# Patient Record
Sex: FEMALE | Race: BLACK OR AFRICAN AMERICAN | NOT HISPANIC OR LATINO | Employment: FULL TIME | URBAN - METROPOLITAN AREA
[De-identification: names, ages, dates, MRNs, and addresses within clinical notes are randomized per-mention and may not be internally consistent; named-entity substitution may affect disease eponyms.]

---

## 2017-01-05 ENCOUNTER — TELEPHONE (OUTPATIENT)
Dept: INTERNAL MEDICINE | Facility: CLINIC | Age: 57
End: 2017-01-05

## 2017-01-09 ENCOUNTER — TELEPHONE (OUTPATIENT)
Dept: INTERNAL MEDICINE | Facility: CLINIC | Age: 57
End: 2017-01-09

## 2017-01-09 RX ORDER — HYDROCHLOROTHIAZIDE 12.5 MG/1
25 CAPSULE ORAL DAILY
Qty: 60 CAPSULE | Refills: 6 | Status: SHIPPED | OUTPATIENT
Start: 2017-01-09 | End: 2017-12-05 | Stop reason: SDUPTHER

## 2017-01-09 RX ORDER — HYDROCHLOROTHIAZIDE 12.5 MG/1
25 CAPSULE ORAL DAILY
Qty: 30 CAPSULE | Refills: 6 | Status: SHIPPED | OUTPATIENT
Start: 2017-01-09 | End: 2017-01-09 | Stop reason: SDUPTHER

## 2017-01-09 RX ORDER — LACTULOSE 10 G/15ML
SOLUTION ORAL; RECTAL
Qty: 236 ML | Refills: 0 | Status: SHIPPED | OUTPATIENT
Start: 2017-01-09 | End: 2017-02-15 | Stop reason: SDUPTHER

## 2017-01-09 RX ORDER — NISOLDIPINE 25.5 MG/1
TABLET, FILM COATED, EXTENDED RELEASE ORAL
Qty: 180 TABLET | Refills: 3 | Status: SHIPPED | OUTPATIENT
Start: 2017-01-09 | End: 2017-12-23 | Stop reason: SDUPTHER

## 2017-01-09 NOTE — TELEPHONE ENCOUNTER
----- Message from Gail Rogers sent at 1/9/2017  9:42 AM CST -----  Contact: self  - [phone]  RX request - refill or new RX.  Is this a refill or new RX:  refill  RX name and strength: Nisoldipine 25.5 mg   Directions:   Is this a 30 day or 90 day RX:  30 day  Pharmacy name and phone #: [facility]  [phone]    Comments:      RX request - refill or new RX.  Is this a refill or new RX:hydrochlorothiazide (MICROZIDE) 12.5    RX name and strength:   Directions:   Is this a 30 day or 90 day RX: 30 day          RX request - refill or new RX.  Is this a refill or new RX:    RX name and strength: lactulose (CHRONULAC) 10 gram/15 mL   Directions:   Is this a 30 day or 90 day RX:    :

## 2017-01-25 DIAGNOSIS — R10.13 DYSPEPSIA: ICD-10-CM

## 2017-01-25 RX ORDER — SUCRALFATE 1 G/1
TABLET ORAL
Qty: 90 TABLET | Refills: 0 | OUTPATIENT
Start: 2017-01-25

## 2017-02-09 DIAGNOSIS — R10.13 DYSPEPSIA: ICD-10-CM

## 2017-02-09 RX ORDER — METOPROLOL TARTRATE 100 MG/1
TABLET ORAL
Qty: 180 TABLET | Refills: 1 | Status: SHIPPED | OUTPATIENT
Start: 2017-02-09 | End: 2017-05-18 | Stop reason: SDUPTHER

## 2017-02-09 RX ORDER — HYOSCYAMINE SULFATE 0.12 MG/1
TABLET SUBLINGUAL
Qty: 30 TABLET | Refills: 0 | Status: SHIPPED | OUTPATIENT
Start: 2017-02-09 | End: 2018-01-19 | Stop reason: SDUPTHER

## 2017-02-09 RX ORDER — SUCRALFATE 1 G/1
1 TABLET ORAL 3 TIMES DAILY
Qty: 90 TABLET | Refills: 2 | Status: SHIPPED | OUTPATIENT
Start: 2017-02-09 | End: 2017-07-20 | Stop reason: SDUPTHER

## 2017-02-09 RX ORDER — HYOSCYAMINE SULFATE 0.12 MG/1
TABLET SUBLINGUAL
Qty: 30 TABLET | Refills: 0 | Status: SHIPPED | OUTPATIENT
Start: 2017-02-09 | End: 2017-02-09 | Stop reason: SDUPTHER

## 2017-02-09 NOTE — TELEPHONE ENCOUNTER
----- Message from Ofelia Roberson sent at 2/9/2017  9:02 AM CST -----  Contact: Self- Joslyn- [phone]  RX request - refill or new RX.  Is this a refill or new RX:  New Rx  RX name and strength: hyoscyamine (LEVSIN/SL) 0.125 mg Subl  Directions:   Is this a 30 day or 90 day RX:  30 day  Pharmacy name and phone #: [place] [phone], [place] - [address] [place] [address]  Comments:      Is this a refill or new RX:  New Rx  RX name and strength: sucralfate (CARAFATE) 1 gram tablet  Directions:   Is this a 30 day or 90 day RX:  30 day

## 2017-02-15 RX ORDER — LACTULOSE 10 G/15ML
SOLUTION ORAL; RECTAL
Qty: 473 ML | Refills: 1 | Status: SHIPPED | OUTPATIENT
Start: 2017-02-15 | End: 2017-08-02 | Stop reason: SDUPTHER

## 2017-02-23 ENCOUNTER — LAB VISIT (OUTPATIENT)
Dept: LAB | Facility: HOSPITAL | Age: 57
End: 2017-02-23
Attending: INTERNAL MEDICINE
Payer: COMMERCIAL

## 2017-02-23 LAB
ALBUMIN SERPL BCP-MCNC: 3.4 G/DL
ALP SERPL-CCNC: 96 U/L
ALT SERPL W/O P-5'-P-CCNC: 16 U/L
ANION GAP SERPL CALC-SCNC: 13 MMOL/L
AST SERPL-CCNC: 17 U/L
BILIRUB SERPL-MCNC: 0.3 MG/DL
BUN SERPL-MCNC: 26 MG/DL
CALCIUM SERPL-MCNC: 10 MG/DL
CHLORIDE SERPL-SCNC: 98 MMOL/L
CHOLEST/HDLC SERPL: 4.4 {RATIO}
CO2 SERPL-SCNC: 26 MMOL/L
CREAT SERPL-MCNC: 0.9 MG/DL
EST. GFR  (AFRICAN AMERICAN): >60 ML/MIN/1.73 M^2
EST. GFR  (NON AFRICAN AMERICAN): >60 ML/MIN/1.73 M^2
ESTIMATED AVG GLUCOSE: 235 MG/DL
GLUCOSE SERPL-MCNC: 266 MG/DL
HBA1C MFR BLD HPLC: 9.8 %
HDL/CHOLESTEROL RATIO: 22.9 %
HDLC SERPL-MCNC: 214 MG/DL
HDLC SERPL-MCNC: 49 MG/DL
LDLC SERPL CALC-MCNC: 131.8 MG/DL
NONHDLC SERPL-MCNC: 165 MG/DL
POTASSIUM SERPL-SCNC: 4 MMOL/L
PROT SERPL-MCNC: 8 G/DL
SODIUM SERPL-SCNC: 137 MMOL/L
TRIGL SERPL-MCNC: 166 MG/DL
TSH SERPL DL<=0.005 MIU/L-ACNC: 2.55 UIU/ML

## 2017-02-23 PROCEDURE — 84443 ASSAY THYROID STIM HORMONE: CPT

## 2017-02-23 PROCEDURE — 83036 HEMOGLOBIN GLYCOSYLATED A1C: CPT

## 2017-02-23 PROCEDURE — 36415 COLL VENOUS BLD VENIPUNCTURE: CPT

## 2017-02-23 PROCEDURE — 80061 LIPID PANEL: CPT

## 2017-02-23 PROCEDURE — 80053 COMPREHEN METABOLIC PANEL: CPT

## 2017-03-01 ENCOUNTER — TELEPHONE (OUTPATIENT)
Dept: INTERNAL MEDICINE | Facility: CLINIC | Age: 57
End: 2017-03-01

## 2017-03-01 DIAGNOSIS — M17.0 OSTEOARTHRITIS OF BOTH KNEES, UNSPECIFIED OSTEOARTHRITIS TYPE: Primary | ICD-10-CM

## 2017-03-01 RX ORDER — HYDROCODONE BITARTRATE AND ACETAMINOPHEN 10; 325 MG/1; MG/1
1 TABLET ORAL EVERY 4 HOURS PRN
Qty: 180 TABLET | Refills: 0 | Status: SHIPPED | OUTPATIENT
Start: 2017-03-01 | End: 2017-05-01 | Stop reason: SDUPTHER

## 2017-03-01 NOTE — TELEPHONE ENCOUNTER
----- Message from Emory Gong sent at 3/1/2017  9:41 AM CST -----  Contact: Self [phone]  Pt would like to speak with the nurse to see about getting her Knee Injections,Please call

## 2017-03-01 NOTE — TELEPHONE ENCOUNTER
Pt stated that you injected her knees approximately 6 months ago. She would like to have both knees re-injected. Would you need to see pt in clinic first? Or will you just place Euflexxa authorization request? Please advise.

## 2017-03-01 NOTE — TELEPHONE ENCOUNTER
----- Message from Chris Caicedo sent at 3/1/2017  9:44 AM CST -----  Contact: Self [phone]  Type: Rx    Name of medication(s):hydrocodone-acetaminophen 10-325mg (NORCO)  mg Tab     Is this a refill? New rx? Refill    Who prescribed medication? Dr Petersen    Pharmacy Name, Phone, & Location: [facility]     Comments:Advice    Thanks

## 2017-03-03 RX ORDER — QUINAPRIL 40 MG/1
TABLET ORAL
Qty: 90 TABLET | Refills: 0 | Status: SHIPPED | OUTPATIENT
Start: 2017-03-03 | End: 2017-06-07 | Stop reason: SDUPTHER

## 2017-03-03 NOTE — TELEPHONE ENCOUNTER
----- Message from Emmy Branham sent at 3/3/2017  9:11 AM CST -----  Contact: [phone]  RX request - refill or new RX.  Is this a refill or new RX:  refill  RX name and strength: quinapril (ACCUPRIL) 40 MG tablet  Directions: TAKE ONE TABLET BY MOUTH ONCE DAILY IN THE EVENING  Is this a 30 day or 90 day RX:  90  Pharmacy name and phone #: [facility] [phone] (Phone)  [phone] (Fax  Comments:

## 2017-03-03 NOTE — TELEPHONE ENCOUNTER
PA for euflexxa placed - please submit to authorizations and Please schedule patient for an appointment with me. Thank you.

## 2017-03-06 ENCOUNTER — TELEPHONE (OUTPATIENT)
Dept: INTERNAL MEDICINE | Facility: CLINIC | Age: 57
End: 2017-03-06

## 2017-03-06 NOTE — TELEPHONE ENCOUNTER
Spoke with pt. Appt for Euflexxa has been scheduled. Pt verbalized understanding of appt details.

## 2017-03-06 NOTE — TELEPHONE ENCOUNTER
----- Message from Sylvie Loo sent at 3/6/2017  9:57 AM CST -----  Contact: patient [phone]  Pt is returning a call from the nurse about a knee injection.

## 2017-03-06 NOTE — TELEPHONE ENCOUNTER
Spoke with pt in regards to scheduling bilateral Euflexxa injections. Appt has been scheduled. Pt has been notified and verbalized understanding of appt details.

## 2017-03-09 ENCOUNTER — OFFICE VISIT (OUTPATIENT)
Dept: INTERNAL MEDICINE | Facility: CLINIC | Age: 57
End: 2017-03-09
Payer: COMMERCIAL

## 2017-03-09 VITALS
HEART RATE: 68 BPM | HEIGHT: 64 IN | WEIGHT: 293 LBS | RESPIRATION RATE: 16 BRPM | BODY MASS INDEX: 50.02 KG/M2 | SYSTOLIC BLOOD PRESSURE: 128 MMHG | DIASTOLIC BLOOD PRESSURE: 86 MMHG | TEMPERATURE: 99 F

## 2017-03-09 DIAGNOSIS — E03.9 HYPOTHYROIDISM, UNSPECIFIED TYPE: ICD-10-CM

## 2017-03-09 DIAGNOSIS — I10 HTN, GOAL BELOW 130/80: ICD-10-CM

## 2017-03-09 DIAGNOSIS — E66.01 MORBID OBESITY WITH BMI OF 60.0-69.9, ADULT: ICD-10-CM

## 2017-03-09 DIAGNOSIS — E78.5 HYPERLIPIDEMIA, UNSPECIFIED HYPERLIPIDEMIA TYPE: ICD-10-CM

## 2017-03-09 PROCEDURE — 99214 OFFICE O/P EST MOD 30 MIN: CPT | Mod: S$GLB,,, | Performed by: INTERNAL MEDICINE

## 2017-03-09 PROCEDURE — 99999 PR PBB SHADOW E&M-EST. PATIENT-LVL III: CPT | Mod: PBBFAC,,, | Performed by: INTERNAL MEDICINE

## 2017-03-09 RX ORDER — BUPROPION HYDROCHLORIDE 150 MG/1
150 TABLET ORAL DAILY
Qty: 30 TABLET | Refills: 11 | Status: SHIPPED | OUTPATIENT
Start: 2017-03-09 | End: 2020-04-15

## 2017-03-09 NOTE — PROGRESS NOTES
Subjective:       Patient ID: Violeta Champion is a 56 y.o. female.    Chief Complaint: Follow-up (6 month f/u)    Patient is a 56 y.o.female who presents today for follow up. She stopped her statin and hasn't been taking the januvia daily. She has not been following the diet either.    Cholesterol: (reviewed)  Vaccines: Influenza (yearly); Tetanus (done) ; Prevnar(declines)  Eye exam: dec 2016  Mammogram: nov 2016  Gyn exam: total hysterectomy  Colonoscopy: due in April 2018    Review of Systems   Constitutional: Negative for appetite change, chills, diaphoresis, fatigue and fever.   HENT: Negative for congestion, dental problem, ear discharge, ear pain, hearing loss, postnasal drip, sinus pressure and sore throat.    Eyes: Negative for discharge, redness and itching.   Respiratory: Negative for cough, chest tightness, shortness of breath and wheezing.    Cardiovascular: Negative for chest pain, palpitations and leg swelling.   Gastrointestinal: Negative for abdominal pain, constipation, diarrhea, nausea and vomiting.   Endocrine: Negative for cold intolerance and heat intolerance.   Genitourinary: Negative for difficulty urinating, frequency, hematuria and urgency.   Musculoskeletal: Negative for arthralgias, back pain, gait problem, myalgias and neck pain.   Skin: Negative for color change and rash.   Neurological: Negative for dizziness, syncope and headaches.   Hematological: Negative for adenopathy.   Psychiatric/Behavioral: Negative for behavioral problems and sleep disturbance. The patient is not nervous/anxious.        Objective:      Physical Exam   Constitutional: She is oriented to person, place, and time. She appears well-developed and well-nourished. No distress.   HENT:   Head: Normocephalic and atraumatic.   Right Ear: External ear normal.   Left Ear: External ear normal.   Eyes: Conjunctivae and EOM are normal. Pupils are equal, round, and reactive to light. Right eye exhibits no discharge. Left eye  exhibits no discharge. No scleral icterus.   Neck: Normal range of motion. Neck supple. No JVD present. No thyromegaly present.   Cardiovascular: Normal rate, regular rhythm, normal heart sounds and intact distal pulses.  Exam reveals no gallop and no friction rub.    No murmur heard.  Pulmonary/Chest: Effort normal and breath sounds normal. No stridor. No respiratory distress. She has no wheezes. She has no rales. She exhibits no tenderness.   Abdominal: Soft. Bowel sounds are normal. She exhibits no distension. There is no tenderness. There is no rebound.   Musculoskeletal: Normal range of motion. She exhibits no edema or tenderness.   Lymphadenopathy:     She has no cervical adenopathy.   Neurological: She is alert and oriented to person, place, and time.   Skin: Skin is warm. No rash noted. She is not diaphoretic. No erythema.   Psychiatric: She has a normal mood and affect. Her behavior is normal.   Nursing note and vitals reviewed.      Assessment and Plan:       1. Uncontrolled type 2 diabetes mellitus without complication, without long-term current use of insulin  - continue metformin  - increase januvia to 100 mg  - restart diabetic diet  - rtc in one month  - Ambulatory Referral to Nutrition Services  - Hemoglobin A1c; Future    2. Hypothyroidism, unspecified type  - stable    3. HTN, goal below 130/80  - stable    4. Hyperlipidemia, unspecified hyperlipidemia type  - restart statin  - Lipid panel; Future    5. Morbid obesity with BMI of 60.0-69.9, adult  - Patient educated on importance of diet and exercise.  Recommended 30-45 minutes of exercise five days a week.  In addition, counseled patient on importance of low fat diet.  Limit carbohydrate intake.  Increase protein intake and vegetables.         Return in about 4 weeks (around 4/6/2017).

## 2017-03-19 ENCOUNTER — TELEPHONE (OUTPATIENT)
Dept: INTERNAL MEDICINE | Facility: CLINIC | Age: 57
End: 2017-03-19

## 2017-03-20 RX ORDER — METFORMIN HYDROCHLORIDE 1000 MG/1
TABLET ORAL
Qty: 180 TABLET | Refills: 1 | Status: SHIPPED | OUTPATIENT
Start: 2017-03-20 | End: 2017-09-25 | Stop reason: SDUPTHER

## 2017-03-20 RX ORDER — GLIPIZIDE 10 MG/1
10 TABLET ORAL
Qty: 180 TABLET | Refills: 3 | Status: SHIPPED | OUTPATIENT
Start: 2017-03-20 | End: 2018-03-24 | Stop reason: SDUPTHER

## 2017-03-20 NOTE — TELEPHONE ENCOUNTER
----- Message from Sylvie Loo sent at 3/20/2017  8:25 AM CDT -----  Contact: patient [phone] home  Pt would like to add glipizide back to her blood sugar medications because reading is still staying in the 300 range. Pharmacy info is on file.

## 2017-04-06 ENCOUNTER — LAB VISIT (OUTPATIENT)
Dept: LAB | Facility: HOSPITAL | Age: 57
End: 2017-04-06
Attending: INTERNAL MEDICINE
Payer: COMMERCIAL

## 2017-04-06 DIAGNOSIS — E78.5 HYPERLIPIDEMIA, UNSPECIFIED HYPERLIPIDEMIA TYPE: ICD-10-CM

## 2017-04-06 LAB
CHOLEST/HDLC SERPL: 4 {RATIO}
HDL/CHOLESTEROL RATIO: 24.8 %
HDLC SERPL-MCNC: 206 MG/DL
HDLC SERPL-MCNC: 51 MG/DL
LDLC SERPL CALC-MCNC: 127.4 MG/DL
NONHDLC SERPL-MCNC: 155 MG/DL
TRIGL SERPL-MCNC: 138 MG/DL

## 2017-04-06 PROCEDURE — 83036 HEMOGLOBIN GLYCOSYLATED A1C: CPT

## 2017-04-06 PROCEDURE — 36415 COLL VENOUS BLD VENIPUNCTURE: CPT | Mod: PO

## 2017-04-06 PROCEDURE — 80061 LIPID PANEL: CPT

## 2017-04-07 LAB
ESTIMATED AVG GLUCOSE: 280 MG/DL
HBA1C MFR BLD HPLC: 11.4 %

## 2017-04-18 ENCOUNTER — LAB VISIT (OUTPATIENT)
Dept: LAB | Facility: HOSPITAL | Age: 57
End: 2017-04-18
Attending: INTERNAL MEDICINE
Payer: COMMERCIAL

## 2017-04-18 ENCOUNTER — OFFICE VISIT (OUTPATIENT)
Dept: INTERNAL MEDICINE | Facility: CLINIC | Age: 57
End: 2017-04-18
Payer: COMMERCIAL

## 2017-04-18 VITALS
BODY MASS INDEX: 50.02 KG/M2 | WEIGHT: 293 LBS | TEMPERATURE: 98 F | HEIGHT: 64 IN | HEART RATE: 71 BPM | SYSTOLIC BLOOD PRESSURE: 131 MMHG | DIASTOLIC BLOOD PRESSURE: 88 MMHG | RESPIRATION RATE: 16 BRPM

## 2017-04-18 DIAGNOSIS — E11.9 TYPE 2 DIABETES MELLITUS WITHOUT COMPLICATION, WITHOUT LONG-TERM CURRENT USE OF INSULIN: Primary | ICD-10-CM

## 2017-04-18 DIAGNOSIS — E11.9 TYPE 2 DIABETES MELLITUS WITHOUT COMPLICATION, WITHOUT LONG-TERM CURRENT USE OF INSULIN: ICD-10-CM

## 2017-04-18 LAB
ALBUMIN SERPL BCP-MCNC: 3.2 G/DL
ALP SERPL-CCNC: 87 U/L
ALT SERPL W/O P-5'-P-CCNC: 14 U/L
ANION GAP SERPL CALC-SCNC: 13 MMOL/L
AST SERPL-CCNC: 18 U/L
BILIRUB SERPL-MCNC: 0.2 MG/DL
BUN SERPL-MCNC: 26 MG/DL
CALCIUM SERPL-MCNC: 9.7 MG/DL
CHLORIDE SERPL-SCNC: 102 MMOL/L
CO2 SERPL-SCNC: 26 MMOL/L
CREAT SERPL-MCNC: 0.9 MG/DL
EST. GFR  (AFRICAN AMERICAN): >60 ML/MIN/1.73 M^2
EST. GFR  (NON AFRICAN AMERICAN): >60 ML/MIN/1.73 M^2
GLUCOSE SERPL-MCNC: 206 MG/DL
POTASSIUM SERPL-SCNC: 4.6 MMOL/L
PROT SERPL-MCNC: 7.5 G/DL
SODIUM SERPL-SCNC: 141 MMOL/L

## 2017-04-18 PROCEDURE — 36415 COLL VENOUS BLD VENIPUNCTURE: CPT | Mod: PO

## 2017-04-18 PROCEDURE — 99999 PR PBB SHADOW E&M-EST. PATIENT-LVL III: CPT | Mod: PBBFAC,,, | Performed by: INTERNAL MEDICINE

## 2017-04-18 PROCEDURE — 99214 OFFICE O/P EST MOD 30 MIN: CPT | Mod: S$GLB,,, | Performed by: INTERNAL MEDICINE

## 2017-04-18 PROCEDURE — 80053 COMPREHEN METABOLIC PANEL: CPT

## 2017-04-18 NOTE — MR AVS SNAPSHOT
[facility]   [facility]  [place] [address]  Phone: [phone]  Fax: [phone]                  Violeta Champion   2017 10:40 AM   Office Visit    Description:  Female : 1960   Provider:  Madie Petersen DO   Department:  [facility]           Reason for Visit     Follow-up           Diagnoses this Visit        Comments    Type 2 diabetes mellitus without complication, without long-term current use of insulin    -  Primary            To Do List           Future Appointments        Provider Department Dept Phone    2017 9:00 AM MD [facility] - Endo/Diab/Metab [phone]    2017 1:30 PM DIETITIAN, GENERAL INT MED [facility] Zaire paul  Nutrition [phone]    2017 1:40 PM Arash Hester MD [facility] Internal Medicine [phone]      Goals (5 Years of Data)     None      Follow-Up and Disposition     Return in about 4 weeks (around 2017).       These Medications        Disp Refills Start End    canagliflozin (INVOKANA) 100 mg Tab 30 tablet 3 2017     Take 1 tablet (100 mg total) by mouth once daily. - Oral    Pharmacy: [facility] [address] [place] [address] #: [phone]         [facility]     [facility] On Call Nurse Care Line -  Assistance  Unless otherwise directed by your provider, please contact [facility], our nurse care line that is available for  assistance.     Registered nurses in the [facility] provide: appointment scheduling, clinical advisement, health education, and other advisory services.  Call: [phone] (toll free)               Medications           Message regarding Medications     Verify the changes and/or additions to your medication regime listed below are the same as discussed with your clinician today.  If any of these changes or additions are incorrect, please notify your healthcare provider.        START  taking these NEW medications        Refills    canagliflozin (INVOKANA) 100 mg Tab 3    Sig: Take 1 tablet (100 mg total) by mouth once daily.    Class: Normal    Route: Oral      STOP taking these medications     EUFLEXXA injection Syrg 20 mg     EUFLEXXA injection Syrg 20 mg     EUFLEXXA injection Syrg 20 mg     EUFLEXXA injection Syrg 20 mg     EUFLEXXA injection Syrg 20 mg     EUFLEXXA injection Syrg 20 mg     blood-glucose meter kit Use as instructed           Verify that the below list of medications is an accurate representation of the medications you are currently taking.  If none reported, the list may be blank. If incorrect, please contact your healthcare provider. Carry this list with you in case of emergency.           Current Medications     albuterol 90 mcg/actuation inhaler Inhale 1-2 puffs into the lungs every 6 (six) hours as needed for Wheezing.    aspirin 81 mg Tab Take by mouth. 1 Tablet Oral Every day    blood sugar diagnostic (FREESTYLE INSULINX TEST STRIPS) Strp Check glucose three times daily    buPROPion (WELLBUTRIN XL) 150 MG TB24 tablet Take 1 tablet (150 mg total) by mouth once daily.    butalbital-acetaminophen-caffeine -40 mg (FIORICET, ESGIC) -40 mg per tablet Take 1 tablet by mouth every 4 (four) hours as needed. for pain.    calcium carbonate (CALCIUM CARBONATE) 300 mg (750 mg) Chew Take by mouth 2 (two) times daily.      cyclobenzaprine (FLEXERIL) 10 MG tablet Take 1 tablet (10 mg total) by mouth 2 (two) times daily as needed for Muscle spasms.    etodolac (LODINE) 500 MG tablet Take 1 tablet (500 mg total) by mouth 2 (two) times daily.    fish oil-dha-epa 1,200-144-216 mg Cap Take by mouth. 1 Capsule Oral Every day    flurazepam (DALMANE) 30 MG capsule Take 30 mg by mouth nightly as needed.      glipiZIDE (GLUCOTROL) 10 MG tablet Take 1 tablet (10 mg total) by mouth 2 (two) times daily before meals.    hydrochlorothiazide (MICROZIDE) 12.5 mg capsule Take 2 capsules (25  mg total) by mouth once daily.    hydrocodone-acetaminophen 10-325mg (NORCO)  mg Tab Take 1 tablet by mouth every 4 (four) hours as needed for Pain.    hyoscyamine (LEVSIN/SL) 0.125 mg Subl DISSOLVE ONE TABLET UNDER THE TONGUE EVERY 4 TO 6 HOURS    lactulose (CHRONULAC) 10 gram/15 mL solution TAKE  15 ML BY MOUTH ONCE DAILY AS NEEDED FOR  CONSTIPATION    lactulose (CHRONULAC) 10 gram/15 mL solution TAKE 15 ML BY MOUTH ONCE DAILY AS NEEDED FOR CONSTIPATION    lancets Misc 1 Units by Misc.(Non-Drug; Combo Route) route 2 (two) times daily.    lancets Misc Check glucose three times daily    levothyroxine (SYNTHROID) 100 MCG tablet Take 1 tablet (100 mcg total) by mouth once daily.    lorazepam (ATIVAN) 0.5 MG tablet TAKE ONE TABLET BY MOUTH TWICE DAILY    meclizine (ANTIVERT) 25 mg tablet Take 1 tablet (25 mg total) by mouth 3 (three) times daily as needed.    metformin (GLUCOPHAGE) 1000 MG tablet TAKE ONE TABLET BY MOUTH TWICE DAILY WITH MEALS    metformin (GLUCOPHAGE-XR) 500 MG 24 hr tablet Take 2 tablets (1,000 mg total) by mouth 2 (two) times daily with meals.    metoprolol tartrate (LOPRESSOR) 100 MG tablet TAKE ONE TABLET BY MOUTH TWICE DAILY    mv-min-FA-Hb-Nf-rvuhbga-lutein (CENTRUM) 0.4-162-18 mg Tab Take by mouth. 1 Tablet Oral Every day    nisoldipine (SULAR) 25.5 MG 24 hr tablet TAKE ONE TABLET BY MOUTH TWICE DAILY    omeprazole (PRILOSEC) 20 MG capsule Take 1 capsule (20 mg total) by mouth 2 (two) times daily.    ondansetron (ZOFRAN) 4 MG tablet Take 1 tablet (4 mg total) by mouth every 8 (eight) hours as needed.    pravastatin (PRAVACHOL) 40 MG tablet     quinapril (ACCUPRIL) 40 MG tablet TAKE ONE TABLET BY MOUTH ONCE DAILY IN THE EVENING    SITagliptan (JANUVIA) 100 MG Tab Take 1 tablet (100 mg total) by mouth once daily.    sucralfate (CARAFATE) 1 gram tablet Take 1 tablet (1 g total) by mouth 3 (three) times daily.    temazepam (RESTORIL) 30 mg capsule Take 1 capsule (30 mg total) by mouth nightly  "as needed. 1 Capsule Oral Every day    tramadol (ULTRAM) 50 mg tablet Take 1-2 tablets ( mg total) by mouth 2 (two) times daily as needed for Pain.    VOLTAREN 1 % Gel APPLY TWO GRAMS TOPICALLY ONCE DAILY    canagliflozin (INVOKANA) 100 mg Tab Take 1 tablet (100 mg total) by mouth once daily.           Clinical Reference Information           Your Vitals Were     BP Pulse Temp Resp Height Weight    131/88 (BP Location: Left arm, Patient Position: Sitting, BP Method: Manual) 71 98.3 °F (36.8 °C) (Oral) 16 5' 4" (1.626 m) 162.3 kg (357 lb 12.9 oz)    BMI                61.42 kg/m2          Blood Pressure          Most Recent Value    BP  131/88      Allergies as of 4/18/2017     Iodinated Contrast Media - Oral And Iv Dye    Naproxen Sodium    Naprosyn  [Naproxen]      Immunizations Administered on Date of Encounter - 4/18/2017     None      Orders Placed During Today's Visit      Normal Orders This Visit    Ambulatory consult to Endocrinology     Future Labs/Procedures Expected by Expires    Comprehensive metabolic panel  4/18/2017 4/18/2019      [facility] Sign-Up     Activating your [facility] account is as easy as 1-2-3!     1) Visit [email or web address], select Sign Up Now, enter this activation code and your date of birth, then select Next.  [email or web address]  Expires: 6/2/2017 12:08 PM      2) Create a username and password to use when you visit [facility] in the future and select a security question in case you lose your password and select Next.    3) Enter your e-mail address and click Sign Up!    Additional Information  If you have questions, please e-mail [email or web address] or call [phone] to talk to our [facility] staff. Remember, [facility] is NOT to be used for urgent needs. For medical emergencies, dial 911.         Language Assistance Services     ATTENTION: Language assistance services are available, free of charge. Please call [phone].      ATENCIÓN: Si tracila español, tiene a jarvis disposición " servicios gratuitos de asistencia lingüística. Alexandro garcia [phone].     [facility] Ý: N?u b?n nói Ti?ng Vi?t, có các d?ch v? h? tr? ngôn ng? mi?n phí dành cho b?n. G?i s? [phone].         [facility] complies with applicable Federal civil rights laws and does not discriminate on the basis of race, color, national origin, age, disability, or sex.

## 2017-04-18 NOTE — PROGRESS NOTES
Subjective:       Patient ID: Violeta Champion is a 56 y.o. female.    Chief Complaint: Follow-up (1 month)    Patient is a 56 y.o.female who presents today for diabetes follow up.    Diabetes - Patient is on metformin, glipizide and januvia. She does check her BG at home, and it ranges from 240 to 250.   Her last A1c's were   Hemoglobin A1C   Date Value Ref Range Status   04/06/2017 11.4 (H) 4.5 - 6.2 % Final     Comment:     According to ADA guidelines, hemoglobin A1C <7.0% represents  optimal control in non-pregnant diabetic patients.  Different  metrics may apply to specific populations.   Standards of Medical Care in Diabetes - 2016.  For the purpose of screening for the presence of diabetes:  <5.7%     Consistent with the absence of diabetes  5.7-6.4%  Consistent with increasing risk for diabetes   (prediabetes)  >or=6.5%  Consistent with diabetes  Currently no consensus exists for use of hemoglobin A1C  for diagnosis of diabetes for children.     02/23/2017 9.8 (H) 4.5 - 6.2 % Final     Comment:     According to ADA guidelines, hemoglobin A1C <7.0% represents  optimal control in non-pregnant diabetic patients.  Different  metrics may apply to specific populations.   Standards of Medical Care in Diabetes - 2016.  For the purpose of screening for the presence of diabetes:  <5.7%     Consistent with the absence of diabetes  5.7-6.4%  Consistent with increasing risk for diabetes   (prediabetes)  >or=6.5%  Consistent with diabetes  Currently no consensus exists for use of hemoglobin A1C  for diagnosis of diabetes for children.     12/16/2016 8.9 (H) 4.5 - 6.2 % Final     Comment:     According to ADA guidelines, hemoglobin A1C <7.0% represents  optimal control in non-pregnant diabetic patients.  Different  metrics may apply to specific populations.   Standards of Medical Care in Diabetes - 2016.  For the purpose of screening for the presence of diabetes:  <5.7%     Consistent with the absence of diabetes  5.7-6.4%   Consistent with increasing risk for diabetes   (prediabetes)  >or=6.5%  Consistent with diabetes  Currently no consensus exists for use of hemoglobin A1C  for diagnosis of diabetes for children.     .    Breakfast: chobani greek yogurt; sugar 15 gm of sugar and 2 chicken legs; apple juice    Lunch: sandwich; Tunisian bread or wheat bread; fruit ( orange or banana)     Dinner: protein shake; later on she will eat chicken and yogurt     Review of Systems   Constitutional: Negative for appetite change, chills, diaphoresis, fatigue and fever.   HENT: Negative for congestion, dental problem, ear discharge, ear pain, hearing loss, postnasal drip, sinus pressure and sore throat.    Eyes: Negative for discharge and itching.   Respiratory: Negative for cough, chest tightness, shortness of breath and wheezing.    Cardiovascular: Negative for chest pain, palpitations and leg swelling.   Gastrointestinal: Negative for abdominal pain, constipation, diarrhea, nausea and vomiting.   Endocrine: Negative for cold intolerance and heat intolerance.   Genitourinary: Negative for difficulty urinating, frequency, hematuria and urgency.   Musculoskeletal: Negative for arthralgias, back pain, gait problem, myalgias and neck pain.   Skin: Negative for color change and rash.   Neurological: Negative for dizziness, syncope and headaches.   Hematological: Negative for adenopathy.   Psychiatric/Behavioral: Negative for behavioral problems and sleep disturbance. The patient is not nervous/anxious.        Objective:      Physical Exam   Constitutional: She is oriented to person, place, and time. She appears well-developed and well-nourished. No distress.   HENT:   Head: Normocephalic and atraumatic.   Right Ear: External ear normal.   Left Ear: External ear normal.   Eyes: Conjunctivae and EOM are normal. Pupils are equal, round, and reactive to light. Right eye exhibits no discharge. Left eye exhibits no discharge. No scleral icterus.   Neck:  Normal range of motion. Neck supple. No JVD present. No thyromegaly present.   Cardiovascular: Normal rate, regular rhythm, normal heart sounds and intact distal pulses.  Exam reveals no gallop and no friction rub.    No murmur heard.  Pulmonary/Chest: Effort normal and breath sounds normal. No stridor. No respiratory distress. She has no wheezes. She has no rales. She exhibits no tenderness.   Abdominal: Soft. Bowel sounds are normal. She exhibits no distension. There is no tenderness. There is no rebound.   Musculoskeletal: Normal range of motion. She exhibits no edema or tenderness.   Lymphadenopathy:     She has no cervical adenopathy.   Neurological: She is alert and oriented to person, place, and time.   Skin: Skin is warm. No rash noted. She is not diaphoretic. No erythema.   Psychiatric: She has a normal mood and affect. Her behavior is normal.   Nursing note and vitals reviewed.      Assessment and Plan:       1. Type 2 diabetes mellitus without complication, without long-term current use of insulin  - check kidney function today  - advised pt that we need to start insulin; she refuses  - advised she f/u with dietary and endocrine  - will attempt fourth oral medication; however, notified pt high risk for hypoglycemia  - she will monitor glucose 2-3 times daily and bring log to appt in one month  - if no improvement, will need to strongly consider insulin  - Comprehensive metabolic panel; Future  - canagliflozin (INVOKANA) 100 mg Tab; Take 1 tablet (100 mg total) by mouth once daily.  Dispense: 30 tablet; Refill: 3  - Ambulatory consult to Endocrinology  - long discussion on diabetic diet        No Follow-up on file.

## 2017-04-19 ENCOUNTER — TELEPHONE (OUTPATIENT)
Dept: INTERNAL MEDICINE | Facility: CLINIC | Age: 57
End: 2017-04-19

## 2017-04-20 NOTE — TELEPHONE ENCOUNTER
Spoke to pt and informed of lab results and ok to start invokana. Pt verbalized understanding. Will call back to schedule f/u

## 2017-04-21 ENCOUNTER — PATIENT OUTREACH (OUTPATIENT)
Dept: DIABETES | Facility: CLINIC | Age: 57
End: 2017-04-21

## 2017-04-21 DIAGNOSIS — E11.65 UNCONTROLLED TYPE 2 DIABETES MELLITUS WITH HYPERGLYCEMIA, WITHOUT LONG-TERM CURRENT USE OF INSULIN: Primary | ICD-10-CM

## 2017-04-21 NOTE — PROGRESS NOTES
Received message from Shanti Guerrero RD that pt is scheduled to see her on Monday but may require diabetes education. Pt is seeing Dr. Hammonds in [facility] on Monday, as well. Called pt, placed order for diabetes education per written order guide, and rescheduled for diabetes education class on Monday.

## 2017-04-24 ENCOUNTER — OFFICE VISIT (OUTPATIENT)
Dept: ENDOCRINOLOGY | Facility: CLINIC | Age: 57
End: 2017-04-24
Payer: COMMERCIAL

## 2017-04-24 ENCOUNTER — CLINICAL SUPPORT (OUTPATIENT)
Dept: DIABETES | Facility: CLINIC | Age: 57
End: 2017-04-24
Payer: COMMERCIAL

## 2017-04-24 VITALS
SYSTOLIC BLOOD PRESSURE: 134 MMHG | DIASTOLIC BLOOD PRESSURE: 80 MMHG | WEIGHT: 293 LBS | HEART RATE: 78 BPM | BODY MASS INDEX: 50.02 KG/M2 | HEIGHT: 64 IN

## 2017-04-24 DIAGNOSIS — E78.5 HYPERLIPIDEMIA, UNSPECIFIED HYPERLIPIDEMIA TYPE: ICD-10-CM

## 2017-04-24 DIAGNOSIS — I10 HTN, GOAL BELOW 130/80: ICD-10-CM

## 2017-04-24 DIAGNOSIS — E66.01 MORBID OBESITY WITH BMI OF 60.0-69.9, ADULT: ICD-10-CM

## 2017-04-24 DIAGNOSIS — E11.65 UNCONTROLLED TYPE 2 DIABETES MELLITUS WITH HYPERGLYCEMIA, WITHOUT LONG-TERM CURRENT USE OF INSULIN: Primary | ICD-10-CM

## 2017-04-24 DIAGNOSIS — E11.65 UNCONTROLLED TYPE 2 DIABETES MELLITUS WITH HYPERGLYCEMIA, WITHOUT LONG-TERM CURRENT USE OF INSULIN: ICD-10-CM

## 2017-04-24 PROCEDURE — G0108 DIAB MANAGE TRN  PER INDIV: HCPCS | Mod: S$GLB,,, | Performed by: INTERNAL MEDICINE

## 2017-04-24 PROCEDURE — 99999 PR PBB SHADOW E&M-EST. PATIENT-LVL III: CPT | Mod: PBBFAC,,, | Performed by: INTERNAL MEDICINE

## 2017-04-24 PROCEDURE — 99244 OFF/OP CNSLTJ NEW/EST MOD 40: CPT | Mod: S$GLB,,, | Performed by: INTERNAL MEDICINE

## 2017-04-24 PROCEDURE — 99999 PR PBB SHADOW E&M-EST. PATIENT-LVL III: CPT | Mod: PBBFAC,,,

## 2017-04-24 NOTE — MR AVS SNAPSHOT
[facility]Di[facility]Metab  [address]  [facility] [address]  Phone: [phone]  Fax: [phone]                  Violeta Champion   2017 11:00 AM   Office Visit    Description:  Female : 1960   Provider:  Rommel Hammonds MD   Department:  [facility]Di[facility]Metab           Reason for Visit     Diabetes Mellitus           Diagnoses this Visit        Comments    Uncontrolled type 2 diabetes mellitus with hyperglycemia, without long-term current use of insulin    -  Primary     HTN, goal below 130/80         Hyperlipidemia, unspecified hyperlipidemia type         Morbid obesity with BMI of 60.0-69.9, adult                To Do List           Future Appointments        Provider Department Dept Phone    2017 1:40 PM Arash Hester MD [facility] - Internal Medicine [phone]    2017 8:45 AM LAB, [facility] - Laboratory [phone]    2017 2:30 PM MD [facility] Endo/Diab/Metab [phone]      Goals (5 Years of Data)     None       These Medications        Disp Refills Start End    dulaglutide (TRULICITY) 0.75 mg/0.5 mL PnIj 2 mL 11 2017     Inject 0.5 mLs (0.75 mg total) into the skin every 7 days. - Subcutaneous    Pharmacy: [facility] [address] [place] [address] #: [phone]         [facility]     [facility] Care [facility] -  Assistance  Unless otherwise directed by your provider, please contact [facility], our nurse care line that is available for  assistance.     Registered nurses in the [facility] provide: appointment scheduling, clinical advisement, health education, and other advisory services.  Call: [phone] (toll free)               Medications           Message regarding Medications     Verify the changes and/or additions to your medication regime listed below are the same as discussed with your clinician today.  If any of these changes or additions are  incorrect, please notify your healthcare provider.        START taking these NEW medications        Refills    dulaglutide (TRULICITY) 0.75 mg/0.5 mL PnIj 11    Sig: Inject 0.5 mLs (0.75 mg total) into the skin every 7 days.    Class: Normal    Route: Subcutaneous      STOP taking these medications     SITagliptan (JANUVIA) 100 MG Tab Take 1 tablet (100 mg total) by mouth once daily.           Verify that the below list of medications is an accurate representation of the medications you are currently taking.  If none reported, the list may be blank. If incorrect, please contact your healthcare provider. Carry this list with you in case of emergency.           Current Medications     albuterol 90 mcg/actuation inhaler Inhale 1-2 puffs into the lungs every 6 (six) hours as needed for Wheezing.    aspirin 81 mg Tab Take by mouth. 1 Tablet Oral Every day    blood sugar diagnostic (FREESTYLE INSULINX TEST STRIPS) Strp Check glucose three times daily    buPROPion (WELLBUTRIN XL) 150 MG TB24 tablet Take 1 tablet (150 mg total) by mouth once daily.    butalbital-acetaminophen-caffeine -40 mg (FIORICET, ESGIC) -40 mg per tablet Take 1 tablet by mouth every 4 (four) hours as needed. for pain.    calcium carbonate (CALCIUM CARBONATE) 300 mg (750 mg) Chew Take by mouth 2 (two) times daily.      canagliflozin (INVOKANA) 100 mg Tab Take 1 tablet (100 mg total) by mouth once daily.    cyclobenzaprine (FLEXERIL) 10 MG tablet Take 1 tablet (10 mg total) by mouth 2 (two) times daily as needed for Muscle spasms.    dulaglutide (TRULICITY) 0.75 mg/0.5 mL PnIj Inject 0.5 mLs (0.75 mg total) into the skin every 7 days.    etodolac (LODINE) 500 MG tablet Take 1 tablet (500 mg total) by mouth 2 (two) times daily.    fish oil-dha-epa 1,200-144-216 mg Cap Take by mouth. 1 Capsule Oral Every day    flurazepam (DALMANE) 30 MG capsule Take 30 mg by mouth nightly as needed.      glipiZIDE (GLUCOTROL) 10 MG tablet Take 1 tablet (10 mg  total) by mouth 2 (two) times daily before meals.    hydrochlorothiazide (MICROZIDE) 12.5 mg capsule Take 2 capsules (25 mg total) by mouth once daily.    hydrocodone-acetaminophen 10-325mg (NORCO)  mg Tab Take 1 tablet by mouth every 4 (four) hours as needed for Pain.    hyoscyamine (LEVSIN/SL) 0.125 mg Subl DISSOLVE ONE TABLET UNDER THE TONGUE EVERY 4 TO 6 HOURS    lactulose (CHRONULAC) 10 gram/15 mL solution TAKE  15 ML BY MOUTH ONCE DAILY AS NEEDED FOR  CONSTIPATION    lactulose (CHRONULAC) 10 gram/15 mL solution TAKE 15 ML BY MOUTH ONCE DAILY AS NEEDED FOR CONSTIPATION    lancets Misc 1 Units by Misc.(Non-Drug; Combo Route) route 2 (two) times daily.    lancets Misc Check glucose three times daily    levothyroxine (SYNTHROID) 100 MCG tablet Take 1 tablet (100 mcg total) by mouth once daily.    lorazepam (ATIVAN) 0.5 MG tablet TAKE ONE TABLET BY MOUTH TWICE DAILY    meclizine (ANTIVERT) 25 mg tablet Take 1 tablet (25 mg total) by mouth 3 (three) times daily as needed.    metformin (GLUCOPHAGE) 1000 MG tablet TAKE ONE TABLET BY MOUTH TWICE DAILY WITH MEALS    metformin (GLUCOPHAGE-XR) 500 MG 24 hr tablet Take 2 tablets (1,000 mg total) by mouth 2 (two) times daily with meals.    metoprolol tartrate (LOPRESSOR) 100 MG tablet TAKE ONE TABLET BY MOUTH TWICE DAILY    mv-min-FA-Dm-Ch-nwmmckb-lutein (CENTRUM) 0.4-162-18 mg Tab Take by mouth. 1 Tablet Oral Every day    nisoldipine (SULAR) 25.5 MG 24 hr tablet TAKE ONE TABLET BY MOUTH TWICE DAILY    omeprazole (PRILOSEC) 20 MG capsule Take 1 capsule (20 mg total) by mouth 2 (two) times daily.    ondansetron (ZOFRAN) 4 MG tablet Take 1 tablet (4 mg total) by mouth every 8 (eight) hours as needed.    pravastatin (PRAVACHOL) 40 MG tablet     quinapril (ACCUPRIL) 40 MG tablet TAKE ONE TABLET BY MOUTH ONCE DAILY IN THE EVENING    sucralfate (CARAFATE) 1 gram tablet Take 1 tablet (1 g total) by mouth 3 (three) times daily.    temazepam (RESTORIL) 30 mg capsule Take 1  "capsule (30 mg total) by mouth nightly as needed. 1 Capsule Oral Every day    tramadol (ULTRAM) 50 mg tablet Take 1-2 tablets ( mg total) by mouth 2 (two) times daily as needed for Pain.    VOLTAREN 1 % Gel APPLY TWO GRAMS TOPICALLY ONCE DAILY           Clinical Reference Information           Your Vitals Were     BP Pulse Height Weight BMI    134/80 78 5' 4" (1.626 m) 161.6 kg (356 lb 4.2 oz) 61.15 kg/m2      Blood Pressure          Most Recent Value    BP  134/80      Allergies as of 4/24/2017     Iodinated Contrast Media - Oral And Iv Dye    Naproxen Sodium    Naprosyn  [Naproxen]      Immunizations Administered on Date of Encounter - 4/24/2017     None      Orders Placed During Today's Visit     Future Labs/Procedures Expected by Expires    Hemoglobin A1c  4/24/2017 6/23/2018      [facility] Sign-Up     Activating your [facility] account is as easy as 1-2-3!     1) Visit [email or web address], select Sign Up Now, enter this activation code and your date of birth, then select Next.  [email or web address]  Expires: 6/2/2017 12:08 PM      2) Create a username and password to use when you visit [facility] in the future and select a security question in case you lose your password and select Next.    3) Enter your e-mail address and click Sign Up!    Additional Information  If you have questions, please e-mail [email or web address] or call [phone] to talk to our [facility] staff. Remember, [facility] is NOT to be used for urgent needs. For medical emergencies, dial 911.         Language Assistance Services     ATTENTION: Language assistance services are available, free of charge. Please call [phone].      ATENCIÓN: Si habla ellis, tiene a jarvis disposición servicios gratuitos de asistencia lingüística. Llame al [phone].     CHÚ Ý: N?u b?n nói Ti?ng Vi?t, có các d?ch v? h? tr? ngôn ng? mi?n phí dành cho b?n. G?i s? [phone].         [facility] complies with applicable Federal civil rights " laws and does not discriminate on the basis of race, color, national origin, age, disability, or sex.

## 2017-04-24 NOTE — LETTER
April 24, 2017      Madie Petersen, DO  [address]  [place] [address]           [facility]  [address]  [facility] [address]  Phone: [phone]  Fax: [phone]          Patient: Violeta Champion   MR Number: [id number]   YOB: 1960   Date of Visit: 4/24/2017       Dear Dr. Madie Pteersen:    Thank you for referring Violeta Champion to me for evaluation. Attached you will find relevant portions of my assessment and plan of care.    If you have questions, please do not hesitate to call me. I look forward to following Violeta Champion along with you.    Sincerely,    Rommel Hammonds MD    Enclosure  CC:  No Recipients    If you would like to receive this communication electronically, please contact [email or web address] or [phone] to request more information on [facility] [employer] access.    For providers and/or their staff who would like to refer a patient to [facility], please contact us through our one-stop-shop provider referral line, [facility], at [phone].    If you feel you have received this communication in error or would no longer like to receive these types of communications, please e-mail [email or web address]

## 2017-04-24 NOTE — PROGRESS NOTES
Subjective:      Patient ID: Violeta Champion is a 56 y.o. female.    Chief Complaint:  Diabetes Mellitus    Referral from Dr. Petersen    History of Present Illness  Has active history of type 2 diabetes, hypertension, hyperlipidemia, YUSEF, osteopenia and hypothyroidism.      Type 2 diabetes first diagnosed in 2010. Has never been on insulin.     Diabetes Complications:  Has some neuropathy - no foot ulcers  Last eye exam 12/2016 - no retinopathy  No nephropathy - last urine micro WNL in 12/2016    Current Diabetes Regimen:  Glipizide 10 mg PO BID (started on glipizide about 1 year ago)  Metformin 1000 mg PO BID  Januvia 100 mg daily (added in past 3-4 months)  Invokana 100 mg daily (just prescribed, hasn't filled yet.    Reports full compliance with diabetes regimen.     Current Self Reported Glucoses:  AM: 195 - 245  Dinner: 220 - 245    Denies any hypoglycemia.    Eats 3 regular meals daily. Trying to eat diet high in protein and vegetable and low in carbs. Has frequent snacks between meals.     Lost about 5-7 lbs recently.     Last saw diabetes educator on 4/24/2017.    Denies polyuria. No polydipsia. No blurry vision.    Has hypothyroidism on levothyroxine 100 mcg PO daily.     Review of Systems   Constitutional: Negative for unexpected weight change.   HENT: Negative for voice change.    Eyes: Negative for visual disturbance.   Respiratory: Negative for shortness of breath.    Cardiovascular: Negative for chest pain.   Gastrointestinal: Negative for abdominal pain.   Endocrine: Negative for cold intolerance.   Genitourinary: Negative for frequency.   Musculoskeletal: Positive for myalgias.   Skin: Negative for rash.       Objective:   Physical Exam   Constitutional: She is oriented to person, place, and time. She appears well-developed and well-nourished.   HENT:   Head: Normocephalic and atraumatic.   Right Ear: External ear normal.   Left Ear: External ear normal.   Nose: Nose normal.   Neck: No tracheal  deviation present. No thyromegaly present.   Cardiovascular: Normal rate, regular rhythm and normal heart sounds.    No edema   Pulmonary/Chest: Effort normal and breath sounds normal.   Abdominal: Soft. Bowel sounds are normal. There is no tenderness.   Musculoskeletal:   Normal gait, no cyanosis or clubbing   Neurological: She is alert and oriented to person, place, and time. She has normal reflexes.   Vibration sense intact   Skin: Skin is warm and dry. No rash noted.   No nodules, no ulcers  Acanthosis and skin tags at the neck   Psychiatric: She has a normal mood and affect. Judgment normal.   Vitals reviewed.  Feet no cuts or  scratches  Shoes appropriate  sensation intact to vibration and monofilament    Lab Review:   Results for SHELLY CRUZ (MRN [id number]) as of 4/24/2017 11:20   Ref. Range 4/18/2017 11:50   Sodium Latest Ref Range: 136 - 145 mmol/L 141   Potassium Latest Ref Range: 3.5 - 5.1 mmol/L 4.6   Chloride Latest Ref Range: 95 - 110 mmol/L 102   CO2 Latest Ref Range: 23 - 29 mmol/L 26   Anion Gap Latest Ref Range: 8 - 16 mmol/L 13   BUN, Bld Latest Ref Range: 6 - 20 mg/dL 26 (H)   Creatinine Latest Ref Range: 0.5 - 1.4 mg/dL 0.9   eGFR if non African American Latest Ref Range: >60 mL/min/1.73 m^2 >60.0   eGFR if African American Latest Ref Range: >60 mL/min/1.73 m^2 >60.0   Glucose Latest Ref Range: 70 - 110 mg/dL 206 (H)   Calcium Latest Ref Range: 8.7 - 10.5 mg/dL 9.7   Alkaline Phosphatase Latest Ref Range: 55 - 135 U/L 87   Total Protein Latest Ref Range: 6.0 - 8.4 g/dL 7.5   Albumin Latest Ref Range: 3.5 - 5.2 g/dL 3.2 (L)   Total Bilirubin Latest Ref Range: 0.1 - 1.0 mg/dL 0.2   AST Latest Ref Range: 10 - 40 U/L 18   ALT Latest Ref Range: 10 - 44 U/L 14     Results for SHELLY CRUZ (MRN [id number]) as of 4/24/2017 11:20   Ref. Range 12/16/2016 11:47 2/23/2017 07:55 4/6/2017 09:14   Hemoglobin A1C Latest Ref Range: 4.5 - 6.2 % 8.9 (H) 9.8 (H) 11.4 (H)   Estimated Avg Glucose Latest Ref  Range: 68 - 131 mg/dL 209 (H) 235 (H) 280 (H)   TSH Latest Ref Range: 0.400 - 4.000 uIU/mL  2.551        Assessment:     1. Uncontrolled type 2 diabetes mellitus with hyperglycemia, without long-term current use of insulin    2. HTN, goal below 130/80    3. Hyperlipidemia, unspecified hyperlipidemia type    4. Morbid obesity with BMI of 60.0-69.9, adult        Plan:     1.) Patient with uncontrolled type 2 diabetes  --A1c goal <7.0  --Recent A1c 11.4%  --Currently on Metformin, glipizide and Januvia  --Had long discussion regarding options at this point  --Explained that I would normally add at least one shot of basal insulin with this degree of A1c elevation  --She is hesitant to start insulin as she explained to me that with diet and exercise in the past she was able to bring her A1c down to 7 from 11  --Will continue metformin and glipizide  --Will substitute once weekly Trulicity for Januvia  --She will check her glucoses regularly and if still above 140 in the next 2-4 weeks she will start Invokana as well  --Will then have her follow up in 3 months with repeat A1c and if still elevated will need to strongly consider basal insulin  --UTD with eye exam and urine micro  --Saw diabetes educator today    2.) Bp stable  --Continue current regimen    3.) On statin    4.) BMI reviewed  --Continue diet and exercise  --Trulicity should aid with wt loss    RTC in 3-4 months with A1c prior to appt    Copy to Dr. Trinity Hammonds M.D. Staff Endocrinology

## 2017-04-24 NOTE — PROGRESS NOTES
Diabetes Education  Author: Aleyda Monzon RD, CDE  Date: 4/24/2017    Diabetes Education Visit  Diabetes Education Record Assessment/Progress: Initial (Patient missed 9:00 appt with Dr. Hammonds; arrived for 11:30 DE appt at 9:27. Was able to see patient for DE and reschedule patient with Dr. Hammonds for 11:00)    Diabetes Type  Diabetes Type : Type II    Diabetes History  Diabetes Diagnosis: 5-10 years    Nutrition  Meal Planning: water, snacks between meal, 3 meals per day, eats out often (Eats about every 4 hours - 3 meals and may have 2 snacks; cut out juices; premier protein shakes (4 grams CHO), milk w/ cereal)    Monitoring   Monitoring: Other (Uses the Relion meter from [employer])  Self Monitoring :  (Checks BG BID)  Blood Glucose Logs: Yes    Exercise   Exercise Type:  (10 minutes of hand weights, leg exercises - 3 days a week)    Current Diabetes Treatment   Current Treatment: Oral Medication (Glipizide and Metformin BID, Januvia daily, was prescribed Invokana, but did not start taking yet - denies missing doses of medication )    Social History  Preferred Learning Method: Face to Face, Demonstration, Hands On, Reading Materials  Primary Support: Self  Smoking Status: Never a Smoker  Alcohol Use: Never                             Barriers to Change  Barriers to Change: None  Learning Challenges : None         Cultural Influences  Cultural Influences: No    Diabetes Education Assessment/Progress  Acute Complications (preventing, detecting, and treating acute complications): Instructed, Discussion, Individual Session, Written Materials Provided  Chronic Complications (preventing, detecting, and treating chronic complications): Instructed, Discussion, Individual Session, Written Materials Provided  Diabetes Disease Process (diabetes disease process and treatment options): Instructed, Discussion, Individual Session, Written Materials Provided  Nutrition (Incorporating nutritional management into one's lifestyle):  Instructed, Discussion, Individual Session, Written Materials Provided  Physical Activity (incorporating physical activity into one's lifestyle): Instructed, Discussion, Individual Session, Written Materials Provided  Medications (states correct name, dose, onset, peak, duration, side effects & timing of meds): Instructed, Discussion, Individual Session, Written Materials Provided  Monitoring (monitoring blood glucose/other parameters & using results): Instructed, Discussion, Individual Session, Written Materials Provided  Goal Setting and Problem Solving (verbalizes behavior change strategies & sets realistic goals): Instructed, Discussion, Individual Session  Behavior Change (developing personal strategies to health & behavior change): Instructed, Discussion, Individual Session  Psychosocial Issues (developing personal srategies to address psychosocial concerns): Instructed, Discussion, Individual Session    Goals  Other: Set (Keep healthcare appts)  Start Date: 04/24/17  Target Date: 07/24/17         Diabetes Care Plan/Intervention  Education Plan/Intervention: Individual Follow-Up DSMT    Diabetes Meal Plan  Carbohydrate Per Meal: 45-60g  Carbohydrate Per Snack : 15-20g    Education Units of Time   Time Spent: 45 min      Health Maintenance Topics with due status: Not Due       Topic Last Completion Date    Colonoscopy 04/15/2011    TETANUS VACCINE 07/13/2016    Mammogram 11/29/2016    Eye Exam 12/15/2016    Lipid Panel 04/06/2017    Hemoglobin A1c 04/06/2017     Health Maintenance Due   Topic Date Due    Pneumococcal PPSV23 (Medium Risk) (1) 12/18/1980    Foot Exam  04/12/2017

## 2017-05-01 ENCOUNTER — TELEPHONE (OUTPATIENT)
Dept: INTERNAL MEDICINE | Facility: CLINIC | Age: 57
End: 2017-05-01

## 2017-05-01 RX ORDER — HYDROCODONE BITARTRATE AND ACETAMINOPHEN 10; 325 MG/1; MG/1
1 TABLET ORAL EVERY 4 HOURS PRN
Qty: 180 TABLET | Refills: 0 | Status: SHIPPED | OUTPATIENT
Start: 2017-05-01 | End: 2017-07-18 | Stop reason: SDUPTHER

## 2017-05-01 RX ORDER — LEVOTHYROXINE SODIUM 100 UG/1
TABLET ORAL
Qty: 90 TABLET | Refills: 0 | Status: SHIPPED | OUTPATIENT
Start: 2017-05-01 | End: 2017-08-25 | Stop reason: SDUPTHER

## 2017-05-01 NOTE — TELEPHONE ENCOUNTER
----- Message from Toby Curry sent at 5/1/2017  4:34 PM CDT -----  Contact: self   RX request - refill or new RX.  Is this a refill or new RX:  Refill   RX name and strength: hydrocodone-acetaminophen 10-325mg (NORCO)  mg Tab  Directions: Take 1 tablet by mouth every 4 (four) hours as needed for Pain.  Is this a 30 day or 90 day RX:  30   Please call pt when hard copy ready for pickup

## 2017-05-08 ENCOUNTER — TELEPHONE (OUTPATIENT)
Dept: INTERNAL MEDICINE | Facility: CLINIC | Age: 57
End: 2017-05-08

## 2017-05-08 RX ORDER — LORAZEPAM 0.5 MG/1
0.5 TABLET ORAL 2 TIMES DAILY
Qty: 60 TABLET | Refills: 0 | Status: SHIPPED | OUTPATIENT
Start: 2017-05-08 | End: 2017-11-08 | Stop reason: SDUPTHER

## 2017-05-08 NOTE — TELEPHONE ENCOUNTER
----- Message from Leda Ulloa sent at 5/8/2017  1:56 PM CDT -----  Contact: Mobile: [phone]   RX request - refill or new RX.  Is this a refill or new RX:  Refill   RX name and strength: Lorazepam (ATIVAN) 0.5 MG tablet  Directions: Take as needed  Is this a 30 day or 90 day RX:  90   Pharmacy name and phone #: [facility]     [phone] (Phone)  [phone] (Fax)Comments:

## 2017-05-16 ENCOUNTER — CLINICAL SUPPORT (OUTPATIENT)
Dept: INTERNAL MEDICINE | Facility: CLINIC | Age: 57
End: 2017-05-16
Payer: COMMERCIAL

## 2017-05-16 VITALS
OXYGEN SATURATION: 98 % | BODY MASS INDEX: 50.02 KG/M2 | HEIGHT: 64 IN | HEART RATE: 81 BPM | WEIGHT: 293 LBS | SYSTOLIC BLOOD PRESSURE: 108 MMHG | DIASTOLIC BLOOD PRESSURE: 62 MMHG

## 2017-05-16 DIAGNOSIS — M17.9 OSTEOARTHRITIS OF KNEE, UNSPECIFIED LATERALITY, UNSPECIFIED OSTEOARTHRITIS TYPE: Primary | ICD-10-CM

## 2017-05-16 PROCEDURE — 99999 PR PBB SHADOW E&M-EST. PATIENT-LVL III: CPT | Mod: PBBFAC,,, | Performed by: FAMILY MEDICINE

## 2017-05-16 PROCEDURE — 99499 UNLISTED E&M SERVICE: CPT | Mod: S$GLB,,, | Performed by: FAMILY MEDICINE

## 2017-05-16 PROCEDURE — 20610 DRAIN/INJ JOINT/BURSA W/O US: CPT | Mod: S$GLB,,, | Performed by: FAMILY MEDICINE

## 2017-05-16 RX ORDER — HYALURONATE SODIUM 20 MG/2 ML
20 SYRINGE (ML) INTRAARTICULAR WEEKLY
Status: COMPLETED | OUTPATIENT
Start: 2017-05-16 | End: 2017-06-06

## 2017-05-16 RX ORDER — HYALURONATE SODIUM 20 MG/2 ML
20 SYRINGE (ML) INTRAARTICULAR WEEKLY
Status: DISCONTINUED | OUTPATIENT
Start: 2017-05-16 | End: 2018-12-17

## 2017-05-16 RX ADMIN — Medication 20 MG: at 02:05

## 2017-05-16 NOTE — MR AVS SNAPSHOT
[facility]  [address]  [place] [address]  Phone: [phone]  Fax: [phone]                  Violeta Champion   2017 1:40 PM   Clinical Support    Description:  Female : 1960   Provider:  Arash Hester MD   Department:  [facility]           Reason for Visit     Injections           Diagnoses this Visit        Comments    Osteoarthritis of knee, unspecified laterality, unspecified osteoarthritis type    -  Primary            To Do List           Future Appointments        Provider Department Dept Phone    2017 8:45 AM LAB, [facility] - Laboratory [phone]    2017 2:30 PM Rommel Hammonds MD [facility] - Endo/Diab/Metab [phone]      Goals (5 Years of Data)     None      Follow-Up and Disposition     Return in about 1 week (around 2017) for to reassess treatment for condition or medication.      [facility] On Call     [facility] On Call Nurse Care Line -  Assistance  Unless otherwise directed by your provider, please contact [facility], our nurse care line that is available for  assistance.     Registered nurses in the [facility] On Call Center provide: appointment scheduling, clinical advisement, health education, and other advisory services.  Call: [phone] (toll free)               Medications           Message regarding Medications     Verify the changes and/or additions to your medication regime listed below are the same as discussed with your clinician today.  If any of these changes or additions are incorrect, please notify your healthcare provider.        These medications were administered today        Dose Freq    EUFLEXXA 10 mg/mL(mw 2.4 -3.6 million) injection Syrg 20 mg 20 mg Weekly    Sig: Inject 2 mLs (20 mg total) into the articular space once a week.    Class: Normal    Route: Intra-articular    EUFLEXXA 10 mg/mL(mw 2.4 -3.6 million) injection Syrg 20 mg 20 mg Weekly    Sig: Inject 2  mLs (20 mg total) into the articular space once a week.    Class: Normal    Route: Intra-articular      STOP taking these medications     metformin (GLUCOPHAGE-XR) 500 MG 24 hr tablet Take 2 tablets (1,000 mg total) by mouth 2 (two) times daily with meals.           Verify that the below list of medications is an accurate representation of the medications you are currently taking.  If none reported, the list may be blank. If incorrect, please contact your healthcare provider. Carry this list with you in case of emergency.           Current Medications     albuterol 90 mcg/actuation inhaler Inhale 1-2 puffs into the lungs every 6 (six) hours as needed for Wheezing.    aspirin 81 mg Tab Take by mouth. 1 Tablet Oral Every day    blood sugar diagnostic (FREESTYLE INSULINX TEST STRIPS) Strp Check glucose three times daily    buPROPion (WELLBUTRIN XL) 150 MG TB24 tablet Take 1 tablet (150 mg total) by mouth once daily.    butalbital-acetaminophen-caffeine -40 mg (FIORICET, ESGIC) -40 mg per tablet Take 1 tablet by mouth every 4 (four) hours as needed. for pain.    calcium carbonate (CALCIUM CARBONATE) 300 mg (750 mg) Chew Take by mouth 2 (two) times daily.      canagliflozin (INVOKANA) 100 mg Tab Take 1 tablet (100 mg total) by mouth once daily.    cyclobenzaprine (FLEXERIL) 10 MG tablet Take 1 tablet (10 mg total) by mouth 2 (two) times daily as needed for Muscle spasms.    dulaglutide (TRULICITY) 0.75 mg/0.5 mL PnIj Inject 0.5 mLs (0.75 mg total) into the skin every 7 days.    etodolac (LODINE) 500 MG tablet Take 1 tablet (500 mg total) by mouth 2 (two) times daily.    fish oil-dha-epa 1,200-144-216 mg Cap Take by mouth. 1 Capsule Oral Every day    flurazepam (DALMANE) 30 MG capsule Take 30 mg by mouth nightly as needed.      glipiZIDE (GLUCOTROL) 10 MG tablet Take 1 tablet (10 mg total) by mouth 2 (two) times daily before meals.    hydrochlorothiazide (MICROZIDE) 12.5 mg capsule Take 2 capsules (25 mg  total) by mouth once daily.    hydrocodone-acetaminophen 10-325mg (NORCO)  mg Tab Take 1 tablet by mouth every 4 (four) hours as needed for Pain.    hyoscyamine (LEVSIN/SL) 0.125 mg Subl DISSOLVE ONE TABLET UNDER THE TONGUE EVERY 4 TO 6 HOURS    lactulose (CHRONULAC) 10 gram/15 mL solution TAKE  15 ML BY MOUTH ONCE DAILY AS NEEDED FOR  CONSTIPATION    lactulose (CHRONULAC) 10 gram/15 mL solution TAKE 15 ML BY MOUTH ONCE DAILY AS NEEDED FOR CONSTIPATION    lancets Misc 1 Units by Misc.(Non-Drug; Combo Route) route 2 (two) times daily.    lancets Misc Check glucose three times daily    levothyroxine (SYNTHROID) 100 MCG tablet TAKE ONE TABLET BY MOUTH ONCE DAILY    lorazepam (ATIVAN) 0.5 MG tablet Take 1 tablet (0.5 mg total) by mouth 2 (two) times daily.    meclizine (ANTIVERT) 25 mg tablet Take 1 tablet (25 mg total) by mouth 3 (three) times daily as needed.    metformin (GLUCOPHAGE) 1000 MG tablet TAKE ONE TABLET BY MOUTH TWICE DAILY WITH MEALS    metoprolol tartrate (LOPRESSOR) 100 MG tablet TAKE ONE TABLET BY MOUTH TWICE DAILY    mv-min-FA-Mv-Pv-dqukbuj-lutein (CENTRUM) 0.4-162-18 mg Tab Take by mouth. 1 Tablet Oral Every day    nisoldipine (SULAR) 25.5 MG 24 hr tablet TAKE ONE TABLET BY MOUTH TWICE DAILY    omeprazole (PRILOSEC) 20 MG capsule Take 1 capsule (20 mg total) by mouth 2 (two) times daily.    ondansetron (ZOFRAN) 4 MG tablet Take 1 tablet (4 mg total) by mouth every 8 (eight) hours as needed.    pravastatin (PRAVACHOL) 40 MG tablet     quinapril (ACCUPRIL) 40 MG tablet TAKE ONE TABLET BY MOUTH ONCE DAILY IN THE EVENING    sucralfate (CARAFATE) 1 gram tablet Take 1 tablet (1 g total) by mouth 3 (three) times daily.    temazepam (RESTORIL) 30 mg capsule Take 1 capsule (30 mg total) by mouth nightly as needed. 1 Capsule Oral Every day    tramadol (ULTRAM) 50 mg tablet Take 1-2 tablets ( mg total) by mouth 2 (two) times daily as needed for Pain.    VOLTAREN 1 % Gel APPLY TWO GRAMS TOPICALLY  "ONCE DAILY           Clinical Reference Information           Your Vitals Were     BP Pulse Height Weight SpO2 BMI    108/62 (BP Location: Left arm, Patient Position: Sitting, BP Method: Manual) 81 5' 4" (1.626 m) 162.8 kg (358 lb 12.8 oz) 98% 61.59 kg/m2      Blood Pressure          Most Recent Value    BP  108/62      Allergies as of 5/16/2017     Iodinated Contrast Media - Oral And Iv Dye    Naproxen Sodium    Naprosyn  [Naproxen]      Immunizations Administered on Date of Encounter - 5/16/2017     None      Administrations This Visit     EUFLEXXA 10 mg/mL(mw 2.4 -3.6 million) injection Syrg 20 mg     Admin Date Action Dose Route Administered By             05/16/2017 Given 20 mg Intra-articular Arash Hester MD              Admin Date Action Dose Route Administered By             05/16/2017 Given 20 mg Intra-articular Arash Hester MD                      [facility] Sign-Up     Activating your [facility] account is as easy as 1-2-3!     1) Visit [email or web address], select Sign Up Now, enter this activation code and your date of birth, then select Next.  [email or web address]  Expires: 6/2/2017 12:08 PM      2) Create a username and password to use when you visit [facility] in the future and select a security question in case you lose your password and select Next.    3) Enter your e-mail address and click Sign Up!    Additional Information  If you have questions, please e-mail [email or web address] or call [phone] to talk to our [facility] staff. Remember, [facility] is NOT to be used for urgent needs. For medical emergencies, dial 911.         Language Assistance Services     ATTENTION: Language assistance services are available, free of charge. Please call [phone].      ATENCIÓN: Si habla antonioañol, tiene a jarvis disposición servicios gratuitos de asistencia lingüística. Llame al [phone].     CHÚ Ý: N?u b?n nói Ti?ng Vi?t, có các d?ch v? h? tr? ngôn ng? mi?n phí dành cho b?n. G?i s? " [phone].         [facility] complies with applicable Federal civil rights laws and does not discriminate on the basis of race, color, national origin, age, disability, or sex.

## 2017-05-16 NOTE — PROGRESS NOTES
Subjective:       Patient ID: Violeta Champion is a 56 y.o. female.    Chief Complaint: Injections    HPI  Review of Systems   Constitutional: Negative for chills, fatigue and fever.   HENT: Negative for congestion and trouble swallowing.    Eyes: Negative for redness.   Respiratory: Negative for cough, chest tightness and shortness of breath.    Cardiovascular: Negative for chest pain, palpitations and leg swelling.   Gastrointestinal: Negative for abdominal pain and blood in stool.   Genitourinary: Negative for hematuria and menstrual problem.   Musculoskeletal: Positive for arthralgias and gait problem. Negative for back pain, joint swelling, myalgias and neck pain.   Skin: Negative for color change and rash.   Neurological: Negative for tremors, speech difficulty, weakness, numbness and headaches.   Hematological: Negative for adenopathy. Does not bruise/bleed easily.   Psychiatric/Behavioral: Negative for behavioral problems, confusion and sleep disturbance. The patient is not nervous/anxious.        Objective:      Physical Exam   Constitutional: She is oriented to person, place, and time. She appears well-developed and well-nourished. No distress.   Neck: Neck supple.   Pulmonary/Chest: Effort normal.   Musculoskeletal: She exhibits no edema.        Right lower leg: She exhibits no edema.        Left lower leg: She exhibits no edema.   Neurological: She is alert and oriented to person, place, and time.   Skin: Skin is warm and dry. No rash noted.   Psychiatric: She has a normal mood and affect. Her behavior is normal. Judgment and thought content normal.   Nursing note and vitals reviewed.      Assessment:       1. Osteoarthritis of knee, unspecified laterality, unspecified osteoarthritis type        Plan:   Violeta was seen today for injections.    Diagnoses and all orders for this visit:    Osteoarthritis of knee, unspecified laterality, unspecified osteoarthritis type    Other orders  -     EUFLEXXA 10  mg/mL(mw 2.4 -3.6 million) injection Syrg 20 mg; Inject 2 mLs (20 mg total) into the articular space once a week.  -     EUFLEXXA 10 mg/mL(mw 2.4 -3.6 million) injection Syrg 20 mg; Inject 2 mLs (20 mg total) into the articular space once a week.      See meds, orders, follow up, routing and instructions sections of encounter.  The patient is in for Euflexxa shot.  She has well established history of DJD of   the knees.  Last injections were in August, she states she is worse.  Discussed   her diabetes and other overall medical care.  She is seen at the [facility], she has no complaints today.      HUBER/AKIL  dd: 05/16/2017 14:28:12 (CDT)  td: 05/17/2017 02:51:58 (CDT)  Doc ID   #[id number]  Job ID #[id number]    CC:       BRIEF HISTORY:    Patient presents for therapeutic injections in the bilateral Knee(s) for OA. No complaints expressed at this time. The patient was counseled about risks and benefits of knee Visco-supplementation and other injections in general, including but not limited to pain, infection even that requiring surgery to clean out, failure to provide relief, transient flare, tissue damage. Patient expressed understanding, was given the opportunity to ask questions and any questions answered and consented verbally. Time out performed for localization, medication and patient identification using multiple identifiers.    Procedure Note:    Following a standard, sterile 2-antiseptic prep and negative arthrocentesis, Euflexxa was instilled in the bilateral knee(s) with no immediate discomfort. The procedure was tolerated well with no immediate adverse effects.    Follow up for next in series as scheduled.

## 2017-05-18 RX ORDER — METOPROLOL TARTRATE 100 MG/1
TABLET ORAL
Qty: 180 TABLET | Refills: 1 | Status: SHIPPED | OUTPATIENT
Start: 2017-05-18 | End: 2017-11-27 | Stop reason: SDUPTHER

## 2017-05-26 ENCOUNTER — NURSE TRIAGE (OUTPATIENT)
Dept: ADMINISTRATIVE | Facility: CLINIC | Age: 57
End: 2017-05-26

## 2017-05-26 NOTE — TELEPHONE ENCOUNTER
Reason for Disposition   Patient wants to be seen    Protocols used: ST BREATHING DIFFICULTY-A-OH  Ms. Champion states she is experiencing wheezing that is not going away after use of inhaler.

## 2017-05-29 ENCOUNTER — CLINICAL SUPPORT (OUTPATIENT)
Dept: INTERNAL MEDICINE | Facility: CLINIC | Age: 57
End: 2017-05-29
Payer: COMMERCIAL

## 2017-05-29 VITALS
BODY MASS INDEX: 50.02 KG/M2 | HEIGHT: 64 IN | HEART RATE: 87 BPM | DIASTOLIC BLOOD PRESSURE: 72 MMHG | OXYGEN SATURATION: 96 % | SYSTOLIC BLOOD PRESSURE: 105 MMHG | WEIGHT: 293 LBS

## 2017-05-29 DIAGNOSIS — K21.9 GASTROESOPHAGEAL REFLUX DISEASE, ESOPHAGITIS PRESENCE NOT SPECIFIED: ICD-10-CM

## 2017-05-29 DIAGNOSIS — M17.0 OSTEOARTHRITIS OF BOTH KNEES, UNSPECIFIED OSTEOARTHRITIS TYPE: Primary | ICD-10-CM

## 2017-05-29 PROCEDURE — 99999 PR PBB SHADOW E&M-EST. PATIENT-LVL III: CPT | Mod: PBBFAC,,, | Performed by: FAMILY MEDICINE

## 2017-05-29 PROCEDURE — 99213 OFFICE O/P EST LOW 20 MIN: CPT | Mod: 25,S$GLB,, | Performed by: FAMILY MEDICINE

## 2017-05-29 PROCEDURE — 20610 DRAIN/INJ JOINT/BURSA W/O US: CPT | Mod: 50,S$GLB,, | Performed by: FAMILY MEDICINE

## 2017-05-29 RX ORDER — PRAVASTATIN SODIUM 40 MG/1
TABLET ORAL
Qty: 90 TABLET | Refills: 0 | Status: SHIPPED | OUTPATIENT
Start: 2017-05-29 | End: 2017-08-25 | Stop reason: SDUPTHER

## 2017-05-30 PROCEDURE — 96372 THER/PROPH/DIAG INJ SC/IM: CPT | Mod: S$GLB,,, | Performed by: FAMILY MEDICINE

## 2017-05-30 RX ADMIN — Medication 20 MG: at 05:05

## 2017-05-30 NOTE — PROGRESS NOTES
Subjective:       Patient ID: Violeta Champion is a 56 y.o. female.    Chief Complaint: Injections (Euflexxa # 2 )    HPI  Review of Systems   Constitutional: Negative for chills, fatigue and fever.   HENT: Negative for congestion and trouble swallowing.    Eyes: Negative for redness.   Respiratory: Negative for cough, chest tightness and shortness of breath.    Cardiovascular: Negative for chest pain, palpitations and leg swelling.   Gastrointestinal: Positive for abdominal pain. Negative for blood in stool.   Genitourinary: Negative for hematuria and menstrual problem.   Musculoskeletal: Negative for arthralgias, back pain, gait problem, joint swelling, myalgias and neck pain.   Skin: Negative for color change and rash.   Neurological: Negative for tremors, speech difficulty, weakness, numbness and headaches.   Hematological: Negative for adenopathy. Does not bruise/bleed easily.   Psychiatric/Behavioral: Negative for behavioral problems, confusion and sleep disturbance. The patient is not nervous/anxious.        Objective:      Physical Exam   Constitutional: She is oriented to person, place, and time. She appears well-developed and well-nourished.   Eyes: No scleral icterus.   Neck: Normal range of motion. Neck supple.   Cardiovascular: Normal rate, normal heart sounds and intact distal pulses.  Exam reveals no gallop and no friction rub.    No murmur heard.  Pulmonary/Chest: Effort normal and breath sounds normal. No respiratory distress. She has no wheezes. She has no rales.   Abdominal: Soft. Bowel sounds are normal. She exhibits no abdominal bruit. There is no tenderness.   Musculoskeletal: She exhibits no edema.   Neurological: She is alert and oriented to person, place, and time. She displays no tremor. No cranial nerve deficit. Coordination and gait normal.   Skin: Skin is warm and dry. No rash noted. She is not diaphoretic. No erythema.   Psychiatric: She has a normal mood and affect. Her behavior is  normal. Judgment and thought content normal.   Nursing note and vitals reviewed.      Assessment:       1. Osteoarthritis of both knees, unspecified osteoarthritis type    2. Gastroesophageal reflux disease, esophagitis presence not specified        Plan:   Violeta was seen today for injections.    Diagnoses and all orders for this visit:    Osteoarthritis of both knees, unspecified osteoarthritis type    Gastroesophageal reflux disease, esophagitis presence not specified      See meds, orders, follow up, routing and instructions sections of encounter.    Dictation #1  MRN:[id number]  CSN:[id number]    BRIEF HISTORY:    Patient presents for therapeutic injections in the bilateral Knee(s) for OA. No complaints expressed at this time. The patient was counseled about risks and benefits of knee Visco-supplementation and other injections in general, including but not limited to pain, infection even that requiring surgery to clean out, failure to provide relief, transient flare, tissue damage. Patient expressed understanding, was given the opportunity to ask questions and any questions answered and consented verbally. Time out performed for localization, medication and patient identification using multiple identifiers.    Procedure Note:    Following a standard, sterile 2-antiseptic prep and negative arthrocentesis, Euflexxa was instilled in the bilateral knee(s) with no immediate discomfort. The procedure was tolerated well with no immediate adverse effects.    Follow up for next in series as scheduled.

## 2017-06-02 ENCOUNTER — OFFICE VISIT (OUTPATIENT)
Dept: INTERNAL MEDICINE | Facility: CLINIC | Age: 57
End: 2017-06-02
Payer: COMMERCIAL

## 2017-06-02 ENCOUNTER — NURSE TRIAGE (OUTPATIENT)
Dept: ADMINISTRATIVE | Facility: CLINIC | Age: 57
End: 2017-06-02

## 2017-06-02 ENCOUNTER — HOSPITAL ENCOUNTER (OUTPATIENT)
Dept: RADIOLOGY | Facility: HOSPITAL | Age: 57
Discharge: HOME | End: 2017-06-02
Attending: INTERNAL MEDICINE
Payer: COMMERCIAL

## 2017-06-02 VITALS
TEMPERATURE: 98 F | BODY MASS INDEX: 50.02 KG/M2 | HEIGHT: 64 IN | RESPIRATION RATE: 16 BRPM | HEART RATE: 75 BPM | SYSTOLIC BLOOD PRESSURE: 142 MMHG | WEIGHT: 293 LBS | DIASTOLIC BLOOD PRESSURE: 93 MMHG

## 2017-06-02 DIAGNOSIS — M25.569 CHRONIC KNEE PAIN, UNSPECIFIED LATERALITY: ICD-10-CM

## 2017-06-02 DIAGNOSIS — R06.2 WHEEZING: ICD-10-CM

## 2017-06-02 DIAGNOSIS — R06.2 WHEEZING: Primary | ICD-10-CM

## 2017-06-02 DIAGNOSIS — E11.65 UNCONTROLLED TYPE 2 DIABETES MELLITUS WITH HYPERGLYCEMIA, WITHOUT LONG-TERM CURRENT USE OF INSULIN: ICD-10-CM

## 2017-06-02 DIAGNOSIS — G89.29 CHRONIC KNEE PAIN, UNSPECIFIED LATERALITY: ICD-10-CM

## 2017-06-02 PROCEDURE — 99214 OFFICE O/P EST MOD 30 MIN: CPT | Mod: 25,S$GLB,, | Performed by: INTERNAL MEDICINE

## 2017-06-02 PROCEDURE — 96372 THER/PROPH/DIAG INJ SC/IM: CPT | Mod: S$GLB,,, | Performed by: INTERNAL MEDICINE

## 2017-06-02 PROCEDURE — 71020 XR CHEST PA AND LATERAL: CPT | Mod: 26,,, | Performed by: RADIOLOGY

## 2017-06-02 PROCEDURE — 71020 XR CHEST PA AND LATERAL: CPT | Mod: TC,PO

## 2017-06-02 PROCEDURE — 99999 PR PBB SHADOW E&M-EST. PATIENT-LVL III: CPT | Mod: PBBFAC,,, | Performed by: INTERNAL MEDICINE

## 2017-06-02 PROCEDURE — 94640 AIRWAY INHALATION TREATMENT: CPT | Mod: 59,S$GLB,, | Performed by: INTERNAL MEDICINE

## 2017-06-02 RX ORDER — OXYCODONE AND ACETAMINOPHEN 7.5; 325 MG/1; MG/1
1 TABLET ORAL EVERY 4 HOURS PRN
Qty: 180 TABLET | Refills: 0 | Status: SHIPPED | OUTPATIENT
Start: 2017-06-02 | End: 2017-11-21

## 2017-06-02 RX ORDER — LEVOCETIRIZINE DIHYDROCHLORIDE 5 MG/1
5 TABLET, FILM COATED ORAL NIGHTLY
Qty: 30 TABLET | Refills: 11 | Status: SHIPPED | OUTPATIENT
Start: 2017-06-02 | End: 2021-08-23

## 2017-06-02 RX ORDER — FLUTICASONE PROPIONATE AND SALMETEROL 250; 50 UG/1; UG/1
1 POWDER RESPIRATORY (INHALATION) 2 TIMES DAILY
Qty: 14 EACH | Refills: 0 | Status: SHIPPED | OUTPATIENT
Start: 2017-06-02 | End: 2017-12-08 | Stop reason: SDUPTHER

## 2017-06-02 RX ORDER — TRIAMCINOLONE ACETONIDE 40 MG/ML
40 INJECTION, SUSPENSION INTRA-ARTICULAR; INTRAMUSCULAR
Status: COMPLETED | OUTPATIENT
Start: 2017-06-02 | End: 2017-06-02

## 2017-06-02 RX ORDER — IPRATROPIUM BROMIDE AND ALBUTEROL SULFATE 2.5; .5 MG/3ML; MG/3ML
3 SOLUTION RESPIRATORY (INHALATION)
Status: COMPLETED | OUTPATIENT
Start: 2017-06-02 | End: 2017-06-02

## 2017-06-02 RX ADMIN — TRIAMCINOLONE ACETONIDE 40 MG: 40 INJECTION, SUSPENSION INTRA-ARTICULAR; INTRAMUSCULAR at 02:06

## 2017-06-02 RX ADMIN — IPRATROPIUM BROMIDE AND ALBUTEROL SULFATE 3 ML: 2.5; .5 SOLUTION RESPIRATORY (INHALATION) at 02:06

## 2017-06-02 NOTE — PROGRESS NOTES
Subjective:       Patient ID: Violeta Champion is a 56 y.o. female.    Chief Complaint: Wheezing    Patient is a 56 y.o.female who presents today for wheezing. It started one week ago after having severe heartburn for a few days. She denies fever, chills, shortness of breath or chest pain. She denies nasal congestion. No history of asthma. She has been using ventolin as needed for the wheezing. She denies cough.    Her glucose has been improving; fasting is 110-190. She never started trulicity which was advised by endocrine.    She suffers with chronic knee pain; norco no longer helping her. Getting euflexxa injections now.    Review of Systems   Constitutional: Negative for appetite change, chills, diaphoresis, fatigue and fever.   HENT: Negative for congestion, dental problem, ear discharge, ear pain, hearing loss, postnasal drip, sinus pressure and sore throat.    Eyes: Negative for discharge, redness and itching.   Respiratory: Positive for wheezing. Negative for cough, chest tightness and shortness of breath.    Cardiovascular: Negative for chest pain, palpitations and leg swelling.   Gastrointestinal: Negative for abdominal pain, constipation, diarrhea, nausea and vomiting.   Endocrine: Negative for cold intolerance and heat intolerance.   Genitourinary: Negative for difficulty urinating, frequency, hematuria and urgency.   Musculoskeletal: Negative for arthralgias, back pain, gait problem, myalgias and neck pain.   Skin: Negative for color change and rash.   Neurological: Negative for dizziness, syncope and headaches.   Hematological: Negative for adenopathy.   Psychiatric/Behavioral: Negative for behavioral problems and sleep disturbance. The patient is not nervous/anxious.        Objective:      Physical Exam   Constitutional: She is oriented to person, place, and time. She appears well-developed and well-nourished. No distress.   HENT:   Head: Normocephalic and atraumatic.   Right Ear: External ear normal.    Left Ear: External ear normal.   Eyes: Conjunctivae and EOM are normal. Pupils are equal, round, and reactive to light. Right eye exhibits no discharge. Left eye exhibits no discharge. No scleral icterus.   Neck: Normal range of motion. Neck supple. No JVD present. No thyromegaly present.   Cardiovascular: Normal rate, regular rhythm, normal heart sounds and intact distal pulses.  Exam reveals no gallop and no friction rub.    No murmur heard.  Pulmonary/Chest: Effort normal. No stridor. No respiratory distress. She has wheezes. She has no rales. She exhibits no tenderness.   Abdominal: Soft. Bowel sounds are normal. She exhibits no distension. There is no tenderness. There is no rebound.   Musculoskeletal: She exhibits no edema or tenderness.        Right knee: She exhibits decreased range of motion.        Left knee: She exhibits decreased range of motion.   Lymphadenopathy:     She has no cervical adenopathy.   Neurological: She is alert and oriented to person, place, and time.   Skin: Skin is warm. No rash noted. She is not diaphoretic. No erythema.   Psychiatric: She has a normal mood and affect. Her behavior is normal.   Nursing note and vitals reviewed.      Assessment and Plan:       1. Wheezing  - questionable asthma vs aspiration vs allergy  - X-Ray Chest PA And Lateral; Future  - levocetirizine (XYZAL) 5 MG tablet; Take 1 tablet (5 mg total) by mouth every evening.  Dispense: 30 tablet; Refill: 11  - albuterol-ipratropium 2.5mg-0.5mg/3mL nebulizer solution 3 mL; Take 3 mLs by nebulization one time.  - triamcinolone acetonide injection 40 mg; Inject 1 mL (40 mg total) into the muscle one time.  - fluticasone-salmeterol 250-50 mcg/dose (ADVAIR) 250-50 mcg/dose diskus inhaler; Inhale 1 puff into the lungs 2 (two) times daily.  Dispense: 14 each; Refill: 0  - if no better by next week, will start singulair and refer to pulm  - steroid shot may increase glucose temporarily  - Notify clinic if symptoms  change, worsen or do not improve    2. Chronic knee pain, unspecified laterality  - oxycodone-acetaminophen (PERCOCET) 7.5-325 mg per tablet; Take 1 tablet by mouth every 4 (four) hours as needed for Pain.  Dispense: 180 tablet; Refill: 0    3. DM: uncontrolled; advised her to call endo to discuss with them          No Follow-up on file.

## 2017-06-02 NOTE — TELEPHONE ENCOUNTER
Reason for Disposition   MODERATE longstanding difficulty breathing (e.g., speaks in phrases, SOB even at rest, pulse 100-120) and SAME as normal    Protocols used: ST BREATHING DIFFICULTY-A-OH  Ms. Champion states she was seen by Dr. Hester last week. She was advised to call back if wheezing has not improved in 2 days. Patient states wheezing is still present after medication adjustments. She is requesting advise from Dr. Hester.

## 2017-06-02 NOTE — TELEPHONE ENCOUNTER
Patient's call returned, no answer, LM on VM    Please reach out to patient again to assess and let me know how she is doing.

## 2017-06-05 ENCOUNTER — TELEPHONE (OUTPATIENT)
Dept: INTERNAL MEDICINE | Facility: CLINIC | Age: 57
End: 2017-06-05

## 2017-06-05 NOTE — TELEPHONE ENCOUNTER
Spoke with patient who stated she saw her PCP and is feeling better was given a kenalog shot.  Message forwarded to Dr. Hester to advise.

## 2017-06-06 ENCOUNTER — CLINICAL SUPPORT (OUTPATIENT)
Dept: INTERNAL MEDICINE | Facility: CLINIC | Age: 57
End: 2017-06-06
Payer: COMMERCIAL

## 2017-06-06 VITALS
HEIGHT: 64 IN | SYSTOLIC BLOOD PRESSURE: 108 MMHG | DIASTOLIC BLOOD PRESSURE: 72 MMHG | BODY MASS INDEX: 50.02 KG/M2 | HEART RATE: 70 BPM | OXYGEN SATURATION: 97 % | WEIGHT: 293 LBS

## 2017-06-06 DIAGNOSIS — M17.0 OSTEOARTHRITIS OF BOTH KNEES, UNSPECIFIED OSTEOARTHRITIS TYPE: Primary | ICD-10-CM

## 2017-06-06 PROCEDURE — 99999 PR PBB SHADOW E&M-EST. PATIENT-LVL III: CPT | Mod: PBBFAC,,, | Performed by: FAMILY MEDICINE

## 2017-06-06 PROCEDURE — 99499 UNLISTED E&M SERVICE: CPT | Mod: S$GLB,,, | Performed by: FAMILY MEDICINE

## 2017-06-06 PROCEDURE — 20610 DRAIN/INJ JOINT/BURSA W/O US: CPT | Mod: 50,S$GLB,, | Performed by: FAMILY MEDICINE

## 2017-06-06 RX ADMIN — Medication 20 MG: at 03:06

## 2017-06-06 NOTE — PROGRESS NOTES
Subjective:       Patient ID: Violeta Champion is a 56 y.o. female.    Chief Complaint: Injections (Euflexxa #3)    HPI  Review of Systems   Constitutional: Negative for chills, fatigue and fever.   HENT: Negative for congestion and trouble swallowing.    Eyes: Negative for redness.   Respiratory: Negative for cough, chest tightness and shortness of breath.    Cardiovascular: Negative for chest pain, palpitations and leg swelling.   Gastrointestinal: Negative for abdominal pain and blood in stool.   Genitourinary: Negative for hematuria and menstrual problem.   Musculoskeletal: Positive for arthralgias. Negative for back pain, gait problem, joint swelling, myalgias and neck pain.   Skin: Negative for color change and rash.   Neurological: Negative for tremors, speech difficulty, weakness, numbness and headaches.   Hematological: Negative for adenopathy. Does not bruise/bleed easily.   Psychiatric/Behavioral: Negative for behavioral problems, confusion and sleep disturbance. The patient is not nervous/anxious.        Objective:      Physical Exam   Constitutional: She is oriented to person, place, and time. She appears well-developed and well-nourished. No distress.   Neck: Neck supple.   Pulmonary/Chest: Effort normal.   Musculoskeletal: She exhibits no edema.        Right lower leg: She exhibits no edema.        Left lower leg: She exhibits no edema.   Neurological: She is alert and oriented to person, place, and time.   Skin: Skin is warm and dry. No rash noted.   Psychiatric: She has a normal mood and affect. Her behavior is normal. Judgment and thought content normal.   Nursing note and vitals reviewed.      Assessment:       1. Osteoarthritis of both knees, unspecified osteoarthritis type        Plan:   Violeta was seen today for injections.    Diagnoses and all orders for this visit:    Osteoarthritis of both knees, unspecified osteoarthritis type      See meds, orders, follow up, routing and instructions  sections of encounter.    BRIEF HISTORY:    Patient presents for therapeutic injections in the bilateral Knee(s) for OA. No complaints expressed at this time. The patient was counseled about risks and benefits of knee Visco-supplementation and other injections in general, including but not limited to pain, infection even that requiring surgery to clean out, failure to provide relief, transient flare, tissue damage. Patient expressed understanding, was given the opportunity to ask questions and any questions answered and consented verbally. Time out performed for localization, medication and patient identification using multiple identifiers.    Procedure Note:    Following a standard, sterile 2-antiseptic prep and negative arthrocentesis, Euflexxa was instilled in the bilateral knee(s) with no immediate discomfort. The procedure was tolerated well with no immediate adverse effects.    Follow up 6 months

## 2017-06-07 RX ORDER — QUINAPRIL 40 MG/1
TABLET ORAL
Qty: 90 TABLET | Refills: 1 | Status: SHIPPED | OUTPATIENT
Start: 2017-06-07 | End: 2017-12-05 | Stop reason: SDUPTHER

## 2017-07-18 ENCOUNTER — TELEPHONE (OUTPATIENT)
Dept: INTERNAL MEDICINE | Facility: CLINIC | Age: 57
End: 2017-07-18

## 2017-07-18 RX ORDER — HYDROCODONE BITARTRATE AND ACETAMINOPHEN 10; 325 MG/1; MG/1
1 TABLET ORAL EVERY 4 HOURS PRN
Qty: 180 TABLET | Refills: 0 | Status: SHIPPED | OUTPATIENT
Start: 2017-07-18 | End: 2017-09-15 | Stop reason: SDUPTHER

## 2017-07-18 NOTE — TELEPHONE ENCOUNTER
----- Message from Nanette Dai sent at 7/18/2017  9:05 AM CDT -----  Contact: Pt at [phone]  RX request - refill or new RX.  Is this a refill or new RX:  refill  RX name and strength: hydrocodone-acetaminophen 10-325mg (NORCO)  mg Tab  Directions:   Is this a 30 day or 90 day RX:    Pharmacy name and phone #: Pt pickup at [facility]  Comments:

## 2017-07-20 DIAGNOSIS — R10.13 DYSPEPSIA: ICD-10-CM

## 2017-07-20 RX ORDER — SUCRALFATE 1 G/1
TABLET ORAL
Qty: 90 TABLET | Refills: 6 | Status: SHIPPED | OUTPATIENT
Start: 2017-07-20 | End: 2020-01-10

## 2017-07-21 RX ORDER — ETODOLAC 500 MG/1
500 TABLET, FILM COATED ORAL 2 TIMES DAILY
Qty: 60 TABLET | Refills: 0 | Status: SHIPPED | OUTPATIENT
Start: 2017-07-21 | End: 2017-10-30 | Stop reason: SDUPTHER

## 2017-08-02 RX ORDER — LACTULOSE 10 G/15ML
SOLUTION ORAL; RECTAL
Qty: 473 ML | Refills: 0 | Status: SHIPPED | OUTPATIENT
Start: 2017-08-02 | End: 2017-09-11 | Stop reason: SDUPTHER

## 2017-08-18 ENCOUNTER — TELEPHONE (OUTPATIENT)
Dept: ENDOCRINOLOGY | Facility: CLINIC | Age: 57
End: 2017-08-18

## 2017-08-18 ENCOUNTER — LAB VISIT (OUTPATIENT)
Dept: LAB | Facility: HOSPITAL | Age: 57
End: 2017-08-18
Attending: INTERNAL MEDICINE
Payer: COMMERCIAL

## 2017-08-18 DIAGNOSIS — I10 HTN, GOAL BELOW 130/80: ICD-10-CM

## 2017-08-18 DIAGNOSIS — E11.65 UNCONTROLLED TYPE 2 DIABETES MELLITUS WITH HYPERGLYCEMIA, WITHOUT LONG-TERM CURRENT USE OF INSULIN: ICD-10-CM

## 2017-08-18 DIAGNOSIS — E66.01 MORBID OBESITY WITH BMI OF 60.0-69.9, ADULT: ICD-10-CM

## 2017-08-18 DIAGNOSIS — E78.5 HYPERLIPIDEMIA, UNSPECIFIED HYPERLIPIDEMIA TYPE: ICD-10-CM

## 2017-08-18 LAB
ESTIMATED AVG GLUCOSE: 166 MG/DL
HBA1C MFR BLD HPLC: 7.4 %

## 2017-08-18 PROCEDURE — 83036 HEMOGLOBIN GLYCOSYLATED A1C: CPT

## 2017-08-18 PROCEDURE — 36415 COLL VENOUS BLD VENIPUNCTURE: CPT

## 2017-08-18 NOTE — TELEPHONE ENCOUNTER
Please advise patient that I reviewed her A1c level and it is greatly improved and nearly to goal. I recommend she continue the same medicines for diabetes at this time.

## 2017-08-25 RX ORDER — LEVOTHYROXINE SODIUM 100 UG/1
TABLET ORAL
Qty: 90 TABLET | Refills: 0 | Status: SHIPPED | OUTPATIENT
Start: 2017-08-25 | End: 2017-11-28 | Stop reason: SDUPTHER

## 2017-08-25 RX ORDER — PRAVASTATIN SODIUM 40 MG/1
TABLET ORAL
Qty: 90 TABLET | Refills: 0 | Status: SHIPPED | OUTPATIENT
Start: 2017-08-25 | End: 2017-10-30 | Stop reason: SDUPTHER

## 2017-09-11 RX ORDER — LACTULOSE 10 G/15ML
SOLUTION ORAL; RECTAL
Qty: 473 ML | Refills: 0 | Status: SHIPPED | OUTPATIENT
Start: 2017-09-11 | End: 2017-10-25 | Stop reason: SDUPTHER

## 2017-09-15 RX ORDER — HYDROCODONE BITARTRATE AND ACETAMINOPHEN 10; 325 MG/1; MG/1
1 TABLET ORAL EVERY 4 HOURS PRN
Qty: 180 TABLET | Refills: 0 | Status: SHIPPED | OUTPATIENT
Start: 2017-09-15 | End: 2017-11-21 | Stop reason: SDUPTHER

## 2017-09-15 NOTE — TELEPHONE ENCOUNTER
----- Message from Janelle Shannon sent at 9/15/2017 12:41 PM CDT -----  Contact: pt [phone]  RX request - refill or new RX.  Is this a refill or new RX:  refill  RX name and strength: hydrocodone-acetaminophen 10-325mg (NORCO)  mg Tab  Directions:   Is this a 30 day or 90 day RX:    Pharmacy name and phone #  Comments:

## 2017-09-15 NOTE — TELEPHONE ENCOUNTER
Informed pt that rx is ready for  and that she is due for 3 month f/u. Pt will call back to schedule.

## 2017-09-20 ENCOUNTER — TELEPHONE (OUTPATIENT)
Dept: INTERNAL MEDICINE | Facility: CLINIC | Age: 57
End: 2017-09-20

## 2017-09-20 NOTE — TELEPHONE ENCOUNTER
----- Message from Jany Cordova sent at 9/20/2017  4:28 PM CDT -----  Contact:  [phone]  Would like to get Zanaflex, muscle relaxers, called in to [facility] pharmacy on [address]. Allergies in chart.

## 2017-09-20 NOTE — TELEPHONE ENCOUNTER
Pt requesting refill of zanaflex, last refill in 2014    Pt last refill of flexeril was 2/2017    Please advise

## 2017-09-25 RX ORDER — METFORMIN HYDROCHLORIDE 1000 MG/1
TABLET ORAL
Qty: 180 TABLET | Refills: 1 | Status: SHIPPED | OUTPATIENT
Start: 2017-09-25 | End: 2018-03-24 | Stop reason: SDUPTHER

## 2017-09-25 NOTE — TELEPHONE ENCOUNTER
Spoke to pt and informed that Dr. Juares didn't prescribe her zanaflex, she stated that the flexeril isn't helping. Advised that she would need an apt to discuss zanaflex. Pt stated she would call back to schedule.

## 2017-09-28 ENCOUNTER — OFFICE VISIT (OUTPATIENT)
Dept: INTERNAL MEDICINE | Facility: CLINIC | Age: 57
End: 2017-09-28
Payer: COMMERCIAL

## 2017-09-28 VITALS
BODY MASS INDEX: 50.02 KG/M2 | OXYGEN SATURATION: 90 % | RESPIRATION RATE: 16 BRPM | WEIGHT: 293 LBS | DIASTOLIC BLOOD PRESSURE: 74 MMHG | HEIGHT: 64 IN | SYSTOLIC BLOOD PRESSURE: 124 MMHG | TEMPERATURE: 98 F | HEART RATE: 95 BPM

## 2017-09-28 DIAGNOSIS — M54.9 BACK PAIN, UNSPECIFIED BACK LOCATION, UNSPECIFIED BACK PAIN LATERALITY, UNSPECIFIED CHRONICITY: Primary | ICD-10-CM

## 2017-09-28 DIAGNOSIS — Z12.31 VISIT FOR SCREENING MAMMOGRAM: ICD-10-CM

## 2017-09-28 PROCEDURE — 3008F BODY MASS INDEX DOCD: CPT | Mod: S$GLB,,, | Performed by: INTERNAL MEDICINE

## 2017-09-28 PROCEDURE — 96372 THER/PROPH/DIAG INJ SC/IM: CPT | Mod: S$GLB,,, | Performed by: INTERNAL MEDICINE

## 2017-09-28 PROCEDURE — 3078F DIAST BP <80 MM HG: CPT | Mod: S$GLB,,, | Performed by: INTERNAL MEDICINE

## 2017-09-28 PROCEDURE — 99999 PR PBB SHADOW E&M-EST. PATIENT-LVL III: CPT | Mod: PBBFAC,,, | Performed by: INTERNAL MEDICINE

## 2017-09-28 PROCEDURE — 3074F SYST BP LT 130 MM HG: CPT | Mod: S$GLB,,, | Performed by: INTERNAL MEDICINE

## 2017-09-28 PROCEDURE — 99214 OFFICE O/P EST MOD 30 MIN: CPT | Mod: 25,S$GLB,, | Performed by: INTERNAL MEDICINE

## 2017-09-28 RX ORDER — TRIAMCINOLONE ACETONIDE 40 MG/ML
40 INJECTION, SUSPENSION INTRA-ARTICULAR; INTRAMUSCULAR
Status: COMPLETED | OUTPATIENT
Start: 2017-09-28 | End: 2017-09-28

## 2017-09-28 RX ORDER — TIZANIDINE 4 MG/1
4 TABLET ORAL EVERY 8 HOURS PRN
Qty: 90 TABLET | Refills: 3 | Status: SHIPPED | OUTPATIENT
Start: 2017-09-28 | End: 2018-11-05 | Stop reason: SDUPTHER

## 2017-09-28 RX ADMIN — TRIAMCINOLONE ACETONIDE 40 MG: 40 INJECTION, SUSPENSION INTRA-ARTICULAR; INTRAMUSCULAR at 11:09

## 2017-09-28 NOTE — PROGRESS NOTES
Subjective:       Patient ID: Violeta Champion is a 56 y.o. female.    Chief Complaint: Back Pain (back pain and bilateral leg pain)    Patient is a 56 y.o.female who presents today for back pain. Two weeks ago started bothering her severely. She complains of pain and stiffness in both legs, quads, and low back pain. She is taking flexeril and lortab as needed. She feels very tight/ stiffness.     Mammo: due in November    Dm: doing well. She is taking metformin, glipizide and januvia.     Review of Systems   Constitutional: Negative for appetite change, chills, diaphoresis, fatigue and fever.   HENT: Negative for congestion, dental problem, ear discharge, ear pain, hearing loss, postnasal drip, sinus pressure and sore throat.    Eyes: Negative for discharge, redness and itching.   Respiratory: Negative for cough, chest tightness, shortness of breath and wheezing.    Cardiovascular: Negative for chest pain, palpitations and leg swelling.   Gastrointestinal: Negative for abdominal pain, constipation, diarrhea, nausea and vomiting.   Endocrine: Negative for cold intolerance and heat intolerance.   Genitourinary: Negative for difficulty urinating, frequency, hematuria and urgency.   Musculoskeletal: Positive for back pain and myalgias. Negative for arthralgias, gait problem and neck pain.   Skin: Negative for color change and rash.   Neurological: Negative for dizziness, syncope and headaches.   Hematological: Negative for adenopathy.   Psychiatric/Behavioral: Negative for behavioral problems and sleep disturbance. The patient is not nervous/anxious.        Objective:      Physical Exam   Constitutional: She is oriented to person, place, and time. She appears well-developed and well-nourished. No distress.   HENT:   Head: Normocephalic and atraumatic.   Right Ear: External ear normal.   Left Ear: External ear normal.   Nose: Nose normal.   Mouth/Throat: Oropharynx is clear and moist. No oropharyngeal exudate.   Eyes:  Conjunctivae and EOM are normal. Pupils are equal, round, and reactive to light. Right eye exhibits no discharge. Left eye exhibits no discharge. No scleral icterus.   Neck: Normal range of motion. Neck supple. No JVD present. No thyromegaly present.   Cardiovascular: Normal rate, regular rhythm, normal heart sounds and intact distal pulses.  Exam reveals no gallop and no friction rub.    No murmur heard.  Pulmonary/Chest: Effort normal and breath sounds normal. No stridor. No respiratory distress. She has no wheezes. She has no rales. She exhibits no tenderness.   Abdominal: Soft. Bowel sounds are normal. She exhibits no distension. There is no tenderness. There is no rebound.   Musculoskeletal: She exhibits no edema.        Lumbar back: She exhibits decreased range of motion and tenderness.   Lymphadenopathy:     She has no cervical adenopathy.   Neurological: She is alert and oriented to person, place, and time. No cranial nerve deficit.   Skin: Skin is warm and dry. No rash noted. She is not diaphoretic. No erythema.   Psychiatric: She has a normal mood and affect. Her behavior is normal.   Nursing note and vitals reviewed.      Assessment and Plan:       1. Back pain, unspecified back location, unspecified back pain laterality, unspecified chronicity  - steroid shot may raise glucose for 48 hrs  - tizanidine (ZANAFLEX) 4 MG tablet; Take 1 tablet (4 mg total) by mouth every 8 (eight) hours as needed (muscle spasm).  Dispense: 90 tablet; Refill: 3  - triamcinolone acetonide injection 40 mg; Inject 1 mL (40 mg total) into the muscle one time.    2. Visit for screening mammogram  - Mammo Digital Screening Bilat with CAD; Future          No Follow-up on file.

## 2017-10-25 RX ORDER — LACTULOSE 10 G/15ML
SOLUTION ORAL; RECTAL
Qty: 473 ML | Refills: 0 | Status: SHIPPED | OUTPATIENT
Start: 2017-10-25 | End: 2017-12-08 | Stop reason: SDUPTHER

## 2017-10-30 ENCOUNTER — OFFICE VISIT (OUTPATIENT)
Dept: INTERNAL MEDICINE | Facility: CLINIC | Age: 57
End: 2017-10-30
Payer: COMMERCIAL

## 2017-10-30 VITALS
RESPIRATION RATE: 20 BRPM | SYSTOLIC BLOOD PRESSURE: 120 MMHG | BODY MASS INDEX: 50.02 KG/M2 | DIASTOLIC BLOOD PRESSURE: 78 MMHG | WEIGHT: 293 LBS | HEART RATE: 86 BPM | TEMPERATURE: 99 F | HEIGHT: 64 IN

## 2017-10-30 DIAGNOSIS — G89.29 CHRONIC PAIN OF BOTH KNEES: Primary | ICD-10-CM

## 2017-10-30 DIAGNOSIS — E66.01 MORBID OBESITY WITH BMI OF 60.0-69.9, ADULT: ICD-10-CM

## 2017-10-30 DIAGNOSIS — M25.562 CHRONIC PAIN OF BOTH KNEES: Primary | ICD-10-CM

## 2017-10-30 DIAGNOSIS — I10 HTN, GOAL BELOW 130/80: ICD-10-CM

## 2017-10-30 DIAGNOSIS — M25.561 CHRONIC PAIN OF BOTH KNEES: Primary | ICD-10-CM

## 2017-10-30 DIAGNOSIS — E11.65 UNCONTROLLED TYPE 2 DIABETES MELLITUS WITH HYPERGLYCEMIA, WITHOUT LONG-TERM CURRENT USE OF INSULIN: ICD-10-CM

## 2017-10-30 DIAGNOSIS — M79.7 FIBROMYALGIA: ICD-10-CM

## 2017-10-30 PROCEDURE — 99999 PR PBB SHADOW E&M-EST. PATIENT-LVL III: CPT | Mod: PBBFAC,,, | Performed by: INTERNAL MEDICINE

## 2017-10-30 PROCEDURE — 99214 OFFICE O/P EST MOD 30 MIN: CPT | Mod: S$GLB,,, | Performed by: INTERNAL MEDICINE

## 2017-10-30 NOTE — PROGRESS NOTES
Subjective:       Patient ID: Violeta Champion is a 56 y.o. female.    Chief Complaint: fmla paper work    Patient is a 56 y.o.female who presents today for follow up. She would like to get fmla for her fibromyalgia, bilateral knee pain. She is getting knee injections without much relief. She does take the percocet as needed. Her fibromyalgia flares once every 2-3 months.     Review of Systems   Constitutional: Negative for appetite change, chills, diaphoresis, fatigue and fever.   HENT: Negative for congestion, dental problem, ear discharge, ear pain, hearing loss, postnasal drip, sinus pressure and sore throat.    Eyes: Negative for discharge, redness and itching.   Respiratory: Negative for cough, chest tightness, shortness of breath and wheezing.    Cardiovascular: Negative for chest pain, palpitations and leg swelling.   Gastrointestinal: Negative for abdominal pain, constipation, diarrhea, nausea and vomiting.   Endocrine: Negative for cold intolerance and heat intolerance.   Genitourinary: Negative for difficulty urinating, frequency, hematuria and urgency.   Musculoskeletal: Positive for arthralgias. Negative for back pain, gait problem, myalgias and neck pain.   Skin: Negative for color change and rash.   Neurological: Negative for dizziness, syncope and headaches.   Hematological: Negative for adenopathy.   Psychiatric/Behavioral: Negative for behavioral problems and sleep disturbance. The patient is not nervous/anxious.        Objective:      Physical Exam   Constitutional: She is oriented to person, place, and time. She appears well-developed and well-nourished. No distress.   HENT:   Head: Normocephalic and atraumatic.   Right Ear: External ear normal.   Left Ear: External ear normal.   Nose: Nose normal.   Mouth/Throat: Oropharynx is clear and moist. No oropharyngeal exudate.   Eyes: Conjunctivae and EOM are normal. Pupils are equal, round, and reactive to light. Right eye exhibits no discharge. Left  eye exhibits no discharge. No scleral icterus.   Neck: Normal range of motion. Neck supple. No JVD present. No thyromegaly present.   Cardiovascular: Normal rate, regular rhythm, normal heart sounds and intact distal pulses.  Exam reveals no gallop and no friction rub.    No murmur heard.  Pulmonary/Chest: Effort normal and breath sounds normal. No stridor. No respiratory distress. She has no wheezes. She has no rales. She exhibits no tenderness.   Abdominal: Soft. Bowel sounds are normal. She exhibits no distension. There is no tenderness. There is no rebound.   Musculoskeletal: Normal range of motion. She exhibits no edema or tenderness.   Lymphadenopathy:     She has no cervical adenopathy.   Neurological: She is alert and oriented to person, place, and time. No cranial nerve deficit.   Skin: Skin is warm and dry. No rash noted. She is not diaphoretic. No erythema.   Psychiatric: She has a normal mood and affect. Her behavior is normal.   Nursing note and vitals reviewed.      Assessment and Plan:       1. Chronic pain of both knees  - discussed importance of weight loss  - Ambulatory Referral to Bariatric Medicine    2. Fibromyalgia  - flares every 2-3 months  - Ambulatory Referral to Bariatric Medicine    3. Morbid obesity with BMI of 60.0-69.9, adult    - Ambulatory Referral to Bariatric Medicine    4. HTN, goal below 130/80  - stable  - Ambulatory Referral to Bariatric Medicine    5. Uncontrolled type 2 diabetes mellitus with hyperglycemia, without long-term current use of insulin  - stable  - Ambulatory Referral to Bariatric Medicine  Advised pt to fax fmla paperwork to be completed        No Follow-up on file.

## 2017-11-08 RX ORDER — LORAZEPAM 0.5 MG/1
TABLET ORAL
Qty: 60 TABLET | Refills: 0 | Status: SHIPPED | OUTPATIENT
Start: 2017-11-08 | End: 2018-07-25 | Stop reason: SDUPTHER

## 2017-11-10 ENCOUNTER — TELEPHONE (OUTPATIENT)
Dept: INTERNAL MEDICINE | Facility: CLINIC | Age: 57
End: 2017-11-10

## 2017-11-10 RX ORDER — METHOCARBAMOL 500 MG/1
500 TABLET, FILM COATED ORAL 3 TIMES DAILY PRN
Qty: 90 TABLET | Refills: 0 | Status: SHIPPED | OUTPATIENT
Start: 2017-11-10 | End: 2017-11-20

## 2017-11-10 NOTE — TELEPHONE ENCOUNTER
----- Message from Sylvie Loo sent at 11/10/2017 12:51 PM CST -----  Contact: patient [phone]  Pt saw you 2 weeks ago for back pain and you told her that if the xanaflex didn't hellp you would order robaxin. Pt feels she needs the robaxin.  Please call pt to advise when this has been ordered.    Macy Duke [phone]

## 2017-11-21 ENCOUNTER — TELEPHONE (OUTPATIENT)
Dept: INTERNAL MEDICINE | Facility: CLINIC | Age: 57
End: 2017-11-21

## 2017-11-21 RX ORDER — HYDROCODONE BITARTRATE AND ACETAMINOPHEN 10; 325 MG/1; MG/1
1 TABLET ORAL EVERY 4 HOURS PRN
Qty: 180 TABLET | Refills: 0 | Status: SHIPPED | OUTPATIENT
Start: 2017-11-21 | End: 2018-01-09 | Stop reason: SDUPTHER

## 2017-11-21 NOTE — TELEPHONE ENCOUNTER
----- Message from Shana Araujo sent at 11/21/2017  1:28 PM CST -----  Contact: self [phone]  RX request - refill or new RX.  Is this a refill or new RX: refill  RX name and strength: hydrocodone-acetaminophen 10-325mg (NORCO)  mg Tab  Directions: Take 1 tablet by mouth every 4 (four) hours as needed for Pain. - Oral  Is this a 30 day or 90 day RX:  30  Pharmacy name and phone # :   Comments:

## 2017-11-27 RX ORDER — METOPROLOL TARTRATE 100 MG/1
TABLET ORAL
Qty: 180 TABLET | Refills: 0 | Status: SHIPPED | OUTPATIENT
Start: 2017-11-27 | End: 2018-03-24 | Stop reason: SDUPTHER

## 2017-11-28 RX ORDER — LEVOTHYROXINE SODIUM 100 UG/1
TABLET ORAL
Qty: 90 TABLET | Refills: 1 | Status: SHIPPED | OUTPATIENT
Start: 2017-11-28 | End: 2018-01-03 | Stop reason: SDUPTHER

## 2017-12-05 DIAGNOSIS — Z12.31 VISIT FOR SCREENING MAMMOGRAM: Primary | ICD-10-CM

## 2017-12-05 RX ORDER — PRAVASTATIN SODIUM 40 MG/1
TABLET ORAL
Qty: 90 TABLET | Refills: 0 | Status: SHIPPED | OUTPATIENT
Start: 2017-12-05 | End: 2018-03-24 | Stop reason: SDUPTHER

## 2017-12-05 RX ORDER — QUINAPRIL 40 MG/1
TABLET ORAL
Qty: 90 TABLET | Refills: 1 | Status: SHIPPED | OUTPATIENT
Start: 2017-12-05 | End: 2018-01-03 | Stop reason: SDUPTHER

## 2017-12-05 RX ORDER — HYDROCHLOROTHIAZIDE 12.5 MG/1
25 CAPSULE ORAL DAILY
Qty: 60 CAPSULE | Refills: 6 | Status: SHIPPED | OUTPATIENT
Start: 2017-12-05 | End: 2017-12-19 | Stop reason: SDUPTHER

## 2017-12-05 NOTE — TELEPHONE ENCOUNTER
"----- Message from Emmy Branham sent at 12/5/2017  2:06 PM CST -----  Contact: Self/[phone]  RX request - refill or new RX.  Is this a refill or new RX:  new  RX name and strength: hydrochlorothiazide (MICROZIDE) 12.5 mg capsule  Directions: Take 2 capsules (25 mg total) by mouth once daily. - Oral  Is this a 30 day or 90 day RX:  60  Pharmacy name and phone # (DON'T enter "on file" or "in chart"): [facility]  [phone] (Phone)  [phone] (Fax)  Comments:  3 mon supply    Patient would like to schedule their annual mammogram appointment, please enter the order and contact the patient to schedule. Pt would like 3D isa  "

## 2017-12-08 DIAGNOSIS — R06.2 WHEEZING: ICD-10-CM

## 2017-12-09 RX ORDER — FLUTICASONE PROPIONATE AND SALMETEROL 50; 250 UG/1; UG/1
POWDER RESPIRATORY (INHALATION)
Qty: 60 EACH | Refills: 3 | Status: SHIPPED | OUTPATIENT
Start: 2017-12-09 | End: 2020-03-23 | Stop reason: SDUPTHER

## 2017-12-09 RX ORDER — ALBUTEROL SULFATE 90 UG/1
AEROSOL, METERED RESPIRATORY (INHALATION)
Qty: 18 EACH | Refills: 11 | Status: SHIPPED | OUTPATIENT
Start: 2017-12-09 | End: 2020-03-23 | Stop reason: SDUPTHER

## 2017-12-09 RX ORDER — LACTULOSE 10 G/15ML
SOLUTION ORAL; RECTAL
Qty: 473 ML | Refills: 3 | Status: SHIPPED | OUTPATIENT
Start: 2017-12-09 | End: 2018-03-03 | Stop reason: SDUPTHER

## 2017-12-14 ENCOUNTER — LAB VISIT (OUTPATIENT)
Dept: LAB | Facility: HOSPITAL | Age: 57
End: 2017-12-14
Attending: INTERNAL MEDICINE
Payer: COMMERCIAL

## 2017-12-14 ENCOUNTER — OFFICE VISIT (OUTPATIENT)
Dept: INTERNAL MEDICINE | Facility: CLINIC | Age: 57
End: 2017-12-14
Payer: COMMERCIAL

## 2017-12-14 VITALS
HEART RATE: 84 BPM | SYSTOLIC BLOOD PRESSURE: 127 MMHG | WEIGHT: 293 LBS | HEIGHT: 64 IN | RESPIRATION RATE: 16 BRPM | BODY MASS INDEX: 50.02 KG/M2 | DIASTOLIC BLOOD PRESSURE: 83 MMHG | TEMPERATURE: 98 F

## 2017-12-14 DIAGNOSIS — R60.0 LOWER EXTREMITY EDEMA: ICD-10-CM

## 2017-12-14 DIAGNOSIS — R60.9 EDEMA, UNSPECIFIED TYPE: ICD-10-CM

## 2017-12-14 DIAGNOSIS — R60.9 EDEMA, UNSPECIFIED TYPE: Primary | ICD-10-CM

## 2017-12-14 LAB
ALBUMIN SERPL BCP-MCNC: 3.1 G/DL
ANION GAP SERPL CALC-SCNC: 7 MMOL/L
BUN SERPL-MCNC: 24 MG/DL
CALCIUM SERPL-MCNC: 9.9 MG/DL
CHLORIDE SERPL-SCNC: 100 MMOL/L
CO2 SERPL-SCNC: 31 MMOL/L
CREAT SERPL-MCNC: 0.8 MG/DL
EST. GFR  (AFRICAN AMERICAN): >60 ML/MIN/1.73 M^2
EST. GFR  (NON AFRICAN AMERICAN): >60 ML/MIN/1.73 M^2
GLUCOSE SERPL-MCNC: 167 MG/DL
PHOSPHATE SERPL-MCNC: 3.5 MG/DL
POTASSIUM SERPL-SCNC: 4.6 MMOL/L
SODIUM SERPL-SCNC: 138 MMOL/L

## 2017-12-14 PROCEDURE — 99999 PR PBB SHADOW E&M-EST. PATIENT-LVL III: CPT | Mod: PBBFAC,,, | Performed by: INTERNAL MEDICINE

## 2017-12-14 PROCEDURE — 99214 OFFICE O/P EST MOD 30 MIN: CPT | Mod: S$GLB,,, | Performed by: INTERNAL MEDICINE

## 2017-12-14 PROCEDURE — 80069 RENAL FUNCTION PANEL: CPT

## 2017-12-14 PROCEDURE — 36415 COLL VENOUS BLD VENIPUNCTURE: CPT | Mod: PO

## 2017-12-14 RX ORDER — FUROSEMIDE 20 MG/1
20 TABLET ORAL 2 TIMES DAILY
Qty: 14 TABLET | Refills: 0 | Status: SHIPPED | OUTPATIENT
Start: 2017-12-14 | End: 2017-12-20

## 2017-12-14 NOTE — PROGRESS NOTES
"Subjective:       Patient ID: Violeta Champion is a 56 y.o. female.    Chief Complaint: Edema (in legs/swollen)    HPI    She reports swelling in her legs since she started taking robaxin in November. Today is different, because the swelling is causing cramping in both legs. She is worried that her kidneys aren't functioning, reports normal urination. She reports using her compression stockings yesterday. No increase in salt in diet. She reports the swelling is all over her body, not just the legs. No warmth, erythema of either leg.     Review of Systems   Constitutional: Negative for activity change, appetite change and unexpected weight change.   Eyes: Negative for visual disturbance.   Respiratory: Positive for wheezing. Negative for chest tightness and shortness of breath.    Cardiovascular: Positive for leg swelling. Negative for chest pain.   Gastrointestinal: Negative for nausea and vomiting.   Endocrine: Negative for polydipsia and polyuria.   Genitourinary: Negative for decreased urine volume and difficulty urinating.   Musculoskeletal: Positive for myalgias. Negative for arthralgias.   Skin: Negative for color change and rash.   Neurological: Negative for weakness.   Hematological: Negative for adenopathy.   All other systems reviewed and are negative.      Objective:        Vitals:    12/14/17 1544   BP: 127/83   BP Location: Left arm   Patient Position: Sitting   BP Method: Medium (Automatic)   Pulse: 84   Resp: 16   Temp: 97.6 °F (36.4 °C)   TempSrc: Oral   Weight: (!) 165.1 kg (363 lb 15.7 oz)   Height: 5' 4" (1.626 m)       Body mass index is 62.48 kg/m².    Physical Exam   Constitutional: She is oriented to person, place, and time. She appears well-developed and well-nourished. No distress.   HENT:   Head: Normocephalic and atraumatic.   Nose: Nose normal.   Eyes: Conjunctivae and EOM are normal. Right eye exhibits no discharge. Left eye exhibits no discharge.   Neck: Normal range of motion. Neck " supple.   Cardiovascular: Normal rate, regular rhythm, normal heart sounds and intact distal pulses.    Pulmonary/Chest: Effort normal and breath sounds normal. She has no wheezes.   Abdominal: Soft. Bowel sounds are normal.   Musculoskeletal: Normal range of motion. She exhibits edema (2+ pre-tibial BLE).        Right lower leg: She exhibits no tenderness and no swelling.        Left lower leg: She exhibits no tenderness and no swelling.   Neurological: She is alert and oriented to person, place, and time.   Skin: Skin is warm and dry. She is not diaphoretic. No erythema.   Psychiatric: She has a normal mood and affect. Her behavior is normal. Thought content normal.       Assessment:     1. Edema, unspecified type    2. Lower extremity edema           Plan:         1. Edema, unspecified type  - check kidney function and for proteinuria. Suspect dietary indiscretion as she reports eating chinese food. Continue compression socks. No signs of DVT. Hold HCTZ and switch to lasix for one week.   - Renal function panel; Future  - Urinalysis; Future  - furosemide (LASIX) 20 MG tablet; Take 1 tablet (20 mg total) by mouth 2 (two) times daily.  Dispense: 14 tablet; Refill: 0    2. Lower extremity edema  - see above  - furosemide (LASIX) 20 MG tablet; Take 1 tablet (20 mg total) by mouth 2 (two) times daily.  Dispense: 14 tablet; Refill: 0    Patient was instructed to notify clinic if symptoms change, worsen or do not improve.

## 2017-12-15 ENCOUNTER — TELEPHONE (OUTPATIENT)
Dept: INTERNAL MEDICINE | Facility: CLINIC | Age: 57
End: 2017-12-15

## 2017-12-15 NOTE — TELEPHONE ENCOUNTER
Please inform patient of lab results:  Electrolytes and kidney function are normal. No protein in the urine.

## 2017-12-19 ENCOUNTER — TELEPHONE (OUTPATIENT)
Dept: GASTROENTEROLOGY | Facility: CLINIC | Age: 57
End: 2017-12-19

## 2017-12-19 ENCOUNTER — TELEPHONE (OUTPATIENT)
Dept: INTERNAL MEDICINE | Facility: CLINIC | Age: 57
End: 2017-12-19

## 2017-12-19 RX ORDER — HYDROCHLOROTHIAZIDE 12.5 MG/1
25 CAPSULE ORAL DAILY
Qty: 60 CAPSULE | Refills: 6 | Status: SHIPPED | OUTPATIENT
Start: 2017-12-19 | End: 2018-04-13

## 2017-12-19 NOTE — TELEPHONE ENCOUNTER
"----- Message from Rosette Neal sent at 12/19/2017 12:05 PM CST -----  Contact: Pt [phone]  RX request - refill or new RX.  Is this a refill or new RX:  Refill  RX name and strength: hydroCHLOROthiazide (MICROZIDE) 12.5 mg capsule  Directions: 2x a day  Is this a 30 day or 90 day RX:  90 day  Pharmacy name and phone # (DON'T enter "on file" or "in chart"): [id number] [facility], [address] [phone] (Phone)  [phone] (Fax)  Comments:        "

## 2017-12-19 NOTE — TELEPHONE ENCOUNTER
----- Message from Darcy Dickinson NP sent at 12/19/2017  3:50 PM CST -----  Contact: Pt #[phone]  Hi Ms. Zambrano,  Moving forward, can you send these messages to my medical assistant please (Jessica Gramajo).    Thanks,  Darcy Dickinson NP  ----- Message -----  From: Birdie Hedrickre  Sent: 12/19/2017   3:22 PM  To: Darcy Dickinson NP    Pt states she is having some stomach issues and would like to scheduled an Appt # [phone]

## 2017-12-20 ENCOUNTER — OFFICE VISIT (OUTPATIENT)
Dept: GASTROENTEROLOGY | Facility: CLINIC | Age: 57
End: 2017-12-20
Payer: COMMERCIAL

## 2017-12-20 VITALS
WEIGHT: 293 LBS | HEART RATE: 73 BPM | HEIGHT: 64 IN | BODY MASS INDEX: 50.02 KG/M2 | DIASTOLIC BLOOD PRESSURE: 76 MMHG | SYSTOLIC BLOOD PRESSURE: 106 MMHG

## 2017-12-20 DIAGNOSIS — K59.09 CHRONIC CONSTIPATION: ICD-10-CM

## 2017-12-20 DIAGNOSIS — R10.84 GENERALIZED ABDOMINAL DISCOMFORT: Primary | ICD-10-CM

## 2017-12-20 PROCEDURE — 99214 OFFICE O/P EST MOD 30 MIN: CPT | Mod: S$GLB,,, | Performed by: NURSE PRACTITIONER

## 2017-12-20 PROCEDURE — 99999 PR PBB SHADOW E&M-EST. PATIENT-LVL V: CPT | Mod: PBBFAC,,, | Performed by: NURSE PRACTITIONER

## 2017-12-20 NOTE — PROGRESS NOTES
[facility] Note    Reason for Visit:  The primary encounter diagnosis was Generalized abdominal discomfort. A diagnosis of Chronic constipation was also pertinent to this visit.    PCP:   Madie Petersen       Referring MD:  No referring provider defined for this encounter.    HPI:  This is a 57 y.o. female here for evaluation of abdominal discomfort.  Previously seen in GI by me (7/2016 ) for dyspepsia.  Treated for h. Pylori in 2011; stools for h. Pylori were negative in 7/016. EGD recommended at that time, but pt declined.  Currently with recurrence of same abd discomfort as detailed below:    Abdominal pain:  Onset: episodic with recurrence 2 weeks ago. Some improvement in the last two days.  Location: generalized abd  Duration: daily  Character:  bloating, queasy   Associated/alleviating/aggravating:queasy/nausea. No vomiting. No fevers.  increased gas.  Passing flatus and burping with some relief of abd discomfort.  Worse after meals.  Taking gas ex or phazyme with some improvement.  levsin once or twice daily with some improvement. Zantac x a few days with some improvement. Takes Carafate 3 times daily per pcp x several months intermittently.  Takes zofran a few days weekly.    Severity:Rated 6-7/10.    Reflux + hx GERD. Taking OTC Nexium or Prilosec 20 mg once daily or less with good control.   Dysphagia/odynophagia - no   Bowel habits - hx constipation. Currently with 1 bristol type 4 or soft BM/day.  Takes two tab exlax daily, one dulcolax daily and colace once daily. Also takes lactulose PRN. On chronic pain meds.  GI bleeding - denies hematochezia, hematemesis, melena, BRBPR, black/tarry stools, and coffee ground emesis  NSAID usage -takes Excedrin every other day for HAs. Has not been taking 81 mg ASA d/t taking the Excedrin. Takes BC goody a few times per week and Advil daily.    ROS:  Constitutional: No fevers, no chills, No unintentional weight loss, + fatigue,   ENT: +  allergies  CV: No chest pain, no palpitations, no perif. edema, no sob on exertion  Pulm: No cough, No shortness of breath, no wheezes, no sputum  Ophtho: No vision changes  GI: see HPI;   Derm: No rash  Heme: No lymphadenopathy, No bruising  MSK: + arthritis, + muscle pain, no muscle weakness  : No dysuria, No hematuria  Endo: No hot or cold intolerance  Neuro: No syncope, No seizure,       Medical History:  has a past medical history of GERD (gastroesophageal reflux disease); High cholesterol; Hypertension; Hypothyroid; Injury of knee, leg, ankle and foot; Insulin-resistant diabetes mellitus and acanthosis nigricans; Menopause present; Osteoarthritis; Osteopenia; Osteopenia; and Sleep apnea.    Surgical History:  has a past surgical history that includes Hysterectomy; Other surgical history; and bilateral duct removal breast.    Family History: family history includes Amblyopia in her father; Cancer in her maternal grandmother; Diabetes in her maternal aunt, maternal uncle, and mother; Glaucoma in her mother; Hypertension in her brother and mother..     Social History:  reports that she has never smoked. She has never used smokeless tobacco. She reports that she drinks alcohol. She reports that she does not use drugs.    Allergies   Allergen Reactions    Iodinated Contrast- Oral And Iv Dye Swelling     Other reaction(s): Swelling    Naproxen Sodium Swelling    Naprosyn  [Naproxen]      Other reaction(s): Swelling       Current Outpatient Prescriptions   Medication Sig    ADVAIR DISKUS 250-50 mcg/dose diskus inhaler INHALE ONE PUFF BY MOUTH TWICE DAILY    aspirin 81 mg Tab Take by mouth. 1 Tablet Oral Every day    buPROPion (WELLBUTRIN XL) 150 MG TB24 tablet Take 1 tablet (150 mg total) by mouth once daily.    butalbital-acetaminophen-caffeine -40 mg (FIORICET, ESGIC) -40 mg per tablet Take 1 tablet by mouth every 4 (four) hours as needed. for pain.    calcium carbonate (CALCIUM CARBONATE) 300  mg (750 mg) Chew Take by mouth 2 (two) times daily.      etodolac (LODINE) 500 MG tablet Take 1 tablet (500 mg total) by mouth 2 (two) times daily.    fish oil-dha-epa 1,200-144-216 mg Cap Take by mouth. 1 Capsule Oral Every day    flurazepam (DALMANE) 30 MG capsule Take 30 mg by mouth nightly as needed.      glipiZIDE (GLUCOTROL) 10 MG tablet Take 1 tablet (10 mg total) by mouth 2 (two) times daily before meals.    hydroCHLOROthiazide (MICROZIDE) 12.5 mg capsule Take 2 capsules (25 mg total) by mouth once daily.    hydrocodone-acetaminophen 10-325mg (NORCO)  mg Tab Take 1 tablet by mouth every 4 (four) hours as needed for Pain.    hyoscyamine (LEVSIN/SL) 0.125 mg Subl DISSOLVE ONE TABLET UNDER THE TONGUE EVERY 4 TO 6 HOURS    lactulose (CHRONULAC) 10 gram/15 mL solution TAKE  15 ML BY MOUTH ONCE DAILY AS NEEDED FOR  CONSTIPATION    lancets Misc 1 Units by Misc.(Non-Drug; Combo Route) route 2 (two) times daily.    lancets Misc Check glucose three times daily    levocetirizine (XYZAL) 5 MG tablet Take 1 tablet (5 mg total) by mouth every evening.    levothyroxine (SYNTHROID) 100 MCG tablet TAKE ONE TABLET BY MOUTH ONCE DAILY    LORazepam (ATIVAN) 0.5 MG tablet TAKE ONE TABLET BY MOUTH TWICE DAILY AS NEEDED FOR ANXIETY    meclizine (ANTIVERT) 25 mg tablet Take 1 tablet (25 mg total) by mouth 3 (three) times daily as needed.    metformin (GLUCOPHAGE) 1000 MG tablet TAKE ONE TABLET BY MOUTH TWICE DAILY WITH  MEALS    metoprolol tartrate (LOPRESSOR) 100 MG tablet TAKE ONE TABLET BY MOUTH TWICE DAILY    mv-min-FA-Oy-Za-pbburnq-lutein (CENTRUM) 0.4-162-18 mg Tab Take by mouth. 1 Tablet Oral Every day    nisoldipine (SULAR) 25.5 MG 24 hr tablet TAKE ONE TABLET BY MOUTH TWICE DAILY    omeprazole (PRILOSEC) 20 MG capsule Take 1 capsule (20 mg total) by mouth 2 (two) times daily.    ondansetron (ZOFRAN) 4 MG tablet Take 1 tablet (4 mg total) by mouth every 8 (eight) hours as needed.    pravastatin  "(PRAVACHOL) 40 MG tablet TAKE ONE TABLET BY MOUTH ONCE DAILY AT  NIGHT    quinapril (ACCUPRIL) 40 MG tablet TAKE ONE TABLET BY MOUTH ONCE DAILY IN THE EVENING    sucralfate (CARAFATE) 1 gram tablet TAKE ONE TABLET BY MOUTH THREE TIMES DAILY    temazepam (RESTORIL) 30 mg capsule Take 1 capsule (30 mg total) by mouth nightly as needed. 1 Capsule Oral Every day    tramadol (ULTRAM) 50 mg tablet Take 1-2 tablets ( mg total) by mouth 2 (two) times daily as needed for Pain.    VENTOLIN HFA 90 mcg/actuation inhaler INHALE ONE TO TWO PUFFS INTO LUNGS EVERY 6 HOURS AS NEEDED FOR WHEEZING    VOLTAREN 1 % Gel APPLY TWO GRAMS TOPICALLY ONCE DAILY    blood sugar diagnostic (FREESTYLE INSULINX TEST STRIPS) Strp Check glucose three times daily    lactulose (CHRONULAC) 10 gram/15 mL solution TAKE 15 ML BY MOUTH ONCE DAILY AS NEEDED FOR  CONSTIPATION     Current Facility-Administered Medications   Medication    EUFLEXXA 10 mg/mL(mw 2.4 -3.6 million) injection Syrg 20 mg       Objective Findings:    Vital Signs:  /76   Pulse 73   Ht 5' 4" (1.626 m)   Wt (!) 163 kg (359 lb 5.6 oz)   BMI 61.68 kg/m²   Body mass index is 61.68 kg/m².    Physical Exam:  General Appearance: Well appearing in no acute distress  Head:   Normocephalic, without obvious abnormality  Eyes:    No scleral icterus, EOMI  ENT: Neck supple, Lips, mucosa, and tongue normal; teeth and gums normal  Lungs: CTA bilaterally in anterior and posterior fields, no wheezes, no crackles.  Heart:  Regular rate and rhythm, S1, S2 normal, no murmurs heard  Abdomen: PE limiited by body habitus, Soft, non tender, non distended with positive bowel sounds in all four quadrants. No hepatosplenomegaly, ascites, or mass  Extremities: 2+ radial pulses, no clubbing, cyanosis or edema  Skin: No rash to exposed areas  Neurologic: A&Ox4      Labs:  Lab Results   Component Value Date    WBC 8.65 07/11/2016    HGB 12.7 07/11/2016    HCT 38.3 07/11/2016     (H) " 07/11/2016    CHOL 206 (H) 04/06/2017    TRIG 138 04/06/2017    HDL 51 04/06/2017    ALT 14 04/18/2017    AST 18 04/18/2017     12/14/2017    K 4.6 12/14/2017     12/14/2017    CREATININE 0.8 12/14/2017    BUN 24 (H) 12/14/2017    CO2 31 (H) 12/14/2017    TSH 2.551 02/23/2017    INR 1.0 06/01/2004    HGBA1C 7.4 (H) 08/18/2017       Endoscopy:    2011 EGD Dr. Holden-normal appearance. Bx= +h. Pylori  2011 Colonoscopy Dr. Holden-tortuous colon. Pan diverticulosis. Repeat in 7 years (2018).   Assessment:  1. Generalized abdominal discomfort    2. Chronic constipation           Recommendations:  1.Gen abd discomfort-EGD once again recommended. Pt declines. Risk vs benefits discussed. Understanding verbalized. stop Carafate d/t it is to be taken for short courses. Continue Zantac, PPI, otc gas ex. Sadgas/ bloating dietary and lifestyle recs as per handout provided. abd us\ ordered. To pain management for non nsaid alternative to treat pain. Pt states she is awaiting call back for scheduling w/ pain management. States she noticed her stomach feels better if off NSAIDS, but needs something to control pain.  2. Constipation-controlled. continue present regimen.       Return in about 2 months (around 2/20/2018) for abd pain w/ Dr. Holden.      Order summary:  Orders Placed This Encounter    US Abdomen Complete         Thank you so much for allowing me to participate in the care of Violeta Champion    Darcy Dickinson, APRN, FNP-C

## 2017-12-20 NOTE — PATIENT INSTRUCTIONS
"   What to Eat and What Not to Eat (to reduce bloating and gas)    Avoid drinking from straws  Avoid eating excessively fast  Avoid eating excessively slow  Avoid gum chewing         Drinks  Cut out: Dairy, soda (regular and diet), sports drinks, fruit drinks, alcohol, caffeine, soy milk, carbonated beverages, kombucha    Drink: Water (1-2 Liters a day), coconut water, herbal tea, green juices (kale, spinach, green apple), smoothies (berries, bananas, amond milk, ice, flax seed).  Drink at the end of the meal; not during.         Food    Eliminate:  Eliminate all of these foods and ingredients for 10 days. Once the bloating has reduced, can add back one at a time to see which ones your body can tolerate.    Soy  Artificial sweeteners - sugar alcohols, splenda, aspartame  Dairy  Gluten  Alcohol  Sugar - no added sugars (glucose, fructose, maltose, dextrose, corn and maple syrup, honey, agave, stevia)    Limit:  Beans, broccoli, cabbage  Caffeine (1 small cup of coffee or tea a day)  Fatty meals  Meat  Processed foods (if it comes in a box, has more than 10 listed ingredients, has an expiration date)  Salt    Eat:  50% of daily intake should be food eaten in its natural, raw state.  Leafy greens - Kale, spinach, chard, collards, parsley, turnip and mustard greens, arugula, bok hailey, lee ann lettuce  Fiber - favor plant based sources, not processed fibers (cereals, fiber in a box)  Papaya and Pineapple  Brightly colored produce - strawberries, blueberries, bell peppers, lemon, spinach      Adapted from "Gutbliss" by Dr. Mariajose Varma                      Gas and Bloating  Although the mention of intestinal gas problems, such as belching, flatulence, bloating and .gas pains. often elicits some degree of amusement, all of us have gas in our  intestinal tract and must expel it in some way. Some individuals are very sensitive to the effects of gas collections in the stomach and intestinal tract and may " develop  significant discomfort. If such complaints are troublesome and persistent and do not respond to simple measures, such as change in diet, a visit to your doctor could be helpful.  Where does the gas that we belch  or burp come from?  The gas brought back by belching comes entirely from  swallowed air. We all swallow some air when eating food and  drinking liquids. Most of the gas mixes with the stomach  content and either enters into the small intestine or is  belched back. The air that enters the small intestine is either  absorbed or it may continue through to the large intestine  and is then passed rectally. Individuals may swallow more air  (and thus increase stomach gas) if they have a post-nasal  drip, chew gum, have poorly fitting dentures, suck on hard  candies or smoke tobacco. Drinking carbonated beverages  (soda or beer) or eating rapidly can also increase stomach  gas.  What causes repetitive belching or burping?  Some people have episodes of repeated belching. Since  belched gas comes from swallowed air, these individuals are  usually unaware that they caused the problem by swallowing  air into the esophagus and bringing it back rapidly. Often,  the habit can be broken if the person is made aware of the air  swallowing behavior.  What foods cause increased flatus  passage?  The food we choose to eat can influence the amount of  gas passed rectally. Although most of our food intake is  absorbed in the small intestine, some foods, such as  cauliflower, broccoli, cabbage, baked beans, and bran are  incompletely digested. They are then broken down by bacteria in the large intestine, causing the formation of gas. A high roughage diet is important to promote bowel  regularity, but excessive roughage or fiber may lead to  bloating and increased flatulence. When increasing the  amount of fiber in your diet, do so gradually, allowing your  intestinal tract time to adjust. Milk sugar (lactose) requires an  intestinal enzyme  (lactase) for digestion. When individuals lack this enzyme  the lactose in milk and other dairy products enters the large  intestine where the lactose is broken down by bacteria,  producing gas. Although milk is an excellent source of  protein and calcium, many adults experience abdominal  bloating, gas and diarrhea after consuming milk sugar.  Persons from [place] are often extremely intolerant  to the smallest quantity of dairy products.  Everyone passes some rectal gas, although the volume  of gas is different each day. Much of the flatus comes from  the nitrogen found in the air one swallows. The remainder  of the flatus volume is the result of carbohydrates which are  not absorbed in the small intestine and are broken down by  bacteria in the large intestine. Therefore, the amount of  flatus represents a combination of swallowed air and poorly  absorbed carbohydrates. The unpleasant order of flatus is  due to other gases, such as hydrogen sulfide, which is  produced by the bacteria.  How can the volume of flatus be reduced?  In addition to the gas-forming foods cited above, some  diet chewing gum and hard candies use sorbitol or fructose  as sweeteners. These sugars can lead to excess gas  production and should be avoided. Elimination of dairy  products or the use of lactase-added milk can be helpful for  those with lactase deficiency.  Where do I feel gas pains?  Individuals with irritable bowel problems (crampy pain  and/or bowel irregularity) are often sensitive to excess  intestinal gas. A common symptom is generalized  abdominal cramping, sometimes relieved by passing flatus.  If the gas accumulates in the right upper abdomen, the pain  may radiate up intconfused with gallbladder disease. If the gas accumulates in  the left upper abdomen, the pain may radiate into the left side  of the chest and could mimic heart disease. If gas  accumulates in the stomach, the upper abdominal  pressure  pain could radiate up to the lower chest and raise concern  about a heart disorder.  Is there treatment for gas pains?  Your physician may wish to obtain tests to be confident  that recurrent .gas pains. are not the result of some other  disorder. If the tests are normal, a diet designed to reduce  both air swallowing and the ingestion of gas forming foods  would be helpful. Anti-spasmodic medications may relieve  crampy discomfort, but these can have side effects on the  eyes, plus bladder and bowel function.  What causes abdominal distension (bloating)?  Many individuals complain of abdominal distension  which increases during the day and is most uncomfortable  after the evening meal. Often distension is believed to be  caused by the build-up of intestinal gas; however, there are  other considerations. The tone of the rectus muscles (the  muscles which support the abdominal wall and run along the  length of the abdomen on either side of the navel) may be  decreased due to the stretching of the abdominal wall in  women who have had one or more pregnancies. If these  muscles have become thinned, the abdomen may distend as  food (and gas) moves through the intestine. This is most  noticed after the evening meal. This explanation for  distension (bloating) is most likely if the uncomfortable  feeling is absent when the individual is lying down (you don.t  need the rectus muscle for a .flat. abdomen when lying  down) but is apparent when standing or sitting erect. There  is no effective medical therapy for this type of abdominal  bloating but exercise directed toward strengthening the  abdominal muscles may be helpful, particularly in younger  women.  When should individuals with gaseous symptoms consult a  Physician?  Individuals with a long history of occasional  gaseousness and abdominal discomfort need not seek  medical attention. A change in the location of abdominal  pain, significant increase in the  frequency or severity of  symptoms, or onset of new symptoms in individuals over the  age of 40 are some of the reasons to see your doctor.o the right lower chest and could be   What over-the-counter drugs provide relief for gaseous  symptoms?  Despite the many commercials and advertiseme  medications which reduce gas pains and bloating, very few  have any proven scientific value. Simethicone, a common  additive to antacid preparations, shows some benefits when  being tested in a lab, but many individuals feel little relief.  Several scientific studies have found some benefit from  activated charcoal preparations in gassy or flatulent  individuals, but other studies have failed to show symptom  Improvement.  10 Steps to Decrease Symptoms of Intestinal Gas  1. Develop a regular routine of diet, exercise, and rest.  2. Correct bad habits:  Chew food thoroughly  Eat slowly and leisurely in a quiet atmosphere  Avoid washing solids down with a beverage  Avoid gulping and sipping liquids  Avoid drinking out of small mouthed bottles or straws  Avoid drinking from water fountains  Avoid carbonated beverages.sodas and beer  Eliminate pipe, cigar, and cigarette smoking  Avoid gum chewing and sucking hard candy  Check dentures for proper fit  Attempt to be aware of and avoid deep sighing  3. Do not attempt to induce belching.  4. Do not overload the stomach at any one meal.  5. Avoid gaseous vegetables: navy beans, cabbage, brussel  sprouts, cauliflower, broccoli, turnips, cucumbers, radishes,  onions, melons, and excess raw fruit and vegetables.  6. Avoid foods with air whipped into them.souffles, sponge cake,  milk shakes.  7. Avoid long-term or frequent intermittent use of medications  intended for relief of cold symptoms. cough, nasal congestion,  post nasal discharge.  8. Avoid tight fitting garments, girdles, and belts.  9. Do not lie down or sit in a slumped position immediately after  eating.  10. Take a leisurely  stroll after meals.

## 2017-12-24 RX ORDER — NISOLDIPINE 25.5 MG/1
TABLET, FILM COATED, EXTENDED RELEASE ORAL
Qty: 60 TABLET | Refills: 11 | Status: SHIPPED | OUTPATIENT
Start: 2017-12-24 | End: 2018-05-09 | Stop reason: SDUPTHER

## 2017-12-29 ENCOUNTER — TELEPHONE (OUTPATIENT)
Dept: INTERNAL MEDICINE | Facility: CLINIC | Age: 57
End: 2017-12-29

## 2017-12-29 NOTE — TELEPHONE ENCOUNTER
----- Message from Rosette Neal sent at 12/29/2017 11:11 AM CST -----  Contact: Pt [phone]  Pt would like a call back from the nurse regarding if her [place] paper work was received.

## 2018-01-01 RX ORDER — NISOLDIPINE 25.5 MG/1
TABLET, FILM COATED, EXTENDED RELEASE ORAL
Qty: 60 TABLET | Refills: 11 | Status: SHIPPED | OUTPATIENT
Start: 2018-01-01 | End: 2018-03-03 | Stop reason: SDUPTHER

## 2018-01-03 ENCOUNTER — TELEPHONE (OUTPATIENT)
Dept: INTERNAL MEDICINE | Facility: CLINIC | Age: 58
End: 2018-01-03

## 2018-01-03 RX ORDER — QUINAPRIL 40 MG/1
TABLET ORAL
Qty: 90 TABLET | Refills: 1 | Status: SHIPPED | OUTPATIENT
Start: 2018-01-03 | End: 2018-12-17 | Stop reason: SDUPTHER

## 2018-01-03 RX ORDER — LEVOTHYROXINE SODIUM 100 UG/1
100 TABLET ORAL DAILY
Qty: 90 TABLET | Refills: 1 | Status: SHIPPED | OUTPATIENT
Start: 2018-01-03 | End: 2019-01-12 | Stop reason: SDUPTHER

## 2018-01-03 NOTE — TELEPHONE ENCOUNTER
"----- Message from Chloe Mancuso sent at 1/3/2018  3:45 PM CST -----  Contact: self/ [phone]  RX request - refill or new RX.  Is this a refill or new RX:    RX name and strength: levothyroxine (SYNTHROID) 100 MCG tablet 90 tablet , quinapril (ACCUPRIL) 40 MG tablet 90 tablet   Directions:   Is this a 30 day or 90 day RX:    Pharmacy name and phone # (DON'T enter "on file" or "in chart"): [facility] [address] [place] [address] [phone] (Phone)  [phone] (Fax)      Comments:        "

## 2018-01-09 RX ORDER — HYDROCODONE BITARTRATE AND ACETAMINOPHEN 10; 325 MG/1; MG/1
1 TABLET ORAL EVERY 4 HOURS PRN
Qty: 180 TABLET | Refills: 0 | Status: SHIPPED | OUTPATIENT
Start: 2018-01-09 | End: 2018-03-28 | Stop reason: SDUPTHER

## 2018-01-09 NOTE — TELEPHONE ENCOUNTER
Spoke to pt who stated that the norco wasn't helping and that she was hoping for a refill of the percocet 7.5 mg. Pt also stated that since she takes 2 HCTZ a day she is requesting a refill with #60     Please advise.

## 2018-01-09 NOTE — TELEPHONE ENCOUNTER
----- Message from Janelle Ortega sent at 1/9/2018 12:58 PM CST -----  Contact: pt [phone]  Pt would like to know if her pain script is ready for pickup pt said she sent a request in last week,please advise pt.

## 2018-01-19 RX ORDER — HYOSCYAMINE SULFATE 0.12 MG/1
TABLET SUBLINGUAL
Qty: 30 TABLET | Refills: 0 | Status: SHIPPED | OUTPATIENT
Start: 2018-01-19 | End: 2018-06-15 | Stop reason: SDUPTHER

## 2018-01-29 RX ORDER — SITAGLIPTIN 100 MG/1
TABLET, FILM COATED ORAL
Qty: 30 TABLET | Refills: 6 | Status: SHIPPED | OUTPATIENT
Start: 2018-01-29 | End: 2018-06-01

## 2018-02-09 ENCOUNTER — TELEPHONE (OUTPATIENT)
Dept: INTERNAL MEDICINE | Facility: CLINIC | Age: 58
End: 2018-02-09

## 2018-02-09 DIAGNOSIS — I10 HYPERTENSION, ESSENTIAL: ICD-10-CM

## 2018-02-09 DIAGNOSIS — M17.9 OSTEOARTHRITIS OF KNEE, UNSPECIFIED LATERALITY, UNSPECIFIED OSTEOARTHRITIS TYPE: Primary | ICD-10-CM

## 2018-02-10 NOTE — TELEPHONE ENCOUNTER
----- Message from Arash Hester MD sent at 2/7/2018  8:38 AM CST -----  Contact: pt [phone]      ----- Message -----  From: Janelle Ortega  Sent: 2/5/2018   4:28 PM  To: [facility] Staff    Pt would like a call from the nurse in [place] to getting knee injections in both knees and need the nurse to call her insurance company to get it approved,please advise

## 2018-02-10 NOTE — TELEPHONE ENCOUNTER
Dory, I placed a prior auth order for euflexxa injections in both knees. Please reach out to patient and schedule her for shots. Often the PA sits there unless a procedure appointment is made. Please also reach out to the auth department so they can initiate the auth. Thank you.

## 2018-02-12 NOTE — TELEPHONE ENCOUNTER
Spoke with Arnold with our authorization dep ext [id number]. She states that she handles the euflexxa injections and all that we are required to do here in the office is schedule the appointment and attach the referral and she will handle the PA from there. For this pt, PA is not needed pt is already approved for injection. Pt notified.      ARNALDO

## 2018-03-03 ENCOUNTER — OFFICE VISIT (OUTPATIENT)
Dept: INTERNAL MEDICINE | Facility: CLINIC | Age: 58
End: 2018-03-03
Payer: COMMERCIAL

## 2018-03-03 VITALS
HEIGHT: 64 IN | BODY MASS INDEX: 50.02 KG/M2 | SYSTOLIC BLOOD PRESSURE: 106 MMHG | TEMPERATURE: 98 F | WEIGHT: 293 LBS | DIASTOLIC BLOOD PRESSURE: 63 MMHG

## 2018-03-03 DIAGNOSIS — E87.6 HYPOKALEMIA: ICD-10-CM

## 2018-03-03 DIAGNOSIS — R60.9 EDEMA, UNSPECIFIED TYPE: Primary | ICD-10-CM

## 2018-03-03 PROCEDURE — 99214 OFFICE O/P EST MOD 30 MIN: CPT | Mod: S$GLB,,, | Performed by: FAMILY MEDICINE

## 2018-03-03 PROCEDURE — 3074F SYST BP LT 130 MM HG: CPT | Mod: S$GLB,,, | Performed by: FAMILY MEDICINE

## 2018-03-03 PROCEDURE — 3078F DIAST BP <80 MM HG: CPT | Mod: S$GLB,,, | Performed by: FAMILY MEDICINE

## 2018-03-03 PROCEDURE — 99999 PR PBB SHADOW E&M-EST. PATIENT-LVL III: CPT | Mod: PBBFAC,,, | Performed by: FAMILY MEDICINE

## 2018-03-03 RX ORDER — FUROSEMIDE 40 MG/1
40 TABLET ORAL 2 TIMES DAILY
Qty: 60 TABLET | Refills: 11 | Status: SHIPPED | OUTPATIENT
Start: 2018-03-03 | End: 2018-03-20

## 2018-03-03 RX ORDER — POTASSIUM CHLORIDE 20 MEQ/1
20 TABLET, EXTENDED RELEASE ORAL 2 TIMES DAILY
Qty: 30 TABLET | Refills: 5 | Status: SHIPPED | OUTPATIENT
Start: 2018-03-03 | End: 2018-07-08 | Stop reason: SDUPTHER

## 2018-03-03 NOTE — PATIENT INSTRUCTIONS
Leg Swelling in Both Legs    Swelling of the feet, ankles, and legs is called edema. It is caused by excess fluid that has collected in the tissues. Extra fluid in the body settles in the lowest part because of gravity. This is why the legs and feet are most affected.  Some of the causes for edema include:  · Disease of the heart like congestive heart failure  · Standing or sitting for long periods of time  · Infection of the feet or legs  · Blood pooling in the veins of your legs (venous insufficiency)  · Dilated veins in your lower leg (varicose veins)  · Garters or other clothing that is tight on your legs. This will cause blood to pool in your legs because the clothing limits blood flow.  · Some medicines such as hormones like birth control pills, some blood pressure medicines like calcium channel blockers (amlodipine) and steroids, some antidepressants like MAO inhibitors and tricyclics  · Menstrual periods that cause you to retain fluids  · Many types of renal disease  · Liver failure or cirrhosis  · Pregnancy, some swelling is normal, but a sudden increase in leg swelling or weight gain can be a sign of a dangerous complication of pregnancy  · Poor nutrition  · Thyroid disease  Medical treatment will depend on what is causing the swelling in your legs. Your healthcare provider may prescribe water pills (diuretics) to get rid of the extra fluid.  Home care  Follow these guidelines when caring for yourself at home:  · Don't wear clothing like garters that is tight on your legs.  · Keep your legs up while lying or sitting.  · If infection, injury, or recent surgery is causing the swelling, stay off your legs as much as possible until symptoms get better.  · If your healthcare provider says that your leg swelling is caused by venous insufficiency or varicose veins, don't sit or  one place for long periods of time. Take breaks and walk about every few hours. Brisk walking is a good exercise. It helps  circulate the blood that has collected in your leg. Talk with your provider about using support stockings to stop daytime leg swelling.  · If your provider says that heart disease is causing your leg swelling, follow a low-salt diet to stop extra fluid from staying in your body. You may also need medicine.  Follow-up care  Follow up with your healthcare provider, or as advised.  When to seek medical advice  Call your healthcare provider right away if any of these occur:  · New shortness of breath or chest pain  · Shortness of breath or chest pain that gets worse  · Swelling in both legs or ankles that gets worse  · Swelling of the abdomen  · Redness, warmth, or swelling in one leg  · Fever of 100.4ºF (38ºC) or higher, or as directed by your healthcare provider  · Yellow color to your skin or eyes  · Rapid, unexplained weight gain  · Having to sleep upright or use an increased number of pillows  Date Last Reviewed: 3/31/2016  © 4009-8806 [employer]. [address] [place] [address], [place] [address]. All rights reserved. This information is not intended as a substitute for professional medical care. Always follow your healthcare professional's instructions.

## 2018-03-05 ENCOUNTER — TELEPHONE (OUTPATIENT)
Dept: INTERNAL MEDICINE | Facility: CLINIC | Age: 58
End: 2018-03-05

## 2018-03-05 NOTE — TELEPHONE ENCOUNTER
Spoke to pt who stated that she needs an additional letter explaining pt's work restrictions. Requesting letter to be sent to:   Mary Bryan, HR [phone]  [facility] [phone]    Pt stated that letter should contain her limited restrictions including being overweight, and medium duty work restrictions; walking 50-75 ft, frequent rest periods ie. Every 2-5 minutes, lifting between 20 to 50lbs, up to 50 lbs of force occasionally, 10-20lbs of force frequently.

## 2018-03-05 NOTE — PROGRESS NOTES
Subjective:       Patient ID: Violeta Cahmpion is a 57 y.o. female.    Chief Complaint: Leg Swelling   patient is been treated with hydrochlorothiazide for swelling doesn't seem to be helping any longer.  Patient's support hose doesn't seem to be helping either.  Patient is morbidly obese and has gained weight  She admits to not adequately watching her salt intake  HPI above   Review of Systems   Constitutional: Positive for fatigue.   HENT: Negative.    Eyes: Negative.    Respiratory: Negative.    Cardiovascular: Positive for leg swelling.   Gastrointestinal: Negative.    Endocrine: Negative.    Genitourinary: Negative.    Musculoskeletal: Negative.    Allergic/Immunologic: Negative.    Neurological: Negative.    Hematological: Negative.    Psychiatric/Behavioral: Negative.        Objective:      Physical Exam   Constitutional: She is oriented to person, place, and time.   Morbid obesity   HENT:   Head: Normocephalic and atraumatic.   Right Ear: External ear normal.   Left Ear: External ear normal.   Nose: Nose normal.   Mouth/Throat: Oropharynx is clear and moist.   Eyes: Conjunctivae and EOM are normal. Pupils are equal, round, and reactive to light.   Neck: Normal range of motion. Neck supple. No JVD present. No thyromegaly present.   Cardiovascular: Normal rate, regular rhythm and normal heart sounds.    No murmur heard.  Pulmonary/Chest: Effort normal and breath sounds normal. No respiratory distress. She has no wheezes. She has no rales. She exhibits no tenderness.   Abdominal: Soft. Bowel sounds are normal. She exhibits no distension and no mass. There is no tenderness. There is no rebound and no guarding. No hernia.   Musculoskeletal: She exhibits edema.   Neurological: She is alert and oriented to person, place, and time. She displays normal reflexes. No cranial nerve deficit or sensory deficit. She exhibits normal muscle tone. Coordination normal.   Skin: Skin is warm and dry. No rash noted. No erythema.  No pallor.   Psychiatric: She has a normal mood and affect. Her behavior is normal. Judgment and thought content normal.   Nursing note and vitals reviewed.      Assessment:       1. Edema, unspecified type    2. Hypokalemia     3.     Morbid obesity  Plan:     see med card 3-3-18  Medications    VOLTAREN 1 % Gel         VENTOLIN HFA 90 mcg/actuation inhaler         tramadol (ULTRAM) 50 mg tablet  mg, 2 times daily PRN        temazepam (RESTORIL) 30 mg capsule 30 mg, Nightly PRN        sucralfate (CARAFATE) 1 gram tablet         quinapril (ACCUPRIL) 40 MG tablet         pravastatin (PRAVACHOL) 40 MG tablet         potassium chloride SA (K-DUR,KLOR-CON) 20 MEQ tablet 20 mEq, 2 times daily        ondansetron (ZOFRAN) 4 MG tablet 4 mg, Every 8 hours PRN        omeprazole (PRILOSEC) 20 MG capsule 20 mg, 2 times daily        nisoldipine (SULAR) 25.5 MG 24 hr tablet         mv-min-FA-Es-Gx-jskzmxi-lutein (CENTRUM) 0.4-162-18 mg Tab         metoprolol tartrate (LOPRESSOR) 100 MG tablet         metformin (GLUCOPHAGE) 1000 MG tablet         meclizine (ANTIVERT) 25 mg tablet 25 mg, 3 times daily PRN        LORazepam (ATIVAN) 0.5 MG tablet         levothyroxine (SYNTHROID) 100 MCG tablet 100 mcg, Daily        levocetirizine (XYZAL) 5 MG tablet 5 mg, Nightly        lancets Misc 1 Units, 2 times daily        lancets Misc         lactulose (CHRONULAC) 10 gram/15 mL solution         JANUVIA 100 mg Tab         hyoscyamine (LEVSIN/SL) 0.125 mg Subl         hydrocodone-acetaminophen 10-325mg (NORCO)  mg Tab 1 tablet, Every 4 hours PRN        hydroCHLOROthiazide (MICROZIDE) 12.5 mg capsule 25 mg, Daily        glipiZIDE (GLUCOTROL) 10 MG tablet 10 mg, 2 times daily before meals        furosemide (LASIX) 40 MG tablet 40 mg, 2 times daily        flurazepam (DALMANE) 30 MG capsule 30 mg, Nightly PRN        fish oil-dha-epa 1,200-144-216 mg Cap         EUFLEXXA 10 mg/mL(mw 2.4 -3.6 million) injection Syrg 20 mg 20 mg, Weekly         etodolac (LODINE) 500 MG tablet 500 mg, 2 times daily        calcium carbonate (CALCIUM CARBONATE) 300 mg (750 mg) Chew 2 times daily        butalbital-acetaminophen-caffeine -40 mg (FIORICET, ESGIC) -40 mg per tablet 1 tablet, Every 4 hours PRN        buPROPion (WELLBUTRIN XL) 150 MG TB24 tablet 150 mg, Daily        aspirin 81 mg Tab         ADVAIR DISKUS 250-50 mcg/dose diskus inhaler              Strongly recommend graduated weight loss elevating the legs (excess salt in the diet  Follow-up with PCP for reevaluation as scheduled

## 2018-03-05 NOTE — TELEPHONE ENCOUNTER
----- Message from Toby Curry sent at 3/5/2018  9:31 AM CST -----  Contact: self [phone]  Patient would like to speak to nurse regarding FMLA job restriction paper work    Please advise

## 2018-03-06 ENCOUNTER — TELEPHONE (OUTPATIENT)
Dept: FAMILY MEDICINE | Facility: CLINIC | Age: 58
End: 2018-03-06

## 2018-03-06 NOTE — TELEPHONE ENCOUNTER
----- Message from Edita Brian sent at 3/6/2018 11:13 AM CST -----  Contact: Patient [phone]  Patient is requesting a call concerning potassium chloride SA (K-DUR,KLOR-CON) 20 MEQ tablet.     Please call and advise.    Thank You

## 2018-03-06 NOTE — TELEPHONE ENCOUNTER
Spoke with patient states she was advised to take the prescribed lasix once daily but it was sent to the pharmacy as twice daily also the potassium was sent to the pharmacy as take 1 tab twice daily but you sent 30 tablets only.  Please clarify both medications.

## 2018-03-09 NOTE — TELEPHONE ENCOUNTER
Spoke with Dr. Delgado states patient would need to take the potassium and the lasix for 7 days only and f/u with her PCP.  Patient advised verbalizes understanding.

## 2018-03-20 ENCOUNTER — HOSPITAL ENCOUNTER (OUTPATIENT)
Dept: RADIOLOGY | Facility: HOSPITAL | Age: 58
Discharge: HOME | End: 2018-03-20
Attending: INTERNAL MEDICINE
Payer: COMMERCIAL

## 2018-03-20 ENCOUNTER — OFFICE VISIT (OUTPATIENT)
Dept: INTERNAL MEDICINE | Facility: CLINIC | Age: 58
End: 2018-03-20
Payer: COMMERCIAL

## 2018-03-20 VITALS
SYSTOLIC BLOOD PRESSURE: 110 MMHG | WEIGHT: 293 LBS | HEART RATE: 88 BPM | BODY MASS INDEX: 50.02 KG/M2 | DIASTOLIC BLOOD PRESSURE: 66 MMHG | HEIGHT: 64 IN | TEMPERATURE: 98 F

## 2018-03-20 DIAGNOSIS — R60.0 LOWER EXTREMITY EDEMA: Primary | ICD-10-CM

## 2018-03-20 DIAGNOSIS — R60.0 LOWER EXTREMITY EDEMA: ICD-10-CM

## 2018-03-20 DIAGNOSIS — R06.01 ORTHOPNEA: ICD-10-CM

## 2018-03-20 PROCEDURE — 3074F SYST BP LT 130 MM HG: CPT | Mod: CPTII,S$GLB,, | Performed by: INTERNAL MEDICINE

## 2018-03-20 PROCEDURE — 3078F DIAST BP <80 MM HG: CPT | Mod: CPTII,S$GLB,, | Performed by: INTERNAL MEDICINE

## 2018-03-20 PROCEDURE — 71046 X-RAY EXAM CHEST 2 VIEWS: CPT | Mod: TC,PO

## 2018-03-20 PROCEDURE — 99214 OFFICE O/P EST MOD 30 MIN: CPT | Mod: S$GLB,,, | Performed by: INTERNAL MEDICINE

## 2018-03-20 PROCEDURE — 99999 PR PBB SHADOW E&M-EST. PATIENT-LVL III: CPT | Mod: PBBFAC,,, | Performed by: INTERNAL MEDICINE

## 2018-03-20 PROCEDURE — 71046 X-RAY EXAM CHEST 2 VIEWS: CPT | Mod: 26,,, | Performed by: RADIOLOGY

## 2018-03-20 RX ORDER — FUROSEMIDE 20 MG/1
20 TABLET ORAL 2 TIMES DAILY
Qty: 14 TABLET | Refills: 3 | Status: SHIPPED | OUTPATIENT
Start: 2018-03-20 | End: 2018-03-28

## 2018-03-20 NOTE — PROGRESS NOTES
Subjective:       Patient ID: Violeta Champion is a 57 y.o. female.    Chief Complaint: Leg Swelling (both)    Patient is a 57 y.o.female who presents today for lower extremity edema. She noticed it worsen over the last week. She has been noncompliant with diet, activity and compression stockings. She notes swelling to her legs and also complains of shortness of breath when she tries to lie flat. No fever or chills. No chest pain.     Review of Systems   Constitutional: Negative for appetite change, chills, diaphoresis, fatigue and fever.   HENT: Negative for congestion, dental problem, ear discharge, ear pain, hearing loss, postnasal drip, sinus pressure and sore throat.    Eyes: Negative for discharge, redness and itching.   Respiratory: Positive for shortness of breath. Negative for cough, chest tightness and wheezing.    Cardiovascular: Positive for leg swelling. Negative for chest pain and palpitations.   Gastrointestinal: Negative for abdominal pain, constipation, diarrhea, nausea and vomiting.   Endocrine: Negative for cold intolerance and heat intolerance.   Genitourinary: Negative for difficulty urinating, frequency, hematuria and urgency.   Musculoskeletal: Negative for arthralgias, back pain, gait problem, myalgias and neck pain.   Skin: Negative for color change and rash.   Neurological: Negative for dizziness, syncope and headaches.   Hematological: Negative for adenopathy.   Psychiatric/Behavioral: Negative for behavioral problems and sleep disturbance. The patient is not nervous/anxious.        Objective:      Physical Exam   Constitutional: She is oriented to person, place, and time. She appears well-developed and well-nourished. No distress.   HENT:   Head: Normocephalic and atraumatic.   Right Ear: External ear normal.   Left Ear: External ear normal.   Nose: Nose normal.   Mouth/Throat: Oropharynx is clear and moist. No oropharyngeal exudate.   Eyes: Conjunctivae and EOM are normal. Pupils are  equal, round, and reactive to light. Right eye exhibits no discharge. Left eye exhibits no discharge. No scleral icterus.   Neck: Normal range of motion. Neck supple. No JVD present. No thyromegaly present.   Cardiovascular: Normal rate, regular rhythm, normal heart sounds and intact distal pulses.  Exam reveals no gallop and no friction rub.    No murmur heard.  Pulmonary/Chest: Effort normal and breath sounds normal. No stridor. No respiratory distress. She has no wheezes. She has no rales. She exhibits no tenderness.   Abdominal: Soft. Bowel sounds are normal. She exhibits no distension. There is no tenderness. There is no rebound.   Musculoskeletal: Normal range of motion. She exhibits no edema or tenderness.   Lymphadenopathy:     She has no cervical adenopathy.   2+ pitting edema bilateral lower extremities   Neurological: She is alert and oriented to person, place, and time. No cranial nerve deficit.   Skin: Skin is warm and dry. No rash noted. She is not diaphoretic. No erythema.   Psychiatric: She has a normal mood and affect. Her behavior is normal.   Nursing note and vitals reviewed.      Assessment and Plan:       1. Lower extremity edema  - Brain natriuretic peptide; Future  - Comprehensive metabolic panel; Future  - X-Ray Chest PA And Lateral; Future  - 2D Echo w/ Color Flow Doppler; Future  - furosemide (LASIX) 20 MG tablet; Take 1 tablet (20 mg total) by mouth 2 (two) times daily.  Dispense: 14 tablet; Refill: 3  - Urinalysis; Future    2. Orthopnea  - Brain natriuretic peptide; Future  - Comprehensive metabolic panel; Future  - X-Ray Chest PA And Lateral; Future  - 2D Echo w/ Color Flow Doppler; Future  - furosemide (LASIX) 20 MG tablet; Take 1 tablet (20 mg total) by mouth 2 (two) times daily.  Dispense: 14 tablet; Refill: 3  - Urinalysis; Future    - hold hctz for one week  - start lasix 20 mg po bid x 7 days; take potassium 20 meq po bid x 7 days with it  - elevate legs; compression stockings;  low salt  - check labs, urine, xray and echo  - if this works for her, will consider changing daily hctz to lasix instead for maintenance  - notify clinic of symptoms in one week  - pt also complains of urine incontinence that worsens with lasix; check ua today; if clear will start detrol  - Notify clinic if symptoms change, worsen or do not improve          No Follow-up on file.

## 2018-03-23 ENCOUNTER — TELEPHONE (OUTPATIENT)
Dept: INTERNAL MEDICINE | Facility: CLINIC | Age: 58
End: 2018-03-23

## 2018-03-23 NOTE — LETTER
March 23, 2018    Violeta Champion  [address]  [address]  [place] [address]             [facility]  [address] [facility] [place] [address]  Phone: [phone]  Fax: [phone] To whom it may concern,     Violeta Champion is under my care. She does have a few medium- duty work restrictions. These include: limit walking to 50-75 feet, frequent rest periods every 2-5 minutes. She can lift up to 50 lbs of force occaionally and 10-20 lbs of force frequently. These restrictions are due to morbid obesity, fibromyalgia and chronic osteoarthritis of joints due to obesity. Her FMLA is permanent due to these chronic conditions. Therefore, these restrictions are permanent.    Thank you.  Sincerely,

## 2018-03-23 NOTE — TELEPHONE ENCOUNTER
Pt stated that letter needed from Dr. Cunningham stating that FMLA is permanent. That her restrictions are permanent.

## 2018-03-25 RX ORDER — GLIPIZIDE 10 MG/1
TABLET ORAL
Qty: 180 TABLET | Refills: 3 | Status: SHIPPED | OUTPATIENT
Start: 2018-03-25 | End: 2018-04-17

## 2018-03-25 RX ORDER — PRAVASTATIN SODIUM 40 MG/1
TABLET ORAL
Qty: 90 TABLET | Refills: 0 | Status: SHIPPED | OUTPATIENT
Start: 2018-03-25 | End: 2018-07-09 | Stop reason: SDUPTHER

## 2018-03-25 RX ORDER — METFORMIN HYDROCHLORIDE 1000 MG/1
TABLET ORAL
Qty: 180 TABLET | Refills: 1 | Status: SHIPPED | OUTPATIENT
Start: 2018-03-25 | End: 2018-09-18 | Stop reason: SDUPTHER

## 2018-03-26 RX ORDER — METOPROLOL TARTRATE 100 MG/1
TABLET ORAL
Qty: 180 TABLET | Refills: 0 | Status: SHIPPED | OUTPATIENT
Start: 2018-03-26 | End: 2018-10-22 | Stop reason: SDUPTHER

## 2018-03-27 ENCOUNTER — CLINICAL SUPPORT (OUTPATIENT)
Dept: CARDIOLOGY | Facility: CLINIC | Age: 58
End: 2018-03-27
Attending: INTERNAL MEDICINE
Payer: COMMERCIAL

## 2018-03-27 DIAGNOSIS — R60.0 LOWER EXTREMITY EDEMA: ICD-10-CM

## 2018-03-27 DIAGNOSIS — I51.7 CARDIOMEGALY: ICD-10-CM

## 2018-03-27 DIAGNOSIS — R06.01 ORTHOPNEA: ICD-10-CM

## 2018-03-27 LAB
DIASTOLIC DYSFUNCTION: NO
ESTIMATED PA SYSTOLIC PRESSURE: 19.65
RETIRED EF AND QEF - SEE NOTES: 60 (ref 55–65)

## 2018-03-27 PROCEDURE — 93306 TTE W/DOPPLER COMPLETE: CPT | Mod: S$GLB,,, | Performed by: INTERNAL MEDICINE

## 2018-03-28 ENCOUNTER — TELEPHONE (OUTPATIENT)
Dept: INTERNAL MEDICINE | Facility: CLINIC | Age: 58
End: 2018-03-28

## 2018-03-28 DIAGNOSIS — M17.9 OSTEOARTHRITIS OF KNEE, UNSPECIFIED LATERALITY, UNSPECIFIED OSTEOARTHRITIS TYPE: Primary | ICD-10-CM

## 2018-03-28 RX ORDER — HYDROCODONE BITARTRATE AND ACETAMINOPHEN 10; 325 MG/1; MG/1
1 TABLET ORAL EVERY 4 HOURS PRN
Qty: 180 TABLET | Refills: 0 | Status: SHIPPED | OUTPATIENT
Start: 2018-03-28 | End: 2018-06-15 | Stop reason: SDUPTHER

## 2018-03-28 RX ORDER — FUROSEMIDE 20 MG/1
20 TABLET ORAL DAILY
Qty: 30 TABLET | Refills: 11 | Status: SHIPPED | OUTPATIENT
Start: 2018-03-28 | End: 2019-03-28

## 2018-03-28 RX ORDER — OXYBUTYNIN CHLORIDE 5 MG/1
5 TABLET, EXTENDED RELEASE ORAL DAILY
Qty: 30 TABLET | Refills: 11 | Status: SHIPPED | OUTPATIENT
Start: 2018-03-28 | End: 2020-08-20 | Stop reason: SDUPTHER

## 2018-03-28 RX ORDER — METOPROLOL TARTRATE 100 MG/1
TABLET ORAL
Qty: 180 TABLET | Refills: 0 | Status: SHIPPED | OUTPATIENT
Start: 2018-03-28 | End: 2018-04-13 | Stop reason: SDUPTHER

## 2018-03-28 NOTE — TELEPHONE ENCOUNTER
Pt stated that lasix is better and would like to swap medication. Reviewed echo, urine, and lab results. Pt is agreeable to starting medication for incontinence. Pt also requesting refill of norco.

## 2018-03-28 NOTE — TELEPHONE ENCOUNTER
----- Message from Tiara Ma sent at 3/27/2018  3:59 PM CDT -----  Contact: self [phone]  Patient requesting a call from the office to discuss getting an euflexxa injection bilateral done . Please call and advise, Thanks

## 2018-03-28 NOTE — TELEPHONE ENCOUNTER
----- Message from Chloe Mancuso sent at 3/28/2018 12:19 PM CDT -----  Contact: self/ [phone]  Patient is returning a phone call.  Who left a message for the patient: Dr. Petersen  Does patient know what this is regarding:  Lasix  Comments:

## 2018-03-28 NOTE — TELEPHONE ENCOUNTER
----- Message from Madie Petersen DO sent at 3/28/2018  7:07 AM CDT -----  Echocardiogram shows normal heart function

## 2018-04-03 NOTE — TELEPHONE ENCOUNTER
Please see euflexxa request and orders. Please run through authorizations and schedule patient for her first shots in about 2 weeks. Thank you.

## 2018-04-05 ENCOUNTER — TELEPHONE (OUTPATIENT)
Dept: INTERNAL MEDICINE | Facility: CLINIC | Age: 58
End: 2018-04-05

## 2018-04-05 NOTE — TELEPHONE ENCOUNTER
----- Message from Toby Curry sent at 4/3/2018  1:41 PM CDT -----  Contact: Arnold with [employer] ext [phone]  Needs to speak to nurse regarding pt euflexxa request     Please advise

## 2018-04-06 NOTE — TELEPHONE ENCOUNTER
Her shots have been authorized - Please call patient and schedule patient for an appointment with me. Thank you.

## 2018-04-13 ENCOUNTER — CLINICAL SUPPORT (OUTPATIENT)
Dept: INTERNAL MEDICINE | Facility: CLINIC | Age: 58
End: 2018-04-13
Attending: FAMILY MEDICINE
Payer: COMMERCIAL

## 2018-04-13 VITALS
WEIGHT: 293 LBS | HEART RATE: 71 BPM | BODY MASS INDEX: 50.02 KG/M2 | SYSTOLIC BLOOD PRESSURE: 128 MMHG | HEIGHT: 64 IN | OXYGEN SATURATION: 96 % | DIASTOLIC BLOOD PRESSURE: 84 MMHG

## 2018-04-13 DIAGNOSIS — M17.0 OSTEOARTHRITIS OF BOTH KNEES, UNSPECIFIED OSTEOARTHRITIS TYPE: Primary | ICD-10-CM

## 2018-04-13 PROCEDURE — 99999 PR PBB SHADOW E&M-EST. PATIENT-LVL III: CPT | Mod: PBBFAC,,, | Performed by: FAMILY MEDICINE

## 2018-04-13 PROCEDURE — 99499 UNLISTED E&M SERVICE: CPT | Mod: S$GLB,,, | Performed by: FAMILY MEDICINE

## 2018-04-13 PROCEDURE — 20610 DRAIN/INJ JOINT/BURSA W/O US: CPT | Mod: 50,S$GLB,, | Performed by: FAMILY MEDICINE

## 2018-04-13 RX ORDER — HYALURONATE SODIUM 20 MG/2 ML
20 SYRINGE (ML) INTRAARTICULAR WEEKLY
Status: COMPLETED | OUTPATIENT
Start: 2018-04-13 | End: 2018-04-30

## 2018-04-13 RX ORDER — METAXALONE 400 MG/1
400 TABLET ORAL 2 TIMES DAILY PRN
Qty: 60 TABLET | Refills: 0 | Status: SHIPPED | OUTPATIENT
Start: 2018-04-13 | End: 2019-04-03

## 2018-04-13 RX ADMIN — Medication 20 MG: at 12:04

## 2018-04-13 NOTE — PROGRESS NOTES
Subjective:       Patient ID: Violeta Champion is a 57 y.o. female.    Chief Complaint: Injections    HPI  Review of Systems   Constitutional: Negative for chills, fatigue and fever.   HENT: Negative for congestion and trouble swallowing.    Eyes: Negative for redness.   Respiratory: Negative for cough, chest tightness and shortness of breath.    Cardiovascular: Negative for chest pain, palpitations and leg swelling.   Gastrointestinal: Negative for abdominal pain and blood in stool.   Genitourinary: Negative for hematuria and menstrual problem.   Musculoskeletal: Positive for arthralgias and gait problem. Negative for back pain, joint swelling, myalgias and neck pain.   Skin: Negative for color change and rash.   Neurological: Negative for tremors, speech difficulty, weakness, numbness and headaches.   Hematological: Negative for adenopathy. Does not bruise/bleed easily.   Psychiatric/Behavioral: Negative for behavioral problems, confusion and sleep disturbance. The patient is not nervous/anxious.        Objective:      Physical Exam   Constitutional: She is oriented to person, place, and time. She appears well-developed and well-nourished. No distress.   Neck: Neck supple.   Pulmonary/Chest: Effort normal.   Musculoskeletal: She exhibits no edema.        Right lower leg: She exhibits no edema.        Left lower leg: She exhibits no edema.   Neurological: She is alert and oriented to person, place, and time.   Skin: Skin is warm and dry. No rash noted.   Psychiatric: She has a normal mood and affect. Her behavior is normal. Judgment and thought content normal.   Nursing note and vitals reviewed.      Assessment:       1. Osteoarthritis of both knees, unspecified osteoarthritis type        Plan:   Violeta was seen today for injections.    Diagnoses and all orders for this visit:    Osteoarthritis of both knees, unspecified osteoarthritis type    Other orders  -     EUFLEXXA 10 mg/mL(mw 2.4 -3.6 million) injection  Syrg 20 mg; Inject 2 mLs (20 mg total) into the articular space once a week.  -     EUFLEXXA 10 mg/mL(mw 2.4 -3.6 million) injection Syrg 20 mg; Inject 2 mLs (20 mg total) into the articular space once a week.  -     metaxalone (SKELAXIN) 400 mg tablet; Take 1 tablet (400 mg total) by mouth 2 (two) times daily as needed.      See meds, orders, follow up, routing and instructions sections of encounter.    BRIEF HISTORY:    Patient presents for therapeutic injections in the bilateral Knee(s) for OA. No complaints expressed at this time. The patient was counseled about risks and benefits of knee Visco-supplementation and other injections in general, including but not limited to pain, infection even that requiring surgery to clean out, failure to provide relief, transient flare, tissue damage. Patient expressed understanding, was given the opportunity to ask questions and any questions answered and consented verbally. Time out performed for localization, medication and patient identification using multiple identifiers.    Procedure Note:    Following a standard, sterile 2-antiseptic prep and negative arthrocentesis, Euflexxa was instilled in the bilateral knee(s) with no immediate discomfort. The procedure was tolerated well with no immediate adverse effects.    Follow up for next in series as scheduled.         This is a 57-year-old established female patient.  She called in requesting   injections in her knee.  She has had Euflexxa and Synvisc in the past with   marginal relief.  I did review her medication list.  She has had some interval   metabolic worsening.  She has no acute complaints at this time.  Her procedure   was explained in detail.  She did consent verbally.    RECOMMENDATIONS:  No immediate complication.  See procedure note.  Her prior   authorization was confirmed based on our 02/09/2018 orders and request.      IQRA  dd: 04/13/2018 12:29:57 (CDT)  td: 04/14/2018 05:11:14 (CDT)  Doc ID   #[id number]   Job ID #[id number]    CC:

## 2018-04-16 ENCOUNTER — TELEPHONE (OUTPATIENT)
Dept: INTERNAL MEDICINE | Facility: CLINIC | Age: 58
End: 2018-04-16

## 2018-04-16 RX ORDER — DOXYCYCLINE HYCLATE 100 MG
100 TABLET ORAL 2 TIMES DAILY
Qty: 14 TABLET | Refills: 0 | Status: SHIPPED | OUTPATIENT
Start: 2018-04-16 | End: 2018-04-23

## 2018-04-16 NOTE — TELEPHONE ENCOUNTER
----- Message from Arash Hester MD sent at 4/16/2018  9:35 AM CDT -----  Contact: Patient [phone]      ----- Message -----  From: Ash Benavides  Sent: 4/13/2018   3:44 PM  To: Mir Antoine A Staff    Patient calling stating you where suppose to call in an Rx Antibiotic for her.    Please call and advise  Thank you

## 2018-04-16 NOTE — TELEPHONE ENCOUNTER
Requested medication refilled and sent by electronic prescription. Please call and notify the patient. Thank you.

## 2018-04-17 ENCOUNTER — INITIAL CONSULT (OUTPATIENT)
Dept: BARIATRICS | Facility: CLINIC | Age: 58
End: 2018-04-17
Payer: COMMERCIAL

## 2018-04-17 VITALS
HEIGHT: 64 IN | WEIGHT: 293 LBS | BODY MASS INDEX: 50.02 KG/M2 | DIASTOLIC BLOOD PRESSURE: 80 MMHG | HEART RATE: 70 BPM | SYSTOLIC BLOOD PRESSURE: 124 MMHG

## 2018-04-17 DIAGNOSIS — E78.5 HYPERLIPIDEMIA, UNSPECIFIED HYPERLIPIDEMIA TYPE: ICD-10-CM

## 2018-04-17 DIAGNOSIS — E66.01 MORBID OBESITY WITH BMI OF 60.0-69.9, ADULT: ICD-10-CM

## 2018-04-17 DIAGNOSIS — I10 HTN, GOAL BELOW 130/80: ICD-10-CM

## 2018-04-17 DIAGNOSIS — E11.65 UNCONTROLLED TYPE 2 DIABETES MELLITUS WITH HYPERGLYCEMIA, WITHOUT LONG-TERM CURRENT USE OF INSULIN: Primary | ICD-10-CM

## 2018-04-17 DIAGNOSIS — M17.0 OSTEOARTHRITIS OF BOTH KNEES, UNSPECIFIED OSTEOARTHRITIS TYPE: ICD-10-CM

## 2018-04-17 PROCEDURE — 99999 PR PBB SHADOW E&M-EST. PATIENT-LVL III: CPT | Mod: PBBFAC,,, | Performed by: INTERNAL MEDICINE

## 2018-04-17 PROCEDURE — 99244 OFF/OP CNSLTJ NEW/EST MOD 40: CPT | Mod: S$GLB,,, | Performed by: INTERNAL MEDICINE

## 2018-04-17 NOTE — LETTER
[facility]  [address]  [facility] [address]  Phone: [phone]  Fax: [phone] April 17, 2018      Madie Petersen, DO  [address] [place] [address]    Patient: Violeta Champion   MR Number: [id number]   YOB: 1960   Date of Visit: 4/17/2018     Dear Dr. Petersen:    Thank you for referring Violeta Champion to me for evaluation. Below are the relevant portions of my assessment and plan of care.  Assessment:       1. Uncontrolled type 2 diabetes mellitus with hyperglycemia, without long-term current use of insulin    2. Morbid obesity with BMI of 60.0-69.9, adult    3. HTN, goal below 130/80    4. Hyperlipidemia, unspecified hyperlipidemia type    5. Osteoarthritis of both knees, unspecified osteoarthritis type        Plan:       1. Uncontrolled type 2 diabetes mellitus with hyperglycemia, without long-term current use of insulin  Start Trulicity once a week.  Stop glipizide when trulicity.      Call us when you finish the box you have at home so we can send in a higher dose.      Decrease portions as soon as you start Trulicity. Some nausea in the first 2 weeks is not unusual.      If you get pain across the upper abdomen and around to your back, please call the office.      2. Morbid obesity with BMI of 60.0-69.9, adult  3 meals a day made up of the following:  Unlimited green vegetables, tomatoes, mushrooms, spaghetti squash, cauliflower, meat, poultry, seafood, eggs and hard cheeses.   Milk and plain yogurt  Dressings, seasonings, condiments, etc should have less than 2 g sugars.   Beans (1-1.5 cups) or nuts (1/4 cup) can have 1 x a day.   1-2 servings of citrus fruits, berries, pineapple or melon a day (1/2 cup)  Avoid fried foods: No grains, rice, pasta, potatoes, bread, corn, peas, oatmeal, grits, tortillas, crackers, chips, no soda, sweet tea, juices or lemonade     Www.dietdoctor.com for recipes. Moderate carb intake     Meal ideas and  spring recipes given.      Sit and Be Fit exercise program on "[employer] or [employer] 3-4 times a week this month     3. HTN, goal below 130/80  The current medical regimen is effective;  continue present plan and medications. Expect improvement with weight loss.      4. Hyperlipidemia, unspecified hyperlipidemia type  Expect improvement with weight loss. Recheck after 10% TBW lost.       5. Osteoarthritis of both knees, unspecified osteoarthritis type  Increase low impact activity as tolerated.  Avoid high impact activity, very heavy lifting or other exercise regimens that may cause discomfort.      If you have questions, please do not hesitate to call me. I look forward to following Violeta along with you.    Sincerely,      Rosette Shirley MD   Medical Weight Loss   [facility]     AF/heath

## 2018-04-17 NOTE — PROGRESS NOTES
Subjective:       Patient ID: Violeta Champion is a 57 y.o. female.    Chief Complaint: Consult    CC:  To lose weight.     Current attempts at weight loss: New pt to me, referred by Madie Petersen,   [address] [facility]  [place] [address] , with Patient Active Problem List:     Hyperlipidemia     Hypothyroidism     Knee pain, right     DJD (degenerative joint disease) of knee     Contusion, knee     Obesity     Osteopenia     Morbid obesity with BMI of 60.0-69.9, adult     Obstructive sleep apnea (adult) (pediatric)     Pain, joint, ankle, right     Osteoarthritis of knee     HTN, goal below 130/80     Uncontrolled type 2 diabetes mellitus with hyperglycemia     Lab Results       Component                Value               Date                       HGBA1C                   7.4 (H)             08/18/2017                 HGBA1C                   11.4 (H)            04/06/2017                 HGBA1C                   9.8 (H)             02/23/2017            Lab Results       Component                Value               Date                       LDLCALC                  127.4               04/06/2017                 CREATININE               0.9                 03/20/2018               No efforts currently.     Previous diet attempts: WW, nutrisystem, atkins. Does say she did well with nutrisytem, even when cheating.     History of medication for loss: Not interested in surgery.   checked today Is on chronic narcotics. Denies.      Heaviest weight: 363#    Lightest weight: 140#    Goal weight: 180#. Did discuss having realistic expectations      Last eye exam:   January . No glaucoma per pt.                                     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 Does not cook often.   Breakfast: yogurt, gilman, sausage. Weekends: same.     Lunch: frozen dinner- enrrique's or atkins, protein shake. Weekends: same.     Dinner: hamburger evans, yogurt, chicken.  "Weekends: will eat every few hours when off.     Snacks: Chips, little sotero oatmeal creme pies, sherbet, protein shakes.    Beverages:  Coke-12 oz 2 times a day, OJ (for potassium), apple juice, water (eats ice). Hot tea with sugar 2 times a week.     Willingness to change:  6/10    EKG:Normal sinus rhythm  Nonspecific ST and T wave abnormality  Otherwise normal ECG  When compared with ECG of 03-MAY-2011 05:24,  No significant change was found    Echo CONCLUSIONS     1 - Mildly enlarged ascending aorta.     2 - Normal left ventricular systolic function (EF 60-65%).     3 - No wall motion abnormalities.     4 - Normal left ventricular diastolic function.     5 - Right ventricular enlargement with normal systolic function.     6 - The estimated PA systolic pressure is 20 mmHg.     BMR: 2216      Review of Systems   Constitutional: Positive for chills and fever.        With URI sx 2 weeks ago   Respiratory: Positive for apnea and shortness of breath.         Has mouth piece, but has not been using.    Cardiovascular: Positive for leg swelling. Negative for chest pain.   Gastrointestinal: Positive for constipation. Negative for diarrhea.        + GERD   Genitourinary: Negative for difficulty urinating and dysuria.   Musculoskeletal: Positive for arthralgias. Negative for back pain.   Neurological: Negative for dizziness and light-headedness.   Psychiatric/Behavioral: Positive for dysphoric mood. The patient is nervous/anxious.        Objective:     /80   Pulse 70   Ht 5' 4" (1.626 m)   Wt (!) 163.6 kg (360 lb 10.8 oz)   LMP  (LMP Unknown)   BMI 61.91 kg/m²     Physical Exam   Constitutional: She is oriented to person, place, and time. She appears well-developed. No distress.   HENT:   Head: Normocephalic and atraumatic.   Eyes: EOM are normal. Pupils are equal, round, and reactive to light. No scleral icterus.   Neck: Normal range of motion. Neck supple. No thyromegaly present.   Cardiovascular: Normal rate " and normal heart sounds.  Exam reveals no gallop and no friction rub.    No murmur heard.  Pulmonary/Chest: Effort normal and breath sounds normal. No respiratory distress. She has no wheezes.   Abdominal: Soft. Bowel sounds are normal. She exhibits no distension. There is no tenderness.   Musculoskeletal: Normal range of motion. She exhibits no edema.   Neurological: She is alert and oriented to person, place, and time. No cranial nerve deficit.   Skin: Skin is warm and dry. No erythema.   Thickened, dark patches at the neck     Psychiatric: She has a normal mood and affect. Her behavior is normal. Judgment normal.   Vitals reviewed.      Assessment:       1. Uncontrolled type 2 diabetes mellitus with hyperglycemia, without long-term current use of insulin    2. Morbid obesity with BMI of 60.0-69.9, adult    3. HTN, goal below 130/80    4. Hyperlipidemia, unspecified hyperlipidemia type    5. Osteoarthritis of both knees, unspecified osteoarthritis type        Plan:         1. Uncontrolled type 2 diabetes mellitus with hyperglycemia, without long-term current use of insulin  Start Trulicity once a week.  Stop glipizide when trulicity.     Call us when you finish the box you have at home so we can send in a higher dose.     Decrease portions as soon as you start Trulicity. Some nausea in the first 2 weeks is not unusual.     If you get pain across the upper abdomen and around to your back, please call the office.       2. Morbid obesity with BMI of 60.0-69.9, adult  3 meals a day made up of the following:  Unlimited green vegetables, tomatoes, mushrooms, spaghetti squash, cauliflower, meat, poultry, seafood, eggs and hard cheeses.   Milk and plain yogurt  Dressings, seasonings, condiments, etc should have less than 2 g sugars.   Beans (1-1.5 cups) or nuts (1/4 cup) can have 1 x a day.   1-2 servings of citrus fruits, berries, pineapple or melon a day (1/2 cup)  Avoid fried foods    No grains, rice, pasta, potatoes,  bread, corn, peas, oatmeal, grits, tortillas, crackers, chips    No soda, sweet tea, juices or lemonade    [email or web address] for recipes. Moderate carb intake    Meal ideas and spring recipes given.     Sit and Be Fit exercise program on WLAE, [employer] or MyCleanube 3-4 times a week this month         3. HTN, goal below 130/80  The current medical regimen is effective;  continue present plan and medications. Expect improvement with weight loss.     4. Hyperlipidemia, unspecified hyperlipidemia type  Expect improvement with weight loss. Recheck after 10% TBW lost.      5. Osteoarthritis of both knees, unspecified osteoarthritis type  Increase low impact activity as tolerated.  Avoid high impact activity, very heavy lifting or other exercise regimens that may cause discomfort.

## 2018-04-17 NOTE — LETTER
April 17, 2018      Madie Petersen, DO  [address] [facility] [place] [address]           [facility]  [address]  [place] [address]  Phone: [phone]  Fax: [phone]          Patient: Violeta Champion   MR Number: [id number]   YOB: 1960   Date of Visit: 4/17/2018       Dear Dr. Madie Petersen:    Thank you for referring Violeta Champion to me for evaluation. Attached you will find relevant portions of my assessment and plan of care.    If you have questions, please do not hesitate to call me. I look forward to following Violeta Champion along with you.    Sincerely,    Rosette Shirley MD    Enclosure  CC:  No Recipients    If you would like to receive this communication electronically, please contact [email or web address] or [phone] to request more information on [employer] access.    For providers and/or their staff who would like to refer a patient to [facility], please contact us through our one-stop-shop provider referral line, [facility], at [phone].    If you feel you have received this communication in error or would no longer like to receive these types of communications, please e-mail [email or web address]

## 2018-04-20 ENCOUNTER — OFFICE VISIT (OUTPATIENT)
Dept: INTERNAL MEDICINE | Facility: CLINIC | Age: 58
End: 2018-04-20
Attending: FAMILY MEDICINE
Payer: COMMERCIAL

## 2018-04-20 VITALS
HEART RATE: 57 BPM | TEMPERATURE: 98 F | DIASTOLIC BLOOD PRESSURE: 66 MMHG | SYSTOLIC BLOOD PRESSURE: 110 MMHG | OXYGEN SATURATION: 94 % | HEIGHT: 64 IN | BODY MASS INDEX: 50.02 KG/M2 | WEIGHT: 293 LBS

## 2018-04-20 DIAGNOSIS — M17.0 OSTEOARTHRITIS OF BOTH KNEES, UNSPECIFIED OSTEOARTHRITIS TYPE: Primary | ICD-10-CM

## 2018-04-20 PROCEDURE — 20610 DRAIN/INJ JOINT/BURSA W/O US: CPT | Mod: 50,S$GLB,, | Performed by: FAMILY MEDICINE

## 2018-04-20 PROCEDURE — 99499 UNLISTED E&M SERVICE: CPT | Mod: S$GLB,,, | Performed by: FAMILY MEDICINE

## 2018-04-20 PROCEDURE — 99999 PR PBB SHADOW E&M-EST. PATIENT-LVL III: CPT | Mod: PBBFAC,,, | Performed by: FAMILY MEDICINE

## 2018-04-20 RX ADMIN — Medication 20 MG: at 03:04

## 2018-04-30 ENCOUNTER — OFFICE VISIT (OUTPATIENT)
Dept: INTERNAL MEDICINE | Facility: CLINIC | Age: 58
End: 2018-04-30
Attending: FAMILY MEDICINE
Payer: COMMERCIAL

## 2018-04-30 VITALS
HEIGHT: 64 IN | OXYGEN SATURATION: 96 % | TEMPERATURE: 98 F | SYSTOLIC BLOOD PRESSURE: 124 MMHG | HEART RATE: 69 BPM | DIASTOLIC BLOOD PRESSURE: 72 MMHG | BODY MASS INDEX: 50.02 KG/M2 | WEIGHT: 293 LBS

## 2018-04-30 DIAGNOSIS — M17.0 OSTEOARTHRITIS OF BOTH KNEES, UNSPECIFIED OSTEOARTHRITIS TYPE: Primary | ICD-10-CM

## 2018-04-30 PROCEDURE — 99499 UNLISTED E&M SERVICE: CPT | Mod: S$GLB,,, | Performed by: FAMILY MEDICINE

## 2018-04-30 PROCEDURE — 99999 PR PBB SHADOW E&M-EST. PATIENT-LVL III: CPT | Mod: PBBFAC,,, | Performed by: FAMILY MEDICINE

## 2018-04-30 PROCEDURE — 20610 DRAIN/INJ JOINT/BURSA W/O US: CPT | Mod: 50,S$GLB,, | Performed by: FAMILY MEDICINE

## 2018-04-30 RX ADMIN — Medication 20 MG: at 12:04

## 2018-04-30 NOTE — PROGRESS NOTES
BRIEF HISTORY:    Patient presents for therapeutic injections in the bilateral Knee(s) for OA. No complaints expressed at this time. The patient was counseled about risks and benefits of knee Visco-supplementation and other injections in general, including but not limited to pain, infection even that requiring surgery to clean out, failure to provide relief, transient flare, tissue damage. Patient expressed understanding, was given the opportunity to ask questions and any questions answered and consented verbally. Time out performed for localization, medication and patient identification using multiple identifiers.    Procedure Note:    Following a standard, sterile 2-antiseptic prep and negative arthrocentesis, Euflexxa was instilled in the bilateral knee(s) with no immediate discomfort. The procedure was tolerated well with no immediate adverse effects.    Follow up prn in about 6 months.

## 2018-05-07 ENCOUNTER — TELEPHONE (OUTPATIENT)
Dept: ENDOCRINOLOGY | Facility: CLINIC | Age: 58
End: 2018-05-07

## 2018-05-07 NOTE — TELEPHONE ENCOUNTER
Spoke to patient and let her know that Dr Hammonds said to let her know that Per Dr. Shirley's note patient supposed to be taking metformin and Trulicity. Stop glipizide.

## 2018-05-07 NOTE — TELEPHONE ENCOUNTER
----- Message from Stefani Villaseñor sent at 5/7/2018 10:33 AM CDT -----  Contact: Self [phone]  PT forgot what diabetic medications she were to take with truclity.

## 2018-05-09 ENCOUNTER — TELEPHONE (OUTPATIENT)
Dept: INTERNAL MEDICINE | Facility: CLINIC | Age: 58
End: 2018-05-09

## 2018-05-09 RX ORDER — NISOLDIPINE 25.5 MG/1
TABLET, FILM COATED, EXTENDED RELEASE ORAL
Qty: 60 TABLET | Refills: 11 | Status: SHIPPED | OUTPATIENT
Start: 2018-05-09 | End: 2018-08-10 | Stop reason: ALTCHOICE

## 2018-05-09 NOTE — TELEPHONE ENCOUNTER
"----- Message from Chloe Mancuso sent at 5/9/2018 12:53 PM CDT -----  Contact: self/ [phone]  RX request - refill or new RX.  Is this a refill or new RX:    RX name and strength: nisoldipine (SULAR) 25.5 MG 24 hr tablet 60 tablet   Directions:   Is this a 30 day or 90 day RX:    Pharmacy name and phone # (DON'T enter "on file" or "in chart"): [facility] [address] [place] [address] [phone] (Phone)  [phone] (Fax)      Comments:        "

## 2018-05-11 ENCOUNTER — TELEPHONE (OUTPATIENT)
Dept: INTERNAL MEDICINE | Facility: CLINIC | Age: 58
End: 2018-05-11

## 2018-05-11 ENCOUNTER — TELEPHONE (OUTPATIENT)
Dept: BARIATRICS | Facility: CLINIC | Age: 58
End: 2018-05-11

## 2018-05-11 RX ORDER — AMLODIPINE BESYLATE 5 MG/1
5 TABLET ORAL DAILY
Qty: 30 TABLET | Refills: 0 | Status: SHIPPED | OUTPATIENT
Start: 2018-05-11 | End: 2018-06-15 | Stop reason: SDUPTHER

## 2018-05-11 NOTE — TELEPHONE ENCOUNTER
"----- Message from Kristina Irina sent at 5/11/2018 10:31 AM CDT -----  Contact: call pt at [phone]  RX request - refill or new RX.  Is this a refill or new RX:  Refill   RX name and strength: nisoldipine (SULAR) 25.5 MG 24 hr tablet   IS OUT PLEASE CALL IN AN EQUILIANT  Directions:   Is this a 30 day or 90 day RX:    Pharmacy name and phone # (DON'T enter "on file" or "in chart"): [facility] [address] [facility], [place] - [address] [facility] [phone] (Phone)   Comments:    "

## 2018-05-11 NOTE — TELEPHONE ENCOUNTER
----- Message from Myra Huber sent at 5/11/2018 12:19 PM CDT -----  [phone]//pt states that she needs to speak with nurse in which way she needs to take her medications/please call//thank you

## 2018-05-11 NOTE — TELEPHONE ENCOUNTER
----- Message from Martine Araujo sent at 5/11/2018 10:08 AM CDT -----  Contact: [facility] [phone]  Pharmacist states the pt has been without nisoldipine (SULAR) 25.5 MG 24 hr tablet for over 3 days because this medication is on back order. Pharmacist also states she would like a substitute for the script sent over to [facility], [address] [phone] (Phone)  [phone] (Fax)     .

## 2018-05-11 NOTE — TELEPHONE ENCOUNTER
----- Message from Delilah Tsai sent at 5/11/2018  9:19 AM CDT -----  Contact: Ms. Estrada @ [employer] [phone] press 0.  She's calling to change the script for Zular 25.5 Mg that's is on back order.

## 2018-05-14 NOTE — TELEPHONE ENCOUNTER
She was to stop the glipizide.   She can call with blood sugar readings as she makes diet changes and loses weight. With that. We may be able to reduce her other medications more later on.   Instructions and updated list (as of visit with me), were in AVS

## 2018-05-30 ENCOUNTER — TELEPHONE (OUTPATIENT)
Dept: GASTROENTEROLOGY | Facility: CLINIC | Age: 58
End: 2018-05-30

## 2018-05-30 NOTE — TELEPHONE ENCOUNTER
----- Message from Billie Harrison sent at 5/30/2018  8:34 AM CDT -----  Contact: Self- [phone]  Navin- pt called to reschedule her appt- originally for 5/30 at 10:30am- pt is unable to make it- please contact pt at [phone]

## 2018-06-01 DIAGNOSIS — R11.0 NAUSEA: ICD-10-CM

## 2018-06-01 RX ORDER — DULAGLUTIDE 0.75 MG/.5ML
INJECTION, SOLUTION SUBCUTANEOUS
Qty: 6 ML | Refills: 1 | Status: SHIPPED | OUTPATIENT
Start: 2018-06-01 | End: 2018-06-21

## 2018-06-01 RX ORDER — ONDANSETRON 4 MG/1
TABLET, FILM COATED ORAL
Qty: 30 TABLET | Refills: 1 | Status: SHIPPED | OUTPATIENT
Start: 2018-06-01 | End: 2020-09-04 | Stop reason: SDUPTHER

## 2018-06-08 DIAGNOSIS — E11.9 TYPE 2 DIABETES MELLITUS WITHOUT COMPLICATION: ICD-10-CM

## 2018-06-15 RX ORDER — AMLODIPINE BESYLATE 5 MG/1
TABLET ORAL
Qty: 30 TABLET | Refills: 0 | Status: SHIPPED | OUTPATIENT
Start: 2018-06-15 | End: 2018-07-25 | Stop reason: SDUPTHER

## 2018-06-15 NOTE — TELEPHONE ENCOUNTER
"----- Message from Olena Brunsoncindy sent at 6/15/2018  1:24 PM CDT -----  Contact: Self [phone]  RX request - refill or new RX.  Is this a refill or new RX:  refill  RX name and strength: hyoscyamine (LEVSIN/SL) 0.125 mg Subl  Directions:   Is this a 30 day or 90 day RX:    Local pharmacy or mail order pharmacy:  Local Pharmacy   Pharmacy name and phone # (DON'T enter "on file" or "in chart"): [address] [phone] (Phone)  [phone] (Fax)  Comments:         RX request - refill or new RX.  Is this a refill or new RX:  refill  RX name and strength: hydrocodone-acetaminophen 10-325mg (NORCO)  mg Tab  Directions:   Is this a 30 day or 90 day RX:    Local pharmacy or mail order pharmacy:  Patient   Pharmacy name and phone # (DON'T enter "on file" or "in chart"):   Comments:                  "

## 2018-06-16 RX ORDER — HYDROCODONE BITARTRATE AND ACETAMINOPHEN 10; 325 MG/1; MG/1
1 TABLET ORAL EVERY 4 HOURS PRN
Qty: 180 TABLET | Refills: 0 | Status: SHIPPED | OUTPATIENT
Start: 2018-06-16 | End: 2018-06-26 | Stop reason: SDUPTHER

## 2018-06-16 RX ORDER — HYOSCYAMINE SULFATE 0.12 MG/1
TABLET SUBLINGUAL
Qty: 30 TABLET | Refills: 0 | Status: SHIPPED | OUTPATIENT
Start: 2018-06-16 | End: 2020-01-10

## 2018-06-20 ENCOUNTER — TELEPHONE (OUTPATIENT)
Dept: INTERNAL MEDICINE | Facility: CLINIC | Age: 58
End: 2018-06-20

## 2018-06-20 NOTE — TELEPHONE ENCOUNTER
----- Message from Delilah Tsai sent at 6/19/2018  1:29 PM CDT -----  Contact: Katia @ [facility] [phone] Press [phone]  She's calling in regards to the script for Norco for the patient she said that she would like to discuss different information with you about it.

## 2018-06-21 ENCOUNTER — TELEPHONE (OUTPATIENT)
Dept: BARIATRICS | Facility: CLINIC | Age: 58
End: 2018-06-21

## 2018-06-21 NOTE — TELEPHONE ENCOUNTER
----- Message from Alistair Boyle sent at 6/21/2018 11:14 AM CDT -----  Pt said she has completed one month of trucility  Pt stated she was told to call in for an increase in dosage. Please call pt regarding this at  [phone]    [facility] [place] [address]   [phone] (Phone)  [phone] (Fax)

## 2018-06-26 ENCOUNTER — TELEPHONE (OUTPATIENT)
Dept: INTERNAL MEDICINE | Facility: CLINIC | Age: 58
End: 2018-06-26

## 2018-06-26 RX ORDER — HYDROCODONE BITARTRATE AND ACETAMINOPHEN 10; 325 MG/1; MG/1
1 TABLET ORAL
Qty: 120 TABLET | Refills: 0 | Status: SHIPPED | OUTPATIENT
Start: 2018-06-26 | End: 2018-08-10 | Stop reason: SDUPTHER

## 2018-06-26 NOTE — TELEPHONE ENCOUNTER
----- Message from Olena Watkins sent at 6/26/2018  8:53 AM CDT -----  Contact: Mimi with [facility] [phone]  Pharmacy is calling to clarify an RX.  RX name:  HYDROcodone-acetaminophen (NORCO)  mg per tablet  What do they need to clarify: Diagnosing Code   Comments: [facility] needs to speak with Dr. Cancino regarding dosage changes at this pharmacy. Pharmacy states they have been calling since last week and received no call back about this.

## 2018-06-26 NOTE — TELEPHONE ENCOUNTER
Per pharmacist.  To meet their new guidelines, patients Norco rx will need to be reduced to 120 tablets taking every 6-8 hours.

## 2018-07-03 ENCOUNTER — HOSPITAL ENCOUNTER (EMERGENCY)
Facility: HOSPITAL | Age: 58
Discharge: HOME | End: 2018-07-03
Attending: EMERGENCY MEDICINE
Payer: COMMERCIAL

## 2018-07-03 VITALS
WEIGHT: 293 LBS | HEIGHT: 64 IN | BODY MASS INDEX: 50.02 KG/M2 | DIASTOLIC BLOOD PRESSURE: 72 MMHG | HEART RATE: 80 BPM | RESPIRATION RATE: 16 BRPM | TEMPERATURE: 98 F | SYSTOLIC BLOOD PRESSURE: 146 MMHG | OXYGEN SATURATION: 96 %

## 2018-07-03 DIAGNOSIS — R05.9 COUGH: ICD-10-CM

## 2018-07-03 DIAGNOSIS — R68.89 CONGESTION OF THROAT: Primary | ICD-10-CM

## 2018-07-03 PROCEDURE — 99283 EMERGENCY DEPT VISIT LOW MDM: CPT | Mod: 25

## 2018-07-03 PROCEDURE — 63600175 PHARM REV CODE 636 W HCPCS: Performed by: EMERGENCY MEDICINE

## 2018-07-03 RX ORDER — PREDNISONE 20 MG/1
60 TABLET ORAL
Status: COMPLETED | OUTPATIENT
Start: 2018-07-03 | End: 2018-07-03

## 2018-07-03 RX ADMIN — PREDNISONE 60 MG: 20 TABLET ORAL at 08:07

## 2018-07-04 NOTE — ED PROVIDER NOTES
Encounter Date: 7/3/2018       History     Chief Complaint   Patient presents with    Cough     coughing with phlegm, and hoarse     The patient presents with cough, shortness of breath and congestion for 90 min.  She was afraid her throat was going to close off.  She took Benadryl and an  tablet of methylprednisolone but does not think there was any improvement.  She denies fever.  The cough is nonproductive.  She says she feels like something is stuck in her throat. No chest pain.  No dyspnea on exertion.  She was worried because she had an allergic reaction to intravenous contrast once that necessitated similar symptoms.          Review of patient's allergies indicates:   Allergen Reactions    Iodinated contrast- oral and iv dye Swelling     Other reaction(s): Swelling    Naproxen sodium Swelling    Naprosyn  [naproxen]      Other reaction(s): Swelling     Past Medical History:   Diagnosis Date    GERD (gastroesophageal reflux disease)     High cholesterol     Hypertension     Hypothyroid     Injury of knee, leg, ankle and foot     Insulin-resistant diabetes mellitus and acanthosis nigricans     Menopause present     Osteoarthritis     Osteopenia     Osteopenia     Sleep apnea      Past Surgical History:   Procedure Laterality Date    bilateral duct removal breast      HYSTERECTOMY      OTHER SURGICAL HISTORY      bilateral central duct removal with bilateral papilomona     Family History   Problem Relation Age of Onset    Hypertension Mother     Glaucoma Mother     Diabetes Mother     Hypertension Brother     Cancer Maternal Grandmother         breast    Amblyopia Father     Diabetes Maternal Aunt     Diabetes Maternal Uncle     Blindness Neg Hx     Cataracts Neg Hx     Macular degeneration Neg Hx     Retinal detachment Neg Hx     Strabismus Neg Hx     Colon cancer Neg Hx     Stomach cancer Neg Hx     Esophageal cancer Neg Hx     Irritable bowel syndrome Neg Hx      Rectal cancer Neg Hx     Ulcerative colitis Neg Hx     Crohn's disease Neg Hx     Celiac disease Neg Hx      Social History   Substance Use Topics    Smoking status: Never Smoker    Smokeless tobacco: Never Used    Alcohol use 0.0 oz/week      Comment: very rarely     Review of Systems   Constitutional: Negative for chills and fever.   HENT: Positive for trouble swallowing and voice change. Negative for sore throat.    Respiratory: Positive for cough and shortness of breath.    Cardiovascular: Negative for chest pain.   Gastrointestinal: Negative for nausea and vomiting.   All other systems reviewed and are negative.      Physical Exam     Initial Vitals [07/03/18 1817]   BP Pulse Resp Temp SpO2   (!) 156/74 81 18 98.4 °F (36.9 °C) 95 %      MAP       --         Physical Exam    Nursing note and vitals reviewed.  Constitutional: She appears well-developed and well-nourished. She is not diaphoretic. No distress.   Normal voice.   HENT:   Mouth/Throat: Oropharynx is clear and moist.   Eyes: EOM are normal. Pupils are equal, round, and reactive to light.   Neck: Normal range of motion. No tracheal deviation present.   Cardiovascular: Normal rate, regular rhythm, normal heart sounds and intact distal pulses.   Pulmonary/Chest: Breath sounds normal. No stridor. No respiratory distress. She has no wheezes. She has no rhonchi. She has no rales.   Lymphadenopathy:     She has no cervical adenopathy.   Skin: Skin is warm and dry.         ED Course   Procedures  Labs Reviewed - No data to display       Imaging Results    None       X-Rays:   Independently Interpreted Readings:   Chest X-Ray: No acute abnormalities.     Medical Decision Making:   Initial Assessment:   My differential includes pneumonia, URI, throat congestion.  Her exam is reassuring.  I will obtain a chest x-ray.  I recommended Mucinex.  ED Management:  Given the possibility of an allergic reaction, the patient was given a single dose of prednisone with  the understanding that she may be hyperglycemic from this.                      Clinical Impression:   The primary encounter diagnosis was Congestion of throat. A diagnosis of Cough was also pertinent to this visit.                             Bruce Savage MD  07/03/18 [id number]

## 2018-07-08 DIAGNOSIS — E87.6 HYPOKALEMIA: ICD-10-CM

## 2018-07-09 RX ORDER — POTASSIUM CHLORIDE 1500 MG/1
TABLET, EXTENDED RELEASE ORAL
Qty: 30 TABLET | Refills: 5 | Status: SHIPPED | OUTPATIENT
Start: 2018-07-09 | End: 2018-07-25 | Stop reason: SDUPTHER

## 2018-07-09 RX ORDER — PRAVASTATIN SODIUM 40 MG/1
TABLET ORAL
Qty: 90 TABLET | Refills: 3 | Status: SHIPPED | OUTPATIENT
Start: 2018-07-09 | End: 2019-04-03 | Stop reason: SDUPTHER

## 2018-07-09 NOTE — TELEPHONE ENCOUNTER
"----- Message from Chloe Mancuso sent at 7/9/2018 12:29 PM CDT -----  Contact: self/ [phone]  RX request - refill or new RX.  Is this a refill or new RX:    RX name and strength: pravastatin (PRAVACHOL) 40 MG tablet 90 tablet ,   Directions:   Is this a 30 day or 90 day RX:  90  Local pharmacy or mail order pharmacy:    Pharmacy name and phone # (DON'T enter "on file" or "in chart"): [facility] [address] [place] [address] [phone] (Phone)  [phone] (Fax)      Comments:        "

## 2018-07-13 NOTE — PROGRESS NOTES
Subjective:       Patient ID: Violeta Champion is a 57 y.o. female.    Chief Complaint: Follow-up and Medication Refill    Patient is a 57 y.o.female who presents today for annual exam.    Cholesterol: DUE NOW  Vaccines: Influenza (yearly); Tetanus (2016) Pneumovax (declines); Prevnar (declines)  Eye exam: up to date; dr brown  Mammogram: DUE NOW  Gyn exam: hysterectomy  Colonoscopy: due April 2021      Past Medical History:   Diagnosis Date    GERD (gastroesophageal reflux disease)     High cholesterol     Hypertension     Hypothyroid     Injury of knee, leg, ankle and foot     Insulin-resistant diabetes mellitus and acanthosis nigricans     Menopause present     Osteoarthritis     Osteopenia     Osteopenia     Sleep apnea      Past Surgical History:   Procedure Laterality Date    bilateral duct removal breast      HYSTERECTOMY      OTHER SURGICAL HISTORY      bilateral central duct removal with bilateral papilomona     Social History     Social History    Marital status: Single     Spouse name: N/A    Number of children: 0    Years of education: 14     Occupational History    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 Champion had no medications administered during this visit.    Review of Systems   Constitutional: Negative for appetite change, chills, diaphoresis, fatigue and fever.   HENT: Negative for congestion, dental problem, ear discharge, ear pain, hearing loss,  postnasal drip, sinus pressure and sore throat.    Eyes: Negative for discharge, redness and itching.   Respiratory: Negative for cough, chest tightness, shortness of breath and wheezing.    Cardiovascular: Negative for chest pain, palpitations and leg swelling.   Gastrointestinal: Negative for abdominal pain, constipation, diarrhea, nausea and vomiting.   Endocrine: Negative for cold intolerance and heat intolerance.   Genitourinary: Negative for difficulty urinating, frequency, hematuria and urgency.   Musculoskeletal: Negative for arthralgias, back pain, gait problem, myalgias and neck pain.   Skin: Negative for color change and rash.   Neurological: Negative for dizziness, syncope and headaches.   Hematological: Negative for adenopathy.   Psychiatric/Behavioral: Negative for behavioral problems and sleep disturbance. The patient is not nervous/anxious.        Objective:      Physical Exam   Constitutional: She is oriented to person, place, and time. She appears well-developed and well-nourished. No distress.   HENT:   Head: Normocephalic and atraumatic.   Right Ear: External ear normal.   Left Ear: External ear normal.   Nose: Nose normal.   Mouth/Throat: Oropharynx is clear and moist. No oropharyngeal exudate.   Eyes: Conjunctivae and EOM are normal. Pupils are equal, round, and reactive to light. Right eye exhibits no discharge. Left eye exhibits no discharge. No scleral icterus.   Neck: Normal range of motion. Neck supple. No JVD present. No thyromegaly present.   Cardiovascular: Normal rate, regular rhythm, normal heart sounds and intact distal pulses.  Exam reveals no gallop and no friction rub.    No murmur heard.  Pulses:       Dorsalis pedis pulses are 2+ on the right side, and 2+ on the left side.        Posterior tibial pulses are 2+ on the right side, and 2+ on the left side.   Pulmonary/Chest: Effort normal and breath sounds normal. No stridor. No respiratory distress. She has no wheezes. She has no  rales. She exhibits no tenderness.   Abdominal: Soft. Bowel sounds are normal. She exhibits no distension. There is no tenderness. There is no rebound.   Musculoskeletal: Normal range of motion. She exhibits no edema or tenderness.        Right foot: There is normal range of motion and no deformity.        Left foot: There is normal range of motion and no deformity.   Feet:   Right Foot:   Protective Sensation: 3 sites tested. 3 sites sensed.   Skin Integrity: Negative for ulcer, blister, skin breakdown, erythema, warmth, callus or dry skin.   Left Foot:   Protective Sensation: 3 sites tested. 3 sites sensed.   Skin Integrity: Negative for ulcer, blister, skin breakdown, erythema, warmth, callus or dry skin.   Lymphadenopathy:     She has no cervical adenopathy.   Neurological: She is alert and oriented to person, place, and time. No cranial nerve deficit.   Skin: Skin is warm and dry. No rash noted. She is not diaphoretic. No erythema.   Psychiatric: She has a normal mood and affect. Her behavior is normal.   Nursing note and vitals reviewed.      Assessment and Plan:       1. Annual physical exam    - CBC auto differential; Future  - Comprehensive metabolic panel; Future  - Hemoglobin A1c; Future  - Microalbumin/creatinine urine ratio; Future  - Lipid panel; Future  - TSH; Future  - Urinalysis; Future  - Vitamin D; Future    2. HTN, goal below 130/80    - CBC auto differential; Future  - Comprehensive metabolic panel; Future  - Hemoglobin A1c; Future  - Microalbumin/creatinine urine ratio; Future  - Lipid panel; Future  - TSH; Future  - Urinalysis; Future  - Vitamin D; Future    3. Hyperlipidemia, unspecified hyperlipidemia type    - CBC auto differential; Future  - Comprehensive metabolic panel; Future  - Hemoglobin A1c; Future  - Microalbumin/creatinine urine ratio; Future  - Lipid panel; Future  - TSH; Future  - Urinalysis; Future  - Vitamin D; Future    4. Hypothyroidism, unspecified type    - CBC auto  differential; Future  - Comprehensive metabolic panel; Future  - Hemoglobin A1c; Future  - Microalbumin/creatinine urine ratio; Future  - Lipid panel; Future  - TSH; Future  - Urinalysis; Future  - Vitamin D; Future    5. Morbid obesity with BMI of 60.0-69.9, adult  - Patient educated on importance of diet and exercise.  Recommended 30-45 minutes of exercise five days a week.  In addition, counseled patient on importance of low fat diet.  Limit carbohydrate intake.  Increase protein intake and vegetables.   - CBC auto differential; Future  - Comprehensive metabolic panel; Future  - Hemoglobin A1c; Future  - Microalbumin/creatinine urine ratio; Future  - Lipid panel; Future  - TSH; Future  - Urinalysis; Future  - Vitamin D; Future    6. Uncontrolled type 2 diabetes mellitus with hyperglycemia, without long-term current use of insulin  - CBC auto differential; Future  - Comprehensive metabolic panel; Future  - Hemoglobin A1c; Future  - Microalbumin/creatinine urine ratio; Future  - Lipid panel; Future  - TSH; Future  - Urinalysis; Future  - Vitamin D; Future    7. Visit for screening mammogram  - Mammo Digital Screening Bilat with CAD; Future    8. Chronic pain of right knee  - flares intermittently    9. Osteoarthritis of both knees, unspecified osteoarthritis type  - flares intermittently          No Follow-up on file.

## 2018-07-16 ENCOUNTER — TELEPHONE (OUTPATIENT)
Dept: INTERNAL MEDICINE | Facility: CLINIC | Age: 58
End: 2018-07-16

## 2018-07-16 ENCOUNTER — OFFICE VISIT (OUTPATIENT)
Dept: INTERNAL MEDICINE | Facility: CLINIC | Age: 58
End: 2018-07-16
Payer: COMMERCIAL

## 2018-07-16 VITALS
TEMPERATURE: 98 F | RESPIRATION RATE: 16 BRPM | DIASTOLIC BLOOD PRESSURE: 68 MMHG | BODY MASS INDEX: 54.76 KG/M2 | WEIGHT: 293 LBS | SYSTOLIC BLOOD PRESSURE: 124 MMHG | HEART RATE: 72 BPM

## 2018-07-16 DIAGNOSIS — M25.561 CHRONIC PAIN OF RIGHT KNEE: ICD-10-CM

## 2018-07-16 DIAGNOSIS — E78.5 HYPERLIPIDEMIA, UNSPECIFIED HYPERLIPIDEMIA TYPE: ICD-10-CM

## 2018-07-16 DIAGNOSIS — E66.01 MORBID OBESITY WITH BMI OF 60.0-69.9, ADULT: ICD-10-CM

## 2018-07-16 DIAGNOSIS — G89.29 CHRONIC PAIN OF RIGHT KNEE: ICD-10-CM

## 2018-07-16 DIAGNOSIS — E11.8 CONTROLLED DIABETES MELLITUS TYPE 2 WITH COMPLICATIONS, UNSPECIFIED WHETHER LONG TERM INSULIN USE: Primary | ICD-10-CM

## 2018-07-16 DIAGNOSIS — E11.65 UNCONTROLLED TYPE 2 DIABETES MELLITUS WITH HYPERGLYCEMIA, WITHOUT LONG-TERM CURRENT USE OF INSULIN: ICD-10-CM

## 2018-07-16 DIAGNOSIS — M17.0 OSTEOARTHRITIS OF BOTH KNEES, UNSPECIFIED OSTEOARTHRITIS TYPE: ICD-10-CM

## 2018-07-16 DIAGNOSIS — E03.9 HYPOTHYROIDISM, UNSPECIFIED TYPE: ICD-10-CM

## 2018-07-16 DIAGNOSIS — Z00.00 ANNUAL PHYSICAL EXAM: Primary | ICD-10-CM

## 2018-07-16 DIAGNOSIS — Z12.31 VISIT FOR SCREENING MAMMOGRAM: ICD-10-CM

## 2018-07-16 DIAGNOSIS — I10 HTN, GOAL BELOW 130/80: ICD-10-CM

## 2018-07-16 PROCEDURE — 3074F SYST BP LT 130 MM HG: CPT | Mod: CPTII,S$GLB,, | Performed by: INTERNAL MEDICINE

## 2018-07-16 PROCEDURE — 3078F DIAST BP <80 MM HG: CPT | Mod: CPTII,S$GLB,, | Performed by: INTERNAL MEDICINE

## 2018-07-16 PROCEDURE — 99396 PREV VISIT EST AGE 40-64: CPT | Mod: S$GLB,,, | Performed by: INTERNAL MEDICINE

## 2018-07-16 PROCEDURE — 3045F PR MOST RECENT HEMOGLOBIN A1C LEVEL 7.0-9.0%: CPT | Mod: CPTII,S$GLB,, | Performed by: INTERNAL MEDICINE

## 2018-07-16 PROCEDURE — 99999 PR PBB SHADOW E&M-EST. PATIENT-LVL III: CPT | Mod: PBBFAC,,, | Performed by: INTERNAL MEDICINE

## 2018-07-23 ENCOUNTER — TELEPHONE (OUTPATIENT)
Dept: BARIATRICS | Facility: CLINIC | Age: 58
End: 2018-07-23

## 2018-07-25 ENCOUNTER — TELEPHONE (OUTPATIENT)
Dept: INTERNAL MEDICINE | Facility: CLINIC | Age: 58
End: 2018-07-25

## 2018-07-25 DIAGNOSIS — E87.6 HYPOKALEMIA: ICD-10-CM

## 2018-07-25 RX ORDER — POTASSIUM CHLORIDE 20 MEQ/1
20 TABLET, EXTENDED RELEASE ORAL 2 TIMES DAILY
Qty: 60 TABLET | Refills: 5 | Status: SHIPPED | OUTPATIENT
Start: 2018-07-25 | End: 2019-04-03 | Stop reason: SDUPTHER

## 2018-07-25 RX ORDER — AMLODIPINE BESYLATE 5 MG/1
5 TABLET ORAL DAILY
Qty: 30 TABLET | Refills: 6 | Status: SHIPPED | OUTPATIENT
Start: 2018-07-25 | End: 2019-04-03 | Stop reason: SDUPTHER

## 2018-07-25 RX ORDER — LORAZEPAM 0.5 MG/1
0.5 TABLET ORAL 2 TIMES DAILY
Qty: 60 TABLET | Refills: 0 | Status: ON HOLD | OUTPATIENT
Start: 2018-07-25 | End: 2021-11-30 | Stop reason: HOSPADM

## 2018-07-25 NOTE — TELEPHONE ENCOUNTER
"----- Message from Latrice Dawson sent at 7/25/2018 12:23 PM CDT -----  Contact: pt [phone]  RX request - refill or new RX.  Is this a refill or new RX:  Refill   RX name and strength: LORazepam (ATIVAN) 0.5 MG tablet  Directions:   Is this a 30 day or 90 day RX:    Local pharmacy or mail order pharmacy: local    Pharmacy name and phone # (DON'T enter "on file" or "in chart"[facility] [address] [email or web address] [phone] (Phone)  [phone] (Fax)      Comments:      RX request - refill or new RX.  Is this a refill or new RX:  Refill   RX name and strength: amLODIPine (NORVASC) 5 MG tablet  Directions:   Is this a 30 day or 90 day RX:    Local pharmacy or mail order pharmacy:  Local   Pharmacy name and phone # (DON'T enter "on file" or "in chart"): [address] [email or web address] [phone] (Phone)  [phone] (Fax)      Comments:      RX request - refill or new RX.  Is this a refill or new RX:  Refill   RX name and strength: KLOR-CON M20 20 mEq tablet  Directions:   Is this a 30 day or 90 day RX:    Local pharmacy or mail order pharmacy:  Local   Pharmacy name and phone # (DON'T enter "on file" or "in chart"): [address] [email or web address] [phone] (Phone)  [phone] (Fax)      Comments:  Taking 2 a day so needs 60 tablets for 30 day refill       "

## 2018-08-09 ENCOUNTER — TELEPHONE (OUTPATIENT)
Dept: GASTROENTEROLOGY | Facility: CLINIC | Age: 58
End: 2018-08-09

## 2018-08-09 NOTE — TELEPHONE ENCOUNTER
----- Message from Billie Harrison sent at 8/9/2018  8:19 AM CDT -----  Contact: Self- [phone]  Navin- pt called to reschedule her appt- originally for today 8/9 at 8am- pt unable to make it- please contact pt at [phone]

## 2018-08-10 ENCOUNTER — TELEPHONE (OUTPATIENT)
Dept: INTERNAL MEDICINE | Facility: CLINIC | Age: 58
End: 2018-08-10

## 2018-08-10 ENCOUNTER — OFFICE VISIT (OUTPATIENT)
Dept: INTERNAL MEDICINE | Facility: CLINIC | Age: 58
End: 2018-08-10
Payer: COMMERCIAL

## 2018-08-10 VITALS
TEMPERATURE: 98 F | OXYGEN SATURATION: 95 % | BODY MASS INDEX: 50.02 KG/M2 | HEART RATE: 78 BPM | DIASTOLIC BLOOD PRESSURE: 76 MMHG | SYSTOLIC BLOOD PRESSURE: 124 MMHG | HEIGHT: 64 IN | WEIGHT: 293 LBS | RESPIRATION RATE: 18 BRPM

## 2018-08-10 DIAGNOSIS — L03.116 CELLULITIS OF LEFT LOWER EXTREMITY: Primary | ICD-10-CM

## 2018-08-10 PROCEDURE — 3074F SYST BP LT 130 MM HG: CPT | Mod: CPTII,S$GLB,, | Performed by: INTERNAL MEDICINE

## 2018-08-10 PROCEDURE — 99213 OFFICE O/P EST LOW 20 MIN: CPT | Mod: S$GLB,,, | Performed by: INTERNAL MEDICINE

## 2018-08-10 PROCEDURE — 3008F BODY MASS INDEX DOCD: CPT | Mod: CPTII,S$GLB,, | Performed by: INTERNAL MEDICINE

## 2018-08-10 PROCEDURE — 99999 PR PBB SHADOW E&M-EST. PATIENT-LVL III: CPT | Mod: PBBFAC,,, | Performed by: INTERNAL MEDICINE

## 2018-08-10 PROCEDURE — 3078F DIAST BP <80 MM HG: CPT | Mod: CPTII,S$GLB,, | Performed by: INTERNAL MEDICINE

## 2018-08-10 RX ORDER — SULFAMETHOXAZOLE AND TRIMETHOPRIM 800; 160 MG/1; MG/1
1 TABLET ORAL 2 TIMES DAILY
Qty: 14 TABLET | Refills: 0 | Status: SHIPPED | OUTPATIENT
Start: 2018-08-10 | End: 2018-08-17

## 2018-08-10 RX ORDER — HYDROCODONE BITARTRATE AND ACETAMINOPHEN 10; 325 MG/1; MG/1
1 TABLET ORAL
Qty: 120 TABLET | Refills: 0 | Status: SHIPPED | OUTPATIENT
Start: 2018-08-10 | End: 2018-09-25 | Stop reason: SDUPTHER

## 2018-08-10 NOTE — PROGRESS NOTES
"Subjective:       Patient ID: Violeta Champion is a 57 y.o. female.    Chief Complaint: Foot Pain (left; pt stepped on some staples, x 4 days ago 3/10 pain level)    HPI    She presents for evaluation of left foot pain. The pain started 4 days ago after stepping on staples. She denies any bleeding. She reports warmth and erythema at the site and she is worried because she is a diabetic.   Her last tetanus vaccine was 7/13/2016.    Review of Systems   Constitutional: Negative for chills and fever.   Musculoskeletal: Positive for arthralgias (foot pain).   Skin: Positive for color change (red).   Neurological: Positive for numbness.       Objective:        Vitals:    08/10/18 1144   BP: 124/76   Pulse: 78   Resp: 18   Temp: 98.4 °F (36.9 °C)   SpO2: 95%   Weight: (!) 140.7 kg (310 lb 3 oz)   Height: 5' 4" (1.626 m)       Body mass index is 53.24 kg/m².    Physical Exam   Constitutional: She is oriented to person, place, and time. She appears well-developed and well-nourished. No distress.   HENT:   Head: Normocephalic and atraumatic.   Right Ear: External ear normal.   Left Ear: External ear normal.   Eyes: Conjunctivae and EOM are normal.   Neck: Normal range of motion.   Cardiovascular: Normal rate and intact distal pulses.    Pulmonary/Chest: Effort normal.   Musculoskeletal: Normal range of motion.   Feet:   Left Foot:   Skin Integrity: Positive for erythema (slight erythema and warm along medial plantar surface) and warmth. Negative for skin breakdown.   Neurological: She is alert and oriented to person, place, and time.   Skin: Skin is warm and dry. No abrasion, no laceration and no rash noted.   Psychiatric: She has a normal mood and affect. Her behavior is normal.       Assessment:     1. Cellulitis of left lower extremity           Plan:         1. Cellulitis of left lower extremity  - sulfamethoxazole-trimethoprim 800-160mg (BACTRIM DS) 800-160 mg Tab; Take 1 tablet by mouth 2 (two) times daily. for 7 days  " Dispense: 14 tablet; Refill: 0

## 2018-08-31 NOTE — PATIENT INSTRUCTIONS
Start Trulicity once a week.  Stop glipizide when trulicity.     Call us when you finish the box you have at home so we can send in a higher dose.     Decrease portions as soon as you start Trulicity. Some nausea in the first 2 weeks is not unusual.     If you get pain across the upper abdomen and around to your back, please call the office.       3 meals a day made up of the following:  Unlimited green vegetables, tomatoes, mushrooms, spaghetti squash, cauliflower, meat, poultry, seafood, eggs and hard cheeses.   Milk and plain yogurt  Dressings, seasonings, condiments, etc should have less than 2 g sugars.   Beans (1-1.5 cups) or nuts (1/4 cup) can have 1 x a day.   1-2 servings of citrus fruits, berries, pineapple or melon a day (1/2 cup)  Avoid fried foods    No grains, rice, pasta, potatoes, bread, corn, peas, oatmeal, grits, tortillas, crackers, chips    No soda, sweet tea, juices or lemonade    [email or web address].[email or web address] for recipes. Moderate carb intake        Sit and Be Fit exercise program on W[employer], On[employer] or youtube 3-4 times a week this month         Fruits and Vegetables       Include 1-2 servings of fruit daily.      1 serving of fruit includes ½ cup unsweetened applesauce, ½ medium banana, tennis ball size piece of fruit, 17 grapes, 1 cup melon, 1 cup strawberries, ¼ cup dried fruit     Include 2-3 servings of vegetables daily. 1 serving is 1 cup raw or ½ cup cooked.     Non-starchy vegetables include artichoke, asparagus, baby corn, bamboo shoots, beans: green/Italian/wax, bean sprouts, beets, broccoli, Sage sprouts, cabbage, carrots, cauliflower, celery, cucumber, eggplant, green onions or scallions, greens, jicama, leeks, mushrooms, okra, onions, pea pods, peppers, radishes, spinach, summer squash, tomatoes and salsa, turnips, vegetable juice cocktail, water chestnuts, zucchini        Meal Ideas for Regular Bariatric Diet  *Recipes and products available at www.bariatriceating.com    *You can  substitute regular dairy and/or dressings, and whole eggs for egg whites in the ideas below.     Breakfast: (15-20g protein)    - Egg white omelet: 2 egg whites or ½ cup Egg Beaters. (Optional proteins: cheese, shrimp, black beans, chicken, sliced turkey) (Optional veggies: tomatoes, salsa, spinach, mushrooms, onions, green peppers, or small slice avocado)     - Egg and sausage: 1 egg or ¼ cup Egg Beaters (any variety), with 1 evans or 2 links of Turkey sausage or Veggie breakfast sausage (Safer[employer]i or [employer])    - Crust-less breakfast quiche: To make a glass pie dish, mix 4oz part skim Ricotta, 1 cup skim milk, and 2 eggs as your base. Add protein: shredded cheese, sliced lean ham or turkey, turkey iglman/sausage. Add veggies: tomato, onion, green onion, mushroom, green pepper, spinach, etc.    - Yogurt parfait: Mix 1 - 6oz container Dannon Light N Fit vanilla yogurt, with ¼ cup Kashi Go Lean cereal    - Cottage cheese and fruit: ½ cup part-skim cottage cheese or ricotta cheese topped with fresh fruit or sugar free preserves     - Whitney Lovell's Vanilla Egg custard* (add 2 Tbsp instant coffee granules to make Cappuccino Custard*)    - Hi-Protein café latte (skim milk, decaf coffee, 1 scoop protein powder). Optional to add Sugar free syrup or extract flavoring.    Lunch: (20-30g protein)    - ½ cup Black bean soup (Homemade or Progresso), with ¼ cup shredded low-fat cheese. Top with chopped tomato or fresh salsa.     - Lean deli turkey breast and low-fat sliced cheese, mustard or light alamo to moisten, rolled up together, or wrapped in a Garfield lettuce leaf    - Chicken salad made from dinner leftovers, moisten with low-fat salad dressing or light alamo. Also try leftover salmon, shrimp, tuna or boiled eggs. Serve ½ cup over dark green salad    - Fat-free canned refried beans, topped with ¼ cup shredded low-fat cheese. Top with chopped tomato or fresh salsa.     - Greek salad: Top mixed greens with 1-2oz  grilled chicken, tomatoes, red onions, 2-3 kalamata olives, and sprinkle lightly with feta cheese. Spritz with Balsamic vinegar to taste.     - Crust-less lunch quiche: To make a glass pie dish, mix 4oz part skim Ricotta, 1 cup skim milk, and 2 eggs as your base. Add protein: shredded cheese, sliced lean ham or turkey, shrimp, chicken. Add veggies: tomato, onion, green onion, mushroom, green pepper, spinach, artichoke, broccoli, etc.    - Pizza bake: tomato sauce, low-fat shredded mozzarella and turkey pepperoni or Nigerian gilman. Add any veggies.    - Cucumber crab bites: Spread ¼ cup crab dip (lump crabmeat + light cream cheese and green onions) over sliced cucumber.     - Chicken with light spinach and artichoke dip*: Puree in : 6oz cooked and drained spinach, 2 cloves garlic, 1 can cannelloni beans, ½ cup chopped green onions, 1 can drained artichoke hearts (not marinated in oil), lemon juice and basil. Mix in 2oz chopped up chicken.    Supper: (20-30g protein)    - Serve grilled fish over dark green salad tossed with low-fat dressing, served with grilled asparagus ahmadi     - Rotisserie chicken salad: served with sliced strawberries, walnuts, fat-free feta cheese crumbles and 1 tbsp Abads Own Light Raspberry [place] Vinaigrette    - Shrimp cocktail: Dip cold boiled shrimp in homemade low-sugar cocktail sauce (1/2 cup Mary One Carb ketchup, 2 tbsp horseradish, 1/4 tsp hot sauce, 1 tsp Worcestershire sauce, 1 tbsp freshly-squeezed lemon juice). Serve with dark green salad, walnuts, and crumbled blue cheese drizzled with olive oil and Balsamic vinegar    - Tuna Melt: Spread tuna salad onto 2 thick slices of tomato. Top with low-fat cheese and broil until cheese is melted. May also be made with chicken salad of shrimp salad. Reed with different types of cheeses.    - Homemade low-fat Chili using extra lean ground beef or ground turkey. Top with shredded cheese and salsa as desired. May add  dollop fat-free sour cream if desired    - Dinner Omelet with shrimp or chicken and onion, green peppers and chives.    - No noodle lasagna: Use sliced zucchini or eggplant in place of noodles.  Layer with part skim ricotta cheese and low sugar meat sauce (use very lean ground beef or ground turkey).    - Mexican chicken bake: Bake chunks of chicken breast or thigh with taco seasoning, Pace brand enchilada sauce, green onions and low-fat cheese. Serve with ¼ cup black beans or fat free refried beans topped with chopped tomatoes or salsa.    - Santa frozen meatballs, simmered in Classico Marinara sauce. Different flavors of salsa or spaghetti sauce create different dishes! Sprinkle with parmesan cheese. Serve with grilled or steamed veggies, or a dark green salad.    - Simmer boneless skinless chicken thigh chunks in Classico Marinara sauce or roasted salsa until tender with chopped onion, bell pepper, garlic, mushrooms, spinach, etc.     - Hamburger, without the bun, dressed the way you like. Served with grilled or steamed veggies.    - Eggplant parmesan: Bake slices of eggplant at 350 degrees for 15 minutes. Layer tomato sauce, sliced eggplant and low-fat mozzarella cheese in a baking dish and cover with foil. Bake 30-40 more minutes or until bubbly. Uncover and bake at 400 degrees for about 15 more minutes, or until top is slightly crisp.    - Fish tacos: grilled/baked white fish, wrapped in Garfield lettuce leaf, topped with salsa, shredded low-fat cheese, and light coleslaw.    Snacks: (100-200 calories; >5g protein)    - 1 low-fat cheese stick with 8 cherry tomatoes or 1 serving fresh fruit  - 4 thin slices fat-free turkey breast and 1 slice low-fat cheese  - 4 thin slices fat-free honey ham with wedge of melon  - 1/4 cup unsalted nuts with ½ cup fruit  - 6-oz container Dannon Light n Fit vanilla yogurt, topped with 1oz unsalted nuts         - apple, celery or baby carrots spread with 2 Tbsp natural peanut  butter or almond butter   - apple slices with 1 oz slice low-fat cheese  - celery, cucumber, bell pepper or baby carrots dipped in ¼ cup hummus bean spread or light spinach and artichoke dip (*recipe in lunch section)  - 100 calorie bag microwave light popcorn with 3 tbsp grated parmesan cheese  - Oscar Carolyn Beef Steak - 14g protein! (similar to beef jerky)  - 2 wedges [place] - Light Herb & Garlic Cheese with sliced cucumber or green bell pepper  - 1/2 cup low-fat cottage cheese with ¼ cup fruit or ¼ cup salsa  - RTD Protein drinks: Atkins, Low Carb Slim Fast, EAS light, Muscle Milk Light, etc.  - Homemade Protein drinks: GNC Soy95, Isopure, Nectar, UNJURY, Whey Gourmet, etc. Mix 1 scoop powder with 8oz skim/1% milk or light soymilk.  - Protein bars: Atkins, EAS, Pure Protein, Think Thin, Detour, etc. Must have 0-4 grams sugar - Read the label.    Takeout Options: No more than twice/week  Deli - Salads (no pasta or rice), meats, cheeses. Roasted chicken. Lox (salmon)    Mexican - Platters which don't include tortillas, chips, or rice. Go easy on the beans. Example: Fajitas without the tortillas. Ask the  not to bring chips to the table if they are too tempting.    Greek - Meat or fish and vegetable, but no bread or rice. Including hummus, baba ganoush, etc, is OK. Most sit-down Greek restaurants can provide you with cucumber slices for dipping instead of mary bread.    Fast Food (Avoid as much as possible) - Salads (no croutons and limit salad dressing to 2 tbsp), grilled chicken sandwich without the bun and ask for no alamo. Arianas low fat chili or Taco Bell pintos and cheese.    BBQ - The meats are fine if you ask for sauces on the side, but most of the traditional side dishes are loaded with carbs. Artur slaw, baked beans and BBQ sauce are typically made with sugar.    Chinese - Nothing deep-fried, no rice or noodles. Many Chinese sauces have starch and sugar in them, so you'll have to use your  "judgement. If you find that these sauces trigger cravings, or cause Dumping, you can ask for the sauce to be made without sugar or just use soy sauce.      Spring Recipes:    [place] Shake:     Ingredients  ½ cup low fat cottage cheese  ¼ cup vanilla protein powder  1/8 tsp mint extract  2-3 packets of stevia or artificial sweetener of choice  5-10 ice cubes (more or less depending on how thick you would like it)  4 oz of water  2-3 drops of green food coloring OR a small handful of spinach to make it green    Put all ingredients in  and  until desired consistency.      "Unstuffed" Cabbage Rolls:    Ingredients:  1 1/2 to 2 pounds lean ground beef or turkey  1 large onion, chopped  1 clove garlic, minced  1 small cabbage, chopped  2 cans (14.5 ounces each) diced tomatoes  1 can (8 ounces) tomato sauce  ¼ - ½ cup water depending on desired consistency  1 teaspoon ground black pepper, salt to taste    Spray large skillet with nonstick cookspray. Heat pan on medium heat. Sauté the onions until tender, and then add the ground beef (or turkey) and cook until the meat is browned.    Add the garlic, cook an additional minute before adding the remaining ingredients. Cover, reduce the heat and simmer about 25 minutes (or until the cabbage is  fork tender)        Not so James's Pie:    Ingredients  2 (or more) pounds of lean ground beef, or turkey  2 heads of cauliflower or 3 bags of frozen cauliflower, chopped  1 bag of frozen mixed veggies          (NO Corn, Peas or Potatoes!)  1-2 onions  1 egg  1 teaspoon each of basil, garlic powder, pepper, oregano and a little cayenne  4-5 sprays of I cant believe its not butter spray  4 ounces of fat free cream cheese (optional)  Low fat Cheese to top (optional)    Roast cauliflower in oven on 350* F for about 15-20 minutes, until browned and fork tender. (begin jacome meat while cauliflower is cooking). Place cooked cauliflower in . Add 4 ounces of fat " free cream cheese, 4-5 sprays of I cant believe its not butter SPRAY, and spices and puree until creamy.     While cauliflower is roasting, brown meat in large skillet and season to taste. Set aside. Sauté diced onion in skillet until somewhat soft. Set aside with meat. Pour mixed veggies in the skillet to heat on low heat.     Mix the meat, onions, mixed veggies, raw egg and any additional seasonings and put in bottom of 9x13 baking dish. Spread mashed cauliflower mixture over it until smooth.    Bake at 350 for approximately 30 minutes. Add cheese and bake 5 additional minutes (optional). Serve warm (or reheat later).    Crock Pot Balsamic Pork Tenderloin:    Ingredients:  1 tsp dried thyme  1 tsp ground pepper  ½ tsp salt  3 tbsp dried minced onion  2 tsp dried basil  ¼ cup low sodium broth  ½ cup balsamic vinegar  2 cloves garlic, minced  2 lbs Pork Tenderloin    Mix first 5 ingredients together. Rub pork tenderloin with dry seasoning mixture.  In a large sauté pan, heat ¼ cup balsamic vinegar and garlic on medium heat. Add pork to sauté swann and sear, jacome all sides. Add low sodium broth, 1 tbsp at a time to keep tenderloin from sticking or use nonstick cookspray.     Transfer meat to crock pot. Pour remaining balsamic vinegar into sauté pan and continue  to stir for a few minutes to deglaze the pan. Pour juices over the top of the tenderloin in crockpot. Cook on high for 3 hours or until meat thermometer says 170*. Let rest 5-10 minutes and then slice.       Side Dish: Steamed carrots w/ garlic and eleuterio    Ingredients:  2 garlic cloves, minced  1 lb baby carrots  5-6 sprays of I cant believe its not butter SPRAY  1 tsp minced peeled fresh eleuterio  1 tbsp chopped fresh cilantro  ½ tsp grated lime rind  1 tbsp fresh lime juice   ¼ tsp salt    Prepare garlic; let stand 10 minutes. Steam carrots, covered in pot, about 10 minutes or until tender.     In a nonstick skillet over medium heat, spray pan w/ I  cant believe its not butter SPRAY. Add garlic and eleuterio and cook 1 minute, stirring constantly. Remove from  heat; stir in carrots, cilantro and remaining ingredients.         Side Dish: Green Bean Almondine    Ingredients:  1 pound fresh green beans, trimmed  1 tbsp galina vinegar  1 ½ tsp water  1 tsp Jan Mustard  ¼ tsp salt  ¼ tsp freshly ground black pepper  1 tbsp sliced almonds, toasted    Cook green beans in boiling water for 4 minutes or until crisp-tender. Drain and plunge beans into ice water. Drain well. Pat beans dry w/ paper towel.     Combine vinegar, water, mustard, salt and pepper in a medium bowl. Add beans to vinegar mixture; toss to coat well. Sprinkle with toasted almonds.      How to Cook the Perfect Hard Boiled Egg:    Steam in water: Fill a large pot with 1 inch of water. Place steamer insert inside, cover, and bring to a boil over high heat. Add eggs to steamer basket, cover, and continue cooking 12 minute. If serving cold, immediately place eggs in a bowl of ice water and allow to cool for at least 15 minutes before peeling under cool running water. Store in the refrigerator for up to 5 days.    OR    Purchase Dash Go Rapid Egg Boiler: available @ Amazon, [employer], [employer], [facility] for ~$15-$20      Classic Egg Salad Recipe:    Ingredients:  8 hard cooked eggs, peeled and coarsely chopped  4-6 tbsp of low fat or fat free alamo  2 tbsp celery, chopped  2 tsp jan mustard  Dash of cayenne pepper (for spice)  Black pepper, salt to taste    In a medium bowl, combine alamo, celery, mustard and cayenne pepper with chopped eggs. Season w/ pepper and salt. Serve over Lettuce leaves.     Get Creative! Add chopped turkey gilman and tomato and serve on lettuce leaves for an egg-cellent BLT egg salad or mix lean diced ham, low fat cheddar and tomatoes for  egg salad    Tuna Deviled Eggs:    Ingredients:  12 hard boiled eggs  1 can of tuna packed in water  1 rib celery, diced  ¼ cup  low fat alamo  1 tsp mustard  Garlic powder, black pepper to taste  Dash of paprika (for finish)    Cut eggs in half lengthwise. Remove yolk and mash in bowl. In separate bowl, mix tuna, celery, low fat alamo, mustard and seasonings.  Stir in egg yolks until blended. Stuff eggs and sprinkle dash of paprika      Gilman Jalapeno Deviled Eggs:    Ingredients:  12 hard boiled eggs, peeled  ½ cup low fat alamo  1 ½ tsp rice vinegar  ¾ tsp ground mustard  ½ packet splenda  2 jalapenos, seeded and chopped, 6 pieces turkey gilman, cooked crisp and crumbled  Dash of paprika (for finish)    Cut eggs in half lengthwise. Remove yolk and mash in bowl. Add alamo, vinegar, spices and Splenda until well combined. Mix in jalapenos and gilman. Stuff eggs and sprinkle w/ dash of paprika      Sugar-Free High Protein Brownie Recipe:    Ingredients:  2 cups of pureed zucchini  4 oz fat free cream cheese  1 large egg  2 scoops chocolate protein powder  1 tbsp pure vanilla extract  1/3 cup unsweetened cocoa powder    ¼ tsp salt  2 tbsp chopped nuts  *8-9 packets of splenda or artificial sweetener of choice    Preheat oven to 350* F.     Line an 8x8 pan w/ parchment paper or spray with nonstick cookspray.     In a medium bowl, blend the fat free cream cheese with egg until creamy. Add chocolate protein powder and cocoa powder until creamy. Add vanilla extract. Stir in pureed zucchini and salt.     The batter will be thick, but not dry. If batter seems dry, add 1-2 tbsp water. Fold in Nuts. Pour batter in prepared pan.     Bake for 30 minutes. Allow to cool completely. Cut into squares and chill to semi-set.     *best if eaten within 2 days but may last 3-4 days in fridge.         Lemon Whip:    Ingredients:  Small box of sugar-free vanilla instant pudding mix  1 small packet of crystal light lemonade mix  2 cups skim milk or fat free fairlife milk  Sugar-free, fat free cool whip     Make sugar-free pudding using 2 cups skim milk according to  package. Stir in 1 small packet of crystal light lemonade mix.  Fold in a dollop of sugar-free, fat free cool whip and stir to combine.     Home-made Sugar-free Pudding Pops:    Ingredients:  1 small pkg of sugar-free instant pudding mix (flavor of choice!)  2 cups fat free milk     Beat ingredients with whisk for 2 minutes. Pour into 6 paper or plastic cups or into a popsicle mold. Insert wooden pop stick in center of each cup.     Freeze for 5 hours or until firm. Peel off paper or pull out of popsicle mold.     Variations: add sugar-free syrups to change up the flavor, such as sugar-free chocolate pudding + sugar free raspberry syrup = chocolate raspberry fudgesicle.          Universal Safety Interventions

## 2018-09-18 RX ORDER — METFORMIN HYDROCHLORIDE 1000 MG/1
TABLET ORAL
Qty: 180 TABLET | Refills: 1 | Status: SHIPPED | OUTPATIENT
Start: 2018-09-18 | End: 2019-04-03 | Stop reason: SDUPTHER

## 2018-09-18 RX ORDER — METOPROLOL TARTRATE 100 MG/1
TABLET ORAL
Qty: 180 TABLET | Refills: 0 | Status: SHIPPED | OUTPATIENT
Start: 2018-09-18 | End: 2018-12-17 | Stop reason: SDUPTHER

## 2018-09-25 ENCOUNTER — TELEPHONE (OUTPATIENT)
Dept: INTERNAL MEDICINE | Facility: CLINIC | Age: 58
End: 2018-09-25

## 2018-09-25 RX ORDER — HYDROCODONE BITARTRATE AND ACETAMINOPHEN 10; 325 MG/1; MG/1
1 TABLET ORAL
Qty: 120 TABLET | Refills: 0 | Status: SHIPPED | OUTPATIENT
Start: 2018-09-25 | End: 2018-11-19 | Stop reason: SDUPTHER

## 2018-09-25 NOTE — TELEPHONE ENCOUNTER
----- Message from Ash Benavides sent at 9/25/2018  9:56 AM CDT -----  Contact: Pharmacy [facility] [phone]  Pharmacy calling stating does not have enough to fill the Rx Norco wants to know if office would like to send to another pharmacy to have refill done.    Pharmacy: [facility] [address] [place] [address] [phone] (Phone) [phone] (Fax)    Please call an advise  Thank you

## 2018-09-25 NOTE — TELEPHONE ENCOUNTER
Spoke to pharm who stated they are out of stock on norco. Will attempt to contact pt to wee what other pharm she would like to use.

## 2018-09-25 NOTE — TELEPHONE ENCOUNTER
"----- Message from Emmy Branham sent at 9/25/2018  9:20 AM CDT -----  Contact: Self/[phone]  RX request - refill or new RX.  Is this a refill or new RX:  refill  RX name and strength: HYDROcodone-acetaminophen (NORCO)  mg per tablet  Directions: Take 1 tablet by mouth every 6 to 8 hours as needed for Pain. - Oral  Is this a 30 day or 90 day RX:    Local pharmacy or mail order pharmacy: local  Pharmacy name and phone # (DON'T enter "on file" or "in chart"[facility] [phone] (Phone)  [phone] (Fax)  Comments:        "

## 2018-09-26 ENCOUNTER — TELEPHONE (OUTPATIENT)
Dept: INTERNAL MEDICINE | Facility: CLINIC | Age: 58
End: 2018-09-26

## 2018-09-26 NOTE — TELEPHONE ENCOUNTER
----- Message from Apolonia Rebollar sent at 9/25/2018  4:00 PM CDT -----  Contact: Patient [phone]  Patient is returning a phone call.  Who left a message for the patient: Rosette  Does patient know what this is regarding:  Yes. [facility] will call [facility] to see if they can find another pharmacy that has the rx forNorco  in stock  Comments:

## 2018-09-28 RX ORDER — SITAGLIPTIN 100 MG/1
TABLET, FILM COATED ORAL
Qty: 30 TABLET | Refills: 6 | Status: SHIPPED | OUTPATIENT
Start: 2018-09-28 | End: 2019-04-17 | Stop reason: SDUPTHER

## 2018-09-28 NOTE — TELEPHONE ENCOUNTER
"----- Message from Igor Ward sent at 9/28/2018 12:54 PM CDT -----  Contact: Self / [phone]  RX request - refill or new RX.  Is this a refill or new RX:  Refill  RX name and strength: HYDROcodone-acetaminophen (NORCO)  mg per tablet   Directions: Take 1 tablet by mouth every 6 to 8 hours as needed for Pain. - Oral  Is this a 30 day or 90 day RX:    Local pharmacy or mail order pharmacy: Local   Pharmacy name and phone # (DON'T enter "on file" or "in chart"): [facility] [address] [place] [address] [phone] (Phone)  [phone] (Fax)  Comments:  Patient states that Pharmacy only needs the prescription code sent over.          "

## 2018-10-17 ENCOUNTER — TELEPHONE (OUTPATIENT)
Dept: INTERNAL MEDICINE | Facility: CLINIC | Age: 58
End: 2018-10-17

## 2018-10-17 DIAGNOSIS — M17.0 OSTEOARTHRITIS OF BOTH KNEES, UNSPECIFIED OSTEOARTHRITIS TYPE: Primary | ICD-10-CM

## 2018-10-17 NOTE — TELEPHONE ENCOUNTER
Please see below and advise. Thank you NL  ----- Message from Mary Choi sent at 10/17/2018  9:42 AM CDT -----  Contact: self/[phone]  Patient called in regards needing to schedule an appointment with Dr Hester about getting a knee injection. Please call and advise. Thank you!!!

## 2018-10-22 ENCOUNTER — OFFICE VISIT (OUTPATIENT)
Dept: BARIATRICS | Facility: CLINIC | Age: 58
End: 2018-10-22
Payer: COMMERCIAL

## 2018-10-22 VITALS
SYSTOLIC BLOOD PRESSURE: 116 MMHG | DIASTOLIC BLOOD PRESSURE: 70 MMHG | HEART RATE: 84 BPM | BODY MASS INDEX: 49.11 KG/M2 | WEIGHT: 287.69 LBS | HEIGHT: 64 IN

## 2018-10-22 DIAGNOSIS — E66.01 CLASS 3 SEVERE OBESITY DUE TO EXCESS CALORIES WITHOUT SERIOUS COMORBIDITY WITH BODY MASS INDEX (BMI) OF 45.0 TO 49.9 IN ADULT: ICD-10-CM

## 2018-10-22 DIAGNOSIS — E11.65 UNCONTROLLED TYPE 2 DIABETES MELLITUS WITH HYPERGLYCEMIA: Primary | ICD-10-CM

## 2018-10-22 PROCEDURE — 99214 OFFICE O/P EST MOD 30 MIN: CPT | Mod: S$GLB,,, | Performed by: INTERNAL MEDICINE

## 2018-10-22 PROCEDURE — 3008F BODY MASS INDEX DOCD: CPT | Mod: CPTII,S$GLB,, | Performed by: INTERNAL MEDICINE

## 2018-10-22 PROCEDURE — 3074F SYST BP LT 130 MM HG: CPT | Mod: CPTII,S$GLB,, | Performed by: INTERNAL MEDICINE

## 2018-10-22 PROCEDURE — 3078F DIAST BP <80 MM HG: CPT | Mod: CPTII,S$GLB,, | Performed by: INTERNAL MEDICINE

## 2018-10-22 PROCEDURE — 99999 PR PBB SHADOW E&M-EST. PATIENT-LVL III: CPT | Mod: PBBFAC,,, | Performed by: INTERNAL MEDICINE

## 2018-10-22 RX ORDER — GLIPIZIDE 10 MG/1
10 TABLET ORAL
Qty: 180 TABLET | Refills: 1 | Status: SHIPPED | OUTPATIENT
Start: 2018-10-22 | End: 2019-04-18 | Stop reason: SDUPTHER

## 2018-10-22 NOTE — PATIENT INSTRUCTIONS
3 meals a day made up of the following:  Unlimited green vegetables, tomatoes, mushrooms, spaghetti squash, cauliflower, meat, poultry, seafood, eggs and hard cheeses.   Milk and plain yogurt  Dressings, seasonings, condiments, etc should have less than 2 g sugars.   Beans (1-1.5 cups) or nuts (1/4 cup) can have 1 x a day.   1-2 servings of citrus fruits, berries, pineapple or melon a day (1/2 cup)  Avoid fried foods    No grains, rice, pasta, potatoes, bread, corn, peas, oatmeal, grits, tortillas, crackers, chips    No soda, sweet tea, juices or lemonade    Www.[employer] for recipes. Moderate carb intake    Sit and Be Fit exercise program on [employer], [employer] or [employer] 3-4 times a week this month      Helpful tips to survive the holidays:  - Dont save yourself for the meal: Make sure you continue to eat high protein small meals every 3-4 hours to ensure to do not become over-hungry. Avoid temptation by not showing up to a holiday party or gathering hungry.   - Plan ahead. Bring a dish to a party if you know there may not be an appropriate option.   - Choose sugar-free drinks: Stick to water or other sugar-free beverages and make sure you are getting 6-8 cups of fluid each day (but not with meals!). Avoid alcohol, carbonated beverages, and high-fat/high-sugar beverages like hot chocolate and eggnog. Try sugar-free hot cocoa made with skim milk or water, or sugar-free spiced tea to add some holiday flair to your beverage (see sugar-free mulled cider recipe below)  - Take your time: Eat mindfully. Dont graze on food throughout the day. Sit down to enjoy your small meals. Chew slowly and thoroughly. Cut your food into small pieces before eating.  - Listen to your body: Stop eating as soon as you feel full. Do not feel pressured to try certain (or all) foods or to eat all of the food on your plate. Listen to your hunger cues.   - REMEMBER: Make your holidays about spending time with family and friends instead of  focusing gatherings around food.  - Keep up your exercise routine: Make sure you continue to get regular exercise throughout the holiday season. Encourage friends and family to be active by taking a walk together after a meal, to look at holiday lights, or to window-shop.    Good Holiday Meal Options:  - Roasted Turkey, NO skin. Use low sodium broth instead of gravy.   - Stuffed Bell Peppers made WITHOUT bread crumbs or Rice. Try using parmesan cheese instead  - Gumbo, NO rice. Try picking out mostly the meat/seafood and vegetables with little broth.   - Green Bean Casserole made with 98% fat free cream of mushroom soup and crushed almonds/pecans instead of fried onions  - Side salad w/ low fat dressing. Try a different kind of salad maybe use Kale or spinach.   - Roasted non-starchy vegetables like brussel sprouts, broccoli, green beans, zucchini, butternut squash, cauliflower  - Cauliflower Mash (steam or roast cauliflower, puree w/ low fat cheese, dash of fat free milk and 2-3 sprays of I cant believe its not butter spray. Add garlic powder and black pepper to season). Use Low sodium broth instead of gravy.   - Try Loaded Cauliflower Mash (Make cauliflower like above cauliflower mash. Top with diced turkey gilman, ¼ cup low fat cheddar cheese and bake @ 350* F for 5-10 minutes, until cheese is melted. Top with minced chives, black pepper and garlic to taste).   - Homemade cranberry sauce using Splenda or another alternative sweetener. Boil fresh cranberries and add splenda to taste. Boil until cranberries break open and then simmer until it reaches the consistency you want (less time for more watery sauce and simmer for longer to create a thicker sauce).   - Deviled eggs: make using low fat alamo, mustard, DILL relish (not sweet relish).   - Vegetable tray w/ Greek yogurt Ranch Dip. Mix 1 packet of hidden valley ranch dip mix w/ 16 oz low fat plain greek yogurt.     Good Holiday Dessert Options:  - High protein  Pumpkin Cheesecake (see recipe below)  - Pumpkin Whip (see recipe below)  - Quest Apple Pie or Cinnamon Roll flavored protein bar (warm in microwave for 10-15 seconds)  - Eggnog Protein shake (see recipe below)  - Fresh fruit w/ low fat cheese  - Sugar-free Jello Parfaits. Layer Red and Green sugar-free jello in cups and top w/ 2 tbsp Sugar-free cool-whip    Pumpkin Cheesecake    8 ounces fat free cream cheese, softened   2 scoops of vanilla protein powder (<4 g sugar per serving)   ¼ tsp Fine salt   2 eggs, at room temperature   1/3 cup fat free sour cream  1/3 cup fat free half and half  1 15 -ounce can pure pumpkin puree   1 tablespoon pumpkin pie spice, plus more for dusting   Unsalted nuts, crushed  *Add splenda to taste    Directions     1. Preheat the oven to 300 degrees F. Line 18 muffin cups with paper liners. Sprinkle 1 tsp crushed unsalted nuts at the bottom of each of muffin cup liner.     2. In a large bowl, beat the cream cheese, vanilla protein powder and 1/4 teaspoon fine salt on medium-high speed until smooth and creamy, 2 to 3 minutes. Scrape down the sides, reduce speed to low and beat in the eggs, 1 at a time, until combined. Beat in 1/3 cup fat free sour cream and fat free half and half. Stir in the pumpkin puree and pumpkin pie spice until smooth. Divide evenly among cookie-lined paper cups, filling almost all the way to the top.     3. Bake until the filling is just set, 40 to 45 minutes. A sharp knife inserted into the center will come out moist, but clean. Cool completely in tins on a wire rack. Refrigerate until cold, 4 hours, or overnight. Top with a dusting of pumpkin pie spice.    Recipe altered from the following recipe: http://www.[employer].com/recipes/food-network-franko/mini-pumpkin-cheesecakes-recipe.print.html?oc=linkback    Pumpkin Whip    Box of sugar-free vanilla pudding  Can of pumpkin puree  Pumpkin Pie spice (sprinkle to taste)  ½ cup of sugar-free Cool  Whip    Directions:  Make sugar-free pudding according to package directions using fat free or 1% milk. Stir in pumpkin and cool whip. Add pumpkin pie spice to taste.     Egg Nog Protein shake    8 oz skim or 1% milk  1 scoop vanilla protein powder  1 tbsp sugar-free vanilla pudding mix  ½ tsp butter flavor extract  ½ tsp rum extract  ½ tsp cinnamon     Shake together or blend with ice and serve.     Sugar-Free Mulled Cider    3 oz diet cran-apple juice  6 oz water  1 packet sugar-free apple cider mix  ½ tsp apple pie spice  ½ tsp butter flavor extract  1 tbsp Sugar-free Syrup    Mix together. Warm if needed and serve w/ orange wedge and cinnamon stick.

## 2018-10-22 NOTE — PROGRESS NOTES
"Subjective:       Patient ID: Violeta Champion is a 57 y.o. female.    Chief Complaint: No chief complaint on file.    Pt here today for follow-up. Has lost 73 lbs. Was last her in April. Started Trulicity. She thinks the trulicity makes her urinate and hungrier. She eating every 4 hours. Her BS was s 401 today. She says she is eating constantly. She is drinking sugary drinks. Has frequent urination.  does not check her BS because it will not be "accurate". She has appt with PCP in 2 days. Missed her lab appointment that had been scheduled previously. .                  Review of Systems   Constitutional: Positive for chills and fever.        With URI sx 2 weeks ago   Respiratory: Positive for apnea and shortness of breath.         Has mouth piece, but has not been using.    Cardiovascular: Positive for leg swelling. Negative for chest pain.   Gastrointestinal: Positive for constipation. Negative for diarrhea.        + GERD   Genitourinary: Negative for difficulty urinating and dysuria.   Musculoskeletal: Positive for arthralgias. Negative for back pain.   Neurological: Negative for dizziness and light-headedness.   Psychiatric/Behavioral: Positive for dysphoric mood. The patient is nervous/anxious.        Objective:     /70   Pulse 84   Ht 5' 4" (1.626 m)   Wt 130.5 kg (287 lb 11.2 oz)   LMP  (LMP Unknown)   BMI 49.38 kg/m²     Physical Exam   Constitutional: She is oriented to person, place, and time. She appears well-developed. No distress.   HENT:   Head: Normocephalic and atraumatic.   Eyes: EOM are normal. Pupils are equal, round, and reactive to light. No scleral icterus.   Neck: Normal range of motion. Neck supple. No thyromegaly present.   Cardiovascular: Normal rate and normal heart sounds. Exam reveals no gallop and no friction rub.   No murmur heard.  Pulmonary/Chest: Effort normal and breath sounds normal. No respiratory distress. She has no wheezes.   Abdominal: Soft. Bowel sounds are normal. " She exhibits no distension. There is no tenderness.   Musculoskeletal: Normal range of motion. She exhibits no edema.   Neurological: She is alert and oriented to person, place, and time. No cranial nerve deficit.   Skin: Skin is warm and dry. No erythema.   Thickened, dark patches at the neck     Psychiatric: She has a normal mood and affect. Her behavior is normal. Judgment normal.   Vitals reviewed.      Assessment:       1. Uncontrolled type 2 diabetes mellitus with hyperglycemia    2. Class 3 severe obesity due to excess calories without serious comorbidity with body mass index (BMI) of 45.0 to 49.9 in adult        Plan:           Diagnoses and all orders for this visit:    Uncontrolled type 2 diabetes mellitus with hyperglycemia  -     glipiZIDE (GLUCOTROL) 10 MG tablet; Take 1 tablet (10 mg total) by mouth 2 (two) times daily before meals.  -     dulaglutide (TRULICITY) 1.5 mg/0.5 mL PnIj; Inject 1.5 mg into the skin every 7 days.  Discussed with pt that she needs to get her labs done and monitor her BS at home. She should bring her home readings with her to her appt with Madie Petersen, DO so further med adjustments can be med. I was rody with pt that she may likely need insulin. Intake of sugary drinks and high carb foods is not helping the situation.  Also that the increase in urination and appetite are not SE of the trulicity, but of the hyperglycemia. Unfortunately, this is also likely the main cause of her weight loss at this point.   Class 3 severe obesity due to excess calories without serious comorbidity with body mass index (BMI) of 45.0 to 49.9 in adult  We can re-eval other tx options once pt has had her labs and BS are improved. I did not want to start other agents as she could has electrolyte abnormalities, etc assoc with BS ion the 400s.     3 meals a day made up of the following:  Unlimited green vegetables, tomatoes, mushrooms, spaghetti squash, cauliflower, meat, poultry, seafood, eggs  and hard cheeses.   Milk and plain yogurt  Dressings, seasonings, condiments, etc should have less than 2 g sugars.   Beans (1-1.5 cups) or nuts (1/4 cup) can have 1 x a day.   1-2 servings of citrus fruits, berries, pineapple or melon a day (1/2 cup)  Avoid fried foods    No grains, rice, pasta, potatoes, bread, corn, peas, oatmeal, grits, tortillas, crackers, chips    No soda, sweet tea, juices or lemonade    Www.dietdoctor.[email or web address] for recipes. Moderate carb intake    Holiday tips given.     Sit and Be Fit exercise program on [employer], [employer] or youtube 3-4 times a week this month

## 2018-10-22 NOTE — TELEPHONE ENCOUNTER
Nam, Patient is requesting Euflexxa injections. See pre-authorization order and schedule her for a 'procedure' appointment in 2 weeks. Please link the PA to the appointment so that the pre-auth is processed. Thank you.

## 2018-10-22 NOTE — TELEPHONE ENCOUNTER
Pt was scheduled for fri 11/9 at 2:40pm by someone else and I have assigned the referral to the appt    The referral was also authorized

## 2018-10-25 ENCOUNTER — LAB VISIT (OUTPATIENT)
Dept: LAB | Facility: HOSPITAL | Age: 58
End: 2018-10-25
Attending: INTERNAL MEDICINE
Payer: COMMERCIAL

## 2018-10-25 DIAGNOSIS — E11.8 CONTROLLED DIABETES MELLITUS TYPE 2 WITH COMPLICATIONS, UNSPECIFIED WHETHER LONG TERM INSULIN USE: ICD-10-CM

## 2018-10-25 LAB
ALBUMIN SERPL BCP-MCNC: 3.4 G/DL
ALP SERPL-CCNC: 85 U/L
ALT SERPL W/O P-5'-P-CCNC: 15 U/L
ANION GAP SERPL CALC-SCNC: 9 MMOL/L
AST SERPL-CCNC: 22 U/L
BILIRUB SERPL-MCNC: 0.3 MG/DL
BUN SERPL-MCNC: 14 MG/DL
CALCIUM SERPL-MCNC: 9.7 MG/DL
CHLORIDE SERPL-SCNC: 102 MMOL/L
CHOLEST SERPL-MCNC: 219 MG/DL
CHOLEST/HDLC SERPL: 5.2 {RATIO}
CO2 SERPL-SCNC: 24 MMOL/L
CREAT SERPL-MCNC: 0.9 MG/DL
EST. GFR  (AFRICAN AMERICAN): >60 ML/MIN/1.73 M^2
EST. GFR  (NON AFRICAN AMERICAN): >60 ML/MIN/1.73 M^2
ESTIMATED AVG GLUCOSE: ABNORMAL MG/DL
GLUCOSE SERPL-MCNC: 341 MG/DL
HBA1C MFR BLD HPLC: >14 %
HDLC SERPL-MCNC: 42 MG/DL
HDLC SERPL: 19.2 %
LDLC SERPL CALC-MCNC: 130.4 MG/DL
NONHDLC SERPL-MCNC: 177 MG/DL
POTASSIUM SERPL-SCNC: 4.7 MMOL/L
PROT SERPL-MCNC: 7.4 G/DL
SODIUM SERPL-SCNC: 135 MMOL/L
TRIGL SERPL-MCNC: 233 MG/DL

## 2018-10-25 PROCEDURE — 80053 COMPREHEN METABOLIC PANEL: CPT

## 2018-10-25 PROCEDURE — 36415 COLL VENOUS BLD VENIPUNCTURE: CPT

## 2018-10-25 PROCEDURE — 80061 LIPID PANEL: CPT

## 2018-10-25 PROCEDURE — 83036 HEMOGLOBIN GLYCOSYLATED A1C: CPT

## 2018-11-05 DIAGNOSIS — M54.9 BACK PAIN, UNSPECIFIED BACK LOCATION, UNSPECIFIED BACK PAIN LATERALITY, UNSPECIFIED CHRONICITY: ICD-10-CM

## 2018-11-05 RX ORDER — TIZANIDINE 4 MG/1
TABLET ORAL
Qty: 90 TABLET | Refills: 3 | Status: SHIPPED | OUTPATIENT
Start: 2018-11-05 | End: 2019-04-03

## 2018-11-19 ENCOUNTER — TELEPHONE (OUTPATIENT)
Dept: INTERNAL MEDICINE | Facility: CLINIC | Age: 58
End: 2018-11-19

## 2018-11-19 RX ORDER — HYDROCODONE BITARTRATE AND ACETAMINOPHEN 10; 325 MG/1; MG/1
1 TABLET ORAL
Qty: 120 TABLET | Refills: 0 | Status: SHIPPED | OUTPATIENT
Start: 2018-11-19 | End: 2018-12-31 | Stop reason: SDUPTHER

## 2018-11-19 NOTE — TELEPHONE ENCOUNTER
----- Message from Apolonia Rebollar sent at 11/19/2018 11:22 AM CST -----  Contact: Patient [phone]  Is this a refill or new RX:  Refill    RX name and strength: HYDROcodone-acetaminophen (NORCO)  mg per tablet     Pharmacy name and phone # [facility]  [address] [phone] (Phone) [phone] (Fax)

## 2018-12-17 ENCOUNTER — OFFICE VISIT (OUTPATIENT)
Dept: INTERNAL MEDICINE | Facility: CLINIC | Age: 58
End: 2018-12-17
Attending: FAMILY MEDICINE
Payer: COMMERCIAL

## 2018-12-17 VITALS
OXYGEN SATURATION: 97 % | TEMPERATURE: 98 F | BODY MASS INDEX: 48.82 KG/M2 | HEIGHT: 65 IN | DIASTOLIC BLOOD PRESSURE: 70 MMHG | HEART RATE: 65 BPM | SYSTOLIC BLOOD PRESSURE: 122 MMHG | WEIGHT: 293 LBS

## 2018-12-17 DIAGNOSIS — M17.0 OSTEOARTHRITIS OF BOTH KNEES, UNSPECIFIED OSTEOARTHRITIS TYPE: Primary | ICD-10-CM

## 2018-12-17 PROCEDURE — 20610 DRAIN/INJ JOINT/BURSA W/O US: CPT | Mod: 50,S$GLB,, | Performed by: FAMILY MEDICINE

## 2018-12-17 PROCEDURE — 99999 PR PBB SHADOW E&M-EST. PATIENT-LVL III: CPT | Mod: PBBFAC,,, | Performed by: FAMILY MEDICINE

## 2018-12-17 PROCEDURE — 99499 UNLISTED E&M SERVICE: CPT | Mod: S$GLB,,, | Performed by: FAMILY MEDICINE

## 2018-12-17 RX ORDER — METOPROLOL TARTRATE 100 MG/1
TABLET ORAL
Qty: 180 TABLET | Refills: 0 | Status: SHIPPED | OUTPATIENT
Start: 2018-12-17 | End: 2019-04-03 | Stop reason: SDUPTHER

## 2018-12-17 RX ORDER — QUINAPRIL 40 MG/1
TABLET ORAL
Qty: 90 TABLET | Refills: 1 | Status: SHIPPED | OUTPATIENT
Start: 2018-12-17 | End: 2019-04-03 | Stop reason: SDUPTHER

## 2018-12-17 NOTE — PROGRESS NOTES
Subjective:       Patient ID: Violeta Champion is a 57 y.o. female.    Chief Complaint: Injections (both knees)    HPI  Review of Systems   Constitutional: Negative for chills, fatigue, fever and unexpected weight change.   HENT: Negative for congestion and trouble swallowing.    Eyes: Negative for redness and visual disturbance.   Respiratory: Negative for cough, chest tightness and shortness of breath.    Cardiovascular: Negative for chest pain, palpitations and leg swelling.   Gastrointestinal: Negative for abdominal pain and blood in stool.   Genitourinary: Negative for difficulty urinating, hematuria and menstrual problem.   Musculoskeletal: Positive for arthralgias, back pain and gait problem. Negative for joint swelling, myalgias and neck pain.   Skin: Negative for color change and rash.   Neurological: Negative for tremors, speech difficulty, weakness, numbness and headaches.   Hematological: Negative for adenopathy. Does not bruise/bleed easily.   Psychiatric/Behavioral: Negative for behavioral problems, confusion and sleep disturbance. The patient is not nervous/anxious.        Objective:      Physical Exam   Constitutional: She is oriented to person, place, and time. She appears well-developed and well-nourished. No distress.   Neck: Neck supple.   Pulmonary/Chest: Effort normal.   Musculoskeletal: She exhibits no edema.        Right lower leg: She exhibits no edema.        Left lower leg: She exhibits no edema.   Neurological: She is alert and oriented to person, place, and time.   Skin: Skin is warm and dry. No rash noted.   Psychiatric: She has a normal mood and affect. Her behavior is normal. Judgment and thought content normal.   Nursing note and vitals reviewed.      Assessment:       1. Osteoarthritis of both knees, unspecified osteoarthritis type        Plan:   Violeta was seen today for injections.    Diagnoses and all orders for this visit:    Osteoarthritis of both knees, unspecified  osteoarthritis type    Other orders  -     sodium hyaluronate (EUFLEXXA) 10 mg/mL(mw 2.4 -3.6 million) Syrg 20 mg  -     sodium hyaluronate (EUFLEXXA) 10 mg/mL(mw 2.4 -3.6 million) Syrg 20 mg      See meds, orders, follow up, routing and instructions sections of encounter.  A 57-year-old patient for knee injections.  She gets these periodically.  She is   having some worsening of her gait at this point in time.  She also has some   neck and back pain under treatment from her primary care physician.  I did   discuss with her the risks and benefits of injection.  She did consent verbally.    Please see EPIC documentation.  Follow up in one week.      HUBER/AKIL  dd: 12/17/2018 15:57:20 (CST)  td: 12/18/2018 11:07:36 (CST)  Doc ID   #[id number]  Job ID #[id number]    CC:        BRIEF HISTORY:    Patient presents for therapeutic injections in the bilateral Knee(s) for OA. No complaints expressed at this time. The patient was counseled about risks and benefits of knee Visco-supplementation and other injections in general, including but not limited to pain, infection even that requiring surgery to clean out, failure to provide relief, transient flare, tissue damage. Patient expressed understanding, was given the opportunity to ask questions and any questions answered and consented verbally. Time out performed for localization, medication and patient identification using multiple identifiers.    Procedure Note:    Following a standard, sterile 2-antiseptic prep and negative arthrocentesis, Euflexxa was instilled in the bilateral knee(s) with no immediate discomfort. The procedure was tolerated well with no immediate adverse effects.    Follow up for next in series as scheduled.

## 2018-12-31 ENCOUNTER — TELEPHONE (OUTPATIENT)
Dept: INTERNAL MEDICINE | Facility: CLINIC | Age: 58
End: 2018-12-31

## 2018-12-31 RX ORDER — HYDROCODONE BITARTRATE AND ACETAMINOPHEN 10; 325 MG/1; MG/1
1 TABLET ORAL
Qty: 120 TABLET | Refills: 0 | Status: SHIPPED | OUTPATIENT
Start: 2018-12-31 | End: 2019-04-03 | Stop reason: SDUPTHER

## 2018-12-31 NOTE — TELEPHONE ENCOUNTER
----- Message from Delilah S Eulalio sent at 12/31/2018 11:17 AM CST -----  Contact: Pt Mobile/Home [phone]   RX request - refill or new RX.  Is this a refill or new RX:  Refill  RX name and strength: HYDROcodone-acetaminophen (NORCO)  mg per tablet  Directions:   Is this a 30 day or 90 day RX:    Local pharmacy or mail order pharmacy:  [facility] [address] [place] [address]  [facility] and phone [facility] Phone# [phone],Fax# [phone]  Comments:  120 Quantity

## 2019-01-07 ENCOUNTER — TELEPHONE (OUTPATIENT)
Dept: INTERNAL MEDICINE | Facility: CLINIC | Age: 59
End: 2019-01-07

## 2019-01-07 NOTE — TELEPHONE ENCOUNTER
----- Message from Rose Avendano sent at 1/7/2019 12:59 PM CST -----  Contact: self [phone]  Patient is scheduled today for 1pm, is not going to make it on time and is calling to ask if she can be seen later today. Please call her back and advise at [phone].    thanks

## 2019-01-11 ENCOUNTER — PROCEDURE VISIT (OUTPATIENT)
Dept: INTERNAL MEDICINE | Facility: CLINIC | Age: 59
End: 2019-01-11
Attending: FAMILY MEDICINE
Payer: COMMERCIAL

## 2019-01-11 VITALS
DIASTOLIC BLOOD PRESSURE: 80 MMHG | OXYGEN SATURATION: 96 % | WEIGHT: 293 LBS | BODY MASS INDEX: 50.02 KG/M2 | TEMPERATURE: 98 F | HEIGHT: 64 IN | HEART RATE: 76 BPM | SYSTOLIC BLOOD PRESSURE: 124 MMHG

## 2019-01-11 DIAGNOSIS — M17.0 OSTEOARTHRITIS OF BOTH KNEES, UNSPECIFIED OSTEOARTHRITIS TYPE: Primary | ICD-10-CM

## 2019-01-11 PROCEDURE — 20610 DRAIN/INJ JOINT/BURSA W/O US: CPT | Mod: 50,S$GLB,, | Performed by: FAMILY MEDICINE

## 2019-01-11 PROCEDURE — 20610 PR DRAIN/INJECT LARGE JOINT/BURSA: ICD-10-PCS | Mod: 50,S$GLB,, | Performed by: FAMILY MEDICINE

## 2019-01-11 NOTE — PROCEDURES
Procedures BRIEF HISTORY:    Patient presents for therapeutic injections in the bilateral Knee(s) for OA. No complaints expressed at this time. The patient was counseled about risks and benefits of knee Visco-supplementation and other injections in general, including but not limited to pain, infection even that requiring surgery to clean out, failure to provide relief, transient flare, tissue damage. Patient expressed understanding, was given the opportunity to ask questions and any questions answered and consented verbally. Time out performed for localization, medication and patient identification using multiple identifiers.    Procedure Note:    Following a standard, sterile 2-antiseptic prep and negative arthrocentesis, Euflexxa was instilled in the bilateral knee(s) with no immediate discomfort. The procedure was tolerated well with no immediate adverse effects.    Follow up for last in series as scheduled.

## 2019-01-13 RX ORDER — LEVOTHYROXINE SODIUM 100 UG/1
TABLET ORAL
Qty: 90 TABLET | Refills: 1 | Status: SHIPPED | OUTPATIENT
Start: 2019-01-13 | End: 2019-07-05 | Stop reason: SDUPTHER

## 2019-02-06 RX ORDER — LACTULOSE 10 G/15ML
SOLUTION ORAL; RECTAL
Qty: 473 ML | Refills: 3 | Status: SHIPPED | OUTPATIENT
Start: 2019-02-06 | End: 2019-08-27 | Stop reason: SDUPTHER

## 2019-02-11 ENCOUNTER — TELEPHONE (OUTPATIENT)
Dept: INTERNAL MEDICINE | Facility: CLINIC | Age: 59
End: 2019-02-11

## 2019-02-11 NOTE — TELEPHONE ENCOUNTER
"----- Message from Navin Hull sent at 2/11/2019  1:04 PM CST -----  Contact: patient @ [phone]  RX request - refill or new RX.  Is this a refill or new RX:  refill  RX name and strength: HYDROcodone-acetaminophen (NORCO)  mg per tablet  Directions: Take 1 tablet by mouth every 6 to 8 hours as needed for Pain. - Oral  Is this a 30 day or 90 day RX:  120 tablets  Local pharmacy or mail order pharmacy:  local  Pharmacy name and phone # (DON'T enter "on file" or "in chart"): [facility] [address] [place] [address] [phone] (Phone)  [phone] (Fax)  Comments:        "

## 2019-02-12 NOTE — TELEPHONE ENCOUNTER
Pt no longer is employed and is currently between jobs without insurance. She is asking for Dr. Cunningham to refill x 1 while she tries to figure out her new insurance.

## 2019-03-25 NOTE — PROGRESS NOTES
Subjective:       Patient ID: Violeta Champion is a 58 y.o. female.    Chief Complaint: Knee Pain    Patient is a 58 y.o.female who presents today for follow up    Cholesterol: due now  Vaccines: Influenza (yearly); Tetanus (2016) Pneumovax (declines); Prevnar (declines)  Eye exam: up to date; dr brown  Mammogram: due now  Gyn exam: hysterectomy  Colonoscopy: due April 2021    She feels generalized stiffness. She has chronic knee pain which is controlled with norco prn.    DM2: sugars are doing better; lost 60 lbs or so.   Review of Systems   Constitutional: Negative for appetite change, chills, diaphoresis, fatigue and fever.   HENT: Negative for congestion, dental problem, ear discharge, ear pain, hearing loss, postnasal drip, sinus pressure and sore throat.    Eyes: Negative for discharge, redness and itching.   Respiratory: Negative for cough, chest tightness, shortness of breath and wheezing.    Cardiovascular: Negative for chest pain, palpitations and leg swelling.   Gastrointestinal: Negative for abdominal pain, constipation, diarrhea, nausea and vomiting.   Endocrine: Negative for cold intolerance and heat intolerance.   Genitourinary: Negative for difficulty urinating, frequency, hematuria and urgency.   Musculoskeletal: Negative for arthralgias, back pain, gait problem, myalgias and neck pain.   Skin: Negative for color change and rash.   Neurological: Negative for dizziness, syncope and headaches.   Hematological: Negative for adenopathy.   Psychiatric/Behavioral: Negative for behavioral problems and sleep disturbance. The patient is not nervous/anxious.        Objective:      Physical Exam   Constitutional: She is oriented to person, place, and time. She appears well-developed and well-nourished. No distress.   HENT:   Head: Normocephalic and atraumatic.   Right Ear: External ear normal.   Left Ear: External ear normal.   Nose: Nose normal.   Mouth/Throat: Oropharynx is clear and moist. No oropharyngeal  exudate.   Eyes: Pupils are equal, round, and reactive to light. Conjunctivae and EOM are normal. Right eye exhibits no discharge. Left eye exhibits no discharge. No scleral icterus.   Neck: Normal range of motion. Neck supple. No JVD present. No thyromegaly present.   Cardiovascular: Normal rate, regular rhythm, normal heart sounds and intact distal pulses. Exam reveals no gallop and no friction rub.   No murmur heard.  Pulmonary/Chest: Effort normal and breath sounds normal. No stridor. No respiratory distress. She has no wheezes. She has no rales. She exhibits no tenderness.   Abdominal: Soft. Bowel sounds are normal. She exhibits no distension. There is no tenderness. There is no rebound.   Musculoskeletal: Normal range of motion. She exhibits no edema or tenderness.   Lymphadenopathy:     She has no cervical adenopathy.   Neurological: She is alert and oriented to person, place, and time. No cranial nerve deficit.   Skin: Skin is warm and dry. No rash noted. She is not diaphoretic. No erythema.   Psychiatric: She has a normal mood and affect. Her behavior is normal.   Nursing note and vitals reviewed.      Assessment and Plan:       1. HTN, goal below 130/80    - Comprehensive metabolic panel; Future  - Hemoglobin A1c; Future  - Lipid panel; Future    2. Hyperlipidemia, unspecified hyperlipidemia type    - Comprehensive metabolic panel; Future  - Hemoglobin A1c; Future  - Lipid panel; Future    3. Hypothyroidism, unspecified type    - Comprehensive metabolic panel; Future  - Hemoglobin A1c; Future  - Lipid panel; Future    4. Uncontrolled type 2 diabetes mellitus with hyperglycemia  - Comprehensive metabolic panel; Future  - Hemoglobin A1c; Future  - Lipid panel; Future  - dulaglutide (TRULICITY) 1.5 mg/0.5 mL PnIj; Inject 1.5 mg into the skin every 7 days.  Dispense: 2 mL; Refill: 3    5. Visit for screening mammogram  mammo ordered    6. Hypokalemia    - potassium chloride SA (KLOR-CON M20) 20 MEQ tablet;  Take 1 tablet (20 mEq total) by mouth 2 (two) times daily.  Dispense: 180 tablet; Refill: 3    7. Chronic Osteoarthritis of both knees, unspecified osteoarthritis type  · - Medication Management: The risks and benefits of medication were discussed with the patient  - Pt has signed pain contract. Discussed that medication should be taken as prescribed. Discussed potential side effects to this medication and pt must notify me if they start having any side effects. Discussed random drug screenings. Discussed appt needed every 3 months to continue this medication. Pt agreeable to plan.    - pt takes opioid sparingly; last filled in December. Has it at home in case of acute flares from arthritic knees. Pt has seen ortho, sports medicine and rheumatology in the past. Pain likely due to weight as well. She has tried synvisc injections in the past and per pt has tried PT. Not interested in surgery at this time as pain is not always flared.      Addendum: pharmacy would not fill medication quantity. Will only fill for 7 day supply. If pt wants higher quantity, will need to see a specialist.          Follow up in about 3 months (around 7/3/2019).

## 2019-04-03 ENCOUNTER — OFFICE VISIT (OUTPATIENT)
Dept: INTERNAL MEDICINE | Facility: CLINIC | Age: 59
End: 2019-04-03
Payer: COMMERCIAL

## 2019-04-03 ENCOUNTER — TELEPHONE (OUTPATIENT)
Dept: INTERNAL MEDICINE | Facility: CLINIC | Age: 59
End: 2019-04-03

## 2019-04-03 VITALS
HEART RATE: 68 BPM | DIASTOLIC BLOOD PRESSURE: 79 MMHG | WEIGHT: 293 LBS | TEMPERATURE: 99 F | RESPIRATION RATE: 16 BRPM | BODY MASS INDEX: 50.02 KG/M2 | HEIGHT: 64 IN | SYSTOLIC BLOOD PRESSURE: 124 MMHG

## 2019-04-03 DIAGNOSIS — E11.65 UNCONTROLLED TYPE 2 DIABETES MELLITUS WITH HYPERGLYCEMIA: ICD-10-CM

## 2019-04-03 DIAGNOSIS — M17.0 OSTEOARTHRITIS OF BOTH KNEES, UNSPECIFIED OSTEOARTHRITIS TYPE: ICD-10-CM

## 2019-04-03 DIAGNOSIS — M79.7 FIBROMYALGIA: ICD-10-CM

## 2019-04-03 DIAGNOSIS — E87.6 HYPOKALEMIA: ICD-10-CM

## 2019-04-03 DIAGNOSIS — Z12.31 VISIT FOR SCREENING MAMMOGRAM: ICD-10-CM

## 2019-04-03 DIAGNOSIS — E03.9 HYPOTHYROIDISM, UNSPECIFIED TYPE: ICD-10-CM

## 2019-04-03 DIAGNOSIS — I10 HTN, GOAL BELOW 130/80: Primary | ICD-10-CM

## 2019-04-03 DIAGNOSIS — M17.0 OSTEOARTHRITIS OF BOTH KNEES, UNSPECIFIED OSTEOARTHRITIS TYPE: Primary | ICD-10-CM

## 2019-04-03 DIAGNOSIS — E78.5 HYPERLIPIDEMIA, UNSPECIFIED HYPERLIPIDEMIA TYPE: ICD-10-CM

## 2019-04-03 PROCEDURE — 99214 OFFICE O/P EST MOD 30 MIN: CPT | Mod: S$GLB,,, | Performed by: INTERNAL MEDICINE

## 2019-04-03 PROCEDURE — 99213 OFFICE O/P EST LOW 20 MIN: CPT | Mod: PBBFAC,PO | Performed by: INTERNAL MEDICINE

## 2019-04-03 PROCEDURE — 99999 PR PBB SHADOW E&M-EST. PATIENT-LVL III: ICD-10-PCS | Mod: PBBFAC,,, | Performed by: INTERNAL MEDICINE

## 2019-04-03 PROCEDURE — 99214 PR OFFICE/OUTPT VISIT, EST, LEVL IV, 30-39 MIN: ICD-10-PCS | Mod: S$GLB,,, | Performed by: INTERNAL MEDICINE

## 2019-04-03 PROCEDURE — 99999 PR PBB SHADOW E&M-EST. PATIENT-LVL III: CPT | Mod: PBBFAC,,, | Performed by: INTERNAL MEDICINE

## 2019-04-03 RX ORDER — POTASSIUM CHLORIDE 20 MEQ/1
20 TABLET, EXTENDED RELEASE ORAL 2 TIMES DAILY
Qty: 180 TABLET | Refills: 3 | Status: SHIPPED | OUTPATIENT
Start: 2019-04-03 | End: 2019-04-17 | Stop reason: SDUPTHER

## 2019-04-03 RX ORDER — AMLODIPINE BESYLATE 5 MG/1
5 TABLET ORAL DAILY
Qty: 90 TABLET | Refills: 3 | Status: SHIPPED | OUTPATIENT
Start: 2019-04-03 | End: 2020-03-08

## 2019-04-03 RX ORDER — METFORMIN HYDROCHLORIDE 1000 MG/1
1000 TABLET ORAL 2 TIMES DAILY WITH MEALS
Qty: 180 TABLET | Refills: 1 | Status: SHIPPED | OUTPATIENT
Start: 2019-04-03 | End: 2019-09-25 | Stop reason: SDUPTHER

## 2019-04-03 RX ORDER — PRAVASTATIN SODIUM 40 MG/1
TABLET ORAL
Qty: 90 TABLET | Refills: 3 | Status: SHIPPED | OUTPATIENT
Start: 2019-04-03 | End: 2019-08-13 | Stop reason: SDUPTHER

## 2019-04-03 RX ORDER — METOPROLOL TARTRATE 100 MG/1
100 TABLET ORAL 2 TIMES DAILY
Qty: 180 TABLET | Refills: 3 | Status: SHIPPED | OUTPATIENT
Start: 2019-04-03 | End: 2020-03-08

## 2019-04-03 RX ORDER — HYDROCODONE BITARTRATE AND ACETAMINOPHEN 10; 325 MG/1; MG/1
1 TABLET ORAL
Qty: 21 TABLET | Refills: 0 | Status: SHIPPED | OUTPATIENT
Start: 2019-04-03 | End: 2019-04-17 | Stop reason: SDUPTHER

## 2019-04-03 RX ORDER — CYCLOBENZAPRINE HCL 5 MG
5 TABLET ORAL 3 TIMES DAILY PRN
Qty: 90 TABLET | Refills: 3 | Status: SHIPPED | OUTPATIENT
Start: 2019-04-03 | End: 2019-04-13

## 2019-04-03 RX ORDER — QUINAPRIL 40 MG/1
40 TABLET ORAL DAILY
Qty: 90 TABLET | Refills: 1 | Status: SHIPPED | OUTPATIENT
Start: 2019-04-03 | End: 2019-06-13

## 2019-04-03 RX ORDER — HYDROCODONE BITARTRATE AND ACETAMINOPHEN 10; 325 MG/1; MG/1
1 TABLET ORAL
Qty: 120 TABLET | Refills: 0 | Status: SHIPPED | OUTPATIENT
Start: 2019-04-03 | End: 2019-04-03 | Stop reason: SDUPTHER

## 2019-04-03 NOTE — TELEPHONE ENCOUNTER
Notify pt that since she doesn't take it all the time, we can only fill 7 days at a time. If she wants a higher quantity, she will need to see a specialist.

## 2019-04-03 NOTE — TELEPHONE ENCOUNTER
Spoke to pharmacist and informed of all previous medications tried and therapies tried.     Called pt and lm for pt to call back.

## 2019-04-03 NOTE — TELEPHONE ENCOUNTER
----- Message from Ash Benavides sent at 4/3/2019 12:05 PM CDT -----  Contact: Pharmacy Yet [phone]  Pharmacy calling regarding the Rx Norco, stating is needing additional information, if acute, chronic, last visit, and if there was any other options that took place prior to requesting the Rx?. Pharmacy Yet [phone]    Please call an advise  Thank you

## 2019-04-03 NOTE — TELEPHONE ENCOUNTER
Spoke to pt and informed of not able to fill for more than 7 days. She stated that she would like to go ahead with the specialist because she will need more than 21 tablets.

## 2019-04-03 NOTE — TELEPHONE ENCOUNTER
Would recommend rheum referral for fibromyalgia and ortho for knee pain.  Contract will be null and void

## 2019-04-03 NOTE — TELEPHONE ENCOUNTER
----- Message from Leda Ulloa sent at 4/3/2019  3:12 PM CDT -----  Contact: Brea Childs [phone]  She has some more question about pain assessment on patient prior to filling medication.

## 2019-04-03 NOTE — TELEPHONE ENCOUNTER
Pharmacy called stating that due to quantity prescribed they cannot fill it. It is being flagged by [facility]. She stated that because pt doesn't fill this monthly, they cannot fill it without starting pt back on the initial dose or only enough for 7 days. She is requesting Dr. Cunningham to refill for #90 for better chance of approval with out being flagged.

## 2019-04-05 NOTE — TELEPHONE ENCOUNTER
Spoke to pt who stated that she has already seen ortho and rheum and would prefer to have Dr. Cunningham continue to refill norco. Explained to pt we would need to see her back in clinic to document from acute to chronic pain and to figure out medication regimen for monthly dosing, and to sign another contract as the previous one has already been shredded. Booked apt.

## 2019-04-07 NOTE — PROGRESS NOTES
Subjective:       Patient ID: Violeta Champion is a 58 y.o. female.    Chief Complaint: Follow-up    Patient is a 58 y.o.female who presents today for chronic pain.    She feels generalized stiffness. She has chronic knee pain which is controlled with norco prn. This is likely contributing from arthritis and weight. She does see an ortho for injections.       Labs: reviewed. Diabetic marker improved from greater than 14 to 12.3. Cholesterol is not at goal despite taking statin.    Review of Systems   Constitutional: Negative for appetite change, chills, diaphoresis, fatigue and fever.   HENT: Negative for congestion, dental problem, ear discharge, ear pain, hearing loss, postnasal drip, sinus pressure and sore throat.    Eyes: Negative for discharge, redness and itching.   Respiratory: Negative for cough, chest tightness, shortness of breath and wheezing.    Cardiovascular: Negative for chest pain, palpitations and leg swelling.   Gastrointestinal: Negative for abdominal pain, constipation, diarrhea, nausea and vomiting.   Endocrine: Negative for cold intolerance and heat intolerance.   Genitourinary: Negative for difficulty urinating, frequency, hematuria and urgency.   Musculoskeletal: Negative for arthralgias, back pain, gait problem, myalgias and neck pain.   Skin: Negative for color change and rash.   Neurological: Negative for dizziness, syncope and headaches.   Hematological: Negative for adenopathy.   Psychiatric/Behavioral: Negative for behavioral problems and sleep disturbance. The patient is not nervous/anxious.        Objective:      Physical Exam   Constitutional: She is oriented to person, place, and time. She appears well-developed and well-nourished. No distress.   HENT:   Head: Normocephalic and atraumatic.   Right Ear: External ear normal.   Left Ear: External ear normal.   Nose: Nose normal.   Mouth/Throat: Oropharynx is clear and moist. No oropharyngeal exudate.   Eyes: Pupils are equal, round,  and reactive to light. Conjunctivae and EOM are normal. Right eye exhibits no discharge. Left eye exhibits no discharge. No scleral icterus.   Neck: Normal range of motion. Neck supple. No JVD present. No thyromegaly present.   Cardiovascular: Normal rate, regular rhythm, normal heart sounds and intact distal pulses. Exam reveals no gallop and no friction rub.   No murmur heard.  Pulmonary/Chest: Effort normal and breath sounds normal. No stridor. No respiratory distress. She has no wheezes. She has no rales. She exhibits no tenderness.   Abdominal: Soft. Bowel sounds are normal. She exhibits no distension. There is no tenderness. There is no rebound.   Musculoskeletal: Normal range of motion. She exhibits no edema or tenderness.   Lymphadenopathy:     She has no cervical adenopathy.   Neurological: She is alert and oriented to person, place, and time. No cranial nerve deficit.   Skin: Skin is warm and dry. No rash noted. She is not diaphoretic. No erythema.   Psychiatric: She has a normal mood and affect. Her behavior is normal.   Nursing note and vitals reviewed.      Assessment and Plan:       1. Osteoarthritis of both knees, unspecified osteoarthritis type  - Pt has signed pain contract. Discussed that medication should be taken as prescribed. Discussed potential side effects to this medication and pt must notify me if they start having any side effects. Discussed random drug screenings. Discussed appt needed every 3 months to continue this medication. Pt agreeable to plan.    · - Medication Management: The risks and benefits of medication were discussed with the patient    Pt has seen ortho, sports medicine and rheumatology in the past. Pain likely due to weight as well. She has tried synvisc injections in the past and per pt has tried PT. Not interested in surgery at this time as pain is not always flared.    2. Uncontrolled type 2 diabetes mellitus with hyperglycemia  - improving; continue meds; labs in three  months    3. Hyperlipidemia, unspecified hyperlipidemia type  - not compliant with med; will restart statin and add coq10 to stop the muscle pain    4. HTN, goal below 130/80  - stable on meds    5. Hypokalemia  - potassium chloride SA (KLOR-CON M20) 20 MEQ tablet; Take 1 tablet (20 mEq total) by mouth 2 (two) times daily.  Dispense: 180 tablet; Refill: 3          No follow-ups on file.

## 2019-04-16 ENCOUNTER — HOSPITAL ENCOUNTER (OUTPATIENT)
Dept: RADIOLOGY | Facility: HOSPITAL | Age: 59
Discharge: HOME | End: 2019-04-16
Attending: INTERNAL MEDICINE
Payer: COMMERCIAL

## 2019-04-16 DIAGNOSIS — Z12.31 VISIT FOR SCREENING MAMMOGRAM: ICD-10-CM

## 2019-04-16 PROCEDURE — 77067 SCR MAMMO BI INCL CAD: CPT | Mod: 26,,, | Performed by: RADIOLOGY

## 2019-04-16 PROCEDURE — 77067 MAMMO DIGITAL SCREENING BILAT WITH TOMOSYNTHESIS_CAD: ICD-10-PCS | Mod: 26,,, | Performed by: RADIOLOGY

## 2019-04-16 PROCEDURE — 77063 BREAST TOMOSYNTHESIS BI: CPT | Mod: 26,,, | Performed by: RADIOLOGY

## 2019-04-16 PROCEDURE — 77063 MAMMO DIGITAL SCREENING BILAT WITH TOMOSYNTHESIS_CAD: ICD-10-PCS | Mod: 26,,, | Performed by: RADIOLOGY

## 2019-04-16 PROCEDURE — 77067 SCR MAMMO BI INCL CAD: CPT | Mod: TC

## 2019-04-17 ENCOUNTER — OFFICE VISIT (OUTPATIENT)
Dept: INTERNAL MEDICINE | Facility: CLINIC | Age: 59
End: 2019-04-17
Payer: COMMERCIAL

## 2019-04-17 VITALS
TEMPERATURE: 98 F | DIASTOLIC BLOOD PRESSURE: 80 MMHG | HEIGHT: 64 IN | WEIGHT: 293 LBS | HEART RATE: 83 BPM | BODY MASS INDEX: 50.02 KG/M2 | SYSTOLIC BLOOD PRESSURE: 117 MMHG | RESPIRATION RATE: 16 BRPM

## 2019-04-17 DIAGNOSIS — E87.6 HYPOKALEMIA: ICD-10-CM

## 2019-04-17 DIAGNOSIS — E78.5 HYPERLIPIDEMIA, UNSPECIFIED HYPERLIPIDEMIA TYPE: ICD-10-CM

## 2019-04-17 DIAGNOSIS — M17.0 OSTEOARTHRITIS OF BOTH KNEES, UNSPECIFIED OSTEOARTHRITIS TYPE: Primary | ICD-10-CM

## 2019-04-17 DIAGNOSIS — I10 HTN, GOAL BELOW 130/80: ICD-10-CM

## 2019-04-17 DIAGNOSIS — E11.65 UNCONTROLLED TYPE 2 DIABETES MELLITUS WITH HYPERGLYCEMIA: ICD-10-CM

## 2019-04-17 PROCEDURE — 3074F PR MOST RECENT SYSTOLIC BLOOD PRESSURE < 130 MM HG: ICD-10-PCS | Mod: CPTII,S$GLB,, | Performed by: INTERNAL MEDICINE

## 2019-04-17 PROCEDURE — 99214 OFFICE O/P EST MOD 30 MIN: CPT | Mod: S$GLB,,, | Performed by: INTERNAL MEDICINE

## 2019-04-17 PROCEDURE — 3008F BODY MASS INDEX DOCD: CPT | Mod: CPTII,S$GLB,, | Performed by: INTERNAL MEDICINE

## 2019-04-17 PROCEDURE — 99999 PR PBB SHADOW E&M-EST. PATIENT-LVL III: CPT | Mod: PBBFAC,,, | Performed by: INTERNAL MEDICINE

## 2019-04-17 PROCEDURE — 99214 PR OFFICE/OUTPT VISIT, EST, LEVL IV, 30-39 MIN: ICD-10-PCS | Mod: S$GLB,,, | Performed by: INTERNAL MEDICINE

## 2019-04-17 PROCEDURE — 3008F PR BODY MASS INDEX (BMI) DOCUMENTED: ICD-10-PCS | Mod: CPTII,S$GLB,, | Performed by: INTERNAL MEDICINE

## 2019-04-17 PROCEDURE — 3046F HEMOGLOBIN A1C LEVEL >9.0%: CPT | Mod: CPTII,S$GLB,, | Performed by: INTERNAL MEDICINE

## 2019-04-17 PROCEDURE — 3079F PR MOST RECENT DIASTOLIC BLOOD PRESSURE 80-89 MM HG: ICD-10-PCS | Mod: CPTII,S$GLB,, | Performed by: INTERNAL MEDICINE

## 2019-04-17 PROCEDURE — 3074F SYST BP LT 130 MM HG: CPT | Mod: CPTII,S$GLB,, | Performed by: INTERNAL MEDICINE

## 2019-04-17 PROCEDURE — 3079F DIAST BP 80-89 MM HG: CPT | Mod: CPTII,S$GLB,, | Performed by: INTERNAL MEDICINE

## 2019-04-17 PROCEDURE — 3046F PR MOST RECENT HEMOGLOBIN A1C LEVEL > 9.0%: ICD-10-PCS | Mod: CPTII,S$GLB,, | Performed by: INTERNAL MEDICINE

## 2019-04-17 PROCEDURE — 99999 PR PBB SHADOW E&M-EST. PATIENT-LVL III: ICD-10-PCS | Mod: PBBFAC,,, | Performed by: INTERNAL MEDICINE

## 2019-04-17 RX ORDER — POTASSIUM CHLORIDE 20 MEQ/1
20 TABLET, EXTENDED RELEASE ORAL 2 TIMES DAILY
Qty: 180 TABLET | Refills: 3 | Status: SHIPPED | OUTPATIENT
Start: 2019-04-17 | End: 2020-06-02

## 2019-04-17 RX ORDER — HYDROCODONE BITARTRATE AND ACETAMINOPHEN 10; 325 MG/1; MG/1
1 TABLET ORAL
Qty: 90 TABLET | Refills: 0 | Status: SHIPPED | OUTPATIENT
Start: 2019-04-17 | End: 2019-05-06 | Stop reason: SDUPTHER

## 2019-04-18 DIAGNOSIS — E11.65 UNCONTROLLED TYPE 2 DIABETES MELLITUS WITH HYPERGLYCEMIA: ICD-10-CM

## 2019-04-18 RX ORDER — GLIPIZIDE 10 MG/1
10 TABLET ORAL
Qty: 180 TABLET | Refills: 1 | Status: SHIPPED | OUTPATIENT
Start: 2019-04-18 | End: 2019-10-21 | Stop reason: SDUPTHER

## 2019-05-06 ENCOUNTER — TELEPHONE (OUTPATIENT)
Dept: INTERNAL MEDICINE | Facility: CLINIC | Age: 59
End: 2019-05-06

## 2019-05-06 RX ORDER — HYDROCODONE BITARTRATE AND ACETAMINOPHEN 10; 325 MG/1; MG/1
1 TABLET ORAL
Qty: 90 TABLET | Refills: 0 | Status: SHIPPED | OUTPATIENT
Start: 2019-05-06 | End: 2019-05-09 | Stop reason: SDUPTHER

## 2019-05-06 NOTE — TELEPHONE ENCOUNTER
----- Message from Azeem Holden sent at 5/6/2019 11:21 AM CDT -----  Contact: self/ [phone]  Patient is calling for an RX refill or new RX.  Is this a refill or new RX: Refill   RX name and strength:HYDROcodone-acetaminophen (NORCO)  mg per tablet 90 tablet    Directions (copy/paste from chart):    Is this a 30 day or 90 day RX:    Local pharmacy or mail order pharmacy:        [facility] [address] [place], [place] [address]KAREN CASAS [address] [phone] (Phone)  [phone] (Fax)

## 2019-05-09 ENCOUNTER — TELEPHONE (OUTPATIENT)
Dept: INTERNAL MEDICINE | Facility: CLINIC | Age: 59
End: 2019-05-09

## 2019-05-09 RX ORDER — HYDROCODONE BITARTRATE AND ACETAMINOPHEN 10; 325 MG/1; MG/1
1 TABLET ORAL
Qty: 90 TABLET | Refills: 0 | Status: SHIPPED | OUTPATIENT
Start: 2019-05-09 | End: 2019-06-13 | Stop reason: SDUPTHER

## 2019-05-09 NOTE — TELEPHONE ENCOUNTER
----- Message from Hayley Guillory sent at 5/9/2019 11:15 AM CDT -----  Contact: self  Patient is calling for an RX refill or new RX.  Is this a refill or new RX:  refill  RX name and strength: HYDROcodone-acetaminophen (NORCO)  mg per tablet  Directions (copy/paste from chart):  Take 1 tablet by mouth every 6 to 8 hours as needed for Pain  Is this a 30 day or 90 day RX:  90  Local pharmacy or mail order pharmacy:  local  Pharmacy name and phone # (copy/paste from chart):   [facility] [address] [place] [address] [phone] (Phone)  [phone] (Fax  Comments:

## 2019-06-04 ENCOUNTER — PATIENT OUTREACH (OUTPATIENT)
Dept: ADMINISTRATIVE | Facility: HOSPITAL | Age: 59
End: 2019-06-04

## 2019-06-13 ENCOUNTER — TELEPHONE (OUTPATIENT)
Dept: INTERNAL MEDICINE | Facility: CLINIC | Age: 59
End: 2019-06-13

## 2019-06-13 RX ORDER — HYDROCODONE BITARTRATE AND ACETAMINOPHEN 10; 325 MG/1; MG/1
1 TABLET ORAL
Qty: 90 TABLET | Refills: 0 | Status: SHIPPED | OUTPATIENT
Start: 2019-06-13 | End: 2019-07-09 | Stop reason: SDUPTHER

## 2019-06-13 RX ORDER — TELMISARTAN 40 MG/1
40 TABLET ORAL DAILY
Qty: 90 TABLET | Refills: 3 | Status: SHIPPED | OUTPATIENT
Start: 2019-06-13 | End: 2019-06-27

## 2019-06-13 NOTE — TELEPHONE ENCOUNTER
----- Message from Mary Choi sent at 6/13/2019 11:10 AM CDT -----  Contact: self/[phone]  Patient is calling for an RX refill or new RX.  Is this a refill or new RX:  refill  RX name and strength: HYDROcodone-acetaminophen (NORCO)  mg per tablet  Directions (copy/paste from chart):    Is this a 30 day or 90 day RX:    Local pharmacy or mail order pharmacy:  Local pharmacy  Pharmacy name and phone # (copy/paste from chart):  [facility] [address] [facility] [phone] (Phone)[phone] (Fax)   Comments:      Patient is calling for an RX refill or new RX.  Is this a refill or new RX: refill   RX name and strength: quinapril (ACCUPRIL) 40 MG tablet  Directions (copy/paste from chart):    Is this a 30 day or 90 day RX:    Local pharmacy or mail order pharmacy: [facility]    Pharmacy name and phone # (copy/paste from chart):    [facility] [address] [facility] [phone] (Phone)[phone] (Fax)   Comments:

## 2019-06-13 NOTE — TELEPHONE ENCOUNTER
Spoke to pt and informed of recall, pt agreeable to switch but requesting not to have losartan due to family hx of reactions to this medication.

## 2019-06-13 NOTE — TELEPHONE ENCOUNTER
----- Message from Ash Benavides sent at 6/13/2019  4:18 PM CDT -----  Contact: Pharmacy [facility] [phone]  Pharmacy is calling to clarify an RX.  RX name:  telmisartan (MICARDIS) 40 MG Tab         And      Quinatril 40 MG  What do they need to clarify: Pharmacy stating both Rx's are the same wants to make sure the office is aware and if would like to have the most recent Rx filled for patient?   Pharmacy [facility] [phone]    Please call an advise  Thank you

## 2019-06-13 NOTE — TELEPHONE ENCOUNTER
checked ; no suspicious activity; med filled    For accupril:    Notify pt that the brand of medication ( Ace inhibitor) they are taking has been linked to lung cancer in recent studies. We would like to change the BP medication to something else. If in agreement, we can send the new one in and have the patient contact the office with BP readings on the new med in one week to confirm it is effective in controlling their BP

## 2019-06-21 NOTE — PROGRESS NOTES
Subjective:       Patient ID: Violeta Champion is a 58 y.o. female.    Chief Complaint: Follow-up (3 month)    Patient is a 58 y.o.female who presents today for follow up    Chronic knee pain:  Pt has seen ortho, sports medicine and rheumatology in the past. Pain likely due to weight as well. She has tried synvisc injections in the past and per pt has tried PT. Not interested in surgery at this time as pain is not always flared. norco controls her pain    Labs: due now  Gyn hysterectomy  Diet: trying intermittent fasting  Mammogram april 2019  Colon due April 2021\  Diabetic eye exam: November; [facility] best.    DM2: pt taking januvia, metformin and glipizide. She stopped trulicity for two weeks. She started intermittent fasting and keto diet.   Review of Systems   Constitutional: Negative for appetite change, chills, diaphoresis and fever.   HENT: Negative for congestion, ear discharge, ear pain, postnasal drip, tinnitus, trouble swallowing and voice change.    Eyes: Negative for discharge, redness and itching.   Respiratory: Negative for cough, chest tightness, shortness of breath and wheezing.    Cardiovascular: Negative for chest pain, palpitations and leg swelling.   Gastrointestinal: Negative for abdominal pain, constipation, diarrhea, nausea and vomiting.   Endocrine: Negative for cold intolerance and heat intolerance.   Genitourinary: Negative for difficulty urinating, flank pain, hematuria and urgency.   Musculoskeletal: Negative for arthralgias, gait problem, myalgias and neck stiffness.   Skin: Negative for color change and rash.   Neurological: Negative for dizziness, seizures, syncope and headaches.   Hematological: Negative for adenopathy.   Psychiatric/Behavioral: Negative for agitation, behavioral problems, confusion and sleep disturbance.       Objective:      Physical Exam   Constitutional: She is oriented to person, place, and time. She appears well-developed and well-nourished. No distress.    HENT:   Head: Normocephalic and atraumatic.   Right Ear: External ear normal.   Left Ear: External ear normal.   Nose: Nose normal.   Mouth/Throat: Oropharynx is clear and moist. No oropharyngeal exudate.   Eyes: Pupils are equal, round, and reactive to light. Conjunctivae and EOM are normal. Right eye exhibits no discharge. Left eye exhibits no discharge. No scleral icterus.   Neck: Neck supple. No JVD present. No tracheal deviation present. No thyromegaly present.   Cardiovascular: Normal rate, normal heart sounds and intact distal pulses. Exam reveals no gallop and no friction rub.   No murmur heard.  Pulmonary/Chest: Effort normal and breath sounds normal. No stridor. No respiratory distress. She has no wheezes. She has no rales. She exhibits no tenderness.   Abdominal: Soft. Bowel sounds are normal. She exhibits no distension. There is no tenderness. There is no rebound.   Musculoskeletal: She exhibits no edema or tenderness.   Lymphadenopathy:     She has no cervical adenopathy.   Neurological: She is alert and oriented to person, place, and time.   Skin: Skin is warm and dry. No rash noted. She is not diaphoretic. No erythema.   Psychiatric: She has a normal mood and affect. Her behavior is normal.   Nursing note and vitals reviewed.      Assessment and Plan:       1. Osteoarthritis of both knees, unspecified osteoarthritis type  - stable on norco  - Hemoglobin A1c; Future  - Comprehensive metabolic panel; Future  - Lipid panel; Future  - Pain Clinic Drug Screen  - naloxone (NARCAN) 4 mg/actuation Spry; 1 spray (4 mg total) by Nasal route once. for 1 dose  Dispense: 1 each; Refill: 0  Pt has signed pain contract. Discussed that medication should be taken as prescribed. Discussed potential side effects to this medication and pt must notify me if they start having any side effects. Discussed random drug screenings. Discussed appt needed every 3 months to continue this medication. Pt agreeable to plan.    2.  Uncontrolled type 2 diabetes mellitus with hyperglycemia  - stopped trulicity; following strict diet; due for labs  - Hemoglobin A1c; Future  - Comprehensive metabolic panel; Future  - Lipid panel; Future    3. Morbid obesity with BMI of 60.0-69.9, adult  - Patient educated on importance of diet and exercise.  Recommended 30-45 minutes of exercise five days a week.  In addition, counseled patient on importance of low fat diet.  Limit carbohydrate intake.  Increase protein intake and vegetables.   - Hemoglobin A1c; Future  - Comprehensive metabolic panel; Future  - Lipid panel; Future    4. Hypothyroidism, unspecified type    - Hemoglobin A1c; Future  - Comprehensive metabolic panel; Future  - Lipid panel; Future    5. Hyperlipidemia, unspecified hyperlipidemia type  - on statin  - Hemoglobin A1c; Future  - Comprehensive metabolic panel; Future  - Lipid panel; Future    6. HTN, goal below 130/80  - didn't want to take arb; brother had angioedema with it  - on amlodipine and metoprolol; bp has been normal at home; will monitor  - Hemoglobin A1c; Future  - Comprehensive metabolic panel; Future  - Lipid panel; Future          No follow-ups on file.

## 2019-06-22 NOTE — PROGRESS NOTES
Subjective:       Patient ID: Violeta Champion is a 58 y.o. female sent by Dr. Petersen for a consultation on myalgia/arthralgia.    Chief Complaint: No chief complaint on file.    HPI   58 yr old lady a number of morbidities including DM, hypothyroidism & morbid obesity who had been seen twice in the remote past by Dr. Haider Caicedo. Patient presented to her with widespread pain, tenderness & swelling in R 2&3rd MCPs only on exam. Serologies for RA were negative, but she did have elevated ESR, CRP, plts and low albumin and she was begun on HCQ. Has a discolored area left back that began age 15 was dx as scleroderma by a dermatologist after bx. She saw me initially once on 7/14/16 at which time an inflammatory polyarthritis was entertained due to a hx of synovitis, AM stiffness and inflammatory parameters of inflammation.      She never did return for follow-up.    She returns today as the request of Dr. Petersen for pain all over--arthralgia/myalgia. States that nothing helps. Is on etodolac and Norco and they don't help much. She thinks she has a form of RA. She thinks maybe she has PsA.  Her main pain today begins in the left elbow where is has hyperpigmented skin and radiates up to her left shoulder. However, she gets episodic pain in different areas at different times.  She states that when she is give steroids for respiratory issues she feels great all over, helps her pain & stiffness but she is a bad diabetic and cannot take them. She denies joint swelling. Currently does not have peripheral arthralgia. Has low back pain on & off.   She is currently stiff all the time. Stiffness does not wear off as day progresses. No swollen joints. Muscles hurt especially legs. Has some hair thinining.  Has dry eyes attributed to allergies. Denies Raynaud's, dysphagia, tight skin, oral ulcers, pleurisy, pericarditis, photosensitivity, miscarriages (0/0), thromboses. Had a maternal aunt with RA.    She has taken neurontin wo  effect. NSAIDs help. . Owen Kr[employer] oil has helped with movement. She is currently on Norco and etodolac and they don't help much.            Current Outpatient Medications   Medication Sig Dispense Refill    ADVAIR DISKUS 250-50 mcg/dose diskus inhaler INHALE ONE PUFF BY MOUTH TWICE DAILY 60 each 3    amLODIPine (NORVASC) 5 MG tablet Take 1 tablet (5 mg total) by mouth once daily. 90 tablet 3    aspirin 81 mg Tab Take by mouth. 1 Tablet Oral Every day      blood sugar diagnostic (FREESTYLE INSULINX TEST STRIPS) Strp Check glucose three times daily 100 each 3    buPROPion (WELLBUTRIN XL) 150 MG TB24 tablet Take 1 tablet (150 mg total) by mouth once daily. 30 tablet 11    calcium carbonate (CALCIUM CARBONATE) 300 mg (750 mg) Chew Take by mouth 2 (two) times daily.        dulaglutide (TRULICITY) 1.5 mg/0.5 mL PnIj Inject 1.5 mg into the skin every 7 days. 2 mL 3    fish oil-dha-epa 1,200-144-216 mg Cap Take by mouth. 1 Capsule Oral Every day      flurazepam (DALMANE) 30 MG capsule Take 30 mg by mouth nightly as needed.        furosemide (LASIX) 20 MG tablet Take 1 tablet (20 mg total) by mouth once daily. 30 tablet 11    glipiZIDE (GLUCOTROL) 10 MG tablet Take 1 tablet (10 mg total) by mouth 2 (two) times daily before meals. 180 tablet 1    HYDROcodone-acetaminophen (NORCO)  mg per tablet Take 1 tablet by mouth every 6 to 8 hours as needed for Pain. 90 tablet 0    hyoscyamine (LEVSIN/SL) 0.125 mg Subl DISSOLVE ONE TABLET UNDER THE TONGUE EVERY 4 TO 6 HOURS 30 tablet 0    lactulose (CHRONULAC) 10 gram/15 mL solution TAKE  15 ML BY MOUTH ONCE DAILY AS NEEDED FOR  CONSTIPATION 236 mL 1    lactulose (CHRONULAC) 10 gram/15 mL solution TAKE 15 ML BY MOUTH ONCE DAILY AS NEEDED FOR CONSTIPATION 473 mL 3    lancets Misc 1 Units by Misc.(Non-Drug; Combo Route) route 2 (two) times daily. 100 each 6    lancets Misc Check glucose three times daily 100 each 3    levocetirizine (XYZAL) 5 MG tablet Take 1 tablet  (5 mg total) by mouth every evening. 30 tablet 11    levothyroxine (SYNTHROID) 100 MCG tablet TAKE ONE TABLET BY MOUTH ONCE DAILY 90 tablet 1    LORazepam (ATIVAN) 0.5 MG tablet Take 1 tablet (0.5 mg total) by mouth 2 (two) times daily. 60 tablet 0    meclizine (ANTIVERT) 25 mg tablet Take 1 tablet (25 mg total) by mouth 3 (three) times daily as needed. 30 tablet 0    metFORMIN (GLUCOPHAGE) 1000 MG tablet Take 1 tablet (1,000 mg total) by mouth 2 (two) times daily with meals. 180 tablet 1    metoprolol tartrate (LOPRESSOR) 100 MG tablet Take 1 tablet (100 mg total) by mouth 2 (two) times daily. 180 tablet 3    mv-min-FA-Ag-Ff-bnpjkwx-lutein (CENTRUM) 0.4-162-18 mg Tab Take by mouth. 1 Tablet Oral Every day      omeprazole (PRILOSEC) 20 MG capsule Take 1 capsule (20 mg total) by mouth 2 (two) times daily. 60 capsule 2    ondansetron (ZOFRAN) 4 MG tablet TAKE ONE TABLET BY MOUTH EVERY 8 HOURS AS NEEDED 30 tablet 1    oxybutynin (DITROPAN-XL) 5 MG TR24 Take 1 tablet (5 mg total) by mouth once daily. 30 tablet 11    potassium chloride SA (KLOR-CON M20) 20 MEQ tablet Take 1 tablet (20 mEq total) by mouth 2 (two) times daily. 180 tablet 3    pravastatin (PRAVACHOL) 40 MG tablet TAKE ONE TABLET BY MOUTH ONCE DAILY AT  NIGHT 90 tablet 3    SITagliptin (JANUVIA) 100 MG Tab Take 1 tablet (100 mg total) by mouth once daily. 90 tablet 3    sucralfate (CARAFATE) 1 gram tablet TAKE ONE TABLET BY MOUTH THREE TIMES DAILY 90 tablet 6    telmisartan (MICARDIS) 40 MG Tab Take 1 tablet (40 mg total) by mouth once daily. 90 tablet 3    temazepam (RESTORIL) 30 mg capsule Take 1 capsule (30 mg total) by mouth nightly as needed. 1 Capsule Oral Every day 30 capsule 0    VENTOLIN HFA 90 mcg/actuation inhaler INHALE ONE TO TWO PUFFS INTO LUNGS EVERY 6 HOURS AS NEEDED FOR WHEEZING 18 each 11    VOLTAREN 1 % Gel APPLY TWO GRAMS TOPICALLY ONCE DAILY 100 g 0     No current facility-administered medications for this visit.         Review of patient's allergies indicates:   Allergen Reactions    Iodinated contrast- oral and iv dye Swelling     Other reaction(s): Swelling    Naproxen sodium Swelling    Naprosyn  [naproxen]      Other reaction(s): Swelling       Past Medical History:   Diagnosis Date    GERD (gastroesophageal reflux disease)     High cholesterol     Hypertension     Hypothyroid     Injury of knee, leg, ankle and foot     Insulin-resistant diabetes mellitus and acanthosis nigricans     Menopause present     Osteoarthritis     Osteopenia     Osteopenia     Sleep apnea        Past Surgical History:   Procedure Laterality Date    bilateral duct removal breast      BREAST CYST EXCISION Bilateral     papilloma    HYSTERECTOMY      OOPHORECTOMY      OTHER SURGICAL HISTORY      bilateral central duct removal with bilateral papilomona         Review of Systems   Constitutional: Positive for fatigue. Negative for diaphoresis and fever.   HENT: Negative.  Negative for mouth sores, sore throat, tinnitus and trouble swallowing.    Eyes: Negative.  Negative for visual disturbance.   Respiratory: Negative.  Negative for cough, choking, chest tightness and shortness of breath.    Cardiovascular: Negative for chest pain, palpitations and leg swelling.   Gastrointestinal: Positive for constipation and nausea. Negative for abdominal distention, abdominal pain, blood in stool, diarrhea and vomiting.        Has GERD   Endocrine: Positive for heat intolerance.   Genitourinary: Positive for frequency (nocturia x 2-3) and menstrual problem (hysterectomy & BSO for fibroids 2005). Negative for hematuria.   Musculoskeletal: Positive for arthralgias, back pain, myalgias, neck pain and neck stiffness. Negative for joint swelling.   Skin: Negative.  Negative for rash.   Neurological: Positive for numbness (left foot now). Negative for dizziness, syncope, weakness, light-headedness and headaches.   Hematological: Negative.  Negative  for adenopathy. Does not bruise/bleed easily.   Psychiatric/Behavioral: Positive for dysphoric mood (has taken elavil in past; celexa & cymbalta in past didn't help; wellbutrin helped in past.) and sleep disturbance (has sleep apnea). The patient is nervous/anxious (occasionally.).         Cymbalta did not help.       Family History   Problem Relation Age of Onset    Hypertension Mother     Glaucoma Mother     Diabetes Mother     Hypertension Brother     Cancer Maternal Grandmother         breast    Amblyopia Father     Diabetes Maternal Aunt     Diabetes Maternal Uncle     Breast cancer Paternal Aunt     Breast cancer Paternal Grandmother     Blindness Neg Hx     Cataracts Neg Hx     Macular degeneration Neg Hx     Retinal detachment Neg Hx     Strabismus Neg Hx     Colon cancer Neg Hx     Stomach cancer Neg Hx     Esophageal cancer Neg Hx     Irritable bowel syndrome Neg Hx     Rectal cancer Neg Hx     Ulcerative colitis Neg Hx     Crohn's disease Neg Hx     Celiac disease Neg Hx    Mom: 76 yo  allergies;  Dad: 80 yo with leg pain  2 S; 1 S has skin problems & stomach problems; 1 S with scoliosis  1 B: age 59 bad heart  Has 1 maternal cousin with thyroid disease.  No CTDs  1 maternal uncle & aunt with Sarcoid.       Social History     Tobacco Use    Smoking status: Never Smoker    Smokeless tobacco: Never Used   Substance Use Topics    Alcohol use: Yes     Alcohol/week: 0.0 oz     Comment: very rarely    Drug use: No   Is an RN at [employer].    Objective:       LMP  (LMP Unknown)     Physical Exam   Vitals reviewed.  Constitutional: She is oriented to person, place, and time and well-developed, well-nourished, and in no distress. No distress.   HENT:   Head: Normocephalic and atraumatic.   Mouth/Throat: Oropharynx is clear and moist. No oropharyngeal exudate.   No facial rashes  Parotids not enlarged  No oral ulcers   Eyes: Conjunctivae and EOM are normal. Pupils are  equal, round, and reactive to light. Right eye exhibits no discharge. Left eye exhibits no discharge. No scleral icterus.   Neck: Neck supple. No JVD present. No tracheal deviation present. No thyromegaly present.   Cardiovascular: Normal rate, regular rhythm, normal heart sounds and intact distal pulses.  Exam reveals no gallop and no friction rub.    No murmur heard.  Pulmonary/Chest: Effort normal and breath sounds normal. No respiratory distress. She has no wheezes. She has no rales. She exhibits no tenderness.   Abdominal: Soft. Bowel sounds are normal. She exhibits no distension and no mass. There is no splenomegaly or hepatomegaly. There is no tenderness. There is no rebound and no guarding.   Lymphadenopathy:     She has no cervical adenopathy.        Right: No inguinal adenopathy present.        Left: No inguinal adenopathy present.   Neurological: She is alert and oriented to person, place, and time. She has normal reflexes. No cranial nerve deficit. Gait normal.   Proximal and distal muscle strength 5/5.   Skin: Skin is warm and dry. Rash noted. She is not diaphoretic.     (See photo)  Hyperpigmented, mildly sclerosed horizontal lesion left back.    Psychiatric: Mood, memory, affect and judgment normal.   Musculoskeletal: Normal range of motion. She exhibits edema (trace). She exhibits no tenderness.   Cspine FROM no tenderness  Tspine FROM no tenderness  Lspine FROM no tenderness.  TMJ: unremarkable  Shoulders: FROM; no synovitis;  Elbows: FROM; no synovitis; no tophi or nodules  Wrists: FROM; no synovitis;    MCPs: FROM; no synovitis; no metacarpalgia;  ok;  PIPs:FROM; no synovitis;   DIPs: FROM; no synovitis;   HIPS: FROM  Knees: FROM; no synovitis; no instability;  Ankles: FROM: no synovitis   Toes: ok; no metatarsalgia                           4/16/19: CMP Na 134; glu 357; alb 3.4; Hg A1C 12.8  7/13/16: ESR 47; CRP 14.9; UA 5.0; CCP neg  7/11/16: CBC plt 380; Alb. 3.1; ; Vit D 27; HgbA1C  7.1; TSH ok; UA tr pr; KO/SSA/RF/CCP    Imagin16: Arthritis survey: personally viewed: Cspine: minimal hypertrophic spurring: hands & feet ok; knees: bilateral mjsn R>L w varus deformity  7/10/15: MRI RLE: personally reviewed: prominence of the posteromedial talus extending to the subtalar joint with associated edema signal.  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  Further evaluation with CT examination may be helpful. Os trigonum with associated edema.   13: DXA: Only forearm done: T=1.1;  13: MRI RLE (knee): personally reviewed: complex tear involving the anterior horn and body segments of the medial meniscus; cartilage loss > medial tibiofemoral compartment with associated subchondral cystic changes.  13: Bilateral knees: personally reviewed: Tibiofemoral joint space narrowing is observed bilaterally, preferentially involving the medial compartments and essentially symmetric on the two sides.  Some spurring involving the tibial spines is observed bilaterally.  Osseous structures appear intact, with no definite evidence of recent fracture, lytic destructive process, or other significant abnormality noted.  Assessment:   Joint pain multiple sites c/w CSS   R/O inflammatory arthritis    Hx of synovitis of 2& 3rd MCPs    AM stiffness    Inflammatory parameters of inflammation    Morphea?/cutaneous scleroderma low back from childhood?   OA of knees   Probable large contribution from fibromyalgia    Fibromyalgia associated with non restorative sleep   Not responsive to duloxetine    Vitamin D insufficiency    DM II    Depressive disorder   Not responsive to celexa & duloxetine    Some response to wellbutrin    Morbid obesity with BMI of 55.41        Plan:   At patient's request we will w/u again for inflammatory polyarthritis.  This was discussed.  Labs and imaging ordered.  Offered to [facility] to [facility] but she declined.  Amb referral to  dermatology for skin lesions  She is of the opinion she needs RA type rx.   RTC 3 months or prn.        CC: Madie Petersen DO

## 2019-06-26 DIAGNOSIS — R60.9 EDEMA, UNSPECIFIED TYPE: ICD-10-CM

## 2019-06-26 RX ORDER — FUROSEMIDE 40 MG/1
TABLET ORAL
Qty: 60 TABLET | Refills: 11 | Status: SHIPPED | OUTPATIENT
Start: 2019-06-26 | End: 2020-09-04 | Stop reason: SDUPTHER

## 2019-06-27 ENCOUNTER — OFFICE VISIT (OUTPATIENT)
Dept: INTERNAL MEDICINE | Facility: CLINIC | Age: 59
End: 2019-06-27
Payer: COMMERCIAL

## 2019-06-27 ENCOUNTER — PATIENT OUTREACH (OUTPATIENT)
Dept: ADMINISTRATIVE | Facility: HOSPITAL | Age: 59
End: 2019-06-27

## 2019-06-27 ENCOUNTER — LAB VISIT (OUTPATIENT)
Dept: LAB | Facility: HOSPITAL | Age: 59
End: 2019-06-27
Attending: INTERNAL MEDICINE
Payer: COMMERCIAL

## 2019-06-27 ENCOUNTER — INITIAL CONSULT (OUTPATIENT)
Dept: RHEUMATOLOGY | Facility: CLINIC | Age: 59
End: 2019-06-27
Payer: COMMERCIAL

## 2019-06-27 VITALS
WEIGHT: 293 LBS | RESPIRATION RATE: 18 BRPM | BODY MASS INDEX: 50.02 KG/M2 | HEIGHT: 64 IN | HEART RATE: 67 BPM | SYSTOLIC BLOOD PRESSURE: 120 MMHG | DIASTOLIC BLOOD PRESSURE: 72 MMHG | OXYGEN SATURATION: 97 % | TEMPERATURE: 98 F

## 2019-06-27 VITALS
HEART RATE: 83 BPM | HEIGHT: 64 IN | SYSTOLIC BLOOD PRESSURE: 140 MMHG | DIASTOLIC BLOOD PRESSURE: 79 MMHG | WEIGHT: 293 LBS | BODY MASS INDEX: 50.02 KG/M2

## 2019-06-27 DIAGNOSIS — M79.10 MYALGIA: ICD-10-CM

## 2019-06-27 DIAGNOSIS — R53.82 CHRONIC FATIGUE: ICD-10-CM

## 2019-06-27 DIAGNOSIS — E03.9 HYPOTHYROIDISM, UNSPECIFIED TYPE: ICD-10-CM

## 2019-06-27 DIAGNOSIS — E66.01 MORBID OBESITY WITH BMI OF 60.0-69.9, ADULT: ICD-10-CM

## 2019-06-27 DIAGNOSIS — M17.0 OSTEOARTHRITIS OF BOTH KNEES, UNSPECIFIED OSTEOARTHRITIS TYPE: Primary | ICD-10-CM

## 2019-06-27 DIAGNOSIS — E55.9 VITAMIN D INSUFFICIENCY: ICD-10-CM

## 2019-06-27 DIAGNOSIS — I10 HTN, GOAL BELOW 130/80: ICD-10-CM

## 2019-06-27 DIAGNOSIS — E78.5 HYPERLIPIDEMIA, UNSPECIFIED HYPERLIPIDEMIA TYPE: ICD-10-CM

## 2019-06-27 DIAGNOSIS — L30.9 DERMATITIS: ICD-10-CM

## 2019-06-27 DIAGNOSIS — M25.50 PAIN IN JOINT INVOLVING MULTIPLE SITES: ICD-10-CM

## 2019-06-27 DIAGNOSIS — E11.65 UNCONTROLLED TYPE 2 DIABETES MELLITUS WITH HYPERGLYCEMIA: ICD-10-CM

## 2019-06-27 DIAGNOSIS — E66.01 MORBID OBESITY WITH BMI OF 50.0-59.9, ADULT: ICD-10-CM

## 2019-06-27 DIAGNOSIS — M25.50 PAIN IN JOINT INVOLVING MULTIPLE SITES: Primary | ICD-10-CM

## 2019-06-27 LAB
25(OH)D3+25(OH)D2 SERPL-MCNC: 54 NG/ML (ref 30–96)
ALBUMIN SERPL BCP-MCNC: 3.5 G/DL (ref 3.5–5.2)
ALP SERPL-CCNC: 103 U/L (ref 55–135)
ALT SERPL W/O P-5'-P-CCNC: 12 U/L (ref 10–44)
ANION GAP SERPL CALC-SCNC: 12 MMOL/L (ref 8–16)
AST SERPL-CCNC: 17 U/L (ref 10–40)
BASOPHILS # BLD AUTO: 0.08 K/UL (ref 0–0.2)
BASOPHILS NFR BLD: 0.8 % (ref 0–1.9)
BILIRUB SERPL-MCNC: 0.2 MG/DL (ref 0.1–1)
BUN SERPL-MCNC: 20 MG/DL (ref 6–20)
CALCIUM SERPL-MCNC: 10 MG/DL (ref 8.7–10.5)
CCP AB SER IA-ACNC: <0.5 U/ML
CHLORIDE SERPL-SCNC: 101 MMOL/L (ref 95–110)
CK SERPL-CCNC: 52 U/L (ref 20–180)
CO2 SERPL-SCNC: 25 MMOL/L (ref 23–29)
CREAT SERPL-MCNC: 0.8 MG/DL (ref 0.5–1.4)
CRP SERPL-MCNC: 11.6 MG/L (ref 0–8.2)
DIFFERENTIAL METHOD: ABNORMAL
EOSINOPHIL # BLD AUTO: 0.8 K/UL (ref 0–0.5)
EOSINOPHIL NFR BLD: 7.7 % (ref 0–8)
ERYTHROCYTE [DISTWIDTH] IN BLOOD BY AUTOMATED COUNT: 13.2 % (ref 11.5–14.5)
ERYTHROCYTE [SEDIMENTATION RATE] IN BLOOD BY WESTERGREN METHOD: 90 MM/HR (ref 0–36)
EST. GFR  (AFRICAN AMERICAN): >60 ML/MIN/1.73 M^2
EST. GFR  (NON AFRICAN AMERICAN): >60 ML/MIN/1.73 M^2
GLUCOSE SERPL-MCNC: 272 MG/DL (ref 70–110)
HCT VFR BLD AUTO: 41.7 % (ref 37–48.5)
HGB BLD-MCNC: 13.5 G/DL (ref 12–16)
IMM GRANULOCYTES # BLD AUTO: 0.02 K/UL (ref 0–0.04)
IMM GRANULOCYTES NFR BLD AUTO: 0.2 % (ref 0–0.5)
LYMPHOCYTES # BLD AUTO: 4.2 K/UL (ref 1–4.8)
LYMPHOCYTES NFR BLD: 42.3 % (ref 18–48)
MCH RBC QN AUTO: 28.2 PG (ref 27–31)
MCHC RBC AUTO-ENTMCNC: 32.4 G/DL (ref 32–36)
MCV RBC AUTO: 87 FL (ref 82–98)
MONOCYTES # BLD AUTO: 0.6 K/UL (ref 0.3–1)
MONOCYTES NFR BLD: 5.9 % (ref 4–15)
NEUTROPHILS # BLD AUTO: 4.3 K/UL (ref 1.8–7.7)
NEUTROPHILS NFR BLD: 43.1 % (ref 38–73)
NRBC BLD-RTO: 0 /100 WBC
PLATELET # BLD AUTO: 440 K/UL (ref 150–350)
PMV BLD AUTO: 10.5 FL (ref 9.2–12.9)
POTASSIUM SERPL-SCNC: 4.5 MMOL/L (ref 3.5–5.1)
PROT SERPL-MCNC: 8 G/DL (ref 6–8.4)
RBC # BLD AUTO: 4.78 M/UL (ref 4–5.4)
RHEUMATOID FACT SERPL-ACNC: <10 IU/ML (ref 0–15)
SODIUM SERPL-SCNC: 138 MMOL/L (ref 136–145)
T4 FREE SERPL-MCNC: 1.07 NG/DL (ref 0.71–1.51)
TSH SERPL DL<=0.005 MIU/L-ACNC: 2.26 UIU/ML (ref 0.4–4)
WBC # BLD AUTO: 10.02 K/UL (ref 3.9–12.7)

## 2019-06-27 PROCEDURE — 86235 NUCLEAR ANTIGEN ANTIBODY: CPT

## 2019-06-27 PROCEDURE — 3074F SYST BP LT 130 MM HG: CPT | Mod: CPTII,S$GLB,, | Performed by: INTERNAL MEDICINE

## 2019-06-27 PROCEDURE — 99214 OFFICE O/P EST MOD 30 MIN: CPT | Mod: S$GLB,,, | Performed by: INTERNAL MEDICINE

## 2019-06-27 PROCEDURE — 99999 PR PBB SHADOW E&M-EST. PATIENT-LVL III: ICD-10-PCS | Mod: PBBFAC,,, | Performed by: INTERNAL MEDICINE

## 2019-06-27 PROCEDURE — 86200 CCP ANTIBODY: CPT

## 2019-06-27 PROCEDURE — 3078F DIAST BP <80 MM HG: CPT | Mod: CPTII,S$GLB,, | Performed by: INTERNAL MEDICINE

## 2019-06-27 PROCEDURE — 3078F PR MOST RECENT DIASTOLIC BLOOD PRESSURE < 80 MM HG: ICD-10-PCS | Mod: CPTII,S$GLB,, | Performed by: INTERNAL MEDICINE

## 2019-06-27 PROCEDURE — 84439 ASSAY OF FREE THYROXINE: CPT

## 2019-06-27 PROCEDURE — 82085 ASSAY OF ALDOLASE: CPT

## 2019-06-27 PROCEDURE — 86038 ANTINUCLEAR ANTIBODIES: CPT

## 2019-06-27 PROCEDURE — 3008F BODY MASS INDEX DOCD: CPT | Mod: CPTII,S$GLB,, | Performed by: INTERNAL MEDICINE

## 2019-06-27 PROCEDURE — 3046F PR MOST RECENT HEMOGLOBIN A1C LEVEL > 9.0%: ICD-10-PCS | Mod: CPTII,S$GLB,, | Performed by: INTERNAL MEDICINE

## 2019-06-27 PROCEDURE — 80053 COMPREHEN METABOLIC PANEL: CPT

## 2019-06-27 PROCEDURE — 99999 PR PBB SHADOW E&M-EST. PATIENT-LVL IV: CPT | Mod: PBBFAC,,, | Performed by: INTERNAL MEDICINE

## 2019-06-27 PROCEDURE — 82550 ASSAY OF CK (CPK): CPT

## 2019-06-27 PROCEDURE — 82306 VITAMIN D 25 HYDROXY: CPT

## 2019-06-27 PROCEDURE — 3074F PR MOST RECENT SYSTOLIC BLOOD PRESSURE < 130 MM HG: ICD-10-PCS | Mod: CPTII,S$GLB,, | Performed by: INTERNAL MEDICINE

## 2019-06-27 PROCEDURE — 86140 C-REACTIVE PROTEIN: CPT

## 2019-06-27 PROCEDURE — 85652 RBC SED RATE AUTOMATED: CPT

## 2019-06-27 PROCEDURE — 99214 PR OFFICE/OUTPT VISIT, EST, LEVL IV, 30-39 MIN: ICD-10-PCS | Mod: S$GLB,,, | Performed by: INTERNAL MEDICINE

## 2019-06-27 PROCEDURE — 99999 PR PBB SHADOW E&M-EST. PATIENT-LVL IV: ICD-10-PCS | Mod: PBBFAC,,, | Performed by: INTERNAL MEDICINE

## 2019-06-27 PROCEDURE — 85025 COMPLETE CBC W/AUTO DIFF WBC: CPT

## 2019-06-27 PROCEDURE — 3046F HEMOGLOBIN A1C LEVEL >9.0%: CPT | Mod: CPTII,S$GLB,, | Performed by: INTERNAL MEDICINE

## 2019-06-27 PROCEDURE — 84165 PATHOLOGIST INTERPRETATION SPE: ICD-10-PCS | Mod: 26,,, | Performed by: PATHOLOGY

## 2019-06-27 PROCEDURE — 99999 PR PBB SHADOW E&M-EST. PATIENT-LVL III: CPT | Mod: PBBFAC,,, | Performed by: INTERNAL MEDICINE

## 2019-06-27 PROCEDURE — 84165 PROTEIN E-PHORESIS SERUM: CPT | Mod: 26,,, | Performed by: PATHOLOGY

## 2019-06-27 PROCEDURE — 86431 RHEUMATOID FACTOR QUANT: CPT

## 2019-06-27 PROCEDURE — 99245 PR OFFICE CONSULTATION,LEVEL V: ICD-10-PCS | Mod: S$GLB,,, | Performed by: INTERNAL MEDICINE

## 2019-06-27 PROCEDURE — 84443 ASSAY THYROID STIM HORMONE: CPT

## 2019-06-27 PROCEDURE — 99245 OFF/OP CONSLTJ NEW/EST HI 55: CPT | Mod: S$GLB,,, | Performed by: INTERNAL MEDICINE

## 2019-06-27 PROCEDURE — 84165 PROTEIN E-PHORESIS SERUM: CPT

## 2019-06-27 PROCEDURE — 36415 COLL VENOUS BLD VENIPUNCTURE: CPT

## 2019-06-27 PROCEDURE — 3008F PR BODY MASS INDEX (BMI) DOCUMENTED: ICD-10-PCS | Mod: CPTII,S$GLB,, | Performed by: INTERNAL MEDICINE

## 2019-06-27 RX ORDER — IBUPROFEN 800 MG/1
800 TABLET ORAL EVERY 8 HOURS PRN
Refills: 0 | Status: ON HOLD | COMMUNITY
Start: 2019-06-18 | End: 2021-11-30 | Stop reason: HOSPADM

## 2019-06-27 RX ORDER — NALOXONE HYDROCHLORIDE 4 MG/.1ML
1 SPRAY NASAL ONCE
Qty: 1 EACH | Refills: 0 | Status: SHIPPED | OUTPATIENT
Start: 2019-06-27 | End: 2019-06-27

## 2019-06-27 RX ORDER — CYCLOBENZAPRINE HCL 5 MG
TABLET ORAL
Refills: 3 | COMMUNITY
Start: 2019-05-06 | End: 2020-02-20

## 2019-06-27 RX ORDER — TIZANIDINE 4 MG/1
TABLET ORAL
Refills: 3 | COMMUNITY
Start: 2019-04-06 | End: 2020-02-20 | Stop reason: SDUPTHER

## 2019-06-27 ASSESSMENT — ROUTINE ASSESSMENT OF PATIENT INDEX DATA (RAPID3)
PSYCHOLOGICAL DISTRESS SCORE: 4.4
PAIN SCORE: 9
WHEN YOU AWAKENED IN THE MORNING OVER THE LAST WEEK, PLEASE INDICATE THE AMOUNT OF TIME IT TAKES UNTIL YOU ARE AS LIMBER AS YOU WILL BE FOR THE DAY: 6 MINUTES
PATIENT GLOBAL ASSESSMENT SCORE: 5.5
MDHAQ FUNCTION SCORE: .8
AM STIFFNESS SCORE: 1, YES
FATIGUE SCORE: 5.5
TOTAL RAPID3 SCORE: 5.72

## 2019-06-27 NOTE — LETTER
June 27, 2019      Madie Petersen, DO  [address] [facility] [place] [address]           [facility] - Rheumatology  [address]  [place] [address]  Phone: [phone]  Fax: [phone]          Patient: Violeta Champion   MR Number: [id number]   YOB: 1960   Date of Visit: 6/27/2019       Dear Dr. Madie Petersen:    Thank you for referring Violeta Champion to me for evaluation. Attached you will find relevant portions of my assessment and plan of care.    If you have questions, please do not hesitate to call me. I look forward to following Violeta Champion along with you.    Sincerely,    María Rudolph MD    Enclosure  CC:  No Recipients    If you would like to receive this communication electronically, please contact [email or web address] or [phone] to request more information on Contactually Link access.    For providers and/or their staff who would like to refer a patient to [facility], please contact us through our one-stop-shop provider referral line, [facility], at [phone].    If you feel you have received this communication in error or would no longer like to receive these types of communications, please e-mail [email or web address]

## 2019-06-27 NOTE — LETTER
June 27, 2019    [facility]  [address]  [place] [address]  [phone] June 27, 2019     Patient: Violeta Champion    YOB: 1960   Date of Visit: 6/27/2019       To Whom It May Concern:    The patient listed above has been seen recently by a primary care provider , Dr. Petersen,at [facility]. Please release the following information specified below for the patient.    Eye Exam     Please fax the requested record to our secure fax number [phone]  Attention: TRINY Romero    Thank you for your help! If I can be of any assistance please contact me at the number listed below.      Regards,    Heather Mir LPN  Clinical Care Coordinator  For Dr. [place]/[facility]  Internal/Family Medicine  [phone] phone  [phone] fax  [email or web address]

## 2019-06-28 LAB
ALBUMIN SERPL ELPH-MCNC: 3.93 G/DL (ref 3.35–5.55)
ALDOLASE SERPL-CCNC: 4.4 U/L (ref 1.2–7.6)
ALPHA1 GLOB SERPL ELPH-MCNC: 0.37 G/DL (ref 0.17–0.41)
ALPHA2 GLOB SERPL ELPH-MCNC: 0.78 G/DL (ref 0.43–0.99)
ANA SER QL IF: NORMAL
ANTI-SSA ANTIBODY: 1.46 EU (ref 0–19.99)
ANTI-SSA INTERPRETATION: NEGATIVE
B-GLOBULIN SERPL ELPH-MCNC: 1.25 G/DL (ref 0.5–1.1)
GAMMA GLOB SERPL ELPH-MCNC: 1.27 G/DL (ref 0.67–1.58)
PATHOLOGIST INTERPRETATION SPE: NORMAL
PROT SERPL-MCNC: 7.6 G/DL (ref 6–8.4)

## 2019-07-01 RX ORDER — CELECOXIB 200 MG/1
200 CAPSULE ORAL 2 TIMES DAILY
Qty: 60 CAPSULE | Refills: 3 | Status: SHIPPED | OUTPATIENT
Start: 2019-07-01 | End: 2019-07-31

## 2019-07-05 RX ORDER — LEVOTHYROXINE SODIUM 100 UG/1
TABLET ORAL
Qty: 90 TABLET | Refills: 0 | Status: SHIPPED | OUTPATIENT
Start: 2019-07-05 | End: 2020-03-23 | Stop reason: SDUPTHER

## 2019-07-09 RX ORDER — HYDROCODONE BITARTRATE AND ACETAMINOPHEN 10; 325 MG/1; MG/1
1 TABLET ORAL
Qty: 90 TABLET | Refills: 0 | Status: SHIPPED | OUTPATIENT
Start: 2019-07-09 | End: 2019-08-13 | Stop reason: SDUPTHER

## 2019-07-09 NOTE — TELEPHONE ENCOUNTER
----- Message from Leda Ulloa sent at 7/9/2019  9:23 AM CDT -----  Contact: Self   [phone]  Patient is calling for an RX refill or new RX.  Is this a refill or new RX:  Refill  RX name and strength: HYDROcodone-acetaminophen (NORCO)  mg per tablet  Directions (copy/paste from chart):  Take 1 tablet by mouth every 6 to 8 hours as needed for Pain  Is this a 30 day or 90 day RX:  30  Local pharmacy or mail order pharmacy:  Local   Pharmacy name and phone # (copy/paste from chart): [facility]  [phone] (Phone)  [phone] (Fax)  Comments:

## 2019-07-11 ENCOUNTER — TELEPHONE (OUTPATIENT)
Dept: INTERNAL MEDICINE | Facility: CLINIC | Age: 59
End: 2019-07-11

## 2019-07-11 ENCOUNTER — HOSPITAL ENCOUNTER (OUTPATIENT)
Dept: RADIOLOGY | Facility: HOSPITAL | Age: 59
Discharge: HOME | End: 2019-07-11
Attending: INTERNAL MEDICINE
Payer: COMMERCIAL

## 2019-07-11 DIAGNOSIS — M25.50 PAIN IN JOINT INVOLVING MULTIPLE SITES: ICD-10-CM

## 2019-07-11 PROCEDURE — 77077 XR ARTHRITIS SURVEY: ICD-10-PCS | Mod: 26,,, | Performed by: RADIOLOGY

## 2019-07-11 PROCEDURE — 77077 JOINT SURVEY SINGLE VIEW: CPT | Mod: 26,,, | Performed by: RADIOLOGY

## 2019-07-11 PROCEDURE — 77077 JOINT SURVEY SINGLE VIEW: CPT | Mod: TC

## 2019-07-11 RX ORDER — LEVOTHYROXINE SODIUM 100 UG/1
TABLET ORAL
Qty: 90 TABLET | Refills: 1 | Status: SHIPPED | OUTPATIENT
Start: 2019-07-11 | End: 2020-03-08

## 2019-07-11 NOTE — TELEPHONE ENCOUNTER
Left msg to call us back to discuss recent labs and get  's comments.    I also mailed results letter with labs enclosed in case she doesn't call us back.

## 2019-07-11 NOTE — TELEPHONE ENCOUNTER
Notify pt that diabetic marker is slowly improving from 12.8 to 10.1.. When I saw her a few weeks ago, she had started a strict diabetic diet, if she is continuing so, we can keep meds the same and repeat in 3 months for monitoring.    Her cholesterol is not at goal; is she compliant with pravastatin? If not, she needs to restart. If so, we need to swap to a different statin as it is not effective enough.     Kidney and liver are normal

## 2019-07-12 ENCOUNTER — TELEPHONE (OUTPATIENT)
Dept: INTERNAL MEDICINE | Facility: CLINIC | Age: 59
End: 2019-07-12

## 2019-07-12 DIAGNOSIS — E11.65 UNCONTROLLED TYPE 2 DIABETES MELLITUS WITH HYPERGLYCEMIA: Primary | ICD-10-CM

## 2019-07-12 NOTE — TELEPHONE ENCOUNTER
Spoke to pt and informed of lab results. Pt stated that she is still on pravastatin, but wants to try diet change and repeat in 3 months before switching statins    Please order labs to repeat in 3 months.

## 2019-07-12 NOTE — TELEPHONE ENCOUNTER
----- Message from Mikel Palencia sent at 7/12/2019 12:11 PM CDT -----  Contact: Self [phone]  Patient is returning a phone call.  Who left a message for the patient: Rosette  Does patient know what this is regarding:  labs  Comments:

## 2019-07-22 ENCOUNTER — TELEPHONE (OUTPATIENT)
Dept: INTERNAL MEDICINE | Facility: CLINIC | Age: 59
End: 2019-07-22

## 2019-07-23 ENCOUNTER — OFFICE VISIT (OUTPATIENT)
Dept: INTERNAL MEDICINE | Facility: CLINIC | Age: 59
End: 2019-07-23
Attending: FAMILY MEDICINE
Payer: COMMERCIAL

## 2019-07-23 VITALS
WEIGHT: 293 LBS | BODY MASS INDEX: 50.02 KG/M2 | HEIGHT: 64 IN | DIASTOLIC BLOOD PRESSURE: 64 MMHG | SYSTOLIC BLOOD PRESSURE: 124 MMHG | HEART RATE: 83 BPM | OXYGEN SATURATION: 96 %

## 2019-07-23 DIAGNOSIS — M17.0 OSTEOARTHRITIS OF BOTH KNEES, UNSPECIFIED OSTEOARTHRITIS TYPE: Primary | ICD-10-CM

## 2019-07-23 PROCEDURE — 20610 PR DRAIN/INJECT LARGE JOINT/BURSA: ICD-10-PCS | Mod: 50,S$GLB,, | Performed by: FAMILY MEDICINE

## 2019-07-23 PROCEDURE — 99499 NO LOS: ICD-10-PCS | Mod: S$GLB,,, | Performed by: FAMILY MEDICINE

## 2019-07-23 PROCEDURE — 99999 PR PBB SHADOW E&M-EST. PATIENT-LVL III: ICD-10-PCS | Mod: PBBFAC,,, | Performed by: FAMILY MEDICINE

## 2019-07-23 PROCEDURE — 99999 PR PBB SHADOW E&M-EST. PATIENT-LVL III: CPT | Mod: PBBFAC,,, | Performed by: FAMILY MEDICINE

## 2019-07-23 PROCEDURE — 99499 UNLISTED E&M SERVICE: CPT | Mod: S$GLB,,, | Performed by: FAMILY MEDICINE

## 2019-07-23 PROCEDURE — 20610 DRAIN/INJ JOINT/BURSA W/O US: CPT | Mod: 50,S$GLB,, | Performed by: FAMILY MEDICINE

## 2019-07-23 RX ORDER — DULAGLUTIDE 1.5 MG/.5ML
INJECTION, SOLUTION SUBCUTANEOUS
Refills: 3 | COMMUNITY
Start: 2019-07-02 | End: 2020-03-23 | Stop reason: SDUPTHER

## 2019-07-23 NOTE — PROGRESS NOTES
Subjective:       Patient ID: Violeta Champion is a 58 y.o. female.    Chief Complaint: Injections (both knees )    HPI  Review of Systems   Constitutional: Negative for chills, fatigue, fever and unexpected weight change.   HENT: Negative for congestion and trouble swallowing.    Eyes: Negative for redness and visual disturbance.   Respiratory: Negative for cough, chest tightness and shortness of breath.    Cardiovascular: Negative for chest pain, palpitations and leg swelling.   Gastrointestinal: Negative for abdominal pain and blood in stool.   Genitourinary: Negative for difficulty urinating, hematuria and menstrual problem.   Musculoskeletal: Positive for arthralgias, gait problem and myalgias. Negative for back pain, joint swelling and neck pain.   Skin: Negative for color change and rash.   Neurological: Negative for tremors, speech difficulty, weakness, numbness and headaches.   Hematological: Negative for adenopathy. Does not bruise/bleed easily.   Psychiatric/Behavioral: Negative for behavioral problems, confusion and sleep disturbance. The patient is not nervous/anxious.        Objective:      Physical Exam   Constitutional: She is oriented to person, place, and time. She appears well-developed and well-nourished. No distress.   Neck: Neck supple.   Pulmonary/Chest: Effort normal.   Musculoskeletal: She exhibits no edema.        Right lower leg: She exhibits no edema.        Left lower leg: She exhibits no edema.   Neurological: She is alert and oriented to person, place, and time.   Skin: Skin is warm and dry. No rash noted.   Psychiatric: She has a normal mood and affect. Her behavior is normal. Judgment and thought content normal.   Nursing note and vitals reviewed.      Assessment:       1. Osteoarthritis of both knees, unspecified osteoarthritis type        Plan:     Medication List with Changes/Refills   Current Medications    ADVAIR DISKUS 250-50 MCG/DOSE DISKUS INHALER    INHALE ONE PUFF BY MOUTH  TWICE DAILY    AMLODIPINE (NORVASC) 5 MG TABLET    Take 1 tablet (5 mg total) by mouth once daily.    ASPIRIN 81 MG TAB    Take by mouth. 1 Tablet Oral Every day    BLOOD SUGAR DIAGNOSTIC (FREESTYLE INSULINX TEST STRIPS) STRP    Check glucose three times daily    BUPROPION (WELLBUTRIN XL) 150 MG TB24 TABLET    Take 1 tablet (150 mg total) by mouth once daily.    CALCIUM CARBONATE (CALCIUM CARBONATE) 300 MG (750 MG) CHEW    Take by mouth 2 (two) times daily.      CELECOXIB (CELEBREX) 200 MG CAPSULE    Take 1 capsule (200 mg total) by mouth 2 (two) times daily.    CYCLOBENZAPRINE (FLEXERIL) 5 MG TABLET        FISH OIL-DHA-EPA 1,200-144-216 MG CAP    Take by mouth. 1 Capsule Oral Every day    FLURAZEPAM (DALMANE) 30 MG CAPSULE    Take 30 mg by mouth nightly as needed.      FUROSEMIDE (LASIX) 40 MG TABLET    TAKE 1 TABLET BY MOUTH TWICE DAILY    GLIPIZIDE (GLUCOTROL) 10 MG TABLET    Take 1 tablet (10 mg total) by mouth 2 (two) times daily before meals.    HYDROCODONE-ACETAMINOPHEN (NORCO)  MG PER TABLET    Take 1 tablet by mouth every 6 to 8 hours as needed for Pain.    HYOSCYAMINE (LEVSIN/SL) 0.125 MG SUBL    DISSOLVE ONE TABLET UNDER THE TONGUE EVERY 4 TO 6 HOURS    IBUPROFEN (ADVIL,MOTRIN) 800 MG TABLET    Take 800 mg by mouth every 8 (eight) hours as needed.    LACTULOSE (CHRONULAC) 10 GRAM/15 ML SOLUTION    TAKE  15 ML BY MOUTH ONCE DAILY AS NEEDED FOR  CONSTIPATION    LACTULOSE (CHRONULAC) 10 GRAM/15 ML SOLUTION    TAKE 15 ML BY MOUTH ONCE DAILY AS NEEDED FOR CONSTIPATION    LANCETS MISC    1 Units by Misc.(Non-Drug; Combo Route) route 2 (two) times daily.    LANCETS MISC    Check glucose three times daily    LEVOCETIRIZINE (XYZAL) 5 MG TABLET    Take 1 tablet (5 mg total) by mouth every evening.    LEVOTHYROXINE (SYNTHROID) 100 MCG TABLET    TAKE 1 TABLET BY MOUTH ONCE DAILY    LEVOTHYROXINE (SYNTHROID) 100 MCG TABLET    TAKE 1 TABLET BY MOUTH ONCE DAILY    LORAZEPAM (ATIVAN) 0.5 MG TABLET    Take 1 tablet  (0.5 mg total) by mouth 2 (two) times daily.    MECLIZINE (ANTIVERT) 25 MG TABLET    Take 1 tablet (25 mg total) by mouth 3 (three) times daily as needed.    METFORMIN (GLUCOPHAGE) 1000 MG TABLET    Take 1 tablet (1,000 mg total) by mouth 2 (two) times daily with meals.    METOPROLOL TARTRATE (LOPRESSOR) 100 MG TABLET    Take 1 tablet (100 mg total) by mouth 2 (two) times daily.    MV-MIN-FA-GO-FS-KXONETH-LUTEIN (CENTRUM) 0.4-162-18 MG TAB    Take by mouth. 1 Tablet Oral Every day    OMEPRAZOLE (PRILOSEC) 20 MG CAPSULE    Take 1 capsule (20 mg total) by mouth 2 (two) times daily.    ONDANSETRON (ZOFRAN) 4 MG TABLET    TAKE ONE TABLET BY MOUTH EVERY 8 HOURS AS NEEDED    OXYBUTYNIN (DITROPAN-XL) 5 MG TR24    Take 1 tablet (5 mg total) by mouth once daily.    POTASSIUM CHLORIDE SA (KLOR-CON M20) 20 MEQ TABLET    Take 1 tablet (20 mEq total) by mouth 2 (two) times daily.    PRAVASTATIN (PRAVACHOL) 40 MG TABLET    TAKE ONE TABLET BY MOUTH ONCE DAILY AT  NIGHT    SITAGLIPTIN (JANUVIA) 100 MG TAB    Take 1 tablet (100 mg total) by mouth once daily.    SUCRALFATE (CARAFATE) 1 GRAM TABLET    TAKE ONE TABLET BY MOUTH THREE TIMES DAILY    TEMAZEPAM (RESTORIL) 30 MG CAPSULE    Take 1 capsule (30 mg total) by mouth nightly as needed. 1 Capsule Oral Every day    TIZANIDINE (ZANAFLEX) 4 MG TABLET        TRULICITY 1.5 MG/0.5 ML PNIJ    INJECT 1.5 MG INTO THE SKIN EVERY 7 DAYS    VENTOLIN HFA 90 MCG/ACTUATION INHALER    INHALE ONE TO TWO PUFFS INTO LUNGS EVERY 6 HOURS AS NEEDED FOR WHEEZING    VOLTAREN 1 % GEL    APPLY TWO GRAMS TOPICALLY ONCE DAILY     Violeta was seen today for injections.    Diagnoses and all orders for this visit:    Osteoarthritis of both knees, unspecified osteoarthritis type      See meds, orders, follow up, routing and instructions sections of encounter.  A 58-year-old established female patient.  She is in for injections in her knee.    We discussed risks and benefits of Euflexxa including, but not  limited to   bleeding, scar, infection and pain flare and septic arthritis necessitating   surgical washout.  The patient accepted the minimal risk.  Verbally consented.      HUBER/AKIL  dd: 07/23/2019 17:11:46 (CDT)  td: 07/24/2019 01:02:34 (CDT)  Doc ID   #[id number]  Job ID #[id number]    CC:         BRIEF HISTORY:    Patient presents for therapeutic injections in the bilateral Knee(s) for OA. No complaints expressed at this time. The patient was counseled about risks and benefits of knee Visco-supplementation and other injections in general, including but not limited to pain, infection even that requiring surgery to clean out, failure to provide relief, transient flare, tissue damage. Patient expressed understanding, was given the opportunity to ask questions and any questions answered and consented verbally. Time out performed for localization, medication and patient identification using multiple identifiers.    Procedure Note:    Following a standard, sterile 2-antiseptic prep and negative arthrocentesis, Euflexxa was instilled in the bilateral knee(s) with no immediate discomfort. The procedure was tolerated well with no immediate adverse effects.    Follow up for next in series as scheduled.

## 2019-07-29 ENCOUNTER — TELEPHONE (OUTPATIENT)
Dept: INTERNAL MEDICINE | Facility: CLINIC | Age: 59
End: 2019-07-29

## 2019-08-01 ENCOUNTER — OFFICE VISIT (OUTPATIENT)
Dept: INTERNAL MEDICINE | Facility: CLINIC | Age: 59
End: 2019-08-01
Attending: FAMILY MEDICINE
Payer: COMMERCIAL

## 2019-08-01 VITALS
OXYGEN SATURATION: 97 % | HEART RATE: 82 BPM | WEIGHT: 293 LBS | HEIGHT: 64 IN | DIASTOLIC BLOOD PRESSURE: 70 MMHG | SYSTOLIC BLOOD PRESSURE: 120 MMHG | BODY MASS INDEX: 50.02 KG/M2

## 2019-08-01 DIAGNOSIS — M17.0 OSTEOARTHRITIS OF BOTH KNEES, UNSPECIFIED OSTEOARTHRITIS TYPE: Primary | ICD-10-CM

## 2019-08-01 PROCEDURE — 99499 NO LOS: ICD-10-PCS | Mod: S$GLB,,, | Performed by: FAMILY MEDICINE

## 2019-08-01 PROCEDURE — 99999 PR PBB SHADOW E&M-EST. PATIENT-LVL III: ICD-10-PCS | Mod: PBBFAC,,, | Performed by: FAMILY MEDICINE

## 2019-08-01 PROCEDURE — 20610 DRAIN/INJ JOINT/BURSA W/O US: CPT | Mod: 50,S$GLB,, | Performed by: FAMILY MEDICINE

## 2019-08-01 PROCEDURE — 99499 UNLISTED E&M SERVICE: CPT | Mod: S$GLB,,, | Performed by: FAMILY MEDICINE

## 2019-08-01 PROCEDURE — 20610 PR DRAIN/INJECT LARGE JOINT/BURSA: ICD-10-PCS | Mod: 50,S$GLB,, | Performed by: FAMILY MEDICINE

## 2019-08-01 PROCEDURE — 99999 PR PBB SHADOW E&M-EST. PATIENT-LVL III: CPT | Mod: PBBFAC,,, | Performed by: FAMILY MEDICINE

## 2019-08-01 NOTE — PROGRESS NOTES
Subjective:       Patient ID: Violeta Champion is a 58 y.o. female.    Chief Complaint: Knee Pain (second knee injection )    HPI  Review of Systems   Constitutional: Negative for chills, fatigue, fever and unexpected weight change.   HENT: Negative for congestion and trouble swallowing.    Eyes: Negative for redness and visual disturbance.   Respiratory: Negative for cough, chest tightness and shortness of breath.    Cardiovascular: Negative for chest pain, palpitations and leg swelling.   Gastrointestinal: Negative for abdominal pain and blood in stool.   Genitourinary: Negative for difficulty urinating, hematuria and menstrual problem.   Musculoskeletal: Positive for arthralgias and gait problem. Negative for back pain, joint swelling, myalgias and neck pain.   Skin: Negative for color change and rash.   Neurological: Negative for tremors, speech difficulty, weakness, numbness and headaches.   Hematological: Negative for adenopathy. Does not bruise/bleed easily.   Psychiatric/Behavioral: Negative for behavioral problems, confusion and sleep disturbance. The patient is not nervous/anxious.        Objective:      Physical Exam    Assessment:       1. Osteoarthritis of both knees, unspecified osteoarthritis type        Plan:     Medication List with Changes/Refills   Current Medications    ADVAIR DISKUS 250-50 MCG/DOSE DISKUS INHALER    INHALE ONE PUFF BY MOUTH TWICE DAILY    AMLODIPINE (NORVASC) 5 MG TABLET    Take 1 tablet (5 mg total) by mouth once daily.    ASPIRIN 81 MG TAB    Take by mouth. 1 Tablet Oral Every day    BLOOD SUGAR DIAGNOSTIC (FREESTYLE INSULINX TEST STRIPS) STRP    Check glucose three times daily    BUPROPION (WELLBUTRIN XL) 150 MG TB24 TABLET    Take 1 tablet (150 mg total) by mouth once daily.    CALCIUM CARBONATE (CALCIUM CARBONATE) 300 MG (750 MG) CHEW    Take by mouth 2 (two) times daily.      CYCLOBENZAPRINE (FLEXERIL) 5 MG TABLET        FISH OIL-DHA-EPA 1,200-144-216 MG CAP    Take by  mouth. 1 Capsule Oral Every day    FLURAZEPAM (DALMANE) 30 MG CAPSULE    Take 30 mg by mouth nightly as needed.      FUROSEMIDE (LASIX) 40 MG TABLET    TAKE 1 TABLET BY MOUTH TWICE DAILY    GLIPIZIDE (GLUCOTROL) 10 MG TABLET    Take 1 tablet (10 mg total) by mouth 2 (two) times daily before meals.    HYDROCODONE-ACETAMINOPHEN (NORCO)  MG PER TABLET    Take 1 tablet by mouth every 6 to 8 hours as needed for Pain.    HYOSCYAMINE (LEVSIN/SL) 0.125 MG SUBL    DISSOLVE ONE TABLET UNDER THE TONGUE EVERY 4 TO 6 HOURS    IBUPROFEN (ADVIL,MOTRIN) 800 MG TABLET    Take 800 mg by mouth every 8 (eight) hours as needed.    LACTULOSE (CHRONULAC) 10 GRAM/15 ML SOLUTION    TAKE  15 ML BY MOUTH ONCE DAILY AS NEEDED FOR  CONSTIPATION    LACTULOSE (CHRONULAC) 10 GRAM/15 ML SOLUTION    TAKE 15 ML BY MOUTH ONCE DAILY AS NEEDED FOR CONSTIPATION    LANCETS MISC    1 Units by Misc.(Non-Drug; Combo Route) route 2 (two) times daily.    LANCETS MISC    Check glucose three times daily    LEVOCETIRIZINE (XYZAL) 5 MG TABLET    Take 1 tablet (5 mg total) by mouth every evening.    LEVOTHYROXINE (SYNTHROID) 100 MCG TABLET    TAKE 1 TABLET BY MOUTH ONCE DAILY    LEVOTHYROXINE (SYNTHROID) 100 MCG TABLET    TAKE 1 TABLET BY MOUTH ONCE DAILY    LORAZEPAM (ATIVAN) 0.5 MG TABLET    Take 1 tablet (0.5 mg total) by mouth 2 (two) times daily.    MECLIZINE (ANTIVERT) 25 MG TABLET    Take 1 tablet (25 mg total) by mouth 3 (three) times daily as needed.    METFORMIN (GLUCOPHAGE) 1000 MG TABLET    Take 1 tablet (1,000 mg total) by mouth 2 (two) times daily with meals.    METOPROLOL TARTRATE (LOPRESSOR) 100 MG TABLET    Take 1 tablet (100 mg total) by mouth 2 (two) times daily.    MV-MIN-FA-XJ-JE-VYDKXDY-LUTEIN (CENTRUM) 0.4-162-18 MG TAB    Take by mouth. 1 Tablet Oral Every day    OMEPRAZOLE (PRILOSEC) 20 MG CAPSULE    Take 1 capsule (20 mg total) by mouth 2 (two) times daily.    ONDANSETRON (ZOFRAN) 4 MG TABLET    TAKE ONE TABLET BY MOUTH EVERY 8 HOURS  AS NEEDED    OXYBUTYNIN (DITROPAN-XL) 5 MG TR24    Take 1 tablet (5 mg total) by mouth once daily.    POTASSIUM CHLORIDE SA (KLOR-CON M20) 20 MEQ TABLET    Take 1 tablet (20 mEq total) by mouth 2 (two) times daily.    PRAVASTATIN (PRAVACHOL) 40 MG TABLET    TAKE ONE TABLET BY MOUTH ONCE DAILY AT  NIGHT    SITAGLIPTIN (JANUVIA) 100 MG TAB    Take 1 tablet (100 mg total) by mouth once daily.    SUCRALFATE (CARAFATE) 1 GRAM TABLET    TAKE ONE TABLET BY MOUTH THREE TIMES DAILY    TEMAZEPAM (RESTORIL) 30 MG CAPSULE    Take 1 capsule (30 mg total) by mouth nightly as needed. 1 Capsule Oral Every day    TIZANIDINE (ZANAFLEX) 4 MG TABLET        TRULICITY 1.5 MG/0.5 ML PNIJ    INJECT 1.5 MG INTO THE SKIN EVERY 7 DAYS    VENTOLIN HFA 90 MCG/ACTUATION INHALER    INHALE ONE TO TWO PUFFS INTO LUNGS EVERY 6 HOURS AS NEEDED FOR WHEEZING    VOLTAREN 1 % GEL    APPLY TWO GRAMS TOPICALLY ONCE DAILY     Violeta was seen today for knee pain.    Diagnoses and all orders for this visit:    Osteoarthritis of both knees, unspecified osteoarthritis type      See meds, orders, follow up, routing and instructions sections of encounter.  A 58-year-old established female patient.  She is here for her knee injection.    She had a little bit of pain from last week.  No fever, chills or constitutional   complaints.    The procedure was slightly modified today.  She had a small noninfected   appearing skin area to the right lateral knee; therefore, we used a medial   approach, well  from the aforementioned entity.  She had some   appropriate postinjection pain on both sides and we will follow up in one week.      HUBER/AKIL  dd: 08/01/2019 12:10:28 (CDT)  td: 08/02/2019 02:41:02 (CDT)  Doc ID   #[id number]  Job ID #[id number]    CC:         BRIEF HISTORY:    Patient presents for therapeutic injections in the bilateral Knee(s) for OA. No complaints expressed at this time. The patient was counseled about risks and benefits of knee  Visco-supplementation and other injections in general, including but not limited to pain, infection even that requiring surgery to clean out, failure to provide relief, transient flare, tissue damage. Patient expressed understanding, was given the opportunity to ask questions and any questions answered and consented verbally. Time out performed for localization, medication and patient identification using multiple identifiers.    Procedure Note:    Following a standard, sterile 2-antiseptic prep and negative arthrocentesis, Euflexxa was instilled in the bilateral knee(s) with no immediate discomfort. The procedure was tolerated well with no immediate adverse effects.    Follow up for final in series as scheduled.

## 2019-08-08 ENCOUNTER — OFFICE VISIT (OUTPATIENT)
Dept: INTERNAL MEDICINE | Facility: CLINIC | Age: 59
End: 2019-08-08
Attending: FAMILY MEDICINE
Payer: COMMERCIAL

## 2019-08-08 VITALS
HEART RATE: 91 BPM | WEIGHT: 293 LBS | OXYGEN SATURATION: 95 % | HEIGHT: 64 IN | DIASTOLIC BLOOD PRESSURE: 82 MMHG | SYSTOLIC BLOOD PRESSURE: 132 MMHG | TEMPERATURE: 98 F | BODY MASS INDEX: 50.02 KG/M2

## 2019-08-08 DIAGNOSIS — M17.0 OSTEOARTHRITIS OF BOTH KNEES, UNSPECIFIED OSTEOARTHRITIS TYPE: Primary | ICD-10-CM

## 2019-08-08 PROCEDURE — 99999 PR PBB SHADOW E&M-EST. PATIENT-LVL III: CPT | Mod: PBBFAC,,, | Performed by: FAMILY MEDICINE

## 2019-08-08 PROCEDURE — 20610 PR DRAIN/INJECT LARGE JOINT/BURSA: ICD-10-PCS | Mod: 50,S$GLB,, | Performed by: FAMILY MEDICINE

## 2019-08-08 PROCEDURE — 99499 UNLISTED E&M SERVICE: CPT | Mod: S$GLB,,, | Performed by: FAMILY MEDICINE

## 2019-08-08 PROCEDURE — 99499 NO LOS: ICD-10-PCS | Mod: S$GLB,,, | Performed by: FAMILY MEDICINE

## 2019-08-08 PROCEDURE — 20610 DRAIN/INJ JOINT/BURSA W/O US: CPT | Mod: 50,S$GLB,, | Performed by: FAMILY MEDICINE

## 2019-08-08 PROCEDURE — 99999 PR PBB SHADOW E&M-EST. PATIENT-LVL III: ICD-10-PCS | Mod: PBBFAC,,, | Performed by: FAMILY MEDICINE

## 2019-08-13 ENCOUNTER — TELEPHONE (OUTPATIENT)
Dept: INTERNAL MEDICINE | Facility: CLINIC | Age: 59
End: 2019-08-13

## 2019-08-13 RX ORDER — HYDROCODONE BITARTRATE AND ACETAMINOPHEN 10; 325 MG/1; MG/1
1 TABLET ORAL
Qty: 90 TABLET | Refills: 0 | Status: SHIPPED | OUTPATIENT
Start: 2019-08-13 | End: 2019-09-16 | Stop reason: SDUPTHER

## 2019-08-13 RX ORDER — PRAVASTATIN SODIUM 40 MG/1
TABLET ORAL
Qty: 90 TABLET | Refills: 3 | Status: SHIPPED | OUTPATIENT
Start: 2019-08-13 | End: 2020-06-30 | Stop reason: RX

## 2019-08-13 NOTE — TELEPHONE ENCOUNTER
----- Message from Erica Price sent at 8/13/2019  9:47 AM CDT -----  Contact: pt [phone]  Patient is calling for an RX refill or new RX.  Is this a refill or new RX:  New   RX name and strength: pravastatin (PRAVACHOL) 40 MG tablet  Directions (copy/paste from chart):  TAKE ONE TABLET BY MOUTH ONCE DAILY AT  NIGHT  Is this a 30 day or 90 day RX:  90  Local pharmacy or mail order pharmacy:  Local   Pharmacy name and phone # (copy/paste from chart):   [facility] [place] [facility] [address] [facility] [phone] (Phone)  [phone] (Fax)  Comments:      Patient is calling for an RX refill or new RX.  Is this a refill or new RX:  New   RX name and strength: HYDROcodone-acetaminophen (NORCO)  mg per tablet  Directions (copy/paste from chart):  Take 1 tablet by mouth every 6 to 8 hours as needed for Pain.   Is this a 30 day or 90 day RX:  30  Local pharmacy or mail order pharmacy:  Local   Pharmacy name and phone # (copy/paste from chart):   [facility] [place] [place] [facility] [address] [facility] [phone] (Phone)  [phone] (Fax)  Comments:

## 2019-08-14 RX ORDER — DICLOFENAC SODIUM 10 MG/G
GEL TOPICAL
Qty: 100 G | Refills: 0 | Status: ON HOLD | OUTPATIENT
Start: 2019-08-14 | End: 2021-11-30 | Stop reason: HOSPADM

## 2019-08-14 NOTE — TELEPHONE ENCOUNTER
----- Message from James Randolph sent at 8/14/2019  8:46 AM CDT -----  Contact: self/[phone]  Type: Rx    Name of medication(s): VOLTAREN 1 % Gel    Is this a refill? New rx? Refill     Who prescribed medication? Dr. Hester     Pharmacy Name, Phone, & Location:[facility] [address] [place] [address]    Comments: Please call and advise.        Thank You

## 2019-08-27 RX ORDER — LACTULOSE 10 G/15ML
SOLUTION ORAL; RECTAL
Qty: 473 ML | Refills: 3 | Status: SHIPPED | OUTPATIENT
Start: 2019-08-27 | End: 2020-03-23 | Stop reason: SDUPTHER

## 2019-09-02 DIAGNOSIS — E11.65 UNCONTROLLED TYPE 2 DIABETES MELLITUS WITH HYPERGLYCEMIA: ICD-10-CM

## 2019-09-02 RX ORDER — DULAGLUTIDE 1.5 MG/.5ML
INJECTION, SOLUTION SUBCUTANEOUS
Qty: 4 SYRINGE | Refills: 3 | Status: SHIPPED | OUTPATIENT
Start: 2019-09-02 | End: 2019-12-31

## 2019-09-05 RX ORDER — LACTULOSE 10 G/15ML
SOLUTION ORAL; RECTAL
Qty: 473 ML | Refills: 3 | Status: SHIPPED | OUTPATIENT
Start: 2019-09-05 | End: 2020-03-08

## 2019-09-16 ENCOUNTER — TELEPHONE (OUTPATIENT)
Dept: INTERNAL MEDICINE | Facility: CLINIC | Age: 59
End: 2019-09-16

## 2019-09-16 RX ORDER — HYDROCODONE BITARTRATE AND ACETAMINOPHEN 10; 325 MG/1; MG/1
1 TABLET ORAL
Qty: 90 TABLET | Refills: 0 | Status: SHIPPED | OUTPATIENT
Start: 2019-09-16 | End: 2019-10-18 | Stop reason: SDUPTHER

## 2019-09-16 NOTE — TELEPHONE ENCOUNTER
----- Message from Leda Ulloa sent at 9/16/2019 12:51 PM CDT -----  Contact: Self Call  [phone]  Patient is calling for an RX refill or new RX.  Is this a refill or new RX: refill   RX name and strength: HYDROcodone-acetaminophen (NORCO)  mg per tablet  Directions (copy/paste from chart):   Take 1 tablet by mouth every 6 to 8 hours as needed for Pain  Is this a 30 day or 90 day RX: 30   Local pharmacy or mail order pharmacy: Local    Pharmacy name and phone # (copy/paste from chart): [facility]   [phone] (Phone)  [phone] (Fax)  Comments:

## 2019-09-24 ENCOUNTER — LAB VISIT (OUTPATIENT)
Dept: LAB | Facility: HOSPITAL | Age: 59
End: 2019-09-24
Attending: INTERNAL MEDICINE
Payer: COMMERCIAL

## 2019-09-24 DIAGNOSIS — E11.65 UNCONTROLLED TYPE 2 DIABETES MELLITUS WITH HYPERGLYCEMIA: ICD-10-CM

## 2019-09-24 LAB
ALBUMIN SERPL BCP-MCNC: 3.5 G/DL (ref 3.5–5.2)
ALP SERPL-CCNC: 92 U/L (ref 55–135)
ALT SERPL W/O P-5'-P-CCNC: 19 U/L (ref 10–44)
ANION GAP SERPL CALC-SCNC: 10 MMOL/L (ref 8–16)
AST SERPL-CCNC: 24 U/L (ref 10–40)
BILIRUB SERPL-MCNC: 0.3 MG/DL (ref 0.1–1)
BUN SERPL-MCNC: 19 MG/DL (ref 6–20)
CALCIUM SERPL-MCNC: 9.4 MG/DL (ref 8.7–10.5)
CHLORIDE SERPL-SCNC: 104 MMOL/L (ref 95–110)
CHOLEST SERPL-MCNC: 221 MG/DL (ref 120–199)
CHOLEST/HDLC SERPL: 4.5 {RATIO} (ref 2–5)
CO2 SERPL-SCNC: 25 MMOL/L (ref 23–29)
CREAT SERPL-MCNC: 0.8 MG/DL (ref 0.5–1.4)
EST. GFR  (AFRICAN AMERICAN): >60 ML/MIN/1.73 M^2
EST. GFR  (NON AFRICAN AMERICAN): >60 ML/MIN/1.73 M^2
ESTIMATED AVG GLUCOSE: 263 MG/DL (ref 68–131)
GLUCOSE SERPL-MCNC: 259 MG/DL (ref 70–110)
HBA1C MFR BLD HPLC: 10.8 % (ref 4–5.6)
HDLC SERPL-MCNC: 49 MG/DL (ref 40–75)
HDLC SERPL: 22.2 % (ref 20–50)
LDLC SERPL CALC-MCNC: 127.2 MG/DL (ref 63–159)
NONHDLC SERPL-MCNC: 172 MG/DL
POTASSIUM SERPL-SCNC: 4.3 MMOL/L (ref 3.5–5.1)
PROT SERPL-MCNC: 7.5 G/DL (ref 6–8.4)
SODIUM SERPL-SCNC: 139 MMOL/L (ref 136–145)
TRIGL SERPL-MCNC: 224 MG/DL (ref 30–150)

## 2019-09-24 PROCEDURE — 83036 HEMOGLOBIN GLYCOSYLATED A1C: CPT

## 2019-09-24 PROCEDURE — 80061 LIPID PANEL: CPT

## 2019-09-24 PROCEDURE — 36415 COLL VENOUS BLD VENIPUNCTURE: CPT | Mod: PO

## 2019-09-24 PROCEDURE — 80053 COMPREHEN METABOLIC PANEL: CPT

## 2019-09-25 RX ORDER — METFORMIN HYDROCHLORIDE 1000 MG/1
TABLET ORAL
Qty: 180 TABLET | Refills: 1 | Status: SHIPPED | OUTPATIENT
Start: 2019-09-25 | End: 2020-03-08

## 2019-10-18 ENCOUNTER — TELEPHONE (OUTPATIENT)
Dept: INTERNAL MEDICINE | Facility: CLINIC | Age: 59
End: 2019-10-18

## 2019-10-18 RX ORDER — HYDROCODONE BITARTRATE AND ACETAMINOPHEN 10; 325 MG/1; MG/1
1 TABLET ORAL
Qty: 90 TABLET | Refills: 0 | Status: SHIPPED | OUTPATIENT
Start: 2019-10-18 | End: 2019-11-14 | Stop reason: SDUPTHER

## 2019-10-18 NOTE — TELEPHONE ENCOUNTER
----- Message from Mikel Palencia sent at 10/18/2019 11:48 AM CDT -----  Contact: Self [phone]  Patient is calling for an RX refill or new RX.  Is this a refill or new RX:  refill  RX name and strength: HYDROcodone-acetaminophen (NORCO)  mg per tabletDirections (copy/paste from chart):    Is this a 30 day or 90 day RX:  30 Day  Local pharmacy or mail order pharmacy:  local  Pharmacy name and phone # (copy/paste from chart):  [facility] [address] [place] [address] [phone] (Phone)  [phone] (Fax)   Comments:

## 2019-10-21 DIAGNOSIS — E11.65 UNCONTROLLED TYPE 2 DIABETES MELLITUS WITH HYPERGLYCEMIA: ICD-10-CM

## 2019-10-21 RX ORDER — GLIPIZIDE 10 MG/1
TABLET ORAL
Qty: 180 TABLET | Refills: 1 | Status: SHIPPED | OUTPATIENT
Start: 2019-10-21 | End: 2020-05-11

## 2019-10-29 ENCOUNTER — OFFICE VISIT (OUTPATIENT)
Dept: RHEUMATOLOGY | Facility: CLINIC | Age: 59
End: 2019-10-29
Payer: COMMERCIAL

## 2019-10-29 ENCOUNTER — LAB VISIT (OUTPATIENT)
Dept: LAB | Facility: HOSPITAL | Age: 59
End: 2019-10-29
Attending: INTERNAL MEDICINE
Payer: COMMERCIAL

## 2019-10-29 VITALS
DIASTOLIC BLOOD PRESSURE: 80 MMHG | BODY MASS INDEX: 50.02 KG/M2 | HEIGHT: 64 IN | SYSTOLIC BLOOD PRESSURE: 123 MMHG | WEIGHT: 293 LBS

## 2019-10-29 DIAGNOSIS — M79.10 MYALGIA: ICD-10-CM

## 2019-10-29 DIAGNOSIS — M25.50 PAIN IN JOINT INVOLVING MULTIPLE SITES: ICD-10-CM

## 2019-10-29 DIAGNOSIS — Z55.9 EDUCATIONAL CIRCUMSTANCE: ICD-10-CM

## 2019-10-29 DIAGNOSIS — R53.82 CHRONIC FATIGUE: ICD-10-CM

## 2019-10-29 DIAGNOSIS — M06.4 INFLAMMATORY POLYARTHRITIS: Primary | ICD-10-CM

## 2019-10-29 LAB
ALBUMIN SERPL BCP-MCNC: 3.6 G/DL (ref 3.5–5.2)
ALP SERPL-CCNC: 97 U/L (ref 55–135)
ALT SERPL W/O P-5'-P-CCNC: 23 U/L (ref 10–44)
ANION GAP SERPL CALC-SCNC: 12 MMOL/L (ref 8–16)
AST SERPL-CCNC: 24 U/L (ref 10–40)
BASOPHILS # BLD AUTO: 0.08 K/UL (ref 0–0.2)
BASOPHILS NFR BLD: 0.9 % (ref 0–1.9)
BILIRUB SERPL-MCNC: 0.2 MG/DL (ref 0.1–1)
BUN SERPL-MCNC: 22 MG/DL (ref 6–20)
CALCIUM SERPL-MCNC: 10.2 MG/DL (ref 8.7–10.5)
CHLORIDE SERPL-SCNC: 102 MMOL/L (ref 95–110)
CO2 SERPL-SCNC: 22 MMOL/L (ref 23–29)
CREAT SERPL-MCNC: 0.8 MG/DL (ref 0.5–1.4)
CRP SERPL-MCNC: 6.7 MG/L (ref 0–8.2)
DIFFERENTIAL METHOD: NORMAL
EOSINOPHIL # BLD AUTO: 0.4 K/UL (ref 0–0.5)
EOSINOPHIL NFR BLD: 4.4 % (ref 0–8)
ERYTHROCYTE [DISTWIDTH] IN BLOOD BY AUTOMATED COUNT: 13.6 % (ref 11.5–14.5)
ERYTHROCYTE [SEDIMENTATION RATE] IN BLOOD BY WESTERGREN METHOD: 65 MM/HR (ref 0–36)
EST. GFR  (AFRICAN AMERICAN): >60 ML/MIN/1.73 M^2
EST. GFR  (NON AFRICAN AMERICAN): >60 ML/MIN/1.73 M^2
GLUCOSE SERPL-MCNC: 335 MG/DL (ref 70–110)
HCT VFR BLD AUTO: 42.3 % (ref 37–48.5)
HGB BLD-MCNC: 13.7 G/DL (ref 12–16)
IMM GRANULOCYTES # BLD AUTO: 0.04 K/UL (ref 0–0.04)
IMM GRANULOCYTES NFR BLD AUTO: 0.5 % (ref 0–0.5)
LYMPHOCYTES # BLD AUTO: 3.8 K/UL (ref 1–4.8)
LYMPHOCYTES NFR BLD: 44.8 % (ref 18–48)
MCH RBC QN AUTO: 28.4 PG (ref 27–31)
MCHC RBC AUTO-ENTMCNC: 32.4 G/DL (ref 32–36)
MCV RBC AUTO: 88 FL (ref 82–98)
MONOCYTES # BLD AUTO: 0.5 K/UL (ref 0.3–1)
MONOCYTES NFR BLD: 5.7 % (ref 4–15)
NEUTROPHILS # BLD AUTO: 3.7 K/UL (ref 1.8–7.7)
NEUTROPHILS NFR BLD: 43.7 % (ref 38–73)
NRBC BLD-RTO: 0 /100 WBC
PLATELET # BLD AUTO: 285 K/UL (ref 150–350)
PLATELET BLD QL SMEAR: NORMAL
PMV BLD AUTO: 11.4 FL (ref 9.2–12.9)
POTASSIUM SERPL-SCNC: 4.3 MMOL/L (ref 3.5–5.1)
PROT SERPL-MCNC: 7.8 G/DL (ref 6–8.4)
RBC # BLD AUTO: 4.82 M/UL (ref 4–5.4)
SODIUM SERPL-SCNC: 136 MMOL/L (ref 136–145)
WBC # BLD AUTO: 8.43 K/UL (ref 3.9–12.7)

## 2019-10-29 PROCEDURE — 99214 OFFICE O/P EST MOD 30 MIN: CPT | Mod: S$GLB,,, | Performed by: INTERNAL MEDICINE

## 2019-10-29 PROCEDURE — 85549 MURAMIDASE: CPT

## 2019-10-29 PROCEDURE — 80053 COMPREHEN METABOLIC PANEL: CPT

## 2019-10-29 PROCEDURE — 99999 PR PBB SHADOW E&M-EST. PATIENT-LVL V: ICD-10-PCS | Mod: PBBFAC,,, | Performed by: INTERNAL MEDICINE

## 2019-10-29 PROCEDURE — 3008F BODY MASS INDEX DOCD: CPT | Mod: CPTII,S$GLB,, | Performed by: INTERNAL MEDICINE

## 2019-10-29 PROCEDURE — 3074F PR MOST RECENT SYSTOLIC BLOOD PRESSURE < 130 MM HG: ICD-10-PCS | Mod: CPTII,S$GLB,, | Performed by: INTERNAL MEDICINE

## 2019-10-29 PROCEDURE — 86787 VARICELLA-ZOSTER ANTIBODY: CPT

## 2019-10-29 PROCEDURE — 86703 HIV-1/HIV-2 1 RESULT ANTBDY: CPT

## 2019-10-29 PROCEDURE — 99999 PR PBB SHADOW E&M-EST. PATIENT-LVL V: CPT | Mod: PBBFAC,,, | Performed by: INTERNAL MEDICINE

## 2019-10-29 PROCEDURE — 82164 ANGIOTENSIN I ENZYME TEST: CPT

## 2019-10-29 PROCEDURE — 3074F SYST BP LT 130 MM HG: CPT | Mod: CPTII,S$GLB,, | Performed by: INTERNAL MEDICINE

## 2019-10-29 PROCEDURE — 3008F PR BODY MASS INDEX (BMI) DOCUMENTED: ICD-10-PCS | Mod: CPTII,S$GLB,, | Performed by: INTERNAL MEDICINE

## 2019-10-29 PROCEDURE — 3079F DIAST BP 80-89 MM HG: CPT | Mod: CPTII,S$GLB,, | Performed by: INTERNAL MEDICINE

## 2019-10-29 PROCEDURE — 86803 HEPATITIS C AB TEST: CPT

## 2019-10-29 PROCEDURE — 3079F PR MOST RECENT DIASTOLIC BLOOD PRESSURE 80-89 MM HG: ICD-10-PCS | Mod: CPTII,S$GLB,, | Performed by: INTERNAL MEDICINE

## 2019-10-29 PROCEDURE — 86592 SYPHILIS TEST NON-TREP QUAL: CPT

## 2019-10-29 PROCEDURE — 86682 HELMINTH ANTIBODY: CPT

## 2019-10-29 PROCEDURE — 99214 PR OFFICE/OUTPT VISIT, EST, LEVL IV, 30-39 MIN: ICD-10-PCS | Mod: S$GLB,,, | Performed by: INTERNAL MEDICINE

## 2019-10-29 PROCEDURE — 86706 HEP B SURFACE ANTIBODY: CPT

## 2019-10-29 PROCEDURE — 85025 COMPLETE CBC W/AUTO DIFF WBC: CPT

## 2019-10-29 PROCEDURE — 87340 HEPATITIS B SURFACE AG IA: CPT

## 2019-10-29 PROCEDURE — 86140 C-REACTIVE PROTEIN: CPT

## 2019-10-29 PROCEDURE — 85652 RBC SED RATE AUTOMATED: CPT

## 2019-10-29 PROCEDURE — 86790 VIRUS ANTIBODY NOS: CPT

## 2019-10-29 PROCEDURE — 86704 HEP B CORE ANTIBODY TOTAL: CPT

## 2019-10-29 RX ORDER — FOLIC ACID 1 MG/1
1 TABLET ORAL DAILY
Qty: 90 TABLET | Refills: 3 | Status: ON HOLD
Start: 2019-10-29 | End: 2021-10-27

## 2019-10-29 RX ORDER — METHOTREXATE 2.5 MG/1
15 TABLET ORAL
Qty: 24 TABLET | Refills: 11 | Status: SHIPPED | OUTPATIENT
Start: 2019-10-29 | End: 2020-04-15

## 2019-10-29 RX ORDER — PREDNISONE 5 MG/1
5 TABLET ORAL 2 TIMES DAILY
Qty: 90 TABLET | Refills: 1 | Status: SHIPPED | OUTPATIENT
Start: 2019-10-29 | End: 2019-11-28

## 2019-10-29 SDOH — SOCIAL DETERMINANTS OF HEALTH (SDOH): PROBLEMS RELATED TO EDUCATION AND LITERACY, UNSPECIFIED: Z55.9

## 2019-10-29 NOTE — PROGRESS NOTES
Subjective:       Patient ID: Violeta Champion is a 58 y.o. female sent by Dr. Petersen for a consultation on myalgia/arthralgia.    Chief Complaint: Follow-up       58 yr old lady a number of morbidities including DM, hypothyroidism & morbid obesity who had been seen twice in the remote past by Dr. Haider Caicedo. Patient presented to her with widespread pain, tenderness & swelling in R 2&3rd MCPs only on exam. Serologies for RA were negative, but she did have elevated ESR, CRP, plts and low albumin and she was begun on HCQ. Has a discolored area left back that began age 15 was dx as scleroderma by a dermatologist after bx. She saw me initially once on 7/14/16 at which time an inflammatory polyarthritis was entertained due to a hx of synovitis, AM stiffness and inflammatory parameters of inflammation.  She did not return for f/u but presented again on 6/27/19 at the request of Dr. Petersen for pain all over--arthralgia/myalgia at which time a work up was repeated and largely unchanged. She declined the [facility] & did not see Dermatology as requested.     She returns to clinic stating that she needs something for pain and stiffness. Currently having pain & reports swelling in her R MCPs , elbows and shoulders.  She is able to get out of her bed in the AM. No other swelling. Has some back pain on & off. She is stiff all the time & gels easily. She states she's been on prednisone in the past and it has helped. Reports some hair thinining.  Has dry eyes attributed to allergies. Denies Raynaud's, dysphagia, tight skin, oral ulcers, pleurisy, pericarditis, photosensitivity, miscarriages (0/0), thromboses. Had a maternal aunt with RA & has a family hx of sarcoid and wants to be checked for that as well.     Celebrex 200 mg bid helps some but not entirely. She's taken prednisone in the past  (for pulmonary issues) and she feels great on it. She is aware of side effects but wants something for her pain  that will help right away    Other meds have not helped. Etodolac has not helped. Gabapentin 300 mg has not helped. Norco does not help much.       She did review some of the material on fibromyalgia and identified with it, but is convinced she has a systemic inflammatory disorder as well.      Current Outpatient Medications   Medication Sig Dispense Refill    ADVAIR DISKUS 250-50 mcg/dose diskus inhaler INHALE ONE PUFF BY MOUTH TWICE DAILY 60 each 3    amLODIPine (NORVASC) 5 MG tablet Take 1 tablet (5 mg total) by mouth once daily. 90 tablet 3    aspirin 81 mg Tab Take by mouth. 1 Tablet Oral Every day      blood sugar diagnostic (FREESTYLE INSULINX TEST STRIPS) Strp Check glucose three times daily 100 each 3    buPROPion (WELLBUTRIN XL) 150 MG TB24 tablet Take 1 tablet (150 mg total) by mouth once daily. 30 tablet 11    calcium carbonate (CALCIUM CARBONATE) 300 mg (750 mg) Chew Take by mouth 2 (two) times daily.        cyclobenzaprine (FLEXERIL) 5 MG tablet   3    diclofenac sodium (VOLTAREN) 1 % Gel APPLY TWO GRAMS TOPICALLY ONCE DAILY 100 g 0    fish oil-dha-epa 1,200-144-216 mg Cap Take by mouth. 1 Capsule Oral Every day      flurazepam (DALMANE) 30 MG capsule Take 30 mg by mouth nightly as needed.        furosemide (LASIX) 40 MG tablet TAKE 1 TABLET BY MOUTH TWICE DAILY 60 tablet 11    glipiZIDE (GLUCOTROL) 10 MG tablet TAKE 1 TABLET BY MOUTH TWICE DAILY BEFORE MEAL(S) 180 tablet 1    HYDROcodone-acetaminophen (NORCO)  mg per tablet Take 1 tablet by mouth every 6 to 8 hours as needed for Pain. 90 tablet 0    hyoscyamine (LEVSIN/SL) 0.125 mg Subl DISSOLVE ONE TABLET UNDER THE TONGUE EVERY 4 TO 6 HOURS 30 tablet 0    ibuprofen (ADVIL,MOTRIN) 800 MG tablet Take 800 mg by mouth every 8 (eight) hours as needed.  0    lactulose (CHRONULAC) 10 gram/15 mL solution TAKE  15 ML BY MOUTH ONCE DAILY AS NEEDED FOR  CONSTIPATION 236 mL 1    lactulose (CHRONULAC) 10 gram/15 mL solution TAKE  15 ML BY  MOUTH ONCE DAILY AS NEEDED FOR CONSTIPATION 473 mL 3    lactulose (CHRONULAC) 10 gram/15 mL solution TAKE  15 ML BY MOUTH ONCE DAILY AS NEEDED FOR CONSTIPATION 473 mL 3    lancets Misc 1 Units by Misc.(Non-Drug; Combo Route) route 2 (two) times daily. 100 each 6    lancets Misc Check glucose three times daily 100 each 3    levothyroxine (SYNTHROID) 100 MCG tablet TAKE 1 TABLET BY MOUTH ONCE DAILY 90 tablet 0    levothyroxine (SYNTHROID) 100 MCG tablet TAKE 1 TABLET BY MOUTH ONCE DAILY 90 tablet 1    LORazepam (ATIVAN) 0.5 MG tablet Take 1 tablet (0.5 mg total) by mouth 2 (two) times daily. 60 tablet 0    meclizine (ANTIVERT) 25 mg tablet Take 1 tablet (25 mg total) by mouth 3 (three) times daily as needed. 30 tablet 0    metFORMIN (GLUCOPHAGE) 1000 MG tablet TAKE 1 TABLET BY MOUTH TWICE DAILY WITH MEALS 180 tablet 1    metoprolol tartrate (LOPRESSOR) 100 MG tablet Take 1 tablet (100 mg total) by mouth 2 (two) times daily. 180 tablet 3    mv-min-FA-Xv-Oo-tnhijnt-lutein (CENTRUM) 0.4-162-18 mg Tab Take by mouth. 1 Tablet Oral Every day      omeprazole (PRILOSEC) 20 MG capsule Take 1 capsule (20 mg total) by mouth 2 (two) times daily. 60 capsule 2    ondansetron (ZOFRAN) 4 MG tablet TAKE ONE TABLET BY MOUTH EVERY 8 HOURS AS NEEDED 30 tablet 1    potassium chloride SA (KLOR-CON M20) 20 MEQ tablet Take 1 tablet (20 mEq total) by mouth 2 (two) times daily. 180 tablet 3    pravastatin (PRAVACHOL) 40 MG tablet TAKE ONE TABLET BY MOUTH ONCE DAILY AT  NIGHT 90 tablet 3    SITagliptin (JANUVIA) 100 MG Tab Take 1 tablet (100 mg total) by mouth once daily. 90 tablet 3    temazepam (RESTORIL) 30 mg capsule Take 1 capsule (30 mg total) by mouth nightly as needed. 1 Capsule Oral Every day 30 capsule 0    tiZANidine (ZANAFLEX) 4 MG tablet   3    TRULICITY 1.5 mg/0.5 mL PnIj INJECT 1.5 MG INTO THE SKIN EVERY 7 DAYS  3    TRULICITY 1.5 mg/0.5 mL PnIj  INJECT 1.5 MG INTO THE SKIN EVERY 7 DAYS 4 Syringe 3    VENTOLIN  HFA 90 mcg/actuation inhaler INHALE ONE TO TWO PUFFS INTO LUNGS EVERY 6 HOURS AS NEEDED FOR WHEEZING 18 each 11    levocetirizine (XYZAL) 5 MG tablet Take 1 tablet (5 mg total) by mouth every evening. 30 tablet 11    oxybutynin (DITROPAN-XL) 5 MG TR24 Take 1 tablet (5 mg total) by mouth once daily. (Patient not taking: Reported on 10/29/2019) 30 tablet 11    sucralfate (CARAFATE) 1 gram tablet TAKE ONE TABLET BY MOUTH THREE TIMES DAILY (Patient not taking: Reported on 10/29/2019) 90 tablet 6     No current facility-administered medications for this visit.    On celebrex 200 mg bid; not on ibuprofen.       Review of patient's allergies indicates:   Allergen Reactions    Iodinated contrast media Swelling     Other reaction(s): Swelling    Naproxen sodium Swelling    Naprosyn  [naproxen]      Other reaction(s): Swelling       Past Medical History:   Diagnosis Date    GERD (gastroesophageal reflux disease)     High cholesterol     Hypertension     Hypothyroid     Injury of knee, leg, ankle and foot     Insulin-resistant diabetes mellitus and acanthosis nigricans     Menopause present     Osteoarthritis     Osteoarthritis, knee     Osteopenia     Osteopenia     Sleep apnea        Past Surgical History:   Procedure Laterality Date    bilateral duct removal breast      BREAST CYST EXCISION Bilateral     papilloma    HYSTERECTOMY      OOPHORECTOMY      OTHER SURGICAL HISTORY      bilateral central duct removal with bilateral papilomona         Review of Systems   Constitutional: Positive for diaphoresis and fatigue. Negative for fever.   HENT: Negative.  Negative for mouth sores, sore throat, tinnitus and trouble swallowing.    Eyes: Negative.  Negative for visual disturbance.   Respiratory: Negative.  Negative for cough, choking, chest tightness and shortness of breath.    Cardiovascular: Negative.  Negative for chest pain, palpitations and leg swelling.   Gastrointestinal: Positive for constipation  and nausea. Negative for abdominal distention, abdominal pain, blood in stool, diarrhea and vomiting.        Has GERD   Endocrine: Positive for heat intolerance.   Genitourinary: Positive for frequency (nocturia x 2-3) and menstrual problem (hysterectomy & BSO for fibroids 2005). Negative for hematuria.   Musculoskeletal: Positive for arthralgias, back pain, myalgias, neck pain and neck stiffness. Negative for joint swelling.   Skin: Negative.  Negative for rash.   Neurological: Positive for numbness (left foot now). Negative for dizziness, syncope, weakness, light-headedness and headaches.   Hematological: Negative.  Negative for adenopathy. Does not bruise/bleed easily.   Psychiatric/Behavioral: Positive for dysphoric mood (has taken elavil in past; celexa & cymbalta in past didn't help; wellbutrin helped in past.) and sleep disturbance (has sleep apnea). The patient is nervous/anxious (occasionally.).         Cymbalta did not help.       Family History   Problem Relation Age of Onset    Hypertension Mother     Glaucoma Mother     Diabetes Mother     Hypertension Brother     Cancer Maternal Grandmother         breast    Amblyopia Father     Diabetes Maternal Aunt     Diabetes Maternal Uncle     Breast cancer Paternal Aunt     Breast cancer Paternal Grandmother     Blindness Neg Hx     Cataracts Neg Hx     Macular degeneration Neg Hx     Retinal detachment Neg Hx     Strabismus Neg Hx     Colon cancer Neg Hx     Stomach cancer Neg Hx     Esophageal cancer Neg Hx     Irritable bowel syndrome Neg Hx     Rectal cancer Neg Hx     Ulcerative colitis Neg Hx     Crohn's disease Neg Hx     Celiac disease Neg Hx    Mom: 76 yo  allergies;  Dad: 78 yo with leg pain  2 S; 1 S has skin problems & stomach problems; 1 S with scoliosis  1 B: age 59 bad heart  Has 1 maternal cousin with thyroid disease.  No CTDs  1 maternal aunt with RA.  1 maternal uncle & aunt with Sarcoid.       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    Objective:       /80   Ht 5' 4" (1.626 m)   Wt (!) 147.5 kg (325 lb 2.9 oz)   LMP  (LMP Unknown)   BMI 55.82 kg/m²     Physical Exam   Vitals reviewed.  Constitutional: She is oriented to person, place, and time and well-developed, well-nourished, and in no distress. No distress.   HENT:   Head: Normocephalic and atraumatic.   Mouth/Throat: Oropharynx is clear and moist. No oropharyngeal exudate.   No facial rashes  Parotids not enlarged  No oral ulcers   Eyes: Conjunctivae and EOM are normal. Pupils are equal, round, and reactive to light. Right eye exhibits no discharge. Left eye exhibits no discharge. No scleral icterus.   Neck: Neck supple. No JVD present. No tracheal deviation present. No thyromegaly present.   Cardiovascular: Normal rate, regular rhythm, normal heart sounds and intact distal pulses.  Exam reveals no gallop and no friction rub.    No murmur heard.  Pulmonary/Chest: Effort normal and breath sounds normal. No respiratory distress. She has no wheezes. She has no rales. She exhibits no tenderness.   Abdominal: Soft. Bowel sounds are normal. She exhibits no distension and no mass. There is no splenomegaly or hepatomegaly. There is no tenderness. There is no rebound and no guarding.   Lymphadenopathy:     She has no cervical adenopathy.        Right: No inguinal adenopathy present.        Left: No inguinal adenopathy present.   Neurological: She is alert and oriented to person, place, and time. She has normal reflexes. No cranial nerve deficit. Gait normal.   Proximal and distal muscle strength 5/5.   Skin: Skin is warm and dry. She is not diaphoretic.     (See photo from previous visit)  Hyperpigmented, mildly sclerosed horizontal lesion left back & both " elbows   Psychiatric: Mood, memory, affect and judgment normal.   Musculoskeletal: Normal range of motion. She exhibits edema (trace). She exhibits no tenderness.   Cspine FROM no tenderness  Tspine FROM no tenderness  Lspine FROM no tenderness.  TMJ: unremarkable  Shoulders: FROM; no synovitis;  Elbows: FROM; no synovitis; no tophi or nodules  Wrists: FROM; no synovitis;    MCPs: FROM; no synovitis; minimal metacarpalgia on R;  ok;  PIPs:FROM; no synovitis;   DIPs: FROM; no synovitis;   HIPS: FROM  Knees: FROM; no synovitis; no instability;  Ankles: FROM: no synovitis   Toes: ok; no metatarsalgia                       19: ESR 90 (36); CRP 11.2;  CBC plt 440; CMP glu 272; Vit D 54; KO/SSA neg; CCP/RF neg; SPE elevated beta  19: CMP Na 134; glu 357; alb 3.4; Hg A1C 12.8  16: ESR 47; CRP 14.9; UA 5.0; CCP neg  16: CBC plt 380; Alb. 3.1; ; Vit D 27; HgbA1C 7.1; TSH ok; UA tr pr; KO/SSA/RF/CCP    Imagin19: Arthritis survey: personally viewed: Lateral view of the cervical spine show mild DJD.  Well maintained disc spaces.  No atlantoaxial subluxation.  AP view of the hands and feet shows mild DJD.  No definite bone erosions.  AP view of the knees shows DJD and narrowing of the medial tibiofemoral joint spaces bilaterally.  No fracture or dislocation.  No bone destruction identified    16: Arthritis survey: personally viewed: Cspine: minimal hypertrophic spurring: hands & feet ok; knees: bilateral mjsn R>L w varus deformity  7/10/15: MRI RLE: personally reviewed: prominence of the posteromedial talus extending to the subtalar joint with associated edema signal.  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  Further evaluation with CT examination may be helpful. Os trigonum with associated edema.   13: DXA: Only forearm done: T=1.1;  13: MRI RLE (knee): personally reviewed: complex tear involving the anterior horn and body segments of the medial  "meniscus; cartilage loss > medial tibiofemoral compartment with associated subchondral cystic changes.  2/1/13: Bilateral knees: personally reviewed: Tibiofemoral joint space narrowing is observed bilaterally, preferentially involving the medial compartments and essentially symmetric on the two sides.  Some spurring involving the tibial spines is observed bilaterally.  Osseous structures appear intact, with no definite evidence of recent fracture, lytic destructive process, or other significant abnormality noted.  Assessment:   Joint pain multiple sites c/w CSS   R/O inflammatory arthritis    Hx of synovitis of 2& 3rd MCPs & mild metacarpalgia    AM stiffness    Inflammatory parameters of inflammation    Morphea?/cutaneous scleroderma low back from childhood?    Response to prednisone    Maternal aunt with RA;    OA of knees   Probable large contribution from fibromyalgia    Fibromyalgia associated with non restorative sleep   Not responsive to duloxetine    Vitamin D insufficiency--treated    DM II    Depressive disorder   Not responsive to celexa & duloxetine      Morbid obesity with BMI of 55.82        Plan:   We discussed possibility for inflammatory arthritis is remote, however, given abnormal parameters of inflammation and response to steroids empiric treatment was reviewed. Likelihood of inadequate response stressed.  Patient is amenable to MTX but wants something immediately over an above what she is taking. She would like to take prednisone and is aware of side effects and toxicities which were reviewed at length and handout provided.  We will get pre dmard labs today & begin on prednisone 5-10 mg/d prn & MTX 15 mg/wk + folic acid 1 mg/d.  Side effects and toxicities of MTX reviewed.  Necessity for labs q 3 months stressed.  I made it clear to patient, I will not provide "pain" medicine.   RTC 4 months or prn.        CC: Madie Petersen, DO  "

## 2019-10-30 ENCOUNTER — TELEPHONE (OUTPATIENT)
Dept: PHARMACY | Facility: CLINIC | Age: 59
End: 2019-10-30

## 2019-10-30 LAB
HBV CORE AB SERPL QL IA: NEGATIVE
HBV SURFACE AB SER-ACNC: NEGATIVE M[IU]/ML
HBV SURFACE AG SERPL QL IA: NEGATIVE
HCV AB SERPL QL IA: NEGATIVE
HEPATITIS A ANTIBODY, IGG: NEGATIVE
HIV 1+2 AB+HIV1 P24 AG SERPL QL IA: NEGATIVE
RPR SER QL: NORMAL
VARICELLA INTERPRETATION: NEGATIVE
VARICELLA ZOSTER IGG: 0.67 ISR (ref 0–0.9)

## 2019-10-31 LAB
ACE SERPL-CCNC: 61 U/L (ref 16–85)
STRONGYLOIDES ANTIBODY IGG: NEGATIVE

## 2019-10-31 NOTE — PROGRESS NOTES
Subjective:       Patient ID: Violeta Champion is a 58 y.o. female.    Chief Complaint: Follow-up    Patient is a 58 y.o.female who presents today for follow up    Chronic knee pain:  Pt has seen ortho, sports medicine and rheumatology in the past. Pain likely due to weight as well. She has tried synvisc injections in the past and per pt has tried PT. Not interested in surgery at this time as pain is not always flared. norco controls her pain       - she did start prednisone 5 mg po bid with rheum; sugars have been doing better since she is more active        Labs: due now  Gyn hysterectomy  Diet: trying intermittent fasting  Mammogram april 2019  Colon due April 2021\  Diabetic eye exam: November; malaika's be    Review of Systems   Constitutional: Negative for appetite change, chills, diaphoresis and fever.   HENT: Negative for congestion, ear discharge, ear pain, postnasal drip, tinnitus, trouble swallowing and voice change.    Eyes: Negative for discharge, redness and itching.   Respiratory: Negative for cough, chest tightness, shortness of breath and wheezing.    Cardiovascular: Negative for chest pain, palpitations and leg swelling.   Gastrointestinal: Negative for abdominal pain, constipation, diarrhea, nausea and vomiting.   Endocrine: Negative for cold intolerance and heat intolerance.   Genitourinary: Negative for difficulty urinating, flank pain, hematuria and urgency.   Musculoskeletal: Negative for arthralgias, gait problem, myalgias and neck stiffness.   Skin: Negative for color change and rash.   Neurological: Negative for dizziness, seizures, syncope and headaches.   Hematological: Negative for adenopathy.   Psychiatric/Behavioral: Negative for agitation, behavioral problems, confusion and sleep disturbance.       Objective:      Physical Exam   Constitutional: She is oriented to person, place, and time. She appears well-developed and well-nourished. No distress.   HENT:   Head: Normocephalic and  atraumatic.   Right Ear: External ear normal.   Left Ear: External ear normal.   Nose: Nose normal.   Mouth/Throat: Oropharynx is clear and moist. No oropharyngeal exudate.   Eyes: Pupils are equal, round, and reactive to light. Conjunctivae and EOM are normal. Right eye exhibits no discharge. Left eye exhibits no discharge. No scleral icterus.   Neck: Neck supple. No JVD present. No tracheal deviation present. No thyromegaly present.   Cardiovascular: Normal rate, normal heart sounds and intact distal pulses. Exam reveals no gallop and no friction rub.   No murmur heard.  Pulses:       Dorsalis pedis pulses are 2+ on the right side, and 2+ on the left side.        Posterior tibial pulses are 2+ on the right side, and 2+ on the left side.   Pulmonary/Chest: Effort normal and breath sounds normal. No stridor. No respiratory distress. She has no wheezes. She has no rales. She exhibits no tenderness.   Abdominal: Soft. Bowel sounds are normal. She exhibits no distension. There is no tenderness. There is no rebound.   Musculoskeletal: She exhibits no edema.        Right knee: She exhibits decreased range of motion and swelling. Tenderness found.        Left knee: She exhibits decreased range of motion and swelling. Tenderness found.        Right foot: There is normal range of motion and no deformity.        Left foot: There is normal range of motion and no deformity.   Feet:   Right Foot:   Protective Sensation: 3 sites tested. 2 sites sensed.   Skin Integrity: Positive for callus. Negative for ulcer, blister, skin breakdown, warmth or dry skin.   Left Foot:   Protective Sensation: 3 sites tested. 2 sites sensed.   Skin Integrity: Positive for callus. Negative for ulcer, blister, skin breakdown, erythema, warmth or dry skin.   Lymphadenopathy:     She has no cervical adenopathy.   Neurological: She is alert and oriented to person, place, and time.   Skin: Skin is warm and dry. No rash noted. She is not diaphoretic. No  erythema.   Psychiatric: She has a normal mood and affect. Her behavior is normal.   Nursing note and vitals reviewed.      Assessment and Plan:       1. HTN, goal below 130/80  - stable; labs reviewed    2. Hyperlipidemia, unspecified hyperlipidemia type  - stable    3. Hypothyroidism, unspecified type  - stable    4. Morbid obesity with BMI of 60.0-69.9, adult  Patient educated on importance of diet and exercise.  Recommended 30-45 minutes of exercise five days a week.  In addition, counseled patient on importance of low fat diet.  Limit carbohydrate intake.  Increase protein intake and vegetables.     5. Osteoarthritis of both knees, unspecified osteoarthritis type  - stable with norco prn  Pt has signed pain contract. Discussed that medication should be taken as prescribed. Discussed potential side effects to this medication and pt must notify me if they start having any side effects. Discussed random drug screenings. Discussed appt needed every 3 months to continue this medication. Pt agreeable to plan.    6. Uncontrolled type 2 diabetes mellitus with hyperglycemia  - not at goal; pt declines med change; wants to exercise more and get stricter with diet; if no better in 3 months, will adjust meds  - Ambulatory Referral to Optometry          No follow-ups on file.

## 2019-11-02 LAB — LYSOZYME SERPL-MCNC: 1.1 UG/ML (ref 0–2.75)

## 2019-11-08 NOTE — TELEPHONE ENCOUNTER
DOCUMENTATION ONLY:  Prior authorization for Methotrexate not required.    Co-pay: $20.13    Patient Assistance is not required.

## 2019-11-13 ENCOUNTER — TELEPHONE (OUTPATIENT)
Dept: PHARMACY | Facility: CLINIC | Age: 59
End: 2019-11-13

## 2019-11-13 NOTE — TELEPHONE ENCOUNTER
Called patient regarding MTX initial consult/fill on 11/13. Patient stated she wanted some time to think about starting MTX, and she did not feel ready. She explained she is doing well with steroids currently. Informed patient that steroids are not a recommended long term therapy and, with long term use,  risks can sometimes outweigh benefits. Patient voiced understanding. Explained MTX has side effects, but [facility] will check in with patient. Emphasized the importance of labs and appts. Patient voiced understanding. Encouraged patient to call with ANY questions. She stated she has an appt with her PCP tomorrow and will review use of steroids then.  Patient declined [facility] offer to review medication in full at this time. Will check in with patient in about 1 week to see if patient has made a decision to start MTX. Preet had no further questions or concerns.

## 2019-11-14 ENCOUNTER — OFFICE VISIT (OUTPATIENT)
Dept: INTERNAL MEDICINE | Facility: CLINIC | Age: 59
End: 2019-11-14
Payer: COMMERCIAL

## 2019-11-14 VITALS
RESPIRATION RATE: 16 BRPM | SYSTOLIC BLOOD PRESSURE: 110 MMHG | HEIGHT: 64 IN | DIASTOLIC BLOOD PRESSURE: 80 MMHG | TEMPERATURE: 98 F | HEART RATE: 76 BPM | WEIGHT: 293 LBS | BODY MASS INDEX: 50.02 KG/M2

## 2019-11-14 DIAGNOSIS — E66.01 MORBID OBESITY WITH BMI OF 60.0-69.9, ADULT: ICD-10-CM

## 2019-11-14 DIAGNOSIS — E11.65 UNCONTROLLED TYPE 2 DIABETES MELLITUS WITH HYPERGLYCEMIA: ICD-10-CM

## 2019-11-14 DIAGNOSIS — E78.5 HYPERLIPIDEMIA, UNSPECIFIED HYPERLIPIDEMIA TYPE: ICD-10-CM

## 2019-11-14 DIAGNOSIS — M17.0 OSTEOARTHRITIS OF BOTH KNEES, UNSPECIFIED OSTEOARTHRITIS TYPE: ICD-10-CM

## 2019-11-14 DIAGNOSIS — E03.9 HYPOTHYROIDISM, UNSPECIFIED TYPE: ICD-10-CM

## 2019-11-14 DIAGNOSIS — I10 HTN, GOAL BELOW 130/80: Primary | ICD-10-CM

## 2019-11-14 PROCEDURE — 3079F DIAST BP 80-89 MM HG: CPT | Mod: CPTII,S$GLB,, | Performed by: INTERNAL MEDICINE

## 2019-11-14 PROCEDURE — 99214 OFFICE O/P EST MOD 30 MIN: CPT | Mod: S$GLB,,, | Performed by: INTERNAL MEDICINE

## 2019-11-14 PROCEDURE — 3008F BODY MASS INDEX DOCD: CPT | Mod: CPTII,S$GLB,, | Performed by: INTERNAL MEDICINE

## 2019-11-14 PROCEDURE — 3046F PR MOST RECENT HEMOGLOBIN A1C LEVEL > 9.0%: ICD-10-PCS | Mod: CPTII,S$GLB,, | Performed by: INTERNAL MEDICINE

## 2019-11-14 PROCEDURE — 99999 PR PBB SHADOW E&M-EST. PATIENT-LVL III: ICD-10-PCS | Mod: PBBFAC,,, | Performed by: INTERNAL MEDICINE

## 2019-11-14 PROCEDURE — 99214 PR OFFICE/OUTPT VISIT, EST, LEVL IV, 30-39 MIN: ICD-10-PCS | Mod: S$GLB,,, | Performed by: INTERNAL MEDICINE

## 2019-11-14 PROCEDURE — 99999 PR PBB SHADOW E&M-EST. PATIENT-LVL III: CPT | Mod: PBBFAC,,, | Performed by: INTERNAL MEDICINE

## 2019-11-14 PROCEDURE — 3008F PR BODY MASS INDEX (BMI) DOCUMENTED: ICD-10-PCS | Mod: CPTII,S$GLB,, | Performed by: INTERNAL MEDICINE

## 2019-11-14 PROCEDURE — 3046F HEMOGLOBIN A1C LEVEL >9.0%: CPT | Mod: CPTII,S$GLB,, | Performed by: INTERNAL MEDICINE

## 2019-11-14 PROCEDURE — 3074F PR MOST RECENT SYSTOLIC BLOOD PRESSURE < 130 MM HG: ICD-10-PCS | Mod: CPTII,S$GLB,, | Performed by: INTERNAL MEDICINE

## 2019-11-14 PROCEDURE — 3074F SYST BP LT 130 MM HG: CPT | Mod: CPTII,S$GLB,, | Performed by: INTERNAL MEDICINE

## 2019-11-14 PROCEDURE — 3079F PR MOST RECENT DIASTOLIC BLOOD PRESSURE 80-89 MM HG: ICD-10-PCS | Mod: CPTII,S$GLB,, | Performed by: INTERNAL MEDICINE

## 2019-11-14 RX ORDER — HYDROCODONE BITARTRATE AND ACETAMINOPHEN 10; 325 MG/1; MG/1
1 TABLET ORAL
Qty: 90 TABLET | Refills: 0 | Status: SHIPPED | OUTPATIENT
Start: 2019-11-14 | End: 2019-12-18 | Stop reason: SDUPTHER

## 2019-11-14 RX ORDER — GABAPENTIN 300 MG/1
300 CAPSULE ORAL 3 TIMES DAILY
Qty: 90 CAPSULE | Refills: 3 | Status: SHIPPED | OUTPATIENT
Start: 2019-11-14 | End: 2021-05-11 | Stop reason: SDUPTHER

## 2019-11-18 ENCOUNTER — PATIENT OUTREACH (OUTPATIENT)
Dept: ADMINISTRATIVE | Facility: OTHER | Age: 59
End: 2019-11-18

## 2019-11-21 ENCOUNTER — OFFICE VISIT (OUTPATIENT)
Dept: OPTOMETRY | Facility: CLINIC | Age: 59
End: 2019-11-21
Payer: COMMERCIAL

## 2019-11-21 DIAGNOSIS — Z46.0 FITTING AND ADJUSTMENT OF SPECTACLES AND CONTACT LENSES: ICD-10-CM

## 2019-11-21 DIAGNOSIS — H52.13 MYOPIA WITH ASTIGMATISM AND PRESBYOPIA, BILATERAL: ICD-10-CM

## 2019-11-21 DIAGNOSIS — H52.203 MYOPIA WITH ASTIGMATISM AND PRESBYOPIA, BILATERAL: Primary | ICD-10-CM

## 2019-11-21 DIAGNOSIS — H52.4 MYOPIA WITH ASTIGMATISM AND PRESBYOPIA, BILATERAL: Primary | ICD-10-CM

## 2019-11-21 DIAGNOSIS — H52.203 MYOPIA WITH ASTIGMATISM AND PRESBYOPIA, BILATERAL: ICD-10-CM

## 2019-11-21 DIAGNOSIS — H52.4 MYOPIA WITH ASTIGMATISM AND PRESBYOPIA, BILATERAL: ICD-10-CM

## 2019-11-21 DIAGNOSIS — E11.9 TYPE 2 DIABETES MELLITUS WITHOUT RETINOPATHY: Primary | ICD-10-CM

## 2019-11-21 DIAGNOSIS — H52.13 MYOPIA WITH ASTIGMATISM AND PRESBYOPIA, BILATERAL: Primary | ICD-10-CM

## 2019-11-21 DIAGNOSIS — H40.023 OAG (OPEN ANGLE GLAUCOMA) SUSPECT, HIGH RISK, BILATERAL: ICD-10-CM

## 2019-11-21 DIAGNOSIS — H25.13 NUCLEAR SCLEROSIS OF BOTH EYES: ICD-10-CM

## 2019-11-21 PROCEDURE — 92014 COMPRE OPH EXAM EST PT 1/>: CPT | Mod: S$GLB,,, | Performed by: OPTOMETRIST

## 2019-11-21 PROCEDURE — 76514 ECHO EXAM OF EYE THICKNESS: CPT | Mod: S$GLB,,, | Performed by: OPTOMETRIST

## 2019-11-21 PROCEDURE — 99999 PR PBB SHADOW E&M-EST. PATIENT-LVL II: ICD-10-PCS | Mod: PBBFAC,,, | Performed by: OPTOMETRIST

## 2019-11-21 PROCEDURE — 92310 CONTACT LENS FITTING OU: CPT | Mod: CSM,,, | Performed by: OPTOMETRIST

## 2019-11-21 PROCEDURE — 99999 PR PBB SHADOW E&M-EST. PATIENT-LVL II: CPT | Mod: PBBFAC,,, | Performed by: OPTOMETRIST

## 2019-11-21 PROCEDURE — 92310 PR CONTACT LENS FITTING (NO CHANGE): ICD-10-PCS | Mod: CSM,,, | Performed by: OPTOMETRIST

## 2019-11-21 PROCEDURE — 92014 PR EYE EXAM, EST PATIENT,COMPREHESV: ICD-10-PCS | Mod: S$GLB,,, | Performed by: OPTOMETRIST

## 2019-11-21 PROCEDURE — 92015 DETERMINE REFRACTIVE STATE: CPT | Mod: S$GLB,,, | Performed by: OPTOMETRIST

## 2019-11-21 PROCEDURE — 76514 PR  US, EYE, FOR CORNEAL THICKNESS: ICD-10-PCS | Mod: S$GLB,,, | Performed by: OPTOMETRIST

## 2019-11-21 PROCEDURE — 92015 PR REFRACTION: ICD-10-PCS | Mod: S$GLB,,, | Performed by: OPTOMETRIST

## 2019-11-21 RX ORDER — PENICILLIN V POTASSIUM 500 MG/1
TABLET, FILM COATED ORAL
Refills: 0 | COMMUNITY
Start: 2019-11-11 | End: 2020-03-23 | Stop reason: ALTCHOICE

## 2019-11-21 NOTE — LETTER
November 22, 2019      Madie Petersen DO  [address]  [place] [address]           [place] [facility]  [address]  [place] [address]  Phone: [phone]  Fax: [phone]          Patient: Violeta Champion   MR Number: [id number]   YOB: 1960   Date of Visit: 11/21/2019       Dear Dr. Madie Petersen:    Thank you for referring Violeta Champion to me for evaluation. Attached you will find relevant portions of my assessment and plan of care.    If you have questions, please do not hesitate to call me. I look forward to following Violeta Champion along with you.    Sincerely,    Brenden Friend, OD    Enclosure  CC:  No Recipients    If you would like to receive this communication electronically, please contact [email or web address] or [phone] to request more information on [employer] Link access.    For providers and/or their staff who would like to refer a patient to [facility], please contact us through our one-stop-shop provider referral line, [facility] , at [phone].    If you feel you have received this communication in error or would no longer like to receive these types of communications, please e-mail [email or web address]

## 2019-11-22 ENCOUNTER — CLINICAL SUPPORT (OUTPATIENT)
Dept: OPHTHALMOLOGY | Facility: CLINIC | Age: 59
End: 2019-11-22
Payer: COMMERCIAL

## 2019-11-22 DIAGNOSIS — H40.023 OAG (OPEN ANGLE GLAUCOMA) SUSPECT, HIGH RISK, BILATERAL: ICD-10-CM

## 2019-11-22 PROCEDURE — 92083 EXTENDED VISUAL FIELD XM: CPT | Mod: S$GLB,,, | Performed by: OPTOMETRIST

## 2019-11-22 PROCEDURE — 92083 HUMPHREY VISUAL FIELD - OU - BOTH EYES: ICD-10-PCS | Mod: S$GLB,,, | Performed by: OPTOMETRIST

## 2019-11-22 PROCEDURE — 92133 POSTERIOR SEGMENT OCT OPTIC NERVE(OCULAR COHERENCE TOMOGRAPHY) - OU - BOTH EYES: ICD-10-PCS | Mod: S$GLB,,, | Performed by: OPTOMETRIST

## 2019-11-22 PROCEDURE — 92133 CPTRZD OPH DX IMG PST SGM ON: CPT | Mod: S$GLB,,, | Performed by: OPTOMETRIST

## 2019-11-22 NOTE — PROGRESS NOTES
HPI     PINA:1 yr   Glasses? yes  Contacts? Yes PW   H/o eye surgery, injections or laser: no  H/o eye injury: no  Known eye conditions? no  Family h/o eye conditions? Glaucoma -mother   Eye gtts?no    (-) Flashes (-) Floaters (-) Mucous   (-) Tearing (+) Itching (-) Burning   (-) Headaches (-) Eye Pain/discomfort (-) Irritation   (-) Redness (-) Double vision (-) Blurry vision    Diabetic? (+)  A1c?  (Hemoglobin A1C       Date                     Value               Ref Range             Status                09/24/2019               10.8 (H)            4.0 - 5.6 %           Final              Comment:    ADA Screening Guidelines:  5.7-6.4%  Consistent with   prediabetes  >or=6.5%  Consistent with diabetes  High levels of fetal   hemoglobin interfere with the HbA1C  assay. Heterozygous hemoglobin   variants (HbS, HgC, etc)do  not significantly interfere with this assay.     However, presence of multiple variants may affect accuracy.         07/11/2019               10.1 (H)            4.0 - 5.6 %           Final              Comment:    ADA Screening Guidelines:  5.7-6.4%  Consistent with   prediabetes  >or=6.5%  Consistent with diabetes  High levels of fetal   hemoglobin interfere with the HbA1C  assay. Heterozygous hemoglobin   variants (HbS, HgC, etc)do  not significantly interfere with this assay.     However, presence of multiple variants may affect accuracy.         04/16/2019               12.8 (H)            4.0 - 5.6 %           Final              Comment:    ADA Screening Guidelines:  5.7-6.4%  Consistent with   prediabetes  >or=6.5%  Consistent with diabetes  High levels of fetal   hemoglobin interfere with the HbA1C  assay. Heterozygous hemoglobin   variants (HbS, HgC, etc)do  not significantly interfere with this assay.     However, presence of multiple variants may affect accuracy.         04/16/2019               12.8 (H)            4.0 - 5.6 %           Final              Comment:    ADA Screening  Guidelines:  5.7-6.4%  Consistent with   prediabetes  >or=6.5%  Consistent with diabetes  High levels of fetal   hemoglobin interfere with the HbA1C  assay. Heterozygous hemoglobin   variants (HbS, HgC, etc)do  not significantly interfere with this assay.     However, presence of multiple variants may affect accuracy.    ----------)        Last edited by Mellisa Harris on 11/21/2019  3:49 PM. (History)            Assessment /Plan     For exam results, see Encounter Report.    Type 2 diabetes mellitus without retinopathy    OAG (open angle glaucoma) suspect, high risk, bilateral  -     Tam Visual Field - OU - Extended - Both Eyes; Future  -     Posterior Segment OCT Optic Nerve- Both eyes; Future    Myopia with astigmatism and presbyopia, bilateral    Nuclear sclerosis of both eyes      1. BS control. No signs of diabetic retinopathy. Monitor with annual exam.  2. (+) FHx- mother. IOP 23 OD, OS. C/d 0.75 OD, OS. Prev testing normal. Educated pt on findings. RTC 1 mo iOP/pachy/OCT/HVF.   3. SRx released to patient. Patient educated on lens options. Normal ocular health. RTC 1 year for routine exam. . Order CL trials. Given to Eri. Pt will need refresher I&R. CL dispense at F/u.   4. Nuclear sclerotic cataract - not visually significant. Observe.

## 2019-12-04 ENCOUNTER — TELEPHONE (OUTPATIENT)
Dept: OPTOMETRY | Facility: CLINIC | Age: 59
End: 2019-12-04

## 2019-12-04 NOTE — TELEPHONE ENCOUNTER
Lm for pt to call back ----- Message from Tonja Soto sent at 12/4/2019 12:57 PM CST -----  Contact: Violeta  Pt was calling to get the results from her last visit. She can be reached at [phone]

## 2019-12-06 ENCOUNTER — TELEPHONE (OUTPATIENT)
Dept: OPTOMETRY | Facility: CLINIC | Age: 59
End: 2019-12-06

## 2019-12-06 NOTE — TELEPHONE ENCOUNTER
Pt is calling to get results of vf. Will forward to Dr. Friend and call pt back. Pt understood ----- Message from Tonja Soto sent at 12/5/2019  3:01 PM CST -----  Contact: Violeta Phan was calling to get the results from her last eye visit. She can be reached at [phone]

## 2019-12-06 NOTE — TELEPHONE ENCOUNTER
Called to let pt know that contacts were not in . (verfied; acuvue in , ultra multi not ). Pt needs contact dispense appt when the trials come in . Pt will be notified when they are in

## 2019-12-10 ENCOUNTER — TELEPHONE (OUTPATIENT)
Dept: INTERNAL MEDICINE | Facility: CLINIC | Age: 59
End: 2019-12-10

## 2019-12-10 NOTE — TELEPHONE ENCOUNTER
Unable to prescribe opioid and benzo any further; pt will have to choose one or the other; does she take the ativan daily?

## 2019-12-10 NOTE — TELEPHONE ENCOUNTER
----- Message from Ash Benavides sent at 12/10/2019  8:27 AM CST -----  Contact: Patient [phone]  RX request - refill or new RX.  Is this a refill or new RX: Refill   RX name and strength:  LORazepam (ATIVAN) 0.5 MG tablet     Pharmacy name and phone # [facility] [address] [phone] (Phone) [phone] (Fax)    Please call an advise  Thank you

## 2019-12-10 NOTE — TELEPHONE ENCOUNTER
Spoke to pt and informed of EDI law. She verbalized understanding. Pt doesn't take ativan daily. Please advise on how to proceed.

## 2019-12-11 NOTE — TELEPHONE ENCOUNTER
LM OR for pt re: no need to wean off medication. Dr. ewing would like to know if you want another non controlled medication called in.   Please call then office if she would.

## 2019-12-12 ENCOUNTER — TELEPHONE (OUTPATIENT)
Dept: OPTOMETRY | Facility: CLINIC | Age: 59
End: 2019-12-12

## 2019-12-18 ENCOUNTER — TELEPHONE (OUTPATIENT)
Dept: INTERNAL MEDICINE | Facility: CLINIC | Age: 59
End: 2019-12-18

## 2019-12-18 RX ORDER — HYDROCODONE BITARTRATE AND ACETAMINOPHEN 10; 325 MG/1; MG/1
1 TABLET ORAL
Qty: 90 TABLET | Refills: 0 | Status: SHIPPED | OUTPATIENT
Start: 2019-12-18 | End: 2020-01-16 | Stop reason: SDUPTHER

## 2019-12-18 NOTE — TELEPHONE ENCOUNTER
----- Message from Laura Leavitt sent at 12/18/2019  8:45 AM CST -----  Contact: Patient   Type:  RX Refill Request    Who Called: Violeta  Refill or New Rx: Refill  RX Name and Strength:HYDROcodone-acetaminophen (NORCO)  mg per tablet  How is the patient currently taking it? (ex. 1XDay): as needed  Is this a 30 day or 90 day RX:30 day  Preferred Pharmacy with phone number: [facility] [address]  Local or Mail Order:local   Ordering Provider:Dr. Gaines  Would the patient rather a call back or a response via [facility]? Call back  Best Call Back Number: [phone]  Additional Information: no

## 2019-12-21 ENCOUNTER — TELEPHONE (OUTPATIENT)
Dept: OPTOMETRY | Facility: CLINIC | Age: 59
End: 2019-12-21

## 2019-12-21 NOTE — TELEPHONE ENCOUNTER
Called to let pt know contacts are in . Scheduled dispense 01/03 @ 11:00 ----- Message from Mellisa Harris sent at 12/20/2019  5:01 PM CST -----  Called to let pt know contacts are in. Set up contact lens dispense

## 2019-12-31 DIAGNOSIS — E11.65 UNCONTROLLED TYPE 2 DIABETES MELLITUS WITH HYPERGLYCEMIA: ICD-10-CM

## 2019-12-31 RX ORDER — DULAGLUTIDE 1.5 MG/.5ML
INJECTION, SOLUTION SUBCUTANEOUS
Qty: 4 ML | Refills: 11 | Status: SHIPPED | OUTPATIENT
Start: 2019-12-31 | End: 2021-01-14

## 2020-01-03 ENCOUNTER — PATIENT OUTREACH (OUTPATIENT)
Dept: ADMINISTRATIVE | Facility: OTHER | Age: 60
End: 2020-01-03

## 2020-01-10 DIAGNOSIS — R10.13 DYSPEPSIA: ICD-10-CM

## 2020-01-10 RX ORDER — SUCRALFATE 1 G/1
TABLET ORAL
Qty: 90 TABLET | Refills: 3 | Status: SHIPPED | OUTPATIENT
Start: 2020-01-10 | End: 2020-10-20

## 2020-01-10 RX ORDER — HYOSCYAMINE SULFATE 0.12 MG/1
TABLET SUBLINGUAL
Qty: 30 TABLET | Refills: 5 | Status: ON HOLD | OUTPATIENT
Start: 2020-01-10 | End: 2021-11-30 | Stop reason: HOSPADM

## 2020-01-15 ENCOUNTER — PATIENT OUTREACH (OUTPATIENT)
Dept: ADMINISTRATIVE | Facility: OTHER | Age: 60
End: 2020-01-15

## 2020-01-15 DIAGNOSIS — E11.65 UNCONTROLLED TYPE 2 DIABETES MELLITUS WITH HYPERGLYCEMIA: Primary | ICD-10-CM

## 2020-01-16 ENCOUNTER — OFFICE VISIT (OUTPATIENT)
Dept: OPTOMETRY | Facility: CLINIC | Age: 60
End: 2020-01-16
Payer: COMMERCIAL

## 2020-01-16 ENCOUNTER — TELEPHONE (OUTPATIENT)
Dept: INTERNAL MEDICINE | Facility: CLINIC | Age: 60
End: 2020-01-16

## 2020-01-16 DIAGNOSIS — H52.13 MYOPIA WITH ASTIGMATISM AND PRESBYOPIA, BILATERAL: Primary | ICD-10-CM

## 2020-01-16 DIAGNOSIS — H52.203 MYOPIA WITH ASTIGMATISM AND PRESBYOPIA, BILATERAL: Primary | ICD-10-CM

## 2020-01-16 DIAGNOSIS — H52.4 MYOPIA WITH ASTIGMATISM AND PRESBYOPIA, BILATERAL: Primary | ICD-10-CM

## 2020-01-16 PROCEDURE — 92499 PR CONTACT LENS F/U LEV 1: ICD-10-PCS | Mod: ,,, | Performed by: OPTOMETRIST

## 2020-01-16 PROCEDURE — 99999 PR PBB SHADOW E&M-EST. PATIENT-LVL III: CPT | Mod: PBBFAC,,, | Performed by: OPTOMETRIST

## 2020-01-16 PROCEDURE — 99999 PR PBB SHADOW E&M-EST. PATIENT-LVL III: ICD-10-PCS | Mod: PBBFAC,,, | Performed by: OPTOMETRIST

## 2020-01-16 PROCEDURE — 92499 UNLISTED OPH SVC/PROCEDURE: CPT | Mod: ,,, | Performed by: OPTOMETRIST

## 2020-01-16 RX ORDER — HYDROCODONE BITARTRATE AND ACETAMINOPHEN 10; 325 MG/1; MG/1
1 TABLET ORAL
Qty: 90 TABLET | Refills: 0 | Status: SHIPPED | OUTPATIENT
Start: 2020-01-16 | End: 2020-02-17 | Stop reason: SDUPTHER

## 2020-01-16 NOTE — TELEPHONE ENCOUNTER
----- Message from Eunice Longoria sent at 1/16/2020 11:49 AM CST -----  Contact: [phone]  Type: Rx    Name of medication(s): HYDROcodone-acetaminophen (NORCO)  mg per tablet    Is this a refill? New rx? Refill     Pharmacy Name, Phone, & Location:[address]   [phone] (Phone)  [phone] (Fax)    Comments:please advise, thanks

## 2020-02-04 ENCOUNTER — TELEPHONE (OUTPATIENT)
Dept: INTERNAL MEDICINE | Facility: CLINIC | Age: 60
End: 2020-02-04

## 2020-02-04 DIAGNOSIS — E11.65 UNCONTROLLED TYPE 2 DIABETES MELLITUS WITH HYPERGLYCEMIA: Primary | ICD-10-CM

## 2020-02-04 DIAGNOSIS — M17.0 OSTEOARTHRITIS OF BOTH KNEES, UNSPECIFIED OSTEOARTHRITIS TYPE: Primary | ICD-10-CM

## 2020-02-04 NOTE — TELEPHONE ENCOUNTER
----- Message from Eunice Longoria sent at 2/4/2020  1:27 PM CST -----  Contact: [phone]  Doctor appointment and lab have been scheduled.  Please link lab orders to the lab appointment.  Date of doctor appointment:02/13    Date of lab appointment:  02/05  Physical or EP: ep  Comments: please advise, thanks     REMINDER to appt coordinator: ## delete this from the message after reading! ##     1) Physical Established Patient: NO LABS FIRST for Choco, Maria Del Rosario, Maximo Chavarria, Rakesh and Marilyn  2) The lab appointment must be at least 3 days from now to allow time for the order to be entered and linked to the appointment.   3) Lab appointment should be scheduled at least 3 days before the doctor appointment.

## 2020-02-04 NOTE — TELEPHONE ENCOUNTER
----- Message from Jessica Posada sent at 2/4/2020  1:30 PM CST -----  Contact: Patient [phone]  Patient would like to speak with you regarding having knee injections.    Please call and advise.    Thank You

## 2020-02-04 NOTE — TELEPHONE ENCOUNTER
Pt says she gets both knees injected every 6 months.  Once a week for 3 weeks. She says normally insurance has to approve. Not too familiar with this. Does pt just schedule an appt?

## 2020-02-05 ENCOUNTER — LAB VISIT (OUTPATIENT)
Dept: LAB | Facility: HOSPITAL | Age: 60
End: 2020-02-05
Attending: INTERNAL MEDICINE
Payer: COMMERCIAL

## 2020-02-05 DIAGNOSIS — E11.65 UNCONTROLLED TYPE 2 DIABETES MELLITUS WITH HYPERGLYCEMIA: ICD-10-CM

## 2020-02-05 LAB
ALBUMIN SERPL BCP-MCNC: 3.4 G/DL (ref 3.5–5.2)
ALP SERPL-CCNC: 82 U/L (ref 55–135)
ALT SERPL W/O P-5'-P-CCNC: 18 U/L (ref 10–44)
ANION GAP SERPL CALC-SCNC: 11 MMOL/L (ref 8–16)
AST SERPL-CCNC: 34 U/L (ref 10–40)
BILIRUB SERPL-MCNC: 0.3 MG/DL (ref 0.1–1)
BUN SERPL-MCNC: 16 MG/DL (ref 6–20)
CALCIUM SERPL-MCNC: 9.9 MG/DL (ref 8.7–10.5)
CHLORIDE SERPL-SCNC: 103 MMOL/L (ref 95–110)
CHOLEST SERPL-MCNC: 193 MG/DL (ref 120–199)
CHOLEST/HDLC SERPL: 4.5 {RATIO} (ref 2–5)
CO2 SERPL-SCNC: 26 MMOL/L (ref 23–29)
CREAT SERPL-MCNC: 0.8 MG/DL (ref 0.5–1.4)
EST. GFR  (AFRICAN AMERICAN): >60 ML/MIN/1.73 M^2
EST. GFR  (NON AFRICAN AMERICAN): >60 ML/MIN/1.73 M^2
ESTIMATED AVG GLUCOSE: 280 MG/DL (ref 68–131)
GLUCOSE SERPL-MCNC: 249 MG/DL (ref 70–110)
HBA1C MFR BLD HPLC: 11.4 % (ref 4–5.6)
HDLC SERPL-MCNC: 43 MG/DL (ref 40–75)
HDLC SERPL: 22.3 % (ref 20–50)
LDLC SERPL CALC-MCNC: 109 MG/DL (ref 63–159)
NONHDLC SERPL-MCNC: 150 MG/DL
POTASSIUM SERPL-SCNC: 4.5 MMOL/L (ref 3.5–5.1)
PROT SERPL-MCNC: 7.3 G/DL (ref 6–8.4)
SODIUM SERPL-SCNC: 140 MMOL/L (ref 136–145)
TRIGL SERPL-MCNC: 205 MG/DL (ref 30–150)

## 2020-02-05 PROCEDURE — 80053 COMPREHEN METABOLIC PANEL: CPT

## 2020-02-05 PROCEDURE — 36415 COLL VENOUS BLD VENIPUNCTURE: CPT | Mod: PO

## 2020-02-05 PROCEDURE — 83036 HEMOGLOBIN GLYCOSYLATED A1C: CPT

## 2020-02-05 PROCEDURE — 80061 LIPID PANEL: CPT

## 2020-02-08 NOTE — TELEPHONE ENCOUNTER
I placed a prior authorization for Euflexxa.  Please schedule patient in a about 2 weeks.  Audrey is ordering a couple of boxes of Euflexxa for us.  Thank you

## 2020-02-10 NOTE — TELEPHONE ENCOUNTER
Spoke with pt and scheduled the appt for the 1st knee shot  You are off for the week of the 24th. Pt was scheduled for the 21st

## 2020-02-17 ENCOUNTER — TELEPHONE (OUTPATIENT)
Dept: INTERNAL MEDICINE | Facility: CLINIC | Age: 60
End: 2020-02-17

## 2020-02-17 RX ORDER — HYDROCODONE BITARTRATE AND ACETAMINOPHEN 10; 325 MG/1; MG/1
1 TABLET ORAL
Qty: 90 TABLET | Refills: 0 | Status: SHIPPED | OUTPATIENT
Start: 2020-02-17 | End: 2020-03-23 | Stop reason: SDUPTHER

## 2020-02-17 NOTE — TELEPHONE ENCOUNTER
----- Message from Mariah Bolton sent at 2/17/2020  3:20 PM CST -----  Contact: [phone]  Type: Rx    Name of medication(s): HYDROcodone-acetaminophen (NORCO)  mg per tablet,  tiZANidine (ZANAFLEX) 4 MG tablet    Is this a refill? New rx?   refill    Who prescribed medication?  Dr. Petersen    Pharmacy Name, Phone, & Location:  [address]    Comments:   Please advise, thank you

## 2020-02-20 ENCOUNTER — TELEPHONE (OUTPATIENT)
Dept: INTERNAL MEDICINE | Facility: CLINIC | Age: 60
End: 2020-02-20

## 2020-02-20 RX ORDER — TIZANIDINE 4 MG/1
4 TABLET ORAL EVERY 8 HOURS
Qty: 90 TABLET | Refills: 3 | Status: SHIPPED | OUTPATIENT
Start: 2020-02-20 | End: 2021-02-21

## 2020-02-20 NOTE — TELEPHONE ENCOUNTER
----- Message from Doreen Holden sent at 2/20/2020 12:28 PM CST -----  Contact: Self   Patient is asking for a Rx zanaflex and asking to discontinue the Rx  cyclobenzaprine (FLEXERIL) 5 MG tablet. Please call and advise.      [facility] [address] [place] [address] [phone] (Phone)  [phone] (Fax)

## 2020-02-27 ENCOUNTER — TELEPHONE (OUTPATIENT)
Dept: INTERNAL MEDICINE | Facility: CLINIC | Age: 60
End: 2020-02-27

## 2020-02-27 DIAGNOSIS — Z12.31 VISIT FOR SCREENING MAMMOGRAM: Primary | ICD-10-CM

## 2020-02-27 NOTE — TELEPHONE ENCOUNTER
----- Message from Jessica Posada sent at 2/27/2020  8:36 AM CST -----  Contact: Patient [phone]  Orders Needed    Orders being requested: Digital Mammogram    Does patient have future appt with PCP: yes    Please contact patient to schedule mammogram when orders have been placed.    Thank You

## 2020-02-27 NOTE — TELEPHONE ENCOUNTER
----- Message from Doreen Holden sent at 2/27/2020  8:28 AM CST -----  Contact: self   Patient is asking for an appointment to be seen sooner. Patient have been advise the next available appointment is 04/16. Please call and advise.

## 2020-02-28 ENCOUNTER — TELEPHONE (OUTPATIENT)
Dept: INTERNAL MEDICINE | Facility: CLINIC | Age: 60
End: 2020-02-28

## 2020-02-28 NOTE — TELEPHONE ENCOUNTER
----- Message from Nehal Baptiste sent at 2/28/2020  2:58 PM CST -----  Contact: [phone]  Caller is requesting to schedule their annual screening mammogram appointment. Order is not listed in Epic.  Please enter order and contact patient to schedule.  Where would they like the mammogram performed?:  [facility]  Would the patient rather receive a phone call back or a response via [facility]?:   Call back  Additional information:

## 2020-03-08 RX ORDER — AMLODIPINE BESYLATE 5 MG/1
TABLET ORAL
Qty: 90 TABLET | Refills: 1 | Status: SHIPPED | OUTPATIENT
Start: 2020-03-08 | End: 2020-09-29 | Stop reason: SDUPTHER

## 2020-03-08 RX ORDER — METOPROLOL TARTRATE 100 MG/1
TABLET ORAL
Qty: 180 TABLET | Refills: 1 | Status: SHIPPED | OUTPATIENT
Start: 2020-03-08 | End: 2020-09-21 | Stop reason: SDUPTHER

## 2020-03-08 RX ORDER — LACTULOSE 10 G/15ML
SOLUTION ORAL; RECTAL
Qty: 473 ML | Refills: 0 | Status: SHIPPED | OUTPATIENT
Start: 2020-03-08 | End: 2020-04-22

## 2020-03-08 RX ORDER — METFORMIN HYDROCHLORIDE 1000 MG/1
TABLET ORAL
Qty: 180 TABLET | Refills: 1 | Status: SHIPPED | OUTPATIENT
Start: 2020-03-08 | End: 2020-09-29 | Stop reason: SDUPTHER

## 2020-03-08 RX ORDER — LEVOTHYROXINE SODIUM 100 UG/1
TABLET ORAL
Qty: 90 TABLET | Refills: 1 | Status: SHIPPED | OUTPATIENT
Start: 2020-03-08 | End: 2020-11-17

## 2020-03-16 ENCOUNTER — TELEPHONE (OUTPATIENT)
Dept: INTERNAL MEDICINE | Facility: CLINIC | Age: 60
End: 2020-03-16

## 2020-03-16 NOTE — TELEPHONE ENCOUNTER
----- Message from Chloe Mancuso sent at 3/16/2020 10:11 AM CDT -----  Contact: self/ [phone]  Requesting an RX refill or new RX.  Is this a refill or new RX:    RX name and strength: HYDROcodone-acetaminophen (NORCO)  mg per tablet 90 tablet   Directions (copy/paste from chart):  Take 1 tablet by mouth every 6 to 8 hours as needed for Pain. Medically necessary; over-ride; pt needs more than seven day supply  Is this a 30 day or 90 day RX:    Local pharmacy or mail order pharmacy:  [facility] [address] [place] [address] [phone] (Phone)  [phone] (Fax)  Pharmacy name and phone # (copy/paste from chart):     Comments:

## 2020-03-16 NOTE — TELEPHONE ENCOUNTER
"Med cannot be filled. She has to be seen every 3 months and she has not been seen since November. She "no showed" her appt last week. She needs an o/v and during that o/v the med will be refilled  "

## 2020-03-18 ENCOUNTER — OFFICE VISIT (OUTPATIENT)
Dept: INTERNAL MEDICINE | Facility: CLINIC | Age: 60
End: 2020-03-18
Attending: FAMILY MEDICINE
Payer: COMMERCIAL

## 2020-03-18 VITALS
BODY MASS INDEX: 50.02 KG/M2 | WEIGHT: 293 LBS | HEIGHT: 64 IN | DIASTOLIC BLOOD PRESSURE: 80 MMHG | TEMPERATURE: 98 F | HEART RATE: 86 BPM | OXYGEN SATURATION: 95 % | SYSTOLIC BLOOD PRESSURE: 106 MMHG

## 2020-03-18 DIAGNOSIS — M17.0 OSTEOARTHRITIS OF BOTH KNEES, UNSPECIFIED OSTEOARTHRITIS TYPE: Primary | ICD-10-CM

## 2020-03-18 PROCEDURE — 99499 UNLISTED E&M SERVICE: CPT | Mod: S$GLB,,, | Performed by: FAMILY MEDICINE

## 2020-03-18 PROCEDURE — 20610 PR DRAIN/INJECT LARGE JOINT/BURSA: ICD-10-PCS | Mod: 50,S$GLB,, | Performed by: FAMILY MEDICINE

## 2020-03-18 PROCEDURE — 99499 NO LOS: ICD-10-PCS | Mod: S$GLB,,, | Performed by: FAMILY MEDICINE

## 2020-03-18 PROCEDURE — 99999 PR PBB SHADOW E&M-EST. PATIENT-LVL III: CPT | Mod: PBBFAC,,, | Performed by: FAMILY MEDICINE

## 2020-03-18 PROCEDURE — 20610 DRAIN/INJ JOINT/BURSA W/O US: CPT | Mod: 50,S$GLB,, | Performed by: FAMILY MEDICINE

## 2020-03-18 PROCEDURE — 99999 PR PBB SHADOW E&M-EST. PATIENT-LVL III: ICD-10-PCS | Mod: PBBFAC,,, | Performed by: FAMILY MEDICINE

## 2020-03-21 NOTE — PROGRESS NOTES
Subjective:       Patient ID: Violeta Champion is a 59 y.o. female.    Chief Complaint: Follow-up    Patient is a 59 y.o.female who presents today for follow up    Chronic knee pain:  Pt has seen ortho, sports medicine and rheumatology in the past. Pain likely due to weight as well. She has tried synvisc injections in the past and per pt has tried PT. Not interested in surgery at this time as pain is not always flared. norco controls her pain. No side effects to this medication. She takes 2-3 per day as needed and only if needed. She had euflexxa injections on 3/18. Contract signed in April 2019.     DM: taking trulicity, januvia, metformin and glipizide  - off steroids now  - sugars are better now ; in the 100s for her fasting.  - she stopped trulicity for a while and now restarted.      Labs: reviewed  Gyn hysterectomy  Diet: trying intermittent fasting  Mammogram april 2019; scheduled  Colon due April 2021  Diabetic eye exam: November; [facility]      1. Felt a lump in her right breast for one week; not painful. Mammogram is scheduled at the end of April. 12 o'clock region.    2. All toes on left foot are in pain; feels like a strong  and swelling.   Review of Systems   Constitutional: Negative for appetite change, chills, diaphoresis and fever.   HENT: Negative for congestion, ear discharge, ear pain, postnasal drip, tinnitus, trouble swallowing and voice change.    Eyes: Negative for discharge, redness and itching.   Respiratory: Negative for cough, chest tightness, shortness of breath and wheezing.    Cardiovascular: Negative for chest pain, palpitations and leg swelling.   Gastrointestinal: Negative for abdominal pain, constipation, diarrhea, nausea and vomiting.   Endocrine: Negative for cold intolerance and heat intolerance.   Genitourinary: Negative for difficulty urinating, flank pain, hematuria and urgency.   Musculoskeletal: Positive for arthralgias. Negative for gait problem, myalgias and neck  stiffness.        Foot pain   Skin: Negative for color change and rash.   Neurological: Negative for dizziness, seizures, syncope and headaches.   Hematological: Negative for adenopathy.   Psychiatric/Behavioral: Negative for agitation, behavioral problems, confusion and sleep disturbance.       Objective:      Physical Exam   Constitutional: She is oriented to person, place, and time. She appears well-developed and well-nourished. No distress.   HENT:   Head: Normocephalic and atraumatic.   Right Ear: External ear normal.   Left Ear: External ear normal.   Nose: Nose normal.   Mouth/Throat: Oropharynx is clear and moist. No oropharyngeal exudate.   Eyes: Pupils are equal, round, and reactive to light. Conjunctivae and EOM are normal. Right eye exhibits no discharge. Left eye exhibits no discharge. No scleral icterus.   Neck: Neck supple. No JVD present. No tracheal deviation present. No thyromegaly present.   Cardiovascular: Normal rate, normal heart sounds and intact distal pulses. Exam reveals no gallop and no friction rub.   No murmur heard.  Pulmonary/Chest: Effort normal and breath sounds normal. No stridor. No respiratory distress. She has no wheezes. She has no rales. She exhibits no tenderness. Right breast exhibits mass.   Abdominal: Soft. Bowel sounds are normal. She exhibits no distension. There is no tenderness. There is no rebound.   Musculoskeletal: She exhibits no edema or tenderness.        Right knee: She exhibits decreased range of motion and swelling.        Left knee: She exhibits decreased range of motion and swelling.   Lymphadenopathy:     She has no cervical adenopathy.   Neurological: She is alert and oriented to person, place, and time.   Skin: Skin is warm and dry. No rash noted. She is not diaphoretic. No erythema.   Psychiatric: She has a normal mood and affect. Her behavior is normal.   Nursing note and vitals reviewed.      Assessment and Plan:       1. Osteoarthritis of both knees,  unspecified osteoarthritis type  - pain contract renewed  Pt has signed pain contract. Discussed that medication should be taken as prescribed. Discussed potential side effects to this medication and pt must notify me if they start having any side effects. Discussed random drug screenings. Discussed appt needed every 3 months to continue this medication. Pt agreeable to plan.  - norco filled   checked ; no suspicious activity; med filled  - Hemoglobin A1c; Future  - Comprehensive metabolic panel; Future  - Lipid panel; Future  - CBC auto differential; Future  - Microalbumin/creatinine urine ratio; Future  - Vitamin D; Future  - Urinalysis; Future  - TSH; Future    2. Uncontrolled type 2 diabetes mellitus with hyperglycemia  - not controlled  - pt was recently on steroids for a few months; now off  - better fasting readings  - compliant with all meds and diet   - Hemoglobin A1c; Future  - Comprehensive metabolic panel; Future  - Lipid panel; Future  - CBC auto differential; Future  - Microalbumin/creatinine urine ratio; Future  - Vitamin D; Future  - Urinalysis; Future  - TSH; Future    3. HTN, goal below 130/80  - stable on meds  - Hemoglobin A1c; Future  - Comprehensive metabolic panel; Future  - Lipid panel; Future  - CBC auto differential; Future  - Microalbumin/creatinine urine ratio; Future  - Vitamin D; Future  - Urinalysis; Future  - TSH; Future    4. Hyperlipidemia, unspecified hyperlipidemia type  - stable on statin  - Hemoglobin A1c; Future  - Comprehensive metabolic panel; Future  - Lipid panel; Future  - CBC auto differential; Future  - Microalbumin/creatinine urine ratio; Future  - Vitamin D; Future  - Urinalysis; Future  - TSH; Future    5. Hypothyroidism, unspecified type  - stable on synthroid  - Hemoglobin A1c; Future  - Comprehensive metabolic panel; Future  - Lipid panel; Future  - CBC auto differential; Future  - Microalbumin/creatinine urine ratio; Future  - Vitamin D; Future  - Urinalysis;  Future  - TSH; Future    6. Lump of right breast  - US Breast Right Complete; Future      7. Wheezing  - fluticasone-salmeterol diskus inhaler 250-50 mcg; Inhale 1 puff into the lungs 2 (two) times daily. Controller  Dispense: 180 each; Refill: 3    8. Left foot pain  - cannot xray today due to covid precautions; no appearance of gout on exam  - likely neuropathy vs arthritis  - Ambulatory referral/consult to Podiatry; Future          No follow-ups on file.

## 2020-03-23 ENCOUNTER — TELEPHONE (OUTPATIENT)
Dept: INTERNAL MEDICINE | Facility: CLINIC | Age: 60
End: 2020-03-23

## 2020-03-23 ENCOUNTER — OFFICE VISIT (OUTPATIENT)
Dept: INTERNAL MEDICINE | Facility: CLINIC | Age: 60
End: 2020-03-23
Payer: COMMERCIAL

## 2020-03-23 VITALS
RESPIRATION RATE: 16 BRPM | DIASTOLIC BLOOD PRESSURE: 84 MMHG | HEART RATE: 76 BPM | TEMPERATURE: 97 F | BODY MASS INDEX: 50.02 KG/M2 | HEIGHT: 64 IN | SYSTOLIC BLOOD PRESSURE: 126 MMHG | WEIGHT: 293 LBS

## 2020-03-23 DIAGNOSIS — M17.0 OSTEOARTHRITIS OF BOTH KNEES, UNSPECIFIED OSTEOARTHRITIS TYPE: Primary | ICD-10-CM

## 2020-03-23 DIAGNOSIS — M79.672 LEFT FOOT PAIN: ICD-10-CM

## 2020-03-23 DIAGNOSIS — E78.5 HYPERLIPIDEMIA, UNSPECIFIED HYPERLIPIDEMIA TYPE: ICD-10-CM

## 2020-03-23 DIAGNOSIS — E03.9 HYPOTHYROIDISM, UNSPECIFIED TYPE: ICD-10-CM

## 2020-03-23 DIAGNOSIS — N63.10 LUMP OF RIGHT BREAST: ICD-10-CM

## 2020-03-23 DIAGNOSIS — E11.65 UNCONTROLLED TYPE 2 DIABETES MELLITUS WITH HYPERGLYCEMIA: ICD-10-CM

## 2020-03-23 DIAGNOSIS — I10 HTN, GOAL BELOW 130/80: ICD-10-CM

## 2020-03-23 DIAGNOSIS — N64.4 BREAST PAIN, RIGHT: Primary | ICD-10-CM

## 2020-03-23 DIAGNOSIS — R06.2 WHEEZING: ICD-10-CM

## 2020-03-23 PROCEDURE — 3079F DIAST BP 80-89 MM HG: CPT | Mod: CPTII,S$GLB,, | Performed by: INTERNAL MEDICINE

## 2020-03-23 PROCEDURE — 3074F SYST BP LT 130 MM HG: CPT | Mod: CPTII,S$GLB,, | Performed by: INTERNAL MEDICINE

## 2020-03-23 PROCEDURE — 3046F HEMOGLOBIN A1C LEVEL >9.0%: CPT | Mod: CPTII,S$GLB,, | Performed by: INTERNAL MEDICINE

## 2020-03-23 PROCEDURE — 99214 PR OFFICE/OUTPT VISIT, EST, LEVL IV, 30-39 MIN: ICD-10-PCS | Mod: S$GLB,,, | Performed by: INTERNAL MEDICINE

## 2020-03-23 PROCEDURE — 99999 PR PBB SHADOW E&M-EST. PATIENT-LVL IV: ICD-10-PCS | Mod: PBBFAC,,, | Performed by: INTERNAL MEDICINE

## 2020-03-23 PROCEDURE — 3008F PR BODY MASS INDEX (BMI) DOCUMENTED: ICD-10-PCS | Mod: CPTII,S$GLB,, | Performed by: INTERNAL MEDICINE

## 2020-03-23 PROCEDURE — 99214 OFFICE O/P EST MOD 30 MIN: CPT | Mod: S$GLB,,, | Performed by: INTERNAL MEDICINE

## 2020-03-23 PROCEDURE — 3008F BODY MASS INDEX DOCD: CPT | Mod: CPTII,S$GLB,, | Performed by: INTERNAL MEDICINE

## 2020-03-23 PROCEDURE — 3079F PR MOST RECENT DIASTOLIC BLOOD PRESSURE 80-89 MM HG: ICD-10-PCS | Mod: CPTII,S$GLB,, | Performed by: INTERNAL MEDICINE

## 2020-03-23 PROCEDURE — 99999 PR PBB SHADOW E&M-EST. PATIENT-LVL IV: CPT | Mod: PBBFAC,,, | Performed by: INTERNAL MEDICINE

## 2020-03-23 PROCEDURE — 3046F PR MOST RECENT HEMOGLOBIN A1C LEVEL > 9.0%: ICD-10-PCS | Mod: CPTII,S$GLB,, | Performed by: INTERNAL MEDICINE

## 2020-03-23 PROCEDURE — 3074F PR MOST RECENT SYSTOLIC BLOOD PRESSURE < 130 MM HG: ICD-10-PCS | Mod: CPTII,S$GLB,, | Performed by: INTERNAL MEDICINE

## 2020-03-23 RX ORDER — ALBUTEROL SULFATE 90 UG/1
AEROSOL, METERED RESPIRATORY (INHALATION)
Qty: 53 G | Refills: 3 | Status: SHIPPED | OUTPATIENT
Start: 2020-03-23 | End: 2020-03-23 | Stop reason: SDUPTHER

## 2020-03-23 RX ORDER — CELECOXIB 200 MG/1
CAPSULE ORAL
Status: ON HOLD | COMMUNITY
Start: 2020-02-05 | End: 2021-10-27

## 2020-03-23 RX ORDER — HYDROCODONE BITARTRATE AND ACETAMINOPHEN 10; 325 MG/1; MG/1
1 TABLET ORAL
Qty: 90 TABLET | Refills: 0 | Status: SHIPPED | OUTPATIENT
Start: 2020-03-23 | End: 2020-04-20 | Stop reason: SDUPTHER

## 2020-03-23 RX ORDER — ALBUTEROL SULFATE 90 UG/1
AEROSOL, METERED RESPIRATORY (INHALATION)
Qty: 54 G | Refills: 3 | Status: SHIPPED | OUTPATIENT
Start: 2020-03-23

## 2020-03-23 RX ORDER — FLUTICASONE PROPIONATE AND SALMETEROL 250; 50 UG/1; UG/1
1 POWDER RESPIRATORY (INHALATION) 2 TIMES DAILY
Qty: 180 EACH | Refills: 3 | Status: SHIPPED | OUTPATIENT
Start: 2020-03-23

## 2020-03-23 NOTE — TELEPHONE ENCOUNTER
----- Message from Jennifer Chu sent at 3/23/2020  1:43 PM CDT -----  Type:  Needs Medical Advice    Who Called: alvarado     Would the patient rather a call back or a response via [facility]? CALL BACK     Best Call Back Number: [phone]    Additional Information: PT WOULD LIKE TO SPEAK WITH THE NURSE REGARDING GETTING A DIAGNOSTIC MAMMOGRAM

## 2020-03-23 NOTE — TELEPHONE ENCOUNTER
----- Message from Chio Hartman sent at 3/23/2020 10:33 AM CDT -----  Contact: [facility] [phone]  Pharmacy Calling    Reason for call: how many inhaler does the patient get due to the grams    Pharmacy Name:   [facility] [address] [phone] (Phone)  [phone] (Fax)      Prescription Name: albuterol (VENTOLIN HFA) 90 mcg/actuation inhaler      Additional Information:  Pharmacy is requesting a call back to advise

## 2020-03-23 NOTE — TELEPHONE ENCOUNTER
Pt stated that routine mammo was canceled due to covid-19. She stated that since she is having a problem, Dr. Cunningham can order a diagnostic mammo for pt to complete.

## 2020-03-24 ENCOUNTER — TELEPHONE (OUTPATIENT)
Dept: INTERNAL MEDICINE | Facility: CLINIC | Age: 60
End: 2020-03-24

## 2020-03-24 NOTE — TELEPHONE ENCOUNTER
Brian, patient has an appointment on 4/1 - please re-schedule that to 4/29 in the morning - you will have to override somewhere. Please call her and tell her what time to come in. Thank you.

## 2020-04-15 ENCOUNTER — PATIENT OUTREACH (OUTPATIENT)
Dept: ADMINISTRATIVE | Facility: OTHER | Age: 60
End: 2020-04-15

## 2020-04-15 ENCOUNTER — OFFICE VISIT (OUTPATIENT)
Dept: RHEUMATOLOGY | Facility: CLINIC | Age: 60
End: 2020-04-15
Payer: COMMERCIAL

## 2020-04-15 DIAGNOSIS — M79.7 FIBROMYALGIA: ICD-10-CM

## 2020-04-15 DIAGNOSIS — M15.9 GENERALIZED OSTEOARTHRITIS OF MULTIPLE SITES: ICD-10-CM

## 2020-04-15 DIAGNOSIS — Z55.9 EDUCATIONAL CIRCUMSTANCE: ICD-10-CM

## 2020-04-15 DIAGNOSIS — M25.50 PAIN IN JOINT INVOLVING MULTIPLE SITES: Primary | ICD-10-CM

## 2020-04-15 PROCEDURE — 99214 OFFICE O/P EST MOD 30 MIN: CPT | Mod: 95,,, | Performed by: INTERNAL MEDICINE

## 2020-04-15 PROCEDURE — 99214 PR OFFICE/OUTPT VISIT, EST, LEVL IV, 30-39 MIN: ICD-10-PCS | Mod: 95,,, | Performed by: INTERNAL MEDICINE

## 2020-04-15 SDOH — SOCIAL DETERMINANTS OF HEALTH (SDOH): PROBLEMS RELATED TO EDUCATION AND LITERACY, UNSPECIFIED: Z55.9

## 2020-04-15 NOTE — PROGRESS NOTES
Subjective:       Patient ID: Violeta Champion is a 59 y.o. female sent by Dr. Petersen for a consultation on myalgia/arthralgia.    Chief Complaint: No chief complaint on file.    The patient location is:home  The chief complaint leading to consultation is: inflammatory arthritis vs fibromyalgia  Visit type: audio only  Total time spent with patient: 30 minutes  Each patient to whom he or she provides medical services by telemedicine is:  (1) informed of the relationship between the physician and patient and the respective role of any other health care provider with respect to management of the patient; and (2) notified that he or she may decline to receive medical services by telemedicine and may withdraw from such care at any time.      59 yr old lady a number of morbidities including DM, hypothyroidism & morbid obesity who had been seen twice in the remote past by Dr. Haider Caicedo. Patient presented to her with widespread pain, tenderness & swelling in R 2&3rd MCPs only on exam. Serologies for RA were negative, but she did have elevated ESR, CRP, plts and low albumin and she was begun on HCQ. Has a discolored area left back that began age 15 was dx as scleroderma by a dermatologist after bx. She saw me initially once on 7/14/16 at which time an inflammatory polyarthritis was entertained due to a hx of synovitis, AM stiffness and inflammatory parameters of inflammation.  She did not return for f/u but presented again on 6/27/19 at the request of Dr. Petersen for pain all over--arthralgia/myalgia at which time a work up was repeated and largely unchanged. She declined the [facility] & did not see Dermatology as requested.     She was last seen by us 10/29/19. At that time we felt we might give her a trial of MTX due to her hx & persisting inflammatory markers. She also reported response to steroids. Nevertheless, she did not begin it.  She took prednisone which helped her pain initially but then  no longer. Gabapentin helped but made her too drowsy to work.  Now she is requesting something for pain which is not only in the joints but muscles and is all over & associated with AM stiffness. She has been on duloxetine in the past and had no problems with it and would like to try it again (she had said previously that it hadn't helped)  as she will be returning to work in 2 weeks and wants to be able to function.    She had a maternal aunt with RA & has a family hx of sarcoid    She did review some of the material on fibromyalgia and identified with it.      Current Outpatient Medications   Medication Sig Dispense Refill    albuterol (VENTOLIN HFA) 90 mcg/actuation inhaler INHALE ONE TO TWO PUFFS INTO LUNGS EVERY 6 HOURS AS NEEDED FOR WHEEZING 54 g 3    amLODIPine (NORVASC) 5 MG tablet Take 1 tablet by mouth once daily 90 tablet 1    aspirin 81 mg Tab Take by mouth. 1 Tablet Oral Every day      blood sugar diagnostic (FREESTYLE INSULINX TEST STRIPS) Strp Check glucose three times daily 100 each 3    buPROPion (WELLBUTRIN XL) 150 MG TB24 tablet Take 1 tablet (150 mg total) by mouth once daily. (Patient not taking: Reported on 3/23/2020) 30 tablet 11    calcium carbonate (CALCIUM CARBONATE) 300 mg (750 mg) Chew Take by mouth 2 (two) times daily.        celecoxib (CELEBREX) 200 MG capsule       diclofenac sodium (VOLTAREN) 1 % Gel APPLY TWO GRAMS TOPICALLY ONCE DAILY 100 g 0    fish oil-dha-epa 1,200-144-216 mg Cap Take by mouth. 1 Capsule Oral Every day      flurazepam (DALMANE) 30 MG capsule Take 30 mg by mouth nightly as needed.        fluticasone-salmeterol diskus inhaler 250-50 mcg Inhale 1 puff into the lungs 2 (two) times daily. Controller 180 each 3    folic acid (FOLVITE) 1 MG tablet Take 1 tablet (1 mg total) by mouth once daily. 90 tablet 3    furosemide (LASIX) 40 MG tablet TAKE 1 TABLET BY MOUTH TWICE DAILY 60 tablet 11    gabapentin (NEURONTIN) 300 MG capsule Take 1 capsule (300 mg  total) by mouth 3 (three) times daily. 90 capsule 3    glipiZIDE (GLUCOTROL) 10 MG tablet TAKE 1 TABLET BY MOUTH TWICE DAILY BEFORE MEAL(S) 180 tablet 1    HYDROcodone-acetaminophen (NORCO)  mg per tablet Take 1 tablet by mouth every 6 to 8 hours as needed for Pain. 90 tablet 0    hyoscyamine (LEVSIN/SL) 0.125 mg Subl DISSOLVE 1 TABLET UNDER THE TONGUE EVERY 4 TO 6 HOURS 30 tablet 5    ibuprofen (ADVIL,MOTRIN) 800 MG tablet Take 800 mg by mouth every 8 (eight) hours as needed.  0    lactulose (CHRONULAC) 10 gram/15 mL solution TAKE 15 ML BY MOUTH ONCE DAILY AS NEEDED FOR CONSTIPATION 473 mL 0    levocetirizine (XYZAL) 5 MG tablet Take 1 tablet (5 mg total) by mouth every evening. (Patient not taking: Reported on 3/23/2020) 30 tablet 11    levothyroxine (SYNTHROID) 100 MCG tablet Take 1 tablet by mouth once daily 90 tablet 1    LORazepam (ATIVAN) 0.5 MG tablet Take 1 tablet (0.5 mg total) by mouth 2 (two) times daily. (Patient not taking: Reported on 3/23/2020) 60 tablet 0    meclizine (ANTIVERT) 25 mg tablet Take 1 tablet (25 mg total) by mouth 3 (three) times daily as needed. (Patient not taking: Reported on 3/23/2020) 30 tablet 0    metFORMIN (GLUCOPHAGE) 1000 MG tablet TAKE 1 TABLET BY MOUTH TWICE DAILY WITH MEALS 180 tablet 1    methotrexate 2.5 MG Tab Take 6 tablets (15 mg total) by mouth every 7 days. This medication requires periodic lab monitoring. (Patient not taking: Reported on 3/23/2020) 24 tablet 11    metoprolol tartrate (LOPRESSOR) 100 MG tablet Take 1 tablet by mouth twice daily 180 tablet 1    mv-min-FA-Pq-Zf-nrdwqjb-lutein (CENTRUM) 0.4-162-18 mg Tab Take by mouth. 1 Tablet Oral Every day      omeprazole (PRILOSEC) 20 MG capsule Take 1 capsule (20 mg total) by mouth 2 (two) times daily. 60 capsule 2    ondansetron (ZOFRAN) 4 MG tablet TAKE ONE TABLET BY MOUTH EVERY 8 HOURS AS NEEDED 30 tablet 1    oxybutynin (DITROPAN-XL) 5 MG TR24 Take 1 tablet (5 mg total) by mouth once  daily. (Patient not taking: Reported on 10/29/2019) 30 tablet 11    potassium chloride SA (KLOR-CON M20) 20 MEQ tablet Take 1 tablet (20 mEq total) by mouth 2 (two) times daily. 180 tablet 3    pravastatin (PRAVACHOL) 40 MG tablet TAKE ONE TABLET BY MOUTH ONCE DAILY AT  NIGHT 90 tablet 3    SITagliptin (JANUVIA) 100 MG Tab Take 1 tablet (100 mg total) by mouth once daily. 90 tablet 3    sucralfate (CARAFATE) 1 gram tablet TAKE ONE TABLET BY MOUTH THREE TIMES DAILY (Patient not taking: Reported on 3/23/2020) 90 tablet 3    temazepam (RESTORIL) 30 mg capsule Take 1 capsule (30 mg total) by mouth nightly as needed. 1 Capsule Oral Every day (Patient not taking: Reported on 3/23/2020) 30 capsule 0    tiZANidine (ZANAFLEX) 4 MG tablet Take 1 tablet (4 mg total) by mouth every 8 (eight) hours. 90 tablet 3    TRULICITY 1.5 mg/0.5 mL PnIj INJECT 1.5 MG INTO THE SKIN EVERY 7 DAYS 4 mL 11     No current facility-administered medications for this visit.           Review of patient's allergies indicates:   Allergen Reactions    Iodinated contrast media Swelling     Other reaction(s): Swelling    Naproxen sodium Swelling    Naprosyn  [naproxen]      Other reaction(s): Swelling       Past Medical History:   Diagnosis Date    GERD (gastroesophageal reflux disease)     High cholesterol     Hypertension     Hypothyroid     Injury of knee, leg, ankle and foot     Insulin-resistant diabetes mellitus and acanthosis nigricans     Menopause present     Osteoarthritis     Osteoarthritis, knee     Osteopenia     Osteopenia     Sleep apnea        Past Surgical History:   Procedure Laterality Date    bilateral duct removal breast      BREAST CYST EXCISION Bilateral     papilloma    HYSTERECTOMY      OOPHORECTOMY      OTHER SURGICAL HISTORY      bilateral central duct removal with bilateral papilomona         Review of Systems   Constitutional: Positive for diaphoresis and fatigue. Negative for fever.   HENT:  Negative.  Negative for mouth sores, sore throat, tinnitus and trouble swallowing.    Eyes: Negative.  Negative for visual disturbance.   Respiratory: Negative.  Negative for cough, choking, chest tightness and shortness of breath.    Cardiovascular: Negative.  Negative for chest pain, palpitations and leg swelling.   Gastrointestinal: Positive for constipation and nausea. Negative for abdominal distention, abdominal pain, blood in stool, diarrhea and vomiting.        Has GERD   Endocrine: Positive for heat intolerance.   Genitourinary: Positive for frequency (nocturia x 2-3) and menstrual problem (hysterectomy & BSO for fibroids 2005). Negative for hematuria.   Musculoskeletal: Positive for arthralgias, back pain, myalgias, neck pain and neck stiffness. Negative for joint swelling.   Skin: Negative.  Negative for rash.   Neurological: Positive for numbness (left foot now). Negative for dizziness, syncope, weakness, light-headedness and headaches.   Hematological: Negative.  Negative for adenopathy. Does not bruise/bleed easily.   Psychiatric/Behavioral: Positive for dysphoric mood (has taken elavil in past; celexa & cymbalta in past didn't help; wellbutrin helped in past.) and sleep disturbance (has sleep apnea). The patient is nervous/anxious (occasionally.).        Family History   Problem Relation Age of Onset    Hypertension Mother     Glaucoma Mother     Diabetes Mother     Hypertension Brother     Cancer Maternal Grandmother         breast    Amblyopia Father     Diabetes Maternal Aunt     Diabetes Maternal Uncle     Breast cancer Paternal Aunt     Breast cancer Paternal Grandmother     Blindness Neg Hx     Cataracts Neg Hx     Macular degeneration Neg Hx     Retinal detachment Neg Hx     Strabismus Neg Hx     Colon cancer Neg Hx     Stomach cancer Neg Hx     Esophageal cancer Neg Hx     Irritable bowel syndrome Neg Hx     Rectal cancer Neg Hx     Ulcerative colitis Neg Hx     Crohn's  disease Neg Hx     Celiac disease Neg Hx    Mom: 76 yo  allergies;  Dad: 78 yo with leg pain  2 S; 1 S has skin problems & stomach problems; 1 S with scoliosis  1 B: age 59 bad heart  Has 1 maternal cousin with thyroid disease.  No CTDs  1 maternal aunt with RA.  1 maternal uncle & aunt with Sarcoid.       Social History     Tobacco Use    Smoking status: Never Smoker    Smokeless tobacco: Never Used   Substance Use Topics    Alcohol use: Yes     Alcohol/week: 0.0 standard drinks     Comment: very rarely    Drug use: No   Is an RN at [employer].    Objective:       LMP  (LMP Unknown)   Exam below is from 10/29/19  Physical Exam   Vitals reviewed.  Constitutional: She is oriented to person, place, and time and well-developed, well-nourished, and in no distress. No distress.   HENT:   Head: Normocephalic and atraumatic.   Mouth/Throat: Oropharynx is clear and moist. No oropharyngeal exudate.   No facial rashes  Parotids not enlarged  No oral ulcers   Eyes: Conjunctivae and EOM are normal. Pupils are equal, round, and reactive to light. Right eye exhibits no discharge. Left eye exhibits no discharge. No scleral icterus.   Neck: Neck supple. No JVD present. No tracheal deviation present. No thyromegaly present.   Cardiovascular: Normal rate, regular rhythm, normal heart sounds and intact distal pulses.  Exam reveals no gallop and no friction rub.    No murmur heard.  Pulmonary/Chest: Effort normal and breath sounds normal. No respiratory distress. She has no wheezes. She has no rales. She exhibits no tenderness.   Abdominal: Soft. Bowel sounds are normal. She exhibits no distension and no mass. There is no splenomegaly or hepatomegaly. There is no tenderness. There is no rebound and no guarding.   Lymphadenopathy:     She has no cervical adenopathy.   Neurological: She is alert and oriented to person, place, and time. She has normal reflexes. No cranial nerve deficit. Gait normal.   Proximal and  distal muscle strength 5/5.   Skin: Skin is warm and dry. She is not diaphoretic.     (See photo from previous visit)  Hyperpigmented, mildly sclerosed horizontal lesion left back & both elbows   Psychiatric: Mood, memory, affect and judgment normal.   Musculoskeletal: Normal range of motion. She exhibits edema (trace). She exhibits no tenderness.   Cspine FROM no tenderness  Tspine FROM no tenderness  Lspine FROM no tenderness.  TMJ: unremarkable  Shoulders: FROM; no synovitis;  Elbows: FROM; no synovitis; no tophi or nodules  Wrists: FROM; no synovitis;    MCPs: FROM; no synovitis; minimal metacarpalgia on R;  ok;  PIPs:FROM; no synovitis;   DIPs: FROM; no synovitis;   HIPS: FROM  Knees: FROM; no synovitis; no instability;  Ankles: FROM: no synovitis   Toes: ok; no metatarsalgia                     10/29/19: ESR 65 (36); CRP 6.7; CBC ok; CMP glu 335; lysozyme ok; ACE ok;  19: ESR 90 (36); CRP 11.2;  CBC plt 440; CMP glu 272; Vit D 54; KO/SSA neg; CCP/RF neg; SPE elevated beta  19: CMP Na 134; glu 357; alb 3.4; Hg A1C 12.8  16: ESR 47; CRP 14.9; UA 5.0; CCP neg  16: CBC plt 380; Alb. 3.1; ; Vit D 27; HgbA1C 7.1; TSH ok; UA tr pr; KO/SSA/RF/CCP    Imagin/27/19: Arthritis survey: personally viewed: Lateral view of the cervical spine show mild DJD.  Well maintained disc spaces.  No atlantoaxial subluxation.  AP view of the hands and feet shows mild DJD.  No definite bone erosions.  AP view of the knees shows DJD and narrowing of the medial tibiofemoral joint spaces bilaterally.  No fracture or dislocation.  No bone destruction identified    7/3/18: CXR: personally viewed: left basilar minimal platelike scarring versus atelectasis.    16: Arthritis survey: personally viewed: Cspine: minimal hypertrophic spurring: hands & feet ok; knees: bilateral mjsn R>L w varus deformity  7/10/15: MRI RLE: personally reviewed: prominence of the posteromedial talus extending to the subtalar  joint with associated edema signal.  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  Further evaluation with CT examination may be helpful. Os trigonum with associated edema.   12/18/13: DXA: Only forearm done: T=1.1;  2/13/13: MRI RLE (knee): personally reviewed: complex tear involving the anterior horn and body segments of the medial meniscus; cartilage loss > medial tibiofemoral compartment with associated subchondral cystic changes.  2/1/13: Bilateral knees: personally reviewed: Tibiofemoral joint space narrowing is observed bilaterally, preferentially involving the medial compartments and essentially symmetric on the two sides.  Some spurring involving the tibial spines is observed bilaterally.  Osseous structures appear intact, with no definite evidence of recent fracture, lytic destructive process, or other significant abnormality noted.  Assessment:   Joint pain multiple sites c/w CSS   R/O inflammatory arthritis--strongly doubt    Synovitis of 2& 3rd MCPs & mild metacarpalgia   by hx in the past    AM stiffness    Inflammatory parameters of inflammation    Morphea?/cutaneous scleroderma low back from childhood?    Response to prednisone    Maternal aunt with RA;    OA of knees   Probable large contribution from fibromyalgia    Fibromyalgia associated with non restorative sleep    Mild generalized osteoarthritis     Vitamin D insufficiency--treated    DM II    Depressive disorder   Not responsive to celexa & duloxetine in past.     Morbid obesity         Plan:   Reviewed and educated on fibromyalgia again.  Also reviewed imaging with patient.  After reviewing sxs again, do not think warranted to begin MTX.  She would like to start duloxetine again, however, I explained to her I do not follow or treat fibromyalgia.  Additionally the mainstay of rx is lifestyle modifications which she stated she is working on.  Risk of seratonin syndrome increases with mixing of many of the CNS drugs she is on  but she can discuss beginning duloxetine again with Dr. Petersen.   RTC prn      CC: Madie Petersen, DO

## 2020-04-15 NOTE — Clinical Note
Madie:If you want to try low dose naltrexone for fibromyalgia, let me know and I will explain it to you.Khushboo

## 2020-04-15 NOTE — PATIENT INSTRUCTIONS
Observe Covid precautions: www.cdc.gov/coronavirus    The importance of hand washing cannot be stressed enough during this COVID 19 pandemic. Here is a YouTube video that shows you the proper 20 sec technique for hand washing.     [email or web address]/[email or web address]

## 2020-04-20 ENCOUNTER — TELEMEDICINE (OUTPATIENT)
Dept: INTERNAL MEDICINE | Facility: CLINIC | Age: 60
End: 2020-04-20

## 2020-04-20 RX ORDER — HYDROCODONE BITARTRATE AND ACETAMINOPHEN 10; 325 MG/1; MG/1
1 TABLET ORAL
Qty: 90 TABLET | Refills: 0 | Status: SHIPPED | OUTPATIENT
Start: 2020-04-20 | End: 2020-05-13 | Stop reason: SDUPTHER

## 2020-04-20 NOTE — PROGRESS NOTES
Patient was upset that we had to move her knee injection appointment.  I gave her a call myself and explained the governor nurse guidance and legal authority on this.  It is pretty clear.  I understand that she is hurting and I want to take care of her but right now there is a directive to suspend elective procedures until at least April 30th.  I explained that we want to back her up further because of the daily changes in guidance to us.  As a compromise we can schedule her for the 1st week of May however explain that between now and then there may be further guidance on face-to-face elective procedures.

## 2020-04-20 NOTE — TELEPHONE ENCOUNTER
----- Message from Apolonia Rebollar sent at 4/20/2020  9:02 AM CDT -----  Contact: Pt [phone]  Is this a refill or new RX:  Refill    RX name and strength: HYDROcodone-acetaminophen (NORCO)  mg per tablet and lactulose (CHRONULAC) 10 gram/15 mL solution    Pharmacy name and phone # [facility] [address] [place] [address] [phone] (Phone) [phone] (Fax)

## 2020-04-22 ENCOUNTER — PATIENT OUTREACH (OUTPATIENT)
Dept: ADMINISTRATIVE | Facility: OTHER | Age: 60
End: 2020-04-22

## 2020-04-22 RX ORDER — LACTULOSE 10 G/15ML
SOLUTION ORAL; RECTAL
Qty: 473 ML | Refills: 6 | Status: SHIPPED | OUTPATIENT
Start: 2020-04-22 | End: 2021-05-31

## 2020-05-09 RX ORDER — SITAGLIPTIN 100 MG/1
TABLET, FILM COATED ORAL
Qty: 90 TABLET | Refills: 0 | Status: SHIPPED | OUTPATIENT
Start: 2020-05-09 | End: 2020-09-04 | Stop reason: ALTCHOICE

## 2020-05-11 ENCOUNTER — TELEPHONE (OUTPATIENT)
Dept: RADIOLOGY | Facility: HOSPITAL | Age: 60
End: 2020-05-11

## 2020-05-11 DIAGNOSIS — E11.65 UNCONTROLLED TYPE 2 DIABETES MELLITUS WITH HYPERGLYCEMIA: ICD-10-CM

## 2020-05-11 RX ORDER — GLIPIZIDE 10 MG/1
TABLET ORAL
Qty: 180 TABLET | Refills: 1 | Status: SHIPPED | OUTPATIENT
Start: 2020-05-11 | End: 2020-06-30 | Stop reason: ALTCHOICE

## 2020-05-11 NOTE — TELEPHONE ENCOUNTER
----- Message from Phillip Ferreira MA sent at 5/11/2020  9:07 AM CDT -----  Contact: PT      ----- Message -----  From: Devora Nix  Sent: 5/11/2020   8:55 AM CDT  To: , #    PT called asking if she's able to do her Ultrasound and Mammogram that was scheduled before. PT said it was cancelled, unsure if these diagnoses deems these orders urgent..    US - Lump of right breast [N63.10]  Mammogram - Breast pain, right [N64.4]    Advised patient we are doing these tests/orders in June for non-urgent needs, depending on the circumstance we may be able to schedule them sooner     Callback: [phone]

## 2020-05-12 ENCOUNTER — OFFICE VISIT (OUTPATIENT)
Dept: INTERNAL MEDICINE | Facility: CLINIC | Age: 60
End: 2020-05-12
Attending: FAMILY MEDICINE
Payer: COMMERCIAL

## 2020-05-12 VITALS
OXYGEN SATURATION: 95 % | BODY MASS INDEX: 50.02 KG/M2 | DIASTOLIC BLOOD PRESSURE: 62 MMHG | HEIGHT: 64 IN | WEIGHT: 293 LBS | HEART RATE: 91 BPM | SYSTOLIC BLOOD PRESSURE: 110 MMHG

## 2020-05-12 DIAGNOSIS — M17.0 OSTEOARTHRITIS OF BOTH KNEES, UNSPECIFIED OSTEOARTHRITIS TYPE: Primary | ICD-10-CM

## 2020-05-12 PROCEDURE — 20610 PR DRAIN/INJECT LARGE JOINT/BURSA: ICD-10-PCS | Mod: 50,S$GLB,, | Performed by: FAMILY MEDICINE

## 2020-05-12 PROCEDURE — 99999 PR PBB SHADOW E&M-EST. PATIENT-LVL III: ICD-10-PCS | Mod: PBBFAC,,, | Performed by: FAMILY MEDICINE

## 2020-05-12 PROCEDURE — 20610 DRAIN/INJ JOINT/BURSA W/O US: CPT | Mod: 50,S$GLB,, | Performed by: FAMILY MEDICINE

## 2020-05-12 PROCEDURE — 99499 UNLISTED E&M SERVICE: CPT | Mod: S$GLB,,, | Performed by: FAMILY MEDICINE

## 2020-05-12 PROCEDURE — 99999 PR PBB SHADOW E&M-EST. PATIENT-LVL III: CPT | Mod: PBBFAC,,, | Performed by: FAMILY MEDICINE

## 2020-05-12 PROCEDURE — 99499 NO LOS: ICD-10-PCS | Mod: S$GLB,,, | Performed by: FAMILY MEDICINE

## 2020-05-13 RX ORDER — HYDROCODONE BITARTRATE AND ACETAMINOPHEN 10; 325 MG/1; MG/1
1 TABLET ORAL
Qty: 90 TABLET | Refills: 0 | Status: SHIPPED | OUTPATIENT
Start: 2020-05-13 | End: 2020-06-09 | Stop reason: SDUPTHER

## 2020-05-13 NOTE — TELEPHONE ENCOUNTER
----- Message from Azeem Holden sent at 5/13/2020  1:39 PM CDT -----  Contact: self    Requesting an RX refill or new RX.  Is this a refill or new RX:  Refill   RX name and strength: HYDROcodone-acetaminophen (NORCO)  mg per tabletDirections (copy/paste from chart):    Is this a 30 day or 90 day RX:  90   [facility] or [facility]        [address] [phone] (Phone)  [phone] (Fax)

## 2020-05-16 ENCOUNTER — TELEPHONE (OUTPATIENT)
Dept: INTERNAL MEDICINE | Facility: CLINIC | Age: 60
End: 2020-05-16

## 2020-05-16 NOTE — TELEPHONE ENCOUNTER
----- Message from Lala Ovalle sent at 5/15/2020  9:17 AM CDT -----  Contact: Mimi das/ Lee Ann  Type:  Pharmacy Calling to Clarify an RX    Name of Caller: Mimi    Pharmacy Name: [facility] [address] [place] [address] [phone] (Phone)  [phone] (Fax)    Prescription Name: HYDROcodone-acetaminophen (NORCO)  mg per tablet    What do they need to clarify?: f/u information    Additional Information: Mimi called to request f/u information for prescription. She is requesting a call back.

## 2020-05-16 NOTE — TELEPHONE ENCOUNTER
Spoke with  @ pharmacy re: Rx that was sent by Dr. Cancino. Pharmacy was verifying bc Dr. Ewing is also prescribing Dr.  Verified both Dr's are in the same facility & Dr. Cancino covering for Dr. ewing while on maternity leave.

## 2020-05-18 ENCOUNTER — OFFICE VISIT (OUTPATIENT)
Dept: INTERNAL MEDICINE | Facility: CLINIC | Age: 60
End: 2020-05-18
Attending: FAMILY MEDICINE
Payer: COMMERCIAL

## 2020-05-18 VITALS
SYSTOLIC BLOOD PRESSURE: 136 MMHG | DIASTOLIC BLOOD PRESSURE: 74 MMHG | BODY MASS INDEX: 50.02 KG/M2 | HEIGHT: 64 IN | OXYGEN SATURATION: 95 % | HEART RATE: 96 BPM | WEIGHT: 293 LBS

## 2020-05-18 DIAGNOSIS — M17.0 OSTEOARTHRITIS OF BOTH KNEES, UNSPECIFIED OSTEOARTHRITIS TYPE: Primary | ICD-10-CM

## 2020-05-18 PROCEDURE — 99999 PR PBB SHADOW E&M-EST. PATIENT-LVL III: ICD-10-PCS | Mod: PBBFAC,,, | Performed by: FAMILY MEDICINE

## 2020-05-18 PROCEDURE — 99499 NO LOS: ICD-10-PCS | Mod: S$GLB,,, | Performed by: FAMILY MEDICINE

## 2020-05-18 PROCEDURE — 20610 DRAIN/INJ JOINT/BURSA W/O US: CPT | Mod: 50,S$GLB,, | Performed by: FAMILY MEDICINE

## 2020-05-18 PROCEDURE — 20610 PR DRAIN/INJECT LARGE JOINT/BURSA: ICD-10-PCS | Mod: 50,S$GLB,, | Performed by: FAMILY MEDICINE

## 2020-05-18 PROCEDURE — 99499 UNLISTED E&M SERVICE: CPT | Mod: S$GLB,,, | Performed by: FAMILY MEDICINE

## 2020-05-18 PROCEDURE — 99999 PR PBB SHADOW E&M-EST. PATIENT-LVL III: CPT | Mod: PBBFAC,,, | Performed by: FAMILY MEDICINE

## 2020-05-18 NOTE — PROGRESS NOTES
Subjective:       Patient ID: Violeta Champion is a 59 y.o. female.    Chief Complaint: Follow-up (knee injections (both ) and Injections    HPI  Review of Systems   Constitutional: Negative for appetite change, chills, diaphoresis, fatigue and fever.   HENT: Negative for congestion, postnasal drip, rhinorrhea, sore throat and trouble swallowing.    Eyes: Negative for visual disturbance.   Respiratory: Negative for cough, choking, chest tightness, shortness of breath and wheezing.    Cardiovascular: Negative for chest pain and leg swelling.   Gastrointestinal: Negative for abdominal distention, abdominal pain, diarrhea, nausea and vomiting.   Genitourinary: Negative for difficulty urinating.   Musculoskeletal: Positive for arthralgias and gait problem. Negative for myalgias.   Skin: Negative for rash.   Neurological: Negative for weakness, light-headedness and headaches.       Objective:      Physical Exam   Constitutional: She is oriented to person, place, and time. She appears well-developed and well-nourished. No distress.   Neck: Neck supple.   Pulmonary/Chest: Effort normal.   Musculoskeletal: She exhibits no edema.        Right lower leg: She exhibits no edema.        Left lower leg: She exhibits no edema.   Neurological: She is alert and oriented to person, place, and time.   Skin: Skin is warm and dry. No rash noted.   Psychiatric: She has a normal mood and affect. Her behavior is normal. Judgment and thought content normal.   Nursing note and vitals reviewed.      Assessment:       1. Osteoarthritis of both knees, unspecified osteoarthritis type        Plan:     Medication List with Changes/Refills   Current Medications    ALBUTEROL (VENTOLIN HFA) 90 MCG/ACTUATION INHALER    INHALE ONE TO TWO PUFFS INTO LUNGS EVERY 6 HOURS AS NEEDED FOR WHEEZING    AMLODIPINE (NORVASC) 5 MG TABLET    Take 1 tablet by mouth once daily    ASPIRIN 81 MG TAB    Take by mouth. 1 Tablet Oral Every day    BLOOD SUGAR DIAGNOSTIC  (FREESTYLE INSULINX TEST STRIPS) STRP    Check glucose three times daily    CALCIUM CARBONATE (CALCIUM CARBONATE) 300 MG (750 MG) CHEW    Take by mouth 2 (two) times daily.      CELECOXIB (CELEBREX) 200 MG CAPSULE        DICLOFENAC SODIUM (VOLTAREN) 1 % GEL    APPLY TWO GRAMS TOPICALLY ONCE DAILY    FISH OIL-DHA-EPA 1,200-144-216 MG CAP    Take by mouth. 1 Capsule Oral Every day    FLURAZEPAM (DALMANE) 30 MG CAPSULE    Take 30 mg by mouth nightly as needed.      FLUTICASONE-SALMETEROL DISKUS INHALER 250-50 MCG    Inhale 1 puff into the lungs 2 (two) times daily. Controller    FOLIC ACID (FOLVITE) 1 MG TABLET    Take 1 tablet (1 mg total) by mouth once daily.    FUROSEMIDE (LASIX) 40 MG TABLET    TAKE 1 TABLET BY MOUTH TWICE DAILY    GABAPENTIN (NEURONTIN) 300 MG CAPSULE    Take 1 capsule (300 mg total) by mouth 3 (three) times daily.    GLIPIZIDE (GLUCOTROL) 10 MG TABLET    TAKE 1 TABLET BY MOUTH TWICE DAILY BEFORE MEAL(S)    HYDROCODONE-ACETAMINOPHEN (NORCO)  MG PER TABLET    Take 1 tablet by mouth every 6 to 8 hours as needed for Pain.    HYOSCYAMINE (LEVSIN/SL) 0.125 MG SUBL    DISSOLVE 1 TABLET UNDER THE TONGUE EVERY 4 TO 6 HOURS    IBUPROFEN (ADVIL,MOTRIN) 800 MG TABLET    Take 800 mg by mouth every 8 (eight) hours as needed.    JANUVIA 100 MG TAB    Take 1 tablet by mouth once daily    LACTULOSE (CHRONULAC) 10 GRAM/15 ML SOLUTION    TAKE 15 ML BY MOUTH  ONCE DAILY AS NEEDED FOR CONSTIPATION    LEVOCETIRIZINE (XYZAL) 5 MG TABLET    Take 1 tablet (5 mg total) by mouth every evening.    LEVOTHYROXINE (SYNTHROID) 100 MCG TABLET    Take 1 tablet by mouth once daily    LORAZEPAM (ATIVAN) 0.5 MG TABLET    Take 1 tablet (0.5 mg total) by mouth 2 (two) times daily.    MECLIZINE (ANTIVERT) 25 MG TABLET    Take 1 tablet (25 mg total) by mouth 3 (three) times daily as needed.    METFORMIN (GLUCOPHAGE) 1000 MG TABLET    TAKE 1 TABLET BY MOUTH TWICE DAILY WITH MEALS    METOPROLOL TARTRATE (LOPRESSOR) 100 MG TABLET     Take 1 tablet by mouth twice daily    MV-MIN-FA-YP-CC-XBCTHJO-LUTEIN (CENTRUM) 0.4-162-18 MG TAB    Take by mouth. 1 Tablet Oral Every day    OMEPRAZOLE (PRILOSEC) 20 MG CAPSULE    Take 1 capsule (20 mg total) by mouth 2 (two) times daily.    ONDANSETRON (ZOFRAN) 4 MG TABLET    TAKE ONE TABLET BY MOUTH EVERY 8 HOURS AS NEEDED    OXYBUTYNIN (DITROPAN-XL) 5 MG TR24    Take 1 tablet (5 mg total) by mouth once daily.    POTASSIUM CHLORIDE SA (KLOR-CON M20) 20 MEQ TABLET    Take 1 tablet (20 mEq total) by mouth 2 (two) times daily.    PRAVASTATIN (PRAVACHOL) 40 MG TABLET    TAKE ONE TABLET BY MOUTH ONCE DAILY AT  NIGHT    SUCRALFATE (CARAFATE) 1 GRAM TABLET    TAKE ONE TABLET BY MOUTH THREE TIMES DAILY    TEMAZEPAM (RESTORIL) 30 MG CAPSULE    Take 1 capsule (30 mg total) by mouth nightly as needed. 1 Capsule Oral Every day    TIZANIDINE (ZANAFLEX) 4 MG TABLET    Take 1 tablet (4 mg total) by mouth every 8 (eight) hours.    TRULICITY 1.5 MG/0.5 ML PNIJ    INJECT 1.5 MG INTO THE SKIN EVERY 7 DAYS     Violeta was seen today for follow-up and injections.    Diagnoses and all orders for this visit:    Osteoarthritis of both knees, unspecified osteoarthritis type      See meds, orders, follow up, routing and instructions sections of encounter and AVS. Discussed with patient and provided on AVS.    BRIEF HISTORY:    Patient presents for therapeutic injections in the bilateral Knee(s) for OA. No complaints expressed at this time. The patient was counseled about risks and benefits of knee Visco-supplementation and other injections in general, including but not limited to pain, infection even that requiring surgery to clean out, failure to provide relief, transient flare, tissue damage. Patient expressed understanding, was given the opportunity to ask questions and any questions answered and consented verbally. Time out performed for localization, medication and patient identification using multiple identifiers.    Procedure  Note:    Following a standard, sterile 2-antiseptic prep and negative arthrocentesis, Euflexxa was instilled in the bilateral knee(s) with no immediate discomfort. The procedure was tolerated well with no immediate adverse effects.    Follow up 6 months.

## 2020-05-19 ENCOUNTER — HOSPITAL ENCOUNTER (OUTPATIENT)
Dept: RADIOLOGY | Facility: HOSPITAL | Age: 60
Discharge: HOME | End: 2020-05-19
Attending: INTERNAL MEDICINE
Payer: COMMERCIAL

## 2020-05-19 DIAGNOSIS — N64.4 BREAST PAIN, RIGHT: ICD-10-CM

## 2020-05-19 DIAGNOSIS — N63.10 LUMP OF RIGHT BREAST: ICD-10-CM

## 2020-05-19 PROCEDURE — 76642 US BREAST RIGHT LIMITED: ICD-10-PCS | Mod: 26,RT,, | Performed by: RADIOLOGY

## 2020-05-19 PROCEDURE — 77062 BREAST TOMOSYNTHESIS BI: CPT | Mod: TC,PO

## 2020-05-19 PROCEDURE — 77066 MAMMO DIGITAL DIAGNOSTIC BILAT WITH TOMOSYNTHESIS_CAD: ICD-10-PCS | Mod: 26,,, | Performed by: RADIOLOGY

## 2020-05-19 PROCEDURE — 76642 ULTRASOUND BREAST LIMITED: CPT | Mod: TC,PO,RT

## 2020-05-19 PROCEDURE — 76642 ULTRASOUND BREAST LIMITED: CPT | Mod: 26,RT,, | Performed by: RADIOLOGY

## 2020-05-19 PROCEDURE — 77066 DX MAMMO INCL CAD BI: CPT | Mod: 26,,, | Performed by: RADIOLOGY

## 2020-05-19 PROCEDURE — G0279 MAMMO DIGITAL DIAGNOSTIC BILAT WITH TOMOSYNTHESIS_CAD: ICD-10-PCS | Mod: 26,,, | Performed by: RADIOLOGY

## 2020-05-19 PROCEDURE — G0279 TOMOSYNTHESIS, MAMMO: HCPCS | Mod: 26,,, | Performed by: RADIOLOGY

## 2020-05-20 ENCOUNTER — TELEPHONE (OUTPATIENT)
Dept: RADIOLOGY | Facility: HOSPITAL | Age: 60
End: 2020-05-20

## 2020-05-21 ENCOUNTER — TELEPHONE (OUTPATIENT)
Dept: RADIOLOGY | Facility: HOSPITAL | Age: 60
End: 2020-05-21

## 2020-05-22 ENCOUNTER — TELEPHONE (OUTPATIENT)
Dept: RADIOLOGY | Facility: HOSPITAL | Age: 60
End: 2020-05-22

## 2020-05-22 NOTE — TELEPHONE ENCOUNTER
Spoke with patient. Reviewed breast biopsy procedure and reviewed instructions for breast biopsy. Patient expressed understanding and all questions were answered. Provided patient with my phone number to call for any further concerns or questions.   Patient scheduled breast biopsy at the [facility] on 5/29/2020.

## 2020-05-27 ENCOUNTER — PATIENT OUTREACH (OUTPATIENT)
Dept: ADMINISTRATIVE | Facility: OTHER | Age: 60
End: 2020-05-27

## 2020-05-27 NOTE — PROGRESS NOTES
Patient's chart was reviewed.   Requested updates within Care Everywhere.  Immunizations reconciled.

## 2020-05-29 ENCOUNTER — HOSPITAL ENCOUNTER (OUTPATIENT)
Dept: RADIOLOGY | Facility: HOSPITAL | Age: 60
Discharge: HOME | End: 2020-05-29
Attending: INTERNAL MEDICINE
Payer: COMMERCIAL

## 2020-05-29 DIAGNOSIS — R92.8 ABNORMAL MAMMOGRAM: ICD-10-CM

## 2020-05-29 PROCEDURE — 19000 US ASPIRATION BREAST CYST: ICD-10-PCS | Mod: ,,, | Performed by: RADIOLOGY

## 2020-05-29 PROCEDURE — 19000 PUNCTURE ASPIR CYST BREAST: CPT | Mod: ,,, | Performed by: RADIOLOGY

## 2020-05-29 PROCEDURE — 25000003 PHARM REV CODE 250: Mod: PO | Performed by: INTERNAL MEDICINE

## 2020-05-29 PROCEDURE — 76942 ECHO GUIDE FOR BIOPSY: CPT | Mod: 26,,, | Performed by: RADIOLOGY

## 2020-05-29 PROCEDURE — 19001 PUNCTURE ASPIR CYST BRST EA: CPT | Mod: PO

## 2020-05-29 PROCEDURE — 76942 US ASPIRATION BREAST CYST: ICD-10-PCS | Mod: 26,,, | Performed by: RADIOLOGY

## 2020-05-29 PROCEDURE — 76942 ECHO GUIDE FOR BIOPSY: CPT | Mod: TC,PO

## 2020-05-29 RX ORDER — LIDOCAINE HYDROCHLORIDE AND EPINEPHRINE 20; 10 MG/ML; UG/ML
20 INJECTION, SOLUTION INFILTRATION; PERINEURAL ONCE
Status: COMPLETED | OUTPATIENT
Start: 2020-05-29 | End: 2020-05-29

## 2020-05-29 RX ORDER — LIDOCAINE HYDROCHLORIDE 10 MG/ML
1 INJECTION, SOLUTION EPIDURAL; INFILTRATION; INTRACAUDAL; PERINEURAL ONCE
Status: COMPLETED | OUTPATIENT
Start: 2020-05-29 | End: 2020-05-29

## 2020-05-29 RX ADMIN — LIDOCAINE HYDROCHLORIDE,EPINEPHRINE BITARTRATE 10 ML: 20; .01 INJECTION, SOLUTION INFILTRATION; PERINEURAL at 02:05

## 2020-05-29 RX ADMIN — LIDOCAINE HYDROCHLORIDE 30 MG: 10 INJECTION, SOLUTION EPIDURAL; INFILTRATION; INTRACAUDAL; PERINEURAL at 02:05

## 2020-05-30 ENCOUNTER — OFFICE VISIT (OUTPATIENT)
Dept: URGENT CARE | Facility: CLINIC | Age: 60
End: 2020-05-30
Payer: COMMERCIAL

## 2020-05-30 VITALS
HEIGHT: 64 IN | BODY MASS INDEX: 50.02 KG/M2 | WEIGHT: 293 LBS | OXYGEN SATURATION: 96 % | TEMPERATURE: 97 F | HEART RATE: 80 BPM

## 2020-05-30 DIAGNOSIS — M65.4 DE QUERVAIN'S TENOSYNOVITIS, RIGHT: Primary | ICD-10-CM

## 2020-05-30 PROCEDURE — 96372 THER/PROPH/DIAG INJ SC/IM: CPT | Mod: S$GLB,,, | Performed by: EMERGENCY MEDICINE

## 2020-05-30 PROCEDURE — 99214 OFFICE O/P EST MOD 30 MIN: CPT | Mod: 25,S$GLB,, | Performed by: PHYSICIAN ASSISTANT

## 2020-05-30 PROCEDURE — 96372 PR INJECTION,THERAP/PROPH/DIAG2ST, IM OR SUBCUT: ICD-10-PCS | Mod: S$GLB,,, | Performed by: EMERGENCY MEDICINE

## 2020-05-30 PROCEDURE — 99214 PR OFFICE/OUTPT VISIT, EST, LEVL IV, 30-39 MIN: ICD-10-PCS | Mod: 25,S$GLB,, | Performed by: PHYSICIAN ASSISTANT

## 2020-05-30 RX ORDER — KETOROLAC TROMETHAMINE 30 MG/ML
30 INJECTION, SOLUTION INTRAMUSCULAR; INTRAVENOUS
Status: COMPLETED | OUTPATIENT
Start: 2020-05-30 | End: 2020-05-30

## 2020-05-30 RX ADMIN — KETOROLAC TROMETHAMINE 30 MG: 30 INJECTION, SOLUTION INTRAMUSCULAR; INTRAVENOUS at 12:05

## 2020-05-30 NOTE — PATIENT INSTRUCTIONS
De Quervain Tenosynovitis    De Quervain tenosynovitis is inflammation of tendons and synovium on the thumb side of the wrist. Tendons are fibers that attach muscle to bone. Synovium is a slick membrane that helps tendons move. Movements done over and over can irritate and inflame these tissues. This can cause pain when you touch or grasp objects, turn or twist your wrist, or make a fist. You may also have pain and swelling near the base of the thumb or numbness along the back of your thumb and index finger. You may also feel the thumb catch or snap when you move it.  Treatment will depend on how bad the pain is. It can often be treated with medicines, injections, splinting, and home care. If your case is severe, you may be referred to a specialist to talk about surgery.  Home care  Your healthcare provider may prescribe medicines to relieve pain and reduce inflammation. A steroid medicine may be injected near the tendons. This reduces swelling. The healthcare provider may also suggest taking over-the-counter medicines like ibuprofen or naproxen. These help reduce inflammation. Take all medicines only as directed.  The following are general care guidelines:  · Avoid repetitive movements of your wrist and thumb.  · Note any activity that causes pain or swelling. If possible, avoid or limit that activity.  · Put a cold pack on your thumb. You can make your own cold pack by wrapping a plastic bag of ice or bag of frozen vegetables in a thin towel. Hold this to your thumb for up to 20 minutes at a time. Don't put ice directly on the skin.  · Your healthcare provider may put a splint on the thumb to hold it still. Use the splint as you have been instructed. In some cases, you may need to use a splint 24 hours a day for 4 to 6 weeks. This will allow the wrist and thumb to heal.  Follow-up care  Follow up with your healthcare provider, or as advised. You may need more treatment if your injury is severe or if your  symptoms don't get better. This additional treatment may include local injections, physical therapy, and surgery.  When to seek medical advice  Call your healthcare provider right away if any of these occur:  · Increase in pain or swelling  · If you have fever, chills, redness, warmth, or drainage  · Symptoms get worse after taking medicine  · Pain spreads farther down the thumb or into the forearm  · Pain continues to get in the way of daily life  Date Last Reviewed: 1/18/2016  © 9710-2450 [employer]. [address] [place] [address]. All rights reserved. This information is not intended as a substitute for professional medical care. Always follow your healthcare professional's instructions.      Please follow up with your Primary care provider within 2-5 days if your signs and symptoms have not resolved or worsen.     If your condition worsens or fails to improve we recommend that you receive another evaluation at the emergency room immediately or contact your primary medical clinic to discuss your concerns.   You must understand that you have received an Urgent Care treatment only and that you may be released before all of your medical problems are known or treated. You, the patient, will arrange for follow up care as instructed.     RED FLAGS/WARNING SYMPTOMS DISCUSSED WITH PATIENT THAT WOULD WARRANT EMERGENT MEDICAL ATTENTION. PATIENT VERBALIZED UNDERSTANDING.

## 2020-05-30 NOTE — PROGRESS NOTES
"Subjective:       Patient ID: Violeta Champion is a 59 y.o. female.    Vitals:  height is 5' 4" (1.626 m) and weight is 138.3 kg (305 lb) (abnormal). Her oral temperature is 96.5 °F (35.8 °C). Her pulse is 80. Her oxygen saturation is 96%.     Chief Complaint: Arm Pain    Arm Pain    The incident occurred 3 to 5 days ago. The incident occurred at home. There was no injury mechanism. The pain is present in the right forearm. The pain does not radiate. The pain is at a severity of 5/10. The pain is moderate. The pain has been constant since the incident. Pertinent negatives include no chest pain, muscle weakness, numbness or tingling. The symptoms are aggravated by movement and lifting. Treatments tried: ibuprofen. The treatment provided mild relief.       Constitution: Negative for chills, fatigue and fever.   HENT: Negative for congestion and sore throat.    Neck: Negative for painful lymph nodes.   Cardiovascular: Negative for chest pain and leg swelling.   Eyes: Negative for double vision and blurred vision.   Respiratory: Negative for cough and shortness of breath.    Gastrointestinal: Negative for nausea, vomiting and diarrhea.   Genitourinary: Negative for dysuria, frequency, urgency and history of kidney stones.   Musculoskeletal: Positive for pain. Negative for joint pain, joint swelling, muscle cramps and muscle ache.   Skin: Negative for color change, pale, rash and bruising.   Allergic/Immunologic: Negative for seasonal allergies.   Neurological: Negative for dizziness, history of vertigo, light-headedness, passing out, headaches and numbness.   Hematologic/Lymphatic: Negative for swollen lymph nodes.   Psychiatric/Behavioral: Negative for nervous/anxious, sleep disturbance and depression. The patient is not nervous/anxious.        Objective:      Physical Exam   Constitutional: She is oriented to person, place, and time. She appears well-developed and well-nourished. She is cooperative.  Non-toxic " appearance. She does not appear ill. No distress.   HENT:   Head: Normocephalic and atraumatic.   Right Ear: Hearing, tympanic membrane, external ear and ear canal normal.   Left Ear: Hearing, tympanic membrane, external ear and ear canal normal.   Nose: Nose normal. No mucosal edema, rhinorrhea or nasal deformity. No epistaxis. Right sinus exhibits no maxillary sinus tenderness and no frontal sinus tenderness. Left sinus exhibits no maxillary sinus tenderness and no frontal sinus tenderness.   Mouth/Throat: Uvula is midline, oropharynx is clear and moist and mucous membranes are normal. No trismus in the jaw. Normal dentition. No uvula swelling. No posterior oropharyngeal erythema.   Eyes: Conjunctivae and lids are normal. Right eye exhibits no discharge. Left eye exhibits no discharge. No scleral icterus.   Neck: Trachea normal, normal range of motion, full passive range of motion without pain and phonation normal. Neck supple.   Cardiovascular: Normal rate, regular rhythm, normal heart sounds, intact distal pulses and normal pulses.   Pulmonary/Chest: Effort normal and breath sounds normal. No respiratory distress.   Abdominal: Soft. Normal appearance and bowel sounds are normal. She exhibits no distension, no pulsatile midline mass and no mass. There is no tenderness.   Musculoskeletal: Normal range of motion. She exhibits no edema or deformity.        Right wrist: She exhibits tenderness. She exhibits normal range of motion, no bony tenderness, no swelling, no effusion, no crepitus, no deformity and no laceration.        Right forearm: She exhibits no tenderness, no bony tenderness, no swelling, no edema, no deformity and no laceration.        Right hand: She exhibits tenderness. She exhibits normal range of motion, no bony tenderness, normal two-point discrimination, normal capillary refill, no deformity, no laceration and no swelling. Normal sensation noted. Decreased sensation is not present in the ulnar  distribution, is not present in the medial distribution and is not present in the radial distribution. Normal strength noted. She exhibits no finger abduction, no thumb/finger opposition and no wrist extension trouble.        Hands:  Neurological: She is alert and oriented to person, place, and time. She exhibits normal muscle tone. Coordination normal.   Skin: Skin is warm, dry, intact, not diaphoretic and not pale.   Psychiatric: She has a normal mood and affect. Her speech is normal and behavior is normal. Judgment and thought content normal. Cognition and memory are normal.   Nursing note and vitals reviewed.        Assessment:       1. De Quervain's tenosynovitis, right        Plan:         De Quervain's tenosynovitis, right  -     ketorolac injection 30 mg  -     THUMB ORTHOSIS SPLINT UNIVERSAL FOR HOME USE    Discussed diagnosis with patient as well as treatment and home care. Discussed return to clinic precautions vs ER precautions. All patients questions answered. Patient verbalized understanding. Patient agreed with plan of care.         De Quervain Tenosynovitis    De Quervain tenosynovitis is inflammation of tendons and synovium on the thumb side of the wrist. Tendons are fibers that attach muscle to bone. Synovium is a slick membrane that helps tendons move. Movements done over and over can irritate and inflame these tissues. This can cause pain when you touch or grasp objects, turn or twist your wrist, or make a fist. You may also have pain and swelling near the base of the thumb or numbness along the back of your thumb and index finger. You may also feel the thumb catch or snap when you move it.  Treatment will depend on how bad the pain is. It can often be treated with medicines, injections, splinting, and home care. If your case is severe, you may be referred to a specialist to talk about surgery.  Home care  Your healthcare provider may prescribe medicines to relieve pain and reduce inflammation. A  steroid medicine may be injected near the tendons. This reduces swelling. The healthcare provider may also suggest taking over-the-counter medicines like ibuprofen or naproxen. These help reduce inflammation. Take all medicines only as directed.  The following are general care guidelines:  · Avoid repetitive movements of your wrist and thumb.  · Note any activity that causes pain or swelling. If possible, avoid or limit that activity.  · Put a cold pack on your thumb. You can make your own cold pack by wrapping a plastic bag of ice or bag of frozen vegetables in a thin towel. Hold this to your thumb for up to 20 minutes at a time. Don't put ice directly on the skin.  · Your healthcare provider may put a splint on the thumb to hold it still. Use the splint as you have been instructed. In some cases, you may need to use a splint 24 hours a day for 4 to 6 weeks. This will allow the wrist and thumb to heal.  Follow-up care  Follow up with your healthcare provider, or as advised. You may need more treatment if your injury is severe or if your symptoms don't get better. This additional treatment may include local injections, physical therapy, and surgery.  When to seek medical advice  Call your healthcare provider right away if any of these occur:  · Increase in pain or swelling  · If you have fever, chills, redness, warmth, or drainage  · Symptoms get worse after taking medicine  · Pain spreads farther down the thumb or into the forearm  · Pain continues to get in the way of daily life  Date Last Reviewed: 1/18/2016  © 2285-0431 [employer]. [address] [place] [address], [place] [address]. All rights reserved. This information is not intended as a substitute for professional medical care. Always follow your healthcare professional's instructions.      Please follow up with your Primary care provider within 2-5 days if your signs and symptoms have not resolved or worsen.     If your condition worsens or fails to  improve we recommend that you receive another evaluation at the emergency room immediately or contact your primary medical clinic to discuss your concerns.   You must understand that you have received an Urgent Care treatment only and that you may be released before all of your medical problems are known or treated. You, the patient, will arrange for follow up care as instructed.     RED FLAGS/WARNING SYMPTOMS DISCUSSED WITH PATIENT THAT WOULD WARRANT EMERGENT MEDICAL ATTENTION. PATIENT VERBALIZED UNDERSTANDING.

## 2020-06-01 ENCOUNTER — TELEPHONE (OUTPATIENT)
Dept: PODIATRY | Facility: CLINIC | Age: 60
End: 2020-06-01

## 2020-06-01 NOTE — TELEPHONE ENCOUNTER
Called pt. And rescheduled appt.     ----- Message from Roseanne Mosley sent at 6/1/2020 10:00 AM CDT -----  Contact: Pt/ [phone]  Pt is requesting a callback in regards to rescheduling the cancelled appt.    Please call and advise

## 2020-06-02 ENCOUNTER — TELEPHONE (OUTPATIENT)
Dept: URGENT CARE | Facility: CLINIC | Age: 60
End: 2020-06-02

## 2020-06-09 RX ORDER — HYDROCODONE BITARTRATE AND ACETAMINOPHEN 10; 325 MG/1; MG/1
1 TABLET ORAL
Qty: 90 TABLET | Refills: 0 | Status: SHIPPED | OUTPATIENT
Start: 2020-06-09 | End: 2020-07-13 | Stop reason: SDUPTHER

## 2020-06-09 NOTE — TELEPHONE ENCOUNTER
----- Message from Nehal Baptiste sent at 6/9/2020  8:18 AM CDT -----  Contact: [phone]  Requesting an RX refill or new RX.  Is this a refill or new RX:  Refill  RX name and strength: HYDROcodone-acetaminophen (NORCO)  mg per tablet  Directions (copy/paste from chart):    Is this a 30 day or 90 day RX:  30  Local pharmacy or mail order pharmacy:  Local  Pharmacy name and phone # (copy/paste from chart):   [address] [phone] (Phone)  [phone] (Fax)  Comments:

## 2020-06-10 ENCOUNTER — TELEPHONE (OUTPATIENT)
Dept: INTERNAL MEDICINE | Facility: CLINIC | Age: 60
End: 2020-06-10

## 2020-06-10 NOTE — TELEPHONE ENCOUNTER
Spoke to pharmacist and informed of stable on current therapy and that Dr. Waters is filling in for Dr. Cunningham while out on maternity leave.

## 2020-06-10 NOTE — TELEPHONE ENCOUNTER
----- Message from Lala Reich LPN sent at 6/10/2020  9:19 AM CDT -----  Contact: Jennifer Childs   [phone]      ----- Message -----  From: Leda Ulloa  Sent: 6/10/2020   9:07 AM CDT  To: Roxana Collins Staff    Pharmacy is calling to clarify an RX.  RX name:  Norco  What do they need to clarify:  Need verifying Rx and need f/u notes for pain therapy  Comments:

## 2020-06-19 ENCOUNTER — LAB VISIT (OUTPATIENT)
Dept: LAB | Facility: HOSPITAL | Age: 60
End: 2020-06-19
Attending: INTERNAL MEDICINE
Payer: COMMERCIAL

## 2020-06-19 DIAGNOSIS — E11.65 UNCONTROLLED TYPE 2 DIABETES MELLITUS WITH HYPERGLYCEMIA: ICD-10-CM

## 2020-06-19 DIAGNOSIS — M79.672 LEFT FOOT PAIN: ICD-10-CM

## 2020-06-19 DIAGNOSIS — R06.2 WHEEZING: ICD-10-CM

## 2020-06-19 DIAGNOSIS — N63.10 LUMP OF RIGHT BREAST: ICD-10-CM

## 2020-06-19 DIAGNOSIS — E78.5 HYPERLIPIDEMIA, UNSPECIFIED HYPERLIPIDEMIA TYPE: ICD-10-CM

## 2020-06-19 DIAGNOSIS — M17.0 OSTEOARTHRITIS OF BOTH KNEES, UNSPECIFIED OSTEOARTHRITIS TYPE: ICD-10-CM

## 2020-06-19 DIAGNOSIS — E03.9 HYPOTHYROIDISM, UNSPECIFIED TYPE: ICD-10-CM

## 2020-06-19 DIAGNOSIS — I10 HTN, GOAL BELOW 130/80: ICD-10-CM

## 2020-06-19 LAB
25(OH)D3+25(OH)D2 SERPL-MCNC: 44 NG/ML (ref 30–96)
ALBUMIN SERPL BCP-MCNC: 3.8 G/DL (ref 3.5–5.2)
ALP SERPL-CCNC: 89 U/L (ref 55–135)
ALT SERPL W/O P-5'-P-CCNC: 16 U/L (ref 10–44)
ANION GAP SERPL CALC-SCNC: 13 MMOL/L (ref 8–16)
AST SERPL-CCNC: 23 U/L (ref 10–40)
BASOPHILS # BLD AUTO: 0.07 K/UL (ref 0–0.2)
BASOPHILS NFR BLD: 0.6 % (ref 0–1.9)
BILIRUB SERPL-MCNC: 0.3 MG/DL (ref 0.1–1)
BUN SERPL-MCNC: 17 MG/DL (ref 6–20)
CALCIUM SERPL-MCNC: 10.2 MG/DL (ref 8.7–10.5)
CHLORIDE SERPL-SCNC: 105 MMOL/L (ref 95–110)
CHOLEST SERPL-MCNC: 200 MG/DL (ref 120–199)
CHOLEST/HDLC SERPL: 3.8 {RATIO} (ref 2–5)
CO2 SERPL-SCNC: 21 MMOL/L (ref 23–29)
CREAT SERPL-MCNC: 0.8 MG/DL (ref 0.5–1.4)
DIFFERENTIAL METHOD: ABNORMAL
EOSINOPHIL # BLD AUTO: 0.4 K/UL (ref 0–0.5)
EOSINOPHIL NFR BLD: 3.4 % (ref 0–8)
ERYTHROCYTE [DISTWIDTH] IN BLOOD BY AUTOMATED COUNT: 14 % (ref 11.5–14.5)
EST. GFR  (AFRICAN AMERICAN): >60 ML/MIN/1.73 M^2
EST. GFR  (NON AFRICAN AMERICAN): >60 ML/MIN/1.73 M^2
ESTIMATED AVG GLUCOSE: 203 MG/DL (ref 68–131)
GLUCOSE SERPL-MCNC: 142 MG/DL (ref 70–110)
HBA1C MFR BLD HPLC: 8.7 % (ref 4–5.6)
HCT VFR BLD AUTO: 39.7 % (ref 37–48.5)
HDLC SERPL-MCNC: 52 MG/DL (ref 40–75)
HDLC SERPL: 26 % (ref 20–50)
HGB BLD-MCNC: 12.8 G/DL (ref 12–16)
IMM GRANULOCYTES # BLD AUTO: 0.03 K/UL (ref 0–0.04)
IMM GRANULOCYTES NFR BLD AUTO: 0.3 % (ref 0–0.5)
LDLC SERPL CALC-MCNC: 119.4 MG/DL (ref 63–159)
LYMPHOCYTES # BLD AUTO: 4.6 K/UL (ref 1–4.8)
LYMPHOCYTES NFR BLD: 41.7 % (ref 18–48)
MCH RBC QN AUTO: 28.3 PG (ref 27–31)
MCHC RBC AUTO-ENTMCNC: 32.2 G/DL (ref 32–36)
MCV RBC AUTO: 88 FL (ref 82–98)
MONOCYTES # BLD AUTO: 0.7 K/UL (ref 0.3–1)
MONOCYTES NFR BLD: 6.5 % (ref 4–15)
NEUTROPHILS # BLD AUTO: 5.2 K/UL (ref 1.8–7.7)
NEUTROPHILS NFR BLD: 47.5 % (ref 38–73)
NONHDLC SERPL-MCNC: 148 MG/DL
NRBC BLD-RTO: 0 /100 WBC
PLATELET # BLD AUTO: 473 K/UL (ref 150–350)
PMV BLD AUTO: 11.4 FL (ref 9.2–12.9)
POTASSIUM SERPL-SCNC: 4.2 MMOL/L (ref 3.5–5.1)
PROT SERPL-MCNC: 8 G/DL (ref 6–8.4)
RBC # BLD AUTO: 4.53 M/UL (ref 4–5.4)
SODIUM SERPL-SCNC: 139 MMOL/L (ref 136–145)
TRIGL SERPL-MCNC: 143 MG/DL (ref 30–150)
TSH SERPL DL<=0.005 MIU/L-ACNC: 1.68 UIU/ML (ref 0.4–4)
WBC # BLD AUTO: 11.02 K/UL (ref 3.9–12.7)

## 2020-06-19 PROCEDURE — 80061 LIPID PANEL: CPT

## 2020-06-19 PROCEDURE — 83036 HEMOGLOBIN GLYCOSYLATED A1C: CPT

## 2020-06-19 PROCEDURE — 82306 VITAMIN D 25 HYDROXY: CPT

## 2020-06-19 PROCEDURE — 80053 COMPREHEN METABOLIC PANEL: CPT

## 2020-06-19 PROCEDURE — 84443 ASSAY THYROID STIM HORMONE: CPT

## 2020-06-19 PROCEDURE — 36415 COLL VENOUS BLD VENIPUNCTURE: CPT | Mod: PO

## 2020-06-19 PROCEDURE — 85025 COMPLETE CBC W/AUTO DIFF WBC: CPT

## 2020-06-24 ENCOUNTER — LAB VISIT (OUTPATIENT)
Dept: LAB | Facility: HOSPITAL | Age: 60
End: 2020-06-24
Attending: INTERNAL MEDICINE
Payer: COMMERCIAL

## 2020-06-24 ENCOUNTER — OFFICE VISIT (OUTPATIENT)
Dept: INTERNAL MEDICINE | Facility: CLINIC | Age: 60
End: 2020-06-24
Payer: COMMERCIAL

## 2020-06-24 VITALS
TEMPERATURE: 97 F | SYSTOLIC BLOOD PRESSURE: 134 MMHG | HEIGHT: 64 IN | WEIGHT: 293 LBS | BODY MASS INDEX: 50.02 KG/M2 | DIASTOLIC BLOOD PRESSURE: 78 MMHG | HEART RATE: 80 BPM

## 2020-06-24 DIAGNOSIS — M79.7 FIBROMYALGIA: ICD-10-CM

## 2020-06-24 DIAGNOSIS — E11.65 UNCONTROLLED TYPE 2 DIABETES MELLITUS WITH HYPERGLYCEMIA: ICD-10-CM

## 2020-06-24 DIAGNOSIS — N63.0 BREAST LUMP: Primary | ICD-10-CM

## 2020-06-24 DIAGNOSIS — I10 HTN, GOAL BELOW 130/80: ICD-10-CM

## 2020-06-24 LAB
ANION GAP SERPL CALC-SCNC: 11 MMOL/L (ref 8–16)
BUN SERPL-MCNC: 15 MG/DL (ref 6–20)
CALCIUM SERPL-MCNC: 9.3 MG/DL (ref 8.7–10.5)
CHLORIDE SERPL-SCNC: 108 MMOL/L (ref 95–110)
CO2 SERPL-SCNC: 22 MMOL/L (ref 23–29)
CREAT SERPL-MCNC: 0.7 MG/DL (ref 0.5–1.4)
EST. GFR  (AFRICAN AMERICAN): >60 ML/MIN/1.73 M^2
EST. GFR  (NON AFRICAN AMERICAN): >60 ML/MIN/1.73 M^2
GLUCOSE SERPL-MCNC: 137 MG/DL (ref 70–110)
POTASSIUM SERPL-SCNC: 4.2 MMOL/L (ref 3.5–5.1)
SODIUM SERPL-SCNC: 141 MMOL/L (ref 136–145)

## 2020-06-24 PROCEDURE — 3052F HG A1C>EQUAL 8.0%<EQUAL 9.0%: CPT | Mod: CPTII,S$GLB,, | Performed by: INTERNAL MEDICINE

## 2020-06-24 PROCEDURE — 3052F PR MOST RECENT HEMOGLOBIN A1C LEVEL 8.0 - < 9.0%: ICD-10-PCS | Mod: CPTII,S$GLB,, | Performed by: INTERNAL MEDICINE

## 2020-06-24 PROCEDURE — 99999 PR PBB SHADOW E&M-EST. PATIENT-LVL V: CPT | Mod: PBBFAC,,, | Performed by: INTERNAL MEDICINE

## 2020-06-24 PROCEDURE — 36415 COLL VENOUS BLD VENIPUNCTURE: CPT | Mod: PO

## 2020-06-24 PROCEDURE — 99999 PR PBB SHADOW E&M-EST. PATIENT-LVL V: ICD-10-PCS | Mod: PBBFAC,,, | Performed by: INTERNAL MEDICINE

## 2020-06-24 PROCEDURE — 3078F PR MOST RECENT DIASTOLIC BLOOD PRESSURE < 80 MM HG: ICD-10-PCS | Mod: CPTII,S$GLB,, | Performed by: INTERNAL MEDICINE

## 2020-06-24 PROCEDURE — 83036 HEMOGLOBIN GLYCOSYLATED A1C: CPT

## 2020-06-24 PROCEDURE — 3008F BODY MASS INDEX DOCD: CPT | Mod: CPTII,S$GLB,, | Performed by: INTERNAL MEDICINE

## 2020-06-24 PROCEDURE — 80048 BASIC METABOLIC PNL TOTAL CA: CPT

## 2020-06-24 PROCEDURE — 99214 OFFICE O/P EST MOD 30 MIN: CPT | Mod: S$GLB,,, | Performed by: INTERNAL MEDICINE

## 2020-06-24 PROCEDURE — 3078F DIAST BP <80 MM HG: CPT | Mod: CPTII,S$GLB,, | Performed by: INTERNAL MEDICINE

## 2020-06-24 PROCEDURE — 3008F PR BODY MASS INDEX (BMI) DOCUMENTED: ICD-10-PCS | Mod: CPTII,S$GLB,, | Performed by: INTERNAL MEDICINE

## 2020-06-24 PROCEDURE — 99214 PR OFFICE/OUTPT VISIT, EST, LEVL IV, 30-39 MIN: ICD-10-PCS | Mod: S$GLB,,, | Performed by: INTERNAL MEDICINE

## 2020-06-24 PROCEDURE — 3075F SYST BP GE 130 - 139MM HG: CPT | Mod: CPTII,S$GLB,, | Performed by: INTERNAL MEDICINE

## 2020-06-24 PROCEDURE — 3075F PR MOST RECENT SYSTOLIC BLOOD PRESS GE 130-139MM HG: ICD-10-PCS | Mod: CPTII,S$GLB,, | Performed by: INTERNAL MEDICINE

## 2020-06-24 RX ORDER — DULOXETIN HYDROCHLORIDE 30 MG/1
30 CAPSULE, DELAYED RELEASE ORAL DAILY
Qty: 30 CAPSULE | Refills: 3 | Status: SHIPPED | OUTPATIENT
Start: 2020-06-24 | End: 2020-08-20 | Stop reason: SDUPTHER

## 2020-06-24 NOTE — PROGRESS NOTES
CC: followup of hypertension and diabetes  HPI:  The patient is a 59 y.o. year old female who presents to the office for followup of hypertension and diabetes.  The patient denies any chest pain, shortness of breath, blurred vision, excessive fatigue, nausea or vomiting, but complains of headache recently, which she attributes to an oral problem.  Review of labs reveals an elevated hemoglobin A1c of 8.7, improved from previous lab, elevated glucose and elevated total cholesterol.  She complains of diffuse bodyaches due to fibromyalgia.  She complains of stiffness with minimal activiy.  She complains of persistent lump of right breast.  She recently had a biopsy, but states it was not of the same area.    PAST MEDICAL HISTORY:  Past Medical History:   Diagnosis Date    Diabetes mellitus, type 2     GERD (gastroesophageal reflux disease)     High cholesterol     Hypertension     Hypothyroid     Injury of knee, leg, ankle and foot     Insulin-resistant diabetes mellitus and acanthosis nigricans     Menopause present     Osteoarthritis     Osteoarthritis, knee     Osteopenia     Osteopenia     Sleep apnea        SURGICAL HISTORY:  Past Surgical History:   Procedure Laterality Date    bilateral duct removal breast      BREAST CYST EXCISION Bilateral     papilloma    HYSTERECTOMY      OOPHORECTOMY      OTHER SURGICAL HISTORY      bilateral central duct removal with bilateral papilomona       MEDS:  Medcard reviewed and updated    ALLERGIES: Allergy Card reviewed and updated    SOCIAL HISTORY:   The patient is a nonsmoker.    PE:   APPEARANCE: Obese, in no acute distress.    CHEST: Lungs clear to auscultation with unlabored respirations.  CARDIOVASCULAR: Normal S1, S2. No murmurs. No carotid bruits. No pedal edema.  ABDOMEN: Bowel sounds normal. Not distended. Soft. No tenderness or masses.   PSYCHIATRIC: The patient is oriented to person, place, and time and has a pleasant affect.         ASSESSMENT/PLAN:  Violeta was seen today for follow-up.    Diagnoses and all orders for this visit:    Breast lump  -     Ambulatory referral/consult to Breast Surgery; Future    HTN, goal below 130/80  -     blood pressure is near goal    Uncontrolled type 2 diabetes mellitus with hyperglycemia  -     Basic metabolic panel; Future  -     Hemoglobin A1C; Future  -     Ambulatory referral/consult to Endocrinology; Future    Fibromyalgia  -     restart Cymbalta    Other orders  -     DULoxetine (CYMBALTA) 30 MG capsule; Take 1 capsule (30 mg total) by mouth once daily.

## 2020-06-25 LAB
ESTIMATED AVG GLUCOSE: 194 MG/DL (ref 68–131)
HBA1C MFR BLD HPLC: 8.4 % (ref 4–5.6)

## 2020-06-25 NOTE — PROGRESS NOTES
HPI: Violeta Champion is a 59 y.o.  female c/I for visit to address Diabetes Type 2  She was diagnosed with T2DM about 10 years ago.   Suboptimal control with a1c at 8.4%.   Her mother, and maternal aunt and uncle all have type 2 diabetes  Reports was getting steroid shots frequently for several years for allergies/seasonal reasons.    She is a patient of Dr. Petersen and recently saw Dr. Burton for primary care needs.   She is taking Trulicity 1.5mg sc weekly and Januvia 100mg tablet daily.   Also on metformin 1000mg po bid and glipizide 10mg tablet - BID before meals. (breakfast and dinner).   Denies missing doses of DM medication.   Has lost 62 lbs on trulicity - over 4 - 5 months. Feels like she lost a lot of muscle mass, and Her ultimate goal is to get off of Trulicity medication.   States a1c was closer to 11% before starting on trulicity.   She is now down to 8.4% - wants to get off of medication completely at some point.   Feeling down somewhat, related to joint pain/aches r/t fibromyalgia.  Otherwise, no acute complaints.     Past medical History:   Past Medical History:   Diagnosis Date    Diabetes mellitus, type 2     GERD (gastroesophageal reflux disease)     High cholesterol     Hypertension     Hypothyroid     Injury of knee, leg, ankle and foot     Insulin-resistant diabetes mellitus and acanthosis nigricans     Menopause present     Osteoarthritis     Osteoarthritis, knee     Osteopenia     Osteopenia     Sleep apnea       Family hx:   Family History   Problem Relation Age of Onset    Hypertension Mother     Glaucoma Mother     Diabetes Mother     Hypertension Brother     Cancer Maternal Grandmother         breast    Amblyopia Father     Diabetes Maternal Aunt     Diabetes Maternal Uncle     Breast cancer Paternal Aunt     Breast cancer Paternal Grandmother     Blindness Neg Hx     Cataracts Neg Hx     Macular degeneration Neg Hx     Retinal detachment Neg Hx      Strabismus Neg Hx     Colon cancer Neg Hx     Stomach cancer Neg Hx     Esophageal cancer Neg Hx     Irritable bowel syndrome Neg Hx     Rectal cancer Neg Hx     Ulcerative colitis Neg Hx     Crohn's disease Neg Hx     Celiac disease Neg Hx       Current meds:   Current Outpatient Medications:     albuterol (VENTOLIN HFA) 90 mcg/actuation inhaler, INHALE ONE TO TWO PUFFS INTO LUNGS EVERY 6 HOURS AS NEEDED FOR WHEEZING, Disp: 54 g, Rfl: 3    amLODIPine (NORVASC) 5 MG tablet, Take 1 tablet by mouth once daily, Disp: 90 tablet, Rfl: 1    aspirin 81 mg Tab, Take by mouth. 1 Tablet Oral Every day, Disp: , Rfl:     blood sugar diagnostic (FREESTYLE INSULINX TEST STRIPS) Strp, Check glucose three times daily, Disp: 100 each, Rfl: 3    calcium carbonate (CALCIUM CARBONATE) 300 mg (750 mg) Chew, Take by mouth 2 (two) times daily.  , Disp: , Rfl:     celecoxib (CELEBREX) 200 MG capsule, , Disp: , Rfl:     diclofenac sodium (VOLTAREN) 1 % Gel, APPLY TWO GRAMS TOPICALLY ONCE DAILY, Disp: 100 g, Rfl: 0    DULoxetine (CYMBALTA) 30 MG capsule, Take 1 capsule (30 mg total) by mouth once daily., Disp: 30 capsule, Rfl: 3    fish oil-dha-epa 1,200-144-216 mg Cap, Take by mouth. 1 Capsule Oral Every day, Disp: , Rfl:     flurazepam (DALMANE) 30 MG capsule, Take 30 mg by mouth nightly as needed.  , Disp: , Rfl:     fluticasone-salmeterol diskus inhaler 250-50 mcg, Inhale 1 puff into the lungs 2 (two) times daily. Controller, Disp: 180 each, Rfl: 3    folic acid (FOLVITE) 1 MG tablet, Take 1 tablet (1 mg total) by mouth once daily., Disp: 90 tablet, Rfl: 3    furosemide (LASIX) 40 MG tablet, TAKE 1 TABLET BY MOUTH TWICE DAILY, Disp: 60 tablet, Rfl: 11    gabapentin (NEURONTIN) 300 MG capsule, Take 1 capsule (300 mg total) by mouth 3 (three) times daily., Disp: 90 capsule, Rfl: 3    glipiZIDE (GLUCOTROL) 10 MG tablet, TAKE 1 TABLET BY MOUTH TWICE DAILY BEFORE MEAL(S), Disp: 180 tablet, Rfl: 1     HYDROcodone-acetaminophen (NORCO)  mg per tablet, Take 1 tablet by mouth every 6 to 8 hours as needed for Pain., Disp: 90 tablet, Rfl: 0    hyoscyamine (LEVSIN/SL) 0.125 mg Subl, DISSOLVE 1 TABLET UNDER THE TONGUE EVERY 4 TO 6 HOURS, Disp: 30 tablet, Rfl: 5    ibuprofen (ADVIL,MOTRIN) 800 MG tablet, Take 800 mg by mouth every 8 (eight) hours as needed., Disp: , Rfl: 0    JANUVIA 100 mg Tab, Take 1 tablet by mouth once daily, Disp: 90 tablet, Rfl: 0    lactulose (CHRONULAC) 10 gram/15 mL solution, TAKE 15 ML BY MOUTH  ONCE DAILY AS NEEDED FOR CONSTIPATION, Disp: 473 mL, Rfl: 6    levothyroxine (SYNTHROID) 100 MCG tablet, Take 1 tablet by mouth once daily, Disp: 90 tablet, Rfl: 1    LORazepam (ATIVAN) 0.5 MG tablet, Take 1 tablet (0.5 mg total) by mouth 2 (two) times daily., Disp: 60 tablet, Rfl: 0    meclizine (ANTIVERT) 25 mg tablet, Take 1 tablet (25 mg total) by mouth 3 (three) times daily as needed., Disp: 30 tablet, Rfl: 0    metFORMIN (GLUCOPHAGE) 1000 MG tablet, TAKE 1 TABLET BY MOUTH TWICE DAILY WITH MEALS, Disp: 180 tablet, Rfl: 1    metoprolol tartrate (LOPRESSOR) 100 MG tablet, Take 1 tablet by mouth twice daily, Disp: 180 tablet, Rfl: 1    mv-min-FA-Qw-Pw-zesswvt-lutein (CENTRUM) 0.4-162-18 mg Tab, Take by mouth. 1 Tablet Oral Every day, Disp: , Rfl:     omeprazole (PRILOSEC) 20 MG capsule, Take 1 capsule (20 mg total) by mouth 2 (two) times daily., Disp: 60 capsule, Rfl: 2    ondansetron (ZOFRAN) 4 MG tablet, TAKE ONE TABLET BY MOUTH EVERY 8 HOURS AS NEEDED, Disp: 30 tablet, Rfl: 1    potassium chloride SA (K-DUR,KLOR-CON) 20 MEQ tablet, Take 1 tablet by mouth twice daily, Disp: 180 tablet, Rfl: 0    pravastatin (PRAVACHOL) 40 MG tablet, TAKE ONE TABLET BY MOUTH ONCE DAILY AT  NIGHT, Disp: 90 tablet, Rfl: 3    sucralfate (CARAFATE) 1 gram tablet, TAKE ONE TABLET BY MOUTH THREE TIMES DAILY, Disp: 90 tablet, Rfl: 3    temazepam (RESTORIL) 30 mg capsule, Take 1 capsule (30 mg total) by  "mouth nightly as needed. 1 Capsule Oral Every day, Disp: 30 capsule, Rfl: 0    tiZANidine (ZANAFLEX) 4 MG tablet, Take 1 tablet (4 mg total) by mouth every 8 (eight) hours., Disp: 90 tablet, Rfl: 3    TRULICITY 1.5 mg/0.5 mL PnIj, INJECT 1.5 MG INTO THE SKIN EVERY 7 DAYS, Disp: 4 mL, Rfl: 11    levocetirizine (XYZAL) 5 MG tablet, Take 1 tablet (5 mg total) by mouth every evening. (Patient not taking: Reported on 3/23/2020), Disp: 30 tablet, Rfl: 11    oxybutynin (DITROPAN-XL) 5 MG TR24, Take 1 tablet (5 mg total) by mouth once daily. (Patient not taking: Reported on 10/29/2019), Disp: 30 tablet, Rfl: 11     Social:   Lives at home by herself.   Life changes/stressors currently:   Diet: following ADA diet for the most part.   3 meals per day with snacks -   Breakfast - chobani yogurt, chicken/green beans.  Lunch - protein and veggie, fish, chicken  Dinner - protein shake, meat and veggie.   Snacks - cashews, almond nut mix with raisins/cranberries, watermelon, apples  Exercise: none. Active at work. Walking a lot.  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    Current Diabetes medications:   GLP1:  Trulicity 1.5mg sc weekly.   Oral meds:   Metformin 1000mg po bid.   Januvia 100mg po daily.   RIGGINS: glucotrol bid before breakfast and dinner.     Medications Tried and Failed:       Glucose Monitoring:   Checks sugar in a.m. on occasion. States running "lower side" lately this week - states normal is 130's.     Vital Signs  /78 (BP Location: Right arm, Patient Position: Sitting, BP Method: Large (Manual))   Pulse 72   Temp 97.8 °F (36.6 °C) (Oral)   Resp 16   Ht 5' 4" (1.626 m)   Wt (!) 139.7 kg (308 lb)   LMP  (LMP Unknown)   BMI 52.87 kg/m²     Pertinent Labs:   Hgba1c   Lab Results   Component Value Date    HGBA1C 8.4 (H) 06/24/2020     Lipid panel   Lab Results   Component Value Date    CHOL 200 (H) 06/19/2020    CHOL 193 02/05/2020    CHOL 221 (H) 09/24/2019     Lab " Results   Component Value Date    HDL 52 06/19/2020    HDL 43 02/05/2020    HDL 49 09/24/2019     Lab Results   Component Value Date    LDLCALC 119.4 06/19/2020    LDLCALC 109.0 02/05/2020    LDLCALC 127.2 09/24/2019     Lab Results   Component Value Date    TRIG 143 06/19/2020    TRIG 205 (H) 02/05/2020    TRIG 224 (H) 09/24/2019     Lab Results   Component Value Date    CHOLHDL 26.0 06/19/2020    CHOLHDL 22.3 02/05/2020    CHOLHDL 22.2 09/24/2019      CMP  Glucose   Date Value Ref Range Status   06/24/2020 137 (H) 70 - 110 mg/dL Final     BUN, Bld   Date Value Ref Range Status   06/24/2020 15 6 - 20 mg/dL Final     Creatinine   Date Value Ref Range Status   06/24/2020 0.7 0.5 - 1.4 mg/dL Final     eGFR if    Date Value Ref Range Status   06/24/2020 >60.0 >60 mL/min/1.73 m^2 Final     eGFR if non    Date Value Ref Range Status   06/24/2020 >60.0 >60 mL/min/1.73 m^2 Final     Comment:     Calculation used to obtain the estimated glomerular filtration  rate (eGFR) is the CKD-EPI equation.         Microalbumin creatinine ratio:   Lab Results   Component Value Date    MICALBCREAT 24.4 06/19/2020       Review of Pertinent co-morbidities/risk factors:   CV: Denies history of MI nor stroke.   CAD: none/denies  Takes aspirin 325mg a few days per week for pain.   BP: has history of HTN  Statin: Taking  ACE/ARB: Not taking    Social History     Tobacco Use   Smoking Status Never Smoker   Smokeless Tobacco Never Used        Standards of care:   Eyes: .: 11/21/2019  Foot exam: : 06/30/2020   Diabetes education: has had in past, over a year ago.     Review Of Systems:   Gen: Appetite good, +60lbs of weight loss, some fatigue and joint pain. No polydipsia  Skin: Skin is intact and heals well, no rashes, no hair changes  Eyes: Denies visual disturbances, no blurred vision. Reports eye exam last year was normal.   Resp: no SOB or Dyspnea on exertion, denies cough.   Cardiac: Denies chest pain,  palpitations, or swelling.   GI: Denies abdominal pain, nausea or vomiting, diarrhea, constipation.   /GYN: No nocturia, burning or pain.   MS/Neuro: Denies numbness/ tingling in BLE; Gait steady, speech clear  Joint pain - reports chronic related to fibromyalgia  Psych: Denies drug/ETOH abuse, reports mild depression related to fibromyalgia.  Other systems: negative.    Physical Exam:   GENERAL: Well developed, well nourished in appearance. Some forehead perspiration noted.   PSYCH: AAOx3, appropriate mood and affect, pleasant expression, conversant, appears relaxed, well groomed.   EYES: PERRL, Conjunctiva and corneas clear  NECK: Soft and Supple, trachea midline   CHEST: Even, regular, and unlabored respirations, CTA bilateral.  CARDIAC: RRR, S1, S2 heard  ABDOMEN: Soft, non-tender, non-distended   VASCULAR: pedal pulses palpable bilaterally, no edema.  NEURO:  cranial nerves II - XII intact   MUSCULOSKELETAL: Good ROM, equal strength against resistance to bilateral lower extremities  SKIN: Skin warm, dry, and intact  FEET: Pedal pulses palpable and intact.     Assessment and Plan of Care:     Violeta was seen today for diabetes.    Diagnoses and all orders for this visit:    Mixed hyperlipidemia    HTN, goal below 130/80    Class 3 severe obesity due to excess calories in adult, unspecified BMI, unspecified whether serious comorbidity present    Uncontrolled type 2 diabetes mellitus with hyperglycemia    Other orders  -     pravastatin (PRAVACHOL) 80 MG tablet; Take 1 tablet (80 mg total) by mouth once daily.  -     Ambulatory referral/consult to Diabetes Education; Future  -     dapagliflozin (FARXIGA) 10 mg Tab; Take 10 mg by mouth once daily.  -     Hemoglobin A1C  -     Lipid Panel; Future  -     Microalbumin/creatinine urine ratio; Future  -     Comprehensive metabolic panel; Future      1. T2DM with hyperglycemia- Hgba1c goal is 7.5% or less - not at goal. Current a1c at 8.4%.   But improved since last  a1c was 11%. She lost 60+ lbs on trulicity.   D/c januvia. It interferes with GLp1 pathway, not effective.   Continue metformin 1000mg po bid.   Continue Trulicity 1.5mg sc weekly - consider switching to ozempic for more weight loss and sugar control on her.   Discontinue glucotrol. Likely can cause lows - especially in combo with GLP1 and SGLT2i.  SGLT2i will have more sugar and a1c reduction than a dpp4 and help with weight loss and have CV protection benefits as well.   Start SGLTi. She has no history of renal disease. Will recheck cmp at 3 month point.   Start farxiga 10 mg - Take 1 tablet po daily.   Discussed MOA, possible side effects including yeast infection, UTI, dehydration and ketoacidosis, importance of maintaining hydration and avoiding No carb diets. Good use hygiene. Notify my office if any side effects. Will monitor renal function closely. Stable at present.   discussed DM, progression of disease, long term complications, CV risk factors and tx options.   Advise compliance with ADA diet and encourage exercise  Reviewed  hypoglycemia, s/s and appropriate tx.  Instructed to monitor Blood glucose 1- 2x/daily fasting. Goal is 100 - 150.   Referral to diabetic educator for diet consultation - gave information/brochure handouts on ADA diet for now.     2. HTN- controlled, continue meds as previously prescribed and monitor.     3. HLP - LDL goal < 100. Is elevated - not at goal. On pravastatin 40mg every Hs - will increase to 80mg every HS.   Can consider zetia since she reports failure to several statins due to joint pain. Seems to worsen fibromyalgia/flare up.   Also started her on Vascepa bid for elevated trigs. She is taking otc fish oil, which are expensive for her, this will likely be better solution and more potent omega 3 to take.  Encourage compliance in taking medication. LFTs WNL.     4.Over Weight - BMI - 52.75   Consider bariatric surgery referral? Right now patient weary to lose more weight -  as she has lost 60lbs quickly on trulicity.   Will continue the GLP1 and add on SGLT2i -   Can visit the idea at future visit.   Encourage Ada diet and exercise.      Follow up in 3 months with OV and labs prior

## 2020-06-30 ENCOUNTER — OFFICE VISIT (OUTPATIENT)
Dept: INTERNAL MEDICINE | Facility: CLINIC | Age: 60
End: 2020-06-30
Payer: COMMERCIAL

## 2020-06-30 VITALS
RESPIRATION RATE: 16 BRPM | DIASTOLIC BLOOD PRESSURE: 78 MMHG | TEMPERATURE: 98 F | HEART RATE: 72 BPM | WEIGHT: 293 LBS | SYSTOLIC BLOOD PRESSURE: 120 MMHG | BODY MASS INDEX: 50.02 KG/M2 | HEIGHT: 64 IN

## 2020-06-30 DIAGNOSIS — E11.65 UNCONTROLLED TYPE 2 DIABETES MELLITUS WITH HYPERGLYCEMIA: Primary | ICD-10-CM

## 2020-06-30 DIAGNOSIS — I10 HTN, GOAL BELOW 130/80: ICD-10-CM

## 2020-06-30 DIAGNOSIS — E78.2 MIXED HYPERLIPIDEMIA: ICD-10-CM

## 2020-06-30 DIAGNOSIS — E66.01 CLASS 3 SEVERE OBESITY DUE TO EXCESS CALORIES IN ADULT, UNSPECIFIED BMI, UNSPECIFIED WHETHER SERIOUS COMORBIDITY PRESENT: ICD-10-CM

## 2020-06-30 PROCEDURE — 3074F SYST BP LT 130 MM HG: CPT | Mod: CPTII,S$GLB,, | Performed by: NURSE PRACTITIONER

## 2020-06-30 PROCEDURE — 3078F DIAST BP <80 MM HG: CPT | Mod: CPTII,S$GLB,, | Performed by: NURSE PRACTITIONER

## 2020-06-30 PROCEDURE — 99214 PR OFFICE/OUTPT VISIT, EST, LEVL IV, 30-39 MIN: ICD-10-PCS | Mod: S$GLB,,, | Performed by: NURSE PRACTITIONER

## 2020-06-30 PROCEDURE — 3008F BODY MASS INDEX DOCD: CPT | Mod: CPTII,S$GLB,, | Performed by: NURSE PRACTITIONER

## 2020-06-30 PROCEDURE — 99214 OFFICE O/P EST MOD 30 MIN: CPT | Mod: S$GLB,,, | Performed by: NURSE PRACTITIONER

## 2020-06-30 PROCEDURE — 3052F PR MOST RECENT HEMOGLOBIN A1C LEVEL 8.0 - < 9.0%: ICD-10-PCS | Mod: CPTII,S$GLB,, | Performed by: NURSE PRACTITIONER

## 2020-06-30 PROCEDURE — 3052F HG A1C>EQUAL 8.0%<EQUAL 9.0%: CPT | Mod: CPTII,S$GLB,, | Performed by: NURSE PRACTITIONER

## 2020-06-30 PROCEDURE — 99999 PR PBB SHADOW E&M-EST. PATIENT-LVL V: CPT | Mod: PBBFAC,,, | Performed by: NURSE PRACTITIONER

## 2020-06-30 PROCEDURE — 3074F PR MOST RECENT SYSTOLIC BLOOD PRESSURE < 130 MM HG: ICD-10-PCS | Mod: CPTII,S$GLB,, | Performed by: NURSE PRACTITIONER

## 2020-06-30 PROCEDURE — 3078F PR MOST RECENT DIASTOLIC BLOOD PRESSURE < 80 MM HG: ICD-10-PCS | Mod: CPTII,S$GLB,, | Performed by: NURSE PRACTITIONER

## 2020-06-30 PROCEDURE — 3008F PR BODY MASS INDEX (BMI) DOCUMENTED: ICD-10-PCS | Mod: CPTII,S$GLB,, | Performed by: NURSE PRACTITIONER

## 2020-06-30 PROCEDURE — 99999 PR PBB SHADOW E&M-EST. PATIENT-LVL V: ICD-10-PCS | Mod: PBBFAC,,, | Performed by: NURSE PRACTITIONER

## 2020-06-30 RX ORDER — PRAVASTATIN SODIUM 80 MG/1
80 TABLET ORAL DAILY
Qty: 90 TABLET | Refills: 1 | Status: SHIPPED | OUTPATIENT
Start: 2020-06-30 | End: 2021-05-20

## 2020-06-30 RX ORDER — DAPAGLIFLOZIN 10 MG/1
10 TABLET, FILM COATED ORAL DAILY
Qty: 90 TABLET | Refills: 1 | Status: SHIPPED | OUTPATIENT
Start: 2020-06-30 | End: 2020-09-04 | Stop reason: ALTCHOICE

## 2020-06-30 RX ORDER — ICOSAPENT ETHYL 1000 MG/1
2 CAPSULE ORAL 2 TIMES DAILY
Qty: 180 CAPSULE | Refills: 1 | Status: SHIPPED | OUTPATIENT
Start: 2020-06-30 | End: 2020-09-28

## 2020-06-30 NOTE — PATIENT INSTRUCTIONS
"Continue metformin 1000mg po bid. Take with food.  Continue trulicity 1.5mg sc weekly.   Stop januvia.   Stop glucotrol.   Start Farxiga 10mg tablet daily every morning. Drink plenty of water/stay hydrated.  Do not use/do a Keto diet. Please eat some carbs - low amount 60 - 90 grams daily is a good goal. Below are some tips on food.   Meet with diabetic educator for a review on diet.     Repeat labs and OV in 3 months with me.   Please call me if you have any questions or concerns.     Low Carb Snacks & Diabetes friendly foods   Snacks can be an important part of a balanced, healthy meal plan.   They allow you to eat more frequently, feeling full and satisfied throughout the day.   Also, they allow you to spread carbohydrates evenly, which may stabilize blood sugars.  Plus, snacks are enjoyable!     The amount of carbohydrate needed at snacks varies. Generally, about 15-30 grams of carbohydrate per snack is recommended.    Below you will find some tasty treats.       0-5 gm carb   Crystal Light flavoring (I like fruit punch flavor!)   Vitamin Water Zero   Shanna antioxidant drinks.    Herbal tea, unsweetened   2 tsp peanut butter on celery or carrots   1/2 cup sugar-free jell-o   1 sugar-free popsicle   ¼ cup blueberries or strawberries   8oz Blue Bessy unsweetened almond milk (or there is sugar free vanilla flavored as well).   5 baby carrots & celery sticks, cucumbers, bell peppers dipped in ¼ cup salsa, 2Tbsp light ranch dressing or 2Tbsp plain Greek yogurt   10 Goldfish crackers   ½ oz low-fat cheese or string cheese   1 closed handful of nuts, unsalted (example-->almonds, pistachios, cashews, peanuts, etc).   1 Tbsp of sunflower seeds, unsalted   1 cup Smart Pop popcorn   1 whole grain brown rice cake    "Think Thin" protein bars - my personal favorite is peanut butter, chocolate brownie, and oreo   Quest bars (my favorite is birthday cake, and also cinnamon roll)      Premier protein shakes " "- sold at [employer], or other brand alternatives - usually 1 - 2 grams of carbs (strawberry, vanilla, chocolate flavors)   Scrambled eggs! Or a fried egg or boiled eggs - add sliced tomatoes, cilantro or some chopped green onions.        15 gm carb   1 small piece of fruit or ½ banana or 1/2 cup lite canned fruit   3 best cracker squares   3 cups Smart Pop popcorn, top spray butter, Campa lite salt or cinnamon and Truvia   5 Vanilla Wafers   ½ cup low fat, no added sugar ice cream or frozen yogurt (Blue bell, Blue Bunny, Weight Watchers, Skinny Cow)   ½ turkey, ham, or chicken sandwich   ½ c fruit with ½ c Cottage cheese   4-6 unsalted wheat crackers with 1 oz low fat cheese or 1 tbsp peanut butter    30-45 goldfish crackers (depending on flavor)    7-8 Rastafari mini brown rice cakes (caramel, apple cinnamon, chocolate)    12 Rastafari mini brown rice cakes (cheddar, bbq, ranch)    1/3 cup hummus dip with raw veg   1/2 whole wheat mary, 1Tbsp hummus   Mini Pizza (1/2 whole wheat English muffin, low-fat  cheese, tomato sauce)   100 calorie snack pack (Oreo, Chips Ahoy, Ritz Mix, Baked Cheetos)   4-6 oz. light or Greek Style yogurt (Chobani, Yoplait, Okios, [place]oneyfield)   ½ cup sugar-free pudding     6 in. wheat tortilla or mary oven toasted chips (topped with spray butter flavoring, cinnamon, Truvia OR spray butter, garlic powder, chili powder)    18 BBQ Popchips (available at [employer], [employer], [employer] Market)   Mini bagel (small size) toasted - add fat free cream cheese or avocado and sliced tomato on top - yum!    1/2 cup Halo top icecream - birthday cake is my go-to flavor.     Tips and Tricks:   My favorite "secret" seasoning to make things extra yummy is cinnamon for sweet taste or adding   "everything but the bagel" seasoning (you can get on amazon.com or at  joes. I have seen at local groceries such as [employer] too!).     Dark colored fruits are your friend -- blueberries, strawberries, " "raspberries, blackberries. They have a lower sugar content. So will be the best choice for you.   Light colored fruits are NOT your friend - they tend to be higher in sugar and are not the best options for an ADA diet - examples are oranges, bananas, peaches, watermelon, -- you can eat these, but carefully and in moderation.     Other snack choices    Celery with peanut butter   Celery with tuna salad   Dill pickles and cheddar cheese (no kidding, it's a great combo)   Nuts (keep raw ones in the freezer if you think you'll overeat them)   Sunflower seeds (get them in the shell so it will take longer to eat them)   Other seeds (How to Toast Pumpkin or Squash Seeds)    Pistachios or almonds - will fill you up and are tasty!    Low-Carb Trail Mix   Jerky (beef or turkey -- try to find low-sugar varieties)   Salami slices (you can find in the deli section)   Cheese sticks, such as string cheese   Sugar-free Jello, alone or with cottage cheese and a sprinkling of nuts. Make sugar-free lime Jello with part coconut milk -- For a large package, dissolve the powder in a cup of boiling water, add a can of coconut milk, and then add the rest of the water. Stir well.   Pepperoni "chips" -- Zap the slices in the microwave   Cheese with a few apple slices   4-ounce plain or sugar-free yogurt with berries and flax seed meal   Smoked salmon and cream cheese on cucumber slices   Lettuce Roll-ups -- Roll luncheon meat, egg salad, tuna or other filling and veggies in lettuce leaves   Lunch Meat Roll-ups -- Roll cheese or veggies in lunch meat (read the labels for carbs on the lunch meat)   Spread bean dip, spinach dip, or other low-carb dip or spread on the lunch meat or lettuce and then roll it up   Raw veggies and spinach dip, or other low-carb dip   Pork rinds (Chicharrón), with or without dip   Ricotta cheese with fruit and/or nuts and/or flax seed meal   Mushrooms with cheese spread inside (or other spreads " or dips)   Low-carb snack bars (watch out for sugar alcohols, especially maltitol)   Product Review: Atkins Advantage Bars   Pepperoni Chips -- Microwave pepperoni slices until crisp. Great with cheeses and dips   Garlic Parmesan Flax Seed Crackers   Parmesan Crisps -- Good when you want a crunchy snack.   Peanut Butter Protein Balls

## 2020-07-07 ENCOUNTER — PATIENT OUTREACH (OUTPATIENT)
Dept: ADMINISTRATIVE | Facility: OTHER | Age: 60
End: 2020-07-07

## 2020-07-07 NOTE — PROGRESS NOTES
Requested updates within Care Everywhere.  Patient's chart was reviewed for overdue VICK topics.  Immunizations reconciled.    Orders placed:  Tasked appts:  Labs Linked:

## 2020-07-13 ENCOUNTER — TELEPHONE (OUTPATIENT)
Dept: INTERNAL MEDICINE | Facility: CLINIC | Age: 60
End: 2020-07-13

## 2020-07-13 RX ORDER — HYDROCODONE BITARTRATE AND ACETAMINOPHEN 10; 325 MG/1; MG/1
1 TABLET ORAL
Qty: 90 TABLET | Refills: 0 | Status: SHIPPED | OUTPATIENT
Start: 2020-07-13 | End: 2020-07-21 | Stop reason: SDUPTHER

## 2020-07-13 NOTE — TELEPHONE ENCOUNTER
----- Message from Jennifer Chu sent at 7/13/2020 10:17 AM CDT -----  Type:  Needs Medical Advice - Refill     Who Called: NANCY    Pharmacy name and phone #: [id number] - [place], [place]  [facility] [phone] (Phone)  [phone] (Fax        Would the patient rather a call back or a response via [facility]? CALL BACK    Best Call Back Number:[phone]    Additional Information: This medication need a refill HYDROcodone-acetaminophen (NORCO)  mg per tablet

## 2020-07-19 ENCOUNTER — PATIENT OUTREACH (OUTPATIENT)
Dept: ADMINISTRATIVE | Facility: OTHER | Age: 60
End: 2020-07-19

## 2020-07-21 NOTE — TELEPHONE ENCOUNTER
Pt needs to clarify with her insurance how many   Tablets they will allow to be filled at one time    7/13/Rx was written for #90     Reviewed w/ [facility] and it is an insurance issue  They only allowed one week (#28)

## 2020-07-21 NOTE — TELEPHONE ENCOUNTER
Pt states she has new insurance, the Rx sent on 7/13/20 for norco was only filled for a week 28 TABLETS. States the pharmacy needs a new rx to get her back on track for the month long supply. Please verify and advise

## 2020-07-21 NOTE — TELEPHONE ENCOUNTER
----- Message from Lesley He sent at 7/21/2020  8:13 AM CDT -----  Contact: self   Requesting an RX refill or new RX.  Is this a refill or new RX:    RX name and strength: HYDROcodone-acetaminophen (NORCO)  mg per tablet   Directions (copy/paste from chart):  Route: Take 1 tablet by mouth every 6 to 8 hours as needed for Pain. - Oral   Is this a 30 day or 90 day RX:    Local pharmacy or mail order pharmacy:    Pharmacy name and phone # (copy/paste from chart):   [facility] [address] [place] [address] [phone] (Phone)  [phone] (Fax)  Comments:    Pt states that with new insurance a new rx will need to be sent to pharmacy.

## 2020-07-23 RX ORDER — HYDROCODONE BITARTRATE AND ACETAMINOPHEN 10; 325 MG/1; MG/1
1 TABLET ORAL
Qty: 90 TABLET | Refills: 0 | Status: SHIPPED | OUTPATIENT
Start: 2020-07-23 | End: 2020-08-25 | Stop reason: SDUPTHER

## 2020-07-23 NOTE — TELEPHONE ENCOUNTER
Due to new insurance, 7 day supply has to be filled first then 30 supply can be filled. Okay to send for 30 day?   [phone]

## 2020-07-30 ENCOUNTER — PATIENT OUTREACH (OUTPATIENT)
Dept: ADMINISTRATIVE | Facility: OTHER | Age: 60
End: 2020-07-30

## 2020-08-02 NOTE — TELEPHONE ENCOUNTER
----- Message from Rhonda English sent at 3/23/2018  3:18 PM CDT -----  Contact: self/[phone]  Pt would like to speak to the nurse regarding a personal matter.   Yes

## 2020-08-03 ENCOUNTER — OFFICE VISIT (OUTPATIENT)
Dept: SURGERY | Facility: CLINIC | Age: 60
End: 2020-08-03
Payer: COMMERCIAL

## 2020-08-03 VITALS
BODY MASS INDEX: 50.02 KG/M2 | HEART RATE: 63 BPM | HEIGHT: 64 IN | SYSTOLIC BLOOD PRESSURE: 136 MMHG | DIASTOLIC BLOOD PRESSURE: 77 MMHG | WEIGHT: 293 LBS

## 2020-08-03 DIAGNOSIS — R92.30 BREAST DENSITY: Primary | ICD-10-CM

## 2020-08-03 DIAGNOSIS — N63.0 BREAST LUMP: Primary | ICD-10-CM

## 2020-08-03 DIAGNOSIS — N63.0 BREAST LUMP: ICD-10-CM

## 2020-08-03 PROCEDURE — 99203 PR OFFICE/OUTPT VISIT, NEW, LEVL III, 30-44 MIN: ICD-10-PCS | Mod: S$GLB,,, | Performed by: PHYSICIAN ASSISTANT

## 2020-08-03 PROCEDURE — 99999 PR PBB SHADOW E&M-EST. PATIENT-LVL V: CPT | Mod: PBBFAC,,, | Performed by: PHYSICIAN ASSISTANT

## 2020-08-03 PROCEDURE — 3078F DIAST BP <80 MM HG: CPT | Mod: CPTII,S$GLB,, | Performed by: PHYSICIAN ASSISTANT

## 2020-08-03 PROCEDURE — 3008F PR BODY MASS INDEX (BMI) DOCUMENTED: ICD-10-PCS | Mod: CPTII,S$GLB,, | Performed by: PHYSICIAN ASSISTANT

## 2020-08-03 PROCEDURE — 3075F PR MOST RECENT SYSTOLIC BLOOD PRESS GE 130-139MM HG: ICD-10-PCS | Mod: CPTII,S$GLB,, | Performed by: PHYSICIAN ASSISTANT

## 2020-08-03 PROCEDURE — 3078F PR MOST RECENT DIASTOLIC BLOOD PRESSURE < 80 MM HG: ICD-10-PCS | Mod: CPTII,S$GLB,, | Performed by: PHYSICIAN ASSISTANT

## 2020-08-03 PROCEDURE — 99999 PR PBB SHADOW E&M-EST. PATIENT-LVL V: ICD-10-PCS | Mod: PBBFAC,,, | Performed by: PHYSICIAN ASSISTANT

## 2020-08-03 PROCEDURE — 99203 OFFICE O/P NEW LOW 30 MIN: CPT | Mod: S$GLB,,, | Performed by: PHYSICIAN ASSISTANT

## 2020-08-03 PROCEDURE — 3075F SYST BP GE 130 - 139MM HG: CPT | Mod: CPTII,S$GLB,, | Performed by: PHYSICIAN ASSISTANT

## 2020-08-03 PROCEDURE — 3008F BODY MASS INDEX DOCD: CPT | Mod: CPTII,S$GLB,, | Performed by: PHYSICIAN ASSISTANT

## 2020-08-03 NOTE — PROGRESS NOTES
[facility]  [facility]  8/3/2020      REFERRING PROVIDER: Quynh Burton MD   [facility] [place] [address]    SUBJECTIVE:   Ms. Violeta Champion is a 59 y.o. female who presents today complaining of right breast lump.    History of Present Illness:  Patient is a 58 yo female who presents today complaining of 2 right breast lumps. She reports she noted these breast lumps 3 months ago. Reports minimal pain associated with lumps that comes and goes. Nothing makes pain better or worse. Lumps have not changed in size. No nipple discharge, inversion, skin changes, swelling. No palpable masses to left breast. She does do self breast exams routinely. No personal history of cancer. Of note, she had a complicated cyst in similar area of right breast drained in May 2020.    GYN History:  Patient age of menarche was 13. Age of menopause 43. She is . Does report history of papillomas on both breasts.    Family History:  PGM breast cancer diagnosed in 70s,  age 78  PA breast cancer diagnosed in 60s, currently 83    Past Medical History:   Diagnosis Date    Diabetes mellitus, type 2     GERD (gastroesophageal reflux disease)     High cholesterol     Hypertension     Hypothyroid     Injury of knee, leg, ankle and foot     Insulin-resistant diabetes mellitus and acanthosis nigricans     Menopause present     Osteoarthritis     Osteoarthritis, knee     Osteopenia     Osteopenia     Sleep apnea      Past Surgical History:   Procedure Laterality Date    bilateral duct removal breast      BREAST CYST EXCISION Bilateral     papilloma    HYSTERECTOMY      OOPHORECTOMY      OTHER SURGICAL HISTORY      bilateral central duct removal with bilateral papilomona     Social History     Socioeconomic History    Marital status: Single     Spouse name: Not on file    Number of children: 0    Years of education: 14    Highest education level: Not on file   Occupational  History    Occupation: Nurse     Employer: [employer]   Social Needs    Financial resource strain: Not on file    Food insecurity     Worry: Not on file     Inability: Not on file    Transportation needs     Medical: Not on file     Non-medical: Not on file   Tobacco Use    Smoking status: Never Smoker    Smokeless tobacco: Never Used   Substance and Sexual Activity    Alcohol use: Yes     Alcohol/week: 0.0 standard drinks     Comment: very rarely    Drug use: No    Sexual activity: Not on file   Lifestyle    Physical activity     Days per week: Not on file     Minutes per session: Not on file    Stress: Not on file   Relationships    Social connections     Talks on phone: Not on file     Gets together: Not on file     Attends Hindu service: Not on file     Active member of club or organization: Not on file     Attends meetings of clubs or organizations: Not on file     Relationship status: Not on file   Other Topics Concern    Not on file   Social History Narrative    RN, 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  Denies any fever or chills.  See HPI/ Interval History for other systems reviewed.    OBJECTIVE:   Vitals:    08/03/20 1429   BP: 136/77   Pulse: 63      Physical Exam:  HEENT: Normocephalic, atraumatic.    Breast: No masses present bilaterally. There is an area of breast density at 11 o clock position 4 cm from NAC on right breast that corresponds to palpable area patient feels; no discernible mass appreciated.  Normal color and contour of right and left breast.  No nipple inversion or discharge bilaterally.    Lymph: No palpable adjacent axillary lymph nodes.  No cervical or supraclavicular  lymphadenopathy.    Neurologic Exam: A&O x 3. There are no focal neurologic deficits.    ASSESSMENT:  Ms. Violeta Champion is a 59 y.o. year old female with right breast density.    PLAN:   Plan diag mammogram and US to further evaluate; will notify with results and plan further if indicated.       Nilda Linares PA-C      Surgical Oncology            8/3/2020

## 2020-08-03 NOTE — LETTER
August 3, 2020      Quynh Burton MD  [address] [facility] [place] [address]           [facility]  [address], [address]  [facility] [address]  Phone: [phone]  Fax: [phone]          Patient: Violeta Champion   MR Number: [id number]   YOB: 1960   Date of Visit: 8/3/2020       Dear Dr. Quynh Burton:    Thank you for referring Violeta Champion to me for evaluation. Attached you will find relevant portions of my assessment and plan of care.    If you have questions, please do not hesitate to call me. I look forward to following Violeta Champion along with you.    Sincerely,    Nilda Linares PA-C    Enclosure  CC:  No Recipients    If you would like to receive this communication electronically, please contact [email or web address] or [phone] to request more information on [facility] access.    For providers and/or their staff who would like to refer a patient to [facility], please contact us through our one-stop-shop provider referral line, [facility], at [phone].    If you feel you have received this communication in error or would no longer like to receive these types of communications, please e-mail [email or web address]

## 2020-08-04 ENCOUNTER — TELEPHONE (OUTPATIENT)
Dept: SURGERY | Facility: CLINIC | Age: 60
End: 2020-08-04

## 2020-08-04 NOTE — TELEPHONE ENCOUNTER
Spoke with patient in regards to US appointment time and date, she verbalizes understanding.      ----- Message from Shirin Garrett RN sent at 8/3/2020  4:38 PM CDT -----  Regarding: RE: US  Booked for you.  US is booked for 1:40 pm because it is a 60 min appt.    ----- Message -----  From: Catarina Mcguire MA  Sent: 8/3/2020   3:03 PM CDT  To: Shirin Garrett RN  Subject: US                                               HI, Can you book this patient for 2:00pm for a Breast US. Her Diag mammo is scheduled for 2:30. The spots are open but I dont have access to book this one. Thanks,

## 2020-08-11 ENCOUNTER — HOSPITAL ENCOUNTER (OUTPATIENT)
Dept: RADIOLOGY | Facility: HOSPITAL | Age: 60
Discharge: HOME | End: 2020-08-11
Attending: PHYSICIAN ASSISTANT
Payer: COMMERCIAL

## 2020-08-11 DIAGNOSIS — R92.30 BREAST DENSITY: ICD-10-CM

## 2020-08-11 PROCEDURE — 77061 BREAST TOMOSYNTHESIS UNI: CPT | Mod: TC

## 2020-08-11 PROCEDURE — 77065 MAMMO DIGITAL DIAGNOSTIC RIGHT WITH TOMOSYNTHESIS_CAD: ICD-10-PCS | Mod: 26,,, | Performed by: RADIOLOGY

## 2020-08-11 PROCEDURE — 76642 US BREAST RIGHT LIMITED: ICD-10-PCS | Mod: 26,RT,, | Performed by: RADIOLOGY

## 2020-08-11 PROCEDURE — 77061 BREAST TOMOSYNTHESIS UNI: CPT | Mod: 26,,, | Performed by: RADIOLOGY

## 2020-08-11 PROCEDURE — 77065 DX MAMMO INCL CAD UNI: CPT | Mod: TC

## 2020-08-11 PROCEDURE — 77061 MAMMO DIGITAL DIAGNOSTIC RIGHT WITH TOMOSYNTHESIS_CAD: ICD-10-PCS | Mod: 26,,, | Performed by: RADIOLOGY

## 2020-08-11 PROCEDURE — 76642 ULTRASOUND BREAST LIMITED: CPT | Mod: TC,RT

## 2020-08-11 PROCEDURE — 77065 DX MAMMO INCL CAD UNI: CPT | Mod: 26,,, | Performed by: RADIOLOGY

## 2020-08-11 PROCEDURE — 76642 ULTRASOUND BREAST LIMITED: CPT | Mod: 26,RT,, | Performed by: RADIOLOGY

## 2020-08-12 ENCOUNTER — TELEPHONE (OUTPATIENT)
Dept: RADIOLOGY | Facility: HOSPITAL | Age: 60
End: 2020-08-12

## 2020-08-12 NOTE — TELEPHONE ENCOUNTER
Spoke with patient. Reviewed breast biopsy procedure and reviewed instructions for breast biopsy. Patient expressed understanding and all questions were answered. Provided patient with my phone number to call for any further concerns or questions.   Patient scheduled breast biopsy at the [facility] on 8/25/2020.

## 2020-08-20 ENCOUNTER — CLINICAL SUPPORT (OUTPATIENT)
Dept: DIABETES | Facility: CLINIC | Age: 60
End: 2020-08-20
Payer: COMMERCIAL

## 2020-08-20 DIAGNOSIS — E11.65 UNCONTROLLED TYPE 2 DIABETES MELLITUS WITH HYPERGLYCEMIA: ICD-10-CM

## 2020-08-20 PROCEDURE — 99999 PR PBB SHADOW E&M-EST. PATIENT-LVL III: ICD-10-PCS | Mod: PBBFAC,,, | Performed by: DIETITIAN, REGISTERED

## 2020-08-20 PROCEDURE — 99999 PR PBB SHADOW E&M-EST. PATIENT-LVL III: CPT | Mod: PBBFAC,,, | Performed by: DIETITIAN, REGISTERED

## 2020-08-20 PROCEDURE — G0108 DIAB MANAGE TRN  PER INDIV: HCPCS | Mod: S$GLB,,, | Performed by: DIETITIAN, REGISTERED

## 2020-08-20 PROCEDURE — G0108 PR DIAB MANAGE TRN  PER INDIV: ICD-10-PCS | Mod: S$GLB,,, | Performed by: DIETITIAN, REGISTERED

## 2020-08-20 NOTE — TELEPHONE ENCOUNTER
----- Message from Olena Saldaña sent at 8/20/2020  2:39 PM CDT -----  Contact: Violeta [phone]  Prescription refill request.    RX name and strength :    DULoxetine (CYMBALTA) 30 MG capsule    Local pharmacy or mail order pharmacy:  Local    Pharmacy name and phone #:   [facility] [phone] [address] [place], [address]    Additional information:   Refill request

## 2020-08-20 NOTE — TELEPHONE ENCOUNTER
----- Message from Olena Saldaña sent at 8/20/2020  2:42 PM CDT -----  Contact: Violeta [phone]  Prescription refill request.    RX name and strength :   oxybutynin (DITROPAN-XL) 5 MG TR24    Local pharmacy or mail order pharmacy:  Local     Pharmacy name and phone # :   [facility] [phone] [address] [facility] [address] [place], [address]    Additional information:

## 2020-08-20 NOTE — PROGRESS NOTES
Diabetes Education  Author: Aleyda Monzon RD, CDE  Date: 8/20/2020    Diabetes Care Management Summary  Diabetes Education Record Assessment/Progress: Initial     Diabetes Type  Diabetes Type : Type II    Diabetes History  Diabetes Diagnosis: >10 years  Current Treatment: Oral Medication, Injectable    Health Maintenance was reviewed today with patient. Discussed with patient importance of routine eye exams, foot exams/foot care, blood work (i.e.: A1c, microalbumin, and lipid), dental visits, yearly flu vaccine, and pneumonia vaccine as indicated by PCP. Patient verbalized understanding.     Health Maintenance Topics with due status: Not Due       Topic Last Completion Date    Colorectal Cancer Screening 04/15/2011    TETANUS VACCINE 07/13/2016    Eye Exam 11/21/2019    Lipid Panel 06/19/2020    Urine Microalbumin 06/19/2020    Hemoglobin A1c 06/24/2020    Foot Exam 06/30/2020    Mammogram 08/11/2020     Health Maintenance Due   Topic Date Due    Urine Drug Screen  12/18/1980    Naloxone Prescription  12/18/1980    Pneumococcal Vaccine (Medium Risk) (1 of 1 - PPSV23) 12/18/1981    Shingles Vaccine (1 of 2) 12/18/2012     Nutrition  Meal Planning: drinks regular soda, water, eats out often, snacks between meal(2 good meals plus snacks)  What type of sweetener do you use?: sugar  What type of beverages do you drink?: water, diet soda/tea, juice, sport drinks    Monitoring   Self Monitoring : Tries to check once a day sometimes twice  Blood Glucose Logs: No  Do you use a personal continuous glucose monitor?: No  In the last month, how often have you had a low blood sugar reaction?: never  What are your symptoms of low blood sugar?: N/A  How do you treat low blood sugar?: N/A  Can you tell when your blood sugar is too high?: yes  How do you treat high blood sugar?: None    Exercise   Exercise Type: none    Current Diabetes Treatment   Current Treatment: Oral Medication, Injectable    Social History  Preferred  Learning Method: Face to Face  Primary Support: Self  Smoking Status: Never a Smoker  Alcohol Use: Never    Barriers to Change  Barriers to Change: None  Learning Challenges : None    Readiness to Learn   Readiness to Learn : Acceptance    Cultural Influences  Cultural Influences: No    Diabetes Education Assessment/Progress  Diabetes Disease Process (diabetes disease process and treatment options): Discussion, Individual Session, Written Materials Provided, Comprehends Key Points  Nutrition (Incorporating nutritional management into one's lifestyle): Discussion, Individual Session, Written Materials Provided, Comprehends Key Points - reviewed CHO counting, label reading and addt'l resources to assist w/ CHO counting; plate method reviewed; encouraged to eliminate sugary beverages - alternatives discussed  Physical Activity (incorporating physical activity into one's lifestyle): Discussion, Individual Session, Comprehends Key Points - reviewed goals and benefits  Medications (states correct name, dose, onset, peak, duration, side effects & timing of meds): Discussion, Individual Session, Written Materials Provided, Comprehends Key Points - reviewed medication regimen  Monitoring (monitoring blood glucose/other parameters & using results): Discussion, Individual Session, Written Materials Provided, Comprehends Key Points - reviewed SMBG schedule and BG goals  Acute Complications (preventing, detecting, and treating acute complications): Discussion, Individual Session, Written Materials Provided, Comprehends Key Points - reviewed s/s and treatment of hypoglycemia  Chronic Complications (preventing, detecting, and treating chronic complications): Discussion, Individual Session, Written Materials Provided, Comprehends Key Points - reviewed standards of care  Clinical (diabetes, other pertinent medical history, and relevant comorbidities reviewed during visit): Discussion, Individual Session, Comprehends Key  Points  Cognitive (knowledge of self-management skills, functional health literacy): Discussion, Individual Session, Comprehends Key Points  Psychosocial (emotional response to diabetes): Discussion, Individual Session, Comprehends Key Points  Diabetes Distress and Support Systems: Not Covered/Deferred - Time  Behavioral (readiness for change, lifestyle practices, self-care behaviors): Discussion, Individual Session, Comprehends Key Points    Goals  Patient has selected/evaluated goals during today's session: Yes, selected  Monitoring: (Check BG BID)    Diabetes Care Plan/Intervention  Education Plan/Intervention: Individual Follow-Up DSMT    Diabetes Meal Plan  Carbohydrate Per Meal: 30-45g  Carbohydrate Per Snack : 15-20g    Today's Self-Management Care Plan was developed with the patient's input and is based on barriers identified during today's assessment.    The long and short-term goals in the care plan were written with the patient/caregiver's input. The patient has agreed to work toward these goals to improve her overall diabetes control.      The patient received a copy of today's self-management plan and verbalized understanding of the care plan, goals, and all of today's instructions.      The patient was encouraged to communicate with her physician and care team regarding her condition(s) and treatment.  I provided the patient with my contact information today and encouraged her to contact me via phone or patient portal as needed.     Education Units of Time   Time Spent: 60 min

## 2020-08-23 RX ORDER — OXYBUTYNIN CHLORIDE 5 MG/1
5 TABLET, EXTENDED RELEASE ORAL DAILY
Qty: 30 TABLET | Refills: 11 | Status: SHIPPED | OUTPATIENT
Start: 2020-08-23 | End: 2020-09-29

## 2020-08-23 RX ORDER — DULOXETIN HYDROCHLORIDE 30 MG/1
30 CAPSULE, DELAYED RELEASE ORAL DAILY
Qty: 30 CAPSULE | Refills: 3 | Status: SHIPPED | OUTPATIENT
Start: 2020-08-23 | End: 2020-11-03 | Stop reason: SDUPTHER

## 2020-08-25 ENCOUNTER — HOSPITAL ENCOUNTER (OUTPATIENT)
Dept: RADIOLOGY | Facility: HOSPITAL | Age: 60
Discharge: HOME | End: 2020-08-25
Attending: PHYSICIAN ASSISTANT
Payer: COMMERCIAL

## 2020-08-25 DIAGNOSIS — R92.8 ABNORMAL MAMMOGRAM: Primary | ICD-10-CM

## 2020-08-25 PROCEDURE — 88305 TISSUE EXAM BY PATHOLOGIST: CPT | Mod: 26,,, | Performed by: PATHOLOGY

## 2020-08-25 PROCEDURE — 19083 US BREAST BIOPSY WITH IMAGING 1ST SITE RIGHT: ICD-10-PCS | Mod: RT,,, | Performed by: RADIOLOGY

## 2020-08-25 PROCEDURE — 88305 TISSUE EXAM BY PATHOLOGIST: CPT | Performed by: PATHOLOGY

## 2020-08-25 PROCEDURE — 88305 TISSUE EXAM BY PATHOLOGIST: ICD-10-PCS | Mod: 26,,, | Performed by: PATHOLOGY

## 2020-08-25 PROCEDURE — 77065 DX MAMMO INCL CAD UNI: CPT | Mod: 26,,, | Performed by: RADIOLOGY

## 2020-08-25 PROCEDURE — 77065 DX MAMMO INCL CAD UNI: CPT | Mod: TC,RT

## 2020-08-25 PROCEDURE — 25000003 PHARM REV CODE 250: Performed by: PHYSICIAN ASSISTANT

## 2020-08-25 PROCEDURE — 27201068 US BREAST BIOPSY WITH IMAGING 1ST SITE RIGHT

## 2020-08-25 PROCEDURE — 77065 MAMMO DIGITAL DIAGNOSTIC RIGHT WITH CAD: ICD-10-PCS | Mod: 26,,, | Performed by: RADIOLOGY

## 2020-08-25 PROCEDURE — 19083 BX BREAST 1ST LESION US IMAG: CPT | Mod: RT,,, | Performed by: RADIOLOGY

## 2020-08-25 RX ORDER — LIDOCAINE HYDROCHLORIDE 10 MG/ML
3 INJECTION INFILTRATION; PERINEURAL ONCE
Status: COMPLETED | OUTPATIENT
Start: 2020-08-25 | End: 2020-08-25

## 2020-08-25 RX ORDER — HYDROCODONE BITARTRATE AND ACETAMINOPHEN 10; 325 MG/1; MG/1
1 TABLET ORAL
Qty: 90 TABLET | Refills: 0 | Status: SHIPPED | OUTPATIENT
Start: 2020-08-25 | End: 2020-09-25 | Stop reason: SDUPTHER

## 2020-08-25 RX ORDER — LIDOCAINE HYDROCHLORIDE AND EPINEPHRINE 20; 10 MG/ML; UG/ML
10 INJECTION, SOLUTION INFILTRATION; PERINEURAL ONCE
Status: COMPLETED | OUTPATIENT
Start: 2020-08-25 | End: 2020-08-25

## 2020-08-25 RX ADMIN — LIDOCAINE HYDROCHLORIDE 3 ML: 10 INJECTION, SOLUTION EPIDURAL; INFILTRATION; INTRACAUDAL; PERINEURAL at 04:08

## 2020-08-25 RX ADMIN — LIDOCAINE HYDROCHLORIDE,EPINEPHRINE BITARTRATE 10 ML: 20; .01 INJECTION, SOLUTION INFILTRATION; PERINEURAL at 04:08

## 2020-08-25 NOTE — TELEPHONE ENCOUNTER
Spoke to pt who stated that she was started on farxiga and trulicity which is making her urinate more than previously.

## 2020-08-27 LAB
COMMENT: NORMAL
FINAL PATHOLOGIC DIAGNOSIS: NORMAL
GROSS: NORMAL

## 2020-08-28 ENCOUNTER — TELEPHONE (OUTPATIENT)
Dept: SURGERY | Facility: CLINIC | Age: 60
End: 2020-08-28

## 2020-08-28 NOTE — TELEPHONE ENCOUNTER
Called patient with biopsy results from 8/25/2020. Biopsy results showed an intraductal papilloma. Discussed what this means and the results were reviewed by Dr. Greene . These results are benign, concordant and a surgical consult is recommended. Patient was waiting on her new work schedule, she will receive it on  Monday. Will call patient on Tuesday 9/1/2020 to schedule appt per patients request.Patient verbalized understanding.

## 2020-09-03 ENCOUNTER — TELEPHONE (OUTPATIENT)
Dept: SURGERY | Facility: CLINIC | Age: 60
End: 2020-09-03

## 2020-09-03 ENCOUNTER — LAB VISIT (OUTPATIENT)
Dept: LAB | Facility: HOSPITAL | Age: 60
End: 2020-09-03
Attending: NURSE PRACTITIONER
Payer: COMMERCIAL

## 2020-09-03 DIAGNOSIS — E78.2 MIXED HYPERLIPIDEMIA: ICD-10-CM

## 2020-09-03 DIAGNOSIS — I10 HTN, GOAL BELOW 130/80: ICD-10-CM

## 2020-09-03 DIAGNOSIS — E11.65 UNCONTROLLED TYPE 2 DIABETES MELLITUS WITH HYPERGLYCEMIA: ICD-10-CM

## 2020-09-03 LAB
ALBUMIN SERPL BCP-MCNC: 3.5 G/DL (ref 3.5–5.2)
ALP SERPL-CCNC: 105 U/L (ref 55–135)
ALT SERPL W/O P-5'-P-CCNC: 20 U/L (ref 10–44)
ANION GAP SERPL CALC-SCNC: 9 MMOL/L (ref 8–16)
AST SERPL-CCNC: 40 U/L (ref 10–40)
BILIRUB SERPL-MCNC: 0.3 MG/DL (ref 0.1–1)
BUN SERPL-MCNC: 13 MG/DL (ref 6–20)
CALCIUM SERPL-MCNC: 9.5 MG/DL (ref 8.7–10.5)
CHLORIDE SERPL-SCNC: 101 MMOL/L (ref 95–110)
CHOLEST SERPL-MCNC: 213 MG/DL (ref 120–199)
CHOLEST/HDLC SERPL: 4 {RATIO} (ref 2–5)
CO2 SERPL-SCNC: 25 MMOL/L (ref 23–29)
CREAT SERPL-MCNC: 0.8 MG/DL (ref 0.5–1.4)
EST. GFR  (AFRICAN AMERICAN): >60 ML/MIN/1.73 M^2
EST. GFR  (NON AFRICAN AMERICAN): >60 ML/MIN/1.73 M^2
ESTIMATED AVG GLUCOSE: 255 MG/DL (ref 68–131)
GLUCOSE SERPL-MCNC: 288 MG/DL (ref 70–110)
HBA1C MFR BLD HPLC: 10.5 % (ref 4–5.6)
HDLC SERPL-MCNC: 53 MG/DL (ref 40–75)
HDLC SERPL: 24.9 % (ref 20–50)
LDLC SERPL CALC-MCNC: 124 MG/DL (ref 63–159)
NONHDLC SERPL-MCNC: 160 MG/DL
POTASSIUM SERPL-SCNC: 4.5 MMOL/L (ref 3.5–5.1)
PROT SERPL-MCNC: 7.7 G/DL (ref 6–8.4)
SODIUM SERPL-SCNC: 135 MMOL/L (ref 136–145)
TRIGL SERPL-MCNC: 180 MG/DL (ref 30–150)

## 2020-09-03 PROCEDURE — 36415 COLL VENOUS BLD VENIPUNCTURE: CPT | Mod: PO

## 2020-09-03 PROCEDURE — 83036 HEMOGLOBIN GLYCOSYLATED A1C: CPT

## 2020-09-03 PROCEDURE — 80053 COMPREHEN METABOLIC PANEL: CPT

## 2020-09-03 PROCEDURE — 80061 LIPID PANEL: CPT

## 2020-09-03 NOTE — TELEPHONE ENCOUNTER
Spoke with patient, scheduled appt for 9/8/2020 with Dr. Lopez. Reviewed location of appt. Patient verbalized understanding.

## 2020-09-04 ENCOUNTER — OFFICE VISIT (OUTPATIENT)
Dept: INTERNAL MEDICINE | Facility: CLINIC | Age: 60
End: 2020-09-04
Payer: COMMERCIAL

## 2020-09-04 VITALS
TEMPERATURE: 98 F | RESPIRATION RATE: 16 BRPM | SYSTOLIC BLOOD PRESSURE: 132 MMHG | DIASTOLIC BLOOD PRESSURE: 80 MMHG | HEART RATE: 72 BPM | HEIGHT: 64 IN | WEIGHT: 293 LBS | BODY MASS INDEX: 50.02 KG/M2

## 2020-09-04 DIAGNOSIS — E66.01 MORBID OBESITY WITH BMI OF 60.0-69.9, ADULT: ICD-10-CM

## 2020-09-04 DIAGNOSIS — I10 HTN, GOAL BELOW 130/80: ICD-10-CM

## 2020-09-04 DIAGNOSIS — E66.01 CLASS 3 SEVERE OBESITY DUE TO EXCESS CALORIES IN ADULT, UNSPECIFIED BMI, UNSPECIFIED WHETHER SERIOUS COMORBIDITY PRESENT: ICD-10-CM

## 2020-09-04 DIAGNOSIS — E78.2 MIXED HYPERLIPIDEMIA: Primary | ICD-10-CM

## 2020-09-04 DIAGNOSIS — E11.65 UNCONTROLLED TYPE 2 DIABETES MELLITUS WITH HYPERGLYCEMIA: ICD-10-CM

## 2020-09-04 DIAGNOSIS — E03.9 HYPOTHYROIDISM, UNSPECIFIED TYPE: ICD-10-CM

## 2020-09-04 DIAGNOSIS — G47.33 OBSTRUCTIVE SLEEP APNEA (ADULT) (PEDIATRIC): ICD-10-CM

## 2020-09-04 DIAGNOSIS — R60.9 EDEMA, UNSPECIFIED TYPE: ICD-10-CM

## 2020-09-04 DIAGNOSIS — R10.13 DYSPEPSIA: ICD-10-CM

## 2020-09-04 DIAGNOSIS — R11.0 NAUSEA: ICD-10-CM

## 2020-09-04 PROCEDURE — 3075F PR MOST RECENT SYSTOLIC BLOOD PRESS GE 130-139MM HG: ICD-10-PCS | Mod: CPTII,S$GLB,, | Performed by: NURSE PRACTITIONER

## 2020-09-04 PROCEDURE — 99999 PR PBB SHADOW E&M-EST. PATIENT-LVL V: CPT | Mod: PBBFAC,,, | Performed by: NURSE PRACTITIONER

## 2020-09-04 PROCEDURE — 3046F PR MOST RECENT HEMOGLOBIN A1C LEVEL > 9.0%: ICD-10-PCS | Mod: CPTII,S$GLB,, | Performed by: NURSE PRACTITIONER

## 2020-09-04 PROCEDURE — 99214 OFFICE O/P EST MOD 30 MIN: CPT | Mod: S$GLB,,, | Performed by: NURSE PRACTITIONER

## 2020-09-04 PROCEDURE — 3046F HEMOGLOBIN A1C LEVEL >9.0%: CPT | Mod: CPTII,S$GLB,, | Performed by: NURSE PRACTITIONER

## 2020-09-04 PROCEDURE — 3008F PR BODY MASS INDEX (BMI) DOCUMENTED: ICD-10-PCS | Mod: CPTII,S$GLB,, | Performed by: NURSE PRACTITIONER

## 2020-09-04 PROCEDURE — 99999 PR PBB SHADOW E&M-EST. PATIENT-LVL V: ICD-10-PCS | Mod: PBBFAC,,, | Performed by: NURSE PRACTITIONER

## 2020-09-04 PROCEDURE — 99214 PR OFFICE/OUTPT VISIT, EST, LEVL IV, 30-39 MIN: ICD-10-PCS | Mod: S$GLB,,, | Performed by: NURSE PRACTITIONER

## 2020-09-04 PROCEDURE — 3079F DIAST BP 80-89 MM HG: CPT | Mod: CPTII,S$GLB,, | Performed by: NURSE PRACTITIONER

## 2020-09-04 PROCEDURE — 3079F PR MOST RECENT DIASTOLIC BLOOD PRESSURE 80-89 MM HG: ICD-10-PCS | Mod: CPTII,S$GLB,, | Performed by: NURSE PRACTITIONER

## 2020-09-04 PROCEDURE — 3008F BODY MASS INDEX DOCD: CPT | Mod: CPTII,S$GLB,, | Performed by: NURSE PRACTITIONER

## 2020-09-04 PROCEDURE — 3075F SYST BP GE 130 - 139MM HG: CPT | Mod: CPTII,S$GLB,, | Performed by: NURSE PRACTITIONER

## 2020-09-04 RX ORDER — BLOOD-GLUCOSE,RECEIVER,CONT
1 EACH MISCELLANEOUS DAILY
Qty: 1 EACH | Refills: 0 | Status: SHIPPED | OUTPATIENT
Start: 2020-09-04 | End: 2021-05-20

## 2020-09-04 RX ORDER — GLIPIZIDE 10 MG/1
10 TABLET, FILM COATED, EXTENDED RELEASE ORAL
Qty: 90 TABLET | Refills: 1 | Status: SHIPPED | OUTPATIENT
Start: 2020-09-04 | End: 2020-11-03 | Stop reason: SDUPTHER

## 2020-09-04 RX ORDER — EZETIMIBE 10 MG/1
10 TABLET ORAL NIGHTLY
Qty: 90 TABLET | Refills: 3 | Status: SHIPPED | OUTPATIENT
Start: 2020-09-04 | End: 2020-11-03 | Stop reason: SDUPTHER

## 2020-09-04 RX ORDER — FLASH GLUCOSE SENSOR
1 KIT MISCELLANEOUS DAILY
Qty: 1 KIT | Refills: 12 | Status: SHIPPED | OUTPATIENT
Start: 2020-09-04 | End: 2020-10-02

## 2020-09-04 RX ORDER — OMEPRAZOLE 20 MG/1
20 CAPSULE, DELAYED RELEASE ORAL 2 TIMES DAILY
Qty: 60 CAPSULE | Refills: 2 | Status: ON HOLD | OUTPATIENT
Start: 2020-09-04 | End: 2021-10-27

## 2020-09-04 RX ORDER — EMPAGLIFLOZIN 10 MG/1
10 TABLET, FILM COATED ORAL DAILY
Qty: 30 TABLET | Refills: 6 | Status: SHIPPED | OUTPATIENT
Start: 2020-09-04 | End: 2021-04-02

## 2020-09-04 RX ORDER — ONDANSETRON 4 MG/1
4 TABLET, FILM COATED ORAL EVERY 8 HOURS PRN
Qty: 30 TABLET | Refills: 1 | Status: SHIPPED | OUTPATIENT
Start: 2020-09-04 | End: 2021-08-23 | Stop reason: SDUPTHER

## 2020-09-04 RX ORDER — BLOOD-GLUCOSE SENSOR
1 EACH MISCELLANEOUS
Qty: 3 EACH | Refills: 3 | Status: SHIPPED | OUTPATIENT
Start: 2020-09-04 | End: 2021-05-20

## 2020-09-04 RX ORDER — FLASH GLUCOSE SENSOR
1 KIT MISCELLANEOUS DAILY
Qty: 2 KIT | Refills: 12 | Status: SHIPPED | OUTPATIENT
Start: 2020-09-04 | End: 2020-10-02

## 2020-09-04 RX ORDER — BLOOD-GLUCOSE TRANSMITTER
1 EACH MISCELLANEOUS DAILY
Qty: 1 EACH | Refills: 3 | Status: SHIPPED | OUTPATIENT
Start: 2020-09-04 | End: 2021-05-20

## 2020-09-04 RX ORDER — FUROSEMIDE 40 MG/1
40 TABLET ORAL 2 TIMES DAILY
Qty: 60 TABLET | Refills: 11 | Status: ON HOLD | OUTPATIENT
Start: 2020-09-04 | End: 2021-10-27

## 2020-09-04 NOTE — PROGRESS NOTES
"  59 y.o. very pleasant lady, patient of Dr. Petersen's -   Coming in to follow up on her management of type 2 diabetes.   I initially saw her June 30th, 2020 - those notes are below.      Reports A lot of stress lately because of job changes and issues with her home needing multiple repairs. Has been "one thing after the other",   So has been difficult for her to eat right.   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     She continues on metformin 1000 mg po bid.   Continues on trulicity 1.5mg sc weekly.   Was on januvia, but I dc'd that as was not right for her to take along with a GLP1 due to same pathways.   She had also been on glipizide twice daily - I recommended stopping that as I was beginning her on an SGLT2i, and did not want her to have lows.   She started on farxiga 10mg tablet daily - states he took for 3 weeks. It caused her, she believes, to urinate so much, that it was affecting her quality of sleep and her job.   So she stopped taking it.   She did restart her glipizide 10mg twice daily however in the interim.   Admits she has not been eating correctly, lots of high sugar, high carbohydrate foods.   Her current Hgba1c yesterday was at 9.3%.   She does not take insulin and does not wish to start.   She is here to discuss realistic plan for management of her diabetes.     Last visit notes from June 30th, 2020:   HPI: Violeta Champion is a 59 y.o.  female c/I for visit to address Diabetes Type 2  She was diagnosed with T2DM about 10 years ago.   Suboptimal control with a1c at 8.4%.   Her mother, and maternal aunt and uncle all have type 2 diabetes  Reports was getting steroid shots frequently for several years for allergies/seasonal reasons.    She is a patient of Dr. Petersen and recently saw Dr. Burton for primary care needs.   She is taking Trulicity 1.5mg sc weekly and Januvia 100mg tablet daily.   Also on metformin 1000mg po bid and glipizide 10mg tablet - BID before meals. " (breakfast and dinner).   Denies missing doses of DM medication.   Has lost 62 lbs on trulicity - over 4 - 5 months. Feels like she lost a lot of muscle mass, and Her ultimate goal is to get off of Trulicity medication.   States a1c was closer to 11% before starting on trulicity.   She is now down to 8.4% - wants to get off of medication completely at some point.   Feeling down somewhat, related to joint pain/aches r/t fibromyalgia.  Otherwise, no acute complaints.     Past medical History:   Past Medical History:   Diagnosis Date    Diabetes mellitus, type 2     GERD (gastroesophageal reflux disease)     High cholesterol     Hypertension     Hypothyroid     Injury of knee, leg, ankle and foot     Insulin-resistant diabetes mellitus and acanthosis nigricans     Menopause present     Osteoarthritis     Osteoarthritis, knee     Osteopenia     Osteopenia     Sleep apnea       Family hx:   Family History   Problem Relation Age of Onset    Hypertension Mother     Glaucoma Mother     Diabetes Mother     Hypertension Brother     Cancer Maternal Grandmother         breast    Amblyopia Father     Diabetes Maternal Aunt     Diabetes Maternal Uncle     Breast cancer Paternal Aunt     Breast cancer Paternal Grandmother     Blindness Neg Hx     Cataracts Neg Hx     Macular degeneration Neg Hx     Retinal detachment Neg Hx     Strabismus Neg Hx     Colon cancer Neg Hx     Stomach cancer Neg Hx     Esophageal cancer Neg Hx     Irritable bowel syndrome Neg Hx     Rectal cancer Neg Hx     Ulcerative colitis Neg Hx     Crohn's disease Neg Hx     Celiac disease Neg Hx       Current meds:   Current Outpatient Medications:     albuterol (VENTOLIN HFA) 90 mcg/actuation inhaler, INHALE ONE TO TWO PUFFS INTO LUNGS EVERY 6 HOURS AS NEEDED FOR WHEEZING, Disp: 54 g, Rfl: 3    amLODIPine (NORVASC) 5 MG tablet, Take 1 tablet by mouth once daily, Disp: 90 tablet, Rfl: 1    aspirin 81 mg Tab, Take by mouth.  1 Tablet Oral Every day, Disp: , Rfl:     blood sugar diagnostic (FREESTYLE INSULINX TEST STRIPS) Strp, Check glucose three times daily, Disp: 100 each, Rfl: 3    calcium carbonate (CALCIUM CARBONATE) 300 mg (750 mg) Chew, Take by mouth 2 (two) times daily.  , Disp: , Rfl:     celecoxib (CELEBREX) 200 MG capsule, , Disp: , Rfl:     diclofenac sodium (VOLTAREN) 1 % Gel, APPLY TWO GRAMS TOPICALLY ONCE DAILY, Disp: 100 g, Rfl: 0    DULoxetine (CYMBALTA) 30 MG capsule, Take 1 capsule (30 mg total) by mouth once daily., Disp: 30 capsule, Rfl: 3    fish oil-dha-epa 1,200-144-216 mg Cap, Take by mouth. 1 Capsule Oral Every day, Disp: , Rfl:     flurazepam (DALMANE) 30 MG capsule, Take 30 mg by mouth nightly as needed.  , Disp: , Rfl:     fluticasone-salmeterol diskus inhaler 250-50 mcg, Inhale 1 puff into the lungs 2 (two) times daily. Controller, Disp: 180 each, Rfl: 3    folic acid (FOLVITE) 1 MG tablet, Take 1 tablet (1 mg total) by mouth once daily., Disp: 90 tablet, Rfl: 3    furosemide (LASIX) 40 MG tablet, TAKE 1 TABLET BY MOUTH TWICE DAILY, Disp: 60 tablet, Rfl: 11    gabapentin (NEURONTIN) 300 MG capsule, Take 1 capsule (300 mg total) by mouth 3 (three) times daily., Disp: 90 capsule, Rfl: 3    HYDROcodone-acetaminophen (NORCO)  mg per tablet, Take 1 tablet by mouth every 6 to 8 hours as needed for Pain., Disp: 90 tablet, Rfl: 0    hyoscyamine (LEVSIN/SL) 0.125 mg Subl, DISSOLVE 1 TABLET UNDER THE TONGUE EVERY 4 TO 6 HOURS, Disp: 30 tablet, Rfl: 5    ibuprofen (ADVIL,MOTRIN) 800 MG tablet, Take 800 mg by mouth every 8 (eight) hours as needed., Disp: , Rfl: 0    icosapent ethyL (VASCEPA) 1 gram Cap, Take 2 g by mouth 2 (two) times daily., Disp: 180 capsule, Rfl: 1    lactulose (CHRONULAC) 10 gram/15 mL solution, TAKE 15 ML BY MOUTH  ONCE DAILY AS NEEDED FOR CONSTIPATION, Disp: 473 mL, Rfl: 6    levothyroxine (SYNTHROID) 100 MCG tablet, Take 1 tablet by mouth once daily, Disp: 90 tablet, Rfl:  1    LORazepam (ATIVAN) 0.5 MG tablet, Take 1 tablet (0.5 mg total) by mouth 2 (two) times daily., Disp: 60 tablet, Rfl: 0    meclizine (ANTIVERT) 25 mg tablet, Take 1 tablet (25 mg total) by mouth 3 (three) times daily as needed., Disp: 30 tablet, Rfl: 0    metFORMIN (GLUCOPHAGE) 1000 MG tablet, TAKE 1 TABLET BY MOUTH TWICE DAILY WITH MEALS, Disp: 180 tablet, Rfl: 1    metoprolol tartrate (LOPRESSOR) 100 MG tablet, Take 1 tablet by mouth twice daily, Disp: 180 tablet, Rfl: 1    mv-min-FA-Nt-Kd-yryeuum-lutein (CENTRUM) 0.4-162-18 mg Tab, Take by mouth. 1 Tablet Oral Every day, Disp: , Rfl:     omeprazole (PRILOSEC) 20 MG capsule, Take 1 capsule (20 mg total) by mouth 2 (two) times daily., Disp: 60 capsule, Rfl: 2    ondansetron (ZOFRAN) 4 MG tablet, TAKE ONE TABLET BY MOUTH EVERY 8 HOURS AS NEEDED, Disp: 30 tablet, Rfl: 1    oxybutynin (DITROPAN-XL) 5 MG TR24, Take 1 tablet (5 mg total) by mouth once daily., Disp: 30 tablet, Rfl: 11    potassium chloride SA (K-DUR,KLOR-CON) 20 MEQ tablet, Take 1 tablet by mouth twice daily, Disp: 180 tablet, Rfl: 0    pravastatin (PRAVACHOL) 80 MG tablet, Take 1 tablet (80 mg total) by mouth once daily., Disp: 90 tablet, Rfl: 1    sucralfate (CARAFATE) 1 gram tablet, TAKE ONE TABLET BY MOUTH THREE TIMES DAILY, Disp: 90 tablet, Rfl: 3    temazepam (RESTORIL) 30 mg capsule, Take 1 capsule (30 mg total) by mouth nightly as needed. 1 Capsule Oral Every day, Disp: 30 capsule, Rfl: 0    tiZANidine (ZANAFLEX) 4 MG tablet, Take 1 tablet (4 mg total) by mouth every 8 (eight) hours., Disp: 90 tablet, Rfl: 3    TRULICITY 1.5 mg/0.5 mL PnIj, INJECT 1.5 MG INTO THE SKIN EVERY 7 DAYS, Disp: 4 mL, Rfl: 11    empagliflozin (JARDIANCE) 10 mg tablet, Take 1 tablet (10 mg total) by mouth once daily., Disp: 30 tablet, Rfl: 6    levocetirizine (XYZAL) 5 MG tablet, Take 1 tablet (5 mg total) by mouth every evening., Disp: 30 tablet, Rfl: 11     Social:   Lives at home by herself.   Life  "changes/stressors currently:   Diet: following ADA diet for the most part.   3 meals per day with snacks -   Breakfast - chobani yogurt, chicken/green beans.  Lunch - protein and veggie, fish, chicken  Dinner - protein shake, meat and veggie.   Snacks - cashews, almond nut mix with raisins/cranberries, watermelon, apples  Exercise: none. Active at work. Walking a lot.  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    Current Diabetes medications:   GLP1:  Trulicity 1.5mg sc weekly.   Oral meds:   Metformin 1000mg po bid.   Januvia 100mg po daily.   RIGGINS: glucotrol bid before breakfast and dinner.     Medications Tried and Failed:   farxiga     Glucose Monitoring:   Checks sugar in a.m. on occasion. States running "lower side" lately this week - states normal is 130's.     Vital Signs  /80 (BP Location: Left arm, Patient Position: Sitting, BP Method: Large (Manual))   Pulse 72   Temp 97.8 °F (36.6 °C) (Temporal)   Resp 16   Ht 5' 4" (1.626 m)   Wt 135 kg (297 lb 9.9 oz)   LMP  (LMP Unknown)   BMI 51.09 kg/m²     Pertinent Labs:   Hgba1c   Lab Results   Component Value Date    HGBA1C 10.5 (H) 09/03/2020     Lipid panel   Lab Results   Component Value Date    CHOL 213 (H) 09/03/2020    CHOL 200 (H) 06/19/2020    CHOL 193 02/05/2020     Lab Results   Component Value Date    HDL 53 09/03/2020    HDL 52 06/19/2020    HDL 43 02/05/2020     Lab Results   Component Value Date    LDLCALC 124.0 09/03/2020    LDLCALC 119.4 06/19/2020    LDLCALC 109.0 02/05/2020     Lab Results   Component Value Date    TRIG 180 (H) 09/03/2020    TRIG 143 06/19/2020    TRIG 205 (H) 02/05/2020     Lab Results   Component Value Date    CHOLHDL 24.9 09/03/2020    CHOLHDL 26.0 06/19/2020    CHOLHDL 22.3 02/05/2020      CMP  Glucose   Date Value Ref Range Status   09/03/2020 288 (H) 70 - 110 mg/dL Final     BUN, Bld   Date Value Ref Range Status   09/03/2020 13 6 - 20 mg/dL Final     Creatinine   Date Value " Ref Range Status   09/03/2020 0.8 0.5 - 1.4 mg/dL Final     eGFR if    Date Value Ref Range Status   09/03/2020 >60.0 >60 mL/min/1.73 m^2 Final     eGFR if non    Date Value Ref Range Status   09/03/2020 >60.0 >60 mL/min/1.73 m^2 Final     Comment:     Calculation used to obtain the estimated glomerular filtration  rate (eGFR) is the CKD-EPI equation.         Microalbumin creatinine ratio:   Lab Results   Component Value Date    MICALBCREAT 24.4 06/19/2020       Review of Pertinent co-morbidities/risk factors:   CV: Denies history of MI nor stroke.   CAD: none/denies  Takes aspirin 325mg a few days per week for pain.   BP: has history of HTN  Statin: Taking  ACE/ARB: Not taking    Social History     Tobacco Use   Smoking Status Never Smoker   Smokeless Tobacco Never Used        Standards of care:   Eyes: .: 11/21/2019  Foot exam: : 06/30/2020   Diabetes education: has had in past, over a year ago.     Review Of Systems:   Gen: Appetite good, +60lbs of weight loss, some fatigue and joint pain. No polydipsia  Skin: Skin is intact and heals well, no rashes, no hair changes  Eyes: Denies visual disturbances, no blurred vision. Reports eye exam last year was normal.   Resp: no SOB or Dyspnea on exertion, denies cough.   Cardiac: Denies chest pain, palpitations, or swelling.   GI: Denies abdominal pain, nausea or vomiting, diarrhea, constipation.   /GYN: No nocturia, burning or pain.   MS/Neuro: Denies numbness/ tingling in BLE; Gait steady, speech clear  Joint pain - reports chronic related to fibromyalgia  Psych: Denies drug/ETOH abuse, reports mild depression related to fibromyalgia.  Other systems: negative.    Physical Exam:   GENERAL: Well developed, well nourished in appearance. Some forehead perspiration noted.   PSYCH: AAOx3, appropriate mood and affect, pleasant expression, conversant, appears relaxed, well groomed.   EYES: PERRL, Conjunctiva and corneas clear  NECK: Soft and  Supple, trachea midline   CHEST: Even, regular, and unlabored respirations  ABDOMEN: Soft, non-tender, non-distended   VASCULAR: pedal pulses palpable bilaterally, no edema.  NEURO:  cranial nerves II - XII intact   MUSCULOSKELETAL: Good ROM, steady gait.   SKIN: Skin warm, dry, and intact  FEET: Pedal pulses palpable and intact.     Assessment and Plan of Care:     Violeta was seen today for diabetes and knee pain.    Diagnoses and all orders for this visit:    Mixed hyperlipidemia    Hypothyroidism, unspecified type    Class 3 severe obesity due to excess calories in adult, unspecified BMI, unspecified whether serious comorbidity present    Obstructive sleep apnea (adult) (pediatric)    Uncontrolled type 2 diabetes mellitus with hyperglycemia    HTN, goal below 130/80    Other orders  -     empagliflozin (JARDIANCE) 10 mg tablet; Take 1 tablet (10 mg total) by mouth once daily.     1. T2DM with hyperglycemia- Hgba1c goal is 7.5% or less - not at goal. Current a1c at 8.4%--> 10.5%--> 9.3%   Failed farxiga - switch to jardiance - will start low dose 10mg daily.   Had s/e's of excessive urination on farxiga.   (Discussed MOA, possible side effects including yeast infection, UTI, dehydration and ketoacidosis, importance of maintaining hydration and avoiding No carb diets. Good use hygiene. Notify my office if any side effects. Will monitor renal function closely. Stable at present.)  Discussed option of starting insulin instead and that I think she needs insulin, but she is unwilling to start insulin.   I truly do believe she needs low dose basal insulin - would recommend tresiba or toujeo once daily.   So was agreeable to beginning on the sglt2i instead of the insulin.   I stressed the importance of taking this medication consistently and if she thinks she needs/wants to stop/or has side effects - to let me know, so I can further tailor a plan to work that meets her needs.   Continue metformin 1000 bid.   Continue  trulicity 1.5 weekly.   Restarted glipizide just 10 mg tablet in the morning.   discussed DM, progression of disease, long term complications, CV risk factors and tx options.   Advise compliance with ADA diet and encourage exercise  Reviewed  hypoglycemia, s/s and appropriate tx.  Instructed to monitor Blood glucose 1- 2x/daily fasting. Goal is 100 - 150.   Referral to diabetic educator for diet consultation - gave information/brochure handouts on ADA diet for now.   Sent in rx for freestyle ivan if that might help her with BG awareness.     2. HTN- controlled, continue meds as previously prescribed and monitor.   Lopressor, norvasc.  Urine mac normal 6/2020.     3. HLP - LDL goal < 100. Is elevated - not at goal. On pravastatin 40mg every Hs - will increase to 80mg every HS.   Can consider zetia since she reports failure to several statins due to joint pain. Seems to worsen fibromyalgia/flare up.   Also started her on Vascepa bid for elevated trigs. She is taking otc fish oil, which are expensive for her, this will likely be better solution and more potent omega 3 to take.  Encourage compliance in taking medication. LFTs WNL.     4.Over Weight - BMI - 52.75   Consider bariatric surgery referral? Right now patient weary to lose more weight - as she has lost 60lbs quickly on trulicity.   Will continue the GLP1 and add on SGLT2i  - failed farxiga. Will try jardiance instead.   Can visit the idea at future visit.   Encourage Ada diet and exercise.     5. Hypothyroid - continues on Synthroid 100 mcg tablet daily. Last TSH in June 2020 was 1.6 - continue same.      6. YUSEF - on cpap? - management of diabetes and weight loss will help with this.     7. Chronic pain - follows with pain management.  Hx. Fibromyalgia. Norco, zanaflex.    Follow up in 6 weeks with bg logs.

## 2020-09-04 NOTE — PATIENT INSTRUCTIONS
Continue trulicity 1.5mg sc weekly.   Start jardiance 10mg tablet daily.   Start glipizide 10mg xr daily in a.m.   Continue metformin 1000mg twice per day.   Check sugars 2 times per day - I sent in rx for free style ivan - start using it - to track your sugars.     Start zetia 10mg every night for high cholesterol

## 2020-09-06 ENCOUNTER — PATIENT OUTREACH (OUTPATIENT)
Dept: ADMINISTRATIVE | Facility: OTHER | Age: 60
End: 2020-09-06

## 2020-09-06 NOTE — PROGRESS NOTES
LINKS immunization registry updated  Care Everywhere updated  Health Maintenance updated  Chart reviewed for overdue Proactive [facility] Encounters (VICK) health maintenance testing (CRS, Breast Ca, Diabetic Eye Exam)   Orders entered:N/A

## 2020-09-15 ENCOUNTER — TELEPHONE (OUTPATIENT)
Dept: PHARMACY | Facility: CLINIC | Age: 60
End: 2020-09-15

## 2020-09-15 RX ORDER — FLASH GLUCOSE SENSOR
1 KIT MISCELLANEOUS DAILY
Qty: 2 KIT | Refills: 12 | Status: SHIPPED | OUTPATIENT
Start: 2020-09-15 | End: 2020-10-13

## 2020-09-16 ENCOUNTER — TELEPHONE (OUTPATIENT)
Dept: INTERNAL MEDICINE | Facility: CLINIC | Age: 60
End: 2020-09-16

## 2020-09-16 NOTE — TELEPHONE ENCOUNTER
Call patient - let her know that I spoke with the pharmacist this morning about her cgm issue.     They said:   the ivan or dexcom isn't covered under prescription benefits.   . you can send off to be billed medically (but we can't send it to dme as it won't get approved b/c she's not on 4 insulin injections per day and that is what is required to get medical device approval).     per the prior auth tech that worked on the dexcom pa- 'Per insurance rep, Dexcom is excluded from patient's drug coverage. No PA can be completed. However, for Freestyle, patient can receive Freestyle if they try Dexcom first but must pay for Dexcom out of pocket'    So it will be up to her if she wants to pay cash out of pocket for one month for the dexcom, then it looks like we can then submit a request for free style ivan.   But please tell her that even if free style ivan is then dispensed - it's going to be the same as the cash price - $50- $60/month generally for any and all cgms.     Thanks,  Pallavi

## 2020-09-19 NOTE — PROGRESS NOTES
Subjective:       Patient ID: Violeta Champion is a 59 y.o. female.    Chief Complaint: No chief complaint on file.    Patient is a 59 y.o.female who presents today for follow up on chronic medical conditions.  Established Patient - Audio Only Telehealth Visit     The patient location is: [place]  The chief complaint leading to consultation is: f/u  Visit type: Virtual visit with audio only (telephone)  Total time spent with patient: 21 min       The reason for the audio only service rather than synchronous audio and video virtual visit was related to technical difficulties or patient preference/necessity.     Each patient to whom I provide medical services by telemedicine is:  (1) informed of the relationship between the physician and patient and the respective role of any other health care provider with respect to management of the patient; and (2) notified that they may decline to receive medical services by telemedicine and may withdraw from such care at any time. Patient verbally consented to receive this service via voice-only telephone call.       HPI:     Her blood sugar is mostly 215. She said she will f/u with endo next month. jardiance is causing urinary frequency      Labs reviewed  Vaccines  Gyn hysterectomy  Mammogram aug 2020  Colon due April 2021  Diet: pt will start getting more aggressive      DM: seeing endo; per last endo visit:    Continue trulicity 1.5mg sc weekly.     Start jardiance 10mg tablet daily. It is causing her to urinate often but not as severe as the farxiga.      Start glipizide 10mg xr daily in a.m.     Continue metformin 1000mg twice per day.          Start zetia 10mg every night for high cholesterol       This service was not originating from a related E/M service provided within the previous 7 days nor will  to an E/M service or procedure within the next 24 hours or my soonest available appointment.  Prevailing standard of care was able to be met in this audio-only visit.           Review of Systems   Constitutional: Negative for appetite change, chills, diaphoresis and fever.   HENT: Negative for congestion, ear discharge, ear pain, postnasal drip, tinnitus, trouble swallowing and voice change.    Eyes: Negative for discharge, redness and itching.   Respiratory: Negative for cough, chest tightness, shortness of breath and wheezing.    Cardiovascular: Negative for chest pain, palpitations and leg swelling.   Gastrointestinal: Negative for abdominal pain, constipation, diarrhea, nausea and vomiting.   Endocrine: Negative for cold intolerance and heat intolerance.   Genitourinary: Negative for difficulty urinating, flank pain, hematuria and urgency.   Musculoskeletal: Positive for arthralgias. Negative for gait problem, myalgias and neck stiffness.   Skin: Negative for color change and rash.   Neurological: Negative for dizziness, seizures, syncope and headaches.   Hematological: Negative for adenopathy.   Psychiatric/Behavioral: Negative for agitation, behavioral problems, confusion and sleep disturbance.       Objective:      Physical Exam  Constitutional:       General: She is not in acute distress.     Appearance: She is well-developed. She is not diaphoretic.   HENT:      Head: Normocephalic and atraumatic.      Right Ear: External ear normal.      Left Ear: External ear normal.   Eyes:      General: No scleral icterus.        Right eye: No discharge.         Left eye: No discharge.      Conjunctiva/sclera: Conjunctivae normal.      Pupils: Pupils are equal, round, and reactive to light.   Neck:      Musculoskeletal: Normal range of motion and neck supple.   Pulmonary:      Effort: Pulmonary effort is normal.      Breath sounds: No stridor.   Neurological:      Mental Status: She is alert and oriented to person, place, and time.   Psychiatric:         Behavior: Behavior normal.         Thought Content: Thought content normal.         Judgment: Judgment normal.         Assessment and  Plan:       1. Osteoarthritis of both knees, unspecified osteoarthritis type  - stable on pain med prn  - pain contract signature in three months  · Medication Management: The risks and benefits of medication were discussed with the patient      2. Mixed hyperlipidemia  On zetia    3. Hypothyroidism, unspecified type  stable    4. Class 3 severe obesity due to excess calories in adult, unspecified BMI, unspecified whether serious comorbidity present  Patient educated on importance of diet and exercise.  Recommended 30-45 minutes of exercise five days a week.  In addition, counseled patient on importance of low fat diet.  Limit carbohydrate intake.  Increase protein intake and vegetables.     5. Uncontrolled type 2 diabetes mellitus with hyperglycemia  Not at goal; sees endo; discussed stricter diabetic diet; sees endo next week    6. HTN, goal below 130/80  Stable omar clayton    7. Hypokalemia    - potassium chloride SA (K-DUR,KLOR-CON) 20 MEQ tablet; Take 1 tablet (20 mEq total) by mouth 2 (two) times daily.  Dispense: 180 tablet; Refill: 3      8. Overactive bladder: increase ditropan to 10 mg daily        No follow-ups on file.

## 2020-09-21 RX ORDER — METOPROLOL TARTRATE 100 MG/1
100 TABLET ORAL 2 TIMES DAILY
Qty: 180 TABLET | Refills: 1 | Status: SHIPPED | OUTPATIENT
Start: 2020-09-21 | End: 2021-03-02 | Stop reason: SDUPTHER

## 2020-09-21 RX ORDER — HYDROCODONE BITARTRATE AND ACETAMINOPHEN 10; 325 MG/1; MG/1
1 TABLET ORAL
Qty: 90 TABLET | Refills: 0 | OUTPATIENT
Start: 2020-09-21

## 2020-09-21 NOTE — TELEPHONE ENCOUNTER
----- Message from Rosette Xavier sent at 9/21/2020  3:17 PM CDT -----  Regarding: refill  Type:  RX Refill Request    Who Called: patient  Refill or New Rx:refill  RX Name and Strength:metoprolol tartrate (LOPRESSOR) 100 MG tablet  How is the patient currently taking it? (ex. 1XDay):2xday  Is this a 30 day or 90 day RX:90  Preferred Pharmacy with phone number:[facility]  Local or Mail Order:local  Ordering Provider:above provider  Would the patient rather a call back or a response via [facility]? callback  Best Call Back Number:[phone]  Additional Information:    Type:  RX Refill Request    Who Called: patient  Refill or New Rx:refill  RX Name and Strength:HYDROcodone-acetaminophen (NORCO)  mg per tablet  How is the patient currently taking it? (ex. 1XDay):2xday as needed  Is this a 30 day or 90 day RX:30  Preferred Pharmacy with phone number:[facility]  Local or Mail Order:local  Ordering Provider:above provider  Would the patient rather a call back or a response via [facility]? callback  Best Call Back Number:[phone]  Additional Information:

## 2020-09-25 ENCOUNTER — TELEPHONE (OUTPATIENT)
Dept: HEMATOLOGY/ONCOLOGY | Facility: CLINIC | Age: 60
End: 2020-09-25

## 2020-09-25 ENCOUNTER — TELEPHONE (OUTPATIENT)
Dept: INTERNAL MEDICINE | Facility: CLINIC | Age: 60
End: 2020-09-25

## 2020-09-25 DIAGNOSIS — M17.0 OSTEOARTHRITIS OF BOTH KNEES, UNSPECIFIED OSTEOARTHRITIS TYPE: Primary | ICD-10-CM

## 2020-09-25 RX ORDER — HYDROCODONE BITARTRATE AND ACETAMINOPHEN 10; 325 MG/1; MG/1
1 TABLET ORAL
Qty: 90 TABLET | Refills: 0 | Status: SHIPPED | OUTPATIENT
Start: 2020-09-25 | End: 2020-10-27 | Stop reason: SDUPTHER

## 2020-09-25 NOTE — TELEPHONE ENCOUNTER
Left voicemail for patient to return call to reschedule appt  That was cancelled. Left direct contact number on voicemail.

## 2020-09-25 NOTE — TELEPHONE ENCOUNTER
----- Message from Lesley He sent at 9/25/2020  3:27 PM CDT -----  Contact: Self   Requesting an RX refill or new RX.  Is this a refill or new RX:    RX name and strength: HYDROcodone-acetaminophen (NORCO)  mg per tablet  Directions (copy/paste from chart):  Take 1 tablet by mouth every 6 to 8 hours as needed for Pain. - Oral   Is this a 30 day or 90 day RX:    Local pharmacy or mail order pharmacy:    Pharmacy name and phone # (copy/paste from chart):   [facility]/pharmacy #[id number] - [place] - [address] AT [facility] [phone] (Phone)  [phone] (Fax)      Comments:

## 2020-09-29 ENCOUNTER — OFFICE VISIT (OUTPATIENT)
Dept: INTERNAL MEDICINE | Facility: CLINIC | Age: 60
End: 2020-09-29
Payer: COMMERCIAL

## 2020-09-29 DIAGNOSIS — E03.9 HYPOTHYROIDISM, UNSPECIFIED TYPE: ICD-10-CM

## 2020-09-29 DIAGNOSIS — E66.01 CLASS 3 SEVERE OBESITY DUE TO EXCESS CALORIES IN ADULT, UNSPECIFIED BMI, UNSPECIFIED WHETHER SERIOUS COMORBIDITY PRESENT: ICD-10-CM

## 2020-09-29 DIAGNOSIS — I10 HTN, GOAL BELOW 130/80: ICD-10-CM

## 2020-09-29 DIAGNOSIS — E87.6 HYPOKALEMIA: ICD-10-CM

## 2020-09-29 DIAGNOSIS — E11.65 UNCONTROLLED TYPE 2 DIABETES MELLITUS WITH HYPERGLYCEMIA: ICD-10-CM

## 2020-09-29 DIAGNOSIS — N32.81 OVERACTIVE BLADDER: ICD-10-CM

## 2020-09-29 DIAGNOSIS — M17.0 OSTEOARTHRITIS OF BOTH KNEES, UNSPECIFIED OSTEOARTHRITIS TYPE: Primary | ICD-10-CM

## 2020-09-29 DIAGNOSIS — E78.2 MIXED HYPERLIPIDEMIA: ICD-10-CM

## 2020-09-29 PROCEDURE — 99214 PR OFFICE/OUTPT VISIT, EST, LEVL IV, 30-39 MIN: ICD-10-PCS | Mod: 95,,, | Performed by: INTERNAL MEDICINE

## 2020-09-29 PROCEDURE — 99214 OFFICE O/P EST MOD 30 MIN: CPT | Mod: 95,,, | Performed by: INTERNAL MEDICINE

## 2020-09-29 RX ORDER — POTASSIUM CHLORIDE 20 MEQ/1
20 TABLET, EXTENDED RELEASE ORAL 2 TIMES DAILY
Qty: 180 TABLET | Refills: 3 | Status: SHIPPED | OUTPATIENT
Start: 2020-09-29 | End: 2021-09-05

## 2020-09-29 RX ORDER — METFORMIN HYDROCHLORIDE 1000 MG/1
1000 TABLET ORAL 2 TIMES DAILY WITH MEALS
Qty: 180 TABLET | Refills: 3 | Status: SHIPPED | OUTPATIENT
Start: 2020-09-29 | End: 2021-09-05

## 2020-09-29 RX ORDER — AMLODIPINE BESYLATE 5 MG/1
5 TABLET ORAL DAILY
Qty: 90 TABLET | Refills: 3 | Status: SHIPPED | OUTPATIENT
Start: 2020-09-29 | End: 2021-08-24

## 2020-09-29 RX ORDER — OXYBUTYNIN CHLORIDE 10 MG/1
10 TABLET, EXTENDED RELEASE ORAL DAILY
Qty: 30 TABLET | Refills: 11 | Status: SHIPPED | OUTPATIENT
Start: 2020-09-29 | End: 2020-10-10

## 2020-10-08 ENCOUNTER — TELEPHONE (OUTPATIENT)
Dept: SURGERY | Facility: CLINIC | Age: 60
End: 2020-10-08

## 2020-10-08 NOTE — TELEPHONE ENCOUNTER
Left voicemail for patient to return call to reschedule appt  With breast surgery that was recommended post biopsy.

## 2020-10-09 ENCOUNTER — TELEPHONE (OUTPATIENT)
Dept: INTERNAL MEDICINE | Facility: CLINIC | Age: 60
End: 2020-10-09

## 2020-10-09 DIAGNOSIS — R32 URINARY INCONTINENCE, UNSPECIFIED TYPE: Primary | ICD-10-CM

## 2020-10-09 NOTE — TELEPHONE ENCOUNTER
Pt c/o of oxybutynin not helping. Is there an alternative medication we can send or referral to specialty?

## 2020-10-09 NOTE — TELEPHONE ENCOUNTER
Pt scheduled with Uro. She wanted to know if there was an alternative med she could take in the meantime.    no

## 2020-10-09 NOTE — TELEPHONE ENCOUNTER
----- Message from Lesley He sent at 10/9/2020  3:11 PM CDT -----  Contact: self   Pt is requesting a call regarding oxybutynin (DITROPAN-XL) 10 MG 24 hr tablet. Pt states she is not helping. Please advise

## 2020-10-10 RX ORDER — TOLTERODINE TARTRATE 2 MG/1
2 TABLET, EXTENDED RELEASE ORAL 2 TIMES DAILY
Qty: 60 TABLET | Refills: 11 | Status: SHIPPED | OUTPATIENT
Start: 2020-10-10 | End: 2021-01-06

## 2020-10-12 ENCOUNTER — PATIENT OUTREACH (OUTPATIENT)
Dept: ADMINISTRATIVE | Facility: OTHER | Age: 60
End: 2020-10-12

## 2020-10-12 NOTE — PROGRESS NOTES
Health Maintenance Due   Topic Date Due    Urine Drug Screen  12/18/1980    Naloxone Prescription  12/18/1980    Pneumococcal Vaccine (Medium Risk) (1 of 1 - PPSV23) 12/18/1981    Shingles Vaccine (1 of 2) 12/18/2012     Updates were requested from care everywhere.  Chart was reviewed for overdue Proactive [facility] Encounters (VICK) topics (CRS, Breast Cancer Screening, Eye exam)  Health Maintenance has been updated.  LINKS immunization registry triggered.  Immunizations were reconciled.

## 2020-10-20 ENCOUNTER — TELEPHONE (OUTPATIENT)
Dept: INTERNAL MEDICINE | Facility: CLINIC | Age: 60
End: 2020-10-20

## 2020-10-20 DIAGNOSIS — R10.13 DYSPEPSIA: ICD-10-CM

## 2020-10-20 DIAGNOSIS — M17.0 OSTEOARTHRITIS OF BOTH KNEES, UNSPECIFIED OSTEOARTHRITIS TYPE: Primary | ICD-10-CM

## 2020-10-20 RX ORDER — SUCRALFATE 1 G/1
TABLET ORAL
Qty: 90 TABLET | Refills: 1 | Status: SHIPPED | OUTPATIENT
Start: 2020-10-20 | End: 2020-11-02

## 2020-10-27 ENCOUNTER — TELEPHONE (OUTPATIENT)
Dept: SURGERY | Facility: CLINIC | Age: 60
End: 2020-10-27

## 2020-10-27 ENCOUNTER — OFFICE VISIT (OUTPATIENT)
Dept: SURGERY | Facility: CLINIC | Age: 60
End: 2020-10-27
Payer: COMMERCIAL

## 2020-10-27 VITALS
HEIGHT: 64 IN | HEART RATE: 62 BPM | DIASTOLIC BLOOD PRESSURE: 68 MMHG | WEIGHT: 293 LBS | BODY MASS INDEX: 50.02 KG/M2 | SYSTOLIC BLOOD PRESSURE: 122 MMHG

## 2020-10-27 DIAGNOSIS — M17.0 OSTEOARTHRITIS OF BOTH KNEES, UNSPECIFIED OSTEOARTHRITIS TYPE: ICD-10-CM

## 2020-10-27 DIAGNOSIS — N63.0 BREAST LUMP: ICD-10-CM

## 2020-10-27 DIAGNOSIS — Z01.818 PRE-OP TESTING: ICD-10-CM

## 2020-10-27 DIAGNOSIS — D24.1 PAPILLOMA OF RIGHT BREAST: Primary | ICD-10-CM

## 2020-10-27 PROCEDURE — 99213 OFFICE O/P EST LOW 20 MIN: CPT | Mod: S$GLB,,, | Performed by: SURGERY

## 2020-10-27 PROCEDURE — 3074F PR MOST RECENT SYSTOLIC BLOOD PRESSURE < 130 MM HG: ICD-10-PCS | Mod: CPTII,S$GLB,, | Performed by: SURGERY

## 2020-10-27 PROCEDURE — 3008F PR BODY MASS INDEX (BMI) DOCUMENTED: ICD-10-PCS | Mod: CPTII,S$GLB,, | Performed by: SURGERY

## 2020-10-27 PROCEDURE — 99213 PR OFFICE/OUTPT VISIT, EST, LEVL III, 20-29 MIN: ICD-10-PCS | Mod: S$GLB,,, | Performed by: SURGERY

## 2020-10-27 PROCEDURE — 99999 PR PBB SHADOW E&M-EST. PATIENT-LVL V: CPT | Mod: PBBFAC,,, | Performed by: SURGERY

## 2020-10-27 PROCEDURE — 3008F BODY MASS INDEX DOCD: CPT | Mod: CPTII,S$GLB,, | Performed by: SURGERY

## 2020-10-27 PROCEDURE — 3078F PR MOST RECENT DIASTOLIC BLOOD PRESSURE < 80 MM HG: ICD-10-PCS | Mod: CPTII,S$GLB,, | Performed by: SURGERY

## 2020-10-27 PROCEDURE — 99999 PR PBB SHADOW E&M-EST. PATIENT-LVL V: ICD-10-PCS | Mod: PBBFAC,,, | Performed by: SURGERY

## 2020-10-27 PROCEDURE — 3078F DIAST BP <80 MM HG: CPT | Mod: CPTII,S$GLB,, | Performed by: SURGERY

## 2020-10-27 PROCEDURE — 3074F SYST BP LT 130 MM HG: CPT | Mod: CPTII,S$GLB,, | Performed by: SURGERY

## 2020-10-27 RX ORDER — HYDROCODONE BITARTRATE AND ACETAMINOPHEN 10; 325 MG/1; MG/1
1 TABLET ORAL
Qty: 90 TABLET | Refills: 0 | Status: SHIPPED | OUTPATIENT
Start: 2020-10-27 | End: 2020-11-13

## 2020-10-27 NOTE — PROGRESS NOTES
"  History and Physical  [facility]  Department of Surgery    REFERRING PROVIDER: Nilda Linares Pa-c  [address] [place] [address]    CHIEF COMPLAINT: intraductal papilloma of the right breast    Subjective:      Violeta Champion is a 59 y.o. postmenopausal female referred for evaluation of an intraductal papilloma of the right breast noted on core needle biopsy.  Patient denies right nipple discharge.  Diagnostics studies including  Mammogram and/or ultrasound were performed on 2020.  Patient was given BIRADS 4 and a biopsy was scheduled. A ultrasound guided biopsy was performed on 2020.  Pathology demonstrated an intraductal papilloma.    Patient does routinely do self breast exams.  Patient has noted a change on breast exam.  Patient denies to previous breast biopsy. Patient denies a personal history of breast cancer.    GYN History:  Age of menarche was 13. Age of menopause was "many years ago".  Last menstrual period was then. Patient denies hormonal therapy. Patient is .d.    Breast cancer risk factors include family hx of ovarian CA. Paternal aunt and PGM with breast cancer  Mat Cousin with br cancer x2 in 40s      Past Medical History:   Diagnosis Date    Diabetes mellitus, type 2     GERD (gastroesophageal reflux disease)     High cholesterol     Hypertension     Hypothyroid     Injury of knee, leg, ankle and foot     Insulin-resistant diabetes mellitus and acanthosis nigricans     Menopause present     Osteoarthritis     Osteoarthritis, knee     Osteopenia     Osteopenia     Sleep apnea    lost 60 lbs on trulicity    Past Surgical History:   Procedure Laterality Date    bilateral duct removal breast      BREAST CYST ASPIRATION Right     HYSTERECTOMY      OOPHORECTOMY      OTHER SURGICAL HISTORY      bilateral central duct removal with bilateral papilomona     Current Outpatient Medications on File Prior to Visit   Medication Sig Dispense " Refill    albuterol (VENTOLIN HFA) 90 mcg/actuation inhaler INHALE ONE TO TWO PUFFS INTO LUNGS EVERY 6 HOURS AS NEEDED FOR WHEEZING 54 g 3    amLODIPine (NORVASC) 5 MG tablet Take 1 tablet (5 mg total) by mouth once daily. 90 tablet 3    aspirin 81 mg Tab Take by mouth. 1 Tablet Oral Every day      blood sugar diagnostic (FREESTYLE INSULINX TEST STRIPS) Strp Check glucose three times daily 100 each 3    blood-glucose meter,continuous (DEXCOM G6 ) Misc 1 each by Misc.(Non-Drug; Combo Route) route Daily. Use as directed with Dexcom G6 transmitter and sensor 1 each 0    blood-glucose sensor (DEXCOM G6 SENSOR) Shelby 1 each by Misc.(Non-Drug; Combo Route) route every 10 days. Apply/change sensor to skin every 10 days. 3 each 3    blood-glucose transmitter (DEXCOM G6 TRANSMITTER) [place] 1 each by Misc.(Non-Drug; Combo Route) route Daily. Use transmitter in sensor with G6 system. Change new transmitter every 3 months. 1 each 3    calcium carbonate (CALCIUM CARBONATE) 300 mg (750 mg) Chew Take by mouth 2 (two) times daily.        celecoxib (CELEBREX) 200 MG capsule       diclofenac sodium (VOLTAREN) 1 % Gel APPLY TWO GRAMS TOPICALLY ONCE DAILY 100 g 0    DULoxetine (CYMBALTA) 30 MG capsule Take 1 capsule (30 mg total) by mouth once daily. 30 capsule 3    empagliflozin (JARDIANCE) 10 mg tablet Take 1 tablet (10 mg total) by mouth once daily. 30 tablet 6    ezetimibe (ZETIA) 10 mg tablet Take 1 tablet (10 mg total) by mouth every evening. 90 tablet 3    fish oil-dha-epa 1,200-144-216 mg Cap Take by mouth. 1 Capsule Oral Every day      flurazepam (DALMANE) 30 MG capsule Take 30 mg by mouth nightly as needed.        fluticasone-salmeterol diskus inhaler 250-50 mcg Inhale 1 puff into the lungs 2 (two) times daily. Controller 180 each 3    folic acid (FOLVITE) 1 MG tablet Take 1 tablet (1 mg total) by mouth once daily. 90 tablet 3    furosemide (LASIX) 40 MG tablet Take 1 tablet (40 mg total) by mouth 2  (two) times daily. 60 tablet 11    gabapentin (NEURONTIN) 300 MG capsule Take 1 capsule (300 mg total) by mouth 3 (three) times daily. 90 capsule 3    glipiZIDE (GLUCOTROL) 10 MG TR24 Take 1 tablet (10 mg total) by mouth daily with breakfast. 90 tablet 1    HYDROcodone-acetaminophen (NORCO)  mg per tablet Take 1 tablet by mouth every 6 to 8 hours as needed for Pain. 90 tablet 0    hyoscyamine (LEVSIN/SL) 0.125 mg Subl DISSOLVE 1 TABLET UNDER THE TONGUE EVERY 4 TO 6 HOURS 30 tablet 5    ibuprofen (ADVIL,MOTRIN) 800 MG tablet Take 800 mg by mouth every 8 (eight) hours as needed.  0    lactulose (CHRONULAC) 10 gram/15 mL solution TAKE 15 ML BY MOUTH  ONCE DAILY AS NEEDED FOR CONSTIPATION 473 mL 6    levocetirizine (XYZAL) 5 MG tablet Take 1 tablet (5 mg total) by mouth every evening. 30 tablet 11    levothyroxine (SYNTHROID) 100 MCG tablet Take 1 tablet by mouth once daily 90 tablet 1    LORazepam (ATIVAN) 0.5 MG tablet Take 1 tablet (0.5 mg total) by mouth 2 (two) times daily. 60 tablet 0    meclizine (ANTIVERT) 25 mg tablet Take 1 tablet (25 mg total) by mouth 3 (three) times daily as needed. 30 tablet 0    metFORMIN (GLUCOPHAGE) 1000 MG tablet Take 1 tablet (1,000 mg total) by mouth 2 (two) times daily with meals. 180 tablet 3    metoprolol tartrate (LOPRESSOR) 100 MG tablet Take 1 tablet (100 mg total) by mouth 2 (two) times daily. 180 tablet 1    mv-min-FA-Yx-Am-zmcmiar-lutein (CENTRUM) 0.4-162-18 mg Tab Take by mouth. 1 Tablet Oral Every day      omeprazole (PRILOSEC) 20 MG capsule Take 1 capsule (20 mg total) by mouth 2 (two) times daily. 60 capsule 2    ondansetron (ZOFRAN) 4 MG tablet Take 1 tablet (4 mg total) by mouth every 8 (eight) hours as needed. 30 tablet 1    potassium chloride SA (K-DUR,KLOR-CON) 20 MEQ tablet Take 1 tablet (20 mEq total) by mouth 2 (two) times daily. 180 tablet 3    pravastatin (PRAVACHOL) 80 MG tablet Take 1 tablet (80 mg total) by mouth once daily. 90 tablet  1    sucralfate (CARAFATE) 1 gram tablet TAKE 1 TABLET BY MOUTH THREE TIMES A DAY 90 tablet 1    temazepam (RESTORIL) 30 mg capsule Take 1 capsule (30 mg total) by mouth nightly as needed. 1 Capsule Oral Every day 30 capsule 0    tiZANidine (ZANAFLEX) 4 MG tablet Take 1 tablet (4 mg total) by mouth every 8 (eight) hours. 90 tablet 3    tolterodine (DETROL) 2 MG Tab Take 1 tablet (2 mg total) by mouth 2 (two) times daily. 60 tablet 11    TRULICITY 1.5 mg/0.5 mL PnIj INJECT 1.5 MG INTO THE SKIN EVERY 7 DAYS 4 mL 11     No current facility-administered medications on file prior to visit.      Social History     Socioeconomic History    Marital status: Single     Spouse name: Not on file    Number of children: 0    Years of education: 14    Highest education level: Not on file   Occupational History    Occupation: Nurse     Employer: [employer]   Social Needs    Financial resource strain: Not on file    Food insecurity     Worry: Not on file     Inability: Not on file    Transportation needs     Medical: Not on file     Non-medical: Not on file   Tobacco Use    Smoking status: Never Smoker    Smokeless tobacco: Never Used   Substance and Sexual Activity    Alcohol use: Yes     Alcohol/week: 0.0 standard drinks     Comment: very rarely    Drug use: No    Sexual activity: Not on file   Lifestyle    Physical activity     Days per week: Not on file     Minutes per session: Not on file    Stress: Not on file   Relationships    Social connections     Talks on phone: Not on file     Gets together: Not on file     Attends Voodoo service: Not on file     Active member of club or organization: Not on file     Attends meetings of clubs or organizations: Not on file     Relationship status: Not on file   Other Topics Concern    Not on file   Social History Narrative    RN, 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     Family History   Problem Relation Age of Onset    Hypertension Mother     Glaucoma Mother   "   Diabetes Mother     Hypertension Brother     Cancer Maternal Grandmother         breast    Amblyopia Father     Diabetes Maternal Aunt     Diabetes Maternal Uncle     Breast cancer Paternal Aunt     Breast cancer Paternal Grandmother     Blindness Neg Hx     Cataracts Neg Hx     Macular degeneration Neg Hx     Retinal detachment Neg Hx     Strabismus Neg Hx     Colon cancer Neg Hx     Stomach cancer Neg Hx     Esophageal cancer Neg Hx     Irritable bowel syndrome Neg Hx     Rectal cancer Neg Hx     Ulcerative colitis Neg Hx     Crohn's disease Neg Hx     Celiac disease Neg Hx        Review of Systems  Pertinent items are noted in HPI.     She does have knee pain and urinary frequency    Objective:     /68 (BP Location: Right arm, Patient Position: Sitting, BP Method: Large (Automatic))   Pulse 62   Ht 5' 4" (1.626 m)   Wt 135.6 kg (299 lb)   LMP  (LMP Unknown)   BMI 51.32 kg/m²       General Appearance:    Alert, cooperative, no distress, appears stated age   Head:    Normocephalic, without obvious abnormality, atraumatic   Eyes:    PERRL, lids normal   Neck:   Supple, symmetrical, no adenopathy   Lungs:     respirations unlabored; no obvious deformity   Chest Wall:    No tenderness or deformity   Heart::   Regular rate and rhythm   Abdomen:     Soft, non-tender, nondistended   Extremities:   Extremities normal, atraumatic   Skin:   Skin color, texture, turgor normal, no rashes or lesions   Lymph nodes:   No Cervical or supraclavicular adenopathy   Neuro/Psych:   Alert and oriented, good judgement   BREAST exam:  Left: no masses, skin changes. No nipple discharge or inverted nipple.  No axillary LAD  Right: mass at 11OC 5cfn, superficial measuring 2 cm, no skin changes. No nipple discharge or inverted nipple.  No axillary LAD      Radiology review: Images personally reviewed by me in the clinic.  Mammogram: This procedure was performed using tomosynthesis. Computer-aided detection " was utilized in the interpretation of this examination.  Mammo Digital Diagnostic Right w/ Demian  The right breast is almost entirely fatty.     In the right breast upper outer quadrant anterior depth, there is an irregular mass surrounded by several dilated ducts.  This corresponds to the area of palpable concern in the right breast, and is similar in appearance to mammogram May 19, 2020.     Limited right breast ultrasound:  In the right breast 11:00 o'clock axis 5 cm from the nipple, there is an irregular complex mixed cystic and solid mass measuring approximately 2.5 cm.  Associated adjacent dilated ducts.  These correspond to the mammographic finding and area of clinical concern.  No associated hypervascularity.  This finding is similar in appearance to the ultrasound May 19, 2020.      No right axillary adenopathy.      Impression:  Since cyst aspiration May 29, 2020, recurrent right breast 11:00 o'clock axis complex mixed cystic and solid mass. BI-RADS 4: Suspicious. Recommend ultrasound-guided biopsy.      BI-RADS Category:   Overall: 4 - Suspicious     Recommendation:  Ultrasound guided biopsy for the right breast.     The findings and recommendations were discussed with the patient by Dr. ANIBAL Greene in person at the time of interpretation.     Your estimated lifetime risk of breast cancer (to age 85) based on Tyrer-Cuzick risk assessment model is Tyrer-Cuzick: 12.55 %. According to the American Cancer Society, patients with a lifetime breast cancer risk of 20% or higher might benefit from supplemental screening tests.      PATHOLOGY:    BREAST, RIGHT, 11:00 5 CM FROM NIPPLE, MASS, ULTRASOUND-GUIDED BIOPSY:  - Hyalinized intraductal papilloma with associated duct ectasia and extensive foreign body giant cell reaction  of surrounding tissues, see comment.  - Microcalcifications: Not identified.  - Negative for atypia or malignancy.  Patient MRN as documented on container and clinic/AP label: [id number]  1. The  "specimen is received in formalin, labeled "right breast ", and consists of multiple whole and  fragmented white-tan to tan-yellow fibrofatty soft tissue cores which are filtered and measure 2.0 x 1.5 x 0.3  cm in aggregate. Specimen is submitted entirely in cassette   Comments  Extensive foreign body type giant cell reaction is noted within the stroma outside of the hyalinized  intraductal papilloma. These findings are likely secondary to duct rupture; however, fat necrosis or response  to other iatrogenic materials cannot be entirely excluded. Correlation with clinical findings is recommended.      Assessment:      Violeta Champion is a 59 y.o. postmenopausal female with an intraductal papilloma of the right breast     Plan:   We discussed the options for management of intraductal papilloma. We discussed with papilloma, there is not an increase in the risk of breast cancer.  If there is nipple discharge, majority (90%) it is due to the papilloma.  We will perform a duct excision at the same time to treat and stop the discharge.    Given the papilloma, I do recommend excision of the area due to the chance of upstaging to an early stage breast cancer such as DCIS or invasive breast cancer.  We did discuss that the chance of upstaging is probably about 5-10% with a papilloma.  Surgery would involve excising a small area of tissue at the site of the biopsy.  This allows us to better evaluate the tissue. This is done using a wire to localize the site the morning of surgery acting as a map for what needs to be removed.  Patient has elected to proceed with right ultrasound localized excisional breast biopsy.     Surgery will be coordinated with the patient's schedule.  Risks and benefits were explained including but not limited to bleeding, infection, pain, recurrence, and need for further surgery.    30 minutes were spent on this encounter, 20 of which was face to face counseling and 10 minutes were spent on chart review " and coordination of care.

## 2020-10-27 NOTE — TELEPHONE ENCOUNTER
Message was left for Ms.Akira regarding her new patient apt with Dr.Amy Lopez @ the [facility] for 11:30 . Please call Mimi webber @ [phone] regarding your apt if we need to reschedule this apt .

## 2020-10-27 NOTE — TELEPHONE ENCOUNTER
----- Message from Mary Choi sent at 10/27/2020  9:24 AM CDT -----  Contact: self/[phone]  Requesting an RX refill or new RX.  Is this a refill or new RX: Refill 1  RX name and strength:HYDROcodone-acetaminophen (NORCO)  mg per tablet  Is this a 30 day or 90 day RX:   Pharmacy name and phone # (copy/paste from chart):  [facility]/pharmacy #[id number] - [place], [place] - [address] AT [facility] [phone] (Phone)  [phone] (Fax)      Comments:

## 2020-10-27 NOTE — LETTER
October 27, 2020      Nilda Linares PA-C  [address]  [facility] [address]           [facility] entry  [address]  [facility] [address]  Phone: [phone]  Fax: [phone]          Patient: Violeta Champion   MR Number: [id number]   YOB: 1960   Date of Visit: 10/27/2020       Dear Nilda Linares:    Thank you for referring Violeta Champion to me for evaluation. Attached you will find relevant portions of my assessment and plan of care.    If you have questions, please do not hesitate to call me. I look forward to following Violeta Champion along with you.    Sincerely,    Bernadette Lopez MD    Enclosure  CC:  No Recipients    If you would like to receive this communication electronically, please contact [email or web address] or [phone] to request more information on [employer] Link access.    For providers and/or their staff who would like to refer a patient to [facility], please contact us through our one-stop-shop provider referral line, [facility], at [phone].    If you feel you have received this communication in error or would no longer like to receive these types of communications, please e-mail [email or web address]

## 2020-10-28 DIAGNOSIS — D24.1 PAPILLOMA OF RIGHT BREAST: Primary | ICD-10-CM

## 2020-11-01 DIAGNOSIS — R10.13 DYSPEPSIA: ICD-10-CM

## 2020-11-02 RX ORDER — SUCRALFATE 1 G/1
TABLET ORAL
Qty: 270 TABLET | Refills: 0 | Status: SHIPPED | OUTPATIENT
Start: 2020-11-02 | End: 2021-02-11

## 2020-11-03 RX ORDER — FISH OIL/DHA/EPA 1200-144MG
CAPSULE ORAL
Qty: 90 CAPSULE | Refills: 3 | Status: SHIPPED | OUTPATIENT
Start: 2020-11-03

## 2020-11-03 RX ORDER — EZETIMIBE 10 MG/1
10 TABLET ORAL NIGHTLY
Qty: 90 TABLET | Refills: 3 | Status: SHIPPED | OUTPATIENT
Start: 2020-11-03 | End: 2021-10-16

## 2020-11-03 RX ORDER — GLIPIZIDE 10 MG/1
10 TABLET, FILM COATED, EXTENDED RELEASE ORAL
Qty: 90 TABLET | Refills: 1 | Status: SHIPPED | OUTPATIENT
Start: 2020-11-03 | End: 2021-05-29

## 2020-11-03 RX ORDER — DULOXETIN HYDROCHLORIDE 30 MG/1
30 CAPSULE, DELAYED RELEASE ORAL DAILY
Qty: 90 CAPSULE | Refills: 3 | Status: SHIPPED | OUTPATIENT
Start: 2020-11-03 | End: 2021-03-03 | Stop reason: SDUPTHER

## 2020-11-03 NOTE — TELEPHONE ENCOUNTER
----- Message from Giovana Khalil sent at 11/3/2020 12:07 PM CST -----  Regarding: refills  Contact: patient [phone]  Requesting an RX refill or new RX.  Is this a refill or new RX: refill  RX name and strength:DULoxetine (CYMBALTA) 30 MG capsule  Is this a 30 day or 90 day RX: 90  Pharmacy name and phone # [facility]/pharmacy #[id number] - [place], [place] - [address] AT [facility] [phone] (Phone)  [phone] (Fax)      2  Requesting an RX refill or new RX.  Is this a refill or new RX: refill  RX name and strength:sucralfate (CARAFATE) 1 gram tablet  Is this a 30 day or 90 day RX: 90  Pharmacy name and phone # [facility]/pharmacy #[id number] - [place], [place] - [address] AT [facility] [phone] (Phone)  [phone] (Fax)        3  Requesting an RX refill or new RX.  Is this a refill or new RX: refill  RX name and strength:glipiZIDE (GLUCOTROL) 10 MG TR24  Is this a 30 day or 90 day RX: 90  Pharmacy name and phone # [facility]/pharmacy #[id number] - [place], [place] - [address] AT [address] [facility] [phone] (Phone)  [phone] (Fax)        4  Requesting an RX refill or new RX.  Is this a refill or new RX: refill  RX name and strength:ezetimibe (ZETIA) 10 mg tablet  Is this a 30 day or 90 day RX: 90  Pharmacy name and phone # [facility]/pharmacy #[id number] - [place], [place] - [address] AT [address] [facility] [phone] (Phone)  [phone] (Fax)        5  Requesting an RX refill or new RX.  Is this a refill or new RX: refill  RX name and strength:fish oil-dha-epa 1,200-144-216 mg Cap  Is this a 30 day or 90 day RX: 90  Pharmacy name and phone # [facility]/pharmacy #[id number] - [place] - [address] AT [facility] [phone] (Phone)  [phone] (Fax)

## 2020-11-13 RX ORDER — HYDROCODONE BITARTRATE AND ACETAMINOPHEN 10; 325 MG/1; MG/1
1 TABLET ORAL EVERY 8 HOURS PRN
Qty: 90 TABLET | Refills: 0 | Status: SHIPPED | OUTPATIENT
Start: 2020-11-13 | End: 2020-12-11 | Stop reason: SDUPTHER

## 2020-11-13 NOTE — TELEPHONE ENCOUNTER
----- Message from Apolonia Rebollar sent at 11/13/2020 10:02 AM CST -----  Regarding: Pt [phone]  Is this a refill or new RX: Refill    RX name and strength: HYDROcodone-acetaminophen (NORCO)  mg per tablet    Pharmacy name and phone # [id number] - [place] - [address] [facility] [phone] (Phone) [phone] (Fax)     Comments: She never got the original script at her usual pharmacy because, it's on back order.

## 2020-11-29 NOTE — PROGRESS NOTES
Patient's BMI is 51.32.  She is not a candidate to have surgery at [facility] per their scheduling guidelines.  Sent an email to ABDI Parekh RN (Dr. Lopez) for her to be moved to [facility].

## 2020-12-01 ENCOUNTER — ANESTHESIA EVENT (OUTPATIENT)
Dept: SURGERY | Facility: HOSPITAL | Age: 60
End: 2020-12-01
Payer: COMMERCIAL

## 2020-12-01 NOTE — ANESTHESIA PREPROCEDURE EVALUATION
2020  Violeta Champion is a 59 y.o., female.    Pt's records reviewed.  Pt may proceed with scheduled operation at [facility].    Morgan Valencia M.D.  Pre-operative evaluation for Procedure(s) (LRB):  EXCISIONAL BIOPSY, breast; u/s guided (Right)    Violeta Champion is a 59 y.o. female     Wt Readings from Last 1 Encounters:   10/27/20 1148 135.6 kg (299 lb)       Patient Active Problem List   Diagnosis    Hyperlipidemia    Hypothyroidism    Knee pain, right    DJD (degenerative joint disease) of knee    Contusion, knee    Osteopenia    Morbid obesity with BMI of 60.0-69.9, adult    Obstructive sleep apnea (adult) (pediatric)    Pain, joint, ankle, right    Osteoarthritis of knee    HTN, goal below 130/80    Uncontrolled type 2 diabetes mellitus with hyperglycemia    Overactive bladder    Papilloma of right breast       Past Surgical History:   Procedure Laterality Date    bilateral duct removal breast      BREAST CYST ASPIRATION Right 2020    HYSTERECTOMY      OOPHORECTOMY      OTHER SURGICAL HISTORY      bilateral central duct removal with bilateral papilomona         Vital Signs Range (Last 24H):         CBC:   Recent Labs     20  1026   WBC 10.90   RBC 5.05   HGB 12.8   HCT 41.6   *   MCV 82   MCH 25.3*   MCHC 30.8*       CMP:   Recent Labs     20  1026      K 4.1      CO2 21*   BUN 19   CREATININE 0.7   *   CALCIUM 9.3   ALBUMIN 3.5   PROT 7.9   ALKPHOS 105   ALT 12   AST 22   BILITOT 0.3       Diagnostic Studies:      EKD Echo:            Anesthesia Evaluation          Review of Systems  Anesthesia Hx:  No problems with previous Anesthesia History of prior surgery of interest to airway management or planning: Denies Family Hx of Anesthesia complications.   Denies Personal Hx of Anesthesia complications.   Cardiovascular:   Hypertension              denies PVD        Pulmonary:   Sleep Apnea    Endocrine:   Diabetes, well controlled, type 2 Hypothyroidism        Physical Exam  General:  Well nourished, Morbid Obesity    Airway/Jaw/Neck:  Airway Findings: Mouth Opening: Normal Tongue: Normal  General Airway Assessment: Adult  Mallampati: I  Improves to I with phonation.  TM Distance: Normal, at least 6 cm  Jaw/Neck Findings:  Neck ROM: Normal ROM      Dental:  Dental Findings: In tact   Chest/Lungs:  Chest/Lungs Findings: Clear to auscultation     Heart/Vascular:  Heart Findings: Rate: Normal  Rhythm: Regular Rhythm  Sounds: Normal        Mental Status:  Mental Status Findings:  Cooperative         Anesthesia Plan  Type of Anesthesia, risks & benefits discussed:  Anesthesia Type:  general, MAC  Patient's Preference:   Intra-op Monitoring Plan:   Intra-op Monitoring Plan Comments:   Post Op Pain Control Plan: multimodal analgesia and peripheral nerve block  Post Op Pain Control Plan Comments:   Induction:   IV  Beta Blocker:  Patient is on a Beta-Blocker and has received one dose within the past 24 hours (No further documentation required).       Informed Consent: Patient understands risks and agrees with Anesthesia plan.  Questions answered. Anesthesia consent signed with patient.  ASA Score: 2     Day of Surgery Review of History & Physical:    H&P update referred to the surgeon.         Ready For Surgery From Anesthesia Perspective.

## 2020-12-07 ENCOUNTER — LAB VISIT (OUTPATIENT)
Dept: LAB | Facility: HOSPITAL | Age: 60
End: 2020-12-07
Attending: SURGERY
Payer: COMMERCIAL

## 2020-12-07 ENCOUNTER — HOSPITAL ENCOUNTER (OUTPATIENT)
Dept: CARDIOLOGY | Facility: CLINIC | Age: 60
Discharge: HOME | End: 2020-12-07
Payer: COMMERCIAL

## 2020-12-07 ENCOUNTER — LAB VISIT (OUTPATIENT)
Dept: INTERNAL MEDICINE | Facility: CLINIC | Age: 60
End: 2020-12-07
Payer: COMMERCIAL

## 2020-12-07 DIAGNOSIS — Z01.818 PRE-OP TESTING: ICD-10-CM

## 2020-12-07 LAB
ALBUMIN SERPL BCP-MCNC: 3.5 G/DL (ref 3.5–5.2)
ALP SERPL-CCNC: 105 U/L (ref 55–135)
ALT SERPL W/O P-5'-P-CCNC: 12 U/L (ref 10–44)
ANION GAP SERPL CALC-SCNC: 15 MMOL/L (ref 8–16)
AST SERPL-CCNC: 22 U/L (ref 10–40)
BASOPHILS # BLD AUTO: 0.08 K/UL (ref 0–0.2)
BASOPHILS NFR BLD: 0.7 % (ref 0–1.9)
BILIRUB SERPL-MCNC: 0.3 MG/DL (ref 0.1–1)
BUN SERPL-MCNC: 19 MG/DL (ref 6–20)
CALCIUM SERPL-MCNC: 9.3 MG/DL (ref 8.7–10.5)
CHLORIDE SERPL-SCNC: 105 MMOL/L (ref 95–110)
CO2 SERPL-SCNC: 21 MMOL/L (ref 23–29)
CREAT SERPL-MCNC: 0.7 MG/DL (ref 0.5–1.4)
DIFFERENTIAL METHOD: ABNORMAL
EOSINOPHIL # BLD AUTO: 0.2 K/UL (ref 0–0.5)
EOSINOPHIL NFR BLD: 2.2 % (ref 0–8)
ERYTHROCYTE [DISTWIDTH] IN BLOOD BY AUTOMATED COUNT: 15.1 % (ref 11.5–14.5)
EST. GFR  (AFRICAN AMERICAN): >60 ML/MIN/1.73 M^2
EST. GFR  (NON AFRICAN AMERICAN): >60 ML/MIN/1.73 M^2
GLUCOSE SERPL-MCNC: 142 MG/DL (ref 70–110)
HCT VFR BLD AUTO: 41.6 % (ref 37–48.5)
HGB BLD-MCNC: 12.8 G/DL (ref 12–16)
IMM GRANULOCYTES # BLD AUTO: 0.01 K/UL (ref 0–0.04)
IMM GRANULOCYTES NFR BLD AUTO: 0.1 % (ref 0–0.5)
LYMPHOCYTES # BLD AUTO: 4.9 K/UL (ref 1–4.8)
LYMPHOCYTES NFR BLD: 45.3 % (ref 18–48)
MCH RBC QN AUTO: 25.3 PG (ref 27–31)
MCHC RBC AUTO-ENTMCNC: 30.8 G/DL (ref 32–36)
MCV RBC AUTO: 82 FL (ref 82–98)
MONOCYTES # BLD AUTO: 0.8 K/UL (ref 0.3–1)
MONOCYTES NFR BLD: 7.2 % (ref 4–15)
NEUTROPHILS # BLD AUTO: 4.9 K/UL (ref 1.8–7.7)
NEUTROPHILS NFR BLD: 44.5 % (ref 38–73)
NRBC BLD-RTO: 0 /100 WBC
PLATELET # BLD AUTO: 460 K/UL (ref 150–350)
PMV BLD AUTO: 10.8 FL (ref 9.2–12.9)
POTASSIUM SERPL-SCNC: 4.1 MMOL/L (ref 3.5–5.1)
PROT SERPL-MCNC: 7.9 G/DL (ref 6–8.4)
RBC # BLD AUTO: 5.05 M/UL (ref 4–5.4)
SODIUM SERPL-SCNC: 141 MMOL/L (ref 136–145)
WBC # BLD AUTO: 10.9 K/UL (ref 3.9–12.7)

## 2020-12-07 PROCEDURE — 80053 COMPREHEN METABOLIC PANEL: CPT

## 2020-12-07 PROCEDURE — 36415 COLL VENOUS BLD VENIPUNCTURE: CPT

## 2020-12-07 PROCEDURE — U0003 INFECTIOUS AGENT DETECTION BY NUCLEIC ACID (DNA OR RNA); SEVERE ACUTE RESPIRATORY SYNDROME CORONAVIRUS 2 (SARS-COV-2) (CORONAVIRUS DISEASE [COVID-19]), AMPLIFIED PROBE TECHNIQUE, MAKING USE OF HIGH THROUGHPUT TECHNOLOGIES AS DESCRIBED BY CMS-2020-01-R: HCPCS

## 2020-12-07 PROCEDURE — 85025 COMPLETE CBC W/AUTO DIFF WBC: CPT

## 2020-12-07 PROCEDURE — 93010 EKG 12-LEAD: ICD-10-PCS | Mod: S$GLB,,, | Performed by: INTERNAL MEDICINE

## 2020-12-07 PROCEDURE — 93005 ELECTROCARDIOGRAM TRACING: CPT | Mod: S$GLB,,, | Performed by: SURGERY

## 2020-12-07 PROCEDURE — 93010 ELECTROCARDIOGRAM REPORT: CPT | Mod: S$GLB,,, | Performed by: INTERNAL MEDICINE

## 2020-12-07 PROCEDURE — 93005 EKG 12-LEAD: ICD-10-PCS | Mod: S$GLB,,, | Performed by: SURGERY

## 2020-12-07 RX ORDER — HYDROCODONE BITARTRATE AND ACETAMINOPHEN 5; 325 MG/1; MG/1
1 TABLET ORAL EVERY 6 HOURS PRN
Qty: 10 TABLET | Refills: 0 | Status: SHIPPED | OUTPATIENT
Start: 2020-12-07 | End: 2020-12-07 | Stop reason: SDUPTHER

## 2020-12-07 RX ORDER — HYDROCODONE BITARTRATE AND ACETAMINOPHEN 5; 325 MG/1; MG/1
1 TABLET ORAL EVERY 6 HOURS PRN
Qty: 10 TABLET | Refills: 0 | Status: SHIPPED | OUTPATIENT
Start: 2020-12-07 | End: 2020-12-07 | Stop reason: HOSPADM

## 2020-12-07 RX ORDER — HYDROCODONE BITARTRATE AND ACETAMINOPHEN 5; 325 MG/1; MG/1
1 TABLET ORAL EVERY 6 HOURS PRN
Qty: 10 TABLET | Refills: 0 | Status: SHIPPED | OUTPATIENT
Start: 2020-12-07 | End: 2021-06-15 | Stop reason: SDUPTHER

## 2020-12-08 LAB — SARS-COV-2 RNA RESP QL NAA+PROBE: NOT DETECTED

## 2020-12-09 ENCOUNTER — TELEPHONE (OUTPATIENT)
Dept: SURGERY | Facility: CLINIC | Age: 60
End: 2020-12-09

## 2020-12-09 NOTE — TELEPHONE ENCOUNTER
Call made to patient to confirm her 0900 arrival time for her 1100 surgery scheduled for tomorrow with Dr. Lopez at the [place] location. Patient verbalized understanding of arrival time and location.

## 2020-12-10 ENCOUNTER — ANESTHESIA (OUTPATIENT)
Dept: SURGERY | Facility: HOSPITAL | Age: 60
End: 2020-12-10
Payer: COMMERCIAL

## 2020-12-10 ENCOUNTER — HOSPITAL ENCOUNTER (OUTPATIENT)
Facility: HOSPITAL | Age: 60
Discharge: HOME | End: 2020-12-10
Attending: SURGERY | Admitting: SURGERY
Payer: COMMERCIAL

## 2020-12-10 ENCOUNTER — HOSPITAL ENCOUNTER (OUTPATIENT)
Dept: RADIOLOGY | Facility: HOSPITAL | Age: 60
Discharge: HOME | End: 2020-12-10
Attending: SURGERY
Payer: COMMERCIAL

## 2020-12-10 VITALS
SYSTOLIC BLOOD PRESSURE: 146 MMHG | TEMPERATURE: 98 F | HEART RATE: 80 BPM | DIASTOLIC BLOOD PRESSURE: 73 MMHG | OXYGEN SATURATION: 97 % | RESPIRATION RATE: 22 BRPM | BODY MASS INDEX: 50.02 KG/M2 | HEIGHT: 64 IN | WEIGHT: 293 LBS

## 2020-12-10 DIAGNOSIS — D24.1 PAPILLOMA OF RIGHT BREAST: Primary | ICD-10-CM

## 2020-12-10 DIAGNOSIS — C50.919 BREAST CANCER: ICD-10-CM

## 2020-12-10 DIAGNOSIS — D24.1 PAPILLOMA OF RIGHT BREAST: ICD-10-CM

## 2020-12-10 LAB
POCT GLUCOSE: 124 MG/DL (ref 70–110)
POCT GLUCOSE: 196 MG/DL (ref 70–110)

## 2020-12-10 PROCEDURE — 88307 TISSUE EXAM BY PATHOLOGIST: CPT | Mod: 26,,, | Performed by: PATHOLOGY

## 2020-12-10 PROCEDURE — 63600175 PHARM REV CODE 636 W HCPCS: Performed by: NURSE ANESTHETIST, CERTIFIED REGISTERED

## 2020-12-10 PROCEDURE — 25000003 PHARM REV CODE 250: Performed by: NURSE ANESTHETIST, CERTIFIED REGISTERED

## 2020-12-10 PROCEDURE — 19125 EXCISION BREAST LESION: CPT | Mod: RT,,, | Performed by: SURGERY

## 2020-12-10 PROCEDURE — 63600175 PHARM REV CODE 636 W HCPCS: Performed by: ANESTHESIOLOGY

## 2020-12-10 PROCEDURE — 94761 N-INVAS EAR/PLS OXIMETRY MLT: CPT

## 2020-12-10 PROCEDURE — 37000009 HC ANESTHESIA EA ADD 15 MINS: Performed by: SURGERY

## 2020-12-10 PROCEDURE — 19125 PR EXCISE BREAST LES W XRAY MARKER: ICD-10-PCS | Mod: RT,,, | Performed by: SURGERY

## 2020-12-10 PROCEDURE — 99900035 HC TECH TIME PER 15 MIN (STAT)

## 2020-12-10 PROCEDURE — D9220A PRA ANESTHESIA: ICD-10-PCS | Mod: CRNA,,, | Performed by: NURSE ANESTHETIST, CERTIFIED REGISTERED

## 2020-12-10 PROCEDURE — 76098 X-RAY EXAM SURGICAL SPECIMEN: CPT | Mod: 26,,, | Performed by: SURGERY

## 2020-12-10 PROCEDURE — 36000706: Performed by: SURGERY

## 2020-12-10 PROCEDURE — 25000003 PHARM REV CODE 250: Performed by: STUDENT IN AN ORGANIZED HEALTH CARE EDUCATION/TRAINING PROGRAM

## 2020-12-10 PROCEDURE — 71000033 HC RECOVERY, INTIAL HOUR: Performed by: SURGERY

## 2020-12-10 PROCEDURE — 36000707: Performed by: SURGERY

## 2020-12-10 PROCEDURE — D9220A PRA ANESTHESIA: Mod: ANES,,, | Performed by: ANESTHESIOLOGY

## 2020-12-10 PROCEDURE — 71000039 HC RECOVERY, EACH ADD'L HOUR: Performed by: SURGERY

## 2020-12-10 PROCEDURE — 37000008 HC ANESTHESIA 1ST 15 MINUTES: Performed by: SURGERY

## 2020-12-10 PROCEDURE — 88307 PR  SURG PATH,LEVEL V: ICD-10-PCS | Mod: 26,,, | Performed by: PATHOLOGY

## 2020-12-10 PROCEDURE — 76098 PR  X-RAY EXAM, BREAST SPECIMEN: ICD-10-PCS | Mod: 26,,, | Performed by: SURGERY

## 2020-12-10 PROCEDURE — 71000015 HC POSTOP RECOV 1ST HR: Performed by: SURGERY

## 2020-12-10 PROCEDURE — D9220A PRA ANESTHESIA: ICD-10-PCS | Mod: ANES,,, | Performed by: ANESTHESIOLOGY

## 2020-12-10 PROCEDURE — 25000003 PHARM REV CODE 250: Performed by: SURGERY

## 2020-12-10 PROCEDURE — 76098 X-RAY EXAM SURGICAL SPECIMEN: CPT | Mod: TC

## 2020-12-10 PROCEDURE — 82962 GLUCOSE BLOOD TEST: CPT | Performed by: SURGERY

## 2020-12-10 PROCEDURE — 88307 TISSUE EXAM BY PATHOLOGIST: CPT | Performed by: PATHOLOGY

## 2020-12-10 PROCEDURE — D9220A PRA ANESTHESIA: Mod: CRNA,,, | Performed by: NURSE ANESTHETIST, CERTIFIED REGISTERED

## 2020-12-10 RX ORDER — LIDOCAINE HYDROCHLORIDE 10 MG/ML
INJECTION, SOLUTION INTRAVENOUS
Status: DISCONTINUED | OUTPATIENT
Start: 2020-12-10 | End: 2020-12-10

## 2020-12-10 RX ORDER — MIDAZOLAM HYDROCHLORIDE 1 MG/ML
INJECTION INTRAMUSCULAR; INTRAVENOUS
Status: DISCONTINUED | OUTPATIENT
Start: 2020-12-10 | End: 2020-12-10

## 2020-12-10 RX ORDER — DIPHENHYDRAMINE HYDROCHLORIDE 50 MG/ML
6.25 INJECTION INTRAMUSCULAR; INTRAVENOUS ONCE
Status: COMPLETED | OUTPATIENT
Start: 2020-12-10 | End: 2020-12-10

## 2020-12-10 RX ORDER — PROPOFOL 10 MG/ML
VIAL (ML) INTRAVENOUS
Status: DISCONTINUED | OUTPATIENT
Start: 2020-12-10 | End: 2020-12-10

## 2020-12-10 RX ORDER — SUCCINYLCHOLINE CHLORIDE 20 MG/ML
INJECTION INTRAMUSCULAR; INTRAVENOUS
Status: DISCONTINUED | OUTPATIENT
Start: 2020-12-10 | End: 2020-12-10

## 2020-12-10 RX ORDER — ONDANSETRON 2 MG/ML
INJECTION INTRAMUSCULAR; INTRAVENOUS
Status: DISCONTINUED | OUTPATIENT
Start: 2020-12-10 | End: 2020-12-10

## 2020-12-10 RX ORDER — FAMOTIDINE 10 MG/ML
INJECTION INTRAVENOUS
Status: DISCONTINUED | OUTPATIENT
Start: 2020-12-10 | End: 2020-12-10

## 2020-12-10 RX ORDER — DEXMEDETOMIDINE HYDROCHLORIDE 100 UG/ML
INJECTION, SOLUTION INTRAVENOUS
Status: DISCONTINUED | OUTPATIENT
Start: 2020-12-10 | End: 2020-12-10

## 2020-12-10 RX ORDER — FENTANYL CITRATE 50 UG/ML
INJECTION, SOLUTION INTRAMUSCULAR; INTRAVENOUS
Status: DISCONTINUED | OUTPATIENT
Start: 2020-12-10 | End: 2020-12-10

## 2020-12-10 RX ORDER — ACETAMINOPHEN 500 MG
1000 TABLET ORAL
Status: COMPLETED | OUTPATIENT
Start: 2020-12-10 | End: 2020-12-10

## 2020-12-10 RX ORDER — DROPERIDOL 2.5 MG/ML
0.62 INJECTION, SOLUTION INTRAMUSCULAR; INTRAVENOUS EVERY 6 HOURS PRN
Status: COMPLETED | OUTPATIENT
Start: 2020-12-10 | End: 2020-12-10

## 2020-12-10 RX ORDER — OXYCODONE HYDROCHLORIDE 5 MG/1
5 TABLET ORAL
Status: DISCONTINUED | OUTPATIENT
Start: 2020-12-10 | End: 2020-12-10 | Stop reason: HOSPADM

## 2020-12-10 RX ORDER — CEFAZOLIN SODIUM 1 G/3ML
INJECTION, POWDER, FOR SOLUTION INTRAMUSCULAR; INTRAVENOUS
Status: DISCONTINUED | OUTPATIENT
Start: 2020-12-10 | End: 2020-12-10

## 2020-12-10 RX ORDER — SODIUM CHLORIDE 9 MG/ML
INJECTION, SOLUTION INTRAVENOUS CONTINUOUS
Status: DISCONTINUED | OUTPATIENT
Start: 2020-12-10 | End: 2021-08-23

## 2020-12-10 RX ORDER — LIDOCAINE HYDROCHLORIDE 10 MG/ML
INJECTION, SOLUTION EPIDURAL; INFILTRATION; INTRACAUDAL; PERINEURAL
Status: DISCONTINUED | OUTPATIENT
Start: 2020-12-10 | End: 2020-12-10 | Stop reason: HOSPADM

## 2020-12-10 RX ORDER — PHENYLEPHRINE HYDROCHLORIDE 10 MG/ML
INJECTION INTRAVENOUS
Status: DISCONTINUED | OUTPATIENT
Start: 2020-12-10 | End: 2020-12-10

## 2020-12-10 RX ORDER — DEXAMETHASONE SODIUM PHOSPHATE 4 MG/ML
INJECTION, SOLUTION INTRA-ARTICULAR; INTRALESIONAL; INTRAMUSCULAR; INTRAVENOUS; SOFT TISSUE
Status: DISCONTINUED | OUTPATIENT
Start: 2020-12-10 | End: 2020-12-10

## 2020-12-10 RX ORDER — BUPIVACAINE HYDROCHLORIDE 5 MG/ML
INJECTION, SOLUTION EPIDURAL; INTRACAUDAL
Status: DISCONTINUED | OUTPATIENT
Start: 2020-12-10 | End: 2020-12-10 | Stop reason: HOSPADM

## 2020-12-10 RX ORDER — KETAMINE HCL IN 0.9 % NACL 50 MG/5 ML
SYRINGE (ML) INTRAVENOUS
Status: DISCONTINUED | OUTPATIENT
Start: 2020-12-10 | End: 2020-12-10

## 2020-12-10 RX ORDER — FENTANYL CITRATE 50 UG/ML
25 INJECTION, SOLUTION INTRAMUSCULAR; INTRAVENOUS EVERY 5 MIN PRN
Status: DISCONTINUED | OUTPATIENT
Start: 2020-12-10 | End: 2020-12-10 | Stop reason: HOSPADM

## 2020-12-10 RX ORDER — ROCURONIUM BROMIDE 10 MG/ML
INJECTION, SOLUTION INTRAVENOUS
Status: DISCONTINUED | OUTPATIENT
Start: 2020-12-10 | End: 2020-12-10

## 2020-12-10 RX ORDER — SODIUM CHLORIDE 9 MG/ML
INJECTION, SOLUTION INTRAVENOUS CONTINUOUS PRN
Status: DISCONTINUED | OUTPATIENT
Start: 2020-12-10 | End: 2020-12-10

## 2020-12-10 RX ORDER — CEFAZOLIN SODIUM 1 G/3ML
2 INJECTION, POWDER, FOR SOLUTION INTRAMUSCULAR; INTRAVENOUS
Status: DISCONTINUED | OUTPATIENT
Start: 2020-12-10 | End: 2021-08-23

## 2020-12-10 RX ORDER — NEOSTIGMINE METHYLSULFATE 1 MG/ML
INJECTION, SOLUTION INTRAVENOUS
Status: DISCONTINUED | OUTPATIENT
Start: 2020-12-10 | End: 2020-12-10

## 2020-12-10 RX ADMIN — PROPOFOL 30 MG: 10 INJECTION, EMULSION INTRAVENOUS at 12:12

## 2020-12-10 RX ADMIN — MIDAZOLAM HYDROCHLORIDE 2 MG: 1 INJECTION, SOLUTION INTRAMUSCULAR; INTRAVENOUS at 11:12

## 2020-12-10 RX ADMIN — DEXMEDETOMIDINE HYDROCHLORIDE 30 MCG: 100 INJECTION, SOLUTION, CONCENTRATE INTRAVENOUS at 01:12

## 2020-12-10 RX ADMIN — PROPOFOL 200 MG: 10 INJECTION, EMULSION INTRAVENOUS at 11:12

## 2020-12-10 RX ADMIN — DEXAMETHASONE SODIUM PHOSPHATE 8 MG: 4 INJECTION, SOLUTION INTRAMUSCULAR; INTRAVENOUS at 11:12

## 2020-12-10 RX ADMIN — PROPOFOL 50 MG: 10 INJECTION, EMULSION INTRAVENOUS at 12:12

## 2020-12-10 RX ADMIN — CEFAZOLIN 3 G: 330 INJECTION, POWDER, FOR SOLUTION INTRAMUSCULAR; INTRAVENOUS at 11:12

## 2020-12-10 RX ADMIN — SODIUM CHLORIDE 70 ML/HR: 0.9 INJECTION, SOLUTION INTRAVENOUS at 09:12

## 2020-12-10 RX ADMIN — ONDANSETRON 4 MG: 2 INJECTION, SOLUTION INTRAMUSCULAR; INTRAVENOUS at 11:12

## 2020-12-10 RX ADMIN — DIPHENHYDRAMINE HYDROCHLORIDE 6.25 MG: 50 INJECTION, SOLUTION INTRAMUSCULAR; INTRAVENOUS at 01:12

## 2020-12-10 RX ADMIN — NEOSTIGMINE METHYLSULFATE 4 MG: 1 INJECTION INTRAVENOUS at 12:12

## 2020-12-10 RX ADMIN — SUCCINYLCHOLINE CHLORIDE 120 MG: 20 INJECTION, SOLUTION INTRAMUSCULAR; INTRAVENOUS at 11:12

## 2020-12-10 RX ADMIN — SODIUM CHLORIDE, SODIUM GLUCONATE, SODIUM ACETATE, POTASSIUM CHLORIDE, MAGNESIUM CHLORIDE, SODIUM PHOSPHATE, DIBASIC, AND POTASSIUM PHOSPHATE: .53; .5; .37; .037; .03; .012; .00082 INJECTION, SOLUTION INTRAVENOUS at 12:12

## 2020-12-10 RX ADMIN — FAMOTIDINE 20 MG: 10 INJECTION, SOLUTION INTRAVENOUS at 11:12

## 2020-12-10 RX ADMIN — DROPERIDOL 0.62 MG: 2.5 INJECTION, SOLUTION INTRAMUSCULAR; INTRAVENOUS at 01:12

## 2020-12-10 RX ADMIN — ACETAMINOPHEN 1000 MG: 500 TABLET ORAL at 09:12

## 2020-12-10 RX ADMIN — ROCURONIUM BROMIDE 20 MG: 10 INJECTION, SOLUTION INTRAVENOUS at 11:12

## 2020-12-10 RX ADMIN — FENTANYL CITRATE 100 MCG: 50 INJECTION, SOLUTION INTRAMUSCULAR; INTRAVENOUS at 11:12

## 2020-12-10 RX ADMIN — Medication 30 MG: at 11:12

## 2020-12-10 RX ADMIN — LIDOCAINE HYDROCHLORIDE 100 MG: 10 INJECTION, SOLUTION INTRAVENOUS at 11:12

## 2020-12-10 RX ADMIN — SODIUM CHLORIDE: 0.9 INJECTION, SOLUTION INTRAVENOUS at 11:12

## 2020-12-10 RX ADMIN — PHENYLEPHRINE HYDROCHLORIDE 100 MCG: 10 INJECTION INTRAVENOUS at 12:12

## 2020-12-10 NOTE — DISCHARGE INSTRUCTIONS
POSTOPERATIVE INSTRUCTIONS FOLLOWING BIOPSY OR LUMPECTOMY    The following are post-operative instructions that will help you to recover from your surgery.  Please read over these instructions carefully and contact us if we can answer any of your questions or concerns.    Dressing/breast binder (surgi-bra)  A surgical bra may be placed around your chest after your surgery.  If you are given the bra, please wear it as close to 24 hours a day as possible until your post-operative clinic appointment.  If the elastic around the bra irritates your skin, you may wear a soft t-shirt underneath the bra.  You may go without wearing the bra long enough to bath, to launder and dry the bra.  If the bra is extremely uncomfortable, you may wear a supportive sports bra instead after 2 days.  You may shower after immediately.  Do not take a tub bath and do not soak the surgical site. Please do not remove the dermabond (glue) that is overlying the incision.    Activity   You should be able to return to your regular activities 2 days after your surgery.  However, do not engage in strenuous activities in which you use your upper body such as:  golf, tennis, aerobics, washing windows, raking the yard, mopping, vacuuming, heavy lifting (e.g children) until you are seen for your follow-up appointment in clinic.    Medication for pain  You may find that over the counter pain medications may be sufficient for your pain.  You will be given a prescription for pain medication for more severe pain.  You should not drive or operate machinery while taking these.  Please take narcotics with food.  Narcotics can cause, or worse, constipation.  You will need to increase your fluid intake, eat high fiber foods (such as fruits and bran) and make sure that you are up and walking. You may need to take an over the counter stool softener for constipation.    Please report the following:   Temperature greater than 101 degrees   Discharge or bad odor  from the wound   Excessive bleeding, such as bloody dressing or extreme bruising   Redness at incision and/or drain sites   Swelling or buildup of fluid around incision     Additional information  I will see you approximately 2 weeks following your surgery.  If this follow-up appointment has not been made, please call the office.    If you have any questions or problems, please call my office or my nurse.    After hours and on weekends, you may call the main [facility] line at [phone] and ask to have the general surgery resident paged or have me paged.    PATIENT INSTRUCTIONS  POST-ANESTHESIA    IMMEDIATELY FOLLOWING SURGERY:  Do not drive or operate machinery for the first twenty four hours after surgery.  Do not make any important decisions for twenty four hours after surgery or while taking narcotic pain medications or sedatives.  If you develop intractable nausea and vomiting or a severe headache please notify your doctor immediately.    FOLLOW-UP:  Please make an appointment with your surgeon as instructed. You do not need to follow up with anesthesia unless specifically instructed to do so.    WOUND CARE INSTRUCTIONS (if applicable):  Keep a dry clean dressing on the anesthesia/puncture wound site if there is drainage.  Once the wound has quit draining you may leave it open to air.  Generally you should leave the bandage intact for twenty four hours unless there is drainage.  If the epidural site drains for more than 36-48 hours please call the anesthesia department.    QUESTIONS?:  Please feel free to call your physician or the hospital  if you have any questions, and they will be happy to assist you.       [facility] Anesthesia Department  [address]  [phone]

## 2020-12-10 NOTE — ANESTHESIA PROCEDURE NOTES
Intubation  Performed by: Edita Drake CRNA  Authorized by: Edita Drake CRNA     Intubation:     Induction:  Intravenous    Intubated:  Postinduction    Mask Ventilation:  Easy mask    Attempts:  1    Attempted By:  CRNA    Method of Intubation:  Video laryngoscopy    Laryngeal View Grade: Grade IIA - cords partially seen      Difficult Airway Encountered?: No      Airway Device:  Oral endotracheal tube    Airway Device Size:  7.5    Style/Cuff Inflation:  Cuffed    Inflation Amount (mL):  7    Tube secured:  21    Secured at:  The lips    Placement Verified By:  Capnometry and Colorimetric ETCO2 device    Complicating Factors:  None    Findings Post-Intubation:  BS equal bilateral

## 2020-12-10 NOTE — BRIEF OP NOTE
[facility]  Brief Operative Note    Surgery Date: 12/10/2020     Surgeon(s) and Role:     * Bernadette Lopez MD - Primary    Assisting Surgeon:       * Lara Kamara MD - Resident     Pre-op Diagnosis:  Papilloma of right breast [D24.1]    Post-op Diagnosis:  Post-Op Diagnosis Codes:     * Papilloma of right breast [D24.1]    Procedure(s) (LRB):  EXCISIONAL BIOPSY, breast; u/s guided (Right)    Anesthesia: Local / general    Description of the findings of the procedure(s): Excisional biopsy of the right breast    Estimated Blood Loss: minimal         Specimens:   1. Excisional biopsy R breast     Discharge Note    OUTCOME: Patient tolerated treatment/procedure well without complication and is now ready for discharge.    DISPOSITION: Home or Self Care    FINAL DIAGNOSIS:  Papilloma of right breast    FOLLOWUP: In clinic    DISCHARGE INSTRUCTIONS:    Discharge Procedure Orders   Notify your health care provider if you experience any of the following:  temperature >100.4     Notify your health care provider if you experience any of the following:  persistent nausea and vomiting or diarrhea     Notify your health care provider if you experience any of the following:  severe uncontrolled pain     Notify your health care provider if you experience any of the following:  redness, tenderness, or signs of infection (pain, swelling, redness, odor or green/yellow discharge around incision site)     Leave dressing on - Keep it clean, dry, and intact until clinic visit   Order Comments: Leave bra and dressing on 24hr/day for 2 weeks until your follow up appointment. OK to remove to shower.     Activity as tolerated

## 2020-12-10 NOTE — PLAN OF CARE
Patient is AAO and VSS.  Tolerating PO and states pain is tolerable.  Dressing CDI.  Patient states they are ready for d/c.  IV removed.  Catheter tip intact.  Caregiver at bedside.  Discharge instructions reviewed and copy given to the patient and caregiver.  Questions answered.  Both verbalized understanding.  Medication delivered to bedside.   no DME needed.  Patient wheeled to car by RN/PCT.

## 2020-12-10 NOTE — TRANSFER OF CARE
"Anesthesia Transfer of Care Note    Patient: Violeta Champion    Procedure(s) Performed: Procedure(s) (LRB):  EXCISIONAL BIOPSY, breast; u/s guided (Right)    Patient location: PACU    Anesthesia Type: general and regional    Transport from OR: Transported from OR on room air with adequate spontaneous ventilation. Transported from OR on 6-10 L/min O2 by face mask with adequate spontaneous ventilation    Post pain: adequate analgesia    Post assessment: no apparent anesthetic complications and tolerated procedure well    Post vital signs: stable    Level of consciousness: awake    Nausea/Vomiting: no nausea/vomiting    Complications: none    Transfer of care protocol was followed      Last vitals:   Visit Vitals  BP (!) 148/87 (BP Location: Left arm, Patient Position: Lying)   Pulse 72   Temp 36.9 °C (98.4 °F) (Oral)   Resp 20   Ht 5' 4" (1.626 m)   Wt 135.6 kg (299 lb)   LMP  (LMP Unknown)   SpO2 95%   Breastfeeding No   BMI 51.32 kg/m²     "

## 2020-12-10 NOTE — PROGRESS NOTES
Patient stating she was informed she could take a cab home. Called into OR #2 and relayed information to Dr. Lopez. Per Dr. Lopez, patient can not take a cab home. Patient states her friend should be able to come pick her up. Spoke with patient's friend, Araseli at phone number [phone] and confirmed that she can bring patient home. I gave her an estimated time of 1400 that patient would be ready.

## 2020-12-10 NOTE — OP NOTE
DATE OF PROCEDURE: 12/10/2020    SURGEON: Surgeon(s) and Role:     * Bernadette Lopez MD - Primary  Resident:  Lara Kamara      PREOPERATIVE DIAGNOSIS: Papilloma of right breast [D24.1]    POSTOPERATIVE DIAGNOSIS: same    ANESTHESIA: local and general    PROCEDURES PERFORMED:    right breast ultrasounbd localization excisional biopsy  Ultrasound localization of breast mass  Surgeon interpretation of specimen radiograph    CLINICAL HISTORY AND INDICATIONS FOR SURGERY:  Violeta Champion is a 59 y.o. female who presents with papilloma  All the attendant risks, benefits, and potential complications of surgery were discussed with her and she wanted to proceed with surgery and consented to surgery.    PROCEDURE IN DETAIL:   The patient underwent informed consent.    She was then brought to the Operating Room and placed in the supine position. Anesthesia with local/general anesthesia care with sedation was administered.  The right breast, anterior chest, right arm and axilla were then prepped and draped in a sterile fashion.     We turned our attention to the right breast. Ultrasound guidance was used to identify the lesion and the clip and shan it for localization.  Local anesthetic was injected in the area. An incision was made in the upper outer quadrant of the right breast over the anticipated tract of the lesion. The specimen was dissected circumferentially around the lesion.  The lumpectomy specimen was marked using short superior, long lateral.  It was then submitted for specimen radiograph, which confirmed the clip and area of interest within the specimen.   We did encounter several cysts that drained during our dissection.  The wound was copiously irrigated afterwards.    Within the lumpectomy cavity, hemostasis was achieved with cautery. The wound was irrigated until clear. There was no evidence of bleeding. It was closed in multiple layers with deep dermal and subcutaneous interrupted Vicryl sutures and a  running 4-0 vicryl subcuticular skin closure.    Dermabond was applied. Sterile fluff gauze was placed and a post-procedure bra was placed. She tolerated the procedure well without complication and was turned over to Anesthesia for transport to the recovery area in a satisfactory condition. All specimens were sent to Pathology for permanent sectioning.    ESTIMATED BLOOD LOSS:   Minimal    COMPLICATIONS: none    DISPOSITION:  PACU--hemodynamically stable

## 2020-12-10 NOTE — ANESTHESIA POSTPROCEDURE EVALUATION
Anesthesia Post Evaluation    Patient: Violeta Champion    Procedure(s) Performed: Procedure(s) (LRB):  EXCISIONAL BIOPSY, breast; u/s guided (Right)    Final Anesthesia Type: general    Patient location during evaluation: PACU  Patient participation: Yes- Able to Participate  Level of consciousness: awake and alert  Post-procedure vital signs: reviewed and stable  Pain management: adequate  Airway patency: patent    PONV status at discharge: No PONV  Anesthetic complications: no      Cardiovascular status: blood pressure returned to baseline  Respiratory status: unassisted  Hydration status: euvolemic  Follow-up not needed.          Vitals Value Taken Time   /73 12/10/20 1402   Temp 36.4 °C (97.6 °F) 12/10/20 1400   Pulse 82 12/10/20 1404   Resp 25 12/10/20 1403   SpO2 97 % 12/10/20 1404   Vitals shown include unvalidated device data.      Event Time   Out of Recovery 14:11:46         Pain/Gagandeep Score: Pain Rating Prior to Med Admin: 0 (12/10/2020  2:00 PM)  Pain Rating Post Med Admin: 0 (12/10/2020  2:00 PM)  Gagandeep Score: 10 (12/10/2020  2:00 PM)

## 2020-12-10 NOTE — PROGRESS NOTES
Patient refusing to get dressed to go home. Patient has no c/o pain. Patient still complains of feeling nauseated but has not thrown up. Patient has been resting.

## 2020-12-11 DIAGNOSIS — M17.0 OSTEOARTHRITIS OF BOTH KNEES, UNSPECIFIED OSTEOARTHRITIS TYPE: ICD-10-CM

## 2020-12-11 RX ORDER — HYDROCODONE BITARTRATE AND ACETAMINOPHEN 10; 325 MG/1; MG/1
1 TABLET ORAL EVERY 8 HOURS PRN
Qty: 90 TABLET | Refills: 0 | Status: SHIPPED | OUTPATIENT
Start: 2020-12-11 | End: 2021-01-25 | Stop reason: SDUPTHER

## 2020-12-11 NOTE — TELEPHONE ENCOUNTER
----- Message from Hayley Guillory sent at 12/11/2020 10:13 AM CST -----  Contact: self [phone]  Requesting an RX refill or new RX.  Is this a refill or new RX: new  RX name and strength: HYDROcodone-acetaminophen (NORCO)  mg per tablet  Is this a 30 day or 90 day RX: 30  Pharmacy name and phone # (copy/paste from chart):  [facility]/pharmacy #[id number] - [place] - [address] AT [facility]  [address] [address]  Phone: [phone] Fax: [phone]  Comments:

## 2020-12-15 ENCOUNTER — TELEPHONE (OUTPATIENT)
Dept: INTERNAL MEDICINE | Facility: CLINIC | Age: 60
End: 2020-12-15

## 2020-12-15 DIAGNOSIS — M17.0 OSTEOARTHRITIS OF BOTH KNEES, UNSPECIFIED OSTEOARTHRITIS TYPE: Primary | ICD-10-CM

## 2020-12-16 LAB
FINAL PATHOLOGIC DIAGNOSIS: NORMAL
GROSS: NORMAL
Lab: NORMAL

## 2020-12-16 NOTE — TELEPHONE ENCOUNTER
Please schedule in about 2 weeks for Euflexxa injections - link PA order to follow up appointment,    Thank you

## 2020-12-16 NOTE — TELEPHONE ENCOUNTER
----- Message from Edie Corado MA sent at 12/15/2020  1:47 PM CST -----  Contact: [phone]  Patient is scheduled for knee injections of the right and left knee.  ARNALDO  ----- Message -----  From: Nehal Baptiste  Sent: 12/15/2020  12:54 PM CST  To: Mir VOGEL Staff    Patient scheduled an elena for knee injections on both knees for 1/15/21. Patient states a PA is needed. Please call and advise

## 2020-12-23 ENCOUNTER — TELEPHONE (OUTPATIENT)
Dept: INTERNAL MEDICINE | Facility: CLINIC | Age: 60
End: 2020-12-23

## 2020-12-23 RX ORDER — ICOSAPENT ETHYL 1000 MG/1
2 CAPSULE ORAL 2 TIMES DAILY
Qty: 180 CAPSULE | Refills: 3 | Status: ON HOLD | OUTPATIENT
Start: 2020-12-23 | End: 2021-10-27

## 2020-12-29 ENCOUNTER — TELEPHONE (OUTPATIENT)
Dept: INTERNAL MEDICINE | Facility: CLINIC | Age: 60
End: 2020-12-29

## 2020-12-29 NOTE — TELEPHONE ENCOUNTER
----- Message from Laura Ortega sent at 12/29/2020 10:30 AM CST -----  Regarding: Prescription Inquiry  Pt called stating that Physician called in Heart Prescription to [facility] that she wasn't aware of. Requesting a call back to discuss reason for medication     [phone]

## 2020-12-29 NOTE — TELEPHONE ENCOUNTER
Let pt know, that Pallavi didn't send a new rx for her to the pharmacy only a refill on vescepa was sent. Pt verbalized understanding and will continue to take as prescribed.

## 2021-01-04 ENCOUNTER — PATIENT OUTREACH (OUTPATIENT)
Dept: ADMINISTRATIVE | Facility: OTHER | Age: 61
End: 2021-01-04

## 2021-01-05 ENCOUNTER — OFFICE VISIT (OUTPATIENT)
Dept: SURGERY | Facility: CLINIC | Age: 61
End: 2021-01-05
Payer: COMMERCIAL

## 2021-01-05 VITALS
WEIGHT: 293 LBS | SYSTOLIC BLOOD PRESSURE: 147 MMHG | BODY MASS INDEX: 51.32 KG/M2 | DIASTOLIC BLOOD PRESSURE: 72 MMHG | HEART RATE: 76 BPM

## 2021-01-05 DIAGNOSIS — D24.1 PAPILLOMA OF RIGHT BREAST: Primary | ICD-10-CM

## 2021-01-05 PROCEDURE — 99999 PR PBB SHADOW E&M-EST. PATIENT-LVL IV: ICD-10-PCS | Mod: PBBFAC,,, | Performed by: SURGERY

## 2021-01-05 PROCEDURE — 99024 PR POST-OP FOLLOW-UP VISIT: ICD-10-PCS | Mod: S$GLB,,, | Performed by: SURGERY

## 2021-01-05 PROCEDURE — 3008F BODY MASS INDEX DOCD: CPT | Mod: CPTII,S$GLB,, | Performed by: SURGERY

## 2021-01-05 PROCEDURE — 3008F PR BODY MASS INDEX (BMI) DOCUMENTED: ICD-10-PCS | Mod: CPTII,S$GLB,, | Performed by: SURGERY

## 2021-01-05 PROCEDURE — 99999 PR PBB SHADOW E&M-EST. PATIENT-LVL IV: CPT | Mod: PBBFAC,,, | Performed by: SURGERY

## 2021-01-05 PROCEDURE — 1125F AMNT PAIN NOTED PAIN PRSNT: CPT | Mod: S$GLB,,, | Performed by: SURGERY

## 2021-01-05 PROCEDURE — 99024 POSTOP FOLLOW-UP VISIT: CPT | Mod: S$GLB,,, | Performed by: SURGERY

## 2021-01-05 PROCEDURE — 1125F PR PAIN SEVERITY QUANTIFIED, PAIN PRESENT: ICD-10-PCS | Mod: S$GLB,,, | Performed by: SURGERY

## 2021-01-06 ENCOUNTER — OFFICE VISIT (OUTPATIENT)
Dept: UROLOGY | Facility: CLINIC | Age: 61
End: 2021-01-06
Payer: COMMERCIAL

## 2021-01-06 VITALS — BODY MASS INDEX: 49.68 KG/M2 | HEIGHT: 64 IN | WEIGHT: 291 LBS

## 2021-01-06 DIAGNOSIS — N39.41 URGE INCONTINENCE: Primary | ICD-10-CM

## 2021-01-06 DIAGNOSIS — M25.569 KNEE PAIN, UNSPECIFIED CHRONICITY, UNSPECIFIED LATERALITY: ICD-10-CM

## 2021-01-06 DIAGNOSIS — E11.65 UNCONTROLLED TYPE 2 DIABETES MELLITUS WITH HYPERGLYCEMIA: ICD-10-CM

## 2021-01-06 PROCEDURE — 3046F HEMOGLOBIN A1C LEVEL >9.0%: CPT | Mod: CPTII,S$GLB,, | Performed by: UROLOGY

## 2021-01-06 PROCEDURE — 81002 URINALYSIS NONAUTO W/O SCOPE: CPT | Mod: S$GLB,,, | Performed by: UROLOGY

## 2021-01-06 PROCEDURE — 99204 PR OFFICE/OUTPT VISIT, NEW, LEVL IV, 45-59 MIN: ICD-10-PCS | Mod: 25,S$GLB,, | Performed by: UROLOGY

## 2021-01-06 PROCEDURE — 81002 PR URINALYSIS NONAUTO W/O SCOPE: ICD-10-PCS | Mod: S$GLB,,, | Performed by: UROLOGY

## 2021-01-06 PROCEDURE — 3008F PR BODY MASS INDEX (BMI) DOCUMENTED: ICD-10-PCS | Mod: CPTII,S$GLB,, | Performed by: UROLOGY

## 2021-01-06 PROCEDURE — 3008F BODY MASS INDEX DOCD: CPT | Mod: CPTII,S$GLB,, | Performed by: UROLOGY

## 2021-01-06 PROCEDURE — 99204 OFFICE O/P NEW MOD 45 MIN: CPT | Mod: 25,S$GLB,, | Performed by: UROLOGY

## 2021-01-06 PROCEDURE — 99999 PR PBB SHADOW E&M-EST. PATIENT-LVL IV: ICD-10-PCS | Mod: PBBFAC,,, | Performed by: UROLOGY

## 2021-01-06 PROCEDURE — 3046F PR MOST RECENT HEMOGLOBIN A1C LEVEL > 9.0%: ICD-10-PCS | Mod: CPTII,S$GLB,, | Performed by: UROLOGY

## 2021-01-06 PROCEDURE — 99999 PR PBB SHADOW E&M-EST. PATIENT-LVL IV: CPT | Mod: PBBFAC,,, | Performed by: UROLOGY

## 2021-01-06 RX ORDER — TROSPIUM CHLORIDE ER 60 MG/1
60 CAPSULE ORAL DAILY
Qty: 30 CAPSULE | Refills: 11 | Status: SHIPPED | OUTPATIENT
Start: 2021-01-06 | End: 2021-02-26 | Stop reason: SINTOL

## 2021-01-10 PROBLEM — C50.919 BREAST CANCER: Status: RESOLVED | Noted: 2020-12-10 | Resolved: 2021-01-10

## 2021-01-15 ENCOUNTER — CLINICAL SUPPORT (OUTPATIENT)
Dept: INTERNAL MEDICINE | Facility: CLINIC | Age: 61
End: 2021-01-15
Attending: FAMILY MEDICINE
Payer: COMMERCIAL

## 2021-01-15 VITALS
HEART RATE: 74 BPM | BODY MASS INDEX: 50.02 KG/M2 | WEIGHT: 293 LBS | HEIGHT: 64 IN | SYSTOLIC BLOOD PRESSURE: 142 MMHG | DIASTOLIC BLOOD PRESSURE: 82 MMHG | OXYGEN SATURATION: 96 %

## 2021-01-15 DIAGNOSIS — M17.0 OSTEOARTHRITIS OF BOTH KNEES, UNSPECIFIED OSTEOARTHRITIS TYPE: Primary | ICD-10-CM

## 2021-01-15 PROCEDURE — 20610 DRAIN/INJ JOINT/BURSA W/O US: CPT | Mod: 50,S$GLB,, | Performed by: FAMILY MEDICINE

## 2021-01-15 PROCEDURE — 99499 UNLISTED E&M SERVICE: CPT | Mod: S$GLB,,, | Performed by: FAMILY MEDICINE

## 2021-01-15 PROCEDURE — 99999 PR PBB SHADOW E&M-EST. PATIENT-LVL V: ICD-10-PCS | Mod: PBBFAC,,, | Performed by: FAMILY MEDICINE

## 2021-01-15 PROCEDURE — 99999 PR PBB SHADOW E&M-EST. PATIENT-LVL V: CPT | Mod: PBBFAC,,, | Performed by: FAMILY MEDICINE

## 2021-01-15 PROCEDURE — 99499 NO LOS: ICD-10-PCS | Mod: S$GLB,,, | Performed by: FAMILY MEDICINE

## 2021-01-15 PROCEDURE — 20610 PR DRAIN/INJECT LARGE JOINT/BURSA: ICD-10-PCS | Mod: 50,S$GLB,, | Performed by: FAMILY MEDICINE

## 2021-01-25 ENCOUNTER — OFFICE VISIT (OUTPATIENT)
Dept: INTERNAL MEDICINE | Facility: CLINIC | Age: 61
End: 2021-01-25
Attending: FAMILY MEDICINE
Payer: COMMERCIAL

## 2021-01-25 VITALS
SYSTOLIC BLOOD PRESSURE: 112 MMHG | DIASTOLIC BLOOD PRESSURE: 70 MMHG | WEIGHT: 293 LBS | HEIGHT: 64 IN | TEMPERATURE: 97 F | OXYGEN SATURATION: 96 % | BODY MASS INDEX: 50.02 KG/M2 | RESPIRATION RATE: 18 BRPM | HEART RATE: 67 BPM

## 2021-01-25 DIAGNOSIS — M17.0 OSTEOARTHRITIS OF BOTH KNEES, UNSPECIFIED OSTEOARTHRITIS TYPE: ICD-10-CM

## 2021-01-25 DIAGNOSIS — M17.0 OSTEOARTHRITIS OF BOTH KNEES, UNSPECIFIED OSTEOARTHRITIS TYPE: Primary | ICD-10-CM

## 2021-01-25 PROCEDURE — 99499 NO LOS: ICD-10-PCS | Mod: S$GLB,,, | Performed by: FAMILY MEDICINE

## 2021-01-25 PROCEDURE — 99999 PR PBB SHADOW E&M-EST. PATIENT-LVL V: CPT | Mod: PBBFAC,,, | Performed by: FAMILY MEDICINE

## 2021-01-25 PROCEDURE — 99499 UNLISTED E&M SERVICE: CPT | Mod: S$GLB,,, | Performed by: FAMILY MEDICINE

## 2021-01-25 PROCEDURE — 1125F AMNT PAIN NOTED PAIN PRSNT: CPT | Mod: S$GLB,,, | Performed by: FAMILY MEDICINE

## 2021-01-25 PROCEDURE — 3008F PR BODY MASS INDEX (BMI) DOCUMENTED: ICD-10-PCS | Mod: CPTII,S$GLB,, | Performed by: FAMILY MEDICINE

## 2021-01-25 PROCEDURE — 1125F PR PAIN SEVERITY QUANTIFIED, PAIN PRESENT: ICD-10-PCS | Mod: S$GLB,,, | Performed by: FAMILY MEDICINE

## 2021-01-25 PROCEDURE — 99999 PR PBB SHADOW E&M-EST. PATIENT-LVL V: ICD-10-PCS | Mod: PBBFAC,,, | Performed by: FAMILY MEDICINE

## 2021-01-25 PROCEDURE — 3008F BODY MASS INDEX DOCD: CPT | Mod: CPTII,S$GLB,, | Performed by: FAMILY MEDICINE

## 2021-01-25 PROCEDURE — 20610 PR DRAIN/INJECT LARGE JOINT/BURSA: ICD-10-PCS | Mod: RT,S$GLB,, | Performed by: FAMILY MEDICINE

## 2021-01-25 PROCEDURE — 20610 DRAIN/INJ JOINT/BURSA W/O US: CPT | Mod: RT,S$GLB,, | Performed by: FAMILY MEDICINE

## 2021-01-25 RX ORDER — HYDROCODONE BITARTRATE AND ACETAMINOPHEN 10; 325 MG/1; MG/1
1 TABLET ORAL EVERY 8 HOURS PRN
Qty: 90 TABLET | Refills: 0 | Status: SHIPPED | OUTPATIENT
Start: 2021-01-25 | End: 2021-03-02 | Stop reason: SDUPTHER

## 2021-01-26 ENCOUNTER — TELEPHONE (OUTPATIENT)
Dept: INTERNAL MEDICINE | Facility: CLINIC | Age: 61
End: 2021-01-26

## 2021-02-02 ENCOUNTER — OFFICE VISIT (OUTPATIENT)
Dept: INTERNAL MEDICINE | Facility: CLINIC | Age: 61
End: 2021-02-02
Attending: FAMILY MEDICINE
Payer: COMMERCIAL

## 2021-02-02 DIAGNOSIS — M17.0 OSTEOARTHRITIS OF BOTH KNEES, UNSPECIFIED OSTEOARTHRITIS TYPE: Primary | ICD-10-CM

## 2021-02-02 PROCEDURE — 20610 PR DRAIN/INJECT LARGE JOINT/BURSA: ICD-10-PCS | Mod: RT,S$GLB,, | Performed by: FAMILY MEDICINE

## 2021-02-02 PROCEDURE — 20610 DRAIN/INJ JOINT/BURSA W/O US: CPT | Mod: RT,S$GLB,, | Performed by: FAMILY MEDICINE

## 2021-02-02 PROCEDURE — 99499 UNLISTED E&M SERVICE: CPT | Mod: S$GLB,,, | Performed by: FAMILY MEDICINE

## 2021-02-02 PROCEDURE — 99999 PR PBB SHADOW E&M-EST. PATIENT-LVL II: ICD-10-PCS | Mod: PBBFAC,,, | Performed by: FAMILY MEDICINE

## 2021-02-02 PROCEDURE — 99499 NO LOS: ICD-10-PCS | Mod: S$GLB,,, | Performed by: FAMILY MEDICINE

## 2021-02-02 PROCEDURE — 99999 PR PBB SHADOW E&M-EST. PATIENT-LVL II: CPT | Mod: PBBFAC,,, | Performed by: FAMILY MEDICINE

## 2021-02-25 ENCOUNTER — OFFICE VISIT (OUTPATIENT)
Dept: URGENT CARE | Facility: CLINIC | Age: 61
End: 2021-02-25
Payer: COMMERCIAL

## 2021-02-25 VITALS
HEART RATE: 60 BPM | HEIGHT: 64 IN | OXYGEN SATURATION: 95 % | TEMPERATURE: 98 F | BODY MASS INDEX: 50.02 KG/M2 | SYSTOLIC BLOOD PRESSURE: 133 MMHG | DIASTOLIC BLOOD PRESSURE: 84 MMHG | WEIGHT: 293 LBS

## 2021-02-25 DIAGNOSIS — R10.9 FLANK PAIN: Primary | ICD-10-CM

## 2021-02-25 DIAGNOSIS — S39.012A STRAIN OF LUMBAR REGION, INITIAL ENCOUNTER: ICD-10-CM

## 2021-02-25 LAB
BILIRUB UR QL STRIP: POSITIVE
GLUCOSE UR QL STRIP: POSITIVE
KETONES UR QL STRIP: NEGATIVE
LEUKOCYTE ESTERASE UR QL STRIP: NEGATIVE
PH, POC UA: 5 (ref 5–8)
POC BLOOD, URINE: NEGATIVE
POC NITRATES, URINE: NEGATIVE
PROT UR QL STRIP: NEGATIVE
SP GR UR STRIP: 1.03 (ref 1–1.03)
UROBILINOGEN UR STRIP-ACNC: ABNORMAL (ref 0.1–1.1)

## 2021-02-25 PROCEDURE — 96372 PR INJECTION,THERAP/PROPH/DIAG2ST, IM OR SUBCUT: ICD-10-PCS | Mod: S$GLB,,, | Performed by: FAMILY MEDICINE

## 2021-02-25 PROCEDURE — 99213 PR OFFICE/OUTPT VISIT, EST, LEVL III, 20-29 MIN: ICD-10-PCS | Mod: 25,S$GLB,, | Performed by: FAMILY MEDICINE

## 2021-02-25 PROCEDURE — 81003 POCT URINALYSIS, DIPSTICK, AUTOMATED, W/O SCOPE: ICD-10-PCS | Mod: QW,S$GLB,, | Performed by: FAMILY MEDICINE

## 2021-02-25 PROCEDURE — 3008F PR BODY MASS INDEX (BMI) DOCUMENTED: ICD-10-PCS | Mod: CPTII,S$GLB,, | Performed by: FAMILY MEDICINE

## 2021-02-25 PROCEDURE — 99213 OFFICE O/P EST LOW 20 MIN: CPT | Mod: 25,S$GLB,, | Performed by: FAMILY MEDICINE

## 2021-02-25 PROCEDURE — 3008F BODY MASS INDEX DOCD: CPT | Mod: CPTII,S$GLB,, | Performed by: FAMILY MEDICINE

## 2021-02-25 PROCEDURE — 96372 THER/PROPH/DIAG INJ SC/IM: CPT | Mod: S$GLB,,, | Performed by: FAMILY MEDICINE

## 2021-02-25 PROCEDURE — 81003 URINALYSIS AUTO W/O SCOPE: CPT | Mod: QW,S$GLB,, | Performed by: FAMILY MEDICINE

## 2021-02-25 RX ORDER — KETOROLAC TROMETHAMINE 10 MG/1
TABLET, FILM COATED ORAL
Qty: 20 TABLET | Refills: 0 | Status: SHIPPED | OUTPATIENT
Start: 2021-02-25 | End: 2021-08-23

## 2021-02-25 RX ORDER — KETOROLAC TROMETHAMINE 30 MG/ML
30 INJECTION, SOLUTION INTRAMUSCULAR; INTRAVENOUS
Status: COMPLETED | OUTPATIENT
Start: 2021-02-25 | End: 2021-02-25

## 2021-02-25 RX ADMIN — KETOROLAC TROMETHAMINE 30 MG: 30 INJECTION, SOLUTION INTRAMUSCULAR; INTRAVENOUS at 01:02

## 2021-02-26 ENCOUNTER — OFFICE VISIT (OUTPATIENT)
Dept: UROLOGY | Facility: CLINIC | Age: 61
End: 2021-02-26
Payer: COMMERCIAL

## 2021-02-26 VITALS
BODY MASS INDEX: 49.72 KG/M2 | HEIGHT: 64 IN | SYSTOLIC BLOOD PRESSURE: 152 MMHG | WEIGHT: 291.25 LBS | HEART RATE: 76 BPM | DIASTOLIC BLOOD PRESSURE: 93 MMHG

## 2021-02-26 DIAGNOSIS — N39.41 URGE INCONTINENCE: Primary | ICD-10-CM

## 2021-02-26 PROCEDURE — 99213 PR OFFICE/OUTPT VISIT, EST, LEVL III, 20-29 MIN: ICD-10-PCS | Mod: S$GLB,,, | Performed by: UROLOGY

## 2021-02-26 PROCEDURE — 3077F PR MOST RECENT SYSTOLIC BLOOD PRESSURE >= 140 MM HG: ICD-10-PCS | Mod: CPTII,S$GLB,, | Performed by: UROLOGY

## 2021-02-26 PROCEDURE — 3080F DIAST BP >= 90 MM HG: CPT | Mod: CPTII,S$GLB,, | Performed by: UROLOGY

## 2021-02-26 PROCEDURE — 3008F BODY MASS INDEX DOCD: CPT | Mod: CPTII,S$GLB,, | Performed by: UROLOGY

## 2021-02-26 PROCEDURE — 3008F PR BODY MASS INDEX (BMI) DOCUMENTED: ICD-10-PCS | Mod: CPTII,S$GLB,, | Performed by: UROLOGY

## 2021-02-26 PROCEDURE — 99999 PR PBB SHADOW E&M-EST. PATIENT-LVL V: CPT | Mod: PBBFAC,,, | Performed by: UROLOGY

## 2021-02-26 PROCEDURE — 1126F PR PAIN SEVERITY QUANTIFIED, NO PAIN PRESENT: ICD-10-PCS | Mod: S$GLB,,, | Performed by: UROLOGY

## 2021-02-26 PROCEDURE — 3077F SYST BP >= 140 MM HG: CPT | Mod: CPTII,S$GLB,, | Performed by: UROLOGY

## 2021-02-26 PROCEDURE — 1126F AMNT PAIN NOTED NONE PRSNT: CPT | Mod: S$GLB,,, | Performed by: UROLOGY

## 2021-02-26 PROCEDURE — 99213 OFFICE O/P EST LOW 20 MIN: CPT | Mod: S$GLB,,, | Performed by: UROLOGY

## 2021-02-26 PROCEDURE — 99999 PR PBB SHADOW E&M-EST. PATIENT-LVL V: ICD-10-PCS | Mod: PBBFAC,,, | Performed by: UROLOGY

## 2021-02-26 PROCEDURE — 3080F PR MOST RECENT DIASTOLIC BLOOD PRESSURE >= 90 MM HG: ICD-10-PCS | Mod: CPTII,S$GLB,, | Performed by: UROLOGY

## 2021-02-26 RX ORDER — MIRABEGRON 50 MG/1
1 TABLET, FILM COATED, EXTENDED RELEASE ORAL DAILY
Qty: 30 TABLET | Refills: 11 | Status: SHIPPED | OUTPATIENT
Start: 2021-02-26 | End: 2021-04-19 | Stop reason: SINTOL

## 2021-03-02 ENCOUNTER — OFFICE VISIT (OUTPATIENT)
Dept: INTERNAL MEDICINE | Facility: CLINIC | Age: 61
End: 2021-03-02
Payer: COMMERCIAL

## 2021-03-02 VITALS
HEART RATE: 63 BPM | OXYGEN SATURATION: 98 % | SYSTOLIC BLOOD PRESSURE: 134 MMHG | TEMPERATURE: 98 F | BODY MASS INDEX: 50.02 KG/M2 | HEIGHT: 64 IN | RESPIRATION RATE: 18 BRPM | WEIGHT: 293 LBS | DIASTOLIC BLOOD PRESSURE: 84 MMHG

## 2021-03-02 DIAGNOSIS — I10 HTN, GOAL BELOW 130/80: ICD-10-CM

## 2021-03-02 DIAGNOSIS — M17.0 OSTEOARTHRITIS OF BOTH KNEES, UNSPECIFIED OSTEOARTHRITIS TYPE: ICD-10-CM

## 2021-03-02 DIAGNOSIS — E03.9 HYPOTHYROIDISM, UNSPECIFIED TYPE: ICD-10-CM

## 2021-03-02 DIAGNOSIS — Z00.00 PHYSICAL EXAM, ANNUAL: Primary | ICD-10-CM

## 2021-03-02 DIAGNOSIS — E11.65 UNCONTROLLED TYPE 2 DIABETES MELLITUS WITH HYPERGLYCEMIA: ICD-10-CM

## 2021-03-02 PROCEDURE — 3079F PR MOST RECENT DIASTOLIC BLOOD PRESSURE 80-89 MM HG: ICD-10-PCS | Mod: CPTII,S$GLB,, | Performed by: STUDENT IN AN ORGANIZED HEALTH CARE EDUCATION/TRAINING PROGRAM

## 2021-03-02 PROCEDURE — 90732 PNEUMOCOCCAL POLYSACCHARIDE VACCINE 23-VALENT =>2YO SQ IM: ICD-10-PCS | Mod: S$GLB,,, | Performed by: STUDENT IN AN ORGANIZED HEALTH CARE EDUCATION/TRAINING PROGRAM

## 2021-03-02 PROCEDURE — 3075F SYST BP GE 130 - 139MM HG: CPT | Mod: CPTII,S$GLB,, | Performed by: STUDENT IN AN ORGANIZED HEALTH CARE EDUCATION/TRAINING PROGRAM

## 2021-03-02 PROCEDURE — 3008F PR BODY MASS INDEX (BMI) DOCUMENTED: ICD-10-PCS | Mod: CPTII,S$GLB,, | Performed by: STUDENT IN AN ORGANIZED HEALTH CARE EDUCATION/TRAINING PROGRAM

## 2021-03-02 PROCEDURE — 99214 OFFICE O/P EST MOD 30 MIN: CPT | Mod: 25,S$GLB,, | Performed by: STUDENT IN AN ORGANIZED HEALTH CARE EDUCATION/TRAINING PROGRAM

## 2021-03-02 PROCEDURE — 90715 TDAP VACCINE GREATER THAN OR EQUAL TO 7YO IM: ICD-10-PCS | Mod: S$GLB,,, | Performed by: STUDENT IN AN ORGANIZED HEALTH CARE EDUCATION/TRAINING PROGRAM

## 2021-03-02 PROCEDURE — 1125F AMNT PAIN NOTED PAIN PRSNT: CPT | Mod: S$GLB,,, | Performed by: STUDENT IN AN ORGANIZED HEALTH CARE EDUCATION/TRAINING PROGRAM

## 2021-03-02 PROCEDURE — 3008F BODY MASS INDEX DOCD: CPT | Mod: CPTII,S$GLB,, | Performed by: STUDENT IN AN ORGANIZED HEALTH CARE EDUCATION/TRAINING PROGRAM

## 2021-03-02 PROCEDURE — 90471 IMMUNIZATION ADMIN: CPT | Mod: S$GLB,,, | Performed by: STUDENT IN AN ORGANIZED HEALTH CARE EDUCATION/TRAINING PROGRAM

## 2021-03-02 PROCEDURE — 90715 TDAP VACCINE 7 YRS/> IM: CPT | Mod: S$GLB,,, | Performed by: STUDENT IN AN ORGANIZED HEALTH CARE EDUCATION/TRAINING PROGRAM

## 2021-03-02 PROCEDURE — 99214 PR OFFICE/OUTPT VISIT, EST, LEVL IV, 30-39 MIN: ICD-10-PCS | Mod: 25,S$GLB,, | Performed by: STUDENT IN AN ORGANIZED HEALTH CARE EDUCATION/TRAINING PROGRAM

## 2021-03-02 PROCEDURE — 3046F PR MOST RECENT HEMOGLOBIN A1C LEVEL > 9.0%: ICD-10-PCS | Mod: CPTII,S$GLB,, | Performed by: STUDENT IN AN ORGANIZED HEALTH CARE EDUCATION/TRAINING PROGRAM

## 2021-03-02 PROCEDURE — 90472 TDAP VACCINE GREATER THAN OR EQUAL TO 7YO IM: ICD-10-PCS | Mod: S$GLB,,, | Performed by: STUDENT IN AN ORGANIZED HEALTH CARE EDUCATION/TRAINING PROGRAM

## 2021-03-02 PROCEDURE — 99999 PR PBB SHADOW E&M-EST. PATIENT-LVL V: ICD-10-PCS | Mod: PBBFAC,,, | Performed by: STUDENT IN AN ORGANIZED HEALTH CARE EDUCATION/TRAINING PROGRAM

## 2021-03-02 PROCEDURE — 90732 PPSV23 VACC 2 YRS+ SUBQ/IM: CPT | Mod: S$GLB,,, | Performed by: STUDENT IN AN ORGANIZED HEALTH CARE EDUCATION/TRAINING PROGRAM

## 2021-03-02 PROCEDURE — 90471 PNEUMOCOCCAL POLYSACCHARIDE VACCINE 23-VALENT =>2YO SQ IM: ICD-10-PCS | Mod: S$GLB,,, | Performed by: STUDENT IN AN ORGANIZED HEALTH CARE EDUCATION/TRAINING PROGRAM

## 2021-03-02 PROCEDURE — 3046F HEMOGLOBIN A1C LEVEL >9.0%: CPT | Mod: CPTII,S$GLB,, | Performed by: STUDENT IN AN ORGANIZED HEALTH CARE EDUCATION/TRAINING PROGRAM

## 2021-03-02 PROCEDURE — 90472 IMMUNIZATION ADMIN EACH ADD: CPT | Mod: S$GLB,,, | Performed by: STUDENT IN AN ORGANIZED HEALTH CARE EDUCATION/TRAINING PROGRAM

## 2021-03-02 PROCEDURE — 3079F DIAST BP 80-89 MM HG: CPT | Mod: CPTII,S$GLB,, | Performed by: STUDENT IN AN ORGANIZED HEALTH CARE EDUCATION/TRAINING PROGRAM

## 2021-03-02 PROCEDURE — 1125F PR PAIN SEVERITY QUANTIFIED, PAIN PRESENT: ICD-10-PCS | Mod: S$GLB,,, | Performed by: STUDENT IN AN ORGANIZED HEALTH CARE EDUCATION/TRAINING PROGRAM

## 2021-03-02 PROCEDURE — 99999 PR PBB SHADOW E&M-EST. PATIENT-LVL V: CPT | Mod: PBBFAC,,, | Performed by: STUDENT IN AN ORGANIZED HEALTH CARE EDUCATION/TRAINING PROGRAM

## 2021-03-02 PROCEDURE — 3075F PR MOST RECENT SYSTOLIC BLOOD PRESS GE 130-139MM HG: ICD-10-PCS | Mod: CPTII,S$GLB,, | Performed by: STUDENT IN AN ORGANIZED HEALTH CARE EDUCATION/TRAINING PROGRAM

## 2021-03-02 RX ORDER — HYDROCODONE BITARTRATE AND ACETAMINOPHEN 10; 325 MG/1; MG/1
1 TABLET ORAL EVERY 8 HOURS PRN
Qty: 90 TABLET | Refills: 0 | Status: SHIPPED | OUTPATIENT
Start: 2021-04-01 | End: 2021-06-15 | Stop reason: SDUPTHER

## 2021-03-02 RX ORDER — HYDROCODONE BITARTRATE AND ACETAMINOPHEN 10; 325 MG/1; MG/1
1 TABLET ORAL EVERY 8 HOURS PRN
Qty: 90 TABLET | Refills: 0 | Status: SHIPPED | OUTPATIENT
Start: 2021-05-01 | End: 2021-04-13 | Stop reason: SDUPTHER

## 2021-03-02 RX ORDER — METOPROLOL TARTRATE 100 MG/1
100 TABLET ORAL 2 TIMES DAILY
Qty: 180 TABLET | Refills: 1 | Status: SHIPPED | OUTPATIENT
Start: 2021-03-02 | End: 2021-08-24

## 2021-03-02 RX ORDER — HYDROCODONE BITARTRATE AND ACETAMINOPHEN 10; 325 MG/1; MG/1
1 TABLET ORAL EVERY 8 HOURS PRN
Qty: 90 TABLET | Refills: 0 | Status: SHIPPED | OUTPATIENT
Start: 2021-03-02 | End: 2021-06-15 | Stop reason: SDUPTHER

## 2021-03-16 ENCOUNTER — LAB VISIT (OUTPATIENT)
Dept: LAB | Facility: HOSPITAL | Age: 61
End: 2021-03-16
Attending: STUDENT IN AN ORGANIZED HEALTH CARE EDUCATION/TRAINING PROGRAM
Payer: COMMERCIAL

## 2021-03-16 DIAGNOSIS — I10 HTN, GOAL BELOW 130/80: ICD-10-CM

## 2021-03-16 DIAGNOSIS — E03.9 HYPOTHYROIDISM, UNSPECIFIED TYPE: ICD-10-CM

## 2021-03-16 DIAGNOSIS — E11.65 UNCONTROLLED TYPE 2 DIABETES MELLITUS WITH HYPERGLYCEMIA: ICD-10-CM

## 2021-03-16 LAB
ALBUMIN SERPL BCP-MCNC: 3.1 G/DL (ref 3.5–5.2)
ALP SERPL-CCNC: 97 U/L (ref 55–135)
ALT SERPL W/O P-5'-P-CCNC: 12 U/L (ref 10–44)
ANION GAP SERPL CALC-SCNC: 10 MMOL/L (ref 8–16)
AST SERPL-CCNC: 29 U/L (ref 10–40)
BASOPHILS # BLD AUTO: 0.07 K/UL (ref 0–0.2)
BASOPHILS NFR BLD: 0.8 % (ref 0–1.9)
BILIRUB SERPL-MCNC: 0.3 MG/DL (ref 0.1–1)
BUN SERPL-MCNC: 14 MG/DL (ref 6–20)
CALCIUM SERPL-MCNC: 8.7 MG/DL (ref 8.7–10.5)
CHLORIDE SERPL-SCNC: 103 MMOL/L (ref 95–110)
CHOLEST SERPL-MCNC: 232 MG/DL (ref 120–199)
CHOLEST/HDLC SERPL: 5 {RATIO} (ref 2–5)
CO2 SERPL-SCNC: 26 MMOL/L (ref 23–29)
CREAT SERPL-MCNC: 0.7 MG/DL (ref 0.5–1.4)
DIFFERENTIAL METHOD: ABNORMAL
EOSINOPHIL # BLD AUTO: 0.6 K/UL (ref 0–0.5)
EOSINOPHIL NFR BLD: 6.7 % (ref 0–8)
ERYTHROCYTE [DISTWIDTH] IN BLOOD BY AUTOMATED COUNT: 15.9 % (ref 11.5–14.5)
EST. GFR  (AFRICAN AMERICAN): >60 ML/MIN/1.73 M^2
EST. GFR  (NON AFRICAN AMERICAN): >60 ML/MIN/1.73 M^2
ESTIMATED AVG GLUCOSE: 174 MG/DL (ref 68–131)
GLUCOSE SERPL-MCNC: 133 MG/DL (ref 70–110)
HBA1C MFR BLD: 7.7 % (ref 4–5.6)
HCT VFR BLD AUTO: 38 % (ref 37–48.5)
HDLC SERPL-MCNC: 46 MG/DL (ref 40–75)
HDLC SERPL: 19.8 % (ref 20–50)
HGB BLD-MCNC: 11.6 G/DL (ref 12–16)
IMM GRANULOCYTES # BLD AUTO: 0.02 K/UL (ref 0–0.04)
IMM GRANULOCYTES NFR BLD AUTO: 0.2 % (ref 0–0.5)
LDLC SERPL CALC-MCNC: 148.8 MG/DL (ref 63–159)
LYMPHOCYTES # BLD AUTO: 4.8 K/UL (ref 1–4.8)
LYMPHOCYTES NFR BLD: 54.7 % (ref 18–48)
MCH RBC QN AUTO: 25.8 PG (ref 27–31)
MCHC RBC AUTO-ENTMCNC: 30.5 G/DL (ref 32–36)
MCV RBC AUTO: 85 FL (ref 82–98)
MONOCYTES # BLD AUTO: 0.5 K/UL (ref 0.3–1)
MONOCYTES NFR BLD: 5.9 % (ref 4–15)
NEUTROPHILS # BLD AUTO: 2.8 K/UL (ref 1.8–7.7)
NEUTROPHILS NFR BLD: 31.7 % (ref 38–73)
NONHDLC SERPL-MCNC: 186 MG/DL
NRBC BLD-RTO: 0 /100 WBC
PLATELET # BLD AUTO: 459 K/UL (ref 150–350)
PMV BLD AUTO: 10.6 FL (ref 9.2–12.9)
POTASSIUM SERPL-SCNC: 4.1 MMOL/L (ref 3.5–5.1)
PROT SERPL-MCNC: 7.3 G/DL (ref 6–8.4)
RBC # BLD AUTO: 4.49 M/UL (ref 4–5.4)
SODIUM SERPL-SCNC: 139 MMOL/L (ref 136–145)
T4 FREE SERPL-MCNC: 1.17 NG/DL (ref 0.71–1.51)
T4 SERPL-MCNC: 8.9 UG/DL (ref 4.5–11.5)
TRIGL SERPL-MCNC: 186 MG/DL (ref 30–150)
TSH SERPL DL<=0.005 MIU/L-ACNC: 4.15 UIU/ML (ref 0.4–4)
WBC # BLD AUTO: 8.79 K/UL (ref 3.9–12.7)

## 2021-03-16 PROCEDURE — 84443 ASSAY THYROID STIM HORMONE: CPT | Performed by: STUDENT IN AN ORGANIZED HEALTH CARE EDUCATION/TRAINING PROGRAM

## 2021-03-16 PROCEDURE — 36415 COLL VENOUS BLD VENIPUNCTURE: CPT | Mod: PO | Performed by: STUDENT IN AN ORGANIZED HEALTH CARE EDUCATION/TRAINING PROGRAM

## 2021-03-16 PROCEDURE — 80061 LIPID PANEL: CPT | Performed by: STUDENT IN AN ORGANIZED HEALTH CARE EDUCATION/TRAINING PROGRAM

## 2021-03-16 PROCEDURE — 85025 COMPLETE CBC W/AUTO DIFF WBC: CPT | Performed by: STUDENT IN AN ORGANIZED HEALTH CARE EDUCATION/TRAINING PROGRAM

## 2021-03-16 PROCEDURE — 80053 COMPREHEN METABOLIC PANEL: CPT | Performed by: STUDENT IN AN ORGANIZED HEALTH CARE EDUCATION/TRAINING PROGRAM

## 2021-03-16 PROCEDURE — 84439 ASSAY OF FREE THYROXINE: CPT | Performed by: STUDENT IN AN ORGANIZED HEALTH CARE EDUCATION/TRAINING PROGRAM

## 2021-03-16 PROCEDURE — 83036 HEMOGLOBIN GLYCOSYLATED A1C: CPT | Performed by: STUDENT IN AN ORGANIZED HEALTH CARE EDUCATION/TRAINING PROGRAM

## 2021-03-16 PROCEDURE — 84436 ASSAY OF TOTAL THYROXINE: CPT | Performed by: STUDENT IN AN ORGANIZED HEALTH CARE EDUCATION/TRAINING PROGRAM

## 2021-03-17 ENCOUNTER — TELEPHONE (OUTPATIENT)
Dept: INTERNAL MEDICINE | Facility: CLINIC | Age: 61
End: 2021-03-17

## 2021-03-17 DIAGNOSIS — E78.5 HYPERLIPIDEMIA, UNSPECIFIED HYPERLIPIDEMIA TYPE: ICD-10-CM

## 2021-03-17 DIAGNOSIS — E11.65 UNCONTROLLED TYPE 2 DIABETES MELLITUS WITH HYPERGLYCEMIA: Primary | ICD-10-CM

## 2021-04-05 DIAGNOSIS — M17.0 OSTEOARTHRITIS OF BOTH KNEES, UNSPECIFIED OSTEOARTHRITIS TYPE: ICD-10-CM

## 2021-04-05 RX ORDER — HYDROCODONE BITARTRATE AND ACETAMINOPHEN 10; 325 MG/1; MG/1
1 TABLET ORAL EVERY 8 HOURS PRN
Qty: 90 TABLET | Refills: 0 | OUTPATIENT
Start: 2021-04-05

## 2021-04-08 ENCOUNTER — TELEPHONE (OUTPATIENT)
Dept: INTERNAL MEDICINE | Facility: CLINIC | Age: 61
End: 2021-04-08

## 2021-04-08 DIAGNOSIS — M17.0 OSTEOARTHRITIS OF BOTH KNEES, UNSPECIFIED OSTEOARTHRITIS TYPE: ICD-10-CM

## 2021-04-12 ENCOUNTER — TELEPHONE (OUTPATIENT)
Dept: INTERNAL MEDICINE | Facility: CLINIC | Age: 61
End: 2021-04-12

## 2021-04-12 DIAGNOSIS — M17.0 OSTEOARTHRITIS OF BOTH KNEES, UNSPECIFIED OSTEOARTHRITIS TYPE: ICD-10-CM

## 2021-04-13 RX ORDER — HYDROCODONE BITARTRATE AND ACETAMINOPHEN 10; 325 MG/1; MG/1
1 TABLET ORAL EVERY 8 HOURS PRN
Qty: 90 TABLET | Refills: 0 | Status: SHIPPED | OUTPATIENT
Start: 2021-05-01 | End: 2021-06-15 | Stop reason: SDUPTHER

## 2021-04-16 ENCOUNTER — TELEPHONE (OUTPATIENT)
Dept: RHEUMATOLOGY | Facility: CLINIC | Age: 61
End: 2021-04-16

## 2021-04-19 ENCOUNTER — OFFICE VISIT (OUTPATIENT)
Dept: UROLOGY | Facility: CLINIC | Age: 61
End: 2021-04-19
Payer: COMMERCIAL

## 2021-04-19 VITALS
HEIGHT: 64 IN | DIASTOLIC BLOOD PRESSURE: 89 MMHG | WEIGHT: 293 LBS | HEART RATE: 76 BPM | SYSTOLIC BLOOD PRESSURE: 147 MMHG | BODY MASS INDEX: 50.02 KG/M2

## 2021-04-19 DIAGNOSIS — N39.41 URGE INCONTINENCE: Primary | ICD-10-CM

## 2021-04-19 PROCEDURE — 3008F PR BODY MASS INDEX (BMI) DOCUMENTED: ICD-10-PCS | Mod: CPTII,S$GLB,, | Performed by: UROLOGY

## 2021-04-19 PROCEDURE — 81002 URINALYSIS NONAUTO W/O SCOPE: CPT | Mod: S$GLB,,, | Performed by: UROLOGY

## 2021-04-19 PROCEDURE — 99999 PR PBB SHADOW E&M-EST. PATIENT-LVL V: CPT | Mod: PBBFAC,,, | Performed by: UROLOGY

## 2021-04-19 PROCEDURE — 1125F PR PAIN SEVERITY QUANTIFIED, PAIN PRESENT: ICD-10-PCS | Mod: S$GLB,,, | Performed by: UROLOGY

## 2021-04-19 PROCEDURE — 99999 PR PBB SHADOW E&M-EST. PATIENT-LVL V: ICD-10-PCS | Mod: PBBFAC,,, | Performed by: UROLOGY

## 2021-04-19 PROCEDURE — 99213 OFFICE O/P EST LOW 20 MIN: CPT | Mod: 25,S$GLB,, | Performed by: UROLOGY

## 2021-04-19 PROCEDURE — 81002 PR URINALYSIS NONAUTO W/O SCOPE: ICD-10-PCS | Mod: S$GLB,,, | Performed by: UROLOGY

## 2021-04-19 PROCEDURE — 99213 PR OFFICE/OUTPT VISIT, EST, LEVL III, 20-29 MIN: ICD-10-PCS | Mod: 25,S$GLB,, | Performed by: UROLOGY

## 2021-04-19 PROCEDURE — 3008F BODY MASS INDEX DOCD: CPT | Mod: CPTII,S$GLB,, | Performed by: UROLOGY

## 2021-04-19 PROCEDURE — 1125F AMNT PAIN NOTED PAIN PRSNT: CPT | Mod: S$GLB,,, | Performed by: UROLOGY

## 2021-04-19 RX ORDER — DOXYCYCLINE HYCLATE 100 MG
100 TABLET ORAL ONCE
Status: CANCELLED | OUTPATIENT
Start: 2021-04-19 | End: 2021-04-19

## 2021-04-19 RX ORDER — LIDOCAINE HYDROCHLORIDE 20 MG/ML
JELLY TOPICAL ONCE
Status: CANCELLED | OUTPATIENT
Start: 2021-04-19 | End: 2021-04-19

## 2021-05-06 ENCOUNTER — PATIENT OUTREACH (OUTPATIENT)
Dept: ADMINISTRATIVE | Facility: OTHER | Age: 61
End: 2021-05-06

## 2021-05-11 ENCOUNTER — OFFICE VISIT (OUTPATIENT)
Dept: RHEUMATOLOGY | Facility: CLINIC | Age: 61
End: 2021-05-11
Payer: COMMERCIAL

## 2021-05-11 VITALS
HEART RATE: 78 BPM | WEIGHT: 293 LBS | SYSTOLIC BLOOD PRESSURE: 156 MMHG | DIASTOLIC BLOOD PRESSURE: 89 MMHG | BODY MASS INDEX: 51.46 KG/M2

## 2021-05-11 DIAGNOSIS — M79.7 FIBROMYALGIA: Primary | ICD-10-CM

## 2021-05-11 DIAGNOSIS — M15.9 PRIMARY OSTEOARTHRITIS INVOLVING MULTIPLE JOINTS: ICD-10-CM

## 2021-05-11 PROCEDURE — 3008F BODY MASS INDEX DOCD: CPT | Mod: CPTII,S$GLB,, | Performed by: INTERNAL MEDICINE

## 2021-05-11 PROCEDURE — 99999 PR PBB SHADOW E&M-EST. PATIENT-LVL IV: ICD-10-PCS | Mod: PBBFAC,,, | Performed by: INTERNAL MEDICINE

## 2021-05-11 PROCEDURE — 3008F PR BODY MASS INDEX (BMI) DOCUMENTED: ICD-10-PCS | Mod: CPTII,S$GLB,, | Performed by: INTERNAL MEDICINE

## 2021-05-11 PROCEDURE — 1125F PR PAIN SEVERITY QUANTIFIED, PAIN PRESENT: ICD-10-PCS | Mod: S$GLB,,, | Performed by: INTERNAL MEDICINE

## 2021-05-11 PROCEDURE — 99215 OFFICE O/P EST HI 40 MIN: CPT | Mod: S$GLB,,, | Performed by: INTERNAL MEDICINE

## 2021-05-11 PROCEDURE — 1125F AMNT PAIN NOTED PAIN PRSNT: CPT | Mod: S$GLB,,, | Performed by: INTERNAL MEDICINE

## 2021-05-11 PROCEDURE — 99999 PR PBB SHADOW E&M-EST. PATIENT-LVL IV: CPT | Mod: PBBFAC,,, | Performed by: INTERNAL MEDICINE

## 2021-05-11 PROCEDURE — 99215 PR OFFICE/OUTPT VISIT, EST, LEVL V, 40-54 MIN: ICD-10-PCS | Mod: S$GLB,,, | Performed by: INTERNAL MEDICINE

## 2021-05-11 RX ORDER — GABAPENTIN 400 MG/1
CAPSULE ORAL
Qty: 60 CAPSULE | Refills: 5 | Status: ON HOLD | OUTPATIENT
Start: 2021-05-11 | End: 2021-10-27

## 2021-05-11 RX ORDER — SULINDAC 200 MG/1
200 TABLET ORAL 2 TIMES DAILY
Qty: 60 TABLET | Refills: 5 | Status: SHIPPED | OUTPATIENT
Start: 2021-05-11 | End: 2021-06-10

## 2021-05-19 ENCOUNTER — TELEPHONE (OUTPATIENT)
Dept: INTERNAL MEDICINE | Facility: CLINIC | Age: 61
End: 2021-05-19

## 2021-05-20 ENCOUNTER — OFFICE VISIT (OUTPATIENT)
Dept: INTERNAL MEDICINE | Facility: CLINIC | Age: 61
End: 2021-05-20
Payer: COMMERCIAL

## 2021-05-20 VITALS
HEIGHT: 64 IN | BODY MASS INDEX: 50.02 KG/M2 | WEIGHT: 293 LBS | DIASTOLIC BLOOD PRESSURE: 80 MMHG | SYSTOLIC BLOOD PRESSURE: 120 MMHG | OXYGEN SATURATION: 98 % | RESPIRATION RATE: 16 BRPM | TEMPERATURE: 98 F | HEART RATE: 82 BPM

## 2021-05-20 DIAGNOSIS — E11.65 UNCONTROLLED TYPE 2 DIABETES MELLITUS WITH HYPERGLYCEMIA: Primary | ICD-10-CM

## 2021-05-20 DIAGNOSIS — F32.89 OTHER DEPRESSION: ICD-10-CM

## 2021-05-20 DIAGNOSIS — E78.2 MIXED HYPERLIPIDEMIA: ICD-10-CM

## 2021-05-20 DIAGNOSIS — E03.9 HYPOTHYROIDISM, UNSPECIFIED TYPE: ICD-10-CM

## 2021-05-20 DIAGNOSIS — E11.59 HYPERTENSION ASSOCIATED WITH DIABETES: ICD-10-CM

## 2021-05-20 DIAGNOSIS — E66.01 MORBID OBESITY WITH BMI OF 60.0-69.9, ADULT: ICD-10-CM

## 2021-05-20 DIAGNOSIS — G47.33 OBSTRUCTIVE SLEEP APNEA (ADULT) (PEDIATRIC): ICD-10-CM

## 2021-05-20 DIAGNOSIS — E78.5 HYPERLIPIDEMIA ASSOCIATED WITH TYPE 2 DIABETES MELLITUS: ICD-10-CM

## 2021-05-20 DIAGNOSIS — E11.69 HYPERLIPIDEMIA ASSOCIATED WITH TYPE 2 DIABETES MELLITUS: ICD-10-CM

## 2021-05-20 DIAGNOSIS — I15.2 HYPERTENSION ASSOCIATED WITH DIABETES: ICD-10-CM

## 2021-05-20 PROCEDURE — 3051F PR MOST RECENT HEMOGLOBIN A1C LEVEL 7.0 - < 8.0%: ICD-10-PCS | Mod: CPTII,S$GLB,, | Performed by: NURSE PRACTITIONER

## 2021-05-20 PROCEDURE — 3051F HG A1C>EQUAL 7.0%<8.0%: CPT | Mod: CPTII,S$GLB,, | Performed by: NURSE PRACTITIONER

## 2021-05-20 PROCEDURE — 99999 PR PBB SHADOW E&M-EST. PATIENT-LVL V: CPT | Mod: PBBFAC,,, | Performed by: NURSE PRACTITIONER

## 2021-05-20 PROCEDURE — 99214 PR OFFICE/OUTPT VISIT, EST, LEVL IV, 30-39 MIN: ICD-10-PCS | Mod: S$GLB,,, | Performed by: NURSE PRACTITIONER

## 2021-05-20 PROCEDURE — 1125F AMNT PAIN NOTED PAIN PRSNT: CPT | Mod: S$GLB,,, | Performed by: NURSE PRACTITIONER

## 2021-05-20 PROCEDURE — 99999 PR PBB SHADOW E&M-EST. PATIENT-LVL V: ICD-10-PCS | Mod: PBBFAC,,, | Performed by: NURSE PRACTITIONER

## 2021-05-20 PROCEDURE — 3008F PR BODY MASS INDEX (BMI) DOCUMENTED: ICD-10-PCS | Mod: CPTII,S$GLB,, | Performed by: NURSE PRACTITIONER

## 2021-05-20 PROCEDURE — 99214 OFFICE O/P EST MOD 30 MIN: CPT | Mod: S$GLB,,, | Performed by: NURSE PRACTITIONER

## 2021-05-20 PROCEDURE — 1125F PR PAIN SEVERITY QUANTIFIED, PAIN PRESENT: ICD-10-PCS | Mod: S$GLB,,, | Performed by: NURSE PRACTITIONER

## 2021-05-20 PROCEDURE — 3008F BODY MASS INDEX DOCD: CPT | Mod: CPTII,S$GLB,, | Performed by: NURSE PRACTITIONER

## 2021-05-25 ENCOUNTER — TELEPHONE (OUTPATIENT)
Dept: UROLOGY | Facility: CLINIC | Age: 61
End: 2021-05-25

## 2021-05-26 ENCOUNTER — DOCUMENTATION ONLY (OUTPATIENT)
Dept: PRIMARY CARE CLINIC | Facility: CLINIC | Age: 61
End: 2021-05-26

## 2021-05-27 ENCOUNTER — TELEPHONE (OUTPATIENT)
Dept: PRIMARY CARE CLINIC | Facility: CLINIC | Age: 61
End: 2021-05-27

## 2021-05-27 RX ORDER — NALOXONE HYDROCHLORIDE 4 MG/.1ML
1 SPRAY NASAL ONCE
Qty: 1 EACH | Refills: 0 | Status: SHIPPED | OUTPATIENT
Start: 2021-05-27 | End: 2021-05-27

## 2021-06-01 ENCOUNTER — TELEPHONE (OUTPATIENT)
Dept: PRIMARY CARE CLINIC | Facility: CLINIC | Age: 61
End: 2021-06-01

## 2021-06-03 ENCOUNTER — TELEPHONE (OUTPATIENT)
Dept: PRIMARY CARE CLINIC | Facility: CLINIC | Age: 61
End: 2021-06-03

## 2021-06-04 ENCOUNTER — TELEPHONE (OUTPATIENT)
Dept: UROLOGY | Facility: CLINIC | Age: 61
End: 2021-06-04

## 2021-06-04 DIAGNOSIS — N39.41 URGE INCONTINENCE: Primary | ICD-10-CM

## 2021-06-08 ENCOUNTER — TELEPHONE (OUTPATIENT)
Dept: PRIMARY CARE CLINIC | Facility: CLINIC | Age: 61
End: 2021-06-08

## 2021-06-14 ENCOUNTER — TELEPHONE (OUTPATIENT)
Dept: PRIMARY CARE CLINIC | Facility: CLINIC | Age: 61
End: 2021-06-14

## 2021-06-15 DIAGNOSIS — M17.0 OSTEOARTHRITIS OF BOTH KNEES, UNSPECIFIED OSTEOARTHRITIS TYPE: ICD-10-CM

## 2021-06-15 RX ORDER — HYDROCODONE BITARTRATE AND ACETAMINOPHEN 10; 325 MG/1; MG/1
1 TABLET ORAL EVERY 8 HOURS PRN
Qty: 90 TABLET | Refills: 0 | Status: SHIPPED | OUTPATIENT
Start: 2021-06-15 | End: 2021-07-15 | Stop reason: SDUPTHER

## 2021-06-16 ENCOUNTER — LAB VISIT (OUTPATIENT)
Dept: LAB | Facility: HOSPITAL | Age: 61
End: 2021-06-16
Attending: INTERNAL MEDICINE
Payer: COMMERCIAL

## 2021-06-16 DIAGNOSIS — E78.5 HYPERLIPIDEMIA, UNSPECIFIED HYPERLIPIDEMIA TYPE: ICD-10-CM

## 2021-06-16 DIAGNOSIS — E11.65 UNCONTROLLED TYPE 2 DIABETES MELLITUS WITH HYPERGLYCEMIA: ICD-10-CM

## 2021-06-16 LAB
ALBUMIN SERPL BCP-MCNC: 3.2 G/DL (ref 3.5–5.2)
ALP SERPL-CCNC: 105 U/L (ref 55–135)
ALT SERPL W/O P-5'-P-CCNC: 9 U/L (ref 10–44)
ANION GAP SERPL CALC-SCNC: 12 MMOL/L (ref 8–16)
AST SERPL-CCNC: 18 U/L (ref 10–40)
BILIRUB SERPL-MCNC: 0.3 MG/DL (ref 0.1–1)
BUN SERPL-MCNC: 18 MG/DL (ref 6–20)
CALCIUM SERPL-MCNC: 9.9 MG/DL (ref 8.7–10.5)
CHLORIDE SERPL-SCNC: 104 MMOL/L (ref 95–110)
CHOLEST SERPL-MCNC: 173 MG/DL (ref 120–199)
CHOLEST/HDLC SERPL: 4.1 {RATIO} (ref 2–5)
CO2 SERPL-SCNC: 24 MMOL/L (ref 23–29)
CREAT SERPL-MCNC: 0.8 MG/DL (ref 0.5–1.4)
EST. GFR  (AFRICAN AMERICAN): >60 ML/MIN/1.73 M^2
EST. GFR  (NON AFRICAN AMERICAN): >60 ML/MIN/1.73 M^2
ESTIMATED AVG GLUCOSE: 174 MG/DL (ref 68–131)
GLUCOSE SERPL-MCNC: 160 MG/DL (ref 70–110)
HBA1C MFR BLD: 7.7 % (ref 4–5.6)
HDLC SERPL-MCNC: 42 MG/DL (ref 40–75)
HDLC SERPL: 24.3 % (ref 20–50)
LDLC SERPL CALC-MCNC: 97.2 MG/DL (ref 63–159)
NONHDLC SERPL-MCNC: 131 MG/DL
POTASSIUM SERPL-SCNC: 4.7 MMOL/L (ref 3.5–5.1)
PROT SERPL-MCNC: 7.8 G/DL (ref 6–8.4)
SODIUM SERPL-SCNC: 140 MMOL/L (ref 136–145)
TRIGL SERPL-MCNC: 169 MG/DL (ref 30–150)

## 2021-06-16 PROCEDURE — 80061 LIPID PANEL: CPT | Performed by: INTERNAL MEDICINE

## 2021-06-16 PROCEDURE — 80053 COMPREHEN METABOLIC PANEL: CPT | Performed by: INTERNAL MEDICINE

## 2021-06-16 PROCEDURE — 36415 COLL VENOUS BLD VENIPUNCTURE: CPT | Mod: PO | Performed by: INTERNAL MEDICINE

## 2021-06-16 PROCEDURE — 83036 HEMOGLOBIN GLYCOSYLATED A1C: CPT | Performed by: INTERNAL MEDICINE

## 2021-06-18 ENCOUNTER — DOCUMENTATION ONLY (OUTPATIENT)
Dept: PRIMARY CARE CLINIC | Facility: CLINIC | Age: 61
End: 2021-06-18

## 2021-06-23 ENCOUNTER — OFFICE VISIT (OUTPATIENT)
Dept: INTERNAL MEDICINE | Facility: CLINIC | Age: 61
End: 2021-06-23
Payer: COMMERCIAL

## 2021-06-23 ENCOUNTER — TELEPHONE (OUTPATIENT)
Dept: UROLOGY | Facility: CLINIC | Age: 61
End: 2021-06-23

## 2021-06-23 VITALS
OXYGEN SATURATION: 98 % | HEART RATE: 87 BPM | SYSTOLIC BLOOD PRESSURE: 110 MMHG | DIASTOLIC BLOOD PRESSURE: 70 MMHG | WEIGHT: 293 LBS | BODY MASS INDEX: 50.02 KG/M2 | HEIGHT: 64 IN

## 2021-06-23 DIAGNOSIS — E78.5 HYPERLIPIDEMIA ASSOCIATED WITH TYPE 2 DIABETES MELLITUS: ICD-10-CM

## 2021-06-23 DIAGNOSIS — Z12.11 SCREEN FOR COLON CANCER: ICD-10-CM

## 2021-06-23 DIAGNOSIS — Z12.31 VISIT FOR SCREENING MAMMOGRAM: ICD-10-CM

## 2021-06-23 DIAGNOSIS — I15.2 HYPERTENSION ASSOCIATED WITH DIABETES: ICD-10-CM

## 2021-06-23 DIAGNOSIS — E11.65 UNCONTROLLED TYPE 2 DIABETES MELLITUS WITH HYPERGLYCEMIA: Primary | ICD-10-CM

## 2021-06-23 DIAGNOSIS — E11.69 HYPERLIPIDEMIA ASSOCIATED WITH TYPE 2 DIABETES MELLITUS: ICD-10-CM

## 2021-06-23 DIAGNOSIS — R10.13 DYSPEPSIA: ICD-10-CM

## 2021-06-23 DIAGNOSIS — E11.59 HYPERTENSION ASSOCIATED WITH DIABETES: ICD-10-CM

## 2021-06-23 PROCEDURE — 99999 PR PBB SHADOW E&M-EST. PATIENT-LVL V: ICD-10-PCS | Mod: PBBFAC,,, | Performed by: INTERNAL MEDICINE

## 2021-06-23 PROCEDURE — 3008F BODY MASS INDEX DOCD: CPT | Mod: CPTII,S$GLB,, | Performed by: INTERNAL MEDICINE

## 2021-06-23 PROCEDURE — 99214 PR OFFICE/OUTPT VISIT, EST, LEVL IV, 30-39 MIN: ICD-10-PCS | Mod: S$GLB,,, | Performed by: INTERNAL MEDICINE

## 2021-06-23 PROCEDURE — 1125F PR PAIN SEVERITY QUANTIFIED, PAIN PRESENT: ICD-10-PCS | Mod: S$GLB,,, | Performed by: INTERNAL MEDICINE

## 2021-06-23 PROCEDURE — 3051F PR MOST RECENT HEMOGLOBIN A1C LEVEL 7.0 - < 8.0%: ICD-10-PCS | Mod: CPTII,S$GLB,, | Performed by: INTERNAL MEDICINE

## 2021-06-23 PROCEDURE — 3051F HG A1C>EQUAL 7.0%<8.0%: CPT | Mod: CPTII,S$GLB,, | Performed by: INTERNAL MEDICINE

## 2021-06-23 PROCEDURE — 99999 PR PBB SHADOW E&M-EST. PATIENT-LVL V: CPT | Mod: PBBFAC,,, | Performed by: INTERNAL MEDICINE

## 2021-06-23 PROCEDURE — 99214 OFFICE O/P EST MOD 30 MIN: CPT | Mod: S$GLB,,, | Performed by: INTERNAL MEDICINE

## 2021-06-23 PROCEDURE — 3008F PR BODY MASS INDEX (BMI) DOCUMENTED: ICD-10-PCS | Mod: CPTII,S$GLB,, | Performed by: INTERNAL MEDICINE

## 2021-06-23 PROCEDURE — 1125F AMNT PAIN NOTED PAIN PRSNT: CPT | Mod: S$GLB,,, | Performed by: INTERNAL MEDICINE

## 2021-06-23 RX ORDER — TOLTERODINE TARTRATE 2 MG/1
2 TABLET, EXTENDED RELEASE ORAL 2 TIMES DAILY
COMMUNITY
Start: 2021-05-23 | End: 2021-09-24

## 2021-06-23 RX ORDER — EMPAGLIFLOZIN 10 MG/1
10 TABLET, FILM COATED ORAL DAILY
Status: ON HOLD | COMMUNITY
Start: 2021-05-23 | End: 2021-11-30 | Stop reason: HOSPADM

## 2021-06-23 RX ORDER — TROSPIUM CHLORIDE ER 60 MG/1
60 CAPSULE ORAL DAILY
COMMUNITY
Start: 2021-05-02 | End: 2021-09-24

## 2021-06-23 RX ORDER — SUCRALFATE 1 G/1
1 TABLET ORAL 3 TIMES DAILY
Qty: 270 TABLET | Refills: 3 | Status: SHIPPED | OUTPATIENT
Start: 2021-06-23 | End: 2021-07-01 | Stop reason: SDUPTHER

## 2021-06-23 RX ORDER — MIRABEGRON 50 MG/1
1 TABLET, FILM COATED, EXTENDED RELEASE ORAL DAILY
COMMUNITY
Start: 2021-06-09 | End: 2021-09-24

## 2021-06-23 RX ORDER — NALOXONE HYDROCHLORIDE 4 MG/.1ML
1 SPRAY NASAL ONCE
Status: ON HOLD | COMMUNITY
Start: 2021-05-27 | End: 2021-11-30 | Stop reason: HOSPADM

## 2021-06-23 RX ORDER — OXYBUTYNIN CHLORIDE 10 MG/1
10 TABLET, EXTENDED RELEASE ORAL DAILY
COMMUNITY
Start: 2021-05-23 | End: 2021-09-24

## 2021-06-23 RX ORDER — SULINDAC 200 MG/1
200 TABLET ORAL 2 TIMES DAILY
Status: ON HOLD | COMMUNITY
Start: 2021-06-20 | End: 2021-10-27

## 2021-07-01 DIAGNOSIS — R10.13 DYSPEPSIA: ICD-10-CM

## 2021-07-01 RX ORDER — SUCRALFATE 1 G/1
1 TABLET ORAL 3 TIMES DAILY
Qty: 270 TABLET | Refills: 3 | Status: ON HOLD | OUTPATIENT
Start: 2021-07-01 | End: 2021-11-30 | Stop reason: HOSPADM

## 2021-07-08 ENCOUNTER — TELEPHONE (OUTPATIENT)
Dept: PRIMARY CARE CLINIC | Facility: CLINIC | Age: 61
End: 2021-07-08

## 2021-07-08 ENCOUNTER — OFFICE VISIT (OUTPATIENT)
Dept: URGENT CARE | Facility: CLINIC | Age: 61
End: 2021-07-08
Payer: COMMERCIAL

## 2021-07-08 VITALS
WEIGHT: 293 LBS | OXYGEN SATURATION: 96 % | BODY MASS INDEX: 50.02 KG/M2 | SYSTOLIC BLOOD PRESSURE: 129 MMHG | HEART RATE: 64 BPM | RESPIRATION RATE: 18 BRPM | HEIGHT: 64 IN | TEMPERATURE: 98 F | DIASTOLIC BLOOD PRESSURE: 82 MMHG

## 2021-07-08 DIAGNOSIS — R14.0 ABDOMINAL BLOATING: ICD-10-CM

## 2021-07-08 DIAGNOSIS — R11.0 NAUSEA: ICD-10-CM

## 2021-07-08 DIAGNOSIS — E66.9 OBESITY, UNSPECIFIED CLASSIFICATION, UNSPECIFIED OBESITY TYPE, UNSPECIFIED WHETHER SERIOUS COMORBIDITY PRESENT: ICD-10-CM

## 2021-07-08 DIAGNOSIS — K59.00 CONSTIPATION, UNSPECIFIED CONSTIPATION TYPE: ICD-10-CM

## 2021-07-08 DIAGNOSIS — R10.9 ABDOMINAL PAIN, UNSPECIFIED ABDOMINAL LOCATION: Primary | ICD-10-CM

## 2021-07-08 LAB
BILIRUB UR QL STRIP: NEGATIVE
GLUCOSE UR QL STRIP: POSITIVE
KETONES UR QL STRIP: NEGATIVE
LEUKOCYTE ESTERASE UR QL STRIP: NEGATIVE
PH, POC UA: 5 (ref 5–8)
POC BLOOD, URINE: NEGATIVE
POC NITRATES, URINE: NEGATIVE
PROT UR QL STRIP: NEGATIVE
SP GR UR STRIP: 1.02 (ref 1–1.03)
UROBILINOGEN UR STRIP-ACNC: NORMAL (ref 0.1–1.1)

## 2021-07-08 PROCEDURE — 3008F PR BODY MASS INDEX (BMI) DOCUMENTED: ICD-10-PCS | Mod: CPTII,S$GLB,, | Performed by: PHYSICIAN ASSISTANT

## 2021-07-08 PROCEDURE — 3008F BODY MASS INDEX DOCD: CPT | Mod: CPTII,S$GLB,, | Performed by: PHYSICIAN ASSISTANT

## 2021-07-08 PROCEDURE — 99214 OFFICE O/P EST MOD 30 MIN: CPT | Mod: 25,S$GLB,, | Performed by: PHYSICIAN ASSISTANT

## 2021-07-08 PROCEDURE — 99214 PR OFFICE/OUTPT VISIT, EST, LEVL IV, 30-39 MIN: ICD-10-PCS | Mod: 25,S$GLB,, | Performed by: PHYSICIAN ASSISTANT

## 2021-07-08 PROCEDURE — S0119 PR ONDANSETRON, ORAL, 4MG: ICD-10-PCS | Mod: S$GLB,,, | Performed by: PHYSICIAN ASSISTANT

## 2021-07-08 PROCEDURE — 81003 POCT URINALYSIS, DIPSTICK, AUTOMATED, W/O SCOPE: ICD-10-PCS | Mod: QW,S$GLB,, | Performed by: PHYSICIAN ASSISTANT

## 2021-07-08 PROCEDURE — 81003 URINALYSIS AUTO W/O SCOPE: CPT | Mod: QW,S$GLB,, | Performed by: PHYSICIAN ASSISTANT

## 2021-07-08 PROCEDURE — S0119 ONDANSETRON 4 MG: HCPCS | Mod: S$GLB,,, | Performed by: PHYSICIAN ASSISTANT

## 2021-07-08 RX ORDER — ONDANSETRON 4 MG/1
4 TABLET, ORALLY DISINTEGRATING ORAL EVERY 8 HOURS PRN
Qty: 10 TABLET | Refills: 0 | Status: ON HOLD | OUTPATIENT
Start: 2021-07-08 | End: 2021-10-27

## 2021-07-08 RX ORDER — ONDANSETRON 4 MG/1
4 TABLET, ORALLY DISINTEGRATING ORAL
Status: COMPLETED | OUTPATIENT
Start: 2021-07-08 | End: 2021-07-08

## 2021-07-08 RX ORDER — TIZANIDINE 4 MG/1
4 TABLET ORAL EVERY 8 HOURS
Qty: 90 TABLET | Refills: 1 | Status: SHIPPED | OUTPATIENT
Start: 2021-07-08 | End: 2021-10-12

## 2021-07-08 RX ORDER — DICYCLOMINE HYDROCHLORIDE 10 MG/1
10 CAPSULE ORAL 3 TIMES DAILY PRN
Qty: 15 CAPSULE | Refills: 0 | Status: SHIPPED | OUTPATIENT
Start: 2021-07-08 | End: 2021-07-13

## 2021-07-08 RX ORDER — POLYETHYLENE GLYCOL 3350 17 G/17G
17 POWDER, FOR SOLUTION ORAL DAILY
Qty: 7 EACH | Refills: 0 | Status: SHIPPED | OUTPATIENT
Start: 2021-07-08 | End: 2021-07-15

## 2021-07-08 RX ADMIN — ONDANSETRON 4 MG: 4 TABLET, ORALLY DISINTEGRATING ORAL at 12:07

## 2021-07-12 ENCOUNTER — DOCUMENTATION ONLY (OUTPATIENT)
Dept: PRIMARY CARE CLINIC | Facility: CLINIC | Age: 61
End: 2021-07-12

## 2021-07-13 DIAGNOSIS — F11.90 CHRONIC, CONTINUOUS USE OF OPIOIDS: Primary | ICD-10-CM

## 2021-07-15 DIAGNOSIS — M17.0 OSTEOARTHRITIS OF BOTH KNEES, UNSPECIFIED OSTEOARTHRITIS TYPE: ICD-10-CM

## 2021-07-16 ENCOUNTER — TELEPHONE (OUTPATIENT)
Dept: PRIMARY CARE CLINIC | Facility: CLINIC | Age: 61
End: 2021-07-16

## 2021-07-16 RX ORDER — HYDROCODONE BITARTRATE AND ACETAMINOPHEN 10; 325 MG/1; MG/1
1 TABLET ORAL EVERY 8 HOURS PRN
Qty: 90 TABLET | Refills: 0 | Status: SHIPPED | OUTPATIENT
Start: 2021-07-16 | End: 2021-08-10 | Stop reason: SDUPTHER

## 2021-07-21 ENCOUNTER — TELEPHONE (OUTPATIENT)
Dept: PRIMARY CARE CLINIC | Facility: CLINIC | Age: 61
End: 2021-07-21

## 2021-07-26 ENCOUNTER — TELEPHONE (OUTPATIENT)
Dept: PRIMARY CARE CLINIC | Facility: CLINIC | Age: 61
End: 2021-07-26

## 2021-07-29 ENCOUNTER — LAB VISIT (OUTPATIENT)
Dept: LAB | Facility: HOSPITAL | Age: 61
End: 2021-07-29
Attending: NURSE PRACTITIONER
Payer: COMMERCIAL

## 2021-07-29 DIAGNOSIS — E11.65 UNCONTROLLED TYPE 2 DIABETES MELLITUS WITH HYPERGLYCEMIA: ICD-10-CM

## 2021-07-29 DIAGNOSIS — E03.9 HYPOTHYROIDISM, UNSPECIFIED TYPE: ICD-10-CM

## 2021-07-29 DIAGNOSIS — E78.2 MIXED HYPERLIPIDEMIA: ICD-10-CM

## 2021-07-29 PROCEDURE — 84439 ASSAY OF FREE THYROXINE: CPT | Performed by: NURSE PRACTITIONER

## 2021-07-29 PROCEDURE — 84443 ASSAY THYROID STIM HORMONE: CPT | Performed by: NURSE PRACTITIONER

## 2021-07-29 PROCEDURE — 36415 COLL VENOUS BLD VENIPUNCTURE: CPT | Mod: PO | Performed by: NURSE PRACTITIONER

## 2021-07-29 PROCEDURE — 80053 COMPREHEN METABOLIC PANEL: CPT | Performed by: NURSE PRACTITIONER

## 2021-07-29 PROCEDURE — 80061 LIPID PANEL: CPT | Performed by: NURSE PRACTITIONER

## 2021-07-29 PROCEDURE — 83036 HEMOGLOBIN GLYCOSYLATED A1C: CPT | Performed by: NURSE PRACTITIONER

## 2021-07-30 ENCOUNTER — TELEPHONE (OUTPATIENT)
Dept: PRIMARY CARE CLINIC | Facility: CLINIC | Age: 61
End: 2021-07-30

## 2021-07-30 ENCOUNTER — TELEPHONE (OUTPATIENT)
Dept: INTERNAL MEDICINE | Facility: CLINIC | Age: 61
End: 2021-07-30

## 2021-07-30 DIAGNOSIS — Z00.6 RESEARCH STUDY PATIENT: ICD-10-CM

## 2021-07-30 LAB
ALBUMIN SERPL BCP-MCNC: 3.5 G/DL (ref 3.5–5.2)
ALP SERPL-CCNC: 103 U/L (ref 55–135)
ALT SERPL W/O P-5'-P-CCNC: 11 U/L (ref 10–44)
ANION GAP SERPL CALC-SCNC: 12 MMOL/L (ref 8–16)
AST SERPL-CCNC: 20 U/L (ref 10–40)
BILIRUB SERPL-MCNC: 0.2 MG/DL (ref 0.1–1)
BUN SERPL-MCNC: 20 MG/DL (ref 6–20)
CALCIUM SERPL-MCNC: 9.9 MG/DL (ref 8.7–10.5)
CHLORIDE SERPL-SCNC: 105 MMOL/L (ref 95–110)
CHOLEST SERPL-MCNC: 233 MG/DL (ref 120–199)
CHOLEST/HDLC SERPL: 4.7 {RATIO} (ref 2–5)
CO2 SERPL-SCNC: 22 MMOL/L (ref 23–29)
CREAT SERPL-MCNC: 0.7 MG/DL (ref 0.5–1.4)
EST. GFR  (AFRICAN AMERICAN): >60 ML/MIN/1.73 M^2
EST. GFR  (NON AFRICAN AMERICAN): >60 ML/MIN/1.73 M^2
ESTIMATED AVG GLUCOSE: 169 MG/DL (ref 68–131)
GLUCOSE SERPL-MCNC: 147 MG/DL (ref 70–110)
HBA1C MFR BLD: 7.5 % (ref 4–5.6)
HDLC SERPL-MCNC: 50 MG/DL (ref 40–75)
HDLC SERPL: 21.5 % (ref 20–50)
LDLC SERPL CALC-MCNC: 139.8 MG/DL (ref 63–159)
NONHDLC SERPL-MCNC: 183 MG/DL
POTASSIUM SERPL-SCNC: 4.2 MMOL/L (ref 3.5–5.1)
PROT SERPL-MCNC: 8.1 G/DL (ref 6–8.4)
SODIUM SERPL-SCNC: 139 MMOL/L (ref 136–145)
T4 FREE SERPL-MCNC: 1.07 NG/DL (ref 0.71–1.51)
TRIGL SERPL-MCNC: 216 MG/DL (ref 30–150)
TSH SERPL DL<=0.005 MIU/L-ACNC: 2.24 UIU/ML (ref 0.4–4)

## 2021-08-10 DIAGNOSIS — M17.0 OSTEOARTHRITIS OF BOTH KNEES, UNSPECIFIED OSTEOARTHRITIS TYPE: ICD-10-CM

## 2021-08-10 RX ORDER — HYDROCODONE BITARTRATE AND ACETAMINOPHEN 10; 325 MG/1; MG/1
1 TABLET ORAL EVERY 8 HOURS PRN
Qty: 90 TABLET | Refills: 0 | Status: SHIPPED | OUTPATIENT
Start: 2021-08-10 | End: 2021-09-17 | Stop reason: SDUPTHER

## 2021-08-12 ENCOUNTER — TELEPHONE (OUTPATIENT)
Dept: PRIMARY CARE CLINIC | Facility: CLINIC | Age: 61
End: 2021-08-12

## 2021-08-13 ENCOUNTER — CLINICAL SUPPORT (OUTPATIENT)
Dept: PRIMARY CARE CLINIC | Facility: CLINIC | Age: 61
End: 2021-08-13
Payer: COMMERCIAL

## 2021-08-13 DIAGNOSIS — F11.90 CHRONIC, CONTINUOUS USE OF OPIOIDS: ICD-10-CM

## 2021-08-13 PROCEDURE — 90791 PR PSYCHIATRIC DIAGNOSTIC EVALUATION: ICD-10-PCS | Mod: BHI,95,, | Performed by: SOCIAL WORKER

## 2021-08-13 PROCEDURE — 90791 PSYCH DIAGNOSTIC EVALUATION: CPT | Mod: BHI,95,, | Performed by: SOCIAL WORKER

## 2021-08-17 ENCOUNTER — PATIENT OUTREACH (OUTPATIENT)
Dept: ADMINISTRATIVE | Facility: OTHER | Age: 61
End: 2021-08-17

## 2021-08-20 ENCOUNTER — TELEPHONE (OUTPATIENT)
Dept: PRIMARY CARE CLINIC | Facility: CLINIC | Age: 61
End: 2021-08-20

## 2021-08-23 ENCOUNTER — TELEPHONE (OUTPATIENT)
Dept: OPTOMETRY | Facility: CLINIC | Age: 61
End: 2021-08-23

## 2021-08-23 ENCOUNTER — OFFICE VISIT (OUTPATIENT)
Dept: OPTOMETRY | Facility: CLINIC | Age: 61
End: 2021-08-23
Payer: COMMERCIAL

## 2021-08-23 DIAGNOSIS — H40.023 OAG (OPEN ANGLE GLAUCOMA) SUSPECT, HIGH RISK, BILATERAL: ICD-10-CM

## 2021-08-23 DIAGNOSIS — E11.9 TYPE 2 DIABETES MELLITUS WITHOUT RETINOPATHY: Primary | ICD-10-CM

## 2021-08-23 DIAGNOSIS — H52.13 MYOPIA WITH ASTIGMATISM AND PRESBYOPIA, BILATERAL: ICD-10-CM

## 2021-08-23 DIAGNOSIS — H02.883 MEIBOMIAN GLAND DYSFUNCTION (MGD) OF BOTH EYES: ICD-10-CM

## 2021-08-23 DIAGNOSIS — H52.203 MYOPIA WITH ASTIGMATISM AND PRESBYOPIA, BILATERAL: ICD-10-CM

## 2021-08-23 DIAGNOSIS — H25.13 NUCLEAR SCLEROSIS OF BOTH EYES: ICD-10-CM

## 2021-08-23 DIAGNOSIS — E11.65 UNCONTROLLED TYPE 2 DIABETES MELLITUS WITH HYPERGLYCEMIA: ICD-10-CM

## 2021-08-23 DIAGNOSIS — H52.4 MYOPIA WITH ASTIGMATISM AND PRESBYOPIA, BILATERAL: ICD-10-CM

## 2021-08-23 DIAGNOSIS — H02.886 MEIBOMIAN GLAND DYSFUNCTION (MGD) OF BOTH EYES: ICD-10-CM

## 2021-08-23 DIAGNOSIS — H10.45 CHRONIC ALLERGIC CONJUNCTIVITIS: ICD-10-CM

## 2021-08-23 PROCEDURE — 1159F PR MEDICATION LIST DOCUMENTED IN MEDICAL RECORD: ICD-10-PCS | Mod: CPTII,S$GLB,, | Performed by: OPTOMETRIST

## 2021-08-23 PROCEDURE — 92014 COMPRE OPH EXAM EST PT 1/>: CPT | Mod: S$GLB,,, | Performed by: OPTOMETRIST

## 2021-08-23 PROCEDURE — 99999 PR PBB SHADOW E&M-EST. PATIENT-LVL III: ICD-10-PCS | Mod: PBBFAC,,, | Performed by: OPTOMETRIST

## 2021-08-23 PROCEDURE — 99999 PR PBB SHADOW E&M-EST. PATIENT-LVL III: CPT | Mod: PBBFAC,,, | Performed by: OPTOMETRIST

## 2021-08-23 PROCEDURE — 2023F DILAT RTA XM W/O RTNOPTHY: CPT | Mod: CPTII,S$GLB,, | Performed by: OPTOMETRIST

## 2021-08-23 PROCEDURE — 2023F PR DILATED RETINAL EXAM W/O EVID OF RETINOPATHY: ICD-10-PCS | Mod: CPTII,S$GLB,, | Performed by: OPTOMETRIST

## 2021-08-23 PROCEDURE — 1159F MED LIST DOCD IN RCRD: CPT | Mod: CPTII,S$GLB,, | Performed by: OPTOMETRIST

## 2021-08-23 PROCEDURE — 3051F HG A1C>EQUAL 7.0%<8.0%: CPT | Mod: CPTII,S$GLB,, | Performed by: OPTOMETRIST

## 2021-08-23 PROCEDURE — 1160F RVW MEDS BY RX/DR IN RCRD: CPT | Mod: CPTII,S$GLB,, | Performed by: OPTOMETRIST

## 2021-08-23 PROCEDURE — 3051F PR MOST RECENT HEMOGLOBIN A1C LEVEL 7.0 - < 8.0%: ICD-10-PCS | Mod: CPTII,S$GLB,, | Performed by: OPTOMETRIST

## 2021-08-23 PROCEDURE — 1160F PR REVIEW ALL MEDS BY PRESCRIBER/CLIN PHARMACIST DOCUMENTED: ICD-10-PCS | Mod: CPTII,S$GLB,, | Performed by: OPTOMETRIST

## 2021-08-23 PROCEDURE — 92014 PR EYE EXAM, EST PATIENT,COMPREHESV: ICD-10-PCS | Mod: S$GLB,,, | Performed by: OPTOMETRIST

## 2021-08-24 ENCOUNTER — TELEPHONE (OUTPATIENT)
Dept: PRIMARY CARE CLINIC | Facility: CLINIC | Age: 61
End: 2021-08-24

## 2021-08-27 ENCOUNTER — CLINICAL SUPPORT (OUTPATIENT)
Dept: PRIMARY CARE CLINIC | Facility: CLINIC | Age: 61
End: 2021-08-27
Payer: COMMERCIAL

## 2021-08-27 DIAGNOSIS — G89.29 CHRONIC PAIN OF RIGHT KNEE: ICD-10-CM

## 2021-08-27 DIAGNOSIS — F11.90 CHRONIC, CONTINUOUS USE OF OPIOIDS: Primary | ICD-10-CM

## 2021-08-27 DIAGNOSIS — M25.561 CHRONIC PAIN OF RIGHT KNEE: ICD-10-CM

## 2021-08-27 PROCEDURE — 90837 PSYTX W PT 60 MINUTES: CPT | Mod: BHI,95,, | Performed by: SOCIAL WORKER

## 2021-08-27 PROCEDURE — 90837 PR PSYCHOTHERAPY W/PATIENT, 60 MIN: ICD-10-PCS | Mod: BHI,95,, | Performed by: SOCIAL WORKER

## 2021-09-05 DIAGNOSIS — E87.6 HYPOKALEMIA: ICD-10-CM

## 2021-09-05 RX ORDER — POTASSIUM CHLORIDE 20 MEQ/1
TABLET, EXTENDED RELEASE ORAL
Qty: 180 TABLET | Refills: 3 | Status: SHIPPED | OUTPATIENT
Start: 2021-09-05

## 2021-09-05 RX ORDER — METFORMIN HYDROCHLORIDE 1000 MG/1
TABLET ORAL
Qty: 180 TABLET | Refills: 3 | Status: SHIPPED | OUTPATIENT
Start: 2021-09-05

## 2021-09-09 ENCOUNTER — TELEPHONE (OUTPATIENT)
Dept: PRIMARY CARE CLINIC | Facility: CLINIC | Age: 61
End: 2021-09-09

## 2021-09-10 ENCOUNTER — CLINICAL SUPPORT (OUTPATIENT)
Dept: PRIMARY CARE CLINIC | Facility: CLINIC | Age: 61
End: 2021-09-10
Payer: COMMERCIAL

## 2021-09-10 DIAGNOSIS — F33.1 MDD (MAJOR DEPRESSIVE DISORDER), RECURRENT EPISODE, MODERATE: ICD-10-CM

## 2021-09-10 DIAGNOSIS — F11.90 CHRONIC, CONTINUOUS USE OF OPIOIDS: Primary | ICD-10-CM

## 2021-09-10 DIAGNOSIS — M25.561 CHRONIC PAIN OF RIGHT KNEE: ICD-10-CM

## 2021-09-10 DIAGNOSIS — G89.29 CHRONIC PAIN OF RIGHT KNEE: ICD-10-CM

## 2021-09-10 PROCEDURE — 90837 PSYTX W PT 60 MINUTES: CPT | Mod: BHI,95,, | Performed by: SOCIAL WORKER

## 2021-09-10 PROCEDURE — 90837 PR PSYCHOTHERAPY W/PATIENT, 60 MIN: ICD-10-PCS | Mod: BHI,95,, | Performed by: SOCIAL WORKER

## 2021-09-17 ENCOUNTER — TELEPHONE (OUTPATIENT)
Dept: INTERNAL MEDICINE | Facility: CLINIC | Age: 61
End: 2021-09-17

## 2021-09-17 ENCOUNTER — TELEPHONE (OUTPATIENT)
Dept: PRIMARY CARE CLINIC | Facility: CLINIC | Age: 61
End: 2021-09-17

## 2021-09-17 ENCOUNTER — CLINICAL SUPPORT (OUTPATIENT)
Dept: PRIMARY CARE CLINIC | Facility: CLINIC | Age: 61
End: 2021-09-17
Payer: COMMERCIAL

## 2021-09-17 DIAGNOSIS — F11.90 CHRONIC, CONTINUOUS USE OF OPIOIDS: Primary | ICD-10-CM

## 2021-09-17 DIAGNOSIS — F33.1 MDD (MAJOR DEPRESSIVE DISORDER), RECURRENT EPISODE, MODERATE: ICD-10-CM

## 2021-09-17 DIAGNOSIS — M17.0 OSTEOARTHRITIS OF BOTH KNEES, UNSPECIFIED OSTEOARTHRITIS TYPE: ICD-10-CM

## 2021-09-17 PROCEDURE — 90837 PSYTX W PT 60 MINUTES: CPT | Mod: BHI,95,, | Performed by: SOCIAL WORKER

## 2021-09-17 PROCEDURE — 90837 PR PSYCHOTHERAPY W/PATIENT, 60 MIN: ICD-10-PCS | Mod: BHI,95,, | Performed by: SOCIAL WORKER

## 2021-09-17 RX ORDER — HYDROCODONE BITARTRATE AND ACETAMINOPHEN 10; 325 MG/1; MG/1
1 TABLET ORAL EVERY 8 HOURS PRN
Qty: 90 TABLET | Refills: 0 | Status: SHIPPED | OUTPATIENT
Start: 2021-09-17 | End: 2021-10-21 | Stop reason: SDUPTHER

## 2021-09-22 ENCOUNTER — TELEPHONE (OUTPATIENT)
Dept: UROLOGY | Facility: CLINIC | Age: 61
End: 2021-09-22

## 2021-09-23 ENCOUNTER — TELEPHONE (OUTPATIENT)
Dept: PRIMARY CARE CLINIC | Facility: CLINIC | Age: 61
End: 2021-09-23

## 2021-09-24 ENCOUNTER — TELEPHONE (OUTPATIENT)
Dept: INTERNAL MEDICINE | Facility: CLINIC | Age: 61
End: 2021-09-24

## 2021-09-24 ENCOUNTER — OFFICE VISIT (OUTPATIENT)
Dept: UROLOGY | Facility: CLINIC | Age: 61
End: 2021-09-24
Payer: COMMERCIAL

## 2021-09-24 DIAGNOSIS — N39.41 URGE INCONTINENCE: Primary | ICD-10-CM

## 2021-09-24 PROCEDURE — 99213 OFFICE O/P EST LOW 20 MIN: CPT | Mod: 95,,, | Performed by: UROLOGY

## 2021-09-24 PROCEDURE — 99213 PR OFFICE/OUTPT VISIT, EST, LEVL III, 20-29 MIN: ICD-10-PCS | Mod: 95,,, | Performed by: UROLOGY

## 2021-09-24 PROCEDURE — 3051F PR MOST RECENT HEMOGLOBIN A1C LEVEL 7.0 - < 8.0%: ICD-10-PCS | Mod: CPTII,95,, | Performed by: UROLOGY

## 2021-09-24 PROCEDURE — 3051F HG A1C>EQUAL 7.0%<8.0%: CPT | Mod: CPTII,95,, | Performed by: UROLOGY

## 2021-09-27 ENCOUNTER — TELEPHONE (OUTPATIENT)
Dept: PRIMARY CARE CLINIC | Facility: CLINIC | Age: 61
End: 2021-09-27

## 2021-09-28 ENCOUNTER — CLINICAL SUPPORT (OUTPATIENT)
Dept: PRIMARY CARE CLINIC | Facility: CLINIC | Age: 61
End: 2021-09-28
Payer: COMMERCIAL

## 2021-09-28 ENCOUNTER — TELEPHONE (OUTPATIENT)
Dept: PRIMARY CARE CLINIC | Facility: CLINIC | Age: 61
End: 2021-09-28

## 2021-09-28 ENCOUNTER — PATIENT OUTREACH (OUTPATIENT)
Dept: ADMINISTRATIVE | Facility: OTHER | Age: 61
End: 2021-09-28

## 2021-09-28 DIAGNOSIS — G89.29 CHRONIC PAIN OF RIGHT KNEE: ICD-10-CM

## 2021-09-28 DIAGNOSIS — F33.1 MDD (MAJOR DEPRESSIVE DISORDER), RECURRENT EPISODE, MODERATE: ICD-10-CM

## 2021-09-28 DIAGNOSIS — M25.561 CHRONIC PAIN OF RIGHT KNEE: ICD-10-CM

## 2021-09-28 DIAGNOSIS — F11.90 CHRONIC, CONTINUOUS USE OF OPIOIDS: Primary | ICD-10-CM

## 2021-09-28 PROCEDURE — 90837 PR PSYCHOTHERAPY W/PATIENT, 60 MIN: ICD-10-PCS | Mod: BHI,95,, | Performed by: SOCIAL WORKER

## 2021-09-28 PROCEDURE — 90837 PSYTX W PT 60 MINUTES: CPT | Mod: BHI,95,, | Performed by: SOCIAL WORKER

## 2021-09-29 ENCOUNTER — TELEPHONE (OUTPATIENT)
Dept: OPTOMETRY | Facility: CLINIC | Age: 61
End: 2021-09-29

## 2021-10-04 ENCOUNTER — TELEPHONE (OUTPATIENT)
Dept: OPTOMETRY | Facility: CLINIC | Age: 61
End: 2021-10-04

## 2021-10-06 PROBLEM — F33.1 MDD (MAJOR DEPRESSIVE DISORDER), RECURRENT EPISODE, MODERATE: Status: ACTIVE | Noted: 2021-10-06

## 2021-10-11 ENCOUNTER — TELEPHONE (OUTPATIENT)
Dept: PRIMARY CARE CLINIC | Facility: CLINIC | Age: 61
End: 2021-10-11

## 2021-10-12 ENCOUNTER — CLINICAL SUPPORT (OUTPATIENT)
Dept: PRIMARY CARE CLINIC | Facility: CLINIC | Age: 61
End: 2021-10-12
Payer: COMMERCIAL

## 2021-10-12 DIAGNOSIS — F33.1 MDD (MAJOR DEPRESSIVE DISORDER), RECURRENT EPISODE, MODERATE: ICD-10-CM

## 2021-10-12 DIAGNOSIS — F11.90 CHRONIC, CONTINUOUS USE OF OPIOIDS: Primary | ICD-10-CM

## 2021-10-12 PROCEDURE — 90834 PSYTX W PT 45 MINUTES: CPT | Mod: BHI,95,, | Performed by: SOCIAL WORKER

## 2021-10-12 PROCEDURE — 90834 PR PSYCHOTHERAPY W/PATIENT, 45 MIN: ICD-10-PCS | Mod: BHI,95,, | Performed by: SOCIAL WORKER

## 2021-10-18 DIAGNOSIS — M17.0 OSTEOARTHRITIS OF BOTH KNEES, UNSPECIFIED OSTEOARTHRITIS TYPE: ICD-10-CM

## 2021-10-18 RX ORDER — HYDROCODONE BITARTRATE AND ACETAMINOPHEN 10; 325 MG/1; MG/1
1 TABLET ORAL EVERY 8 HOURS PRN
Qty: 90 TABLET | Refills: 0 | OUTPATIENT
Start: 2021-10-18

## 2021-10-21 ENCOUNTER — TELEPHONE (OUTPATIENT)
Dept: INTERNAL MEDICINE | Facility: CLINIC | Age: 61
End: 2021-10-21

## 2021-10-21 ENCOUNTER — OFFICE VISIT (OUTPATIENT)
Dept: INTERNAL MEDICINE | Facility: CLINIC | Age: 61
End: 2021-10-21
Payer: COMMERCIAL

## 2021-10-21 DIAGNOSIS — M06.4 INFLAMMATORY POLYARTHRITIS: ICD-10-CM

## 2021-10-21 DIAGNOSIS — M17.0 OSTEOARTHRITIS OF BOTH KNEES, UNSPECIFIED OSTEOARTHRITIS TYPE: ICD-10-CM

## 2021-10-21 DIAGNOSIS — Z76.0 MEDICATION REFILL: Primary | ICD-10-CM

## 2021-10-21 PROCEDURE — 3051F HG A1C>EQUAL 7.0%<8.0%: CPT | Mod: CPTII,95,, | Performed by: NURSE PRACTITIONER

## 2021-10-21 PROCEDURE — 3051F PR MOST RECENT HEMOGLOBIN A1C LEVEL 7.0 - < 8.0%: ICD-10-PCS | Mod: CPTII,95,, | Performed by: NURSE PRACTITIONER

## 2021-10-21 PROCEDURE — 99214 PR OFFICE/OUTPT VISIT, EST, LEVL IV, 30-39 MIN: ICD-10-PCS | Mod: 95,,, | Performed by: NURSE PRACTITIONER

## 2021-10-21 PROCEDURE — 99214 OFFICE O/P EST MOD 30 MIN: CPT | Mod: 95,,, | Performed by: NURSE PRACTITIONER

## 2021-10-21 RX ORDER — HYDROCODONE BITARTRATE AND ACETAMINOPHEN 10; 325 MG/1; MG/1
1 TABLET ORAL EVERY 8 HOURS PRN
Qty: 90 TABLET | Refills: 0 | Status: ON HOLD | OUTPATIENT
Start: 2021-10-21 | End: 2021-11-30 | Stop reason: HOSPADM

## 2021-10-25 ENCOUNTER — TELEPHONE (OUTPATIENT)
Dept: PRIMARY CARE CLINIC | Facility: CLINIC | Age: 61
End: 2021-10-25
Payer: COMMERCIAL

## 2021-10-27 ENCOUNTER — ANESTHESIA (OUTPATIENT)
Dept: INTENSIVE CARE | Facility: HOSPITAL | Age: 61
DRG: 871 | End: 2021-10-27
Payer: COMMERCIAL

## 2021-10-27 ENCOUNTER — ANESTHESIA EVENT (OUTPATIENT)
Dept: INTENSIVE CARE | Facility: HOSPITAL | Age: 61
DRG: 871 | End: 2021-10-27
Payer: COMMERCIAL

## 2021-10-27 ENCOUNTER — HOSPITAL ENCOUNTER (INPATIENT)
Facility: HOSPITAL | Age: 61
LOS: 16 days | Discharge: ANOTHER HOSPITAL | DRG: 871 | End: 2021-11-12
Attending: EMERGENCY MEDICINE | Admitting: INTERNAL MEDICINE
Payer: COMMERCIAL

## 2021-10-27 DIAGNOSIS — G93.41 ENCEPHALOPATHY, METABOLIC: ICD-10-CM

## 2021-10-27 DIAGNOSIS — D72.829 LEUKOCYTOSIS, UNSPECIFIED TYPE: ICD-10-CM

## 2021-10-27 DIAGNOSIS — A41.9 SEVERE SEPSIS: ICD-10-CM

## 2021-10-27 DIAGNOSIS — R41.89 UNRESPONSIVE: ICD-10-CM

## 2021-10-27 DIAGNOSIS — E11.11 DIABETIC KETOACIDOSIS WITH COMA ASSOCIATED WITH TYPE 2 DIABETES MELLITUS: Primary | ICD-10-CM

## 2021-10-27 DIAGNOSIS — R65.20 SEVERE SEPSIS: ICD-10-CM

## 2021-10-27 DIAGNOSIS — R78.81 BACTEREMIA: ICD-10-CM

## 2021-10-27 PROBLEM — J96.01 ACUTE HYPOXEMIC RESPIRATORY FAILURE: Status: ACTIVE | Noted: 2021-10-27

## 2021-10-27 LAB
ALBUMIN SERPL BCP-MCNC: 2.4 G/DL (ref 3.5–5.2)
ALLENS TEST: ABNORMAL
ALP SERPL-CCNC: 246 U/L (ref 55–135)
ALT SERPL W/O P-5'-P-CCNC: 23 U/L (ref 10–44)
AMPHET+METHAMPHET UR QL: NEGATIVE
ANION GAP SERPL CALC-SCNC: 21 MMOL/L (ref 8–16)
ANION GAP SERPL CALC-SCNC: 23 MMOL/L (ref 8–16)
ANION GAP SERPL CALC-SCNC: 28 MMOL/L (ref 8–16)
AST SERPL-CCNC: 33 U/L (ref 10–40)
B-OH-BUTYR BLD STRIP-SCNC: 5.3 MMOL/L (ref 0–0.5)
BACTERIA #/AREA URNS HPF: ABNORMAL /HPF
BARBITURATES UR QL SCN>200 NG/ML: ABNORMAL
BASOPHILS # BLD AUTO: 0.08 K/UL (ref 0–0.2)
BASOPHILS NFR BLD: 0.3 % (ref 0–1.9)
BENZODIAZ UR QL SCN>200 NG/ML: NEGATIVE
BILIRUB SERPL-MCNC: 0.4 MG/DL (ref 0.1–1)
BILIRUB UR QL STRIP: ABNORMAL
BNP SERPL-MCNC: 52 PG/ML (ref 0–99)
BUN SERPL-MCNC: 96 MG/DL (ref 6–20)
BZE UR QL SCN: NEGATIVE
CALCIUM SERPL-MCNC: 8.6 MG/DL (ref 8.7–10.5)
CALCIUM SERPL-MCNC: 8.9 MG/DL (ref 8.7–10.5)
CALCIUM SERPL-MCNC: 9.7 MG/DL (ref 8.7–10.5)
CANNABINOIDS UR QL SCN: NEGATIVE
CHLORIDE SERPL-SCNC: 113 MMOL/L (ref 95–110)
CHLORIDE SERPL-SCNC: 116 MMOL/L (ref 95–110)
CHLORIDE SERPL-SCNC: 117 MMOL/L (ref 95–110)
CK SERPL-CCNC: 1465 U/L (ref 20–180)
CLARITY UR: ABNORMAL
CO2 SERPL-SCNC: 11 MMOL/L (ref 23–29)
CO2 SERPL-SCNC: 12 MMOL/L (ref 23–29)
CO2 SERPL-SCNC: 8 MMOL/L (ref 23–29)
COLOR UR: YELLOW
CREAT SERPL-MCNC: 2.9 MG/DL (ref 0.5–1.4)
CREAT SERPL-MCNC: 3 MG/DL (ref 0.5–1.4)
CREAT SERPL-MCNC: 3.2 MG/DL (ref 0.5–1.4)
CREAT UR-MCNC: 62.6 MG/DL (ref 15–325)
CTP QC/QA: YES
DELSYS: ABNORMAL
DIFFERENTIAL METHOD: ABNORMAL
EOSINOPHIL # BLD AUTO: 0 K/UL (ref 0–0.5)
EOSINOPHIL NFR BLD: 0 % (ref 0–8)
ERYTHROCYTE [DISTWIDTH] IN BLOOD BY AUTOMATED COUNT: 20.8 % (ref 11.5–14.5)
ERYTHROCYTE [SEDIMENTATION RATE] IN BLOOD BY WESTERGREN METHOD: 26 MM/H
ERYTHROCYTE [SEDIMENTATION RATE] IN BLOOD BY WESTERGREN METHOD: 35 MM/H
EST. GFR  (AFRICAN AMERICAN): 18 ML/MIN/1.73 M^2
EST. GFR  (AFRICAN AMERICAN): 19 ML/MIN/1.73 M^2
EST. GFR  (AFRICAN AMERICAN): 20 ML/MIN/1.73 M^2
EST. GFR  (NON AFRICAN AMERICAN): 15 ML/MIN/1.73 M^2
EST. GFR  (NON AFRICAN AMERICAN): 16 ML/MIN/1.73 M^2
EST. GFR  (NON AFRICAN AMERICAN): 17 ML/MIN/1.73 M^2
ESTIMATED AVG GLUCOSE: 212 MG/DL (ref 68–131)
ETHANOL SERPL-MCNC: <10 MG/DL
FIO2: 100
FIO2: 40
FLOW: 3
GLUCOSE SERPL-MCNC: 708 MG/DL (ref 70–110)
GLUCOSE SERPL-MCNC: 805 MG/DL (ref 70–110)
GLUCOSE SERPL-MCNC: 931 MG/DL (ref 70–110)
GLUCOSE UR QL STRIP: ABNORMAL
HBA1C MFR BLD: 9 % (ref 4–5.6)
HCO3 UR-SCNC: 11.9 MMOL/L (ref 24–28)
HCO3 UR-SCNC: 14.2 MMOL/L (ref 24–28)
HCO3 UR-SCNC: 15.8 MMOL/L (ref 24–28)
HCT VFR BLD AUTO: 38.2 % (ref 37–48.5)
HCT VFR BLD CALC: 37 %PCV (ref 36–54)
HGB BLD-MCNC: 11.6 G/DL (ref 12–16)
HGB BLD-MCNC: 13 G/DL
HGB UR QL STRIP: ABNORMAL
HYALINE CASTS #/AREA URNS LPF: 0 /LPF
IMM GRANULOCYTES # BLD AUTO: 0.62 K/UL (ref 0–0.04)
IMM GRANULOCYTES NFR BLD AUTO: 2.4 % (ref 0–0.5)
KETONES UR QL STRIP: ABNORMAL
LACTATE SERPL-SCNC: 1.6 MMOL/L (ref 0.5–2.2)
LACTATE SERPL-SCNC: 3.1 MMOL/L (ref 0.5–2.2)
LACTATE SERPL-SCNC: 5.1 MMOL/L (ref 0.5–2.2)
LEUKOCYTE ESTERASE UR QL STRIP: NEGATIVE
LYMPHOCYTES # BLD AUTO: 1 K/UL (ref 1–4.8)
LYMPHOCYTES NFR BLD: 4 % (ref 18–48)
MAGNESIUM SERPL-MCNC: 3.8 MG/DL (ref 1.6–2.6)
MCH RBC QN AUTO: 24.9 PG (ref 27–31)
MCHC RBC AUTO-ENTMCNC: 30.4 G/DL (ref 32–36)
MCV RBC AUTO: 82 FL (ref 82–98)
METHADONE UR QL SCN>300 NG/ML: NEGATIVE
MICROSCOPIC COMMENT: ABNORMAL
MIN VOL: 15.2
MODE: ABNORMAL
MONOCYTES # BLD AUTO: 1.3 K/UL (ref 0.3–1)
MONOCYTES NFR BLD: 5.2 % (ref 4–15)
NEUTROPHILS # BLD AUTO: 22.8 K/UL (ref 1.8–7.7)
NEUTROPHILS NFR BLD: 88.1 % (ref 38–73)
NITRITE UR QL STRIP: NEGATIVE
NRBC BLD-RTO: 0 /100 WBC
OPIATES UR QL SCN: NEGATIVE
PCO2 BLDA: 43.4 MMHG (ref 35–45)
PCO2 BLDA: 57 MMHG (ref 35–45)
PCO2 BLDA: 63.9 MMHG (ref 35–45)
PCP UR QL SCN>25 NG/ML: NEGATIVE
PEEP: 5
PEEP: 5
PH SMN: 6.93 [PH] (ref 7.35–7.45)
PH SMN: 7 [PH] (ref 7.35–7.45)
PH SMN: 7.12 [PH] (ref 7.35–7.45)
PH UR STRIP: 6 [PH] (ref 5–8)
PHOSPHATE SERPL-MCNC: 8.4 MG/DL (ref 2.7–4.5)
PIP: 27
PLATELET # BLD AUTO: 462 K/UL (ref 150–450)
PLATELET BLD QL SMEAR: ABNORMAL
PMV BLD AUTO: 11.2 FL (ref 9.2–12.9)
PO2 BLDA: 175 MMHG (ref 80–100)
PO2 BLDA: 64 MMHG (ref 40–60)
PO2 BLDA: 75 MMHG (ref 80–100)
POC BE: -15 MMOL/L
POC BE: -15 MMOL/L
POC BE: -21 MMOL/L
POC SATURATED O2: 74 % (ref 95–100)
POC SATURATED O2: 89 % (ref 95–100)
POC SATURATED O2: 99 % (ref 95–100)
POC TCO2: 14 MMOL/L (ref 24–29)
POC TCO2: 16 MMOL/L (ref 23–27)
POC TCO2: 18 MMOL/L (ref 23–27)
POCT GLUCOSE: 391 MG/DL (ref 70–110)
POCT GLUCOSE: >500 MG/DL (ref 70–110)
POTASSIUM BLD-SCNC: 4.1 MMOL/L (ref 3.5–5.1)
POTASSIUM SERPL-SCNC: 2.8 MMOL/L (ref 3.5–5.1)
POTASSIUM SERPL-SCNC: 3.4 MMOL/L (ref 3.5–5.1)
POTASSIUM SERPL-SCNC: 4.5 MMOL/L (ref 3.5–5.1)
PROT SERPL-MCNC: 8.2 G/DL (ref 6–8.4)
PROT UR QL STRIP: ABNORMAL
RBC # BLD AUTO: 4.65 M/UL (ref 4–5.4)
RBC #/AREA URNS HPF: 5 /HPF (ref 0–4)
SAMPLE: ABNORMAL
SARS-COV-2 RDRP RESP QL NAA+PROBE: NEGATIVE
SITE: ABNORMAL
SODIUM BLD-SCNC: 153 MMOL/L (ref 136–145)
SODIUM SERPL-SCNC: 148 MMOL/L (ref 136–145)
SODIUM SERPL-SCNC: 149 MMOL/L (ref 136–145)
SODIUM SERPL-SCNC: 152 MMOL/L (ref 136–145)
SP GR UR STRIP: 1.02 (ref 1–1.03)
SQUAMOUS #/AREA URNS HPF: 2 /HPF
T4 FREE SERPL-MCNC: 0.9 NG/DL (ref 0.71–1.51)
TOXICOLOGY INFORMATION: ABNORMAL
TROPONIN I SERPL DL<=0.01 NG/ML-MCNC: 0.01 NG/ML (ref 0–0.03)
TSH SERPL DL<=0.005 MIU/L-ACNC: 0.21 UIU/ML (ref 0.4–4)
URN SPEC COLLECT METH UR: ABNORMAL
UROBILINOGEN UR STRIP-ACNC: NEGATIVE EU/DL
VT: 400
VT: 440
WBC # BLD AUTO: 25.85 K/UL (ref 3.9–12.7)
WBC #/AREA URNS HPF: 8 /HPF (ref 0–5)
YEAST URNS QL MICRO: ABNORMAL

## 2021-10-27 PROCEDURE — 25000003 PHARM REV CODE 250: Performed by: INTERNAL MEDICINE

## 2021-10-27 PROCEDURE — C9399 UNCLASSIFIED DRUGS OR BIOLOG: HCPCS | Performed by: INTERNAL MEDICINE

## 2021-10-27 PROCEDURE — 63600175 PHARM REV CODE 636 W HCPCS: Performed by: INTERNAL MEDICINE

## 2021-10-27 PROCEDURE — 96375 TX/PRO/DX INJ NEW DRUG ADDON: CPT

## 2021-10-27 PROCEDURE — 25000003 PHARM REV CODE 250

## 2021-10-27 PROCEDURE — 85014 HEMATOCRIT: CPT

## 2021-10-27 PROCEDURE — 82803 BLOOD GASES ANY COMBINATION: CPT

## 2021-10-27 PROCEDURE — 87040 BLOOD CULTURE FOR BACTERIA: CPT | Mod: 59 | Performed by: EMERGENCY MEDICINE

## 2021-10-27 PROCEDURE — 63600175 PHARM REV CODE 636 W HCPCS

## 2021-10-27 PROCEDURE — 25000003 PHARM REV CODE 250: Performed by: EMERGENCY MEDICINE

## 2021-10-27 PROCEDURE — 84443 ASSAY THYROID STIM HORMONE: CPT | Performed by: EMERGENCY MEDICINE

## 2021-10-27 PROCEDURE — 85025 COMPLETE CBC W/AUTO DIFF WBC: CPT | Performed by: EMERGENCY MEDICINE

## 2021-10-27 PROCEDURE — 83735 ASSAY OF MAGNESIUM: CPT | Performed by: EMERGENCY MEDICINE

## 2021-10-27 PROCEDURE — 83930 ASSAY OF BLOOD OSMOLALITY: CPT | Performed by: INTERNAL MEDICINE

## 2021-10-27 PROCEDURE — 31500 INSERT EMERGENCY AIRWAY: CPT

## 2021-10-27 PROCEDURE — 51702 INSERT TEMP BLADDER CATH: CPT

## 2021-10-27 PROCEDURE — 36415 COLL VENOUS BLD VENIPUNCTURE: CPT | Performed by: INTERNAL MEDICINE

## 2021-10-27 PROCEDURE — 82077 ASSAY SPEC XCP UR&BREATH IA: CPT | Performed by: EMERGENCY MEDICINE

## 2021-10-27 PROCEDURE — 81000 URINALYSIS NONAUTO W/SCOPE: CPT | Mod: 59 | Performed by: EMERGENCY MEDICINE

## 2021-10-27 PROCEDURE — 84132 ASSAY OF SERUM POTASSIUM: CPT

## 2021-10-27 PROCEDURE — 82550 ASSAY OF CK (CPK): CPT | Performed by: INTERNAL MEDICINE

## 2021-10-27 PROCEDURE — 80053 COMPREHEN METABOLIC PANEL: CPT | Performed by: EMERGENCY MEDICINE

## 2021-10-27 PROCEDURE — 83880 ASSAY OF NATRIURETIC PEPTIDE: CPT | Performed by: EMERGENCY MEDICINE

## 2021-10-27 PROCEDURE — 36600 WITHDRAWAL OF ARTERIAL BLOOD: CPT

## 2021-10-27 PROCEDURE — 82010 KETONE BODYS QUAN: CPT | Performed by: INTERNAL MEDICINE

## 2021-10-27 PROCEDURE — 94761 N-INVAS EAR/PLS OXIMETRY MLT: CPT

## 2021-10-27 PROCEDURE — 93005 ELECTROCARDIOGRAM TRACING: CPT

## 2021-10-27 PROCEDURE — 83605 ASSAY OF LACTIC ACID: CPT | Mod: 91 | Performed by: INTERNAL MEDICINE

## 2021-10-27 PROCEDURE — 76937 US GUIDE VASCULAR ACCESS: CPT | Performed by: HEALTH CARE PROVIDER

## 2021-10-27 PROCEDURE — 96365 THER/PROPH/DIAG IV INF INIT: CPT | Mod: 59

## 2021-10-27 PROCEDURE — U0002 COVID-19 LAB TEST NON-CDC: HCPCS | Performed by: EMERGENCY MEDICINE

## 2021-10-27 PROCEDURE — 20000000 HC ICU ROOM

## 2021-10-27 PROCEDURE — 94002 VENT MGMT INPAT INIT DAY: CPT

## 2021-10-27 PROCEDURE — 99900035 HC TECH TIME PER 15 MIN (STAT)

## 2021-10-27 PROCEDURE — 83605 ASSAY OF LACTIC ACID: CPT | Performed by: EMERGENCY MEDICINE

## 2021-10-27 PROCEDURE — 63600175 PHARM REV CODE 636 W HCPCS: Performed by: EMERGENCY MEDICINE

## 2021-10-27 PROCEDURE — 83036 HEMOGLOBIN GLYCOSYLATED A1C: CPT | Performed by: EMERGENCY MEDICINE

## 2021-10-27 PROCEDURE — 93010 ELECTROCARDIOGRAM REPORT: CPT | Mod: ,,, | Performed by: INTERNAL MEDICINE

## 2021-10-27 PROCEDURE — 82962 GLUCOSE BLOOD TEST: CPT

## 2021-10-27 PROCEDURE — 84484 ASSAY OF TROPONIN QUANT: CPT | Performed by: EMERGENCY MEDICINE

## 2021-10-27 PROCEDURE — 80048 BASIC METABOLIC PNL TOTAL CA: CPT | Mod: 91 | Performed by: INTERNAL MEDICINE

## 2021-10-27 PROCEDURE — 84439 ASSAY OF FREE THYROXINE: CPT | Performed by: EMERGENCY MEDICINE

## 2021-10-27 PROCEDURE — 80307 DRUG TEST PRSMV CHEM ANLYZR: CPT | Performed by: EMERGENCY MEDICINE

## 2021-10-27 PROCEDURE — S5010 5% DEXTROSE AND 0.45% SALINE: HCPCS | Performed by: INTERNAL MEDICINE

## 2021-10-27 PROCEDURE — 84100 ASSAY OF PHOSPHORUS: CPT | Performed by: EMERGENCY MEDICINE

## 2021-10-27 PROCEDURE — 82330 ASSAY OF CALCIUM: CPT

## 2021-10-27 PROCEDURE — 99291 CRITICAL CARE FIRST HOUR: CPT | Mod: 25

## 2021-10-27 PROCEDURE — 84295 ASSAY OF SERUM SODIUM: CPT

## 2021-10-27 PROCEDURE — 93010 EKG 12-LEAD: ICD-10-PCS | Mod: ,,, | Performed by: INTERNAL MEDICINE

## 2021-10-27 PROCEDURE — 27000221 HC OXYGEN, UP TO 24 HOURS

## 2021-10-27 RX ORDER — NOREPINEPHRINE BITARTRATE/D5W 4MG/250ML
0.05 PLASTIC BAG, INJECTION (ML) INTRAVENOUS CONTINUOUS
Status: DISCONTINUED | OUTPATIENT
Start: 2021-10-27 | End: 2021-10-28

## 2021-10-27 RX ORDER — NOREPINEPHRINE BITARTRATE/D5W 4MG/250ML
PLASTIC BAG, INJECTION (ML) INTRAVENOUS
Status: COMPLETED
Start: 2021-10-27 | End: 2021-10-27

## 2021-10-27 RX ORDER — IPRATROPIUM BROMIDE 21 UG/1
SPRAY, METERED NASAL
Status: ON HOLD | COMMUNITY
Start: 2021-10-20 | End: 2021-11-30 | Stop reason: HOSPADM

## 2021-10-27 RX ORDER — DEXTROSE MONOHYDRATE 100 MG/ML
INJECTION, SOLUTION INTRAVENOUS
Status: DISCONTINUED | OUTPATIENT
Start: 2021-10-27 | End: 2021-11-12 | Stop reason: HOSPADM

## 2021-10-27 RX ORDER — ETOMIDATE 2 MG/ML
INJECTION INTRAVENOUS
Status: COMPLETED
Start: 2021-10-27 | End: 2021-10-27

## 2021-10-27 RX ORDER — NALOXONE HYDROCHLORIDE 1 MG/ML
INJECTION INTRAMUSCULAR; INTRAVENOUS; SUBCUTANEOUS
Status: COMPLETED
Start: 2021-10-27 | End: 2021-10-27

## 2021-10-27 RX ORDER — MUPIROCIN 20 MG/G
OINTMENT TOPICAL 2 TIMES DAILY
Status: COMPLETED | OUTPATIENT
Start: 2021-10-27 | End: 2021-11-01

## 2021-10-27 RX ORDER — SODIUM CHLORIDE AND POTASSIUM CHLORIDE 150; 900 MG/100ML; MG/100ML
INJECTION, SOLUTION INTRAVENOUS CONTINUOUS
Status: DISCONTINUED | OUTPATIENT
Start: 2021-10-27 | End: 2021-10-27

## 2021-10-27 RX ORDER — DEXTROSE MONOHYDRATE AND SODIUM CHLORIDE 5; .45 G/100ML; G/100ML
125 INJECTION, SOLUTION INTRAVENOUS CONTINUOUS
Status: DISCONTINUED | OUTPATIENT
Start: 2021-10-27 | End: 2021-10-27

## 2021-10-27 RX ORDER — ONDANSETRON 2 MG/ML
4 INJECTION INTRAMUSCULAR; INTRAVENOUS EVERY 6 HOURS PRN
Status: DISCONTINUED | OUTPATIENT
Start: 2021-10-27 | End: 2021-11-12 | Stop reason: HOSPADM

## 2021-10-27 RX ORDER — NALOXONE HCL 0.4 MG/ML
0.4 VIAL (ML) INJECTION
Status: COMPLETED | OUTPATIENT
Start: 2021-10-27 | End: 2021-10-27

## 2021-10-27 RX ORDER — POTASSIUM CITRATE, TRISODIUM CITRATE DIHYDRATE AND CITRIC ACID MONOHYDRATE 550; 500; 334 MG/5ML; MG/5ML; MG/5ML
30 SOLUTION ORAL ONCE
Status: DISCONTINUED | OUTPATIENT
Start: 2021-10-28 | End: 2021-10-28

## 2021-10-27 RX ORDER — ROCURONIUM BROMIDE 10 MG/ML
INJECTION, SOLUTION INTRAVENOUS
Status: DISPENSED
Start: 2021-10-27 | End: 2021-10-28

## 2021-10-27 RX ORDER — HEPARIN SODIUM 5000 [USP'U]/ML
5000 INJECTION, SOLUTION INTRAVENOUS; SUBCUTANEOUS EVERY 8 HOURS
Status: DISCONTINUED | OUTPATIENT
Start: 2021-10-27 | End: 2021-10-27

## 2021-10-27 RX ORDER — SODIUM CHLORIDE 9 MG/ML
INJECTION, SOLUTION INTRAVENOUS CONTINUOUS
Status: DISCONTINUED | OUTPATIENT
Start: 2021-10-27 | End: 2021-11-12 | Stop reason: HOSPADM

## 2021-10-27 RX ORDER — BUTALBITAL, ACETAMINOPHEN AND CAFFEINE 50; 325; 40 MG/1; MG/1; MG/1
1 TABLET ORAL
Status: ON HOLD | COMMUNITY
Start: 2021-10-21 | End: 2021-11-30 | Stop reason: SDUPTHER

## 2021-10-27 RX ORDER — SODIUM CHLORIDE 0.9 % (FLUSH) 0.9 %
10 SYRINGE (ML) INJECTION
Status: DISCONTINUED | OUTPATIENT
Start: 2021-10-27 | End: 2021-10-27

## 2021-10-27 RX ORDER — FENTANYL CITRATE-0.9 % NACL/PF 10 MCG/ML
0-250 PLASTIC BAG, INJECTION (ML) INTRAVENOUS CONTINUOUS
Status: DISCONTINUED | OUTPATIENT
Start: 2021-10-27 | End: 2021-10-29

## 2021-10-27 RX ORDER — HEPARIN SODIUM 5000 [USP'U]/ML
7500 INJECTION, SOLUTION INTRAVENOUS; SUBCUTANEOUS EVERY 8 HOURS
Status: DISCONTINUED | OUTPATIENT
Start: 2021-10-27 | End: 2021-11-01

## 2021-10-27 RX ORDER — VANCOMYCIN HCL IN 5 % DEXTROSE 1G/250ML
1000 PLASTIC BAG, INJECTION (ML) INTRAVENOUS
Status: DISCONTINUED | OUTPATIENT
Start: 2021-10-28 | End: 2021-10-27

## 2021-10-27 RX ORDER — SODIUM CHLORIDE 0.9 % (FLUSH) 0.9 %
10 SYRINGE (ML) INJECTION
Status: DISCONTINUED | OUTPATIENT
Start: 2021-10-27 | End: 2021-11-12 | Stop reason: HOSPADM

## 2021-10-27 RX ORDER — ROCURONIUM BROMIDE 10 MG/ML
INJECTION, SOLUTION INTRAVENOUS
Status: COMPLETED
Start: 2021-10-27 | End: 2021-10-27

## 2021-10-27 RX ORDER — SODIUM CHLORIDE 9 MG/ML
125 INJECTION, SOLUTION INTRAVENOUS CONTINUOUS
Status: DISCONTINUED | OUTPATIENT
Start: 2021-10-27 | End: 2021-10-27

## 2021-10-27 RX ORDER — ACETAMINOPHEN 325 MG/1
650 TABLET ORAL EVERY 4 HOURS PRN
Status: DISCONTINUED | OUTPATIENT
Start: 2021-10-27 | End: 2021-11-05

## 2021-10-27 RX ORDER — INDOMETHACIN 25 MG/1
50 CAPSULE ORAL
Status: COMPLETED | OUTPATIENT
Start: 2021-10-27 | End: 2021-10-27

## 2021-10-27 RX ORDER — POTASSIUM CHLORIDE 20 MEQ/1
40 TABLET, EXTENDED RELEASE ORAL ONCE
Status: DISCONTINUED | OUTPATIENT
Start: 2021-10-28 | End: 2021-10-27

## 2021-10-27 RX ORDER — GUAIFENESIN AND CODEINE PHOSPHATE 10; 100 MG/5ML; MG/5ML
5 LIQUID ORAL EVERY 4 HOURS PRN
Status: ON HOLD | COMMUNITY
Start: 2021-10-20 | End: 2021-11-30 | Stop reason: HOSPADM

## 2021-10-27 RX ORDER — POTASSIUM CHLORIDE 29.8 MG/ML
40 INJECTION INTRAVENOUS ONCE
Status: COMPLETED | OUTPATIENT
Start: 2021-10-28 | End: 2021-10-28

## 2021-10-27 RX ADMIN — CEFTRIAXONE 1 G: 1 INJECTION, SOLUTION INTRAVENOUS at 05:10

## 2021-10-27 RX ADMIN — SODIUM CHLORIDE 22 UNITS/HR: 9 INJECTION, SOLUTION INTRAVENOUS at 07:10

## 2021-10-27 RX ADMIN — MUPIROCIN: 20 OINTMENT TOPICAL at 09:10

## 2021-10-27 RX ADMIN — SODIUM CHLORIDE 50 UNITS/HR: 9 INJECTION, SOLUTION INTRAVENOUS at 10:10

## 2021-10-27 RX ADMIN — ETOMIDATE 20 MG: 2 INJECTION INTRAVENOUS at 02:10

## 2021-10-27 RX ADMIN — SODIUM CHLORIDE, SODIUM LACTATE, POTASSIUM CHLORIDE, AND CALCIUM CHLORIDE 1000 ML: .6; .31; .03; .02 INJECTION, SOLUTION INTRAVENOUS at 11:10

## 2021-10-27 RX ADMIN — POTASSIUM CHLORIDE: 2 INJECTION, SOLUTION, CONCENTRATE INTRAVENOUS at 05:10

## 2021-10-27 RX ADMIN — HEPARIN SODIUM 7500 UNITS: 5000 INJECTION INTRAVENOUS; SUBCUTANEOUS at 09:10

## 2021-10-27 RX ADMIN — INSULIN DETEMIR 20 UNITS: 100 INJECTION, SOLUTION SUBCUTANEOUS at 09:10

## 2021-10-27 RX ADMIN — SODIUM CHLORIDE, SODIUM LACTATE, POTASSIUM CHLORIDE, AND CALCIUM CHLORIDE 1000 ML: .6; .31; .03; .02 INJECTION, SOLUTION INTRAVENOUS at 03:10

## 2021-10-27 RX ADMIN — SODIUM CHLORIDE 5 ML/HR: 0.9 INJECTION, SOLUTION INTRAVENOUS at 03:10

## 2021-10-27 RX ADMIN — AZITHROMYCIN MONOHYDRATE 500 MG: 500 INJECTION, POWDER, LYOPHILIZED, FOR SOLUTION INTRAVENOUS at 05:10

## 2021-10-27 RX ADMIN — DEXTROSE AND SODIUM CHLORIDE 125 ML/HR: 5; .45 INJECTION, SOLUTION INTRAVENOUS at 12:10

## 2021-10-27 RX ADMIN — NALXONE HYDROCHLORIDE 0.4 MG: 0.4 INJECTION INTRAMUSCULAR; INTRAVENOUS; SUBCUTANEOUS at 10:10

## 2021-10-27 RX ADMIN — Medication 0.05 MCG/KG/MIN: at 02:10

## 2021-10-27 RX ADMIN — ROCURONIUM BROMIDE 80 MG: 10 INJECTION, SOLUTION INTRAVENOUS at 02:10

## 2021-10-27 RX ADMIN — SODIUM CHLORIDE 10 UNITS/HR: 9 INJECTION, SOLUTION INTRAVENOUS at 11:10

## 2021-10-27 RX ADMIN — POTASSIUM CHLORIDE: 2 INJECTION, SOLUTION, CONCENTRATE INTRAVENOUS at 09:10

## 2021-10-27 RX ADMIN — VANCOMYCIN HYDROCHLORIDE 2000 MG: 10 INJECTION, POWDER, LYOPHILIZED, FOR SOLUTION INTRAVENOUS at 12:10

## 2021-10-27 RX ADMIN — PIPERACILLIN AND TAZOBACTAM 4.5 G: 4; .5 INJECTION, POWDER, FOR SOLUTION INTRAVENOUS at 11:10

## 2021-10-27 RX ADMIN — NALOXONE HYDROCHLORIDE 2 MG: 1 INJECTION PARENTERAL at 02:10

## 2021-10-27 RX ADMIN — SODIUM BICARBONATE 50 MEQ: 84 INJECTION, SOLUTION INTRAVENOUS at 11:10

## 2021-10-27 RX ADMIN — SODIUM CHLORIDE 4 UNITS/HR: 9 INJECTION, SOLUTION INTRAVENOUS at 12:10

## 2021-10-28 PROBLEM — G93.41 ENCEPHALOPATHY, METABOLIC: Status: ACTIVE | Noted: 2021-10-28

## 2021-10-28 PROBLEM — N17.9 AKI (ACUTE KIDNEY INJURY): Status: ACTIVE | Noted: 2021-10-28

## 2021-10-28 LAB
ALBUMIN SERPL BCP-MCNC: 1.8 G/DL (ref 3.5–5.2)
ALLENS TEST: ABNORMAL
ALLENS TEST: ABNORMAL
ALP SERPL-CCNC: 162 U/L (ref 55–135)
ALT SERPL W/O P-5'-P-CCNC: 21 U/L (ref 10–44)
ANION GAP SERPL CALC-SCNC: 12 MMOL/L (ref 8–16)
ANION GAP SERPL CALC-SCNC: 13 MMOL/L (ref 8–16)
ANION GAP SERPL CALC-SCNC: 13 MMOL/L (ref 8–16)
ANION GAP SERPL CALC-SCNC: 14 MMOL/L (ref 8–16)
ANION GAP SERPL CALC-SCNC: 19 MMOL/L (ref 8–16)
ANISOCYTOSIS BLD QL SMEAR: SLIGHT
AST SERPL-CCNC: 42 U/L (ref 10–40)
BASOPHILS # BLD AUTO: ABNORMAL K/UL (ref 0–0.2)
BASOPHILS NFR BLD: 0 % (ref 0–1.9)
BILIRUB SERPL-MCNC: 0.5 MG/DL (ref 0.1–1)
BUN SERPL-MCNC: 84 MG/DL (ref 6–20)
BUN SERPL-MCNC: 91 MG/DL (ref 6–20)
BUN SERPL-MCNC: 91 MG/DL (ref 6–20)
BUN SERPL-MCNC: 92 MG/DL (ref 6–20)
BUN SERPL-MCNC: 94 MG/DL (ref 6–20)
BUN SERPL-MCNC: 95 MG/DL (ref 6–20)
BUN SERPL-MCNC: 98 MG/DL (ref 6–20)
BURR CELLS BLD QL SMEAR: ABNORMAL
CALCIUM SERPL-MCNC: 8.3 MG/DL (ref 8.7–10.5)
CALCIUM SERPL-MCNC: 8.7 MG/DL (ref 8.7–10.5)
CALCIUM SERPL-MCNC: 8.7 MG/DL (ref 8.7–10.5)
CALCIUM SERPL-MCNC: 8.8 MG/DL (ref 8.7–10.5)
CALCIUM SERPL-MCNC: 8.8 MG/DL (ref 8.7–10.5)
CALCIUM SERPL-MCNC: 9 MG/DL (ref 8.7–10.5)
CALCIUM SERPL-MCNC: 9.1 MG/DL (ref 8.7–10.5)
CHLORIDE SERPL-SCNC: 121 MMOL/L (ref 95–110)
CHLORIDE SERPL-SCNC: 122 MMOL/L (ref 95–110)
CHLORIDE SERPL-SCNC: 122 MMOL/L (ref 95–110)
CHLORIDE SERPL-SCNC: 124 MMOL/L (ref 95–110)
CHLORIDE SERPL-SCNC: 125 MMOL/L (ref 95–110)
CO2 SERPL-SCNC: 14 MMOL/L (ref 23–29)
CO2 SERPL-SCNC: 14 MMOL/L (ref 23–29)
CO2 SERPL-SCNC: 15 MMOL/L (ref 23–29)
CREAT SERPL-MCNC: 3 MG/DL (ref 0.5–1.4)
CREAT SERPL-MCNC: 3.1 MG/DL (ref 0.5–1.4)
DACRYOCYTES BLD QL SMEAR: ABNORMAL
DELSYS: ABNORMAL
DELSYS: ABNORMAL
DIFFERENTIAL METHOD: ABNORMAL
EOSINOPHIL # BLD AUTO: ABNORMAL K/UL (ref 0–0.5)
EOSINOPHIL NFR BLD: 0 % (ref 0–8)
ERYTHROCYTE [DISTWIDTH] IN BLOOD BY AUTOMATED COUNT: 18.6 % (ref 11.5–14.5)
ERYTHROCYTE [SEDIMENTATION RATE] IN BLOOD BY WESTERGREN METHOD: 35 MM/H
ERYTHROCYTE [SEDIMENTATION RATE] IN BLOOD BY WESTERGREN METHOD: 40 MM/H
EST. GFR  (AFRICAN AMERICAN): 18 ML/MIN/1.73 M^2
EST. GFR  (AFRICAN AMERICAN): 19 ML/MIN/1.73 M^2
EST. GFR  (NON AFRICAN AMERICAN): 16 ML/MIN/1.73 M^2
FIO2: 40
FIO2: 40
GLUCOSE SERPL-MCNC: 214 MG/DL (ref 70–110)
GLUCOSE SERPL-MCNC: 226 MG/DL (ref 70–110)
GLUCOSE SERPL-MCNC: 226 MG/DL (ref 70–110)
GLUCOSE SERPL-MCNC: 272 MG/DL (ref 70–110)
GLUCOSE SERPL-MCNC: 277 MG/DL (ref 70–110)
GLUCOSE SERPL-MCNC: 299 MG/DL (ref 70–110)
GLUCOSE SERPL-MCNC: 372 MG/DL (ref 70–110)
HCO3 UR-SCNC: 15.5 MMOL/L (ref 24–28)
HCO3 UR-SCNC: 16 MMOL/L (ref 24–28)
HCT VFR BLD AUTO: 29.3 % (ref 37–48.5)
HGB BLD-MCNC: 9.8 G/DL (ref 12–16)
HYPOCHROMIA BLD QL SMEAR: ABNORMAL
IMM GRANULOCYTES # BLD AUTO: ABNORMAL K/UL (ref 0–0.04)
IMM GRANULOCYTES NFR BLD AUTO: ABNORMAL % (ref 0–0.5)
LIPASE SERPL-CCNC: 131 U/L (ref 4–60)
LYMPHOCYTES # BLD AUTO: ABNORMAL K/UL (ref 1–4.8)
LYMPHOCYTES NFR BLD: 1 % (ref 18–48)
MAGNESIUM SERPL-MCNC: 2.3 MG/DL (ref 1.6–2.6)
MCH RBC QN AUTO: 24.9 PG (ref 27–31)
MCHC RBC AUTO-ENTMCNC: 33.4 G/DL (ref 32–36)
MCV RBC AUTO: 75 FL (ref 82–98)
MODE: ABNORMAL
MODE: ABNORMAL
MONOCYTES # BLD AUTO: ABNORMAL K/UL (ref 0.3–1)
MONOCYTES NFR BLD: 11 % (ref 4–15)
NEUTROPHILS # BLD AUTO: ABNORMAL K/UL (ref 1.8–7.7)
NEUTROPHILS NFR BLD: 69 % (ref 38–73)
NEUTS BAND NFR BLD MANUAL: 19 %
NRBC BLD-RTO: 1 /100 WBC
OSMOLALITY SERPL: 419 MOSM/KG (ref 275–295)
OVALOCYTES BLD QL SMEAR: ABNORMAL
PCO2 BLDA: 31.9 MMHG (ref 35–45)
PCO2 BLDA: 35.8 MMHG (ref 35–45)
PEEP: 5
PEEP: 5
PH SMN: 7.25 [PH] (ref 7.35–7.45)
PH SMN: 7.31 [PH] (ref 7.35–7.45)
PHOSPHATE SERPL-MCNC: <1 MG/DL (ref 2.7–4.5)
PLATELET # BLD AUTO: 317 K/UL (ref 150–450)
PLATELET BLD QL SMEAR: ABNORMAL
PMV BLD AUTO: 10.7 FL (ref 9.2–12.9)
PO2 BLDA: 87 MMHG (ref 80–100)
PO2 BLDA: 87 MMHG (ref 80–100)
POC BE: -10 MMOL/L
POC BE: -12 MMOL/L
POC SATURATED O2: 95 % (ref 95–100)
POC SATURATED O2: 96 % (ref 95–100)
POC TCO2: 17 MMOL/L (ref 23–27)
POC TCO2: 17 MMOL/L (ref 23–27)
POCT GLUCOSE: 197 MG/DL (ref 70–110)
POCT GLUCOSE: 198 MG/DL (ref 70–110)
POCT GLUCOSE: 199 MG/DL (ref 70–110)
POCT GLUCOSE: 203 MG/DL (ref 70–110)
POCT GLUCOSE: 210 MG/DL (ref 70–110)
POCT GLUCOSE: 216 MG/DL (ref 70–110)
POCT GLUCOSE: 217 MG/DL (ref 70–110)
POCT GLUCOSE: 237 MG/DL (ref 70–110)
POCT GLUCOSE: 240 MG/DL (ref 70–110)
POCT GLUCOSE: 246 MG/DL (ref 70–110)
POCT GLUCOSE: 246 MG/DL (ref 70–110)
POCT GLUCOSE: 255 MG/DL (ref 70–110)
POCT GLUCOSE: 258 MG/DL (ref 70–110)
POCT GLUCOSE: 265 MG/DL (ref 70–110)
POCT GLUCOSE: 269 MG/DL (ref 70–110)
POCT GLUCOSE: 270 MG/DL (ref 70–110)
POCT GLUCOSE: 273 MG/DL (ref 70–110)
POCT GLUCOSE: 275 MG/DL (ref 70–110)
POCT GLUCOSE: 275 MG/DL (ref 70–110)
POCT GLUCOSE: 277 MG/DL (ref 70–110)
POCT GLUCOSE: 286 MG/DL (ref 70–110)
POCT GLUCOSE: 288 MG/DL (ref 70–110)
POCT GLUCOSE: 327 MG/DL (ref 70–110)
POIKILOCYTOSIS BLD QL SMEAR: SLIGHT
POTASSIUM SERPL-SCNC: 3.4 MMOL/L (ref 3.5–5.1)
POTASSIUM SERPL-SCNC: 4.4 MMOL/L (ref 3.5–5.1)
POTASSIUM SERPL-SCNC: 5 MMOL/L (ref 3.5–5.1)
POTASSIUM SERPL-SCNC: 5.1 MMOL/L (ref 3.5–5.1)
POTASSIUM SERPL-SCNC: 5.1 MMOL/L (ref 3.5–5.1)
PROT SERPL-MCNC: 6.6 G/DL (ref 6–8.4)
RBC # BLD AUTO: 3.93 M/UL (ref 4–5.4)
SAMPLE: ABNORMAL
SAMPLE: ABNORMAL
SITE: ABNORMAL
SITE: ABNORMAL
SODIUM SERPL-SCNC: 151 MMOL/L (ref 136–145)
SODIUM SERPL-SCNC: 152 MMOL/L (ref 136–145)
SODIUM SERPL-SCNC: 152 MMOL/L (ref 136–145)
SODIUM SERPL-SCNC: 153 MMOL/L (ref 136–145)
SODIUM SERPL-SCNC: 154 MMOL/L (ref 136–145)
VANCOMYCIN SERPL-MCNC: 18.8 UG/ML
VT: 440
VT: 440
WBC # BLD AUTO: 21.5 K/UL (ref 3.9–12.7)
WBC TOXIC VACUOLES BLD QL SMEAR: PRESENT

## 2021-10-28 PROCEDURE — 25000003 PHARM REV CODE 250: Performed by: INTERNAL MEDICINE

## 2021-10-28 PROCEDURE — 83690 ASSAY OF LIPASE: CPT | Performed by: INTERNAL MEDICINE

## 2021-10-28 PROCEDURE — 84100 ASSAY OF PHOSPHORUS: CPT | Performed by: INTERNAL MEDICINE

## 2021-10-28 PROCEDURE — 85007 BL SMEAR W/DIFF WBC COUNT: CPT | Performed by: INTERNAL MEDICINE

## 2021-10-28 PROCEDURE — 82803 BLOOD GASES ANY COMBINATION: CPT

## 2021-10-28 PROCEDURE — 25000003 PHARM REV CODE 250: Performed by: EMERGENCY MEDICINE

## 2021-10-28 PROCEDURE — 27000221 HC OXYGEN, UP TO 24 HOURS

## 2021-10-28 PROCEDURE — 80048 BASIC METABOLIC PNL TOTAL CA: CPT | Mod: 91 | Performed by: INTERNAL MEDICINE

## 2021-10-28 PROCEDURE — 99291 PR CRITICAL CARE, E/M 30-74 MINUTES: ICD-10-PCS | Mod: ,,, | Performed by: INTERNAL MEDICINE

## 2021-10-28 PROCEDURE — 80048 BASIC METABOLIC PNL TOTAL CA: CPT | Performed by: INTERNAL MEDICINE

## 2021-10-28 PROCEDURE — C9399 UNCLASSIFIED DRUGS OR BIOLOG: HCPCS | Performed by: INTERNAL MEDICINE

## 2021-10-28 PROCEDURE — 80202 ASSAY OF VANCOMYCIN: CPT | Performed by: INTERNAL MEDICINE

## 2021-10-28 PROCEDURE — 20000000 HC ICU ROOM

## 2021-10-28 PROCEDURE — 85027 COMPLETE CBC AUTOMATED: CPT | Performed by: INTERNAL MEDICINE

## 2021-10-28 PROCEDURE — 36415 COLL VENOUS BLD VENIPUNCTURE: CPT | Performed by: INTERNAL MEDICINE

## 2021-10-28 PROCEDURE — 94761 N-INVAS EAR/PLS OXIMETRY MLT: CPT

## 2021-10-28 PROCEDURE — 94003 VENT MGMT INPAT SUBQ DAY: CPT

## 2021-10-28 PROCEDURE — 63600175 PHARM REV CODE 636 W HCPCS: Performed by: INTERNAL MEDICINE

## 2021-10-28 PROCEDURE — 99291 CRITICAL CARE FIRST HOUR: CPT | Mod: ,,, | Performed by: INTERNAL MEDICINE

## 2021-10-28 PROCEDURE — 80053 COMPREHEN METABOLIC PANEL: CPT | Performed by: INTERNAL MEDICINE

## 2021-10-28 PROCEDURE — 83735 ASSAY OF MAGNESIUM: CPT | Performed by: INTERNAL MEDICINE

## 2021-10-28 PROCEDURE — 99900035 HC TECH TIME PER 15 MIN (STAT)

## 2021-10-28 RX ORDER — FLUDROCORTISONE ACETATE 0.1 MG/1
100 TABLET ORAL DAILY
Status: DISCONTINUED | OUTPATIENT
Start: 2021-10-28 | End: 2021-10-29

## 2021-10-28 RX ORDER — DEXTROSE MONOHYDRATE, SODIUM CHLORIDE, AND POTASSIUM CHLORIDE 50; 2.98; 4.5 G/1000ML; G/1000ML; G/1000ML
INJECTION, SOLUTION INTRAVENOUS CONTINUOUS
Status: DISCONTINUED | OUTPATIENT
Start: 2021-10-28 | End: 2021-10-29

## 2021-10-28 RX ORDER — POTASSIUM CHLORIDE 1.5 G/1.58G
20 POWDER, FOR SOLUTION ORAL ONCE
Status: DISCONTINUED | OUTPATIENT
Start: 2021-10-28 | End: 2021-10-28

## 2021-10-28 RX ORDER — SODIUM,POTASSIUM PHOSPHATES 280-250MG
2 POWDER IN PACKET (EA) ORAL
Status: COMPLETED | OUTPATIENT
Start: 2021-10-28 | End: 2021-10-28

## 2021-10-28 RX ORDER — POTASSIUM CHLORIDE 1.5 G/1.58G
40 POWDER, FOR SOLUTION ORAL ONCE
Status: COMPLETED | OUTPATIENT
Start: 2021-10-28 | End: 2021-10-28

## 2021-10-28 RX ADMIN — HYDROCORTISONE SODIUM SUCCINATE 50 MG: 100 INJECTION, POWDER, FOR SOLUTION INTRAMUSCULAR; INTRAVENOUS at 12:10

## 2021-10-28 RX ADMIN — ACETAMINOPHEN 650 MG: 325 TABLET ORAL at 02:10

## 2021-10-28 RX ADMIN — INSULIN DETEMIR 20 UNITS: 100 INJECTION, SOLUTION SUBCUTANEOUS at 09:10

## 2021-10-28 RX ADMIN — POTASSIUM CHLORIDE: 2 INJECTION, SOLUTION, CONCENTRATE INTRAVENOUS at 05:10

## 2021-10-28 RX ADMIN — MUPIROCIN: 20 OINTMENT TOPICAL at 08:10

## 2021-10-28 RX ADMIN — Medication 25 MCG/HR: at 05:10

## 2021-10-28 RX ADMIN — SODIUM CHLORIDE, SODIUM LACTATE, POTASSIUM CHLORIDE, AND CALCIUM CHLORIDE 1000 ML: .6; .31; .03; .02 INJECTION, SOLUTION INTRAVENOUS at 01:10

## 2021-10-28 RX ADMIN — POTASSIUM CHLORIDE: 2 INJECTION, SOLUTION, CONCENTRATE INTRAVENOUS at 03:10

## 2021-10-28 RX ADMIN — NOREPINEPHRINE BITARTRATE 0.28 MCG/KG/MIN: 1 INJECTION, SOLUTION, CONCENTRATE INTRAVENOUS at 12:10

## 2021-10-28 RX ADMIN — SODIUM CHLORIDE 6.25 UNITS/HR: 9 INJECTION, SOLUTION INTRAVENOUS at 04:10

## 2021-10-28 RX ADMIN — ACETAMINOPHEN 650 MG: 325 TABLET ORAL at 07:10

## 2021-10-28 RX ADMIN — POTASSIUM & SODIUM PHOSPHATES POWDER PACK 280-160-250 MG 2 PACKET: 280-160-250 PACK at 01:10

## 2021-10-28 RX ADMIN — HEPARIN SODIUM 7500 UNITS: 5000 INJECTION INTRAVENOUS; SUBCUTANEOUS at 11:10

## 2021-10-28 RX ADMIN — POTASSIUM CHLORIDE 100 ML/HR: 2 INJECTION, SOLUTION, CONCENTRATE INTRAVENOUS at 04:10

## 2021-10-28 RX ADMIN — NOREPINEPHRINE BITARTRATE 0.25 MCG/KG/MIN: 1 INJECTION, SOLUTION, CONCENTRATE INTRAVENOUS at 01:10

## 2021-10-28 RX ADMIN — MUPIROCIN: 20 OINTMENT TOPICAL at 09:10

## 2021-10-28 RX ADMIN — POTASSIUM & SODIUM PHOSPHATES POWDER PACK 280-160-250 MG 2 PACKET: 280-160-250 PACK at 11:10

## 2021-10-28 RX ADMIN — HEPARIN SODIUM 7500 UNITS: 5000 INJECTION INTRAVENOUS; SUBCUTANEOUS at 01:10

## 2021-10-28 RX ADMIN — SODIUM CHLORIDE, SODIUM LACTATE, POTASSIUM CHLORIDE, AND CALCIUM CHLORIDE 500 ML: .6; .31; .03; .02 INJECTION, SOLUTION INTRAVENOUS at 01:10

## 2021-10-28 RX ADMIN — HYDROCORTISONE SODIUM SUCCINATE 50 MG: 100 INJECTION, POWDER, FOR SOLUTION INTRAMUSCULAR; INTRAVENOUS at 05:10

## 2021-10-28 RX ADMIN — VANCOMYCIN HYDROCHLORIDE 500 MG: 500 INJECTION, POWDER, LYOPHILIZED, FOR SOLUTION INTRAVENOUS at 11:10

## 2021-10-28 RX ADMIN — ACETAMINOPHEN 650 MG: 325 TABLET ORAL at 06:10

## 2021-10-28 RX ADMIN — POTASSIUM & SODIUM PHOSPHATES POWDER PACK 280-160-250 MG 2 PACKET: 280-160-250 PACK at 10:10

## 2021-10-28 RX ADMIN — ACETAMINOPHEN 650 MG: 325 TABLET ORAL at 12:10

## 2021-10-28 RX ADMIN — HEPARIN SODIUM 7500 UNITS: 5000 INJECTION INTRAVENOUS; SUBCUTANEOUS at 06:10

## 2021-10-28 RX ADMIN — FLUDROCORTISONE ACETATE 100 MCG: 0.1 TABLET ORAL at 12:10

## 2021-10-28 RX ADMIN — POTASSIUM CHLORIDE 40 MEQ: 1.5 FOR SOLUTION ORAL at 12:10

## 2021-10-28 RX ADMIN — POTASSIUM CHLORIDE 40 MEQ: 400 INJECTION, SOLUTION INTRAVENOUS at 12:10

## 2021-10-28 RX ADMIN — PIPERACILLIN AND TAZOBACTAM 4.5 G: 4; .5 INJECTION, POWDER, FOR SOLUTION INTRAVENOUS at 09:10

## 2021-10-28 RX ADMIN — PIPERACILLIN AND TAZOBACTAM 4.5 G: 4; .5 INJECTION, POWDER, FOR SOLUTION INTRAVENOUS at 04:10

## 2021-10-29 LAB
ALLENS TEST: ABNORMAL
ANION GAP SERPL CALC-SCNC: 11 MMOL/L (ref 8–16)
ANION GAP SERPL CALC-SCNC: 12 MMOL/L (ref 8–16)
ANION GAP SERPL CALC-SCNC: 13 MMOL/L (ref 8–16)
ANION GAP SERPL CALC-SCNC: 13 MMOL/L (ref 8–16)
ANION GAP SERPL CALC-SCNC: 15 MMOL/L (ref 8–16)
ANION GAP SERPL CALC-SCNC: 15 MMOL/L (ref 8–16)
AORTIC ROOT ANNULUS: 3.43 CM
ASCENDING AORTA: 3.39 CM
AV INDEX (PROSTH): 0.69
AV MEAN GRADIENT: 7 MMHG
AV PEAK GRADIENT: 10 MMHG
AV VALVE AREA: 2.12 CM2
AV VELOCITY RATIO: 0.67
BASOPHILS # BLD AUTO: 0.03 K/UL (ref 0–0.2)
BASOPHILS NFR BLD: 0.2 % (ref 0–1.9)
BSA FOR ECHO PROCEDURE: 2.39 M2
BUN SERPL-MCNC: 87 MG/DL (ref 6–20)
BUN SERPL-MCNC: 90 MG/DL (ref 6–20)
BUN SERPL-MCNC: 92 MG/DL (ref 6–20)
BUN SERPL-MCNC: 93 MG/DL (ref 6–20)
CALCIUM SERPL-MCNC: 7.7 MG/DL (ref 8.7–10.5)
CALCIUM SERPL-MCNC: 7.9 MG/DL (ref 8.7–10.5)
CALCIUM SERPL-MCNC: 7.9 MG/DL (ref 8.7–10.5)
CALCIUM SERPL-MCNC: 8 MG/DL (ref 8.7–10.5)
CALCIUM SERPL-MCNC: 8.1 MG/DL (ref 8.7–10.5)
CALCIUM SERPL-MCNC: 8.2 MG/DL (ref 8.7–10.5)
CHLORIDE SERPL-SCNC: 124 MMOL/L (ref 95–110)
CHLORIDE SERPL-SCNC: 124 MMOL/L (ref 95–110)
CHLORIDE SERPL-SCNC: 125 MMOL/L (ref 95–110)
CHLORIDE SERPL-SCNC: 126 MMOL/L (ref 95–110)
CHLORIDE SERPL-SCNC: 126 MMOL/L (ref 95–110)
CHLORIDE SERPL-SCNC: 128 MMOL/L (ref 95–110)
CK SERPL-CCNC: 1306 U/L (ref 20–180)
CO2 SERPL-SCNC: 13 MMOL/L (ref 23–29)
CO2 SERPL-SCNC: 13 MMOL/L (ref 23–29)
CO2 SERPL-SCNC: 15 MMOL/L (ref 23–29)
CO2 SERPL-SCNC: 16 MMOL/L (ref 23–29)
CREAT SERPL-MCNC: 2.4 MG/DL (ref 0.5–1.4)
CREAT SERPL-MCNC: 2.7 MG/DL (ref 0.5–1.4)
CREAT SERPL-MCNC: 2.8 MG/DL (ref 0.5–1.4)
CREAT SERPL-MCNC: 3 MG/DL (ref 0.5–1.4)
CV ECHO LV RWT: 0.48 CM
DIFFERENTIAL METHOD: ABNORMAL
DOP CALC AO PEAK VEL: 1.62 M/S
DOP CALC AO VTI: 29.28 CM
DOP CALC LVOT AREA: 3.1 CM2
DOP CALC LVOT DIAMETER: 1.98 CM
DOP CALC LVOT PEAK VEL: 1.08 M/S
DOP CALC LVOT STROKE VOLUME: 62.1 CM3
DOP CALCLVOT PEAK VEL VTI: 20.18 CM
E WAVE DECELERATION TIME: 162.15 MSEC
E/A RATIO: 1.01
ECHO LV POSTERIOR WALL: 1.13 CM (ref 0.6–1.1)
EJECTION FRACTION: 60 %
EOSINOPHIL # BLD AUTO: 0 K/UL (ref 0–0.5)
EOSINOPHIL NFR BLD: 0 % (ref 0–8)
ERYTHROCYTE [DISTWIDTH] IN BLOOD BY AUTOMATED COUNT: 18.1 % (ref 11.5–14.5)
EST. GFR  (AFRICAN AMERICAN): 19 ML/MIN/1.73 M^2
EST. GFR  (AFRICAN AMERICAN): 21 ML/MIN/1.73 M^2
EST. GFR  (AFRICAN AMERICAN): 22 ML/MIN/1.73 M^2
EST. GFR  (AFRICAN AMERICAN): 25 ML/MIN/1.73 M^2
EST. GFR  (NON AFRICAN AMERICAN): 16 ML/MIN/1.73 M^2
EST. GFR  (NON AFRICAN AMERICAN): 18 ML/MIN/1.73 M^2
EST. GFR  (NON AFRICAN AMERICAN): 19 ML/MIN/1.73 M^2
EST. GFR  (NON AFRICAN AMERICAN): 22 ML/MIN/1.73 M^2
FRACTIONAL SHORTENING: 26 % (ref 28–44)
GLUCOSE SERPL-MCNC: 201 MG/DL (ref 70–110)
GLUCOSE SERPL-MCNC: 221 MG/DL (ref 70–110)
GLUCOSE SERPL-MCNC: 234 MG/DL (ref 70–110)
GLUCOSE SERPL-MCNC: 240 MG/DL (ref 70–110)
GLUCOSE SERPL-MCNC: 288 MG/DL (ref 70–110)
GLUCOSE SERPL-MCNC: 288 MG/DL (ref 70–110)
HCO3 UR-SCNC: 19.4 MMOL/L (ref 24–28)
HCT VFR BLD AUTO: 25.9 % (ref 37–48.5)
HGB BLD-MCNC: 8.7 G/DL (ref 12–16)
IMM GRANULOCYTES # BLD AUTO: 0.32 K/UL (ref 0–0.04)
IMM GRANULOCYTES NFR BLD AUTO: 1.7 % (ref 0–0.5)
INTERVENTRICULAR SEPTUM: 0.99 CM (ref 0.6–1.1)
IVRT: 74.22 MSEC
LA MAJOR: 6.62 CM
LA MINOR: 5.52 CM
LA WIDTH: 4.82 CM
LACTATE SERPL-SCNC: 1.1 MMOL/L (ref 0.5–2.2)
LEFT ATRIUM SIZE: 3.53 CM
LEFT ATRIUM VOLUME INDEX MOD: 31.8 ML/M2
LEFT ATRIUM VOLUME INDEX: 38.7 ML/M2
LEFT ATRIUM VOLUME MOD: 71.63 CM3
LEFT ATRIUM VOLUME: 87.07 CM3
LEFT INTERNAL DIMENSION IN SYSTOLE: 3.45 CM (ref 2.1–4)
LEFT VENTRICLE DIASTOLIC VOLUME INDEX: 44.75 ML/M2
LEFT VENTRICLE DIASTOLIC VOLUME: 100.69 ML
LEFT VENTRICLE MASS INDEX: 78 G/M2
LEFT VENTRICLE SYSTOLIC VOLUME INDEX: 21.8 ML/M2
LEFT VENTRICLE SYSTOLIC VOLUME: 49.1 ML
LEFT VENTRICULAR INTERNAL DIMENSION IN DIASTOLE: 4.67 CM (ref 3.5–6)
LEFT VENTRICULAR MASS: 176.32 G
LV LATERAL E/E' RATIO: 6.55 M/S
LYMPHOCYTES # BLD AUTO: 2 K/UL (ref 1–4.8)
LYMPHOCYTES NFR BLD: 10.7 % (ref 18–48)
MAGNESIUM SERPL-MCNC: 2.4 MG/DL (ref 1.6–2.6)
MCH RBC QN AUTO: 24.6 PG (ref 27–31)
MCHC RBC AUTO-ENTMCNC: 33.6 G/DL (ref 32–36)
MCV RBC AUTO: 73 FL (ref 82–98)
MONOCYTES # BLD AUTO: 1.2 K/UL (ref 0.3–1)
MONOCYTES NFR BLD: 6.4 % (ref 4–15)
MV A" WAVE DURATION": 11.42 MSEC
MV PEAK A VEL: 0.71 M/S
MV PEAK E VEL: 0.72 M/S
MV STENOSIS PRESSURE HALF TIME: 47.02 MS
MV VALVE AREA P 1/2 METHOD: 4.68 CM2
NEUTROPHILS # BLD AUTO: 15 K/UL (ref 1.8–7.7)
NEUTROPHILS NFR BLD: 81 % (ref 38–73)
NRBC BLD-RTO: 0 /100 WBC
PCO2 BLDA: 30.2 MMHG (ref 35–45)
PH SMN: 7.42 [PH] (ref 7.35–7.45)
PHOSPHATE SERPL-MCNC: <1 MG/DL (ref 2.7–4.5)
PISA TR MAX VEL: 1.69 M/S
PLATELET # BLD AUTO: 267 K/UL (ref 150–450)
PMV BLD AUTO: 10.9 FL (ref 9.2–12.9)
PO2 BLDA: 119 MMHG (ref 80–100)
POC BE: -5 MMOL/L
POC SATURATED O2: 99 % (ref 95–100)
POC TCO2: 20 MMOL/L (ref 23–27)
POCT GLUCOSE: 184 MG/DL (ref 70–110)
POCT GLUCOSE: 186 MG/DL (ref 70–110)
POCT GLUCOSE: 198 MG/DL (ref 70–110)
POCT GLUCOSE: 199 MG/DL (ref 70–110)
POCT GLUCOSE: 203 MG/DL (ref 70–110)
POCT GLUCOSE: 206 MG/DL (ref 70–110)
POCT GLUCOSE: 209 MG/DL (ref 70–110)
POCT GLUCOSE: 210 MG/DL (ref 70–110)
POCT GLUCOSE: 214 MG/DL (ref 70–110)
POCT GLUCOSE: 216 MG/DL (ref 70–110)
POCT GLUCOSE: 219 MG/DL (ref 70–110)
POCT GLUCOSE: 221 MG/DL (ref 70–110)
POCT GLUCOSE: 223 MG/DL (ref 70–110)
POCT GLUCOSE: 224 MG/DL (ref 70–110)
POCT GLUCOSE: 224 MG/DL (ref 70–110)
POCT GLUCOSE: 234 MG/DL (ref 70–110)
POCT GLUCOSE: 239 MG/DL (ref 70–110)
POCT GLUCOSE: 250 MG/DL (ref 70–110)
POCT GLUCOSE: 252 MG/DL (ref 70–110)
POCT GLUCOSE: 260 MG/DL (ref 70–110)
POCT GLUCOSE: 287 MG/DL (ref 70–110)
POCT GLUCOSE: 290 MG/DL (ref 70–110)
POCT GLUCOSE: 310 MG/DL (ref 70–110)
POCT GLUCOSE: 368 MG/DL (ref 70–110)
POTASSIUM SERPL-SCNC: 4 MMOL/L (ref 3.5–5.1)
POTASSIUM SERPL-SCNC: 4.1 MMOL/L (ref 3.5–5.1)
POTASSIUM SERPL-SCNC: 4.3 MMOL/L (ref 3.5–5.1)
POTASSIUM SERPL-SCNC: 4.3 MMOL/L (ref 3.5–5.1)
PULM VEIN S/D RATIO: 1.38
PV PEAK D VEL: 0.21 M/S
PV PEAK S VEL: 0.29 M/S
RA MAJOR: 6 CM
RA WIDTH: 4.59 CM
RBC # BLD AUTO: 3.54 M/UL (ref 4–5.4)
RIGHT VENTRICULAR END-DIASTOLIC DIMENSION: 3.03 CM
RV TISSUE DOPPLER FREE WALL SYSTOLIC VELOCITY 1 (APICAL 4 CHAMBER VIEW): 22.5 CM/S
SAMPLE: ABNORMAL
SITE: ABNORMAL
SODIUM SERPL-SCNC: 152 MMOL/L (ref 136–145)
SODIUM SERPL-SCNC: 152 MMOL/L (ref 136–145)
SODIUM SERPL-SCNC: 153 MMOL/L (ref 136–145)
SODIUM SERPL-SCNC: 154 MMOL/L (ref 136–145)
SODIUM SERPL-SCNC: 155 MMOL/L (ref 136–145)
SODIUM SERPL-SCNC: 155 MMOL/L (ref 136–145)
STJ: 3.5 CM
TDI LATERAL: 0.11 M/S
TR MAX PG: 11 MMHG
TRICUSPID ANNULAR PLANE SYSTOLIC EXCURSION: 4.04 CM
VANCOMYCIN SERPL-MCNC: 16.1 UG/ML
WBC # BLD AUTO: 18.48 K/UL (ref 3.9–12.7)

## 2021-10-29 PROCEDURE — 63600175 PHARM REV CODE 636 W HCPCS: Performed by: INTERNAL MEDICINE

## 2021-10-29 PROCEDURE — 85025 COMPLETE CBC W/AUTO DIFF WBC: CPT | Performed by: INTERNAL MEDICINE

## 2021-10-29 PROCEDURE — S5010 5% DEXTROSE AND 0.45% SALINE: HCPCS | Performed by: INTERNAL MEDICINE

## 2021-10-29 PROCEDURE — 94761 N-INVAS EAR/PLS OXIMETRY MLT: CPT

## 2021-10-29 PROCEDURE — 99222 PR INITIAL HOSPITAL CARE,LEVL II: ICD-10-PCS | Mod: ,,, | Performed by: INTERNAL MEDICINE

## 2021-10-29 PROCEDURE — 25000003 PHARM REV CODE 250: Performed by: INTERNAL MEDICINE

## 2021-10-29 PROCEDURE — 99291 PR CRITICAL CARE, E/M 30-74 MINUTES: ICD-10-PCS | Mod: ,,, | Performed by: INTERNAL MEDICINE

## 2021-10-29 PROCEDURE — 83605 ASSAY OF LACTIC ACID: CPT | Performed by: INTERNAL MEDICINE

## 2021-10-29 PROCEDURE — 80202 ASSAY OF VANCOMYCIN: CPT | Performed by: INTERNAL MEDICINE

## 2021-10-29 PROCEDURE — 99222 1ST HOSP IP/OBS MODERATE 55: CPT | Mod: ,,, | Performed by: STUDENT IN AN ORGANIZED HEALTH CARE EDUCATION/TRAINING PROGRAM

## 2021-10-29 PROCEDURE — 20000000 HC ICU ROOM

## 2021-10-29 PROCEDURE — 99222 PR INITIAL HOSPITAL CARE,LEVL II: ICD-10-PCS | Mod: ,,, | Performed by: STUDENT IN AN ORGANIZED HEALTH CARE EDUCATION/TRAINING PROGRAM

## 2021-10-29 PROCEDURE — 37799 UNLISTED PX VASCULAR SURGERY: CPT

## 2021-10-29 PROCEDURE — 84100 ASSAY OF PHOSPHORUS: CPT | Performed by: INTERNAL MEDICINE

## 2021-10-29 PROCEDURE — S0030 INJECTION, METRONIDAZOLE: HCPCS | Performed by: INTERNAL MEDICINE

## 2021-10-29 PROCEDURE — 80048 BASIC METABOLIC PNL TOTAL CA: CPT | Performed by: INTERNAL MEDICINE

## 2021-10-29 PROCEDURE — 99222 1ST HOSP IP/OBS MODERATE 55: CPT | Mod: ,,, | Performed by: INTERNAL MEDICINE

## 2021-10-29 PROCEDURE — 85347 COAGULATION TIME ACTIVATED: CPT

## 2021-10-29 PROCEDURE — 99291 CRITICAL CARE FIRST HOUR: CPT | Mod: ,,, | Performed by: INTERNAL MEDICINE

## 2021-10-29 PROCEDURE — 25000003 PHARM REV CODE 250: Performed by: STUDENT IN AN ORGANIZED HEALTH CARE EDUCATION/TRAINING PROGRAM

## 2021-10-29 PROCEDURE — 82550 ASSAY OF CK (CPK): CPT | Performed by: INTERNAL MEDICINE

## 2021-10-29 PROCEDURE — 94003 VENT MGMT INPAT SUBQ DAY: CPT

## 2021-10-29 PROCEDURE — 83735 ASSAY OF MAGNESIUM: CPT | Performed by: INTERNAL MEDICINE

## 2021-10-29 PROCEDURE — 99900035 HC TECH TIME PER 15 MIN (STAT)

## 2021-10-29 PROCEDURE — 80048 BASIC METABOLIC PNL TOTAL CA: CPT | Mod: 91 | Performed by: INTERNAL MEDICINE

## 2021-10-29 PROCEDURE — 82803 BLOOD GASES ANY COMBINATION: CPT

## 2021-10-29 PROCEDURE — 27000221 HC OXYGEN, UP TO 24 HOURS

## 2021-10-29 RX ORDER — METRONIDAZOLE 500 MG/100ML
500 INJECTION, SOLUTION INTRAVENOUS
Status: DISCONTINUED | OUTPATIENT
Start: 2021-10-29 | End: 2021-10-30

## 2021-10-29 RX ORDER — DEXTROSE MONOHYDRATE, SODIUM CHLORIDE, AND POTASSIUM CHLORIDE 50; 2.98; 4.5 G/1000ML; G/1000ML; G/1000ML
INJECTION, SOLUTION INTRAVENOUS CONTINUOUS
Status: DISCONTINUED | OUTPATIENT
Start: 2021-10-29 | End: 2021-10-30

## 2021-10-29 RX ORDER — CEFEPIME HYDROCHLORIDE 1 G/50ML
2 INJECTION, SOLUTION INTRAVENOUS
Status: DISCONTINUED | OUTPATIENT
Start: 2021-10-29 | End: 2021-10-31

## 2021-10-29 RX ORDER — DEXMEDETOMIDINE HYDROCHLORIDE 4 UG/ML
0-1.4 INJECTION, SOLUTION INTRAVENOUS CONTINUOUS
Status: DISCONTINUED | OUTPATIENT
Start: 2021-10-29 | End: 2021-10-29

## 2021-10-29 RX ORDER — SODIUM,POTASSIUM PHOSPHATES 280-250MG
2 POWDER IN PACKET (EA) ORAL EVERY 4 HOURS
Status: DISCONTINUED | OUTPATIENT
Start: 2021-10-29 | End: 2021-10-29

## 2021-10-29 RX ORDER — DEXMEDETOMIDINE HYDROCHLORIDE 4 UG/ML
0-1.4 INJECTION, SOLUTION INTRAVENOUS CONTINUOUS
Status: DISCONTINUED | OUTPATIENT
Start: 2021-10-29 | End: 2021-11-01

## 2021-10-29 RX ADMIN — HEPARIN SODIUM 7500 UNITS: 5000 INJECTION INTRAVENOUS; SUBCUTANEOUS at 10:10

## 2021-10-29 RX ADMIN — METRONIDAZOLE 500 MG: 500 INJECTION, SOLUTION INTRAVENOUS at 11:10

## 2021-10-29 RX ADMIN — SODIUM CHLORIDE: 0.9 INJECTION, SOLUTION INTRAVENOUS at 07:10

## 2021-10-29 RX ADMIN — MUPIROCIN: 20 OINTMENT TOPICAL at 10:10

## 2021-10-29 RX ADMIN — POTASSIUM PHOSPHATE, MONOBASIC AND POTASSIUM PHOSPHATE, DIBASIC 30 MMOL: 224; 236 INJECTION, SOLUTION, CONCENTRATE INTRAVENOUS at 11:10

## 2021-10-29 RX ADMIN — HYDROCORTISONE SODIUM SUCCINATE 50 MG: 100 INJECTION, POWDER, FOR SOLUTION INTRAMUSCULAR; INTRAVENOUS at 12:10

## 2021-10-29 RX ADMIN — POTASSIUM CHLORIDE: 2 INJECTION, SOLUTION, CONCENTRATE INTRAVENOUS at 02:10

## 2021-10-29 RX ADMIN — NOREPINEPHRINE BITARTRATE 0.02 MCG/KG/MIN: 1 INJECTION, SOLUTION, CONCENTRATE INTRAVENOUS at 05:10

## 2021-10-29 RX ADMIN — METRONIDAZOLE 500 MG: 500 INJECTION, SOLUTION INTRAVENOUS at 06:10

## 2021-10-29 RX ADMIN — MUPIROCIN: 20 OINTMENT TOPICAL at 09:10

## 2021-10-29 RX ADMIN — SODIUM CHLORIDE 9.25 UNITS/HR: 9 INJECTION, SOLUTION INTRAVENOUS at 01:10

## 2021-10-29 RX ADMIN — DEXMEDETOMIDINE HYDROCHLORIDE 0.2 MCG/KG/HR: 4 INJECTION, SOLUTION INTRAVENOUS at 02:10

## 2021-10-29 RX ADMIN — ACETAMINOPHEN 650 MG: 325 TABLET ORAL at 10:10

## 2021-10-29 RX ADMIN — CEFEPIME HYDROCHLORIDE 2 G: 2 INJECTION, SOLUTION INTRAVENOUS at 11:10

## 2021-10-29 RX ADMIN — DEXTROSE AND SODIUM CHLORIDE 100 ML/HR: 5; .45 INJECTION, SOLUTION INTRAVENOUS at 04:10

## 2021-10-29 RX ADMIN — HEPARIN SODIUM 7500 UNITS: 5000 INJECTION INTRAVENOUS; SUBCUTANEOUS at 06:10

## 2021-10-29 RX ADMIN — HYDROCORTISONE SODIUM SUCCINATE 50 MG: 100 INJECTION, POWDER, FOR SOLUTION INTRAMUSCULAR; INTRAVENOUS at 06:10

## 2021-10-29 RX ADMIN — SODIUM CHLORIDE 6.62 UNITS/HR: 9 INJECTION, SOLUTION INTRAVENOUS at 06:10

## 2021-10-29 RX ADMIN — DEXMEDETOMIDINE HYDROCHLORIDE 0.4 MCG/KG/HR: 4 INJECTION, SOLUTION INTRAVENOUS at 07:10

## 2021-10-29 RX ADMIN — INSULIN DETEMIR 20 UNITS: 100 INJECTION, SOLUTION SUBCUTANEOUS at 09:10

## 2021-10-29 RX ADMIN — HEPARIN SODIUM 7500 UNITS: 5000 INJECTION INTRAVENOUS; SUBCUTANEOUS at 05:10

## 2021-10-29 RX ADMIN — FLUDROCORTISONE ACETATE 100 MCG: 0.1 TABLET ORAL at 10:10

## 2021-10-29 RX ADMIN — PIPERACILLIN AND TAZOBACTAM 4.5 G: 4; .5 INJECTION, POWDER, FOR SOLUTION INTRAVENOUS at 01:10

## 2021-10-30 LAB
ALBUMIN SERPL BCP-MCNC: 1.6 G/DL (ref 3.5–5.2)
ANION GAP SERPL CALC-SCNC: 10 MMOL/L (ref 8–16)
ANION GAP SERPL CALC-SCNC: 12 MMOL/L (ref 8–16)
ANION GAP SERPL CALC-SCNC: 9 MMOL/L (ref 8–16)
BACTERIA BLD CULT: ABNORMAL
BASOPHILS # BLD AUTO: 0.04 K/UL (ref 0–0.2)
BASOPHILS NFR BLD: 0.2 % (ref 0–1.9)
BUN SERPL-MCNC: 66 MG/DL (ref 6–20)
BUN SERPL-MCNC: 73 MG/DL (ref 6–20)
BUN SERPL-MCNC: 74 MG/DL (ref 6–20)
BUN SERPL-MCNC: 75 MG/DL (ref 6–20)
BUN SERPL-MCNC: 81 MG/DL (ref 6–20)
CALCIUM SERPL-MCNC: 7.6 MG/DL (ref 8.7–10.5)
CALCIUM SERPL-MCNC: 7.6 MG/DL (ref 8.7–10.5)
CALCIUM SERPL-MCNC: 7.7 MG/DL (ref 8.7–10.5)
CALCIUM SERPL-MCNC: 7.8 MG/DL (ref 8.7–10.5)
CALCIUM SERPL-MCNC: 8 MG/DL (ref 8.7–10.5)
CHLORIDE SERPL-SCNC: 128 MMOL/L (ref 95–110)
CHLORIDE SERPL-SCNC: 129 MMOL/L (ref 95–110)
CHLORIDE SERPL-SCNC: 129 MMOL/L (ref 95–110)
CHLORIDE SERPL-SCNC: 130 MMOL/L (ref 95–110)
CHLORIDE SERPL-SCNC: 130 MMOL/L (ref 95–110)
CO2 SERPL-SCNC: 15 MMOL/L (ref 23–29)
CO2 SERPL-SCNC: 16 MMOL/L (ref 23–29)
CO2 SERPL-SCNC: 18 MMOL/L (ref 23–29)
CREAT SERPL-MCNC: 1.7 MG/DL (ref 0.5–1.4)
CREAT SERPL-MCNC: 2 MG/DL (ref 0.5–1.4)
CREAT SERPL-MCNC: 2.1 MG/DL (ref 0.5–1.4)
CREAT SERPL-MCNC: 2.1 MG/DL (ref 0.5–1.4)
CREAT SERPL-MCNC: 2.2 MG/DL (ref 0.5–1.4)
DIFFERENTIAL METHOD: ABNORMAL
EOSINOPHIL # BLD AUTO: 0.1 K/UL (ref 0–0.5)
EOSINOPHIL NFR BLD: 0.3 % (ref 0–8)
ERYTHROCYTE [DISTWIDTH] IN BLOOD BY AUTOMATED COUNT: 18 % (ref 11.5–14.5)
EST. GFR  (AFRICAN AMERICAN): 28 ML/MIN/1.73 M^2
EST. GFR  (AFRICAN AMERICAN): 29 ML/MIN/1.73 M^2
EST. GFR  (AFRICAN AMERICAN): 29 ML/MIN/1.73 M^2
EST. GFR  (AFRICAN AMERICAN): 31 ML/MIN/1.73 M^2
EST. GFR  (AFRICAN AMERICAN): 38 ML/MIN/1.73 M^2
EST. GFR  (NON AFRICAN AMERICAN): 24 ML/MIN/1.73 M^2
EST. GFR  (NON AFRICAN AMERICAN): 25 ML/MIN/1.73 M^2
EST. GFR  (NON AFRICAN AMERICAN): 25 ML/MIN/1.73 M^2
EST. GFR  (NON AFRICAN AMERICAN): 27 ML/MIN/1.73 M^2
EST. GFR  (NON AFRICAN AMERICAN): 33 ML/MIN/1.73 M^2
GLUCOSE SERPL-MCNC: 143 MG/DL (ref 70–110)
GLUCOSE SERPL-MCNC: 159 MG/DL (ref 70–110)
GLUCOSE SERPL-MCNC: 190 MG/DL (ref 70–110)
GLUCOSE SERPL-MCNC: 233 MG/DL (ref 70–110)
GLUCOSE SERPL-MCNC: 286 MG/DL (ref 70–110)
HCT VFR BLD AUTO: 26.2 % (ref 37–48.5)
HGB BLD-MCNC: 8.9 G/DL (ref 12–16)
IMM GRANULOCYTES # BLD AUTO: 0.67 K/UL (ref 0–0.04)
IMM GRANULOCYTES NFR BLD AUTO: 3.1 % (ref 0–0.5)
LYMPHOCYTES # BLD AUTO: 3.5 K/UL (ref 1–4.8)
LYMPHOCYTES NFR BLD: 15.9 % (ref 18–48)
MAGNESIUM SERPL-MCNC: 2.3 MG/DL (ref 1.6–2.6)
MCH RBC QN AUTO: 25 PG (ref 27–31)
MCHC RBC AUTO-ENTMCNC: 34 G/DL (ref 32–36)
MCV RBC AUTO: 74 FL (ref 82–98)
MONOCYTES # BLD AUTO: 1.6 K/UL (ref 0.3–1)
MONOCYTES NFR BLD: 7.3 % (ref 4–15)
NEUTROPHILS # BLD AUTO: 16 K/UL (ref 1.8–7.7)
NEUTROPHILS NFR BLD: 73.2 % (ref 38–73)
NRBC BLD-RTO: 0 /100 WBC
PHOSPHATE SERPL-MCNC: 2.5 MG/DL (ref 2.7–4.5)
PLATELET # BLD AUTO: 307 K/UL (ref 150–450)
PMV BLD AUTO: 12.1 FL (ref 9.2–12.9)
POCT GLUCOSE: 145 MG/DL (ref 70–110)
POCT GLUCOSE: 160 MG/DL (ref 70–110)
POCT GLUCOSE: 164 MG/DL (ref 70–110)
POCT GLUCOSE: 167 MG/DL (ref 70–110)
POCT GLUCOSE: 178 MG/DL (ref 70–110)
POCT GLUCOSE: 189 MG/DL (ref 70–110)
POCT GLUCOSE: 190 MG/DL (ref 70–110)
POCT GLUCOSE: 195 MG/DL (ref 70–110)
POCT GLUCOSE: 197 MG/DL (ref 70–110)
POCT GLUCOSE: 203 MG/DL (ref 70–110)
POCT GLUCOSE: 222 MG/DL (ref 70–110)
POCT GLUCOSE: 224 MG/DL (ref 70–110)
POCT GLUCOSE: 228 MG/DL (ref 70–110)
POCT GLUCOSE: 241 MG/DL (ref 70–110)
POCT GLUCOSE: 247 MG/DL (ref 70–110)
POCT GLUCOSE: 251 MG/DL (ref 70–110)
POCT GLUCOSE: 254 MG/DL (ref 70–110)
POCT GLUCOSE: 270 MG/DL (ref 70–110)
POCT GLUCOSE: 284 MG/DL (ref 70–110)
POCT GLUCOSE: 285 MG/DL (ref 70–110)
POCT GLUCOSE: 298 MG/DL (ref 70–110)
POCT GLUCOSE: 305 MG/DL (ref 70–110)
POTASSIUM SERPL-SCNC: 4 MMOL/L (ref 3.5–5.1)
POTASSIUM SERPL-SCNC: 4.3 MMOL/L (ref 3.5–5.1)
POTASSIUM SERPL-SCNC: 4.5 MMOL/L (ref 3.5–5.1)
POTASSIUM SERPL-SCNC: 4.6 MMOL/L (ref 3.5–5.1)
POTASSIUM SERPL-SCNC: 4.6 MMOL/L (ref 3.5–5.1)
RBC # BLD AUTO: 3.56 M/UL (ref 4–5.4)
SODIUM SERPL-SCNC: 155 MMOL/L (ref 136–145)
SODIUM SERPL-SCNC: 155 MMOL/L (ref 136–145)
SODIUM SERPL-SCNC: 156 MMOL/L (ref 136–145)
WBC # BLD AUTO: 21.77 K/UL (ref 3.9–12.7)

## 2021-10-30 PROCEDURE — 94761 N-INVAS EAR/PLS OXIMETRY MLT: CPT

## 2021-10-30 PROCEDURE — 80048 BASIC METABOLIC PNL TOTAL CA: CPT | Mod: 91 | Performed by: INTERNAL MEDICINE

## 2021-10-30 PROCEDURE — 20000000 HC ICU ROOM

## 2021-10-30 PROCEDURE — 99232 SBSQ HOSP IP/OBS MODERATE 35: CPT | Mod: ,,, | Performed by: INTERNAL MEDICINE

## 2021-10-30 PROCEDURE — 27000221 HC OXYGEN, UP TO 24 HOURS

## 2021-10-30 PROCEDURE — 84100 ASSAY OF PHOSPHORUS: CPT | Performed by: INTERNAL MEDICINE

## 2021-10-30 PROCEDURE — 80048 BASIC METABOLIC PNL TOTAL CA: CPT | Mod: 91 | Performed by: STUDENT IN AN ORGANIZED HEALTH CARE EDUCATION/TRAINING PROGRAM

## 2021-10-30 PROCEDURE — 99900035 HC TECH TIME PER 15 MIN (STAT)

## 2021-10-30 PROCEDURE — 94003 VENT MGMT INPAT SUBQ DAY: CPT

## 2021-10-30 PROCEDURE — 63600175 PHARM REV CODE 636 W HCPCS: Performed by: STUDENT IN AN ORGANIZED HEALTH CARE EDUCATION/TRAINING PROGRAM

## 2021-10-30 PROCEDURE — 27000190 HC CPAP FULL FACE MASK W/VALVE

## 2021-10-30 PROCEDURE — 25000003 PHARM REV CODE 250: Performed by: INTERNAL MEDICINE

## 2021-10-30 PROCEDURE — 85025 COMPLETE CBC W/AUTO DIFF WBC: CPT | Performed by: INTERNAL MEDICINE

## 2021-10-30 PROCEDURE — 25000003 PHARM REV CODE 250: Performed by: STUDENT IN AN ORGANIZED HEALTH CARE EDUCATION/TRAINING PROGRAM

## 2021-10-30 PROCEDURE — 94660 CPAP INITIATION&MGMT: CPT

## 2021-10-30 PROCEDURE — S0030 INJECTION, METRONIDAZOLE: HCPCS | Performed by: INTERNAL MEDICINE

## 2021-10-30 PROCEDURE — S5010 5% DEXTROSE AND 0.45% SALINE: HCPCS | Performed by: INTERNAL MEDICINE

## 2021-10-30 PROCEDURE — 99232 SBSQ HOSP IP/OBS MODERATE 35: CPT | Mod: ,,, | Performed by: STUDENT IN AN ORGANIZED HEALTH CARE EDUCATION/TRAINING PROGRAM

## 2021-10-30 PROCEDURE — 99232 PR SUBSEQUENT HOSPITAL CARE,LEVL II: ICD-10-PCS | Mod: ,,, | Performed by: INTERNAL MEDICINE

## 2021-10-30 PROCEDURE — 80048 BASIC METABOLIC PNL TOTAL CA: CPT | Performed by: INTERNAL MEDICINE

## 2021-10-30 PROCEDURE — 63600175 PHARM REV CODE 636 W HCPCS: Performed by: INTERNAL MEDICINE

## 2021-10-30 PROCEDURE — 87040 BLOOD CULTURE FOR BACTERIA: CPT | Performed by: STUDENT IN AN ORGANIZED HEALTH CARE EDUCATION/TRAINING PROGRAM

## 2021-10-30 PROCEDURE — 36415 COLL VENOUS BLD VENIPUNCTURE: CPT | Performed by: STUDENT IN AN ORGANIZED HEALTH CARE EDUCATION/TRAINING PROGRAM

## 2021-10-30 PROCEDURE — 27200966 HC CLOSED SUCTION SYSTEM

## 2021-10-30 PROCEDURE — 99232 PR SUBSEQUENT HOSPITAL CARE,LEVL II: ICD-10-PCS | Mod: ,,, | Performed by: STUDENT IN AN ORGANIZED HEALTH CARE EDUCATION/TRAINING PROGRAM

## 2021-10-30 PROCEDURE — 82040 ASSAY OF SERUM ALBUMIN: CPT | Performed by: INTERNAL MEDICINE

## 2021-10-30 PROCEDURE — 83735 ASSAY OF MAGNESIUM: CPT | Performed by: INTERNAL MEDICINE

## 2021-10-30 RX ORDER — DEXTROSE MONOHYDRATE 50 MG/ML
INJECTION, SOLUTION INTRAVENOUS CONTINUOUS
Status: DISCONTINUED | OUTPATIENT
Start: 2021-10-30 | End: 2021-11-01

## 2021-10-30 RX ADMIN — DEXMEDETOMIDINE HYDROCHLORIDE 0.8 MCG/KG/HR: 4 INJECTION, SOLUTION INTRAVENOUS at 05:10

## 2021-10-30 RX ADMIN — METRONIDAZOLE 500 MG: 500 INJECTION, SOLUTION INTRAVENOUS at 01:10

## 2021-10-30 RX ADMIN — VANCOMYCIN HYDROCHLORIDE 500 MG: 500 INJECTION, POWDER, LYOPHILIZED, FOR SOLUTION INTRAVENOUS at 12:10

## 2021-10-30 RX ADMIN — HEPARIN SODIUM 7500 UNITS: 5000 INJECTION INTRAVENOUS; SUBCUTANEOUS at 02:10

## 2021-10-30 RX ADMIN — DEXTROSE: 5 SOLUTION INTRAVENOUS at 08:10

## 2021-10-30 RX ADMIN — MUPIROCIN: 20 OINTMENT TOPICAL at 09:10

## 2021-10-30 RX ADMIN — SODIUM CHLORIDE 5.63 UNITS/HR: 9 INJECTION, SOLUTION INTRAVENOUS at 04:10

## 2021-10-30 RX ADMIN — HEPARIN SODIUM 7500 UNITS: 5000 INJECTION INTRAVENOUS; SUBCUTANEOUS at 10:10

## 2021-10-30 RX ADMIN — AZITHROMYCIN MONOHYDRATE 500 MG: 500 INJECTION, POWDER, LYOPHILIZED, FOR SOLUTION INTRAVENOUS at 10:10

## 2021-10-30 RX ADMIN — DEXTROSE: 5 SOLUTION INTRAVENOUS at 10:10

## 2021-10-30 RX ADMIN — INSULIN DETEMIR 20 UNITS: 100 INJECTION, SOLUTION SUBCUTANEOUS at 09:10

## 2021-10-30 RX ADMIN — CEFEPIME HYDROCHLORIDE 2 G: 2 INJECTION, SOLUTION INTRAVENOUS at 09:10

## 2021-10-30 RX ADMIN — DEXMEDETOMIDINE HYDROCHLORIDE 0.8 MCG/KG/HR: 4 INJECTION, SOLUTION INTRAVENOUS at 01:10

## 2021-10-30 RX ADMIN — DEXTROSE AND SODIUM CHLORIDE: 5; .45 INJECTION, SOLUTION INTRAVENOUS at 05:10

## 2021-10-30 RX ADMIN — SODIUM CHLORIDE 2.81 UNITS/HR: 9 INJECTION, SOLUTION INTRAVENOUS at 07:10

## 2021-10-30 RX ADMIN — HEPARIN SODIUM 7500 UNITS: 5000 INJECTION INTRAVENOUS; SUBCUTANEOUS at 05:10

## 2021-10-31 PROBLEM — R65.20 SEVERE SEPSIS: Status: ACTIVE | Noted: 2021-10-31

## 2021-10-31 PROBLEM — A41.9 SEVERE SEPSIS: Status: ACTIVE | Noted: 2021-10-31

## 2021-10-31 LAB
ANION GAP SERPL CALC-SCNC: 10 MMOL/L (ref 8–16)
ANION GAP SERPL CALC-SCNC: 12 MMOL/L (ref 8–16)
BACTERIA BLD CULT: ABNORMAL
BASOPHILS # BLD AUTO: 0.03 K/UL (ref 0–0.2)
BASOPHILS NFR BLD: 0.2 % (ref 0–1.9)
BUN SERPL-MCNC: 43 MG/DL (ref 6–20)
BUN SERPL-MCNC: 51 MG/DL (ref 6–20)
BUN SERPL-MCNC: 58 MG/DL (ref 6–20)
BUN SERPL-MCNC: 62 MG/DL (ref 6–20)
CALCIUM SERPL-MCNC: 7.6 MG/DL (ref 8.7–10.5)
CALCIUM SERPL-MCNC: 7.7 MG/DL (ref 8.7–10.5)
CHLORIDE SERPL-SCNC: 121 MMOL/L (ref 95–110)
CHLORIDE SERPL-SCNC: 122 MMOL/L (ref 95–110)
CHLORIDE SERPL-SCNC: 124 MMOL/L (ref 95–110)
CHLORIDE SERPL-SCNC: 127 MMOL/L (ref 95–110)
CO2 SERPL-SCNC: 15 MMOL/L (ref 23–29)
CO2 SERPL-SCNC: 16 MMOL/L (ref 23–29)
CO2 SERPL-SCNC: 16 MMOL/L (ref 23–29)
CO2 SERPL-SCNC: 18 MMOL/L (ref 23–29)
CREAT SERPL-MCNC: 1.4 MG/DL (ref 0.5–1.4)
CREAT SERPL-MCNC: 1.5 MG/DL (ref 0.5–1.4)
CREAT SERPL-MCNC: 1.7 MG/DL (ref 0.5–1.4)
CREAT SERPL-MCNC: 1.7 MG/DL (ref 0.5–1.4)
DIFFERENTIAL METHOD: ABNORMAL
EOSINOPHIL # BLD AUTO: 0.1 K/UL (ref 0–0.5)
EOSINOPHIL NFR BLD: 0.4 % (ref 0–8)
ERYTHROCYTE [DISTWIDTH] IN BLOOD BY AUTOMATED COUNT: 18.6 % (ref 11.5–14.5)
EST. GFR  (AFRICAN AMERICAN): 38 ML/MIN/1.73 M^2
EST. GFR  (AFRICAN AMERICAN): 38 ML/MIN/1.73 M^2
EST. GFR  (AFRICAN AMERICAN): 44 ML/MIN/1.73 M^2
EST. GFR  (AFRICAN AMERICAN): 48 ML/MIN/1.73 M^2
EST. GFR  (NON AFRICAN AMERICAN): 33 ML/MIN/1.73 M^2
EST. GFR  (NON AFRICAN AMERICAN): 33 ML/MIN/1.73 M^2
EST. GFR  (NON AFRICAN AMERICAN): 38 ML/MIN/1.73 M^2
EST. GFR  (NON AFRICAN AMERICAN): 41 ML/MIN/1.73 M^2
GLUCOSE SERPL-MCNC: 205 MG/DL (ref 70–110)
GLUCOSE SERPL-MCNC: 227 MG/DL (ref 70–110)
GLUCOSE SERPL-MCNC: 280 MG/DL (ref 70–110)
GLUCOSE SERPL-MCNC: 284 MG/DL (ref 70–110)
HCT VFR BLD AUTO: 25.8 % (ref 37–48.5)
HGB BLD-MCNC: 8.4 G/DL (ref 12–16)
IMM GRANULOCYTES # BLD AUTO: 0.46 K/UL (ref 0–0.04)
IMM GRANULOCYTES NFR BLD AUTO: 2.3 % (ref 0–0.5)
LYMPHOCYTES # BLD AUTO: 3.2 K/UL (ref 1–4.8)
LYMPHOCYTES NFR BLD: 15.9 % (ref 18–48)
MAGNESIUM SERPL-MCNC: 2.2 MG/DL (ref 1.6–2.6)
MCH RBC QN AUTO: 24.6 PG (ref 27–31)
MCHC RBC AUTO-ENTMCNC: 32.6 G/DL (ref 32–36)
MCV RBC AUTO: 76 FL (ref 82–98)
MONOCYTES # BLD AUTO: 2 K/UL (ref 0.3–1)
MONOCYTES NFR BLD: 10.1 % (ref 4–15)
NEUTROPHILS # BLD AUTO: 14.2 K/UL (ref 1.8–7.7)
NEUTROPHILS NFR BLD: 71.1 % (ref 38–73)
NRBC BLD-RTO: 1 /100 WBC
PHOSPHATE SERPL-MCNC: 3.1 MG/DL (ref 2.7–4.5)
PLATELET # BLD AUTO: 253 K/UL (ref 150–450)
PMV BLD AUTO: 11.7 FL (ref 9.2–12.9)
POCT GLUCOSE: 184 MG/DL (ref 70–110)
POCT GLUCOSE: 191 MG/DL (ref 70–110)
POCT GLUCOSE: 203 MG/DL (ref 70–110)
POCT GLUCOSE: 210 MG/DL (ref 70–110)
POCT GLUCOSE: 217 MG/DL (ref 70–110)
POCT GLUCOSE: 219 MG/DL (ref 70–110)
POCT GLUCOSE: 220 MG/DL (ref 70–110)
POCT GLUCOSE: 223 MG/DL (ref 70–110)
POCT GLUCOSE: 229 MG/DL (ref 70–110)
POCT GLUCOSE: 235 MG/DL (ref 70–110)
POCT GLUCOSE: 237 MG/DL (ref 70–110)
POCT GLUCOSE: 275 MG/DL (ref 70–110)
POCT GLUCOSE: 276 MG/DL (ref 70–110)
POCT GLUCOSE: 277 MG/DL (ref 70–110)
POCT GLUCOSE: 281 MG/DL (ref 70–110)
POCT GLUCOSE: 294 MG/DL (ref 70–110)
POCT GLUCOSE: 299 MG/DL (ref 70–110)
POCT GLUCOSE: 300 MG/DL (ref 70–110)
POCT GLUCOSE: 304 MG/DL (ref 70–110)
POTASSIUM SERPL-SCNC: 3.6 MMOL/L (ref 3.5–5.1)
POTASSIUM SERPL-SCNC: 3.8 MMOL/L (ref 3.5–5.1)
POTASSIUM SERPL-SCNC: 4.1 MMOL/L (ref 3.5–5.1)
POTASSIUM SERPL-SCNC: 4.2 MMOL/L (ref 3.5–5.1)
RBC # BLD AUTO: 3.41 M/UL (ref 4–5.4)
SODIUM SERPL-SCNC: 148 MMOL/L (ref 136–145)
SODIUM SERPL-SCNC: 148 MMOL/L (ref 136–145)
SODIUM SERPL-SCNC: 152 MMOL/L (ref 136–145)
SODIUM SERPL-SCNC: 153 MMOL/L (ref 136–145)
VANCOMYCIN SERPL-MCNC: 11.8 UG/ML
WBC # BLD AUTO: 19.95 K/UL (ref 3.9–12.7)

## 2021-10-31 PROCEDURE — 97167 OT EVAL HIGH COMPLEX 60 MIN: CPT

## 2021-10-31 PROCEDURE — 63600175 PHARM REV CODE 636 W HCPCS: Performed by: INTERNAL MEDICINE

## 2021-10-31 PROCEDURE — 97161 PT EVAL LOW COMPLEX 20 MIN: CPT

## 2021-10-31 PROCEDURE — 92610 EVALUATE SWALLOWING FUNCTION: CPT

## 2021-10-31 PROCEDURE — 80202 ASSAY OF VANCOMYCIN: CPT | Performed by: INTERNAL MEDICINE

## 2021-10-31 PROCEDURE — 85025 COMPLETE CBC W/AUTO DIFF WBC: CPT | Performed by: INTERNAL MEDICINE

## 2021-10-31 PROCEDURE — S0030 INJECTION, METRONIDAZOLE: HCPCS | Performed by: STUDENT IN AN ORGANIZED HEALTH CARE EDUCATION/TRAINING PROGRAM

## 2021-10-31 PROCEDURE — 27000221 HC OXYGEN, UP TO 24 HOURS

## 2021-10-31 PROCEDURE — 99900035 HC TECH TIME PER 15 MIN (STAT)

## 2021-10-31 PROCEDURE — 99900039 HC SLP GENERIC THERAPY SCREENING (STAT)

## 2021-10-31 PROCEDURE — 20000000 HC ICU ROOM

## 2021-10-31 PROCEDURE — 25000003 PHARM REV CODE 250: Performed by: STUDENT IN AN ORGANIZED HEALTH CARE EDUCATION/TRAINING PROGRAM

## 2021-10-31 PROCEDURE — 80048 BASIC METABOLIC PNL TOTAL CA: CPT | Performed by: STUDENT IN AN ORGANIZED HEALTH CARE EDUCATION/TRAINING PROGRAM

## 2021-10-31 PROCEDURE — 99232 PR SUBSEQUENT HOSPITAL CARE,LEVL II: ICD-10-PCS | Mod: ,,, | Performed by: INTERNAL MEDICINE

## 2021-10-31 PROCEDURE — 80048 BASIC METABOLIC PNL TOTAL CA: CPT | Mod: 91 | Performed by: STUDENT IN AN ORGANIZED HEALTH CARE EDUCATION/TRAINING PROGRAM

## 2021-10-31 PROCEDURE — 84100 ASSAY OF PHOSPHORUS: CPT | Performed by: INTERNAL MEDICINE

## 2021-10-31 PROCEDURE — 25000003 PHARM REV CODE 250: Performed by: INTERNAL MEDICINE

## 2021-10-31 PROCEDURE — 63600175 PHARM REV CODE 636 W HCPCS: Performed by: STUDENT IN AN ORGANIZED HEALTH CARE EDUCATION/TRAINING PROGRAM

## 2021-10-31 PROCEDURE — 94660 CPAP INITIATION&MGMT: CPT

## 2021-10-31 PROCEDURE — 83735 ASSAY OF MAGNESIUM: CPT | Performed by: INTERNAL MEDICINE

## 2021-10-31 PROCEDURE — 94761 N-INVAS EAR/PLS OXIMETRY MLT: CPT

## 2021-10-31 PROCEDURE — 99232 SBSQ HOSP IP/OBS MODERATE 35: CPT | Mod: ,,, | Performed by: INTERNAL MEDICINE

## 2021-10-31 RX ORDER — METRONIDAZOLE 500 MG/100ML
500 INJECTION, SOLUTION INTRAVENOUS
Status: DISCONTINUED | OUTPATIENT
Start: 2021-10-31 | End: 2021-11-03

## 2021-10-31 RX ADMIN — SODIUM CHLORIDE 3.13 UNITS/HR: 9 INJECTION, SOLUTION INTRAVENOUS at 09:10

## 2021-10-31 RX ADMIN — HEPARIN SODIUM 7500 UNITS: 5000 INJECTION INTRAVENOUS; SUBCUTANEOUS at 09:10

## 2021-10-31 RX ADMIN — CEFTRIAXONE SODIUM 1 G: 1 INJECTION, POWDER, FOR SOLUTION INTRAMUSCULAR; INTRAVENOUS at 01:10

## 2021-10-31 RX ADMIN — DEXTROSE: 5 SOLUTION INTRAVENOUS at 06:10

## 2021-10-31 RX ADMIN — HEPARIN SODIUM 7500 UNITS: 5000 INJECTION INTRAVENOUS; SUBCUTANEOUS at 06:10

## 2021-10-31 RX ADMIN — METRONIDAZOLE 500 MG: 500 INJECTION, SOLUTION INTRAVENOUS at 09:10

## 2021-10-31 RX ADMIN — AZITHROMYCIN MONOHYDRATE 500 MG: 500 INJECTION, POWDER, LYOPHILIZED, FOR SOLUTION INTRAVENOUS at 11:10

## 2021-10-31 RX ADMIN — MUPIROCIN: 20 OINTMENT TOPICAL at 09:10

## 2021-10-31 RX ADMIN — DEXTROSE: 5 SOLUTION INTRAVENOUS at 04:10

## 2021-10-31 RX ADMIN — CEFEPIME HYDROCHLORIDE 2 G: 2 INJECTION, SOLUTION INTRAVENOUS at 10:10

## 2021-10-31 RX ADMIN — SODIUM CHLORIDE 5 ML/HR: 0.9 INJECTION, SOLUTION INTRAVENOUS at 03:10

## 2021-10-31 RX ADMIN — INSULIN DETEMIR 20 UNITS: 100 INJECTION, SOLUTION SUBCUTANEOUS at 09:10

## 2021-10-31 RX ADMIN — METRONIDAZOLE 500 MG: 500 INJECTION, SOLUTION INTRAVENOUS at 03:10

## 2021-10-31 RX ADMIN — VANCOMYCIN HYDROCHLORIDE 750 MG: 750 INJECTION, POWDER, LYOPHILIZED, FOR SOLUTION INTRAVENOUS at 04:10

## 2021-10-31 RX ADMIN — HEPARIN SODIUM 7500 UNITS: 5000 INJECTION INTRAVENOUS; SUBCUTANEOUS at 01:10

## 2021-11-01 LAB
ANION GAP SERPL CALC-SCNC: 11 MMOL/L (ref 8–16)
ANION GAP SERPL CALC-SCNC: 8 MMOL/L (ref 8–16)
ANION GAP SERPL CALC-SCNC: 8 MMOL/L (ref 8–16)
BASOPHILS # BLD AUTO: 0.04 K/UL (ref 0–0.2)
BASOPHILS NFR BLD: 0.2 % (ref 0–1.9)
BUN SERPL-MCNC: 34 MG/DL (ref 6–20)
BUN SERPL-MCNC: 36 MG/DL (ref 6–20)
BUN SERPL-MCNC: 39 MG/DL (ref 6–20)
CALCIUM SERPL-MCNC: 7.8 MG/DL (ref 8.7–10.5)
CALCIUM SERPL-MCNC: 7.9 MG/DL (ref 8.7–10.5)
CALCIUM SERPL-MCNC: 7.9 MG/DL (ref 8.7–10.5)
CHLORIDE SERPL-SCNC: 118 MMOL/L (ref 95–110)
CHLORIDE SERPL-SCNC: 121 MMOL/L (ref 95–110)
CHLORIDE SERPL-SCNC: 121 MMOL/L (ref 95–110)
CO2 SERPL-SCNC: 18 MMOL/L (ref 23–29)
CO2 SERPL-SCNC: 19 MMOL/L (ref 23–29)
CO2 SERPL-SCNC: 20 MMOL/L (ref 23–29)
CREAT SERPL-MCNC: 1.3 MG/DL (ref 0.5–1.4)
DIFFERENTIAL METHOD: ABNORMAL
EOSINOPHIL # BLD AUTO: 0.3 K/UL (ref 0–0.5)
EOSINOPHIL NFR BLD: 1.3 % (ref 0–8)
ERYTHROCYTE [DISTWIDTH] IN BLOOD BY AUTOMATED COUNT: 18.2 % (ref 11.5–14.5)
EST. GFR  (AFRICAN AMERICAN): 52 ML/MIN/1.73 M^2
EST. GFR  (NON AFRICAN AMERICAN): 45 ML/MIN/1.73 M^2
GLUCOSE SERPL-MCNC: 152 MG/DL (ref 70–110)
GLUCOSE SERPL-MCNC: 166 MG/DL (ref 70–110)
GLUCOSE SERPL-MCNC: 222 MG/DL (ref 70–110)
HCT VFR BLD AUTO: 25.2 % (ref 37–48.5)
HGB BLD-MCNC: 8.1 G/DL (ref 12–16)
IMM GRANULOCYTES # BLD AUTO: 0.74 K/UL (ref 0–0.04)
IMM GRANULOCYTES NFR BLD AUTO: 3.9 % (ref 0–0.5)
LYMPHOCYTES # BLD AUTO: 3.3 K/UL (ref 1–4.8)
LYMPHOCYTES NFR BLD: 17.3 % (ref 18–48)
MAGNESIUM SERPL-MCNC: 2.2 MG/DL (ref 1.6–2.6)
MCH RBC QN AUTO: 24.7 PG (ref 27–31)
MCHC RBC AUTO-ENTMCNC: 32.1 G/DL (ref 32–36)
MCV RBC AUTO: 77 FL (ref 82–98)
MONOCYTES # BLD AUTO: 1.8 K/UL (ref 0.3–1)
MONOCYTES NFR BLD: 9.6 % (ref 4–15)
NEUTROPHILS # BLD AUTO: 12.9 K/UL (ref 1.8–7.7)
NEUTROPHILS NFR BLD: 67.7 % (ref 38–73)
NRBC BLD-RTO: 1 /100 WBC
PHOSPHATE SERPL-MCNC: 2.9 MG/DL (ref 2.7–4.5)
PLATELET # BLD AUTO: 242 K/UL (ref 150–450)
PMV BLD AUTO: 11 FL (ref 9.2–12.9)
POCT GLUCOSE: 152 MG/DL (ref 70–110)
POCT GLUCOSE: 154 MG/DL (ref 70–110)
POCT GLUCOSE: 160 MG/DL (ref 70–110)
POCT GLUCOSE: 167 MG/DL (ref 70–110)
POCT GLUCOSE: 168 MG/DL (ref 70–110)
POCT GLUCOSE: 173 MG/DL (ref 70–110)
POCT GLUCOSE: 179 MG/DL (ref 70–110)
POCT GLUCOSE: 183 MG/DL (ref 70–110)
POCT GLUCOSE: 188 MG/DL (ref 70–110)
POCT GLUCOSE: 207 MG/DL (ref 70–110)
POCT GLUCOSE: 241 MG/DL (ref 70–110)
POCT GLUCOSE: 242 MG/DL (ref 70–110)
POCT GLUCOSE: 278 MG/DL (ref 70–110)
POTASSIUM SERPL-SCNC: 3.6 MMOL/L (ref 3.5–5.1)
POTASSIUM SERPL-SCNC: 3.8 MMOL/L (ref 3.5–5.1)
POTASSIUM SERPL-SCNC: 4 MMOL/L (ref 3.5–5.1)
RBC # BLD AUTO: 3.28 M/UL (ref 4–5.4)
SODIUM SERPL-SCNC: 147 MMOL/L (ref 136–145)
SODIUM SERPL-SCNC: 148 MMOL/L (ref 136–145)
SODIUM SERPL-SCNC: 149 MMOL/L (ref 136–145)
WBC # BLD AUTO: 19.01 K/UL (ref 3.9–12.7)

## 2021-11-01 PROCEDURE — C1751 CATH, INF, PER/CENT/MIDLINE: HCPCS

## 2021-11-01 PROCEDURE — 99291 CRITICAL CARE FIRST HOUR: CPT | Mod: ,,, | Performed by: INTERNAL MEDICINE

## 2021-11-01 PROCEDURE — 99291 PR CRITICAL CARE, E/M 30-74 MINUTES: ICD-10-PCS | Mod: ,,, | Performed by: INTERNAL MEDICINE

## 2021-11-01 PROCEDURE — 94761 N-INVAS EAR/PLS OXIMETRY MLT: CPT

## 2021-11-01 PROCEDURE — 63600175 PHARM REV CODE 636 W HCPCS: Performed by: INTERNAL MEDICINE

## 2021-11-01 PROCEDURE — 84100 ASSAY OF PHOSPHORUS: CPT | Performed by: INTERNAL MEDICINE

## 2021-11-01 PROCEDURE — 11000001 HC ACUTE MED/SURG PRIVATE ROOM

## 2021-11-01 PROCEDURE — 63600175 PHARM REV CODE 636 W HCPCS: Performed by: STUDENT IN AN ORGANIZED HEALTH CARE EDUCATION/TRAINING PROGRAM

## 2021-11-01 PROCEDURE — 99232 PR SUBSEQUENT HOSPITAL CARE,LEVL II: ICD-10-PCS | Mod: ,,, | Performed by: INTERNAL MEDICINE

## 2021-11-01 PROCEDURE — 80048 BASIC METABOLIC PNL TOTAL CA: CPT | Mod: 91 | Performed by: STUDENT IN AN ORGANIZED HEALTH CARE EDUCATION/TRAINING PROGRAM

## 2021-11-01 PROCEDURE — 99232 SBSQ HOSP IP/OBS MODERATE 35: CPT | Mod: ,,, | Performed by: INTERNAL MEDICINE

## 2021-11-01 PROCEDURE — 99900035 HC TECH TIME PER 15 MIN (STAT)

## 2021-11-01 PROCEDURE — 97535 SELF CARE MNGMENT TRAINING: CPT

## 2021-11-01 PROCEDURE — 83735 ASSAY OF MAGNESIUM: CPT | Performed by: INTERNAL MEDICINE

## 2021-11-01 PROCEDURE — 36415 COLL VENOUS BLD VENIPUNCTURE: CPT | Performed by: STUDENT IN AN ORGANIZED HEALTH CARE EDUCATION/TRAINING PROGRAM

## 2021-11-01 PROCEDURE — S0030 INJECTION, METRONIDAZOLE: HCPCS | Performed by: STUDENT IN AN ORGANIZED HEALTH CARE EDUCATION/TRAINING PROGRAM

## 2021-11-01 PROCEDURE — 97530 THERAPEUTIC ACTIVITIES: CPT

## 2021-11-01 PROCEDURE — 80048 BASIC METABOLIC PNL TOTAL CA: CPT | Performed by: STUDENT IN AN ORGANIZED HEALTH CARE EDUCATION/TRAINING PROGRAM

## 2021-11-01 PROCEDURE — 25000003 PHARM REV CODE 250: Performed by: STUDENT IN AN ORGANIZED HEALTH CARE EDUCATION/TRAINING PROGRAM

## 2021-11-01 PROCEDURE — 85025 COMPLETE CBC W/AUTO DIFF WBC: CPT | Performed by: INTERNAL MEDICINE

## 2021-11-01 PROCEDURE — 36569 INSJ PICC 5 YR+ W/O IMAGING: CPT

## 2021-11-01 PROCEDURE — 92526 ORAL FUNCTION THERAPY: CPT

## 2021-11-01 RX ORDER — ENOXAPARIN SODIUM 100 MG/ML
40 INJECTION SUBCUTANEOUS EVERY 12 HOURS
Status: DISCONTINUED | OUTPATIENT
Start: 2021-11-01 | End: 2021-11-12 | Stop reason: HOSPADM

## 2021-11-01 RX ORDER — SODIUM CHLORIDE 0.9 % (FLUSH) 0.9 %
10 SYRINGE (ML) INJECTION EVERY 6 HOURS
Status: DISCONTINUED | OUTPATIENT
Start: 2021-11-02 | End: 2021-11-12 | Stop reason: HOSPADM

## 2021-11-01 RX ORDER — SODIUM CHLORIDE 0.9 % (FLUSH) 0.9 %
10 SYRINGE (ML) INJECTION
Status: DISCONTINUED | OUTPATIENT
Start: 2021-11-01 | End: 2021-11-12 | Stop reason: HOSPADM

## 2021-11-01 RX ORDER — INSULIN ASPART 100 [IU]/ML
0-5 INJECTION, SOLUTION INTRAVENOUS; SUBCUTANEOUS
Status: DISCONTINUED | OUTPATIENT
Start: 2021-11-01 | End: 2021-11-12 | Stop reason: HOSPADM

## 2021-11-01 RX ADMIN — CEFTRIAXONE SODIUM 1 G: 1 INJECTION, POWDER, FOR SOLUTION INTRAMUSCULAR; INTRAVENOUS at 01:11

## 2021-11-01 RX ADMIN — INSULIN ASPART 2 UNITS: 100 INJECTION, SOLUTION INTRAVENOUS; SUBCUTANEOUS at 05:11

## 2021-11-01 RX ADMIN — ENOXAPARIN SODIUM 40 MG: 40 INJECTION, SOLUTION INTRAVENOUS; SUBCUTANEOUS at 08:11

## 2021-11-01 RX ADMIN — INSULIN DETEMIR 20 UNITS: 100 INJECTION, SOLUTION SUBCUTANEOUS at 08:11

## 2021-11-01 RX ADMIN — INSULIN ASPART 3 UNITS: 100 INJECTION, SOLUTION INTRAVENOUS; SUBCUTANEOUS at 01:11

## 2021-11-01 RX ADMIN — INSULIN DETEMIR 20 UNITS: 100 INJECTION, SOLUTION SUBCUTANEOUS at 09:11

## 2021-11-01 RX ADMIN — HEPARIN SODIUM 7500 UNITS: 5000 INJECTION INTRAVENOUS; SUBCUTANEOUS at 06:11

## 2021-11-01 RX ADMIN — DEXTROSE: 5 SOLUTION INTRAVENOUS at 03:11

## 2021-11-01 RX ADMIN — METRONIDAZOLE 500 MG: 500 INJECTION, SOLUTION INTRAVENOUS at 06:11

## 2021-11-01 RX ADMIN — MUPIROCIN: 20 OINTMENT TOPICAL at 09:11

## 2021-11-01 RX ADMIN — METRONIDAZOLE 500 MG: 500 INJECTION, SOLUTION INTRAVENOUS at 08:11

## 2021-11-01 RX ADMIN — METRONIDAZOLE 500 MG: 500 INJECTION, SOLUTION INTRAVENOUS at 02:11

## 2021-11-02 ENCOUNTER — PATIENT OUTREACH (OUTPATIENT)
Dept: ADMINISTRATIVE | Facility: OTHER | Age: 61
End: 2021-11-02
Payer: COMMERCIAL

## 2021-11-02 LAB
ANION GAP SERPL CALC-SCNC: 10 MMOL/L (ref 8–16)
ANION GAP SERPL CALC-SCNC: 11 MMOL/L (ref 8–16)
ANION GAP SERPL CALC-SCNC: 9 MMOL/L (ref 8–16)
ANISOCYTOSIS BLD QL SMEAR: SLIGHT
BASOPHILS # BLD AUTO: 0.05 K/UL (ref 0–0.2)
BASOPHILS NFR BLD: 0.3 % (ref 0–1.9)
BUN SERPL-MCNC: 25 MG/DL (ref 6–20)
BUN SERPL-MCNC: 26 MG/DL (ref 6–20)
BUN SERPL-MCNC: 29 MG/DL (ref 6–20)
BUN SERPL-MCNC: 30 MG/DL (ref 6–20)
BUN SERPL-MCNC: 30 MG/DL (ref 6–20)
CALCIUM SERPL-MCNC: 7.7 MG/DL (ref 8.7–10.5)
CALCIUM SERPL-MCNC: 7.8 MG/DL (ref 8.7–10.5)
CHLORIDE SERPL-SCNC: 118 MMOL/L (ref 95–110)
CHLORIDE SERPL-SCNC: 119 MMOL/L (ref 95–110)
CHLORIDE SERPL-SCNC: 119 MMOL/L (ref 95–110)
CHLORIDE SERPL-SCNC: 121 MMOL/L (ref 95–110)
CHLORIDE SERPL-SCNC: 121 MMOL/L (ref 95–110)
CO2 SERPL-SCNC: 19 MMOL/L (ref 23–29)
CO2 SERPL-SCNC: 20 MMOL/L (ref 23–29)
CREAT SERPL-MCNC: 1.2 MG/DL (ref 0.5–1.4)
CREAT SERPL-MCNC: 1.3 MG/DL (ref 0.5–1.4)
DIFFERENTIAL METHOD: ABNORMAL
EOSINOPHIL # BLD AUTO: 0.1 K/UL (ref 0–0.5)
EOSINOPHIL NFR BLD: 0.6 % (ref 0–8)
ERYTHROCYTE [DISTWIDTH] IN BLOOD BY AUTOMATED COUNT: 18.1 % (ref 11.5–14.5)
EST. GFR  (AFRICAN AMERICAN): 52 ML/MIN/1.73 M^2
EST. GFR  (AFRICAN AMERICAN): 58 ML/MIN/1.73 M^2
EST. GFR  (NON AFRICAN AMERICAN): 45 ML/MIN/1.73 M^2
EST. GFR  (NON AFRICAN AMERICAN): 50 ML/MIN/1.73 M^2
GLUCOSE SERPL-MCNC: 210 MG/DL (ref 70–110)
GLUCOSE SERPL-MCNC: 210 MG/DL (ref 70–110)
GLUCOSE SERPL-MCNC: 217 MG/DL (ref 70–110)
GLUCOSE SERPL-MCNC: 251 MG/DL (ref 70–110)
GLUCOSE SERPL-MCNC: 361 MG/DL (ref 70–110)
HCT VFR BLD AUTO: 24.9 % (ref 37–48.5)
HGB BLD-MCNC: 7.9 G/DL (ref 12–16)
HYPOCHROMIA BLD QL SMEAR: ABNORMAL
IMM GRANULOCYTES # BLD AUTO: 0.98 K/UL (ref 0–0.04)
IMM GRANULOCYTES NFR BLD AUTO: 5.7 % (ref 0–0.5)
LYMPHOCYTES # BLD AUTO: 3.4 K/UL (ref 1–4.8)
LYMPHOCYTES NFR BLD: 19.5 % (ref 18–48)
MAGNESIUM SERPL-MCNC: 2.1 MG/DL (ref 1.6–2.6)
MCH RBC QN AUTO: 24.9 PG (ref 27–31)
MCHC RBC AUTO-ENTMCNC: 31.7 G/DL (ref 32–36)
MCV RBC AUTO: 79 FL (ref 82–98)
MONOCYTES # BLD AUTO: 1.9 K/UL (ref 0.3–1)
MONOCYTES NFR BLD: 10.8 % (ref 4–15)
NEUTROPHILS # BLD AUTO: 10.9 K/UL (ref 1.8–7.7)
NEUTROPHILS NFR BLD: 63.1 % (ref 38–73)
NRBC BLD-RTO: 1 /100 WBC
OVALOCYTES BLD QL SMEAR: ABNORMAL
PHOSPHATE SERPL-MCNC: 3.4 MG/DL (ref 2.7–4.5)
PLATELET # BLD AUTO: 256 K/UL (ref 150–450)
PLATELET BLD QL SMEAR: ABNORMAL
PMV BLD AUTO: 12.1 FL (ref 9.2–12.9)
POCT GLUCOSE: 237 MG/DL (ref 70–110)
POCT GLUCOSE: 255 MG/DL (ref 70–110)
POCT GLUCOSE: 282 MG/DL (ref 70–110)
POCT GLUCOSE: 387 MG/DL (ref 70–110)
POCT GLUCOSE: 422 MG/DL (ref 70–110)
POIKILOCYTOSIS BLD QL SMEAR: SLIGHT
POTASSIUM SERPL-SCNC: 3.5 MMOL/L (ref 3.5–5.1)
POTASSIUM SERPL-SCNC: 3.5 MMOL/L (ref 3.5–5.1)
POTASSIUM SERPL-SCNC: 3.8 MMOL/L (ref 3.5–5.1)
POTASSIUM SERPL-SCNC: 3.8 MMOL/L (ref 3.5–5.1)
POTASSIUM SERPL-SCNC: 3.9 MMOL/L (ref 3.5–5.1)
RBC # BLD AUTO: 3.17 M/UL (ref 4–5.4)
SODIUM SERPL-SCNC: 147 MMOL/L (ref 136–145)
SODIUM SERPL-SCNC: 148 MMOL/L (ref 136–145)
SODIUM SERPL-SCNC: 149 MMOL/L (ref 136–145)
WBC # BLD AUTO: 17.29 K/UL (ref 3.9–12.7)

## 2021-11-02 PROCEDURE — 97530 THERAPEUTIC ACTIVITIES: CPT

## 2021-11-02 PROCEDURE — 94660 CPAP INITIATION&MGMT: CPT

## 2021-11-02 PROCEDURE — 80048 BASIC METABOLIC PNL TOTAL CA: CPT | Mod: 91 | Performed by: STUDENT IN AN ORGANIZED HEALTH CARE EDUCATION/TRAINING PROGRAM

## 2021-11-02 PROCEDURE — 84100 ASSAY OF PHOSPHORUS: CPT | Performed by: INTERNAL MEDICINE

## 2021-11-02 PROCEDURE — 85007 BL SMEAR W/DIFF WBC COUNT: CPT | Performed by: INTERNAL MEDICINE

## 2021-11-02 PROCEDURE — 25000003 PHARM REV CODE 250: Performed by: STUDENT IN AN ORGANIZED HEALTH CARE EDUCATION/TRAINING PROGRAM

## 2021-11-02 PROCEDURE — 85027 COMPLETE CBC AUTOMATED: CPT | Performed by: INTERNAL MEDICINE

## 2021-11-02 PROCEDURE — 92526 ORAL FUNCTION THERAPY: CPT

## 2021-11-02 PROCEDURE — 83735 ASSAY OF MAGNESIUM: CPT | Performed by: INTERNAL MEDICINE

## 2021-11-02 PROCEDURE — A4216 STERILE WATER/SALINE, 10 ML: HCPCS | Performed by: INTERNAL MEDICINE

## 2021-11-02 PROCEDURE — 25000003 PHARM REV CODE 250: Performed by: INTERNAL MEDICINE

## 2021-11-02 PROCEDURE — 36415 COLL VENOUS BLD VENIPUNCTURE: CPT | Performed by: STUDENT IN AN ORGANIZED HEALTH CARE EDUCATION/TRAINING PROGRAM

## 2021-11-02 PROCEDURE — 11000001 HC ACUTE MED/SURG PRIVATE ROOM

## 2021-11-02 PROCEDURE — 63600175 PHARM REV CODE 636 W HCPCS: Performed by: STUDENT IN AN ORGANIZED HEALTH CARE EDUCATION/TRAINING PROGRAM

## 2021-11-02 PROCEDURE — 63600175 PHARM REV CODE 636 W HCPCS: Performed by: INTERNAL MEDICINE

## 2021-11-02 PROCEDURE — S0030 INJECTION, METRONIDAZOLE: HCPCS | Performed by: STUDENT IN AN ORGANIZED HEALTH CARE EDUCATION/TRAINING PROGRAM

## 2021-11-02 PROCEDURE — 94761 N-INVAS EAR/PLS OXIMETRY MLT: CPT

## 2021-11-02 PROCEDURE — 99900035 HC TECH TIME PER 15 MIN (STAT)

## 2021-11-02 RX ADMIN — METRONIDAZOLE 500 MG: 500 INJECTION, SOLUTION INTRAVENOUS at 05:11

## 2021-11-02 RX ADMIN — SODIUM CHLORIDE: 0.9 INJECTION, SOLUTION INTRAVENOUS at 05:11

## 2021-11-02 RX ADMIN — SODIUM CHLORIDE, PRESERVATIVE FREE 10 ML: 5 INJECTION INTRAVENOUS at 12:11

## 2021-11-02 RX ADMIN — SODIUM CHLORIDE, PRESERVATIVE FREE 10 ML: 5 INJECTION INTRAVENOUS at 06:11

## 2021-11-02 RX ADMIN — CEFTRIAXONE SODIUM 1 G: 1 INJECTION, POWDER, FOR SOLUTION INTRAMUSCULAR; INTRAVENOUS at 02:11

## 2021-11-02 RX ADMIN — ENOXAPARIN SODIUM 40 MG: 40 INJECTION, SOLUTION INTRAVENOUS; SUBCUTANEOUS at 08:11

## 2021-11-02 RX ADMIN — METRONIDAZOLE 500 MG: 500 INJECTION, SOLUTION INTRAVENOUS at 03:11

## 2021-11-02 RX ADMIN — METRONIDAZOLE 500 MG: 500 INJECTION, SOLUTION INTRAVENOUS at 09:11

## 2021-11-02 RX ADMIN — ENOXAPARIN SODIUM 40 MG: 40 INJECTION, SOLUTION INTRAVENOUS; SUBCUTANEOUS at 09:11

## 2021-11-02 RX ADMIN — INSULIN DETEMIR 20 UNITS: 100 INJECTION, SOLUTION SUBCUTANEOUS at 09:11

## 2021-11-02 RX ADMIN — INSULIN ASPART 3 UNITS: 100 INJECTION, SOLUTION INTRAVENOUS; SUBCUTANEOUS at 04:11

## 2021-11-02 RX ADMIN — INSULIN ASPART 3 UNITS: 100 INJECTION, SOLUTION INTRAVENOUS; SUBCUTANEOUS at 02:11

## 2021-11-02 RX ADMIN — INSULIN DETEMIR 20 UNITS: 100 INJECTION, SOLUTION SUBCUTANEOUS at 08:11

## 2021-11-02 RX ADMIN — INSULIN ASPART 2 UNITS: 100 INJECTION, SOLUTION INTRAVENOUS; SUBCUTANEOUS at 06:11

## 2021-11-03 LAB
ANION GAP SERPL CALC-SCNC: 10 MMOL/L (ref 8–16)
ANION GAP SERPL CALC-SCNC: 9 MMOL/L (ref 8–16)
ANISOCYTOSIS BLD QL SMEAR: ABNORMAL
BASOPHILS # BLD AUTO: ABNORMAL K/UL (ref 0–0.2)
BASOPHILS NFR BLD: 0 % (ref 0–1.9)
BUN SERPL-MCNC: 16 MG/DL (ref 6–20)
BUN SERPL-MCNC: 17 MG/DL (ref 6–20)
BUN SERPL-MCNC: 21 MG/DL (ref 6–20)
BUN SERPL-MCNC: 21 MG/DL (ref 6–20)
CALCIUM SERPL-MCNC: 7.6 MG/DL (ref 8.7–10.5)
CALCIUM SERPL-MCNC: 7.8 MG/DL (ref 8.7–10.5)
CHLORIDE SERPL-SCNC: 111 MMOL/L (ref 95–110)
CHLORIDE SERPL-SCNC: 113 MMOL/L (ref 95–110)
CHLORIDE SERPL-SCNC: 117 MMOL/L (ref 95–110)
CHLORIDE SERPL-SCNC: 117 MMOL/L (ref 95–110)
CO2 SERPL-SCNC: 19 MMOL/L (ref 23–29)
CO2 SERPL-SCNC: 20 MMOL/L (ref 23–29)
CREAT SERPL-MCNC: 0.9 MG/DL (ref 0.5–1.4)
CREAT SERPL-MCNC: 1 MG/DL (ref 0.5–1.4)
DIFFERENTIAL METHOD: ABNORMAL
EOSINOPHIL # BLD AUTO: ABNORMAL K/UL (ref 0–0.5)
EOSINOPHIL NFR BLD: 0 % (ref 0–8)
ERYTHROCYTE [DISTWIDTH] IN BLOOD BY AUTOMATED COUNT: 18.4 % (ref 11.5–14.5)
EST. GFR  (AFRICAN AMERICAN): >60 ML/MIN/1.73 M^2
EST. GFR  (NON AFRICAN AMERICAN): >60 ML/MIN/1.73 M^2
GLUCOSE SERPL-MCNC: 173 MG/DL (ref 70–110)
GLUCOSE SERPL-MCNC: 233 MG/DL (ref 70–110)
GLUCOSE SERPL-MCNC: 233 MG/DL (ref 70–110)
GLUCOSE SERPL-MCNC: 306 MG/DL (ref 70–110)
HCT VFR BLD AUTO: 23.6 % (ref 37–48.5)
HGB BLD-MCNC: 7.5 G/DL (ref 12–16)
HYPOCHROMIA BLD QL SMEAR: ABNORMAL
IMM GRANULOCYTES # BLD AUTO: ABNORMAL K/UL (ref 0–0.04)
IMM GRANULOCYTES NFR BLD AUTO: ABNORMAL % (ref 0–0.5)
LYMPHOCYTES # BLD AUTO: ABNORMAL K/UL (ref 1–4.8)
LYMPHOCYTES NFR BLD: 14 % (ref 18–48)
MAGNESIUM SERPL-MCNC: 1.8 MG/DL (ref 1.6–2.6)
MCH RBC QN AUTO: 25.3 PG (ref 27–31)
MCHC RBC AUTO-ENTMCNC: 31.8 G/DL (ref 32–36)
MCV RBC AUTO: 80 FL (ref 82–98)
MONOCYTES # BLD AUTO: ABNORMAL K/UL (ref 0.3–1)
MONOCYTES NFR BLD: 2 % (ref 4–15)
NEUTROPHILS NFR BLD: 84 % (ref 38–73)
NRBC BLD-RTO: 1 /100 WBC
PHOSPHATE SERPL-MCNC: 3.1 MG/DL (ref 2.7–4.5)
PLATELET # BLD AUTO: 266 K/UL (ref 150–450)
PLATELET BLD QL SMEAR: ABNORMAL
PMV BLD AUTO: 11.8 FL (ref 9.2–12.9)
POCT GLUCOSE: 196 MG/DL (ref 70–110)
POCT GLUCOSE: 230 MG/DL (ref 70–110)
POCT GLUCOSE: 264 MG/DL (ref 70–110)
POCT GLUCOSE: 302 MG/DL (ref 70–110)
POLYCHROMASIA BLD QL SMEAR: ABNORMAL
POTASSIUM SERPL-SCNC: 3.1 MMOL/L (ref 3.5–5.1)
POTASSIUM SERPL-SCNC: 3.2 MMOL/L (ref 3.5–5.1)
POTASSIUM SERPL-SCNC: 3.3 MMOL/L (ref 3.5–5.1)
POTASSIUM SERPL-SCNC: 3.3 MMOL/L (ref 3.5–5.1)
RBC # BLD AUTO: 2.97 M/UL (ref 4–5.4)
SODIUM SERPL-SCNC: 140 MMOL/L (ref 136–145)
SODIUM SERPL-SCNC: 142 MMOL/L (ref 136–145)
SODIUM SERPL-SCNC: 146 MMOL/L (ref 136–145)
SODIUM SERPL-SCNC: 146 MMOL/L (ref 136–145)
WBC # BLD AUTO: 14.63 K/UL (ref 3.9–12.7)

## 2021-11-03 PROCEDURE — A4216 STERILE WATER/SALINE, 10 ML: HCPCS | Performed by: INTERNAL MEDICINE

## 2021-11-03 PROCEDURE — 25000003 PHARM REV CODE 250: Performed by: FAMILY MEDICINE

## 2021-11-03 PROCEDURE — 97530 THERAPEUTIC ACTIVITIES: CPT

## 2021-11-03 PROCEDURE — 80048 BASIC METABOLIC PNL TOTAL CA: CPT | Mod: 91 | Performed by: STUDENT IN AN ORGANIZED HEALTH CARE EDUCATION/TRAINING PROGRAM

## 2021-11-03 PROCEDURE — 80048 BASIC METABOLIC PNL TOTAL CA: CPT | Performed by: STUDENT IN AN ORGANIZED HEALTH CARE EDUCATION/TRAINING PROGRAM

## 2021-11-03 PROCEDURE — 99900035 HC TECH TIME PER 15 MIN (STAT)

## 2021-11-03 PROCEDURE — 83735 ASSAY OF MAGNESIUM: CPT | Performed by: INTERNAL MEDICINE

## 2021-11-03 PROCEDURE — S0030 INJECTION, METRONIDAZOLE: HCPCS | Performed by: STUDENT IN AN ORGANIZED HEALTH CARE EDUCATION/TRAINING PROGRAM

## 2021-11-03 PROCEDURE — 85007 BL SMEAR W/DIFF WBC COUNT: CPT | Performed by: INTERNAL MEDICINE

## 2021-11-03 PROCEDURE — 94660 CPAP INITIATION&MGMT: CPT

## 2021-11-03 PROCEDURE — 63600175 PHARM REV CODE 636 W HCPCS: Performed by: STUDENT IN AN ORGANIZED HEALTH CARE EDUCATION/TRAINING PROGRAM

## 2021-11-03 PROCEDURE — 25000003 PHARM REV CODE 250: Performed by: INTERNAL MEDICINE

## 2021-11-03 PROCEDURE — 63600175 PHARM REV CODE 636 W HCPCS: Performed by: FAMILY MEDICINE

## 2021-11-03 PROCEDURE — 84100 ASSAY OF PHOSPHORUS: CPT | Performed by: INTERNAL MEDICINE

## 2021-11-03 PROCEDURE — C9399 UNCLASSIFIED DRUGS OR BIOLOG: HCPCS | Performed by: STUDENT IN AN ORGANIZED HEALTH CARE EDUCATION/TRAINING PROGRAM

## 2021-11-03 PROCEDURE — 94761 N-INVAS EAR/PLS OXIMETRY MLT: CPT

## 2021-11-03 PROCEDURE — 92526 ORAL FUNCTION THERAPY: CPT

## 2021-11-03 PROCEDURE — 85027 COMPLETE CBC AUTOMATED: CPT | Performed by: INTERNAL MEDICINE

## 2021-11-03 PROCEDURE — 63600175 PHARM REV CODE 636 W HCPCS: Performed by: INTERNAL MEDICINE

## 2021-11-03 PROCEDURE — 25000003 PHARM REV CODE 250: Performed by: STUDENT IN AN ORGANIZED HEALTH CARE EDUCATION/TRAINING PROGRAM

## 2021-11-03 PROCEDURE — 97535 SELF CARE MNGMENT TRAINING: CPT

## 2021-11-03 PROCEDURE — 11000001 HC ACUTE MED/SURG PRIVATE ROOM

## 2021-11-03 RX ORDER — METRONIDAZOLE 500 MG/1
500 TABLET ORAL EVERY 8 HOURS
Status: DISCONTINUED | OUTPATIENT
Start: 2021-11-03 | End: 2021-11-12

## 2021-11-03 RX ORDER — INSULIN ASPART 100 [IU]/ML
5 INJECTION, SOLUTION INTRAVENOUS; SUBCUTANEOUS
Status: DISCONTINUED | OUTPATIENT
Start: 2021-11-03 | End: 2021-11-05

## 2021-11-03 RX ORDER — POTASSIUM CHLORIDE 20 MEQ/1
40 TABLET, EXTENDED RELEASE ORAL ONCE
Status: COMPLETED | OUTPATIENT
Start: 2021-11-03 | End: 2021-11-03

## 2021-11-03 RX ADMIN — METRONIDAZOLE 500 MG: 500 INJECTION, SOLUTION INTRAVENOUS at 04:11

## 2021-11-03 RX ADMIN — SODIUM CHLORIDE, PRESERVATIVE FREE 10 ML: 5 INJECTION INTRAVENOUS at 06:11

## 2021-11-03 RX ADMIN — SODIUM CHLORIDE, PRESERVATIVE FREE 10 ML: 5 INJECTION INTRAVENOUS at 12:11

## 2021-11-03 RX ADMIN — INSULIN ASPART 2 UNITS: 100 INJECTION, SOLUTION INTRAVENOUS; SUBCUTANEOUS at 05:11

## 2021-11-03 RX ADMIN — ENOXAPARIN SODIUM 40 MG: 40 INJECTION, SOLUTION INTRAVENOUS; SUBCUTANEOUS at 10:11

## 2021-11-03 RX ADMIN — INSULIN ASPART 3 UNITS: 100 INJECTION, SOLUTION INTRAVENOUS; SUBCUTANEOUS at 06:11

## 2021-11-03 RX ADMIN — INSULIN DETEMIR 20 UNITS: 100 INJECTION, SOLUTION SUBCUTANEOUS at 10:11

## 2021-11-03 RX ADMIN — METRONIDAZOLE 500 MG: 500 TABLET ORAL at 10:11

## 2021-11-03 RX ADMIN — POTASSIUM CHLORIDE 40 MEQ: 1500 TABLET, EXTENDED RELEASE ORAL at 05:11

## 2021-11-03 RX ADMIN — SODIUM CHLORIDE, PRESERVATIVE FREE 10 ML: 5 INJECTION INTRAVENOUS at 11:11

## 2021-11-03 RX ADMIN — CEFTRIAXONE SODIUM 1 G: 1 INJECTION, POWDER, FOR SOLUTION INTRAMUSCULAR; INTRAVENOUS at 01:11

## 2021-11-03 RX ADMIN — INSULIN DETEMIR 20 UNITS: 100 INJECTION, SOLUTION SUBCUTANEOUS at 09:11

## 2021-11-03 RX ADMIN — INSULIN ASPART 5 UNITS: 100 INJECTION, SOLUTION INTRAVENOUS; SUBCUTANEOUS at 05:11

## 2021-11-03 RX ADMIN — INSULIN ASPART 4 UNITS: 100 INJECTION, SOLUTION INTRAVENOUS; SUBCUTANEOUS at 01:11

## 2021-11-03 RX ADMIN — METRONIDAZOLE 500 MG: 500 INJECTION, SOLUTION INTRAVENOUS at 02:11

## 2021-11-03 RX ADMIN — ENOXAPARIN SODIUM 40 MG: 40 INJECTION, SOLUTION INTRAVENOUS; SUBCUTANEOUS at 09:11

## 2021-11-04 PROBLEM — R78.81 BACTEREMIA: Status: ACTIVE | Noted: 2021-11-04

## 2021-11-04 LAB
ANION GAP SERPL CALC-SCNC: 7 MMOL/L (ref 8–16)
ANION GAP SERPL CALC-SCNC: 7 MMOL/L (ref 8–16)
BACTERIA BLD CULT: NORMAL
BACTERIA BLD CULT: NORMAL
BASOPHILS # BLD AUTO: 0.05 K/UL (ref 0–0.2)
BASOPHILS NFR BLD: 0.3 % (ref 0–1.9)
BUN SERPL-MCNC: 13 MG/DL (ref 6–20)
BUN SERPL-MCNC: 13 MG/DL (ref 6–20)
CALCIUM SERPL-MCNC: 7.9 MG/DL (ref 8.7–10.5)
CALCIUM SERPL-MCNC: 7.9 MG/DL (ref 8.7–10.5)
CHLORIDE SERPL-SCNC: 113 MMOL/L (ref 95–110)
CHLORIDE SERPL-SCNC: 113 MMOL/L (ref 95–110)
CO2 SERPL-SCNC: 21 MMOL/L (ref 23–29)
CO2 SERPL-SCNC: 21 MMOL/L (ref 23–29)
CREAT SERPL-MCNC: 0.9 MG/DL (ref 0.5–1.4)
CREAT SERPL-MCNC: 0.9 MG/DL (ref 0.5–1.4)
DIFFERENTIAL METHOD: ABNORMAL
EOSINOPHIL # BLD AUTO: 0.2 K/UL (ref 0–0.5)
EOSINOPHIL NFR BLD: 1.4 % (ref 0–8)
ERYTHROCYTE [DISTWIDTH] IN BLOOD BY AUTOMATED COUNT: 18.6 % (ref 11.5–14.5)
EST. GFR  (AFRICAN AMERICAN): >60 ML/MIN/1.73 M^2
EST. GFR  (AFRICAN AMERICAN): >60 ML/MIN/1.73 M^2
EST. GFR  (NON AFRICAN AMERICAN): >60 ML/MIN/1.73 M^2
EST. GFR  (NON AFRICAN AMERICAN): >60 ML/MIN/1.73 M^2
GLUCOSE SERPL-MCNC: 144 MG/DL (ref 70–110)
GLUCOSE SERPL-MCNC: 144 MG/DL (ref 70–110)
HCT VFR BLD AUTO: 24.5 % (ref 37–48.5)
HGB BLD-MCNC: 7.8 G/DL (ref 12–16)
IMM GRANULOCYTES # BLD AUTO: 0.63 K/UL (ref 0–0.04)
IMM GRANULOCYTES NFR BLD AUTO: 4.2 % (ref 0–0.5)
LYMPHOCYTES # BLD AUTO: 3.4 K/UL (ref 1–4.8)
LYMPHOCYTES NFR BLD: 22.7 % (ref 18–48)
MAGNESIUM SERPL-MCNC: 1.8 MG/DL (ref 1.6–2.6)
MCH RBC QN AUTO: 25.1 PG (ref 27–31)
MCHC RBC AUTO-ENTMCNC: 31.8 G/DL (ref 32–36)
MCV RBC AUTO: 79 FL (ref 82–98)
MONOCYTES # BLD AUTO: 1.3 K/UL (ref 0.3–1)
MONOCYTES NFR BLD: 8.6 % (ref 4–15)
NEUTROPHILS # BLD AUTO: 9.5 K/UL (ref 1.8–7.7)
NEUTROPHILS NFR BLD: 62.8 % (ref 38–73)
NRBC BLD-RTO: 1 /100 WBC
PHOSPHATE SERPL-MCNC: 3.6 MG/DL (ref 2.7–4.5)
PLATELET # BLD AUTO: 315 K/UL (ref 150–450)
PMV BLD AUTO: 12.1 FL (ref 9.2–12.9)
POCT GLUCOSE: 143 MG/DL (ref 70–110)
POCT GLUCOSE: 209 MG/DL (ref 70–110)
POCT GLUCOSE: 244 MG/DL (ref 70–110)
POCT GLUCOSE: 249 MG/DL (ref 70–110)
POTASSIUM SERPL-SCNC: 3.5 MMOL/L (ref 3.5–5.1)
POTASSIUM SERPL-SCNC: 3.5 MMOL/L (ref 3.5–5.1)
RBC # BLD AUTO: 3.11 M/UL (ref 4–5.4)
SODIUM SERPL-SCNC: 141 MMOL/L (ref 136–145)
SODIUM SERPL-SCNC: 141 MMOL/L (ref 136–145)
WBC # BLD AUTO: 15.07 K/UL (ref 3.9–12.7)

## 2021-11-04 PROCEDURE — 84100 ASSAY OF PHOSPHORUS: CPT | Performed by: INTERNAL MEDICINE

## 2021-11-04 PROCEDURE — 25000003 PHARM REV CODE 250: Performed by: FAMILY MEDICINE

## 2021-11-04 PROCEDURE — 83735 ASSAY OF MAGNESIUM: CPT | Performed by: INTERNAL MEDICINE

## 2021-11-04 PROCEDURE — 92526 ORAL FUNCTION THERAPY: CPT

## 2021-11-04 PROCEDURE — 63600175 PHARM REV CODE 636 W HCPCS: Performed by: INTERNAL MEDICINE

## 2021-11-04 PROCEDURE — 11000001 HC ACUTE MED/SURG PRIVATE ROOM

## 2021-11-04 PROCEDURE — A4216 STERILE WATER/SALINE, 10 ML: HCPCS | Performed by: INTERNAL MEDICINE

## 2021-11-04 PROCEDURE — 85025 COMPLETE CBC W/AUTO DIFF WBC: CPT | Performed by: INTERNAL MEDICINE

## 2021-11-04 PROCEDURE — 97530 THERAPEUTIC ACTIVITIES: CPT

## 2021-11-04 PROCEDURE — 80048 BASIC METABOLIC PNL TOTAL CA: CPT | Performed by: STUDENT IN AN ORGANIZED HEALTH CARE EDUCATION/TRAINING PROGRAM

## 2021-11-04 PROCEDURE — 99900035 HC TECH TIME PER 15 MIN (STAT)

## 2021-11-04 PROCEDURE — 63600175 PHARM REV CODE 636 W HCPCS: Performed by: STUDENT IN AN ORGANIZED HEALTH CARE EDUCATION/TRAINING PROGRAM

## 2021-11-04 PROCEDURE — 97535 SELF CARE MNGMENT TRAINING: CPT

## 2021-11-04 PROCEDURE — 94761 N-INVAS EAR/PLS OXIMETRY MLT: CPT

## 2021-11-04 PROCEDURE — 25000003 PHARM REV CODE 250: Performed by: INTERNAL MEDICINE

## 2021-11-04 RX ORDER — AMLODIPINE BESYLATE 5 MG/1
5 TABLET ORAL DAILY
Status: DISCONTINUED | OUTPATIENT
Start: 2021-11-04 | End: 2021-11-12

## 2021-11-04 RX ADMIN — SODIUM CHLORIDE, PRESERVATIVE FREE 10 ML: 5 INJECTION INTRAVENOUS at 05:11

## 2021-11-04 RX ADMIN — METRONIDAZOLE 500 MG: 500 TABLET ORAL at 02:11

## 2021-11-04 RX ADMIN — SODIUM CHLORIDE, PRESERVATIVE FREE 10 ML: 5 INJECTION INTRAVENOUS at 11:11

## 2021-11-04 RX ADMIN — INSULIN ASPART 1 UNITS: 100 INJECTION, SOLUTION INTRAVENOUS; SUBCUTANEOUS at 09:11

## 2021-11-04 RX ADMIN — ENOXAPARIN SODIUM 40 MG: 40 INJECTION, SOLUTION INTRAVENOUS; SUBCUTANEOUS at 08:11

## 2021-11-04 RX ADMIN — ENOXAPARIN SODIUM 40 MG: 40 INJECTION, SOLUTION INTRAVENOUS; SUBCUTANEOUS at 09:11

## 2021-11-04 RX ADMIN — INSULIN DETEMIR 20 UNITS: 100 INJECTION, SOLUTION SUBCUTANEOUS at 09:11

## 2021-11-04 RX ADMIN — INSULIN ASPART 2 UNITS: 100 INJECTION, SOLUTION INTRAVENOUS; SUBCUTANEOUS at 05:11

## 2021-11-04 RX ADMIN — METRONIDAZOLE 500 MG: 500 TABLET ORAL at 05:11

## 2021-11-04 RX ADMIN — INSULIN ASPART 5 UNITS: 100 INJECTION, SOLUTION INTRAVENOUS; SUBCUTANEOUS at 11:11

## 2021-11-04 RX ADMIN — CEFTRIAXONE SODIUM 1 G: 1 INJECTION, POWDER, FOR SOLUTION INTRAMUSCULAR; INTRAVENOUS at 02:11

## 2021-11-04 RX ADMIN — INSULIN ASPART 5 UNITS: 100 INJECTION, SOLUTION INTRAVENOUS; SUBCUTANEOUS at 05:11

## 2021-11-04 RX ADMIN — METRONIDAZOLE 500 MG: 500 TABLET ORAL at 09:11

## 2021-11-04 RX ADMIN — INSULIN DETEMIR 20 UNITS: 100 INJECTION, SOLUTION SUBCUTANEOUS at 08:11

## 2021-11-04 RX ADMIN — AMLODIPINE BESYLATE 5 MG: 5 TABLET ORAL at 11:11

## 2021-11-05 LAB
ANION GAP SERPL CALC-SCNC: 10 MMOL/L (ref 8–16)
ANION GAP SERPL CALC-SCNC: 10 MMOL/L (ref 8–16)
ANION GAP SERPL CALC-SCNC: 8 MMOL/L (ref 8–16)
ANION GAP SERPL CALC-SCNC: 9 MMOL/L (ref 8–16)
BASOPHILS # BLD AUTO: 0.04 K/UL (ref 0–0.2)
BASOPHILS NFR BLD: 0.3 % (ref 0–1.9)
BUN SERPL-MCNC: 10 MG/DL (ref 6–20)
BUN SERPL-MCNC: 10 MG/DL (ref 6–20)
BUN SERPL-MCNC: 9 MG/DL (ref 6–20)
BUN SERPL-MCNC: 9 MG/DL (ref 6–20)
CALCIUM SERPL-MCNC: 7.7 MG/DL (ref 8.7–10.5)
CALCIUM SERPL-MCNC: 7.7 MG/DL (ref 8.7–10.5)
CALCIUM SERPL-MCNC: 7.8 MG/DL (ref 8.7–10.5)
CALCIUM SERPL-MCNC: 7.9 MG/DL (ref 8.7–10.5)
CHLORIDE SERPL-SCNC: 113 MMOL/L (ref 95–110)
CHLORIDE SERPL-SCNC: 114 MMOL/L (ref 95–110)
CO2 SERPL-SCNC: 18 MMOL/L (ref 23–29)
CO2 SERPL-SCNC: 18 MMOL/L (ref 23–29)
CO2 SERPL-SCNC: 19 MMOL/L (ref 23–29)
CO2 SERPL-SCNC: 21 MMOL/L (ref 23–29)
CREAT SERPL-MCNC: 0.9 MG/DL (ref 0.5–1.4)
CREAT SERPL-MCNC: 0.9 MG/DL (ref 0.5–1.4)
CREAT SERPL-MCNC: 1 MG/DL (ref 0.5–1.4)
CREAT SERPL-MCNC: 1 MG/DL (ref 0.5–1.4)
DIFFERENTIAL METHOD: ABNORMAL
EOSINOPHIL # BLD AUTO: 0.1 K/UL (ref 0–0.5)
EOSINOPHIL NFR BLD: 0.5 % (ref 0–8)
ERYTHROCYTE [DISTWIDTH] IN BLOOD BY AUTOMATED COUNT: 19.4 % (ref 11.5–14.5)
EST. GFR  (AFRICAN AMERICAN): >60 ML/MIN/1.73 M^2
EST. GFR  (NON AFRICAN AMERICAN): >60 ML/MIN/1.73 M^2
GLUCOSE SERPL-MCNC: 186 MG/DL (ref 70–110)
GLUCOSE SERPL-MCNC: 214 MG/DL (ref 70–110)
GLUCOSE SERPL-MCNC: 242 MG/DL (ref 70–110)
GLUCOSE SERPL-MCNC: 244 MG/DL (ref 70–110)
HCT VFR BLD AUTO: 26 % (ref 37–48.5)
HGB BLD-MCNC: 8.1 G/DL (ref 12–16)
IMM GRANULOCYTES # BLD AUTO: 0.24 K/UL (ref 0–0.04)
IMM GRANULOCYTES NFR BLD AUTO: 1.6 % (ref 0–0.5)
LYMPHOCYTES # BLD AUTO: 2.9 K/UL (ref 1–4.8)
LYMPHOCYTES NFR BLD: 20 % (ref 18–48)
MAGNESIUM SERPL-MCNC: 1.8 MG/DL (ref 1.6–2.6)
MCH RBC QN AUTO: 24.8 PG (ref 27–31)
MCHC RBC AUTO-ENTMCNC: 31.2 G/DL (ref 32–36)
MCV RBC AUTO: 80 FL (ref 82–98)
MONOCYTES # BLD AUTO: 1 K/UL (ref 0.3–1)
MONOCYTES NFR BLD: 7.1 % (ref 4–15)
NEUTROPHILS # BLD AUTO: 10.3 K/UL (ref 1.8–7.7)
NEUTROPHILS NFR BLD: 70.5 % (ref 38–73)
NRBC BLD-RTO: 0 /100 WBC
PHOSPHATE SERPL-MCNC: 4 MG/DL (ref 2.7–4.5)
PLATELET # BLD AUTO: 328 K/UL (ref 150–450)
PMV BLD AUTO: 11.2 FL (ref 9.2–12.9)
POCT GLUCOSE: 155 MG/DL (ref 70–110)
POCT GLUCOSE: 220 MG/DL (ref 70–110)
POCT GLUCOSE: 243 MG/DL (ref 70–110)
POCT GLUCOSE: 256 MG/DL (ref 70–110)
POTASSIUM SERPL-SCNC: 3.3 MMOL/L (ref 3.5–5.1)
POTASSIUM SERPL-SCNC: 3.4 MMOL/L (ref 3.5–5.1)
POTASSIUM SERPL-SCNC: 3.6 MMOL/L (ref 3.5–5.1)
POTASSIUM SERPL-SCNC: 3.7 MMOL/L (ref 3.5–5.1)
RBC # BLD AUTO: 3.26 M/UL (ref 4–5.4)
SODIUM SERPL-SCNC: 141 MMOL/L (ref 136–145)
SODIUM SERPL-SCNC: 141 MMOL/L (ref 136–145)
SODIUM SERPL-SCNC: 142 MMOL/L (ref 136–145)
SODIUM SERPL-SCNC: 142 MMOL/L (ref 136–145)
WBC # BLD AUTO: 14.59 K/UL (ref 3.9–12.7)

## 2021-11-05 PROCEDURE — 85025 COMPLETE CBC W/AUTO DIFF WBC: CPT | Performed by: INTERNAL MEDICINE

## 2021-11-05 PROCEDURE — 11000001 HC ACUTE MED/SURG PRIVATE ROOM

## 2021-11-05 PROCEDURE — 94760 N-INVAS EAR/PLS OXIMETRY 1: CPT

## 2021-11-05 PROCEDURE — A4216 STERILE WATER/SALINE, 10 ML: HCPCS | Performed by: INTERNAL MEDICINE

## 2021-11-05 PROCEDURE — 63600175 PHARM REV CODE 636 W HCPCS: Performed by: STUDENT IN AN ORGANIZED HEALTH CARE EDUCATION/TRAINING PROGRAM

## 2021-11-05 PROCEDURE — 25000003 PHARM REV CODE 250: Performed by: FAMILY MEDICINE

## 2021-11-05 PROCEDURE — 36415 COLL VENOUS BLD VENIPUNCTURE: CPT | Performed by: INTERNAL MEDICINE

## 2021-11-05 PROCEDURE — 80048 BASIC METABOLIC PNL TOTAL CA: CPT | Mod: 91 | Performed by: STUDENT IN AN ORGANIZED HEALTH CARE EDUCATION/TRAINING PROGRAM

## 2021-11-05 PROCEDURE — 84100 ASSAY OF PHOSPHORUS: CPT | Performed by: INTERNAL MEDICINE

## 2021-11-05 PROCEDURE — 36415 COLL VENOUS BLD VENIPUNCTURE: CPT | Performed by: STUDENT IN AN ORGANIZED HEALTH CARE EDUCATION/TRAINING PROGRAM

## 2021-11-05 PROCEDURE — 63600175 PHARM REV CODE 636 W HCPCS: Performed by: INTERNAL MEDICINE

## 2021-11-05 PROCEDURE — 25000003 PHARM REV CODE 250: Performed by: INTERNAL MEDICINE

## 2021-11-05 PROCEDURE — 83735 ASSAY OF MAGNESIUM: CPT | Performed by: INTERNAL MEDICINE

## 2021-11-05 PROCEDURE — 80048 BASIC METABOLIC PNL TOTAL CA: CPT | Performed by: STUDENT IN AN ORGANIZED HEALTH CARE EDUCATION/TRAINING PROGRAM

## 2021-11-05 PROCEDURE — 97530 THERAPEUTIC ACTIVITIES: CPT

## 2021-11-05 PROCEDURE — 97116 GAIT TRAINING THERAPY: CPT | Mod: CQ

## 2021-11-05 RX ORDER — INSULIN ASPART 100 [IU]/ML
7 INJECTION, SOLUTION INTRAVENOUS; SUBCUTANEOUS
Status: DISCONTINUED | OUTPATIENT
Start: 2021-11-05 | End: 2021-11-12 | Stop reason: HOSPADM

## 2021-11-05 RX ORDER — ACETAMINOPHEN 325 MG/1
650 TABLET ORAL EVERY 4 HOURS PRN
Status: DISCONTINUED | OUTPATIENT
Start: 2021-11-05 | End: 2021-11-12 | Stop reason: HOSPADM

## 2021-11-05 RX ORDER — POTASSIUM CHLORIDE 750 MG/1
40 TABLET, EXTENDED RELEASE ORAL DAILY
Status: DISCONTINUED | OUTPATIENT
Start: 2021-11-06 | End: 2021-11-08

## 2021-11-05 RX ORDER — POTASSIUM CHLORIDE 750 MG/1
40 CAPSULE, EXTENDED RELEASE ORAL DAILY
Status: DISCONTINUED | OUTPATIENT
Start: 2021-11-05 | End: 2021-11-05

## 2021-11-05 RX ADMIN — SODIUM CHLORIDE, PRESERVATIVE FREE 10 ML: 5 INJECTION INTRAVENOUS at 12:11

## 2021-11-05 RX ADMIN — ENOXAPARIN SODIUM 40 MG: 40 INJECTION, SOLUTION INTRAVENOUS; SUBCUTANEOUS at 08:11

## 2021-11-05 RX ADMIN — SODIUM CHLORIDE, PRESERVATIVE FREE 10 ML: 5 INJECTION INTRAVENOUS at 05:11

## 2021-11-05 RX ADMIN — INSULIN ASPART 1 UNITS: 100 INJECTION, SOLUTION INTRAVENOUS; SUBCUTANEOUS at 09:11

## 2021-11-05 RX ADMIN — AMLODIPINE BESYLATE 5 MG: 5 TABLET ORAL at 08:11

## 2021-11-05 RX ADMIN — INSULIN ASPART 7 UNITS: 100 INJECTION, SOLUTION INTRAVENOUS; SUBCUTANEOUS at 05:11

## 2021-11-05 RX ADMIN — INSULIN DETEMIR 20 UNITS: 100 INJECTION, SOLUTION SUBCUTANEOUS at 08:11

## 2021-11-05 RX ADMIN — INSULIN ASPART 5 UNITS: 100 INJECTION, SOLUTION INTRAVENOUS; SUBCUTANEOUS at 08:11

## 2021-11-05 RX ADMIN — CEFTRIAXONE SODIUM 1 G: 1 INJECTION, POWDER, FOR SOLUTION INTRAMUSCULAR; INTRAVENOUS at 01:11

## 2021-11-05 RX ADMIN — ENOXAPARIN SODIUM 40 MG: 40 INJECTION, SOLUTION INTRAVENOUS; SUBCUTANEOUS at 09:11

## 2021-11-05 RX ADMIN — SODIUM CHLORIDE, PRESERVATIVE FREE 10 ML: 5 INJECTION INTRAVENOUS at 01:11

## 2021-11-05 RX ADMIN — METRONIDAZOLE 500 MG: 500 TABLET ORAL at 05:11

## 2021-11-05 RX ADMIN — INSULIN ASPART 2 UNITS: 100 INJECTION, SOLUTION INTRAVENOUS; SUBCUTANEOUS at 01:11

## 2021-11-05 RX ADMIN — INSULIN DETEMIR 20 UNITS: 100 INJECTION, SOLUTION SUBCUTANEOUS at 09:11

## 2021-11-05 RX ADMIN — METRONIDAZOLE 500 MG: 500 TABLET ORAL at 09:11

## 2021-11-05 RX ADMIN — INSULIN ASPART 7 UNITS: 100 INJECTION, SOLUTION INTRAVENOUS; SUBCUTANEOUS at 01:11

## 2021-11-05 RX ADMIN — METRONIDAZOLE 500 MG: 500 TABLET ORAL at 01:11

## 2021-11-05 RX ADMIN — INSULIN ASPART 2 UNITS: 100 INJECTION, SOLUTION INTRAVENOUS; SUBCUTANEOUS at 05:11

## 2021-11-06 LAB
ANION GAP SERPL CALC-SCNC: 10 MMOL/L (ref 8–16)
ANION GAP SERPL CALC-SCNC: 12 MMOL/L (ref 8–16)
ANION GAP SERPL CALC-SCNC: 8 MMOL/L (ref 8–16)
ANION GAP SERPL CALC-SCNC: 8 MMOL/L (ref 8–16)
BASOPHILS # BLD AUTO: 0.05 K/UL (ref 0–0.2)
BASOPHILS NFR BLD: 0.3 % (ref 0–1.9)
BUN SERPL-MCNC: 6 MG/DL (ref 6–20)
BUN SERPL-MCNC: 6 MG/DL (ref 6–20)
BUN SERPL-MCNC: 7 MG/DL (ref 6–20)
BUN SERPL-MCNC: 9 MG/DL (ref 6–20)
CALCIUM SERPL-MCNC: 7.8 MG/DL (ref 8.7–10.5)
CALCIUM SERPL-MCNC: 7.9 MG/DL (ref 8.7–10.5)
CALCIUM SERPL-MCNC: 7.9 MG/DL (ref 8.7–10.5)
CALCIUM SERPL-MCNC: 8.1 MG/DL (ref 8.7–10.5)
CHLORIDE SERPL-SCNC: 112 MMOL/L (ref 95–110)
CHLORIDE SERPL-SCNC: 113 MMOL/L (ref 95–110)
CHLORIDE SERPL-SCNC: 113 MMOL/L (ref 95–110)
CHLORIDE SERPL-SCNC: 114 MMOL/L (ref 95–110)
CO2 SERPL-SCNC: 18 MMOL/L (ref 23–29)
CO2 SERPL-SCNC: 20 MMOL/L (ref 23–29)
CO2 SERPL-SCNC: 21 MMOL/L (ref 23–29)
CO2 SERPL-SCNC: 23 MMOL/L (ref 23–29)
CREAT SERPL-MCNC: 0.8 MG/DL (ref 0.5–1.4)
CREAT SERPL-MCNC: 0.8 MG/DL (ref 0.5–1.4)
CREAT SERPL-MCNC: 0.9 MG/DL (ref 0.5–1.4)
CREAT SERPL-MCNC: 0.9 MG/DL (ref 0.5–1.4)
DIFFERENTIAL METHOD: ABNORMAL
EOSINOPHIL # BLD AUTO: 0.1 K/UL (ref 0–0.5)
EOSINOPHIL NFR BLD: 0.4 % (ref 0–8)
ERYTHROCYTE [DISTWIDTH] IN BLOOD BY AUTOMATED COUNT: 20.2 % (ref 11.5–14.5)
EST. GFR  (AFRICAN AMERICAN): >60 ML/MIN/1.73 M^2
EST. GFR  (NON AFRICAN AMERICAN): >60 ML/MIN/1.73 M^2
GLUCOSE SERPL-MCNC: 171 MG/DL (ref 70–110)
GLUCOSE SERPL-MCNC: 177 MG/DL (ref 70–110)
GLUCOSE SERPL-MCNC: 291 MG/DL (ref 70–110)
GLUCOSE SERPL-MCNC: 99 MG/DL (ref 70–110)
HCT VFR BLD AUTO: 25.5 % (ref 37–48.5)
HGB BLD-MCNC: 8 G/DL (ref 12–16)
IMM GRANULOCYTES # BLD AUTO: 0.15 K/UL (ref 0–0.04)
IMM GRANULOCYTES NFR BLD AUTO: 1 % (ref 0–0.5)
LYMPHOCYTES # BLD AUTO: 3 K/UL (ref 1–4.8)
LYMPHOCYTES NFR BLD: 20.9 % (ref 18–48)
MAGNESIUM SERPL-MCNC: 1.9 MG/DL (ref 1.6–2.6)
MCH RBC QN AUTO: 25.3 PG (ref 27–31)
MCHC RBC AUTO-ENTMCNC: 31.4 G/DL (ref 32–36)
MCV RBC AUTO: 81 FL (ref 82–98)
MONOCYTES # BLD AUTO: 1 K/UL (ref 0.3–1)
MONOCYTES NFR BLD: 7.2 % (ref 4–15)
NEUTROPHILS # BLD AUTO: 10.2 K/UL (ref 1.8–7.7)
NEUTROPHILS NFR BLD: 70.2 % (ref 38–73)
NRBC BLD-RTO: 0 /100 WBC
PHOSPHATE SERPL-MCNC: 3.9 MG/DL (ref 2.7–4.5)
PLATELET # BLD AUTO: 356 K/UL (ref 150–450)
PMV BLD AUTO: 11.6 FL (ref 9.2–12.9)
POCT GLUCOSE: 100 MG/DL (ref 70–110)
POCT GLUCOSE: 185 MG/DL (ref 70–110)
POCT GLUCOSE: 294 MG/DL (ref 70–110)
POCT GLUCOSE: 317 MG/DL (ref 70–110)
POTASSIUM SERPL-SCNC: 3.1 MMOL/L (ref 3.5–5.1)
POTASSIUM SERPL-SCNC: 3.1 MMOL/L (ref 3.5–5.1)
POTASSIUM SERPL-SCNC: 3.3 MMOL/L (ref 3.5–5.1)
POTASSIUM SERPL-SCNC: 3.7 MMOL/L (ref 3.5–5.1)
RBC # BLD AUTO: 3.16 M/UL (ref 4–5.4)
SODIUM SERPL-SCNC: 142 MMOL/L (ref 136–145)
SODIUM SERPL-SCNC: 142 MMOL/L (ref 136–145)
SODIUM SERPL-SCNC: 143 MMOL/L (ref 136–145)
SODIUM SERPL-SCNC: 145 MMOL/L (ref 136–145)
WBC # BLD AUTO: 14.49 K/UL (ref 3.9–12.7)

## 2021-11-06 PROCEDURE — 94660 CPAP INITIATION&MGMT: CPT

## 2021-11-06 PROCEDURE — 80048 BASIC METABOLIC PNL TOTAL CA: CPT | Performed by: STUDENT IN AN ORGANIZED HEALTH CARE EDUCATION/TRAINING PROGRAM

## 2021-11-06 PROCEDURE — 94761 N-INVAS EAR/PLS OXIMETRY MLT: CPT

## 2021-11-06 PROCEDURE — 36415 COLL VENOUS BLD VENIPUNCTURE: CPT | Performed by: INTERNAL MEDICINE

## 2021-11-06 PROCEDURE — 63600175 PHARM REV CODE 636 W HCPCS: Performed by: STUDENT IN AN ORGANIZED HEALTH CARE EDUCATION/TRAINING PROGRAM

## 2021-11-06 PROCEDURE — 21400001 HC TELEMETRY ROOM

## 2021-11-06 PROCEDURE — 83735 ASSAY OF MAGNESIUM: CPT | Performed by: INTERNAL MEDICINE

## 2021-11-06 PROCEDURE — 63600175 PHARM REV CODE 636 W HCPCS: Performed by: INTERNAL MEDICINE

## 2021-11-06 PROCEDURE — 99900035 HC TECH TIME PER 15 MIN (STAT)

## 2021-11-06 PROCEDURE — 84100 ASSAY OF PHOSPHORUS: CPT | Performed by: INTERNAL MEDICINE

## 2021-11-06 PROCEDURE — 85025 COMPLETE CBC W/AUTO DIFF WBC: CPT | Performed by: INTERNAL MEDICINE

## 2021-11-06 PROCEDURE — 25000003 PHARM REV CODE 250: Performed by: FAMILY MEDICINE

## 2021-11-06 PROCEDURE — 36415 COLL VENOUS BLD VENIPUNCTURE: CPT | Performed by: STUDENT IN AN ORGANIZED HEALTH CARE EDUCATION/TRAINING PROGRAM

## 2021-11-06 PROCEDURE — 25000003 PHARM REV CODE 250: Performed by: INTERNAL MEDICINE

## 2021-11-06 PROCEDURE — 94760 N-INVAS EAR/PLS OXIMETRY 1: CPT

## 2021-11-06 PROCEDURE — A4216 STERILE WATER/SALINE, 10 ML: HCPCS | Performed by: INTERNAL MEDICINE

## 2021-11-06 RX ORDER — METOPROLOL SUCCINATE 50 MG/1
50 TABLET, EXTENDED RELEASE ORAL DAILY
Status: DISCONTINUED | OUTPATIENT
Start: 2021-11-06 | End: 2021-11-12

## 2021-11-06 RX ADMIN — POTASSIUM CHLORIDE 40 MEQ: 750 TABLET, EXTENDED RELEASE ORAL at 08:11

## 2021-11-06 RX ADMIN — METRONIDAZOLE 500 MG: 500 TABLET ORAL at 05:11

## 2021-11-06 RX ADMIN — METOPROLOL SUCCINATE 50 MG: 50 TABLET, EXTENDED RELEASE ORAL at 08:11

## 2021-11-06 RX ADMIN — ENOXAPARIN SODIUM 40 MG: 40 INJECTION, SOLUTION INTRAVENOUS; SUBCUTANEOUS at 09:11

## 2021-11-06 RX ADMIN — INSULIN ASPART 7 UNITS: 100 INJECTION, SOLUTION INTRAVENOUS; SUBCUTANEOUS at 08:11

## 2021-11-06 RX ADMIN — SODIUM CHLORIDE, PRESERVATIVE FREE 10 ML: 5 INJECTION INTRAVENOUS at 09:11

## 2021-11-06 RX ADMIN — ACETAMINOPHEN 650 MG: 325 TABLET ORAL at 08:11

## 2021-11-06 RX ADMIN — INSULIN DETEMIR 20 UNITS: 100 INJECTION, SOLUTION SUBCUTANEOUS at 08:11

## 2021-11-06 RX ADMIN — ENOXAPARIN SODIUM 40 MG: 40 INJECTION, SOLUTION INTRAVENOUS; SUBCUTANEOUS at 08:11

## 2021-11-06 RX ADMIN — METRONIDAZOLE 500 MG: 500 TABLET ORAL at 09:11

## 2021-11-06 RX ADMIN — SODIUM CHLORIDE, PRESERVATIVE FREE 10 ML: 5 INJECTION INTRAVENOUS at 05:11

## 2021-11-06 RX ADMIN — INSULIN DETEMIR 20 UNITS: 100 INJECTION, SOLUTION SUBCUTANEOUS at 09:11

## 2021-11-06 RX ADMIN — INSULIN ASPART 7 UNITS: 100 INJECTION, SOLUTION INTRAVENOUS; SUBCUTANEOUS at 05:11

## 2021-11-06 RX ADMIN — AMLODIPINE BESYLATE 5 MG: 5 TABLET ORAL at 08:11

## 2021-11-06 RX ADMIN — CEFTRIAXONE SODIUM 1 G: 1 INJECTION, POWDER, FOR SOLUTION INTRAMUSCULAR; INTRAVENOUS at 12:11

## 2021-11-06 RX ADMIN — SODIUM CHLORIDE, PRESERVATIVE FREE 10 ML: 5 INJECTION INTRAVENOUS at 12:11

## 2021-11-06 RX ADMIN — INSULIN ASPART 3 UNITS: 100 INJECTION, SOLUTION INTRAVENOUS; SUBCUTANEOUS at 05:11

## 2021-11-06 RX ADMIN — INSULIN ASPART 2 UNITS: 100 INJECTION, SOLUTION INTRAVENOUS; SUBCUTANEOUS at 09:11

## 2021-11-06 RX ADMIN — METRONIDAZOLE 500 MG: 500 TABLET ORAL at 01:11

## 2021-11-07 LAB
ANION GAP SERPL CALC-SCNC: 11 MMOL/L (ref 8–16)
ANION GAP SERPL CALC-SCNC: 12 MMOL/L (ref 8–16)
ANION GAP SERPL CALC-SCNC: 14 MMOL/L (ref 8–16)
ANION GAP SERPL CALC-SCNC: 8 MMOL/L (ref 8–16)
ANION GAP SERPL CALC-SCNC: 9 MMOL/L (ref 8–16)
BASOPHILS # BLD AUTO: 0.04 K/UL (ref 0–0.2)
BASOPHILS NFR BLD: 0.3 % (ref 0–1.9)
BUN SERPL-MCNC: 6 MG/DL (ref 6–20)
BUN SERPL-MCNC: 7 MG/DL (ref 6–20)
BUN SERPL-MCNC: 7 MG/DL (ref 6–20)
BUN SERPL-MCNC: 8 MG/DL (ref 6–20)
BUN SERPL-MCNC: 8 MG/DL (ref 6–20)
CALCIUM SERPL-MCNC: 8 MG/DL (ref 8.7–10.5)
CALCIUM SERPL-MCNC: 8.1 MG/DL (ref 8.7–10.5)
CALCIUM SERPL-MCNC: 8.2 MG/DL (ref 8.7–10.5)
CALCIUM SERPL-MCNC: 8.3 MG/DL (ref 8.7–10.5)
CALCIUM SERPL-MCNC: 8.6 MG/DL (ref 8.7–10.5)
CHLORIDE SERPL-SCNC: 113 MMOL/L (ref 95–110)
CHLORIDE SERPL-SCNC: 113 MMOL/L (ref 95–110)
CHLORIDE SERPL-SCNC: 115 MMOL/L (ref 95–110)
CHLORIDE SERPL-SCNC: 115 MMOL/L (ref 95–110)
CHLORIDE SERPL-SCNC: 119 MMOL/L (ref 95–110)
CO2 SERPL-SCNC: 19 MMOL/L (ref 23–29)
CO2 SERPL-SCNC: 20 MMOL/L (ref 23–29)
CO2 SERPL-SCNC: 22 MMOL/L (ref 23–29)
CREAT SERPL-MCNC: 0.8 MG/DL (ref 0.5–1.4)
CREAT SERPL-MCNC: 0.9 MG/DL (ref 0.5–1.4)
CREAT SERPL-MCNC: 1.1 MG/DL (ref 0.5–1.4)
DIFFERENTIAL METHOD: ABNORMAL
EOSINOPHIL # BLD AUTO: 0.1 K/UL (ref 0–0.5)
EOSINOPHIL NFR BLD: 0.5 % (ref 0–8)
ERYTHROCYTE [DISTWIDTH] IN BLOOD BY AUTOMATED COUNT: 21.2 % (ref 11.5–14.5)
EST. GFR  (AFRICAN AMERICAN): >60 ML/MIN/1.73 M^2
EST. GFR  (NON AFRICAN AMERICAN): 55 ML/MIN/1.73 M^2
EST. GFR  (NON AFRICAN AMERICAN): >60 ML/MIN/1.73 M^2
GLUCOSE SERPL-MCNC: 180 MG/DL (ref 70–110)
GLUCOSE SERPL-MCNC: 182 MG/DL (ref 70–110)
GLUCOSE SERPL-MCNC: 197 MG/DL (ref 70–110)
GLUCOSE SERPL-MCNC: 214 MG/DL (ref 70–110)
GLUCOSE SERPL-MCNC: 242 MG/DL (ref 70–110)
HCT VFR BLD AUTO: 25.8 % (ref 37–48.5)
HGB BLD-MCNC: 8 G/DL (ref 12–16)
IMM GRANULOCYTES # BLD AUTO: 0.11 K/UL (ref 0–0.04)
IMM GRANULOCYTES NFR BLD AUTO: 0.7 % (ref 0–0.5)
LYMPHOCYTES # BLD AUTO: 2.2 K/UL (ref 1–4.8)
LYMPHOCYTES NFR BLD: 15.1 % (ref 18–48)
MAGNESIUM SERPL-MCNC: 1.9 MG/DL (ref 1.6–2.6)
MCH RBC QN AUTO: 25.3 PG (ref 27–31)
MCHC RBC AUTO-ENTMCNC: 31 G/DL (ref 32–36)
MCV RBC AUTO: 82 FL (ref 82–98)
MONOCYTES # BLD AUTO: 0.9 K/UL (ref 0.3–1)
MONOCYTES NFR BLD: 5.8 % (ref 4–15)
NEUTROPHILS # BLD AUTO: 11.5 K/UL (ref 1.8–7.7)
NEUTROPHILS NFR BLD: 77.6 % (ref 38–73)
NRBC BLD-RTO: 0 /100 WBC
PHOSPHATE SERPL-MCNC: 3.8 MG/DL (ref 2.7–4.5)
PLATELET # BLD AUTO: 367 K/UL (ref 150–450)
PMV BLD AUTO: 11.4 FL (ref 9.2–12.9)
POCT GLUCOSE: 201 MG/DL (ref 70–110)
POCT GLUCOSE: 217 MG/DL (ref 70–110)
POCT GLUCOSE: 220 MG/DL (ref 70–110)
POCT GLUCOSE: 249 MG/DL (ref 70–110)
POTASSIUM SERPL-SCNC: 3.4 MMOL/L (ref 3.5–5.1)
POTASSIUM SERPL-SCNC: 3.8 MMOL/L (ref 3.5–5.1)
POTASSIUM SERPL-SCNC: 3.9 MMOL/L (ref 3.5–5.1)
RBC # BLD AUTO: 3.16 M/UL (ref 4–5.4)
SODIUM SERPL-SCNC: 146 MMOL/L (ref 136–145)
SODIUM SERPL-SCNC: 147 MMOL/L (ref 136–145)
SODIUM SERPL-SCNC: 149 MMOL/L (ref 136–145)
WBC # BLD AUTO: 14.75 K/UL (ref 3.9–12.7)

## 2021-11-07 PROCEDURE — 94660 CPAP INITIATION&MGMT: CPT

## 2021-11-07 PROCEDURE — 83735 ASSAY OF MAGNESIUM: CPT | Performed by: INTERNAL MEDICINE

## 2021-11-07 PROCEDURE — 63600175 PHARM REV CODE 636 W HCPCS: Performed by: INTERNAL MEDICINE

## 2021-11-07 PROCEDURE — 99900035 HC TECH TIME PER 15 MIN (STAT)

## 2021-11-07 PROCEDURE — 84100 ASSAY OF PHOSPHORUS: CPT | Performed by: INTERNAL MEDICINE

## 2021-11-07 PROCEDURE — 80048 BASIC METABOLIC PNL TOTAL CA: CPT | Mod: 91 | Performed by: STUDENT IN AN ORGANIZED HEALTH CARE EDUCATION/TRAINING PROGRAM

## 2021-11-07 PROCEDURE — 94761 N-INVAS EAR/PLS OXIMETRY MLT: CPT

## 2021-11-07 PROCEDURE — 25000003 PHARM REV CODE 250: Performed by: INTERNAL MEDICINE

## 2021-11-07 PROCEDURE — 63600175 PHARM REV CODE 636 W HCPCS: Performed by: FAMILY MEDICINE

## 2021-11-07 PROCEDURE — 21400001 HC TELEMETRY ROOM

## 2021-11-07 PROCEDURE — 85025 COMPLETE CBC W/AUTO DIFF WBC: CPT | Performed by: INTERNAL MEDICINE

## 2021-11-07 PROCEDURE — 63600175 PHARM REV CODE 636 W HCPCS: Performed by: STUDENT IN AN ORGANIZED HEALTH CARE EDUCATION/TRAINING PROGRAM

## 2021-11-07 PROCEDURE — A4216 STERILE WATER/SALINE, 10 ML: HCPCS | Performed by: INTERNAL MEDICINE

## 2021-11-07 PROCEDURE — 36415 COLL VENOUS BLD VENIPUNCTURE: CPT | Performed by: INTERNAL MEDICINE

## 2021-11-07 PROCEDURE — 25000003 PHARM REV CODE 250: Performed by: FAMILY MEDICINE

## 2021-11-07 PROCEDURE — 94760 N-INVAS EAR/PLS OXIMETRY 1: CPT

## 2021-11-07 PROCEDURE — 36415 COLL VENOUS BLD VENIPUNCTURE: CPT | Performed by: STUDENT IN AN ORGANIZED HEALTH CARE EDUCATION/TRAINING PROGRAM

## 2021-11-07 RX ORDER — FUROSEMIDE 10 MG/ML
20 INJECTION INTRAMUSCULAR; INTRAVENOUS ONCE
Status: COMPLETED | OUTPATIENT
Start: 2021-11-07 | End: 2021-11-07

## 2021-11-07 RX ADMIN — METRONIDAZOLE 500 MG: 500 TABLET ORAL at 02:11

## 2021-11-07 RX ADMIN — INSULIN ASPART 2 UNITS: 100 INJECTION, SOLUTION INTRAVENOUS; SUBCUTANEOUS at 05:11

## 2021-11-07 RX ADMIN — SODIUM CHLORIDE, PRESERVATIVE FREE 10 ML: 5 INJECTION INTRAVENOUS at 05:11

## 2021-11-07 RX ADMIN — INSULIN DETEMIR 20 UNITS: 100 INJECTION, SOLUTION SUBCUTANEOUS at 08:11

## 2021-11-07 RX ADMIN — INSULIN ASPART 7 UNITS: 100 INJECTION, SOLUTION INTRAVENOUS; SUBCUTANEOUS at 05:11

## 2021-11-07 RX ADMIN — INSULIN ASPART 2 UNITS: 100 INJECTION, SOLUTION INTRAVENOUS; SUBCUTANEOUS at 11:11

## 2021-11-07 RX ADMIN — SODIUM CHLORIDE, PRESERVATIVE FREE 10 ML: 5 INJECTION INTRAVENOUS at 11:11

## 2021-11-07 RX ADMIN — SODIUM CHLORIDE, PRESERVATIVE FREE 10 ML: 5 INJECTION INTRAVENOUS at 09:11

## 2021-11-07 RX ADMIN — METOPROLOL SUCCINATE 50 MG: 50 TABLET, EXTENDED RELEASE ORAL at 08:11

## 2021-11-07 RX ADMIN — CEFTRIAXONE SODIUM 1 G: 1 INJECTION, POWDER, FOR SOLUTION INTRAMUSCULAR; INTRAVENOUS at 12:11

## 2021-11-07 RX ADMIN — POTASSIUM CHLORIDE 40 MEQ: 750 TABLET, EXTENDED RELEASE ORAL at 08:11

## 2021-11-07 RX ADMIN — AMLODIPINE BESYLATE 5 MG: 5 TABLET ORAL at 08:11

## 2021-11-07 RX ADMIN — METRONIDAZOLE 500 MG: 500 TABLET ORAL at 05:11

## 2021-11-07 RX ADMIN — FUROSEMIDE 20 MG: 20 INJECTION, SOLUTION INTRAMUSCULAR; INTRAVENOUS at 11:11

## 2021-11-07 RX ADMIN — ENOXAPARIN SODIUM 40 MG: 40 INJECTION, SOLUTION INTRAVENOUS; SUBCUTANEOUS at 09:11

## 2021-11-07 RX ADMIN — INSULIN ASPART 7 UNITS: 100 INJECTION, SOLUTION INTRAVENOUS; SUBCUTANEOUS at 11:11

## 2021-11-07 RX ADMIN — ENOXAPARIN SODIUM 40 MG: 40 INJECTION, SOLUTION INTRAVENOUS; SUBCUTANEOUS at 08:11

## 2021-11-07 RX ADMIN — METRONIDAZOLE 500 MG: 500 TABLET ORAL at 09:11

## 2021-11-07 RX ADMIN — INSULIN DETEMIR 20 UNITS: 100 INJECTION, SOLUTION SUBCUTANEOUS at 09:11

## 2021-11-07 RX ADMIN — INSULIN ASPART 1 UNITS: 100 INJECTION, SOLUTION INTRAVENOUS; SUBCUTANEOUS at 09:11

## 2021-11-08 PROBLEM — E87.0 HYPERNATREMIA: Status: ACTIVE | Noted: 2021-11-08

## 2021-11-08 LAB
ANION GAP SERPL CALC-SCNC: 12 MMOL/L (ref 8–16)
ANION GAP SERPL CALC-SCNC: 9 MMOL/L (ref 8–16)
BASOPHILS # BLD AUTO: 0.03 K/UL (ref 0–0.2)
BASOPHILS NFR BLD: 0.2 % (ref 0–1.9)
BUN SERPL-MCNC: 6 MG/DL (ref 6–20)
BUN SERPL-MCNC: 6 MG/DL (ref 6–20)
CALCIUM SERPL-MCNC: 8.3 MG/DL (ref 8.7–10.5)
CALCIUM SERPL-MCNC: 8.5 MG/DL (ref 8.7–10.5)
CHLORIDE SERPL-SCNC: 117 MMOL/L (ref 95–110)
CHLORIDE SERPL-SCNC: 117 MMOL/L (ref 95–110)
CO2 SERPL-SCNC: 25 MMOL/L (ref 23–29)
CO2 SERPL-SCNC: 25 MMOL/L (ref 23–29)
CREAT SERPL-MCNC: 0.9 MG/DL (ref 0.5–1.4)
CREAT SERPL-MCNC: 0.9 MG/DL (ref 0.5–1.4)
DIFFERENTIAL METHOD: ABNORMAL
EOSINOPHIL # BLD AUTO: 0.1 K/UL (ref 0–0.5)
EOSINOPHIL NFR BLD: 0.7 % (ref 0–8)
ERYTHROCYTE [DISTWIDTH] IN BLOOD BY AUTOMATED COUNT: 22 % (ref 11.5–14.5)
EST. GFR  (AFRICAN AMERICAN): >60 ML/MIN/1.73 M^2
EST. GFR  (AFRICAN AMERICAN): >60 ML/MIN/1.73 M^2
EST. GFR  (NON AFRICAN AMERICAN): >60 ML/MIN/1.73 M^2
EST. GFR  (NON AFRICAN AMERICAN): >60 ML/MIN/1.73 M^2
GLUCOSE SERPL-MCNC: 125 MG/DL (ref 70–110)
GLUCOSE SERPL-MCNC: 170 MG/DL (ref 70–110)
HCT VFR BLD AUTO: 25.6 % (ref 37–48.5)
HGB BLD-MCNC: 7.8 G/DL (ref 12–16)
IMM GRANULOCYTES # BLD AUTO: 0.08 K/UL (ref 0–0.04)
IMM GRANULOCYTES NFR BLD AUTO: 0.7 % (ref 0–0.5)
LYMPHOCYTES # BLD AUTO: 2.7 K/UL (ref 1–4.8)
LYMPHOCYTES NFR BLD: 21.9 % (ref 18–48)
MAGNESIUM SERPL-MCNC: 1.7 MG/DL (ref 1.6–2.6)
MCH RBC QN AUTO: 25.5 PG (ref 27–31)
MCHC RBC AUTO-ENTMCNC: 30.5 G/DL (ref 32–36)
MCV RBC AUTO: 84 FL (ref 82–98)
MONOCYTES # BLD AUTO: 0.7 K/UL (ref 0.3–1)
MONOCYTES NFR BLD: 6 % (ref 4–15)
NEUTROPHILS # BLD AUTO: 8.7 K/UL (ref 1.8–7.7)
NEUTROPHILS NFR BLD: 70.5 % (ref 38–73)
NRBC BLD-RTO: 0 /100 WBC
PHOSPHATE SERPL-MCNC: 4.2 MG/DL (ref 2.7–4.5)
PLATELET # BLD AUTO: 351 K/UL (ref 150–450)
PMV BLD AUTO: 10.9 FL (ref 9.2–12.9)
POCT GLUCOSE: 112 MG/DL (ref 70–110)
POCT GLUCOSE: 122 MG/DL (ref 70–110)
POCT GLUCOSE: 200 MG/DL (ref 70–110)
POCT GLUCOSE: 251 MG/DL (ref 70–110)
POTASSIUM SERPL-SCNC: 3.3 MMOL/L (ref 3.5–5.1)
POTASSIUM SERPL-SCNC: 3.6 MMOL/L (ref 3.5–5.1)
RBC # BLD AUTO: 3.06 M/UL (ref 4–5.4)
SODIUM SERPL-SCNC: 151 MMOL/L (ref 136–145)
SODIUM SERPL-SCNC: 154 MMOL/L (ref 136–145)
WBC # BLD AUTO: 12.26 K/UL (ref 3.9–12.7)

## 2021-11-08 PROCEDURE — 25000003 PHARM REV CODE 250: Performed by: FAMILY MEDICINE

## 2021-11-08 PROCEDURE — 36415 COLL VENOUS BLD VENIPUNCTURE: CPT | Performed by: STUDENT IN AN ORGANIZED HEALTH CARE EDUCATION/TRAINING PROGRAM

## 2021-11-08 PROCEDURE — 83735 ASSAY OF MAGNESIUM: CPT | Performed by: INTERNAL MEDICINE

## 2021-11-08 PROCEDURE — 85025 COMPLETE CBC W/AUTO DIFF WBC: CPT | Performed by: INTERNAL MEDICINE

## 2021-11-08 PROCEDURE — A4216 STERILE WATER/SALINE, 10 ML: HCPCS | Performed by: INTERNAL MEDICINE

## 2021-11-08 PROCEDURE — 94660 CPAP INITIATION&MGMT: CPT

## 2021-11-08 PROCEDURE — 21400001 HC TELEMETRY ROOM

## 2021-11-08 PROCEDURE — 63600175 PHARM REV CODE 636 W HCPCS: Performed by: INTERNAL MEDICINE

## 2021-11-08 PROCEDURE — 99900035 HC TECH TIME PER 15 MIN (STAT)

## 2021-11-08 PROCEDURE — 97530 THERAPEUTIC ACTIVITIES: CPT | Mod: CQ

## 2021-11-08 PROCEDURE — 84100 ASSAY OF PHOSPHORUS: CPT | Performed by: INTERNAL MEDICINE

## 2021-11-08 PROCEDURE — 63600175 PHARM REV CODE 636 W HCPCS: Performed by: STUDENT IN AN ORGANIZED HEALTH CARE EDUCATION/TRAINING PROGRAM

## 2021-11-08 PROCEDURE — 25000003 PHARM REV CODE 250: Performed by: INTERNAL MEDICINE

## 2021-11-08 PROCEDURE — 80048 BASIC METABOLIC PNL TOTAL CA: CPT | Performed by: STUDENT IN AN ORGANIZED HEALTH CARE EDUCATION/TRAINING PROGRAM

## 2021-11-08 PROCEDURE — 97535 SELF CARE MNGMENT TRAINING: CPT

## 2021-11-08 PROCEDURE — 36415 COLL VENOUS BLD VENIPUNCTURE: CPT | Performed by: INTERNAL MEDICINE

## 2021-11-08 PROCEDURE — 94761 N-INVAS EAR/PLS OXIMETRY MLT: CPT

## 2021-11-08 PROCEDURE — 97110 THERAPEUTIC EXERCISES: CPT

## 2021-11-08 PROCEDURE — 94760 N-INVAS EAR/PLS OXIMETRY 1: CPT

## 2021-11-08 RX ORDER — POTASSIUM CHLORIDE 20 MEQ/1
60 TABLET, EXTENDED RELEASE ORAL DAILY
Status: DISCONTINUED | OUTPATIENT
Start: 2021-11-08 | End: 2021-11-12 | Stop reason: HOSPADM

## 2021-11-08 RX ORDER — DEXTROSE MONOHYDRATE 50 MG/ML
INJECTION, SOLUTION INTRAVENOUS CONTINUOUS
Status: ACTIVE | OUTPATIENT
Start: 2021-11-08 | End: 2021-11-08

## 2021-11-08 RX ADMIN — METRONIDAZOLE 500 MG: 500 TABLET ORAL at 05:11

## 2021-11-08 RX ADMIN — AMLODIPINE BESYLATE 5 MG: 5 TABLET ORAL at 09:11

## 2021-11-08 RX ADMIN — DEXTROSE: 5 SOLUTION INTRAVENOUS at 12:11

## 2021-11-08 RX ADMIN — METRONIDAZOLE 500 MG: 500 TABLET ORAL at 09:11

## 2021-11-08 RX ADMIN — ENOXAPARIN SODIUM 40 MG: 40 INJECTION, SOLUTION INTRAVENOUS; SUBCUTANEOUS at 09:11

## 2021-11-08 RX ADMIN — INSULIN DETEMIR 20 UNITS: 100 INJECTION, SOLUTION SUBCUTANEOUS at 09:11

## 2021-11-08 RX ADMIN — POTASSIUM CHLORIDE 60 MEQ: 1500 TABLET, EXTENDED RELEASE ORAL at 09:11

## 2021-11-08 RX ADMIN — INSULIN ASPART 7 UNITS: 100 INJECTION, SOLUTION INTRAVENOUS; SUBCUTANEOUS at 07:11

## 2021-11-08 RX ADMIN — CEFTRIAXONE SODIUM 1 G: 1 INJECTION, POWDER, FOR SOLUTION INTRAMUSCULAR; INTRAVENOUS at 12:11

## 2021-11-08 RX ADMIN — METOPROLOL SUCCINATE 50 MG: 50 TABLET, EXTENDED RELEASE ORAL at 09:11

## 2021-11-08 RX ADMIN — INSULIN ASPART 7 UNITS: 100 INJECTION, SOLUTION INTRAVENOUS; SUBCUTANEOUS at 12:11

## 2021-11-08 RX ADMIN — SODIUM CHLORIDE, PRESERVATIVE FREE 10 ML: 5 INJECTION INTRAVENOUS at 06:11

## 2021-11-08 RX ADMIN — METRONIDAZOLE 500 MG: 500 TABLET ORAL at 04:11

## 2021-11-08 RX ADMIN — INSULIN ASPART 7 UNITS: 100 INJECTION, SOLUTION INTRAVENOUS; SUBCUTANEOUS at 04:11

## 2021-11-08 RX ADMIN — SODIUM CHLORIDE, PRESERVATIVE FREE 10 ML: 5 INJECTION INTRAVENOUS at 12:11

## 2021-11-09 LAB
ANION GAP SERPL CALC-SCNC: 11 MMOL/L (ref 8–16)
ANION GAP SERPL CALC-SCNC: 12 MMOL/L (ref 8–16)
ANION GAP SERPL CALC-SCNC: 13 MMOL/L (ref 8–16)
ANION GAP SERPL CALC-SCNC: 9 MMOL/L (ref 8–16)
ANISOCYTOSIS BLD QL SMEAR: ABNORMAL
BASOPHILS # BLD AUTO: 0.06 K/UL (ref 0–0.2)
BASOPHILS NFR BLD: 0.6 % (ref 0–1.9)
BUN SERPL-MCNC: 7 MG/DL (ref 6–20)
BUN SERPL-MCNC: 8 MG/DL (ref 6–20)
CALCIUM SERPL-MCNC: 7.8 MG/DL (ref 8.7–10.5)
CALCIUM SERPL-MCNC: 8.2 MG/DL (ref 8.7–10.5)
CALCIUM SERPL-MCNC: 8.3 MG/DL (ref 8.7–10.5)
CALCIUM SERPL-MCNC: 8.4 MG/DL (ref 8.7–10.5)
CHLORIDE SERPL-SCNC: 111 MMOL/L (ref 95–110)
CHLORIDE SERPL-SCNC: 115 MMOL/L (ref 95–110)
CHLORIDE SERPL-SCNC: 118 MMOL/L (ref 95–110)
CHLORIDE SERPL-SCNC: 120 MMOL/L (ref 95–110)
CO2 SERPL-SCNC: 24 MMOL/L (ref 23–29)
CO2 SERPL-SCNC: 25 MMOL/L (ref 23–29)
CO2 SERPL-SCNC: 25 MMOL/L (ref 23–29)
CO2 SERPL-SCNC: 26 MMOL/L (ref 23–29)
CREAT SERPL-MCNC: 0.9 MG/DL (ref 0.5–1.4)
DACRYOCYTES BLD QL SMEAR: ABNORMAL
DIFFERENTIAL METHOD: ABNORMAL
EOSINOPHIL # BLD AUTO: 0.1 K/UL (ref 0–0.5)
EOSINOPHIL NFR BLD: 0.8 % (ref 0–8)
ERYTHROCYTE [DISTWIDTH] IN BLOOD BY AUTOMATED COUNT: 23.2 % (ref 11.5–14.5)
EST. GFR  (AFRICAN AMERICAN): >60 ML/MIN/1.73 M^2
EST. GFR  (NON AFRICAN AMERICAN): >60 ML/MIN/1.73 M^2
GLUCOSE SERPL-MCNC: 152 MG/DL (ref 70–110)
GLUCOSE SERPL-MCNC: 197 MG/DL (ref 70–110)
GLUCOSE SERPL-MCNC: 211 MG/DL (ref 70–110)
GLUCOSE SERPL-MCNC: 218 MG/DL (ref 70–110)
HCT VFR BLD AUTO: 27.4 % (ref 37–48.5)
HGB BLD-MCNC: 8.1 G/DL (ref 12–16)
IMM GRANULOCYTES # BLD AUTO: 0.03 K/UL (ref 0–0.04)
IMM GRANULOCYTES NFR BLD AUTO: 0.3 % (ref 0–0.5)
LYMPHOCYTES # BLD AUTO: 2.5 K/UL (ref 1–4.8)
LYMPHOCYTES NFR BLD: 26.3 % (ref 18–48)
MAGNESIUM SERPL-MCNC: 1.9 MG/DL (ref 1.6–2.6)
MCH RBC QN AUTO: 25.4 PG (ref 27–31)
MCHC RBC AUTO-ENTMCNC: 29.6 G/DL (ref 32–36)
MCV RBC AUTO: 86 FL (ref 82–98)
MONOCYTES # BLD AUTO: 0.7 K/UL (ref 0.3–1)
MONOCYTES NFR BLD: 7.1 % (ref 4–15)
NEUTROPHILS # BLD AUTO: 6.2 K/UL (ref 1.8–7.7)
NEUTROPHILS NFR BLD: 64.9 % (ref 38–73)
NRBC BLD-RTO: 0 /100 WBC
PHOSPHATE SERPL-MCNC: 4.2 MG/DL (ref 2.7–4.5)
PLATELET # BLD AUTO: 382 K/UL (ref 150–450)
PMV BLD AUTO: 11.2 FL (ref 9.2–12.9)
POCT GLUCOSE: 162 MG/DL (ref 70–110)
POCT GLUCOSE: 172 MG/DL (ref 70–110)
POCT GLUCOSE: 194 MG/DL (ref 70–110)
POCT GLUCOSE: 252 MG/DL (ref 70–110)
POIKILOCYTOSIS BLD QL SMEAR: SLIGHT
POLYCHROMASIA BLD QL SMEAR: ABNORMAL
POTASSIUM SERPL-SCNC: 3.5 MMOL/L (ref 3.5–5.1)
POTASSIUM SERPL-SCNC: 3.6 MMOL/L (ref 3.5–5.1)
POTASSIUM SERPL-SCNC: 3.7 MMOL/L (ref 3.5–5.1)
POTASSIUM SERPL-SCNC: 3.8 MMOL/L (ref 3.5–5.1)
RBC # BLD AUTO: 3.19 M/UL (ref 4–5.4)
SODIUM SERPL-SCNC: 145 MMOL/L (ref 136–145)
SODIUM SERPL-SCNC: 152 MMOL/L (ref 136–145)
SODIUM SERPL-SCNC: 155 MMOL/L (ref 136–145)
SODIUM SERPL-SCNC: 157 MMOL/L (ref 136–145)
WBC # BLD AUTO: 9.58 K/UL (ref 3.9–12.7)

## 2021-11-09 PROCEDURE — 25000003 PHARM REV CODE 250: Performed by: INTERNAL MEDICINE

## 2021-11-09 PROCEDURE — 97535 SELF CARE MNGMENT TRAINING: CPT

## 2021-11-09 PROCEDURE — 99900035 HC TECH TIME PER 15 MIN (STAT)

## 2021-11-09 PROCEDURE — 36415 COLL VENOUS BLD VENIPUNCTURE: CPT | Performed by: STUDENT IN AN ORGANIZED HEALTH CARE EDUCATION/TRAINING PROGRAM

## 2021-11-09 PROCEDURE — 80048 BASIC METABOLIC PNL TOTAL CA: CPT | Mod: 91 | Performed by: STUDENT IN AN ORGANIZED HEALTH CARE EDUCATION/TRAINING PROGRAM

## 2021-11-09 PROCEDURE — A4216 STERILE WATER/SALINE, 10 ML: HCPCS | Performed by: INTERNAL MEDICINE

## 2021-11-09 PROCEDURE — 36415 COLL VENOUS BLD VENIPUNCTURE: CPT | Performed by: INTERNAL MEDICINE

## 2021-11-09 PROCEDURE — 85025 COMPLETE CBC W/AUTO DIFF WBC: CPT | Performed by: INTERNAL MEDICINE

## 2021-11-09 PROCEDURE — 94660 CPAP INITIATION&MGMT: CPT

## 2021-11-09 PROCEDURE — 63600175 PHARM REV CODE 636 W HCPCS: Performed by: STUDENT IN AN ORGANIZED HEALTH CARE EDUCATION/TRAINING PROGRAM

## 2021-11-09 PROCEDURE — 97530 THERAPEUTIC ACTIVITIES: CPT

## 2021-11-09 PROCEDURE — 21400001 HC TELEMETRY ROOM

## 2021-11-09 PROCEDURE — 83735 ASSAY OF MAGNESIUM: CPT | Performed by: INTERNAL MEDICINE

## 2021-11-09 PROCEDURE — 94761 N-INVAS EAR/PLS OXIMETRY MLT: CPT

## 2021-11-09 PROCEDURE — 63600175 PHARM REV CODE 636 W HCPCS: Performed by: INTERNAL MEDICINE

## 2021-11-09 PROCEDURE — 84100 ASSAY OF PHOSPHORUS: CPT | Performed by: INTERNAL MEDICINE

## 2021-11-09 PROCEDURE — 25000003 PHARM REV CODE 250: Performed by: FAMILY MEDICINE

## 2021-11-09 RX ORDER — LORAZEPAM 0.5 MG/1
0.5 TABLET ORAL EVERY 12 HOURS PRN
Status: DISCONTINUED | OUTPATIENT
Start: 2021-11-09 | End: 2021-11-12 | Stop reason: HOSPADM

## 2021-11-09 RX ORDER — HYDROCODONE BITARTRATE AND ACETAMINOPHEN 10; 325 MG/1; MG/1
1 TABLET ORAL EVERY 8 HOURS PRN
Status: DISCONTINUED | OUTPATIENT
Start: 2021-11-09 | End: 2021-11-12 | Stop reason: HOSPADM

## 2021-11-09 RX ORDER — ASPIRIN 81 MG/1
81 TABLET ORAL DAILY
Status: DISCONTINUED | OUTPATIENT
Start: 2021-11-09 | End: 2021-11-12 | Stop reason: HOSPADM

## 2021-11-09 RX ORDER — ZOLPIDEM TARTRATE 5 MG/1
5 TABLET ORAL NIGHTLY PRN
Refills: 0 | Status: DISCONTINUED | OUTPATIENT
Start: 2021-11-09 | End: 2021-11-12 | Stop reason: HOSPADM

## 2021-11-09 RX ORDER — LEVOTHYROXINE SODIUM 50 UG/1
100 TABLET ORAL DAILY
Status: DISCONTINUED | OUTPATIENT
Start: 2021-11-09 | End: 2021-11-12 | Stop reason: HOSPADM

## 2021-11-09 RX ADMIN — ENOXAPARIN SODIUM 40 MG: 40 INJECTION, SOLUTION INTRAVENOUS; SUBCUTANEOUS at 08:11

## 2021-11-09 RX ADMIN — SODIUM CHLORIDE, PRESERVATIVE FREE 10 ML: 5 INJECTION INTRAVENOUS at 11:11

## 2021-11-09 RX ADMIN — METRONIDAZOLE 500 MG: 500 TABLET ORAL at 01:11

## 2021-11-09 RX ADMIN — ASPIRIN 81 MG: 81 TABLET, COATED ORAL at 01:11

## 2021-11-09 RX ADMIN — METRONIDAZOLE 500 MG: 500 TABLET ORAL at 09:11

## 2021-11-09 RX ADMIN — METOPROLOL SUCCINATE 50 MG: 50 TABLET, EXTENDED RELEASE ORAL at 11:11

## 2021-11-09 RX ADMIN — CEFTRIAXONE SODIUM 1 G: 1 INJECTION, POWDER, FOR SOLUTION INTRAMUSCULAR; INTRAVENOUS at 02:11

## 2021-11-09 RX ADMIN — INSULIN DETEMIR 20 UNITS: 100 INJECTION, SOLUTION SUBCUTANEOUS at 09:11

## 2021-11-09 RX ADMIN — AMLODIPINE BESYLATE 5 MG: 5 TABLET ORAL at 08:11

## 2021-11-09 RX ADMIN — POTASSIUM CHLORIDE 60 MEQ: 1500 TABLET, EXTENDED RELEASE ORAL at 08:11

## 2021-11-09 RX ADMIN — METRONIDAZOLE 500 MG: 500 TABLET ORAL at 06:11

## 2021-11-09 RX ADMIN — Medication 10 ML: at 01:11

## 2021-11-09 RX ADMIN — ENOXAPARIN SODIUM 40 MG: 40 INJECTION, SOLUTION INTRAVENOUS; SUBCUTANEOUS at 09:11

## 2021-11-09 RX ADMIN — INSULIN ASPART 7 UNITS: 100 INJECTION, SOLUTION INTRAVENOUS; SUBCUTANEOUS at 05:11

## 2021-11-09 RX ADMIN — SODIUM CHLORIDE, PRESERVATIVE FREE 10 ML: 5 INJECTION INTRAVENOUS at 12:11

## 2021-11-09 RX ADMIN — INSULIN ASPART 7 UNITS: 100 INJECTION, SOLUTION INTRAVENOUS; SUBCUTANEOUS at 07:11

## 2021-11-09 RX ADMIN — INSULIN DETEMIR 20 UNITS: 100 INJECTION, SOLUTION SUBCUTANEOUS at 11:11

## 2021-11-09 RX ADMIN — INSULIN ASPART 3 UNITS: 100 INJECTION, SOLUTION INTRAVENOUS; SUBCUTANEOUS at 05:11

## 2021-11-09 RX ADMIN — INSULIN ASPART 7 UNITS: 100 INJECTION, SOLUTION INTRAVENOUS; SUBCUTANEOUS at 01:11

## 2021-11-09 RX ADMIN — LEVOTHYROXINE SODIUM 100 MCG: 50 TABLET ORAL at 01:11

## 2021-11-09 RX ADMIN — SODIUM CHLORIDE, PRESERVATIVE FREE 10 ML: 5 INJECTION INTRAVENOUS at 07:11

## 2021-11-09 RX ADMIN — SODIUM CHLORIDE, PRESERVATIVE FREE 10 ML: 5 INJECTION INTRAVENOUS at 06:11

## 2021-11-10 LAB
ANION GAP SERPL CALC-SCNC: 11 MMOL/L (ref 8–16)
ANION GAP SERPL CALC-SCNC: 12 MMOL/L (ref 8–16)
ANION GAP SERPL CALC-SCNC: 13 MMOL/L (ref 8–16)
ANION GAP SERPL CALC-SCNC: 14 MMOL/L (ref 8–16)
BASOPHILS # BLD AUTO: 0.05 K/UL (ref 0–0.2)
BASOPHILS NFR BLD: 0.5 % (ref 0–1.9)
BUN SERPL-MCNC: 8 MG/DL (ref 6–20)
CALCIUM SERPL-MCNC: 8 MG/DL (ref 8.7–10.5)
CALCIUM SERPL-MCNC: 8.3 MG/DL (ref 8.7–10.5)
CALCIUM SERPL-MCNC: 8.4 MG/DL (ref 8.7–10.5)
CALCIUM SERPL-MCNC: 8.5 MG/DL (ref 8.7–10.5)
CHLORIDE SERPL-SCNC: 110 MMOL/L (ref 95–110)
CHLORIDE SERPL-SCNC: 111 MMOL/L (ref 95–110)
CHLORIDE SERPL-SCNC: 112 MMOL/L (ref 95–110)
CHLORIDE SERPL-SCNC: 113 MMOL/L (ref 95–110)
CO2 SERPL-SCNC: 21 MMOL/L (ref 23–29)
CO2 SERPL-SCNC: 23 MMOL/L (ref 23–29)
CO2 SERPL-SCNC: 23 MMOL/L (ref 23–29)
CO2 SERPL-SCNC: 24 MMOL/L (ref 23–29)
CREAT SERPL-MCNC: 0.8 MG/DL (ref 0.5–1.4)
CREAT SERPL-MCNC: 0.9 MG/DL (ref 0.5–1.4)
DIFFERENTIAL METHOD: ABNORMAL
EOSINOPHIL # BLD AUTO: 0.1 K/UL (ref 0–0.5)
EOSINOPHIL NFR BLD: 1.2 % (ref 0–8)
ERYTHROCYTE [DISTWIDTH] IN BLOOD BY AUTOMATED COUNT: 22.8 % (ref 11.5–14.5)
EST. GFR  (AFRICAN AMERICAN): >60 ML/MIN/1.73 M^2
EST. GFR  (NON AFRICAN AMERICAN): >60 ML/MIN/1.73 M^2
GLUCOSE SERPL-MCNC: 206 MG/DL (ref 70–110)
GLUCOSE SERPL-MCNC: 216 MG/DL (ref 70–110)
GLUCOSE SERPL-MCNC: 239 MG/DL (ref 70–110)
GLUCOSE SERPL-MCNC: 247 MG/DL (ref 70–110)
HCT VFR BLD AUTO: 29.4 % (ref 37–48.5)
HGB BLD-MCNC: 8.7 G/DL (ref 12–16)
IMM GRANULOCYTES # BLD AUTO: 0.02 K/UL (ref 0–0.04)
IMM GRANULOCYTES NFR BLD AUTO: 0.2 % (ref 0–0.5)
LYMPHOCYTES # BLD AUTO: 2.3 K/UL (ref 1–4.8)
LYMPHOCYTES NFR BLD: 24.5 % (ref 18–48)
MAGNESIUM SERPL-MCNC: 1.8 MG/DL (ref 1.6–2.6)
MCH RBC QN AUTO: 26 PG (ref 27–31)
MCHC RBC AUTO-ENTMCNC: 29.6 G/DL (ref 32–36)
MCV RBC AUTO: 88 FL (ref 82–98)
MONOCYTES # BLD AUTO: 0.6 K/UL (ref 0.3–1)
MONOCYTES NFR BLD: 6.4 % (ref 4–15)
NEUTROPHILS # BLD AUTO: 6.2 K/UL (ref 1.8–7.7)
NEUTROPHILS NFR BLD: 67.2 % (ref 38–73)
NRBC BLD-RTO: 0 /100 WBC
PHOSPHATE SERPL-MCNC: 3.4 MG/DL (ref 2.7–4.5)
PLATELET # BLD AUTO: 385 K/UL (ref 150–450)
PMV BLD AUTO: 11 FL (ref 9.2–12.9)
POCT GLUCOSE: 207 MG/DL (ref 70–110)
POCT GLUCOSE: 208 MG/DL (ref 70–110)
POCT GLUCOSE: 211 MG/DL (ref 70–110)
POCT GLUCOSE: 242 MG/DL (ref 70–110)
POTASSIUM SERPL-SCNC: 3.6 MMOL/L (ref 3.5–5.1)
POTASSIUM SERPL-SCNC: 3.8 MMOL/L (ref 3.5–5.1)
POTASSIUM SERPL-SCNC: 4 MMOL/L (ref 3.5–5.1)
POTASSIUM SERPL-SCNC: 4.1 MMOL/L (ref 3.5–5.1)
RBC # BLD AUTO: 3.34 M/UL (ref 4–5.4)
SODIUM SERPL-SCNC: 146 MMOL/L (ref 136–145)
SODIUM SERPL-SCNC: 146 MMOL/L (ref 136–145)
SODIUM SERPL-SCNC: 147 MMOL/L (ref 136–145)
SODIUM SERPL-SCNC: 148 MMOL/L (ref 136–145)
WBC # BLD AUTO: 9.19 K/UL (ref 3.9–12.7)

## 2021-11-10 PROCEDURE — A4216 STERILE WATER/SALINE, 10 ML: HCPCS | Performed by: INTERNAL MEDICINE

## 2021-11-10 PROCEDURE — 97530 THERAPEUTIC ACTIVITIES: CPT | Mod: CQ

## 2021-11-10 PROCEDURE — 85025 COMPLETE CBC W/AUTO DIFF WBC: CPT | Performed by: INTERNAL MEDICINE

## 2021-11-10 PROCEDURE — 97535 SELF CARE MNGMENT TRAINING: CPT

## 2021-11-10 PROCEDURE — 84100 ASSAY OF PHOSPHORUS: CPT | Performed by: INTERNAL MEDICINE

## 2021-11-10 PROCEDURE — 25000003 PHARM REV CODE 250: Performed by: FAMILY MEDICINE

## 2021-11-10 PROCEDURE — 99900035 HC TECH TIME PER 15 MIN (STAT)

## 2021-11-10 PROCEDURE — 36415 COLL VENOUS BLD VENIPUNCTURE: CPT | Performed by: STUDENT IN AN ORGANIZED HEALTH CARE EDUCATION/TRAINING PROGRAM

## 2021-11-10 PROCEDURE — 94761 N-INVAS EAR/PLS OXIMETRY MLT: CPT

## 2021-11-10 PROCEDURE — 63600175 PHARM REV CODE 636 W HCPCS: Performed by: STUDENT IN AN ORGANIZED HEALTH CARE EDUCATION/TRAINING PROGRAM

## 2021-11-10 PROCEDURE — 25000003 PHARM REV CODE 250: Performed by: INTERNAL MEDICINE

## 2021-11-10 PROCEDURE — 21400001 HC TELEMETRY ROOM

## 2021-11-10 PROCEDURE — 83735 ASSAY OF MAGNESIUM: CPT | Performed by: INTERNAL MEDICINE

## 2021-11-10 PROCEDURE — 80048 BASIC METABOLIC PNL TOTAL CA: CPT | Mod: 91 | Performed by: STUDENT IN AN ORGANIZED HEALTH CARE EDUCATION/TRAINING PROGRAM

## 2021-11-10 PROCEDURE — 63600175 PHARM REV CODE 636 W HCPCS: Performed by: INTERNAL MEDICINE

## 2021-11-10 PROCEDURE — 36415 COLL VENOUS BLD VENIPUNCTURE: CPT | Performed by: INTERNAL MEDICINE

## 2021-11-10 RX ADMIN — INSULIN ASPART 1 UNITS: 100 INJECTION, SOLUTION INTRAVENOUS; SUBCUTANEOUS at 09:11

## 2021-11-10 RX ADMIN — METOPROLOL SUCCINATE 50 MG: 50 TABLET, EXTENDED RELEASE ORAL at 09:11

## 2021-11-10 RX ADMIN — CEFTRIAXONE SODIUM 1 G: 1 INJECTION, POWDER, FOR SOLUTION INTRAMUSCULAR; INTRAVENOUS at 12:11

## 2021-11-10 RX ADMIN — METRONIDAZOLE 500 MG: 500 TABLET ORAL at 02:11

## 2021-11-10 RX ADMIN — METRONIDAZOLE 500 MG: 500 TABLET ORAL at 09:11

## 2021-11-10 RX ADMIN — SODIUM CHLORIDE, PRESERVATIVE FREE 10 ML: 5 INJECTION INTRAVENOUS at 06:11

## 2021-11-10 RX ADMIN — ASPIRIN 81 MG: 81 TABLET, COATED ORAL at 09:11

## 2021-11-10 RX ADMIN — INSULIN ASPART 7 UNITS: 100 INJECTION, SOLUTION INTRAVENOUS; SUBCUTANEOUS at 05:11

## 2021-11-10 RX ADMIN — SODIUM CHLORIDE, PRESERVATIVE FREE 10 ML: 5 INJECTION INTRAVENOUS at 12:11

## 2021-11-10 RX ADMIN — AMLODIPINE BESYLATE 5 MG: 5 TABLET ORAL at 09:11

## 2021-11-10 RX ADMIN — INSULIN ASPART 7 UNITS: 100 INJECTION, SOLUTION INTRAVENOUS; SUBCUTANEOUS at 06:11

## 2021-11-10 RX ADMIN — INSULIN DETEMIR 23 UNITS: 100 INJECTION, SOLUTION SUBCUTANEOUS at 09:11

## 2021-11-10 RX ADMIN — ENOXAPARIN SODIUM 40 MG: 40 INJECTION, SOLUTION INTRAVENOUS; SUBCUTANEOUS at 09:11

## 2021-11-10 RX ADMIN — LEVOTHYROXINE SODIUM 100 MCG: 50 TABLET ORAL at 09:11

## 2021-11-10 RX ADMIN — POTASSIUM CHLORIDE 60 MEQ: 1500 TABLET, EXTENDED RELEASE ORAL at 09:11

## 2021-11-10 RX ADMIN — SODIUM CHLORIDE, PRESERVATIVE FREE 10 ML: 5 INJECTION INTRAVENOUS at 05:11

## 2021-11-10 RX ADMIN — INSULIN ASPART 7 UNITS: 100 INJECTION, SOLUTION INTRAVENOUS; SUBCUTANEOUS at 12:11

## 2021-11-10 RX ADMIN — METRONIDAZOLE 500 MG: 500 TABLET ORAL at 06:11

## 2021-11-10 RX ADMIN — ACETAMINOPHEN 650 MG: 325 TABLET ORAL at 09:11

## 2021-11-11 PROBLEM — R53.1 WEAKNESS: Status: ACTIVE | Noted: 2021-11-11

## 2021-11-11 LAB
ANION GAP SERPL CALC-SCNC: 10 MMOL/L (ref 8–16)
ANION GAP SERPL CALC-SCNC: 11 MMOL/L (ref 8–16)
ANION GAP SERPL CALC-SCNC: 13 MMOL/L (ref 8–16)
ANION GAP SERPL CALC-SCNC: 8 MMOL/L (ref 8–16)
ANION GAP SERPL CALC-SCNC: 8 MMOL/L (ref 8–16)
BASOPHILS # BLD AUTO: 0.06 K/UL (ref 0–0.2)
BASOPHILS NFR BLD: 0.7 % (ref 0–1.9)
BUN SERPL-MCNC: 7 MG/DL (ref 6–20)
BUN SERPL-MCNC: 8 MG/DL (ref 6–20)
BUN SERPL-MCNC: 9 MG/DL (ref 6–20)
CALCIUM SERPL-MCNC: 7.9 MG/DL (ref 8.7–10.5)
CALCIUM SERPL-MCNC: 8 MG/DL (ref 8.7–10.5)
CALCIUM SERPL-MCNC: 8.2 MG/DL (ref 8.7–10.5)
CALCIUM SERPL-MCNC: 8.2 MG/DL (ref 8.7–10.5)
CALCIUM SERPL-MCNC: 8.3 MG/DL (ref 8.7–10.5)
CHLORIDE SERPL-SCNC: 109 MMOL/L (ref 95–110)
CHLORIDE SERPL-SCNC: 109 MMOL/L (ref 95–110)
CHLORIDE SERPL-SCNC: 110 MMOL/L (ref 95–110)
CHLORIDE SERPL-SCNC: 111 MMOL/L (ref 95–110)
CHLORIDE SERPL-SCNC: 112 MMOL/L (ref 95–110)
CO2 SERPL-SCNC: 21 MMOL/L (ref 23–29)
CO2 SERPL-SCNC: 23 MMOL/L (ref 23–29)
CO2 SERPL-SCNC: 23 MMOL/L (ref 23–29)
CO2 SERPL-SCNC: 25 MMOL/L (ref 23–29)
CO2 SERPL-SCNC: 25 MMOL/L (ref 23–29)
CREAT SERPL-MCNC: 0.8 MG/DL (ref 0.5–1.4)
CREAT SERPL-MCNC: 0.9 MG/DL (ref 0.5–1.4)
CREAT SERPL-MCNC: 0.9 MG/DL (ref 0.5–1.4)
DIFFERENTIAL METHOD: ABNORMAL
EOSINOPHIL # BLD AUTO: 0.1 K/UL (ref 0–0.5)
EOSINOPHIL NFR BLD: 1.3 % (ref 0–8)
ERYTHROCYTE [DISTWIDTH] IN BLOOD BY AUTOMATED COUNT: 22.7 % (ref 11.5–14.5)
EST. GFR  (AFRICAN AMERICAN): >60 ML/MIN/1.73 M^2
EST. GFR  (NON AFRICAN AMERICAN): >60 ML/MIN/1.73 M^2
GLUCOSE SERPL-MCNC: 121 MG/DL (ref 70–110)
GLUCOSE SERPL-MCNC: 150 MG/DL (ref 70–110)
GLUCOSE SERPL-MCNC: 151 MG/DL (ref 70–110)
GLUCOSE SERPL-MCNC: 176 MG/DL (ref 70–110)
GLUCOSE SERPL-MCNC: 182 MG/DL (ref 70–110)
HCT VFR BLD AUTO: 28.7 % (ref 37–48.5)
HGB BLD-MCNC: 8.6 G/DL (ref 12–16)
IMM GRANULOCYTES # BLD AUTO: 0.04 K/UL (ref 0–0.04)
IMM GRANULOCYTES NFR BLD AUTO: 0.5 % (ref 0–0.5)
LYMPHOCYTES # BLD AUTO: 2.8 K/UL (ref 1–4.8)
LYMPHOCYTES NFR BLD: 32.3 % (ref 18–48)
MAGNESIUM SERPL-MCNC: 1.8 MG/DL (ref 1.6–2.6)
MCH RBC QN AUTO: 25.9 PG (ref 27–31)
MCHC RBC AUTO-ENTMCNC: 30 G/DL (ref 32–36)
MCV RBC AUTO: 86 FL (ref 82–98)
MONOCYTES # BLD AUTO: 0.7 K/UL (ref 0.3–1)
MONOCYTES NFR BLD: 7.6 % (ref 4–15)
NEUTROPHILS # BLD AUTO: 5 K/UL (ref 1.8–7.7)
NEUTROPHILS NFR BLD: 57.6 % (ref 38–73)
NRBC BLD-RTO: 0 /100 WBC
PHOSPHATE SERPL-MCNC: 3.9 MG/DL (ref 2.7–4.5)
PLATELET # BLD AUTO: 356 K/UL (ref 150–450)
PMV BLD AUTO: 10.9 FL (ref 9.2–12.9)
POCT GLUCOSE: 145 MG/DL (ref 70–110)
POCT GLUCOSE: 153 MG/DL (ref 70–110)
POCT GLUCOSE: 156 MG/DL (ref 70–110)
POCT GLUCOSE: 170 MG/DL (ref 70–110)
POCT GLUCOSE: 205 MG/DL (ref 70–110)
POTASSIUM SERPL-SCNC: 3.7 MMOL/L (ref 3.5–5.1)
POTASSIUM SERPL-SCNC: 3.8 MMOL/L (ref 3.5–5.1)
POTASSIUM SERPL-SCNC: 3.8 MMOL/L (ref 3.5–5.1)
POTASSIUM SERPL-SCNC: 4 MMOL/L (ref 3.5–5.1)
POTASSIUM SERPL-SCNC: 4 MMOL/L (ref 3.5–5.1)
RBC # BLD AUTO: 3.32 M/UL (ref 4–5.4)
SODIUM SERPL-SCNC: 142 MMOL/L (ref 136–145)
SODIUM SERPL-SCNC: 143 MMOL/L (ref 136–145)
SODIUM SERPL-SCNC: 143 MMOL/L (ref 136–145)
SODIUM SERPL-SCNC: 144 MMOL/L (ref 136–145)
SODIUM SERPL-SCNC: 146 MMOL/L (ref 136–145)
WBC # BLD AUTO: 8.64 K/UL (ref 3.9–12.7)

## 2021-11-11 PROCEDURE — C9399 UNCLASSIFIED DRUGS OR BIOLOG: HCPCS | Performed by: INTERNAL MEDICINE

## 2021-11-11 PROCEDURE — 25000003 PHARM REV CODE 250: Performed by: FAMILY MEDICINE

## 2021-11-11 PROCEDURE — 83735 ASSAY OF MAGNESIUM: CPT | Performed by: INTERNAL MEDICINE

## 2021-11-11 PROCEDURE — 36415 COLL VENOUS BLD VENIPUNCTURE: CPT | Performed by: STUDENT IN AN ORGANIZED HEALTH CARE EDUCATION/TRAINING PROGRAM

## 2021-11-11 PROCEDURE — 21400001 HC TELEMETRY ROOM

## 2021-11-11 PROCEDURE — 80048 BASIC METABOLIC PNL TOTAL CA: CPT | Performed by: STUDENT IN AN ORGANIZED HEALTH CARE EDUCATION/TRAINING PROGRAM

## 2021-11-11 PROCEDURE — 63600175 PHARM REV CODE 636 W HCPCS: Performed by: INTERNAL MEDICINE

## 2021-11-11 PROCEDURE — 63600175 PHARM REV CODE 636 W HCPCS: Performed by: STUDENT IN AN ORGANIZED HEALTH CARE EDUCATION/TRAINING PROGRAM

## 2021-11-11 PROCEDURE — 36415 COLL VENOUS BLD VENIPUNCTURE: CPT | Performed by: INTERNAL MEDICINE

## 2021-11-11 PROCEDURE — A4216 STERILE WATER/SALINE, 10 ML: HCPCS | Performed by: INTERNAL MEDICINE

## 2021-11-11 PROCEDURE — 99900035 HC TECH TIME PER 15 MIN (STAT)

## 2021-11-11 PROCEDURE — 97530 THERAPEUTIC ACTIVITIES: CPT

## 2021-11-11 PROCEDURE — 25000003 PHARM REV CODE 250: Performed by: INTERNAL MEDICINE

## 2021-11-11 PROCEDURE — 84100 ASSAY OF PHOSPHORUS: CPT | Performed by: INTERNAL MEDICINE

## 2021-11-11 PROCEDURE — 94761 N-INVAS EAR/PLS OXIMETRY MLT: CPT

## 2021-11-11 PROCEDURE — 85025 COMPLETE CBC W/AUTO DIFF WBC: CPT | Performed by: INTERNAL MEDICINE

## 2021-11-11 PROCEDURE — 25000003 PHARM REV CODE 250: Performed by: STUDENT IN AN ORGANIZED HEALTH CARE EDUCATION/TRAINING PROGRAM

## 2021-11-11 RX ADMIN — METRONIDAZOLE 500 MG: 500 TABLET ORAL at 06:11

## 2021-11-11 RX ADMIN — SODIUM CHLORIDE, PRESERVATIVE FREE 10 ML: 5 INJECTION INTRAVENOUS at 11:11

## 2021-11-11 RX ADMIN — SODIUM CHLORIDE, PRESERVATIVE FREE 10 ML: 5 INJECTION INTRAVENOUS at 05:11

## 2021-11-11 RX ADMIN — INSULIN ASPART 2 UNITS: 100 INJECTION, SOLUTION INTRAVENOUS; SUBCUTANEOUS at 11:11

## 2021-11-11 RX ADMIN — CEFTRIAXONE SODIUM 1 G: 1 INJECTION, POWDER, FOR SOLUTION INTRAMUSCULAR; INTRAVENOUS at 02:11

## 2021-11-11 RX ADMIN — INSULIN ASPART 7 UNITS: 100 INJECTION, SOLUTION INTRAVENOUS; SUBCUTANEOUS at 11:11

## 2021-11-11 RX ADMIN — HYDROCODONE BITARTRATE AND ACETAMINOPHEN 1 TABLET: 10; 325 TABLET ORAL at 10:11

## 2021-11-11 RX ADMIN — ENOXAPARIN SODIUM 40 MG: 40 INJECTION, SOLUTION INTRAVENOUS; SUBCUTANEOUS at 09:11

## 2021-11-11 RX ADMIN — LEVOTHYROXINE SODIUM 100 MCG: 50 TABLET ORAL at 09:11

## 2021-11-11 RX ADMIN — METRONIDAZOLE 500 MG: 500 TABLET ORAL at 10:11

## 2021-11-11 RX ADMIN — METRONIDAZOLE 500 MG: 500 TABLET ORAL at 02:11

## 2021-11-11 RX ADMIN — AMLODIPINE BESYLATE 5 MG: 5 TABLET ORAL at 09:11

## 2021-11-11 RX ADMIN — POTASSIUM CHLORIDE 60 MEQ: 1500 TABLET, EXTENDED RELEASE ORAL at 09:11

## 2021-11-11 RX ADMIN — INSULIN ASPART 7 UNITS: 100 INJECTION, SOLUTION INTRAVENOUS; SUBCUTANEOUS at 05:11

## 2021-11-11 RX ADMIN — SODIUM CHLORIDE, PRESERVATIVE FREE 10 ML: 5 INJECTION INTRAVENOUS at 12:11

## 2021-11-11 RX ADMIN — METOPROLOL SUCCINATE 50 MG: 50 TABLET, EXTENDED RELEASE ORAL at 09:11

## 2021-11-11 RX ADMIN — INSULIN DETEMIR 23 UNITS: 100 INJECTION, SOLUTION SUBCUTANEOUS at 09:11

## 2021-11-11 RX ADMIN — ASPIRIN 81 MG: 81 TABLET, COATED ORAL at 09:11

## 2021-11-11 RX ADMIN — SODIUM CHLORIDE, PRESERVATIVE FREE 10 ML: 5 INJECTION INTRAVENOUS at 06:11

## 2021-11-11 RX ADMIN — INSULIN ASPART 7 UNITS: 100 INJECTION, SOLUTION INTRAVENOUS; SUBCUTANEOUS at 09:11

## 2021-11-12 ENCOUNTER — HOSPITAL ENCOUNTER (INPATIENT)
Facility: HOSPITAL | Age: 61
LOS: 19 days | Discharge: NURSING, REHAB OR LONG-TERM CARE | DRG: 871 | End: 2021-12-01
Attending: INTERNAL MEDICINE | Admitting: INTERNAL MEDICINE
Payer: COMMERCIAL

## 2021-11-12 DIAGNOSIS — G93.6 VASOGENIC BRAIN EDEMA: ICD-10-CM

## 2021-11-12 DIAGNOSIS — R78.81 BACTEREMIA: ICD-10-CM

## 2021-11-12 DIAGNOSIS — R94.31 QT PROLONGATION: ICD-10-CM

## 2021-11-12 DIAGNOSIS — G93.9 BRAIN LESION: ICD-10-CM

## 2021-11-12 DIAGNOSIS — R94.31 PROLONGED QT INTERVAL: ICD-10-CM

## 2021-11-12 DIAGNOSIS — G93.5 BRAIN COMPRESSION: ICD-10-CM

## 2021-11-12 DIAGNOSIS — G93.41 ENCEPHALOPATHY, METABOLIC: ICD-10-CM

## 2021-11-12 LAB
ANION GAP SERPL CALC-SCNC: 7 MMOL/L (ref 8–16)
ANION GAP SERPL CALC-SCNC: 8 MMOL/L (ref 8–16)
BASOPHILS # BLD AUTO: 0.07 K/UL (ref 0–0.2)
BASOPHILS NFR BLD: 0.9 % (ref 0–1.9)
BUN SERPL-MCNC: 8 MG/DL (ref 6–20)
BUN SERPL-MCNC: 8 MG/DL (ref 6–20)
CALCIUM SERPL-MCNC: 7.4 MG/DL (ref 8.7–10.5)
CALCIUM SERPL-MCNC: 8.3 MG/DL (ref 8.7–10.5)
CHLORIDE SERPL-SCNC: 109 MMOL/L (ref 95–110)
CHLORIDE SERPL-SCNC: 114 MMOL/L (ref 95–110)
CO2 SERPL-SCNC: 22 MMOL/L (ref 23–29)
CO2 SERPL-SCNC: 24 MMOL/L (ref 23–29)
CREAT SERPL-MCNC: 0.8 MG/DL (ref 0.5–1.4)
CREAT SERPL-MCNC: 0.9 MG/DL (ref 0.5–1.4)
DIFFERENTIAL METHOD: ABNORMAL
EOSINOPHIL # BLD AUTO: 0.1 K/UL (ref 0–0.5)
EOSINOPHIL NFR BLD: 1.3 % (ref 0–8)
ERYTHROCYTE [DISTWIDTH] IN BLOOD BY AUTOMATED COUNT: 22.5 % (ref 11.5–14.5)
EST. GFR  (AFRICAN AMERICAN): >60 ML/MIN/1.73 M^2
EST. GFR  (AFRICAN AMERICAN): >60 ML/MIN/1.73 M^2
EST. GFR  (NON AFRICAN AMERICAN): >60 ML/MIN/1.73 M^2
EST. GFR  (NON AFRICAN AMERICAN): >60 ML/MIN/1.73 M^2
GLUCOSE SERPL-MCNC: 121 MG/DL (ref 70–110)
GLUCOSE SERPL-MCNC: 276 MG/DL (ref 70–110)
HCT VFR BLD AUTO: 28.4 % (ref 37–48.5)
HGB BLD-MCNC: 8.5 G/DL (ref 12–16)
IMM GRANULOCYTES # BLD AUTO: 0.03 K/UL (ref 0–0.04)
IMM GRANULOCYTES NFR BLD AUTO: 0.4 % (ref 0–0.5)
LYMPHOCYTES # BLD AUTO: 2.7 K/UL (ref 1–4.8)
LYMPHOCYTES NFR BLD: 34.4 % (ref 18–48)
MAGNESIUM SERPL-MCNC: 1.8 MG/DL (ref 1.6–2.6)
MCH RBC QN AUTO: 25.9 PG (ref 27–31)
MCHC RBC AUTO-ENTMCNC: 29.9 G/DL (ref 32–36)
MCV RBC AUTO: 87 FL (ref 82–98)
MONOCYTES # BLD AUTO: 0.6 K/UL (ref 0.3–1)
MONOCYTES NFR BLD: 7.1 % (ref 4–15)
NEUTROPHILS # BLD AUTO: 4.4 K/UL (ref 1.8–7.7)
NEUTROPHILS NFR BLD: 55.9 % (ref 38–73)
NRBC BLD-RTO: 0 /100 WBC
PHOSPHATE SERPL-MCNC: 4.1 MG/DL (ref 2.7–4.5)
PLATELET # BLD AUTO: 331 K/UL (ref 150–450)
PMV BLD AUTO: 10.9 FL (ref 9.2–12.9)
POCT GLUCOSE: 115 MG/DL (ref 70–110)
POCT GLUCOSE: 142 MG/DL (ref 70–110)
POCT GLUCOSE: 170 MG/DL (ref 70–110)
POCT GLUCOSE: 290 MG/DL (ref 70–110)
POTASSIUM SERPL-SCNC: 3.6 MMOL/L (ref 3.5–5.1)
POTASSIUM SERPL-SCNC: 3.9 MMOL/L (ref 3.5–5.1)
RBC # BLD AUTO: 3.28 M/UL (ref 4–5.4)
SODIUM SERPL-SCNC: 138 MMOL/L (ref 136–145)
SODIUM SERPL-SCNC: 146 MMOL/L (ref 136–145)
WBC # BLD AUTO: 7.87 K/UL (ref 3.9–12.7)

## 2021-11-12 PROCEDURE — A4216 STERILE WATER/SALINE, 10 ML: HCPCS | Performed by: INTERNAL MEDICINE

## 2021-11-12 PROCEDURE — C9399 UNCLASSIFIED DRUGS OR BIOLOG: HCPCS | Performed by: INTERNAL MEDICINE

## 2021-11-12 PROCEDURE — 83930 ASSAY OF BLOOD OSMOLALITY: CPT | Performed by: INTERNAL MEDICINE

## 2021-11-12 PROCEDURE — 80048 BASIC METABOLIC PNL TOTAL CA: CPT | Performed by: STUDENT IN AN ORGANIZED HEALTH CARE EDUCATION/TRAINING PROGRAM

## 2021-11-12 PROCEDURE — 97530 THERAPEUTIC ACTIVITIES: CPT | Mod: CQ

## 2021-11-12 PROCEDURE — 97530 THERAPEUTIC ACTIVITIES: CPT

## 2021-11-12 PROCEDURE — 94761 N-INVAS EAR/PLS OXIMETRY MLT: CPT

## 2021-11-12 PROCEDURE — S0030 INJECTION, METRONIDAZOLE: HCPCS | Performed by: INTERNAL MEDICINE

## 2021-11-12 PROCEDURE — 83735 ASSAY OF MAGNESIUM: CPT | Performed by: INTERNAL MEDICINE

## 2021-11-12 PROCEDURE — 25000003 PHARM REV CODE 250: Performed by: FAMILY MEDICINE

## 2021-11-12 PROCEDURE — 63600175 PHARM REV CODE 636 W HCPCS: Performed by: INTERNAL MEDICINE

## 2021-11-12 PROCEDURE — 36415 COLL VENOUS BLD VENIPUNCTURE: CPT | Performed by: INTERNAL MEDICINE

## 2021-11-12 PROCEDURE — 84100 ASSAY OF PHOSPHORUS: CPT | Performed by: INTERNAL MEDICINE

## 2021-11-12 PROCEDURE — 85025 COMPLETE CBC W/AUTO DIFF WBC: CPT | Performed by: INTERNAL MEDICINE

## 2021-11-12 PROCEDURE — 20000000 HC ICU ROOM

## 2021-11-12 PROCEDURE — 80048 BASIC METABOLIC PNL TOTAL CA: CPT | Mod: 91 | Performed by: INTERNAL MEDICINE

## 2021-11-12 PROCEDURE — 25000003 PHARM REV CODE 250: Performed by: INTERNAL MEDICINE

## 2021-11-12 PROCEDURE — 99900035 HC TECH TIME PER 15 MIN (STAT)

## 2021-11-12 PROCEDURE — 63600175 PHARM REV CODE 636 W HCPCS: Performed by: STUDENT IN AN ORGANIZED HEALTH CARE EDUCATION/TRAINING PROGRAM

## 2021-11-12 PROCEDURE — 97112 NEUROMUSCULAR REEDUCATION: CPT

## 2021-11-12 RX ORDER — 3% SODIUM CHLORIDE 3 G/100ML
15 INJECTION, SOLUTION INTRAVENOUS CONTINUOUS
Status: DISCONTINUED | OUTPATIENT
Start: 2021-11-12 | End: 2021-11-12 | Stop reason: HOSPADM

## 2021-11-12 RX ORDER — CEFEPIME HYDROCHLORIDE 1 G/50ML
2 INJECTION, SOLUTION INTRAVENOUS
Status: DISCONTINUED | OUTPATIENT
Start: 2021-11-12 | End: 2021-11-12 | Stop reason: HOSPADM

## 2021-11-12 RX ORDER — MANNITOL 20 G/100ML
120 INJECTION, SOLUTION INTRAVENOUS ONCE
Status: COMPLETED | OUTPATIENT
Start: 2021-11-12 | End: 2021-11-12

## 2021-11-12 RX ORDER — METRONIDAZOLE 500 MG/100ML
500 INJECTION, SOLUTION INTRAVENOUS
Status: DISCONTINUED | OUTPATIENT
Start: 2021-11-12 | End: 2021-11-12 | Stop reason: HOSPADM

## 2021-11-12 RX ADMIN — AMLODIPINE BESYLATE 5 MG: 5 TABLET ORAL at 08:11

## 2021-11-12 RX ADMIN — INSULIN DETEMIR 23 UNITS: 100 INJECTION, SOLUTION SUBCUTANEOUS at 09:11

## 2021-11-12 RX ADMIN — ENOXAPARIN SODIUM 40 MG: 40 INJECTION, SOLUTION INTRAVENOUS; SUBCUTANEOUS at 09:11

## 2021-11-12 RX ADMIN — INSULIN DETEMIR 23 UNITS: 100 INJECTION, SOLUTION SUBCUTANEOUS at 08:11

## 2021-11-12 RX ADMIN — INSULIN ASPART 7 UNITS: 100 INJECTION, SOLUTION INTRAVENOUS; SUBCUTANEOUS at 08:11

## 2021-11-12 RX ADMIN — SODIUM CHLORIDE: 0.9 INJECTION, SOLUTION INTRAVENOUS at 07:11

## 2021-11-12 RX ADMIN — METRONIDAZOLE 500 MG: 500 SOLUTION INTRAVENOUS at 07:11

## 2021-11-12 RX ADMIN — POTASSIUM CHLORIDE 60 MEQ: 1500 TABLET, EXTENDED RELEASE ORAL at 08:11

## 2021-11-12 RX ADMIN — ASPIRIN 81 MG: 81 TABLET, COATED ORAL at 08:11

## 2021-11-12 RX ADMIN — SODIUM CHLORIDE, PRESERVATIVE FREE 10 ML: 5 INJECTION INTRAVENOUS at 01:11

## 2021-11-12 RX ADMIN — CEFEPIME HYDROCHLORIDE 2 G: 2 INJECTION, SOLUTION INTRAVENOUS at 07:11

## 2021-11-12 RX ADMIN — SODIUM CHLORIDE, PRESERVATIVE FREE 10 ML: 5 INJECTION INTRAVENOUS at 06:11

## 2021-11-12 RX ADMIN — VANCOMYCIN HYDROCHLORIDE 2500 MG: 10 INJECTION, POWDER, LYOPHILIZED, FOR SOLUTION INTRAVENOUS at 06:11

## 2021-11-12 RX ADMIN — SODIUM CHLORIDE 15 ML/HR: 3 INJECTION, SOLUTION INTRAVENOUS at 09:11

## 2021-11-12 RX ADMIN — METRONIDAZOLE 500 MG: 500 TABLET ORAL at 07:11

## 2021-11-12 RX ADMIN — CEFTRIAXONE SODIUM 1 G: 1 INJECTION, POWDER, FOR SOLUTION INTRAMUSCULAR; INTRAVENOUS at 01:11

## 2021-11-12 RX ADMIN — METOPROLOL SUCCINATE 50 MG: 50 TABLET, EXTENDED RELEASE ORAL at 08:11

## 2021-11-12 RX ADMIN — LEVOTHYROXINE SODIUM 100 MCG: 50 TABLET ORAL at 08:11

## 2021-11-12 RX ADMIN — MANNITOL 120 G: 20 INJECTION, SOLUTION INTRAVENOUS at 07:11

## 2021-11-12 RX ADMIN — INSULIN ASPART 7 UNITS: 100 INJECTION, SOLUTION INTRAVENOUS; SUBCUTANEOUS at 01:11

## 2021-11-12 RX ADMIN — ENOXAPARIN SODIUM 40 MG: 40 INJECTION, SOLUTION INTRAVENOUS; SUBCUTANEOUS at 08:11

## 2021-11-12 RX ADMIN — SODIUM CHLORIDE, PRESERVATIVE FREE 10 ML: 5 INJECTION INTRAVENOUS at 07:11

## 2021-11-12 RX ADMIN — METRONIDAZOLE 500 MG: 500 TABLET ORAL at 01:11

## 2021-11-13 VITALS
HEIGHT: 64 IN | TEMPERATURE: 100 F | SYSTOLIC BLOOD PRESSURE: 170 MMHG | RESPIRATION RATE: 28 BRPM | DIASTOLIC BLOOD PRESSURE: 87 MMHG | WEIGHT: 285.5 LBS | HEART RATE: 95 BPM | BODY MASS INDEX: 48.74 KG/M2 | OXYGEN SATURATION: 96 %

## 2021-11-13 LAB
ABO + RH BLD: NORMAL
ALBUMIN SERPL BCP-MCNC: 2.3 G/DL (ref 3.5–5.2)
ALP SERPL-CCNC: 84 U/L (ref 55–135)
ALT SERPL W/O P-5'-P-CCNC: 6 U/L (ref 10–44)
ANION GAP SERPL CALC-SCNC: 9 MMOL/L (ref 8–16)
APTT BLDCRRT: 21.9 SEC (ref 21–32)
ASCENDING AORTA: 3.9 CM
AST SERPL-CCNC: 13 U/L (ref 10–40)
AV INDEX (PROSTH): 0.62
AV MEAN GRADIENT: 5 MMHG
AV PEAK GRADIENT: 8 MMHG
AV VALVE AREA: 2.17 CM2
AV VELOCITY RATIO: 0.56
BACTERIA #/AREA URNS AUTO: ABNORMAL /HPF
BASOPHILS # BLD AUTO: 0.08 K/UL (ref 0–0.2)
BASOPHILS NFR BLD: 1 % (ref 0–1.9)
BILIRUB SERPL-MCNC: 0.3 MG/DL (ref 0.1–1)
BILIRUB UR QL STRIP: NEGATIVE
BLD GP AB SCN CELLS X3 SERPL QL: NORMAL
BSA FOR ECHO PROCEDURE: 2.36 M2
BUN SERPL-MCNC: 7 MG/DL (ref 6–20)
CALCIUM SERPL-MCNC: 8.7 MG/DL (ref 8.7–10.5)
CHLORIDE SERPL-SCNC: 111 MMOL/L (ref 95–110)
CLARITY UR REFRACT.AUTO: ABNORMAL
CO2 SERPL-SCNC: 25 MMOL/L (ref 23–29)
COLOR UR AUTO: ABNORMAL
CREAT SERPL-MCNC: 0.8 MG/DL (ref 0.5–1.4)
CREAT UR-MCNC: 51 MG/DL (ref 15–325)
CV ECHO LV RWT: 0.41 CM
DIFFERENTIAL METHOD: ABNORMAL
DOP CALC AO PEAK VEL: 1.42 M/S
DOP CALC AO VTI: 20.86 CM
DOP CALC LVOT AREA: 3.5 CM2
DOP CALC LVOT DIAMETER: 2.12 CM
DOP CALC LVOT PEAK VEL: 0.8 M/S
DOP CALC LVOT STROKE VOLUME: 45.34 CM3
DOP CALCLVOT PEAK VEL VTI: 12.85 CM
E WAVE DECELERATION TIME: 157.13 MSEC
E/A RATIO: 0.76
E/E' RATIO: 8.43 M/S
ECHO LV POSTERIOR WALL: 0.85 CM (ref 0.6–1.1)
EJECTION FRACTION: 65 %
EOSINOPHIL # BLD AUTO: 0.1 K/UL (ref 0–0.5)
EOSINOPHIL NFR BLD: 1 % (ref 0–8)
ERYTHROCYTE [DISTWIDTH] IN BLOOD BY AUTOMATED COUNT: 23.6 % (ref 11.5–14.5)
EST. GFR  (AFRICAN AMERICAN): >60 ML/MIN/1.73 M^2
EST. GFR  (NON AFRICAN AMERICAN): >60 ML/MIN/1.73 M^2
FRACTIONAL SHORTENING: 29 % (ref 28–44)
GLUCOSE SERPL-MCNC: 241 MG/DL (ref 70–110)
GLUCOSE UR QL STRIP: NEGATIVE
HCT VFR BLD AUTO: 30.5 % (ref 37–48.5)
HGB BLD-MCNC: 8.8 G/DL (ref 12–16)
HGB UR QL STRIP: ABNORMAL
IMM GRANULOCYTES # BLD AUTO: 0.02 K/UL (ref 0–0.04)
IMM GRANULOCYTES NFR BLD AUTO: 0.3 % (ref 0–0.5)
INR PPP: 1 (ref 0.8–1.2)
INTERVENTRICULAR SEPTUM: 1 CM (ref 0.6–1.1)
IVRT: 108.47 MSEC
KETONES UR QL STRIP: NEGATIVE
LA MAJOR: 5.88 CM
LA WIDTH: 4.36 CM
LEFT ATRIUM SIZE: 2.83 CM
LEFT ATRIUM VOLUME INDEX MOD: 27.4 ML/M2
LEFT ATRIUM VOLUME MOD: 61.16 CM3
LEFT INTERNAL DIMENSION IN SYSTOLE: 2.9 CM (ref 2.1–4)
LEFT VENTRICLE DIASTOLIC VOLUME INDEX: 33.27 ML/M2
LEFT VENTRICLE DIASTOLIC VOLUME: 74.2 ML
LEFT VENTRICLE MASS INDEX: 53 G/M2
LEFT VENTRICLE SYSTOLIC VOLUME INDEX: 14.4 ML/M2
LEFT VENTRICLE SYSTOLIC VOLUME: 32.13 ML
LEFT VENTRICULAR INTERNAL DIMENSION IN DIASTOLE: 4.1 CM (ref 3.5–6)
LEFT VENTRICULAR MASS: 118.51 G
LEUKOCYTE ESTERASE UR QL STRIP: ABNORMAL
LV LATERAL E/E' RATIO: 8.43 M/S
LV SEPTAL E/E' RATIO: 8.43 M/S
LYMPHOCYTES # BLD AUTO: 2.3 K/UL (ref 1–4.8)
LYMPHOCYTES NFR BLD: 29.5 % (ref 18–48)
MAGNESIUM SERPL-MCNC: 2 MG/DL (ref 1.6–2.6)
MCH RBC QN AUTO: 26 PG (ref 27–31)
MCHC RBC AUTO-ENTMCNC: 28.9 G/DL (ref 32–36)
MCV RBC AUTO: 90 FL (ref 82–98)
MICROSCOPIC COMMENT: ABNORMAL
MONOCYTES # BLD AUTO: 0.6 K/UL (ref 0.3–1)
MONOCYTES NFR BLD: 7.7 % (ref 4–15)
MV PEAK A VEL: 0.78 M/S
MV PEAK E VEL: 0.59 M/S
MV STENOSIS PRESSURE HALF TIME: 45.57 MS
MV VALVE AREA P 1/2 METHOD: 4.83 CM2
NEUTROPHILS # BLD AUTO: 4.7 K/UL (ref 1.8–7.7)
NEUTROPHILS NFR BLD: 60.5 % (ref 38–73)
NITRITE UR QL STRIP: NEGATIVE
NRBC BLD-RTO: 0 /100 WBC
OSMOLALITY SERPL: 329 MOSM/KG (ref 275–295)
OSMOLALITY SERPL: 331 MOSM/KG (ref 275–295)
OSMOLALITY SERPL: 333 MOSM/KG (ref 275–295)
OSMOLALITY SERPL: 337 MOSM/KG (ref 275–295)
OSMOLALITY SERPL: 351 MOSM/KG (ref 275–295)
OSMOLALITY UR: 369 MOSM/KG (ref 50–1200)
PH UR STRIP: 6 [PH] (ref 5–8)
PHOSPHATE SERPL-MCNC: 3.8 MG/DL (ref 2.7–4.5)
PISA TR MAX VEL: 2.48 M/S
PLATELET # BLD AUTO: 332 K/UL (ref 150–450)
PMV BLD AUTO: 10.6 FL (ref 9.2–12.9)
POCT GLUCOSE: 156 MG/DL (ref 70–110)
POCT GLUCOSE: 198 MG/DL (ref 70–110)
POCT GLUCOSE: 202 MG/DL (ref 70–110)
POTASSIUM SERPL-SCNC: 3.9 MMOL/L (ref 3.5–5.1)
PROT SERPL-MCNC: 7.5 G/DL (ref 6–8.4)
PROT UR QL STRIP: NEGATIVE
PROTHROMBIN TIME: 11.4 SEC (ref 9–12.5)
RA MAJOR: 3.88 CM
RA WIDTH: 2.45 CM
RBC # BLD AUTO: 3.39 M/UL (ref 4–5.4)
RBC #/AREA URNS AUTO: 4 /HPF (ref 0–4)
RIGHT VENTRICULAR END-DIASTOLIC DIMENSION: 2.87 CM
RV TISSUE DOPPLER FREE WALL SYSTOLIC VELOCITY 1 (APICAL 4 CHAMBER VIEW): 21.4 CM/S
SINUS: 3.35 CM
SODIUM SERPL-SCNC: 145 MMOL/L (ref 136–145)
SODIUM SERPL-SCNC: 152 MMOL/L (ref 136–145)
SODIUM SERPL-SCNC: 154 MMOL/L (ref 136–145)
SODIUM SERPL-SCNC: 154 MMOL/L (ref 136–145)
SODIUM UR-SCNC: 102 MMOL/L (ref 20–250)
SP GR UR STRIP: 1.01 (ref 1–1.03)
SQUAMOUS #/AREA URNS AUTO: 0 /HPF
STJ: 2.64 CM
TDI LATERAL: 0.07 M/S
TDI SEPTAL: 0.07 M/S
TDI: 0.07 M/S
TR MAX PG: 25 MMHG
TRICUSPID ANNULAR PLANE SYSTOLIC EXCURSION: 1.98 CM
TROPONIN I SERPL DL<=0.01 NG/ML-MCNC: <0.006 NG/ML (ref 0–0.03)
TSH SERPL DL<=0.005 MIU/L-ACNC: 1.39 UIU/ML (ref 0.4–4)
URN SPEC COLLECT METH UR: ABNORMAL
WBC # BLD AUTO: 7.83 K/UL (ref 3.9–12.7)
WBC #/AREA URNS AUTO: 73 /HPF (ref 0–5)
YEAST UR QL AUTO: ABNORMAL

## 2021-11-13 PROCEDURE — 25000003 PHARM REV CODE 250: Performed by: NURSE PRACTITIONER

## 2021-11-13 PROCEDURE — 97162 PT EVAL MOD COMPLEX 30 MIN: CPT

## 2021-11-13 PROCEDURE — 99291 CRITICAL CARE FIRST HOUR: CPT | Mod: ,,, | Performed by: INTERNAL MEDICINE

## 2021-11-13 PROCEDURE — 85025 COMPLETE CBC W/AUTO DIFF WBC: CPT | Performed by: STUDENT IN AN ORGANIZED HEALTH CARE EDUCATION/TRAINING PROGRAM

## 2021-11-13 PROCEDURE — 80053 COMPREHEN METABOLIC PANEL: CPT | Performed by: STUDENT IN AN ORGANIZED HEALTH CARE EDUCATION/TRAINING PROGRAM

## 2021-11-13 PROCEDURE — 97165 OT EVAL LOW COMPLEX 30 MIN: CPT

## 2021-11-13 PROCEDURE — 84484 ASSAY OF TROPONIN QUANT: CPT | Performed by: STUDENT IN AN ORGANIZED HEALTH CARE EDUCATION/TRAINING PROGRAM

## 2021-11-13 PROCEDURE — 99223 PR INITIAL HOSPITAL CARE,LEVL III: ICD-10-PCS | Mod: ,,, | Performed by: NEUROLOGICAL SURGERY

## 2021-11-13 PROCEDURE — 85730 THROMBOPLASTIN TIME PARTIAL: CPT | Performed by: STUDENT IN AN ORGANIZED HEALTH CARE EDUCATION/TRAINING PROGRAM

## 2021-11-13 PROCEDURE — 84295 ASSAY OF SERUM SODIUM: CPT | Mod: 91 | Performed by: STUDENT IN AN ORGANIZED HEALTH CARE EDUCATION/TRAINING PROGRAM

## 2021-11-13 PROCEDURE — 97530 THERAPEUTIC ACTIVITIES: CPT

## 2021-11-13 PROCEDURE — 25500020 PHARM REV CODE 255: Performed by: INTERNAL MEDICINE

## 2021-11-13 PROCEDURE — 25000003 PHARM REV CODE 250: Performed by: INTERNAL MEDICINE

## 2021-11-13 PROCEDURE — 81001 URINALYSIS AUTO W/SCOPE: CPT | Performed by: INTERNAL MEDICINE

## 2021-11-13 PROCEDURE — 85610 PROTHROMBIN TIME: CPT | Performed by: STUDENT IN AN ORGANIZED HEALTH CARE EDUCATION/TRAINING PROGRAM

## 2021-11-13 PROCEDURE — 87040 BLOOD CULTURE FOR BACTERIA: CPT | Mod: 59 | Performed by: STUDENT IN AN ORGANIZED HEALTH CARE EDUCATION/TRAINING PROGRAM

## 2021-11-13 PROCEDURE — 84100 ASSAY OF PHOSPHORUS: CPT | Performed by: STUDENT IN AN ORGANIZED HEALTH CARE EDUCATION/TRAINING PROGRAM

## 2021-11-13 PROCEDURE — 94761 N-INVAS EAR/PLS OXIMETRY MLT: CPT

## 2021-11-13 PROCEDURE — A9585 GADOBUTROL INJECTION: HCPCS | Performed by: INTERNAL MEDICINE

## 2021-11-13 PROCEDURE — 99223 1ST HOSP IP/OBS HIGH 75: CPT | Mod: ,,, | Performed by: NEUROLOGICAL SURGERY

## 2021-11-13 PROCEDURE — C9399 UNCLASSIFIED DRUGS OR BIOLOG: HCPCS | Performed by: STUDENT IN AN ORGANIZED HEALTH CARE EDUCATION/TRAINING PROGRAM

## 2021-11-13 PROCEDURE — 25000003 PHARM REV CODE 250: Performed by: STUDENT IN AN ORGANIZED HEALTH CARE EDUCATION/TRAINING PROGRAM

## 2021-11-13 PROCEDURE — 82570 ASSAY OF URINE CREATININE: CPT | Performed by: NURSE PRACTITIONER

## 2021-11-13 PROCEDURE — 83935 ASSAY OF URINE OSMOLALITY: CPT | Performed by: NURSE PRACTITIONER

## 2021-11-13 PROCEDURE — 99291 PR CRITICAL CARE, E/M 30-74 MINUTES: ICD-10-PCS | Mod: ,,, | Performed by: INTERNAL MEDICINE

## 2021-11-13 PROCEDURE — 86901 BLOOD TYPING SEROLOGIC RH(D): CPT | Performed by: STUDENT IN AN ORGANIZED HEALTH CARE EDUCATION/TRAINING PROGRAM

## 2021-11-13 PROCEDURE — 83930 ASSAY OF BLOOD OSMOLALITY: CPT | Mod: 91 | Performed by: STUDENT IN AN ORGANIZED HEALTH CARE EDUCATION/TRAINING PROGRAM

## 2021-11-13 PROCEDURE — 86900 BLOOD TYPING SEROLOGIC ABO: CPT | Performed by: STUDENT IN AN ORGANIZED HEALTH CARE EDUCATION/TRAINING PROGRAM

## 2021-11-13 PROCEDURE — 97112 NEUROMUSCULAR REEDUCATION: CPT

## 2021-11-13 PROCEDURE — 63600175 PHARM REV CODE 636 W HCPCS: Performed by: STUDENT IN AN ORGANIZED HEALTH CARE EDUCATION/TRAINING PROGRAM

## 2021-11-13 PROCEDURE — 25000003 PHARM REV CODE 250: Performed by: PHYSICIAN ASSISTANT

## 2021-11-13 PROCEDURE — 63600175 PHARM REV CODE 636 W HCPCS: Performed by: INTERNAL MEDICINE

## 2021-11-13 PROCEDURE — 20000000 HC ICU ROOM

## 2021-11-13 PROCEDURE — A4217 STERILE WATER/SALINE, 500 ML: HCPCS | Performed by: STUDENT IN AN ORGANIZED HEALTH CARE EDUCATION/TRAINING PROGRAM

## 2021-11-13 PROCEDURE — 84300 ASSAY OF URINE SODIUM: CPT | Performed by: NURSE PRACTITIONER

## 2021-11-13 PROCEDURE — 84443 ASSAY THYROID STIM HORMONE: CPT | Performed by: STUDENT IN AN ORGANIZED HEALTH CARE EDUCATION/TRAINING PROGRAM

## 2021-11-13 PROCEDURE — 87086 URINE CULTURE/COLONY COUNT: CPT | Performed by: INTERNAL MEDICINE

## 2021-11-13 PROCEDURE — 92610 EVALUATE SWALLOWING FUNCTION: CPT

## 2021-11-13 PROCEDURE — 83735 ASSAY OF MAGNESIUM: CPT | Performed by: STUDENT IN AN ORGANIZED HEALTH CARE EDUCATION/TRAINING PROGRAM

## 2021-11-13 PROCEDURE — 63600175 PHARM REV CODE 636 W HCPCS: Performed by: NURSE PRACTITIONER

## 2021-11-13 RX ORDER — GADOBUTROL 604.72 MG/ML
10 INJECTION INTRAVENOUS
Status: COMPLETED | OUTPATIENT
Start: 2021-11-13 | End: 2021-11-13

## 2021-11-13 RX ORDER — INSULIN ASPART 100 [IU]/ML
1-10 INJECTION, SOLUTION INTRAVENOUS; SUBCUTANEOUS EVERY 6 HOURS PRN
Status: DISCONTINUED | OUTPATIENT
Start: 2021-11-13 | End: 2021-11-14

## 2021-11-13 RX ORDER — LEVOTHYROXINE SODIUM 100 UG/1
100 TABLET ORAL
Status: DISCONTINUED | OUTPATIENT
Start: 2021-11-13 | End: 2021-12-01 | Stop reason: HOSPADM

## 2021-11-13 RX ORDER — SODIUM CHLORIDE 450 MG/100ML
INJECTION, SOLUTION INTRAVENOUS CONTINUOUS
Status: DISCONTINUED | OUTPATIENT
Start: 2021-11-13 | End: 2021-11-14

## 2021-11-13 RX ORDER — SODIUM CHLORIDE 9 MG/ML
INJECTION, SOLUTION INTRAVENOUS CONTINUOUS
Status: DISCONTINUED | OUTPATIENT
Start: 2021-11-13 | End: 2021-11-13

## 2021-11-13 RX ORDER — SODIUM CHLORIDE 0.9 % (FLUSH) 0.9 %
10 SYRINGE (ML) INJECTION
Status: DISCONTINUED | OUTPATIENT
Start: 2021-11-13 | End: 2021-12-01 | Stop reason: HOSPADM

## 2021-11-13 RX ORDER — GLUCAGON 1 MG
1 KIT INJECTION
Status: DISCONTINUED | OUTPATIENT
Start: 2021-11-13 | End: 2021-12-01 | Stop reason: HOSPADM

## 2021-11-13 RX ORDER — MIDAZOLAM HYDROCHLORIDE 1 MG/ML
2 INJECTION INTRAMUSCULAR; INTRAVENOUS ONCE
Status: COMPLETED | OUTPATIENT
Start: 2021-11-13 | End: 2021-11-13

## 2021-11-13 RX ORDER — LABETALOL HCL 20 MG/4 ML
10 SYRINGE (ML) INTRAVENOUS
Status: DISCONTINUED | OUTPATIENT
Start: 2021-11-13 | End: 2021-11-15

## 2021-11-13 RX ADMIN — MIDAZOLAM 1 MG: 1 INJECTION INTRAMUSCULAR; INTRAVENOUS at 05:11

## 2021-11-13 RX ADMIN — PIPERACILLIN AND TAZOBACTAM 4.5 G: 4; .5 INJECTION, POWDER, LYOPHILIZED, FOR SOLUTION INTRAVENOUS; PARENTERAL at 10:11

## 2021-11-13 RX ADMIN — INSULIN ASPART 4 UNITS: 100 INJECTION, SOLUTION INTRAVENOUS; SUBCUTANEOUS at 06:11

## 2021-11-13 RX ADMIN — SODIUM CHLORIDE: 0.45 INJECTION, SOLUTION INTRAVENOUS at 09:11

## 2021-11-13 RX ADMIN — SODIUM CHLORIDE: 0.9 INJECTION, SOLUTION INTRAVENOUS at 08:11

## 2021-11-13 RX ADMIN — INSULIN DETEMIR 25 UNITS: 100 INJECTION, SOLUTION SUBCUTANEOUS at 10:11

## 2021-11-13 RX ADMIN — SODIUM CHLORIDE: 234 INJECTION INTRAMUSCULAR; INTRAVENOUS; SUBCUTANEOUS at 02:11

## 2021-11-13 RX ADMIN — PIPERACILLIN AND TAZOBACTAM 4.5 G: 4; .5 INJECTION, POWDER, LYOPHILIZED, FOR SOLUTION INTRAVENOUS; PARENTERAL at 05:11

## 2021-11-13 RX ADMIN — VANCOMYCIN HYDROCHLORIDE 1750 MG: 10 INJECTION, POWDER, LYOPHILIZED, FOR SOLUTION INTRAVENOUS at 08:11

## 2021-11-13 RX ADMIN — PIPERACILLIN AND TAZOBACTAM 4.5 G: 4; .5 INJECTION, POWDER, LYOPHILIZED, FOR SOLUTION INTRAVENOUS; PARENTERAL at 01:11

## 2021-11-13 RX ADMIN — MIDAZOLAM 2 MG: 1 INJECTION INTRAMUSCULAR; INTRAVENOUS at 12:11

## 2021-11-13 RX ADMIN — INSULIN DETEMIR 25 UNITS: 100 INJECTION, SOLUTION SUBCUTANEOUS at 09:11

## 2021-11-13 RX ADMIN — VANCOMYCIN HYDROCHLORIDE 1750 MG: 10 INJECTION, POWDER, LYOPHILIZED, FOR SOLUTION INTRAVENOUS at 06:11

## 2021-11-13 RX ADMIN — GADOBUTROL 10 ML: 604.72 INJECTION INTRAVENOUS at 05:11

## 2021-11-13 RX ADMIN — INSULIN ASPART 2 UNITS: 100 INJECTION, SOLUTION INTRAVENOUS; SUBCUTANEOUS at 11:11

## 2021-11-13 NOTE — PT/OT/SLP EVAL
Speech Language Pathology Evaluation  Bedside Swallow    Patient Name:  Violeta Champion   MRN:  [id number]  Admitting Diagnosis: Brain lesion    Recommendations:                 General Recommendations:  Dysphagia therapy and Cognitive-linguistic evaluation  Diet recommendations:  Mechanical soft, Thin   Aspiration Precautions:   - 1:1 assist for ALL PO intake  - Whole meds 1 by 1 - if unable to tolerate, please crush and mix into puree textures  - Alternate sips/bites   - Slow rate of intake - Check for oral clearance   - Upright, awake and alert for ALL PO intake and remain upright for 30 mins s/p meals   General Precautions: Standard, fall,aspiration (mechanical soft)  Communication strategies:  provide increased time to answer and frequent redirection     History:     Past Medical History:   Diagnosis Date    Breast cancer 12/10/2020    Diabetes mellitus, type 2     GERD (gastroesophageal reflux disease)     High cholesterol     Hypertension     Hypothyroid     Injury of knee, leg, ankle and foot     Insulin-resistant diabetes mellitus and acanthosis nigricans     Menopause present     Osteoarthritis     Osteoarthritis, knee     Osteopenia     Osteopenia     Sleep apnea     Status post breast lump removal 12/01/2020       Past Surgical History:   Procedure Laterality Date    bilateral duct removal breast      BREAST CYST ASPIRATION Right 2020    EXCISIONAL BIOPSY Right 12/10/2020    Procedure: EXCISIONAL BIOPSY, breast; u/s guided;  Surgeon: Bernadette Lopez MD;  Location: [facility];  Service: General;  Laterality: Right;    HYSTERECTOMY      OOPHORECTOMY      OTHER SURGICAL HISTORY      bilateral central duct removal with bilateral papilomona     Neuro MD note 11/13: History of Present Illness: 60F h/o breast cancer, admitted 10/17 to [facility] w/ [facility] found to have septicemia, BCx w/ oral fusobacerium, treated w/ appropriate abx. Increased lethargy last few days/AMS prompted new MRI which  showed multiple lesions throughout the bilateral cerebral hemispheres with vasogenic edema ,mild leftward midline shift and partial mass effect of the right lateral ventricle. Patient transferred to [facility] for neuro-ICU level of care. Now patient is in ICU stable protecting airway Ox name [id number] AD doesn't follow commands, on vanc and 3%, CTH pending needs MRI + C, Echo, ID consult.    Head CT:  Multiple areas of mild diminished attenuation involving the cerebral hemispheres bilaterally corresponding with multiple T2/FLAIR hyperintense lesions noted on the recent MRI examination, the overall pattern and degree of edema and mass effect with mild right-to-left midline shift appears stable when compared to the recent MRI examination without evidence for significant interval detrimental change when accounting for difference in imaging modality.      Prior Intubation HX:  Intubated 10/27/21 and extubated 10/30/21    Modified Barium Swallow: None on file, per EMR.     CXR 11/7: No acute cardiopulmonary process.    Prior diet: Per pt's sister during admission at [facility]--regular textures and thin liquids.      Subjective     Patient seen at bedside with family member sleeping in room.   Patient lethargic.   Communicated with RN who cleared SLP for po trials.     Pain/Comfort:  Pain Rating 1: 0/10    Respiratory Status:  O2 Device (Oxygen Therapy): room air    Objective:     Oral Musculature Evaluation  Oral Musculature: unable to assess due to poor participation/comprehension  Dentition: present and adequate  Mucosal Quality: adequate  Volitional Cough: patient did not elicit a volitional cough; strong spontaneous cough noted  Volitional Swallow: patient did not follow direction  Voice Prior to PO Intake: clear    Bedside Swallow Eval:   Consistencies Assessed:  · Thin liquids cup sips  · Puree teaspoon bites of apple sauce  · Solids bites of best cracker    Oral Phase:   · Prolonged bolus prep and delayed AP  transit with solids    Pharyngeal Phase:   · Dry cough appreciated at end of trials, however, not suspected to be in relation to swallowing.   · Vocal quality remained clear, O2 sats remained consistent, and patient with no significant coughing/choking or throat clearing    Compensatory Strategies  · none    Treatment: SLP provided patient education on SLP recommendations, SLP role, s/s and risks of aspiration, safe swallow precautions, and POC. Patient demonstrated understanding. White board updated and SLP communicated with RN.      Assessment:     Violeta Champion is a 60 y.o. female with an SLP diagnosis of Dysphagia. ST will continue to follow.     Goals:   Multidisciplinary Problems     SLP Goals        Problem: SLP Goal    Goal Priority Disciplines Outcome   SLP Goal     SLP Ongoing, Progressing   Description: Speech Therapy Short Term Goals  Goal expected to be met by 11/25  1. Pt will participate in an ongoing assessment to determine the least restrictive and safest diet with possible updated goals to follow pending results.  2. Pt will participate in a speech, language, and cognitive evaluation with possible updated goals to follow pending results.                     Plan:     · Patient to be seen:  4 x/week   · Plan of Care expires:     · Plan of Care reviewed with:  patient   · SLP Follow-Up:  Yes       Discharge recommendations:  rehabilitation facility   Barriers to Discharge:  Safety Awareness .    Time Tracking:     SLP Treatment Date:   11/13/21  Speech Start Time:  0717  Speech Stop Time:  0725     Speech Total Time (min):  8 min    Billable Minutes: Eval Swallow and Oral Function 8    11/13/2021

## 2021-11-13 NOTE — PROGRESS NOTES
Pharmacokinetic Initial Assessment: IV Vancomycin    Assessment/Plan:    Mrs. Champion received intravenous vancomycin with loading dose of 2500 mg once at OSH before being transferred to [facility].  -  Will schedule a maintenance dose of vancomycin 1750 mg (~14 mg/kg) IV every 12 hours since Scr appears to be stable and at baseline.  - Desired empiric serum trough concentration is 15 to 20 mcg/mL. There was a concern for encephalitis at OSH.   - Draw vancomycin trough level 60 min prior to fourth dose on 11/14 at approximately 0500.   - Please draw a random level sooner than scheduled trough if renal function changes significantly.     Pharmacy will continue to follow and monitor vancomycin.      Please contact pharmacy with any questions regarding this assessment.     Thank you for the consult,   Inga Daniels       Patient brief summary:  Violeta Champion is a 60 y.o. female initiated on antimicrobial therapy with IV Vancomycin for treatment of suspected bacteremia    Drug Allergies:   Review of patient's allergies indicates:   Allergen Reactions    Iodinated contrast media Swelling     Other reaction(s): Swelling    Naproxen sodium Swelling    Naprosyn  [naproxen]      Other reaction(s): Swelling       Actual Body Weight:   124.1 kg    Renal Function:   Estimated Creatinine Clearance: 88.7 mL/min (based on SCr of 0.9 mg/dL).     Dialysis Method (if applicable):  N/A

## 2021-11-13 NOTE — PLAN OF CARE
[facility] Care Plan    POC reviewed with Violeta Champion and family at 0300. Sister at bedside verbalized understanding. Questions and concerns addressed. No acute events today. Pt progressing toward goals. Will continue to monitor. See below and flowsheets for full assessment and VS info.     -Neuro exam unchanged. Pt disoriented to time, place, situation  -CT complete   -3% infusing @ 15ml/hr     Neuro:  [place] Coma Scale  Best Eye Response: 4-->(E4) spontaneous  Best Motor Response: 5-->(M5) localizes pain  Best Verbal Response: 4-->(V4) confused  A[place] Coma Scale Score: 13  Assessment Qualifiers: patient not sedated/intubated,no eye obstruction present        24 hr Temp:  [98.3 °F (36.8 °C)-99.7 °F (37.6 °C)]     CV:   Rhythm: normal sinus rhythm  BP goals:   SBP < 160  MAP > 65    Resp:   O2 Device (Oxygen Therapy): room air       Plan: N/A    GI/:     Diet/Nutrition Received: NPO  Last Bowel Movement: 11/11/21  Voiding Characteristics: urethral catheter (bladder)  No intake or output data in the 24 hours ending 11/13/21 0511       Labs/Accuchecks:  Recent Labs   Lab 11/13/21 0111   WBC 7.83   RBC 3.39*   HGB 8.8*   HCT 30.5*         Recent Labs   Lab 11/13/21 0111      K 3.9   CO2 25   *   BUN 7   CREATININE 0.8   ALKPHOS 84   ALT 6*   AST 13   BILITOT 0.3      Recent Labs   Lab 11/13/21 0111   INR 1.0   APTT 21.9      Recent Labs   Lab 11/13/21 0111   TROPONINI <0.006       Electrolytes: No replacement orders  Accuchecks: Q6H    Gtts:   buffered 3% sodium acetate 130meq, sodium chloride 130meq, sterile water for inj IV soln         LDA/Wounds:  Lines/Drains/Airways       Peripherally Inserted Central Catheter Line              PICC Double Lumen 11/01/21 1950 right basilic 11 days              Drain              Female External Urinary Catheter 11/05/21 2000 7 days                  Wounds: Yes  Wound care consulted: Yes   Problem: Adult Inpatient Plan of Care  Goal: Plan of Care  Review  Flowsheets (Taken 11/13/2021 0510)  Plan of Care Reviewed With: patient     Problem: Diabetes Comorbidity  Goal: Blood Glucose Level Within Desired Range  Intervention: Maintain Glycemic Control  Flowsheets (Taken 11/13/2021 0510)  Glycemic Management: blood glucose monitoring     Problem: Hemodynamic Instability (Sepsis/Septic Shock)  Goal: Effective Tissue Perfusion  Intervention: Optimize Blood Flow  Flowsheets (Taken 11/13/2021 0510)  Fluid/Electrolyte Management: fluids provided  Stabilization Measures: airway opened

## 2021-11-13 NOTE — CONSULTS
[facility]  Neurosurgery  Consult Note    Consults  Subjective:     Chief Complaint/Reason for Admission: Multiple Brain Lesions    History of Present Illness: 60F h/o breast cancer, admitted 10/17 to [facility] w/ [facility] found to have septicemia, BCx w/ oral fusobacerium, treated w/ appropriate abx. Increased lethargy last few days/AMS prompted new MRI which showed multiple lesions throughout the bilateral cerebral hemispheres with vasogenic edema ,mild leftward midline shift and partial mass effect of the right lateral ventricle. Patient transferred to [facility] for neuro-ICU level of care. Now patient is in ICU stable protecting airway Ox name [id number] AD doesn't follow commands, on vanc and 3%, CTH pending needs MRI + C, Echo, ID consult.      Medications Prior to Admission   Medication Sig Dispense Refill Last Dose    albuterol (VENTOLIN HFA) 90 mcg/actuation inhaler INHALE ONE TO TWO PUFFS INTO LUNGS EVERY 6 HOURS AS NEEDED FOR WHEEZING 54 g 3     amLODIPine (NORVASC) 5 MG tablet TAKE 1 TABLET BY MOUTH EVERY DAY 90 tablet 3     aspirin 81 mg Tab Take by mouth. 1 Tablet Oral Every day       blood sugar diagnostic (FREESTYLE INSULINX TEST STRIPS) Strp Check glucose three times daily 100 each 3     butalbital-acetaminophen-caffeine -40 mg (FIORICET, ESGIC) -40 mg per tablet Take 1 tablet by mouth as needed.       calcium carbonate (TUMS) 300 mg (750 mg) Chew Take by mouth 2 (two) times daily.       diclofenac sodium (VOLTAREN) 1 % Gel APPLY TWO GRAMS TOPICALLY ONCE DAILY 100 g 0     ezetimibe (ZETIA) 10 mg tablet TAKE 1 TABLET BY MOUTH EVERY DAY IN THE EVENING 90 tablet 3     FARXIGA 10 mg tablet TAKE 1 TABLET BY MOUTH EVERY DAY 90 tablet 1     fish oil-dha-epa 1,200-144-216 mg Cap 1 Capsule Oral Every day 90 capsule 3     fluticasone-salmeterol diskus inhaler 250-50 mcg Inhale 1 puff into the lungs 2 (two) times daily. Controller 180 each 3     glipiZIDE (GLUCOTROL) 10 MG  TR24 TAKE 1 TABLET (10 MG TOTAL) BY MOUTH DAILY WITH BREAKFAST. 90 tablet 2     GUAIATUSSIN AC  mg/5 mL syrup Take 5 mLs by mouth every 4 (four) hours as needed.       HYDROcodone-acetaminophen (NORCO)  mg per tablet Take 1 tablet by mouth every 8 (eight) hours as needed for Pain. 90 tablet 0     hyoscyamine (LEVSIN/SL) 0.125 mg Subl DISSOLVE 1 TABLET UNDER THE TONGUE EVERY 4 TO 6 HOURS 30 tablet 5     ibuprofen (ADVIL,MOTRIN) 800 MG tablet Take 800 mg by mouth every 8 (eight) hours as needed.  0     ipratropium (ATROVENT) 21 mcg (0.03 %) nasal spray SMARTSI Both Nares Every Night       JARDIANCE 10 mg tablet Take 10 mg by mouth once daily.       lactulose (CHRONULAC) 10 gram/15 mL solution TAKE 15 ML DAILY AS NEEDED FOR CONSTIPATION 473 mL 4     levothyroxine (SYNTHROID) 100 MCG tablet TAKE 1 TABLET DAILY 90 tablet 3     LORazepam (ATIVAN) 0.5 MG tablet Take 1 tablet (0.5 mg total) by mouth 2 (two) times daily. 60 tablet 0     meclizine (ANTIVERT) 25 mg tablet Take 1 tablet (25 mg total) by mouth 3 (three) times daily as needed. 30 tablet 0     metFORMIN (GLUCOPHAGE) 1000 MG tablet TAKE 1 TABLET BY MOUTH TWICE A DAY WITH MEALS 180 tablet 3     metoprolol tartrate (LOPRESSOR) 100 MG tablet TAKE 1 TABLET BY MOUTH TWICE A  tablet 1     mv-min-FA-Ez-Vl-bnjfuzy-lutein 0.4-162-18 mg Tab Take by mouth. 1 Tablet Oral Every day       NARCAN 4 mg/actuation Spry 1 spray once.       potassium chloride SA (K-DUR,KLOR-CON) 20 MEQ tablet TAKE 1 TABLET BY MOUTH TWICE A  tablet 3     sucralfate (CARAFATE) 1 gram tablet Take 1 tablet (1 g total) by mouth 3 (three) times daily. 270 tablet 3     temazepam (RESTORIL) 30 mg capsule Take 1 capsule (30 mg total) by mouth nightly as needed. 1 Capsule Oral Every day 30 capsule 0     tiZANidine (ZANAFLEX) 4 MG tablet TAKE 1 TABLET (4 MG TOTAL) BY MOUTH EVERY 8 (EIGHT) HOURS. 90 tablet 1     TRULICITY 1.5 mg/0.5 mL pen injector INJECT 1 PEN  EVERY WEEK 2 pen 4     vibegron 75 mg Tab Take 75 mg by mouth once daily. 30 tablet 11        Review of patient's allergies indicates:   Allergen Reactions    Iodinated contrast media Swelling     Other reaction(s): Swelling    Naproxen sodium Swelling    Naprosyn  [naproxen]      Other reaction(s): Swelling       Past Medical History:   Diagnosis Date    Breast cancer 12/10/2020    Diabetes mellitus, type 2     GERD (gastroesophageal reflux disease)     High cholesterol     Hypertension     Hypothyroid     Injury of knee, leg, ankle and foot     Insulin-resistant diabetes mellitus and acanthosis nigricans     Menopause present     Osteoarthritis     Osteoarthritis, knee     Osteopenia     Osteopenia     Sleep apnea     Status post breast lump removal 12/01/2020     Past Surgical History:   Procedure Laterality Date    bilateral duct removal breast      BREAST CYST ASPIRATION Right 2020    EXCISIONAL BIOPSY Right 12/10/2020    Procedure: EXCISIONAL BIOPSY, breast; u/s guided;  Surgeon: Bernadette Lopez MD;  Location: [facility];  Service: General;  Laterality: Right;    HYSTERECTOMY      OOPHORECTOMY      OTHER SURGICAL HISTORY      bilateral central duct removal with bilateral papilomona     Family History     Problem Relation (Age of Onset)    Amblyopia Father    Breast cancer Paternal Aunt, Paternal Grandmother    Cancer Maternal Grandmother    Diabetes Mother, Maternal Aunt, Maternal Uncle    Glaucoma Mother    Hypertension Mother, Brother        Tobacco Use    Smoking status: Never Smoker    Smokeless tobacco: Never Used   Substance and Sexual Activity    Alcohol use: Yes     Alcohol/week: 0.0 standard drinks     Comment: very rarely    Drug use: No    Sexual activity: Not on file     Review of Systems   Unable to perform ROS: Mental status change     Objective:     Weight: 124.1 kg (273 lb 9.5 oz)  Body mass index is 46.96 kg/m².  Vital Signs (Most Recent):  Pulse: 91 (11/13/21  0000)  Resp: (!) 35 (11/13/21 0000)  BP: (!) 112/58 (11/13/21 0000)  SpO2: 97 % (11/13/21 0000) Vital Signs (24h Range):  Temp:  [98.3 °F (36.8 °C)-99.7 °F (37.6 °C)] 99.7 °F (37.6 °C)  Pulse:  [72-95] 91  Resp:  [13-35] 35  SpO2:  [94 %-98 %] 97 %  BP: (112-170)/(58-91) 112/58                     Female External Urinary Catheter 11/05/21 2000 (Active)   Skin no redness;no breakdown 11/12/21 1915   Tolerance no signs/symptoms of discomfort 11/12/21 1915   Suction Continuous suction at 60 mmHg 11/12/21 1915   Date of last wick change 11/12/21 11/12/21 1915   Time of last wick change 1915 11/12/21 1915   Output (mL) 1100 mL 11/12/21 0600       Physical Exam:  Nursing note and vitals reviewed.    Eyes: Pupils are equal, round, and reactive to light. EOM are normal.     Cardiovascular: Normal rate and intact distal pulses.     Abdominal: Soft.     Psych/Behavior:   Oriented x Name     Neurological:   Oriented x Name  GCS 14 E4V4 M5  Eyes open says name  CN II-XII grossly intact  Moves all extremities briskly, intermittently following commands         Significant Labs:  Recent Labs   Lab 11/11/21  0616 11/11/21  1259 11/11/21  2325 11/12/21  0539 11/12/21 1952   *   < > 121* 121* 276*   *   < > 144 146* 138   K 3.8   < > 3.8 3.6 3.9   *   < > 111* 114* 109   CO2 21*   < > 23 24 22*   BUN 8   < > 9 8 8   CREATININE 0.9   < > 0.8 0.8 0.9   CALCIUM 8.3*   < > 7.9* 8.3* 7.4*   MG 1.8  --   --  1.8  --     < > = values in this interval not displayed.     Recent Labs   Lab 11/11/21  0616 11/12/21  0539   WBC 8.64 7.87   HGB 8.6* 8.5*   HCT 28.7* 28.4*    331     No results for input(s): LABPT, INR, APTT in the last 48 hours.  Microbiology Results (last 7 days)     Procedure Component Value Units Date/Time    Blood culture [[id number]]     Order Status: Sent Specimen: Blood     Blood culture [[id number]]     Order Status: Sent Specimen: Blood         All pertinent labs from the last 24 hours have been  reviewed.    Significant Diagnostics:  I have reviewed all pertinent imaging results/findings within the past 24 hours.    Assessment/Plan:     * Brain lesion  60F h/o breast cancer, admitted 10/17 to [facility] w/ [facility] found to have septicemia, BCx w/ oral fusobacerium, treated w/ appropriate abx. Increased lethargy last few days/AMS prompted new MRI which showed multiple lesions throughout the bilateral cerebral hemispheres with vasogenic edema ,mild leftward midline shift and partial mass effect of the right lateral ventricle. Patient transferred to [facility] for neuro-ICU level of care. Now patient is in ICU stable protecting airway.    Plan  --admit to [facility]  --q1 neurochecks  --MRI + Contrast  --Echo  --ID consult  --Hypertonics per primary  --no acute neurosurgical intervention at this time, we will continue to follow    Please page with any change in neuro exam  Discussed w/ Dr. Rdz        Thank you for your consult. I will follow-up with patient. Please contact us if you have any additional questions.    Eugene Camarillo MD  Neurosurgery  [facility]

## 2021-11-13 NOTE — PT/OT/SLP EVAL
Physical Therapy Co-Evaluation and Treatment with OT    Patient Name:  Violeta Champion   MRN:  [id number]    *Co-treatment with OT due to suspected patient complexity and need for two skilled therapists to ensure safe mobilization.    Recommendations:     Discharge Recommendations:  rehabilitation facility   Discharge Equipment Recommendations:  (tbd pending progress)   Barriers to discharge: increased (A)    Highest Level of Mobility: Sitting EOB  Assistance Required: Total(A)    Assessment:     Violeta Champion is a 60 y.o. female admitted with a medical diagnosis of Brain lesion. Pt met with HOB elevated, sister present and agreeable to PT treatment. Pt very lethargic throughout eval and requires max cues for participation. Pt with impaired command following at this date which limits evaluation. Her eyes remained closed ~80% of session despite max cues for eye opening. Per pt's sister who was present, pt's PLOF is (I) with functional mobility and ADLs using no DME. Currently, pt requires total(A)X2 persons for bed mobility. Pt is able to move R LE against gravity and no volitional movement of L LE was observed, which may be due to impaired command following.    Problem list: weakness,impaired balance,decreased safety awareness,impaired endurance,impaired cognition,impaired self care skills,impaired functional mobilty,decreased upper extremity function,impaired fine motor,gait instability,decreased lower extremity function,impaired coordination,decreased ROM    Pt would benefit from continued skilled acute PT 3x/wk to address above listed functional deficits, provide patient/caregiver education, reduce fall risk, and maximize (I) and safety with functional mobility. After hospital discharge, pt would benefit from inpatient rehab to maximize rehab potential.      Rehab Prognosis: Good; patient would benefit from acute skilled PT services to address these deficits and reach maximum level of function.      Plan:  "    During this hospitalization, patient to be seen 4 x/week to address the identified rehab impairments via gait training,therapeutic activities,therapeutic exercises,neuromuscular re-education and progress toward the following goals:    · Plan of Care Expires:  12/13/21    This plan of care has been discussed with the patient/caregiver, who was included in its development and is in agreement with the identified goals and treatment plan.     Subjective     Communicated with RN prior to session.  Patient agreeable to participate.     Chief Complaint: Brain lesion  Patient/Family Comments/goals: "I'm sleepy"    Pain/Comfort:  · Pain Rating 1: 0/10  · Pain Rating Post-Intervention 1: 0/10    Patients cultural, spiritual, Orthodoxy conflicts given the current situation: no    Patient's living environment is as follows:  Living Environment: Pt's prior living situation was alone in 2nd floor apt with no elevator. Plan is for pt to move in with sister in [facility] with threshold to enter and t/c combo  Prior Level of Function: independent with mobility and ADLs. Pt worked as a nurse on night shift  DME used: none  DME owned (not currently used): none  Upon discharge, patient will have assistance from: sister    Objective:     Patient found HOB elevated with telemetry,pulse ox (continuous),blood pressure cuff,peripheral IV,bed alarm,enamorado catheter  upon PT entry to room.    General Precautions: Standard, fall,aspiration   Orthopedic Precautions:N/A   Braces: N/A   /62   Pulse 74   Temp 98.4 °F (36.9 °C) (Oral)   Resp (!) 32   Ht 5' 4" (1.626 m)   Wt 123.8 kg (273 lb)   LMP  (LMP Unknown)   SpO2 96%   BMI 46.86 kg/m²   Oxygen Device: room air      Exams:     Cognition:  o Pt is lethargic and oriented x 4  o Command following: impaired, pt follows selective commands     Sensation:   o Light touch sensation: Unable to formally assess due to impaired cognition      Edema: None present     Tone: None " present     Postural examination/scapula alignment: Rounded shoulder, Head forward and Slouched posture     Lower Extremity Range of Motion:  o Right Lower Extremity: WNL  o Left Lower Extremity: WNL     Lower Extremity Strength:  · Unable to assess formally due to impaired command following  · Pt moves R LE against gravity (knee extension) but does not PF or DF ankle. Limited command following  · No volitional movement of L LE observed    Functional Mobility:    Bed Mobility:  · Supine to Sit: Total Assistance and 2 persons on R side of bed  · Sit to Supine: Total Assistance and 2 persons  · Rolling L: Total Assistance  · Rolling R: Total Assistance  · Scooting anteriorly to EOB to plant feet on floor: Total Assistance and 2 persons  · Scooting/Bridging in supine to HOB: Total Assistance and 2 persons    Transfers:   · Sit to Stand Transfer: Activity did not occur due to poor sitting balance, pt lethargy, poor command following, and unable to obtain accurate LE MMT    Balance:  · Static Sit:   · Total Assist at EOB x ~10 minutes  · Poor: Patient requires handhold support and moderate to maximal assistance to maintain position.   · R posterior lean  · PT provided tactile cues for midline awareness, scapular retraction, and improved upright posture        Therapeutic Activities/Exercises      Patient assisted with functional mobility as noted above   Patient educated on the importance of early mobility to prevent functional decline during hospital stay   Patient educated on PT POC and role of PT in acute care   White board updated to include patient's safest level of mobility with staff assistance, RN also updated    AM-PAC 6 CLICK MOBILITY  Total Score:8     Patient left HOB elevated with all lines intact, call button in reach, bed alarm on, RN notified and sister present.      History/Goals:     PAST MEDICAL HISTORY:  Past Medical History:   Diagnosis Date    Breast cancer 12/10/2020    Diabetes mellitus,  type 2     GERD (gastroesophageal reflux disease)     High cholesterol     Hypertension     Hypothyroid     Injury of knee, leg, ankle and foot     Insulin-resistant diabetes mellitus and acanthosis nigricans     Menopause present     Osteoarthritis     Osteoarthritis, knee     Osteopenia     Osteopenia     Sleep apnea     Status post breast lump removal 2020       Past Surgical History:   Procedure Laterality Date    bilateral duct removal breast      BREAST CYST ASPIRATION Right 2020    EXCISIONAL BIOPSY Right 12/10/2020    Procedure: EXCISIONAL BIOPSY, breast; u/s guided;  Surgeon: Bernadette Lopez MD;  Location: [facility];  Service: General;  Laterality: Right;    HYSTERECTOMY      OOPHORECTOMY      OTHER SURGICAL HISTORY      bilateral central duct removal with bilateral papilomona       GOALS:   Multidisciplinary Problems     Physical Therapy Goals        Problem: Physical Therapy Goal    Goal Priority Disciplines Outcome Goal Variances Interventions   Physical Therapy Goal     PT, PT/OT Ongoing, Progressing     Description: Goals to be met by: 21     Patient will increase functional independence with mobility by performin. Supine to sit with Moderate Assistance  2. Sit to supine with Moderate Assistance  3. Rolling to Left and Right with Moderate Assistance.  4. Sit to stand transfer with Maximum Assistance  5. Bed to chair transfer with Maximum Assistance using LRAD  6. Gait  x 5 feet with Maximum Assistance using LRAD.   7. Lower extremity exercise program x15 reps per handout, with assistance as needed                     Time Tracking:     PT Received On: 21  PT Start Time: 904     PT Stop Time: 926  PT Total Time (min): 22 min     Billable Minutes: Evaluation 14 and Neuromuscular Re-education 8      Sarahi Norris, PT  2021  Pager# [phone]

## 2021-11-13 NOTE — PROGRESS NOTES
Patient arrived to [facility] from [facility] via [facility]    Type of stroke/diagnosis:  Brain mass with vasogenix edema    Current symptoms: AMS, disoriented to time, place, situation, follows commands intermittently     Skin assessment done: Y  Wounds noted: Skin tear to bilateral buttocks and bilateral posterior upper thigh, rash to perineal area, skin tear noted to bilateral labia    *If wounds noted, was Wound Care consulted? Y    Rodriguez Completed? Y. Failed    Patient Belongings on Admit: waffle mattress and pump, disposable briefs, levemir insulin pen    NCC notified: MD Rosaline

## 2021-11-13 NOTE — PLAN OF CARE
Eval and POC set 11/13/21    Problem: Occupational Therapy Goal  Goal: Occupational Therapy Goal  Description: Goals to be met by: 2 weeks (11/27/21)     Patient will increase functional independence with ADLs by performing:    UE Dressing with Minimal Assistance.  Grooming while seated with Minimal Assistance.  Sitting at edge of bed x10 minutes with Minimal Assistance.  Supine to sit with Minimal Assistance.  Stand pivot transfers with Moderate Assistance.  Pt will follow 3/3 one-step commands to participate in ADL task.    Outcome: Ongoing, Progressing

## 2021-11-13 NOTE — PT/OT/SLP EVAL
"Occupational Therapy   Co-Evaluation/Treatment    Name: Violeta Champion  MRN: [id number]  Admitting Diagnosis:  Brain lesion  Recent Surgery: * No surgery found *      Recommendations:     Discharge Recommendations: rehabilitation facility  Discharge Equipment Recommendations: TBD pending progress  Barriers to discharge: Decreased caregiver support and increased assistance needed    Assessment:     Violeta Champion is a 60 y.o. female with a medical diagnosis of Brain lesion. She presents with performance deficits including weakness,impaired endurance,impaired self care skills,impaired functional mobilty,impaired balance,decreased safety awareness,decreased lower extremity function,decreased upper extremity function,decreased coordination,decreased ROM,impaired cardiopulmonary response to activity. Pt inconsistently follows commands and had eyes closed throughout majority of session, unable to demo active movement to L side on command. Pt would continue to benefit from OT to increase functional independence and safety. Recommend rehab upon D/C pending progress and participation.     Rehab Prognosis: Good; patient would benefit from acute skilled OT services to address these deficits and reach maximum level of function.       Plan:     Patient to be seen 4 x/week to address the above listed problems via self-care/home management,therapeutic activities,therapeutic exercises,neuromuscular re-education  · Plan of Care Expires: 12/13/21  · Plan of Care Reviewed with: patient (sister)    Subjective     Chief Complaint: SHERIN  Patient/Family Comments/goals: Return to PLOF per sister    Occupational Profile:  History obtained from previous OT evaluation.  Living Environment: pt was living alone prior to incident, however, will be moving to live with her sister [place] - sister's home is a [facility] with THE entrance - bathroom has tub.shower combo and raised toilet - wide doorways in home (36")  Previous level of function: " independent with mobility and ADLs - drives - worked as night nurse with pediatrics  Equipment Used at Home:  glucometer  Assistance upon Discharge: will have assistance when moving to her sister's home    Pain/Comfort:  · Pain Rating 1: 0/10  · Pain Rating Post-Intervention 1: 0/10    Patients cultural, spiritual, Church conflicts given the current situation: no    Objective:     Communicated with: RN prior to session. Patient found with HOB elevated with telemetry,pulse ox (continuous),blood pressure cuff,peripheral IV,bed alarm,[place]or[place] catheter upon OT entry to room, sister present.  Co-evaluation/treatment performed with PT due to patient's multiple deficits requiring two skilled therapists to appropriately and safely assess patient's strength and endurance while facilitating functional tasks in addition to accommodating for patient's activity tolerance.     General Precautions: Standard, fall,aspiration   Orthopedic Precautions:N/A   Braces: N/A  Respiratory Status::   · O2 Device (Oxygen Therapy): room air    Occupational Performance:    Bed Mobility:    · Patient completed Scooting/Bridging with total assistance and 2 persons  · Patient completed Supine to Sit with total assistance and 2 persons  · Patient completed Sit to Supine with total assistance and 2 persons    Functional Mobility/Transfers:  · Not attempted due to poor sitting balance and command following    Activities of Daily Living:  · Total A for LB dressing and to wash face; presented with wash cloth and provided hand-over-hand assist but unable to successfully complete    Cognitive/Visual Perceptual:  Cognitive/Psychosocial Skills:     -       Oriented to: Person, Place (hospital) and Time (year)  -       Follows Commands/attention: Follows one-step commands inconsistently; no response at times  -       Communication: impaired  -       Safety awareness/insight to disability: impaired   Visual/Perceptual: SHERIN due to poor command following and  eyes closed majority of session    Physical Exam:  Balance:    -       poor sitting balance reuqiring Total A for postural control, R lateral lean and poor effort to correct  Postural examination/scapula alignment:    -       Rounded shoulders  -       Forward head  Sensation: SHERIN  Upper Extremity Range of Motion:     -       Right Upper Extremity: WFL based on observation although not on command  -       Left Upper Extremity: no active movement noted on command; WFL PROM  Upper Extremity Strength:    -       Right Upper Extremity: WFL based on observation  -       Left Upper Extremity: impaired; unable to formally assess   Strength:    -       Right Upper Extremity: WFL  -       Left Upper Extremity: impaired  Fine Motor Coordination: impaired    AMPA 6 Click ADL:  AMPA Total Score: 6    Treatment & Education:  OT eval; educated pt and sister on OT role an POC as well as progression with mobility and ADL tasks and to call for assistance  Education:    Patient left HOB elevated with all lines intact, call button in reach, bed alarm on, MD/RN notified and sister present    GOALS:   Multidisciplinary Problems     Occupational Therapy Goals        Problem: Occupational Therapy Goal    Goal Priority Disciplines Outcome Interventions   Occupational Therapy Goal     OT, PT/OT Ongoing, Progressing    Description: Goals to be met by: 2 weeks (11/27/21)     Patient will increase functional independence with ADLs by performing:    UE Dressing with Minimal Assistance.  Grooming while seated with Minimal Assistance.  Sitting at edge of bed x10 minutes with Minimal Assistance.  Supine to sit with Minimal Assistance.  Stand pivot transfers with Moderate Assistance.  Pt will follow 3/3 one-step commands to participate in ADL task.                     History:     Past Medical History:   Diagnosis Date    Breast cancer 12/10/2020    Diabetes mellitus, type 2     GERD (gastroesophageal reflux disease)     High cholesterol      Hypertension     Hypothyroid     Injury of knee, leg, ankle and foot     Insulin-resistant diabetes mellitus and acanthosis nigricans     Menopause present     Osteoarthritis     Osteoarthritis, knee     Osteopenia     Osteopenia     Sleep apnea     Status post breast lump removal 12/01/2020       Past Surgical History:   Procedure Laterality Date    bilateral duct removal breast      BREAST CYST ASPIRATION Right 2020    EXCISIONAL BIOPSY Right 12/10/2020    Procedure: EXCISIONAL BIOPSY, breast; u/s guided;  Surgeon: Bernadette Lopez MD;  Location: [facility];  Service: General;  Laterality: Right;    HYSTERECTOMY      OOPHORECTOMY      OTHER SURGICAL HISTORY      bilateral central duct removal with bilateral papilomona       Time Tracking:     OT Date of Treatment: 11/13/21  OT Start Time: 0904  OT Stop Time: 0925  OT Total Time (min): 21 min    Billable Minutes:Evaluation 13  Therapeutic Activity 8    11/13/2021

## 2021-11-13 NOTE — PLAN OF CARE
Recommendations    1. Once medically able, ADAT to Diabetic. Texture per SLP.     2. If PO intake is <50%, recommend Boost Glucose Control BID.     3. RD following.     Goals: Pt to receive nutrition by RD follow up  Nutrition Goal Status: new  Communication of RD Recs:  (POC)

## 2021-11-13 NOTE — NURSING
0100- Pt transferred to CT vua bed with RNx1 and PCTx1 on continuous cardiac monitoring. Versed given for restless. VSS. WCTM.   0130-Pt returned to unit.

## 2021-11-13 NOTE — SUBJECTIVE & OBJECTIVE
Past Medical History:   Diagnosis Date    Breast cancer 12/10/2020    Diabetes mellitus, type 2     GERD (gastroesophageal reflux disease)     High cholesterol     Hypertension     Hypothyroid     Injury of knee, leg, ankle and foot     Insulin-resistant diabetes mellitus and acanthosis nigricans     Menopause present     Osteoarthritis     Osteoarthritis, knee     Osteopenia     Osteopenia     Sleep apnea     Status post breast lump removal 12/01/2020     Past Surgical History:   Procedure Laterality Date    bilateral duct removal breast      BREAST CYST ASPIRATION Right 2020    EXCISIONAL BIOPSY Right 12/10/2020    Procedure: EXCISIONAL BIOPSY, breast; u/s guided;  Surgeon: Bernadette Lopez MD;  Location: [facility];  Service: General;  Laterality: Right;    HYSTERECTOMY      OOPHORECTOMY      OTHER SURGICAL HISTORY      bilateral central duct removal with bilateral papilomona      Current Facility-Administered Medications on File Prior to Encounter   Medication Dose Route Frequency Provider Last Rate Last Admin    [COMPLETED] mannitol 20% infusion 120 g  120 g Intravenous Once Timmy S Primavera, DO   Stopped at 11/12/21 2033    [COMPLETED] vancomycin (VANCOCIN) 2,500 mg in dextrose 5 % 500 mL IVPB  2,500 mg Intravenous Once Timmy S Primavera,  mL/hr at 11/12/21 1830 2,500 mg at 11/12/21 1830    [DISCONTINUED] 0.9%  NaCl infusion   Intravenous Continuous Timmy S Primavera, DO   Stopped at 11/12/21 2124    [DISCONTINUED] acetaminophen tablet 650 mg  650 mg Oral Q4H PRN Lonamanuela Saenz MD   650 mg at 11/10/21 2127    [DISCONTINUED] amLODIPine tablet 5 mg  5 mg Oral Daily Lona Saenz MD   5 mg at 11/12/21 0814    [DISCONTINUED] aspirin EC tablet 81 mg  81 mg Oral Daily Timmy S Primavera, DO   81 mg at 11/12/21 0814    [DISCONTINUED] cefepime in dextrose 5 % IVPB 2 g  2 g Intravenous Q8H Timmy S Primavera, DO   Stopped at 11/12/21 2007    [DISCONTINUED] cefTRIAXone  (ROCEPHIN) 1 g/50 mL D5W IVPB  1 g Intravenous Q24H Adriana Gutiérrez  mL/hr at 11/12/21 1310 1 g at 11/12/21 1310    [DISCONTINUED] cefTRIAXone (ROCEPHIN) 2 g/50 mL D5W IVPB  2 g Intravenous Q12H Timmy S Primavera, DO        [DISCONTINUED] dextrose 10 % infusion   Intravenous PRN Timmy S Primavera, DO        [DISCONTINUED] dextrose 10 % infusion   Intravenous PRN Timmy S Primavera, DO        [DISCONTINUED] dextrose 50% injection 12.5 g  12.5 g Intravenous PRN Timmy S Primavera, DO        [DISCONTINUED] dextrose 50% injection 25 g  25 g Intravenous PRN Timmy S Primavera, DO        [DISCONTINUED] enoxaparin injection 40 mg  40 mg Subcutaneous Q12H Brady Caicedo MD   40 mg at 11/12/21 2156    [DISCONTINUED] HYDROcodone-acetaminophen  mg per tablet 1 tablet  1 tablet Oral Q8H PRN Timmy S Primavera, DO   1 tablet at 11/11/21 1057    [DISCONTINUED] insulin aspart U-100 pen 0-5 Units  0-5 Units Subcutaneous QID (AC + HS) PRN Timmy S Primavera, DO   2 Units at 11/11/21 1148    [DISCONTINUED] insulin aspart U-100 pen 7 Units  7 Units Subcutaneous TIDWM Lona Saenz MD   7 Units at 11/12/21 1306    [DISCONTINUED] insulin detemir U-100 pen 23 Units  23 Units Subcutaneous BID Timmy S Primavera, DO   23 Units at 11/12/21 2102    [DISCONTINUED] levothyroxine tablet 100 mcg  100 mcg Oral Daily Timmy S Primavera, DO   100 mcg at 11/12/21 0814    [DISCONTINUED] LORazepam tablet 0.5 mg  0.5 mg Oral Q12H PRN Timmy S Primavera, DO        [DISCONTINUED] metoprolol succinate (TOPROL-XL) 24 hr tablet 50 mg  50 mg Oral Daily Lona Saenz MD   50 mg at 11/12/21 0814    [DISCONTINUED] metronidazole IVPB 500 mg  500 mg Intravenous Q8H Timmy S Primavera, DO   Stopped at 11/12/21 2039    [DISCONTINUED] metroNIDAZOLE tablet 500 mg  500 mg Oral Q8H Lona Saenz MD   500 mg at 11/12/21 1306    [DISCONTINUED] ondansetron injection 4 mg  4 mg Intravenous Q6H PRN Timmy SEVERINO  Primavera, DO        [DISCONTINUED] potassium chloride SA CR tablet 60 mEq  60 mEq Oral Daily Lona Saenz MD   60 mEq at 11/12/21 0814    [DISCONTINUED] sodium chloride 0.9% flush 10 mL  10 mL Intravenous PRN Timmy S Primavera, DO   10 mL at 11/09/21 1353    [DISCONTINUED] sodium chloride 0.9% flush 10 mL  10 mL Intravenous Q6H Timmy S Primavera, DO   10 mL at 11/12/21 1800    [DISCONTINUED] sodium chloride 0.9% flush 10 mL  10 mL Intravenous PRN Timmy S Primavera, DO        [DISCONTINUED] sodium chloride 3% solution  15 mL/hr Intravenous Continuous Timmy S Primavera, DO 15 mL/hr at 11/12/21 2155 15 mL/hr at 11/12/21 2155    [DISCONTINUED] vancomycin (VANCOCIN) 2,000 mg in dextrose 5 % 500 mL IVPB  2,000 mg Intravenous Q12H Timmy S Primavera, DO        [DISCONTINUED] vancomycin - pharmacy to dose   Intravenous pharmacy to manage frequency Timmy S Primavera, DO        [DISCONTINUED] vibegron Tab 75 mg  75 mg Oral Daily Timmy S Primavera, DO   75 mg at 11/12/21 0814    [DISCONTINUED] zolpidem tablet 5 mg  5 mg Oral Nightly PRN Timmy S Primavera, DO         Current Outpatient Medications on File Prior to Encounter   Medication Sig Dispense Refill    albuterol (VENTOLIN HFA) 90 mcg/actuation inhaler INHALE ONE TO TWO PUFFS INTO LUNGS EVERY 6 HOURS AS NEEDED FOR WHEEZING 54 g 3    amLODIPine (NORVASC) 5 MG tablet TAKE 1 TABLET BY MOUTH EVERY DAY 90 tablet 3    aspirin 81 mg Tab Take by mouth. 1 Tablet Oral Every day      blood sugar diagnostic (FREESTYLE INSULINX TEST STRIPS) Strp Check glucose three times daily 100 each 3    butalbital-acetaminophen-caffeine -40 mg (FIORICET, ESGIC) -40 mg per tablet Take 1 tablet by mouth as needed.      calcium carbonate (TUMS) 300 mg (750 mg) Chew Take by mouth 2 (two) times daily.      diclofenac sodium (VOLTAREN) 1 % Gel APPLY TWO GRAMS TOPICALLY ONCE DAILY 100 g 0    ezetimibe (ZETIA) 10 mg tablet TAKE 1 TABLET BY MOUTH EVERY DAY IN THE  EVENING 90 tablet 3    FARXIGA 10 mg tablet TAKE 1 TABLET BY MOUTH EVERY DAY 90 tablet 1    fish oil-dha-epa 1,200-144-216 mg Cap 1 Capsule Oral Every day 90 capsule 3    fluticasone-salmeterol diskus inhaler 250-50 mcg Inhale 1 puff into the lungs 2 (two) times daily. Controller 180 each 3    glipiZIDE (GLUCOTROL) 10 MG TR24 TAKE 1 TABLET (10 MG TOTAL) BY MOUTH DAILY WITH BREAKFAST. 90 tablet 2    GUAIATUSSIN AC  mg/5 mL syrup Take 5 mLs by mouth every 4 (four) hours as needed.      HYDROcodone-acetaminophen (NORCO)  mg per tablet Take 1 tablet by mouth every 8 (eight) hours as needed for Pain. 90 tablet 0    hyoscyamine (LEVSIN/SL) 0.125 mg Subl DISSOLVE 1 TABLET UNDER THE TONGUE EVERY 4 TO 6 HOURS 30 tablet 5    ibuprofen (ADVIL,MOTRIN) 800 MG tablet Take 800 mg by mouth every 8 (eight) hours as needed.  0    ipratropium (ATROVENT) 21 mcg (0.03 %) nasal spray SMARTSI Both Nares Every Night      JARDIANCE 10 mg tablet Take 10 mg by mouth once daily.      lactulose (CHRONULAC) 10 gram/15 mL solution TAKE 15 ML DAILY AS NEEDED FOR CONSTIPATION 473 mL 4    levothyroxine (SYNTHROID) 100 MCG tablet TAKE 1 TABLET DAILY 90 tablet 3    LORazepam (ATIVAN) 0.5 MG tablet Take 1 tablet (0.5 mg total) by mouth 2 (two) times daily. 60 tablet 0    meclizine (ANTIVERT) 25 mg tablet Take 1 tablet (25 mg total) by mouth 3 (three) times daily as needed. 30 tablet 0    metFORMIN (GLUCOPHAGE) 1000 MG tablet TAKE 1 TABLET BY MOUTH TWICE A DAY WITH MEALS 180 tablet 3    metoprolol tartrate (LOPRESSOR) 100 MG tablet TAKE 1 TABLET BY MOUTH TWICE A  tablet 1    mv-min-FA-Kp-Ut-erygytz-lutein 0.4-162-18 mg Tab Take by mouth. 1 Tablet Oral Every day      NARCAN 4 mg/actuation Spry 1 spray once.      potassium chloride SA (K-DUR,KLOR-CON) 20 MEQ tablet TAKE 1 TABLET BY MOUTH TWICE A  tablet 3    sucralfate (CARAFATE) 1 gram tablet Take 1 tablet (1 g total) by mouth 3 (three) times daily. 270  tablet 3    temazepam (RESTORIL) 30 mg capsule Take 1 capsule (30 mg total) by mouth nightly as needed. 1 Capsule Oral Every day 30 capsule 0    tiZANidine (ZANAFLEX) 4 MG tablet TAKE 1 TABLET (4 MG TOTAL) BY MOUTH EVERY 8 (EIGHT) HOURS. 90 tablet 1    TRULICITY 1.5 mg/0.5 mL pen injector INJECT 1 PEN EVERY WEEK 2 pen 4    vibegron 75 mg Tab Take 75 mg by mouth once daily. 30 tablet 11    [DISCONTINUED] DULoxetine (CYMBALTA) 30 MG capsule Take 1 capsule (30 mg total) by mouth once daily. 90 capsule 3    [DISCONTINUED] FARXIGA 10 mg tablet TAKE 1 TABLET BY MOUTH EVERY DAY 90 tablet 1    [DISCONTINUED] sucralfate (CARAFATE) 1 gram tablet TAKE 1 TABLET BY MOUTH THREE TIMES A  tablet 0      Allergies: Iodinated contrast media, Naproxen sodium, and Naprosyn  [naproxen]    Family History   Problem Relation Age of Onset    Hypertension Mother     Glaucoma Mother     Diabetes Mother     Hypertension Brother     Cancer Maternal Grandmother         breast    Amblyopia Father     Diabetes Maternal Aunt     Diabetes Maternal Uncle     Breast cancer Paternal Aunt     Breast cancer Paternal Grandmother     Blindness Neg Hx     Cataracts Neg Hx     Macular degeneration Neg Hx     Retinal detachment Neg Hx     Strabismus Neg Hx     Colon cancer Neg Hx     Stomach cancer Neg Hx     Esophageal cancer Neg Hx     Irritable bowel syndrome Neg Hx     Rectal cancer Neg Hx     Ulcerative colitis Neg Hx     Crohn's disease Neg Hx     Celiac disease Neg Hx      Social History     Tobacco Use    Smoking status: Never Smoker    Smokeless tobacco: Never Used   Substance Use Topics    Alcohol use: Yes     Alcohol/week: 0.0 standard drinks     Comment: very rarely    Drug use: No     Review of Systems   Unable to perform ROS: Mental status change     Objective:     Vitals:    Pulse: 91  BP: (!) 112/58  MAP (mmHg): 80  Resp: (!) 35  SpO2: 97 %    Temp  Min: 98.3 °F (36.8 °C)  Max: 99.7 °F (37.6 °C)  Pulse   Min: 72  Max: 95  BP  Min: 112/58  Max: 170/87  MAP (mmHg)  Min: 80  Max: 121  Resp  Min: 13  Max: 35  SpO2  Min: 94 %  Max: 98 %    No intake/output data recorded.           Physical Exam  Constitutional:       General: She is not in acute distress.     Appearance: She is obese. She is not ill-appearing.   HENT:      Head: Normocephalic and atraumatic.      Mouth/Throat:      Mouth: Mucous membranes are moist.      Pharynx: Oropharynx is clear. No oropharyngeal exudate.   Eyes:      General:         Right eye: No discharge.         Left eye: No discharge.      Extraocular Movements: Extraocular movements intact.      Pupils: Pupils are equal, round, and reactive to light.   Cardiovascular:      Rate and Rhythm: Normal rate and regular rhythm.      Pulses: Normal pulses.      Heart sounds: Normal heart sounds.   Pulmonary:      Effort: Pulmonary effort is normal. No respiratory distress.      Breath sounds: Normal breath sounds.   Abdominal:      General: Abdomen is flat. There is no distension.      Palpations: Abdomen is soft.   Musculoskeletal:         General: No swelling. Normal range of motion.      Cervical back: Normal range of motion.   Skin:     General: Skin is warm.      Capillary Refill: Capillary refill takes less than 2 seconds.   Neurological:      Mental Status: She is alert. She is disoriented.      Cranial Nerves: No cranial nerve deficit.      Sensory: No sensory deficit.      Motor: No weakness.      Comments: AOx1. Moves all extremities spontaneously. Intermittently follows commands and confused speech. GCS 14

## 2021-11-13 NOTE — ASSESSMENT & PLAN NOTE
MRI Brain w/lesions throughout the cerebral hemispheres involving frontal, parietal, temporal, and occipital lobes with surrounding vasogenic edema.   Suspect 2/2 septic emboli given recent bacteremia. Speciation of BCx 10/27 revealing organisms from oral mary. Mannitol and 3% saline started prior to transfer.     -stat CTH   -q1h neuro checks  -NSG consulted, appreciate recs   -repeat TTE, may need DORCAS   -BCx   -vanc/zosyn  -cont 3%, q6h Na   -[place]or[place] for strict I/O, expect diuresis from mannitol administration

## 2021-11-13 NOTE — PLAN OF CARE
Problem: SLP Goal  Goal: SLP Goal  Description: Speech Therapy Short Term Goals  Goal expected to be met by 11/25  1. Pt will participate in an ongoing assessment to determine the least restrictive and safest diet with possible updated goals to follow pending results.  2. Pt will participate in a speech, language, and cognitive evaluation with possible updated goals to follow pending results.    11/13/2021 0729 by ADI Hubbard, CCC-SLP  Outcome: Ongoing, Progressing  11/13/2021 0729 by ADI Hubbard, CCC-SLP  Outcome: Ongoing, Progressing   Bedside swallow study completed. Recommend: mechanical soft diet, thin liquids, assistance with meals, feed only when awake/alert. ST will continue to follow.

## 2021-11-13 NOTE — ASSESSMENT & PLAN NOTE
60F h/o breast cancer, admitted 10/17 to [facility] w/ [facility] found to have septicemia, BCx w/ oral fusobacerium, treated w/ appropriate abx. Increased lethargy last few days/AMS prompted new MRI which showed multiple lesions throughout the bilateral cerebral hemispheres with vasogenic edema ,mild leftward midline shift and partial mass effect of the right lateral ventricle. [place] [facility] [place]. Now patient is in ICU stable protecting airway.    Plan  --admit to [facility]  --q1 neurochecks  --MRI + Contrast  --Echo  --ID consult  --Hypertonics per primary  --no acute neurosurgical intervention at this time, we will continue to follow    Please page with any change in neuro exam  Discussed w/ Dr. Rdz

## 2021-11-13 NOTE — H&P
[facility]  Neurocritical Care  History & Physical    Admit Date: 11/12/2021  Service Date: 11/13/2021  Length of Stay: 1    Subjective:     Chief Complaint: Brain lesion    History of Present Illness: 60 year old female with Pmhx DM2, hypothyroidism, YUSEF, hx breast ca, fibromyalgia, htn, hld, depression, class 3 obesity admitted as transfer from [place] for concern of elevated ICP 2/2 vasogenic edema of numerous brain abscesses vs masses. Patient recently admitted at [place] 10/27 for acute encephalopathy, found to have DKA vs HHS and bacteremia. Clinically improved with abx and appropriate blood sugar management until 3 days prior to transfer when she developed worsening confusion and lethargy. MRI 11/13 revealing multiple lesions throughout the bilateral cerebral hemispheres with associated vasogenic edema and leftward midline shift.     HPI from [place] below:  Miss Champion is a 60 year old female with a PMH of DMII on oral anti-hyperglycemics, hypothyroid on synthroid, YUSEF, Right breast papilloma, fibromyalgia, HTN, HLD and depression presents to [facility] via ambulance after being found down and unresponsive at home. Patient is obtunded with a GCS of 8 and is not able to participate in any history giving. This note is per report from neighbor, EMS, ED providers and nurse. Per report Miss Champion' neighbor hadnt seen her for 3 days so they went over to her house to check on her and found her down and unresponsive. EMS was called and brought her into the ED. Upon drawing labwork patient Wbc was 25.8, , anion gap 8, Cr 2.9, blood glucose 805, UDS pos for barbiturates, pH 6.928. Patient is protecting airway although her respirations are labored, Sp02 mid to low 90's and she has YUSEF. A dose of narcan had no response, patient was further treated with bicarb, insulin, IV fluids, IV vanc and zosyn and admitted to the ICU for a higher level of care. Patients sister Darcy was called by staff  and myself and asked to be kept updated, phone number in EMR.      Past Medical History:   Diagnosis Date    Breast cancer 12/10/2020    Diabetes mellitus, type 2     GERD (gastroesophageal reflux disease)     High cholesterol     Hypertension     Hypothyroid     Injury of knee, leg, ankle and foot     Insulin-resistant diabetes mellitus and acanthosis nigricans     Menopause present     Osteoarthritis     Osteoarthritis, knee     Osteopenia     Osteopenia     Sleep apnea     Status post breast lump removal 12/01/2020     Past Surgical History:   Procedure Laterality Date    bilateral duct removal breast      BREAST CYST ASPIRATION Right 2020    EXCISIONAL BIOPSY Right 12/10/2020    Procedure: EXCISIONAL BIOPSY, breast; u/s guided;  Surgeon: Bernadette Lopez MD;  Location: [facility];  Service: General;  Laterality: Right;    HYSTERECTOMY      OOPHORECTOMY      OTHER SURGICAL HISTORY      bilateral central duct removal with bilateral papilomona      Current Facility-Administered Medications on File Prior to Encounter   Medication Dose Route Frequency Provider Last Rate Last Admin    [COMPLETED] mannitol 20% infusion 120 g  120 g Intravenous Once Timmy S Primavera, DO   Stopped at 11/12/21 2033    [COMPLETED] vancomycin (VANCOCIN) 2,500 mg in dextrose 5 % 500 mL IVPB  2,500 mg Intravenous Once Timmy S Primavera,  mL/hr at 11/12/21 1830 2,500 mg at 11/12/21 1830    [DISCONTINUED] 0.9%  NaCl infusion   Intravenous Continuous Timmy S Primavera, DO   Stopped at 11/12/21 2124    [DISCONTINUED] acetaminophen tablet 650 mg  650 mg Oral Q4H PRN Lona WILY Saenz MD   650 mg at 11/10/21 2127    [DISCONTINUED] amLODIPine tablet 5 mg  5 mg Oral Daily Lona Saenz MD   5 mg at 11/12/21 0814    [DISCONTINUED] aspirin EC tablet 81 mg  81 mg Oral Daily Timmy S Primavera, DO   81 mg at 11/12/21 0814    [DISCONTINUED] cefepime in dextrose 5 % IVPB 2 g  2 g Intravenous Q8H Timmy S  Primavera, DO   Stopped at 11/12/21 2007    [DISCONTINUED] cefTRIAXone (ROCEPHIN) 1 g/50 mL D5W IVPB  1 g Intravenous Q24H Adriana Gutiérrez  mL/hr at 11/12/21 1310 1 g at 11/12/21 1310    [DISCONTINUED] cefTRIAXone (ROCEPHIN) 2 g/50 mL D5W IVPB  2 g Intravenous Q12H Timmy S Primavera, DO        [DISCONTINUED] dextrose 10 % infusion   Intravenous PRN Timmy S Primavera, DO        [DISCONTINUED] dextrose 10 % infusion   Intravenous PRN Timmy S Primavera, DO        [DISCONTINUED] dextrose 50% injection 12.5 g  12.5 g Intravenous PRN Timmy S Primavera, DO        [DISCONTINUED] dextrose 50% injection 25 g  25 g Intravenous PRN Timmy S Primavera, DO        [DISCONTINUED] enoxaparin injection 40 mg  40 mg Subcutaneous Q12H Brady Caicedo MD   40 mg at 11/12/21 2156    [DISCONTINUED] HYDROcodone-acetaminophen  mg per tablet 1 tablet  1 tablet Oral Q8H PRN Timmy S Primavera, DO   1 tablet at 11/11/21 1057    [DISCONTINUED] insulin aspart U-100 pen 0-5 Units  0-5 Units Subcutaneous QID (AC + HS) PRN Timmy S Primavera, DO   2 Units at 11/11/21 1148    [DISCONTINUED] insulin aspart U-100 pen 7 Units  7 Units Subcutaneous TIDWM Lona Saenz MD   7 Units at 11/12/21 1306    [DISCONTINUED] insulin detemir U-100 pen 23 Units  23 Units Subcutaneous BID Timmy S Primavera, DO   23 Units at 11/12/21 2102    [DISCONTINUED] levothyroxine tablet 100 mcg  100 mcg Oral Daily Timmy S Primavera, DO   100 mcg at 11/12/21 0814    [DISCONTINUED] LORazepam tablet 0.5 mg  0.5 mg Oral Q12H PRN Timmy S Primavera, DO        [DISCONTINUED] metoprolol succinate (TOPROL-XL) 24 hr tablet 50 mg  50 mg Oral Daily Lona Saenz MD   50 mg at 11/12/21 0814    [DISCONTINUED] metronidazole IVPB 500 mg  500 mg Intravenous Q8H Timmy S Primavera, DO   Stopped at 11/12/21 2039    [DISCONTINUED] metroNIDAZOLE tablet 500 mg  500 mg Oral Q8H Lona NJosephine Saenz MD   500 mg at 11/12/21 1308     [DISCONTINUED] ondansetron injection 4 mg  4 mg Intravenous Q6H PRN Timmy S Primavera, DO        [DISCONTINUED] potassium chloride SA CR tablet 60 mEq  60 mEq Oral Daily Lona Saenz MD   60 mEq at 11/12/21 0814    [DISCONTINUED] sodium chloride 0.9% flush 10 mL  10 mL Intravenous PRN Timmy S Primavera, DO   10 mL at 11/09/21 1353    [DISCONTINUED] sodium chloride 0.9% flush 10 mL  10 mL Intravenous Q6H Timmy S Primavera, DO   10 mL at 11/12/21 1800    [DISCONTINUED] sodium chloride 0.9% flush 10 mL  10 mL Intravenous PRN Timmy S Primavera, DO        [DISCONTINUED] sodium chloride 3% solution  15 mL/hr Intravenous Continuous Timmy S Primavera, DO 15 mL/hr at 11/12/21 2155 15 mL/hr at 11/12/21 2155    [DISCONTINUED] vancomycin (VANCOCIN) 2,000 mg in dextrose 5 % 500 mL IVPB  2,000 mg Intravenous Q12H Timmy S Primavera, DO        [DISCONTINUED] vancomycin - pharmacy to dose   Intravenous pharmacy to manage frequency Timmy S Primavera, DO        [DISCONTINUED] vibegron Tab 75 mg  75 mg Oral Daily Timmy S Primavera, DO   75 mg at 11/12/21 0814    [DISCONTINUED] zolpidem tablet 5 mg  5 mg Oral Nightly PRN Timmy S Primavera, DO         Current Outpatient Medications on File Prior to Encounter   Medication Sig Dispense Refill    albuterol (VENTOLIN HFA) 90 mcg/actuation inhaler INHALE ONE TO TWO PUFFS INTO LUNGS EVERY 6 HOURS AS NEEDED FOR WHEEZING 54 g 3    amLODIPine (NORVASC) 5 MG tablet TAKE 1 TABLET BY MOUTH EVERY DAY 90 tablet 3    aspirin 81 mg Tab Take by mouth. 1 Tablet Oral Every day      blood sugar diagnostic (FREESTYLE INSULINX TEST STRIPS) Strp Check glucose three times daily 100 each 3    butalbital-acetaminophen-caffeine -40 mg (FIORICET, ESGIC) -40 mg per tablet Take 1 tablet by mouth as needed.      calcium carbonate (TUMS) 300 mg (750 mg) Chew Take by mouth 2 (two) times daily.      diclofenac sodium (VOLTAREN) 1 % Gel APPLY TWO GRAMS TOPICALLY ONCE DAILY 100 g 0     ezetimibe (ZETIA) 10 mg tablet TAKE 1 TABLET BY MOUTH EVERY DAY IN THE EVENING 90 tablet 3    FARXIGA 10 mg tablet TAKE 1 TABLET BY MOUTH EVERY DAY 90 tablet 1    fish oil-dha-epa 1,200-144-216 mg Cap 1 Capsule Oral Every day 90 capsule 3    fluticasone-salmeterol diskus inhaler 250-50 mcg Inhale 1 puff into the lungs 2 (two) times daily. Controller 180 each 3    glipiZIDE (GLUCOTROL) 10 MG TR24 TAKE 1 TABLET (10 MG TOTAL) BY MOUTH DAILY WITH BREAKFAST. 90 tablet 2    GUAIATUSSIN AC  mg/5 mL syrup Take 5 mLs by mouth every 4 (four) hours as needed.      HYDROcodone-acetaminophen (NORCO)  mg per tablet Take 1 tablet by mouth every 8 (eight) hours as needed for Pain. 90 tablet 0    hyoscyamine (LEVSIN/SL) 0.125 mg Subl DISSOLVE 1 TABLET UNDER THE TONGUE EVERY 4 TO 6 HOURS 30 tablet 5    ibuprofen (ADVIL,MOTRIN) 800 MG tablet Take 800 mg by mouth every 8 (eight) hours as needed.  0    ipratropium (ATROVENT) 21 mcg (0.03 %) nasal spray SMARTSI Both Nares Every Night      JARDIANCE 10 mg tablet Take 10 mg by mouth once daily.      lactulose (CHRONULAC) 10 gram/15 mL solution TAKE 15 ML DAILY AS NEEDED FOR CONSTIPATION 473 mL 4    levothyroxine (SYNTHROID) 100 MCG tablet TAKE 1 TABLET DAILY 90 tablet 3    LORazepam (ATIVAN) 0.5 MG tablet Take 1 tablet (0.5 mg total) by mouth 2 (two) times daily. 60 tablet 0    meclizine (ANTIVERT) 25 mg tablet Take 1 tablet (25 mg total) by mouth 3 (three) times daily as needed. 30 tablet 0    metFORMIN (GLUCOPHAGE) 1000 MG tablet TAKE 1 TABLET BY MOUTH TWICE A DAY WITH MEALS 180 tablet 3    metoprolol tartrate (LOPRESSOR) 100 MG tablet TAKE 1 TABLET BY MOUTH TWICE A  tablet 1    mv-min-FA-Zf-Pr-dssjtuv-lutein 0.4-162-18 mg Tab Take by mouth. 1 Tablet Oral Every day      NARCAN 4 mg/actuation Spry 1 spray once.      potassium chloride SA (K-DUR,KLOR-CON) 20 MEQ tablet TAKE 1 TABLET BY MOUTH TWICE A  tablet 3    sucralfate (CARAFATE) 1  gram tablet Take 1 tablet (1 g total) by mouth 3 (three) times daily. 270 tablet 3    temazepam (RESTORIL) 30 mg capsule Take 1 capsule (30 mg total) by mouth nightly as needed. 1 Capsule Oral Every day 30 capsule 0    tiZANidine (ZANAFLEX) 4 MG tablet TAKE 1 TABLET (4 MG TOTAL) BY MOUTH EVERY 8 (EIGHT) HOURS. 90 tablet 1    TRULICITY 1.5 mg/0.5 mL pen injector INJECT 1 PEN EVERY WEEK 2 pen 4    vibegron 75 mg Tab Take 75 mg by mouth once daily. 30 tablet 11    [DISCONTINUED] DULoxetine (CYMBALTA) 30 MG capsule Take 1 capsule (30 mg total) by mouth once daily. 90 capsule 3    [DISCONTINUED] FARXIGA 10 mg tablet TAKE 1 TABLET BY MOUTH EVERY DAY 90 tablet 1    [DISCONTINUED] sucralfate (CARAFATE) 1 gram tablet TAKE 1 TABLET BY MOUTH THREE TIMES A  tablet 0      Allergies: Iodinated contrast media, Naproxen sodium, and Naprosyn  [naproxen]    Family History   Problem Relation Age of Onset    Hypertension Mother     Glaucoma Mother     Diabetes Mother     Hypertension Brother     Cancer Maternal Grandmother         breast    Amblyopia Father     Diabetes Maternal Aunt     Diabetes Maternal Uncle     Breast cancer Paternal Aunt     Breast cancer Paternal Grandmother     Blindness Neg Hx     Cataracts Neg Hx     Macular degeneration Neg Hx     Retinal detachment Neg Hx     Strabismus Neg Hx     Colon cancer Neg Hx     Stomach cancer Neg Hx     Esophageal cancer Neg Hx     Irritable bowel syndrome Neg Hx     Rectal cancer Neg Hx     Ulcerative colitis Neg Hx     Crohn's disease Neg Hx     Celiac disease Neg Hx      Social History     Tobacco Use    Smoking status: Never Smoker    Smokeless tobacco: Never Used   Substance Use Topics    Alcohol use: Yes     Alcohol/week: 0.0 standard drinks     Comment: very rarely    Drug use: No     Review of Systems   Unable to perform ROS: Mental status change     Objective:     Vitals:    Pulse: 91  BP: (!) 112/58  MAP (mmHg): 80  Resp: (!)  35  SpO2: 97 %    Temp  Min: 98.3 °F (36.8 °C)  Max: 99.7 °F (37.6 °C)  Pulse  Min: 72  Max: 95  BP  Min: 112/58  Max: 170/87  MAP (mmHg)  Min: 80  Max: 121  Resp  Min: 13  Max: 35  SpO2  Min: 94 %  Max: 98 %    No intake/output data recorded.           Physical Exam  Constitutional:       General: She is not in acute distress.     Appearance: She is obese. She is not ill-appearing.   HENT:      Head: Normocephalic and atraumatic.      Mouth/Throat:      Mouth: Mucous membranes are moist.      Pharynx: Oropharynx is clear. No oropharyngeal exudate.   Eyes:      General:         Right eye: No discharge.         Left eye: No discharge.      Extraocular Movements: Extraocular movements intact.      Pupils: Pupils are equal, round, and reactive to light.   Cardiovascular:      Rate and Rhythm: Normal rate and regular rhythm.      Pulses: Normal pulses.      Heart sounds: Normal heart sounds.   Pulmonary:      Effort: Pulmonary effort is normal. No respiratory distress.      Breath sounds: Normal breath sounds.   Abdominal:      General: Abdomen is flat. There is no distension.      Palpations: Abdomen is soft.   Musculoskeletal:         General: No swelling. Normal range of motion.      Cervical back: Normal range of motion.   Skin:     General: Skin is warm.      Capillary Refill: Capillary refill takes less than 2 seconds.   Neurological:      Mental Status: She is alert. She is disoriented.      Cranial Nerves: No cranial nerve deficit.      Sensory: No sensory deficit.      Motor: No weakness.      Comments: AOx1. Moves all extremities spontaneously. Intermittently follows commands and confused speech. GCS 14          Assessment/Plan:     Neuro  * Brain lesion  MRI Brain w/lesions throughout the cerebral hemispheres involving frontal, parietal, temporal, and occipital lobes with surrounding vasogenic edema.   Suspect 2/2 septic emboli given recent bacteremia. Speciation of BCx 10/27 revealing organisms from oral  mary. Mannitol and 3% saline started prior to transfer.     -stat CTH   -q1h neuro checks  -NSG consulted, appreciate recs   -repeat TTE, may need DORCAS   -BCx   -vanc/zosyn  -cont 3%, q6h Na   -en[place]orado for strict I/O, expect diuresis from mannitol administration          Encephalopathy, metabolic  See brain lesion     Cardiac/Vascular  Hypertension associated with diabetes  Goal SBP < 140     Endocrine  Uncontrolled type 2 diabetes mellitus with hyperglycemia  A1c 9 on admission to [place]  Goal CBG < 180   Cont basal insulin, detemir 25u BID  NPO for now, moderate SSI.   May need insulin gtt    Hypothyroidism  Cont home synthroid           The patient is being Prophylaxed for:  Venous Thromboembolism with: Mechanical  Stress Ulcer with: Not Applicable   Ventilator Pneumonia with: not applicable    Activity Orders          Turn patient starting at 11/13 0000    Elevate HOB starting at 11/12 2358    Diet NPO: NPO starting at 11/12 2358        Full Code    Francisco Waggoner MD  Neurocritical Care  [facility]

## 2021-11-13 NOTE — ASSESSMENT & PLAN NOTE
A1c 9 on admission to [place]  Goal CBG < 180   Cont basal insulin, detemir 25u BID  NPO for now, moderate SSI.   May need insulin gtt

## 2021-11-13 NOTE — HPI
60 year old female with Pmhx DM2, hypothyroidism, YUSEF, hx breast ca, fibromyalgia, htn, hld, depression, class 3 obesity admitted as transfer from [place] for concern of elevated ICP 2/2 vasogenic edema of numerous brain abscesses vs masses. Patient recently admitted at [place] 10/27 for acute encephalopathy, found to have DKA vs HHS and bacteremia. Clinically improved with abx and appropriate blood sugar management until 3 days prior to transfer when she developed worsening confusion and lethargy. MRI 11/13 revealing multiple lesions throughout the bilateral cerebral hemispheres with associated vasogenic edema and leftward midline shift.     HPI from [place] below:  Miss Champion is a 60 year old female with a PMH of DMII on oral anti-hyperglycemics, hypothyroid on synthroid, YUSEF, Right breast papilloma, fibromyalgia, HTN, HLD and depression presents to [facility] via ambulance after being found down and unresponsive at home. Patient is obtunded with a GCS of 8 and is not able to participate in any history giving. This note is per report from neighbor, EMS, ED providers and nurse. Per report Miss Champion' neighbor hadnt seen her for 3 days so they went over to her house to check on her and found her down and unresponsive. EMS was called and brought her into the ED. Upon drawing labwork patient Wbc was 25.8, , anion gap 8, Cr 2.9, blood glucose 805, UDS pos for barbiturates, pH 6.928. Patient is protecting airway although her respirations are labored, Sp02 mid to low 90's and she has YUSEF. A dose of narcan had no response, patient was further treated with bicarb, insulin, IV fluids, IV vanc and zosyn and admitted to the ICU for a higher level of care. Patients sister Darcy was called by staff and myself and asked to be kept updated, phone number in EMR.

## 2021-11-13 NOTE — SUBJECTIVE & OBJECTIVE
Medications Prior to Admission   Medication Sig Dispense Refill Last Dose    albuterol (VENTOLIN HFA) 90 mcg/actuation inhaler INHALE ONE TO TWO PUFFS INTO LUNGS EVERY 6 HOURS AS NEEDED FOR WHEEZING 54 g 3     amLODIPine (NORVASC) 5 MG tablet TAKE 1 TABLET BY MOUTH EVERY DAY 90 tablet 3     aspirin 81 mg Tab Take by mouth. 1 Tablet Oral Every day       blood sugar diagnostic (FREESTYLE INSULINX TEST STRIPS) Strp Check glucose three times daily 100 each 3     butalbital-acetaminophen-caffeine -40 mg (FIORICET, ESGIC) -40 mg per tablet Take 1 tablet by mouth as needed.       calcium carbonate (TUMS) 300 mg (750 mg) Chew Take by mouth 2 (two) times daily.       diclofenac sodium (VOLTAREN) 1 % Gel APPLY TWO GRAMS TOPICALLY ONCE DAILY 100 g 0     ezetimibe (ZETIA) 10 mg tablet TAKE 1 TABLET BY MOUTH EVERY DAY IN THE EVENING 90 tablet 3     FARXIGA 10 mg tablet TAKE 1 TABLET BY MOUTH EVERY DAY 90 tablet 1     fish oil-dha-epa 1,200-144-216 mg Cap 1 Capsule Oral Every day 90 capsule 3     fluticasone-salmeterol diskus inhaler 250-50 mcg Inhale 1 puff into the lungs 2 (two) times daily. Controller 180 each 3     glipiZIDE (GLUCOTROL) 10 MG TR24 TAKE 1 TABLET (10 MG TOTAL) BY MOUTH DAILY WITH BREAKFAST. 90 tablet 2     GUAIATUSSIN AC  mg/5 mL syrup Take 5 mLs by mouth every 4 (four) hours as needed.       HYDROcodone-acetaminophen (NORCO)  mg per tablet Take 1 tablet by mouth every 8 (eight) hours as needed for Pain. 90 tablet 0     hyoscyamine (LEVSIN/SL) 0.125 mg Subl DISSOLVE 1 TABLET UNDER THE TONGUE EVERY 4 TO 6 HOURS 30 tablet 5     ibuprofen (ADVIL,MOTRIN) 800 MG tablet Take 800 mg by mouth every 8 (eight) hours as needed.  0     ipratropium (ATROVENT) 21 mcg (0.03 %) nasal spray SMARTSI Both Nares Every Night       JARDIANCE 10 mg tablet Take 10 mg by mouth once daily.       lactulose (CHRONULAC) 10 gram/15 mL solution TAKE 15 ML DAILY AS NEEDED FOR CONSTIPATION 473 mL 4      levothyroxine (SYNTHROID) 100 MCG tablet TAKE 1 TABLET DAILY 90 tablet 3     LORazepam (ATIVAN) 0.5 MG tablet Take 1 tablet (0.5 mg total) by mouth 2 (two) times daily. 60 tablet 0     meclizine (ANTIVERT) 25 mg tablet Take 1 tablet (25 mg total) by mouth 3 (three) times daily as needed. 30 tablet 0     metFORMIN (GLUCOPHAGE) 1000 MG tablet TAKE 1 TABLET BY MOUTH TWICE A DAY WITH MEALS 180 tablet 3     metoprolol tartrate (LOPRESSOR) 100 MG tablet TAKE 1 TABLET BY MOUTH TWICE A  tablet 1     mv-min-FA-Tp-Mg-wckugip-lutein 0.4-162-18 mg Tab Take by mouth. 1 Tablet Oral Every day       NARCAN 4 mg/actuation Spry 1 spray once.       potassium chloride SA (K-DUR,KLOR-CON) 20 MEQ tablet TAKE 1 TABLET BY MOUTH TWICE A  tablet 3     sucralfate (CARAFATE) 1 gram tablet Take 1 tablet (1 g total) by mouth 3 (three) times daily. 270 tablet 3     temazepam (RESTORIL) 30 mg capsule Take 1 capsule (30 mg total) by mouth nightly as needed. 1 Capsule Oral Every day 30 capsule 0     tiZANidine (ZANAFLEX) 4 MG tablet TAKE 1 TABLET (4 MG TOTAL) BY MOUTH EVERY 8 (EIGHT) HOURS. 90 tablet 1     TRULICITY 1.5 mg/0.5 mL pen injector INJECT 1 PEN EVERY WEEK 2 pen 4     vibegron 75 mg Tab Take 75 mg by mouth once daily. 30 tablet 11        Review of patient's allergies indicates:   Allergen Reactions    Iodinated contrast media Swelling     Other reaction(s): Swelling    Naproxen sodium Swelling    Naprosyn  [naproxen]      Other reaction(s): Swelling       Past Medical History:   Diagnosis Date    Breast cancer 12/10/2020    Diabetes mellitus, type 2     GERD (gastroesophageal reflux disease)     High cholesterol     Hypertension     Hypothyroid     Injury of knee, leg, ankle and foot     Insulin-resistant diabetes mellitus and acanthosis nigricans     Menopause present     Osteoarthritis     Osteoarthritis, knee     Osteopenia     Osteopenia     Sleep apnea     Status post breast lump  removal 12/01/2020     Past Surgical History:   Procedure Laterality Date    bilateral duct removal breast      BREAST CYST ASPIRATION Right 2020    EXCISIONAL BIOPSY Right 12/10/2020    Procedure: EXCISIONAL BIOPSY, breast; u/s guided;  Surgeon: Bernadette Lopez MD;  Location: [facility];  Service: General;  Laterality: Right;    HYSTERECTOMY      OOPHORECTOMY      OTHER SURGICAL HISTORY      bilateral central duct removal with bilateral papilomona     Family History     Problem Relation (Age of Onset)    Amblyopia Father    Breast cancer Paternal Aunt, Paternal Grandmother    Cancer Maternal Grandmother    Diabetes Mother, Maternal Aunt, Maternal Uncle    Glaucoma Mother    Hypertension Mother, Brother        Tobacco Use    Smoking status: Never Smoker    Smokeless tobacco: Never Used   Substance and Sexual Activity    Alcohol use: Yes     Alcohol/week: 0.0 standard drinks     Comment: very rarely    Drug use: No    Sexual activity: Not on file     Review of Systems   Unable to perform ROS: Mental status change     Objective:     Weight: 124.1 kg (273 lb 9.5 oz)  Body mass index is 46.96 kg/m².  Vital Signs (Most Recent):  Pulse: 91 (11/13/21 0000)  Resp: (!) 35 (11/13/21 0000)  BP: (!) 112/58 (11/13/21 0000)  SpO2: 97 % (11/13/21 0000) Vital Signs (24h Range):  Temp:  [98.3 °F (36.8 °C)-99.7 °F (37.6 °C)] 99.7 °F (37.6 °C)  Pulse:  [72-95] 91  Resp:  [13-35] 35  SpO2:  [94 %-98 %] 97 %  BP: (112-170)/(58-91) 112/58                     Female External Urinary Catheter 11/05/21 2000 (Active)   Skin no redness;no breakdown 11/12/21 1915   Tolerance no signs/symptoms of discomfort 11/12/21 1915   Suction Continuous suction at 60 mmHg 11/12/21 1915   Date of last wick change 11/12/21 11/12/21 1915   Time of last wick change 1915 11/12/21 1915   Output (mL) 1100 mL 11/12/21 0600       Physical Exam:  Nursing note and vitals reviewed.    Eyes: Pupils are equal, round, and reactive to light. EOM are normal.      Cardiovascular: Normal rate and intact distal pulses.     Abdominal: Soft.     Psych/Behavior:   Oriented x Name     Neurological:   Oriented x Name  GCS 14 E4V4 M5  Eyes open says name  CN II-XII grossly intact  Moves all extremities briskly, intermittently following commands         Significant Labs:  Recent Labs   Lab 11/11/21  0616 11/11/21  1259 11/11/21  2325 11/12/21  0539 11/12/21  1952   *   < > 121* 121* 276*   *   < > 144 146* 138   K 3.8   < > 3.8 3.6 3.9   *   < > 111* 114* 109   CO2 21*   < > 23 24 22*   BUN 8   < > 9 8 8   CREATININE 0.9   < > 0.8 0.8 0.9   CALCIUM 8.3*   < > 7.9* 8.3* 7.4*   MG 1.8  --   --  1.8  --     < > = values in this interval not displayed.     Recent Labs   Lab 11/11/21  0616 11/12/21  0539   WBC 8.64 7.87   HGB 8.6* 8.5*   HCT 28.7* 28.4*    331     No results for input(s): LABPT, INR, APTT in the last 48 hours.  Microbiology Results (last 7 days)     Procedure Component Value Units Date/Time    Blood culture [[id number]]     Order Status: Sent Specimen: Blood     Blood culture [[id number]]     Order Status: Sent Specimen: Blood         All pertinent labs from the last 24 hours have been reviewed.    Significant Diagnostics:  I have reviewed all pertinent imaging results/findings within the past 24 hours.

## 2021-11-13 NOTE — PLAN OF CARE
POC established and functional mobility goals were created to help pt return to PLOF. Will be reassessed as appropriate to measure pt progress.    Problem: Physical Therapy Goal  Goal: Physical Therapy Goal  Description: Goals to be met by: 21     Patient will increase functional independence with mobility by performin. Supine to sit with Moderate Assistance  2. Sit to supine with Moderate Assistance  3. Rolling to Left and Right with Moderate Assistance.  4. Sit to stand transfer with Maximum Assistance  5. Bed to chair transfer with Maximum Assistance using LRAD  6. Gait  x 5 feet with Maximum Assistance using LRAD.   7. Lower extremity exercise program x15 reps per handout, with assistance as needed    Outcome: Ongoing, Progressing

## 2021-11-13 NOTE — CONSULTS
"  [facility]  Adult Nutrition  Consult Note    SUMMARY     Recommendations    1. Once medically able, ADAT to Diabetic. Texture per SLP.     2. If PO intake is <50%, recommend Boost Glucose Control BID.     3. RD following.     Goals: Pt to receive nutrition by RD follow up  Nutrition Goal Status: new  Communication of RD Recs:  (POC)    Assessment and Plan    Nutrition Problem  1. Obesity   2. Altered nutrition related lab values     Related to (etiology):   1. Suspected excessive energy intake   2. Physiological reasons/suspected noncompliance with diet     Signs and Symptoms (as evidenced by):   1. BMI 46.94 kg/m2   2. Glucose 241     Interventions (treatment strategy):  Collaboration of nutrition care with other providers   Diabetic diet     Nutrition Diagnosis Status:   New       Reason for Assessment    Reason For Assessment: consult  Diagnosis:  (brain lesion)  Relevant Medical History: T2DM, YUSEF, breast cancer, fibromyalgia, HTN, HLD, obesity  Interdisciplinary Rounds: did not attend  General Information Comments: Pt slighlty disoriented, unable to answer most questions. Sister at bedside to provide diet hx. Appetite PTA was good, followed a diabetic diet at home. UBW unknown, per chart review Pts wt varies. Pt cleared by SLP for a [facility] soft diet. NFPE not indicated, Pt appears nourished. RD to monitor.  Nutrition Discharge Planning: Diabetic diet, texture per SLP.    Nutrition Risk Screen    Nutrition Risk Screen: dysphagia or difficulty swallowing    Nutrition/Diet History    Patient Reported Diet/Restrictions/Preferences: diabetic diet  Spiritual, Cultural Beliefs, Sikh Practices, Values that Affect Care: no  Food Allergies: NKFA  Factors Affecting Nutritional Intake: NPO    Anthropometrics    Temp: 98.4 °F (36.9 °C)  Height: 5' 4.02" (162.6 cm)  Height (inches): 64.02 in  Weight Method: Bed Scale  Weight: 124.1 kg (273 lb 9.5 oz)  Weight (lb): 273.59 lb  Ideal Body Weight (IBW), " Female: 120.1 lb  % Ideal Body Weight, Female (lb): 227.8 %  BMI (Calculated): 46.9     Lab/Procedures/Meds    Pertinent Labs Reviewed: reviewed  Pertinent Labs Comments: Na 154, Glu 241  Pertinent Medications Reviewed: reviewed  Pertinent Medications Comments: insulin    Estimated/Assessed Needs    Weight Used For Calorie Calculations: 124.1 kg (273 lb 9.5 oz)  Energy Calorie Requirements (kcal): 1806 kcal  Energy Need Method: [place]-St Jeor (no AF 2/2 obesity)  Protein Requirements: 65-82g (1.2-1.5g/kg IBW)  Weight Used For Protein Calculations: 54.5 kg (120 lb 2.4 oz) (IBW)  Fluid Requirements (mL): 1ml/kcal or per MD     RDA Method (mL): 1806  CHO Requirement: 225g    Nutrition Prescription Ordered    Current Diet Order: NPO    Evaluation of Received Nutrient/Fluid Intake    I/O: -1100  Comments: LBM 11/11  % Intake of Estimated Energy Needs: 0 - 25 %  % Meal Intake: NPO    Nutrition Risk    Level of Risk/Frequency of Follow-up: low     Monitor and Evaluation    Food and Nutrient Intake: energy intake,food and beverage intake  Food and Nutrient Adminstration: diet order  Knowledge/Beliefs/Attitudes: food and nutrition knowledge/skill,beliefs and attitudes  Physical Activity and Function: nutrition-related ADLs and IADLs  Anthropometric Measurements: weight,weight change,body mass index  Biochemical Data, Medical Tests and Procedures: electrolyte and renal panel,gastrointestinal profile,glucose/endocrine profile,inflammatory profile,lipid profile  Nutrition-Focused Physical Findings: overall appearance,extremities, muscles and bones     Nutrition Follow-Up    RD Follow-up?: Yes

## 2021-11-14 LAB
ALBUMIN SERPL BCP-MCNC: 2.1 G/DL (ref 3.5–5.2)
ALP SERPL-CCNC: 70 U/L (ref 55–135)
ALT SERPL W/O P-5'-P-CCNC: <5 U/L (ref 10–44)
ANION GAP SERPL CALC-SCNC: 8 MMOL/L (ref 8–16)
AST SERPL-CCNC: 10 U/L (ref 10–40)
BACTERIA UR CULT: NO GROWTH
BASOPHILS # BLD AUTO: 0.05 K/UL (ref 0–0.2)
BASOPHILS NFR BLD: 0.8 % (ref 0–1.9)
BILIRUB SERPL-MCNC: 0.3 MG/DL (ref 0.1–1)
BUN SERPL-MCNC: 5 MG/DL (ref 6–20)
CALCIUM SERPL-MCNC: 7.5 MG/DL (ref 8.7–10.5)
CHLORIDE SERPL-SCNC: 112 MMOL/L (ref 95–110)
CO2 SERPL-SCNC: 23 MMOL/L (ref 23–29)
CREAT SERPL-MCNC: 0.7 MG/DL (ref 0.5–1.4)
DIFFERENTIAL METHOD: ABNORMAL
EOSINOPHIL # BLD AUTO: 0.1 K/UL (ref 0–0.5)
EOSINOPHIL NFR BLD: 2.3 % (ref 0–8)
ERYTHROCYTE [DISTWIDTH] IN BLOOD BY AUTOMATED COUNT: 23.9 % (ref 11.5–14.5)
EST. GFR  (AFRICAN AMERICAN): >60 ML/MIN/1.73 M^2
EST. GFR  (NON AFRICAN AMERICAN): >60 ML/MIN/1.73 M^2
GLUCOSE SERPL-MCNC: 176 MG/DL (ref 70–110)
HCT VFR BLD AUTO: 27.6 % (ref 37–48.5)
HGB BLD-MCNC: 7.9 G/DL (ref 12–16)
IMM GRANULOCYTES # BLD AUTO: 0.02 K/UL (ref 0–0.04)
IMM GRANULOCYTES NFR BLD AUTO: 0.3 % (ref 0–0.5)
LYMPHOCYTES # BLD AUTO: 2 K/UL (ref 1–4.8)
LYMPHOCYTES NFR BLD: 31.9 % (ref 18–48)
MAGNESIUM SERPL-MCNC: 1.7 MG/DL (ref 1.6–2.6)
MCH RBC QN AUTO: 26 PG (ref 27–31)
MCHC RBC AUTO-ENTMCNC: 28.6 G/DL (ref 32–36)
MCV RBC AUTO: 91 FL (ref 82–98)
MONOCYTES # BLD AUTO: 0.5 K/UL (ref 0.3–1)
MONOCYTES NFR BLD: 8.1 % (ref 4–15)
NEUTROPHILS # BLD AUTO: 3.5 K/UL (ref 1.8–7.7)
NEUTROPHILS NFR BLD: 56.6 % (ref 38–73)
NRBC BLD-RTO: 0 /100 WBC
OSMOLALITY SERPL: 307 MOSM/KG (ref 275–295)
OSMOLALITY SERPL: 311 MOSM/KG (ref 275–295)
PHOSPHATE SERPL-MCNC: 3.3 MG/DL (ref 2.7–4.5)
PLATELET # BLD AUTO: 277 K/UL (ref 150–450)
PMV BLD AUTO: 10.7 FL (ref 9.2–12.9)
POCT GLUCOSE: 148 MG/DL (ref 70–110)
POCT GLUCOSE: 200 MG/DL (ref 70–110)
POCT GLUCOSE: 281 MG/DL (ref 70–110)
POTASSIUM SERPL-SCNC: 3 MMOL/L (ref 3.5–5.1)
PROT SERPL-MCNC: 6.3 G/DL (ref 6–8.4)
RBC # BLD AUTO: 3.04 M/UL (ref 4–5.4)
SODIUM SERPL-SCNC: 139 MMOL/L (ref 136–145)
SODIUM SERPL-SCNC: 143 MMOL/L (ref 136–145)
SODIUM SERPL-SCNC: 144 MMOL/L (ref 136–145)
SODIUM SERPL-SCNC: 149 MMOL/L (ref 136–145)
SODIUM SERPL-SCNC: 154 MMOL/L (ref 136–145)
VANCOMYCIN TROUGH SERPL-MCNC: 19.5 UG/ML (ref 10–22)
WBC # BLD AUTO: 6.15 K/UL (ref 3.9–12.7)

## 2021-11-14 PROCEDURE — 63600175 PHARM REV CODE 636 W HCPCS: Performed by: NURSE PRACTITIONER

## 2021-11-14 PROCEDURE — 99233 SBSQ HOSP IP/OBS HIGH 50: CPT | Mod: ,,, | Performed by: NEUROLOGICAL SURGERY

## 2021-11-14 PROCEDURE — 87389 HIV-1 AG W/HIV-1&-2 AB AG IA: CPT | Performed by: NURSE PRACTITIONER

## 2021-11-14 PROCEDURE — 25000003 PHARM REV CODE 250: Performed by: INTERNAL MEDICINE

## 2021-11-14 PROCEDURE — 84100 ASSAY OF PHOSPHORUS: CPT | Performed by: STUDENT IN AN ORGANIZED HEALTH CARE EDUCATION/TRAINING PROGRAM

## 2021-11-14 PROCEDURE — 83930 ASSAY OF BLOOD OSMOLALITY: CPT | Performed by: STUDENT IN AN ORGANIZED HEALTH CARE EDUCATION/TRAINING PROGRAM

## 2021-11-14 PROCEDURE — 94761 N-INVAS EAR/PLS OXIMETRY MLT: CPT

## 2021-11-14 PROCEDURE — 99291 PR CRITICAL CARE, E/M 30-74 MINUTES: ICD-10-PCS | Mod: ,,, | Performed by: INTERNAL MEDICINE

## 2021-11-14 PROCEDURE — 99291 CRITICAL CARE FIRST HOUR: CPT | Mod: ,,, | Performed by: INTERNAL MEDICINE

## 2021-11-14 PROCEDURE — 84295 ASSAY OF SERUM SODIUM: CPT | Mod: 91 | Performed by: STUDENT IN AN ORGANIZED HEALTH CARE EDUCATION/TRAINING PROGRAM

## 2021-11-14 PROCEDURE — 85025 COMPLETE CBC W/AUTO DIFF WBC: CPT | Performed by: STUDENT IN AN ORGANIZED HEALTH CARE EDUCATION/TRAINING PROGRAM

## 2021-11-14 PROCEDURE — 25000003 PHARM REV CODE 250: Performed by: NURSE PRACTITIONER

## 2021-11-14 PROCEDURE — 80202 ASSAY OF VANCOMYCIN: CPT | Performed by: INTERNAL MEDICINE

## 2021-11-14 PROCEDURE — 99223 1ST HOSP IP/OBS HIGH 75: CPT | Mod: ,,, | Performed by: INTERNAL MEDICINE

## 2021-11-14 PROCEDURE — 63600175 PHARM REV CODE 636 W HCPCS: Performed by: INTERNAL MEDICINE

## 2021-11-14 PROCEDURE — 84295 ASSAY OF SERUM SODIUM: CPT | Performed by: STUDENT IN AN ORGANIZED HEALTH CARE EDUCATION/TRAINING PROGRAM

## 2021-11-14 PROCEDURE — 99223 PR INITIAL HOSPITAL CARE,LEVL III: ICD-10-PCS | Mod: ,,, | Performed by: INTERNAL MEDICINE

## 2021-11-14 PROCEDURE — S0030 INJECTION, METRONIDAZOLE: HCPCS | Performed by: NURSE PRACTITIONER

## 2021-11-14 PROCEDURE — 63600175 PHARM REV CODE 636 W HCPCS: Performed by: STUDENT IN AN ORGANIZED HEALTH CARE EDUCATION/TRAINING PROGRAM

## 2021-11-14 PROCEDURE — 80053 COMPREHEN METABOLIC PANEL: CPT | Performed by: STUDENT IN AN ORGANIZED HEALTH CARE EDUCATION/TRAINING PROGRAM

## 2021-11-14 PROCEDURE — 99233 PR SUBSEQUENT HOSPITAL CARE,LEVL III: ICD-10-PCS | Mod: ,,, | Performed by: NEUROLOGICAL SURGERY

## 2021-11-14 PROCEDURE — 83930 ASSAY OF BLOOD OSMOLALITY: CPT | Mod: 91 | Performed by: STUDENT IN AN ORGANIZED HEALTH CARE EDUCATION/TRAINING PROGRAM

## 2021-11-14 PROCEDURE — 25000003 PHARM REV CODE 250: Performed by: STUDENT IN AN ORGANIZED HEALTH CARE EDUCATION/TRAINING PROGRAM

## 2021-11-14 PROCEDURE — 83735 ASSAY OF MAGNESIUM: CPT | Performed by: STUDENT IN AN ORGANIZED HEALTH CARE EDUCATION/TRAINING PROGRAM

## 2021-11-14 PROCEDURE — 20000000 HC ICU ROOM

## 2021-11-14 RX ORDER — AMOXICILLIN 250 MG
1 CAPSULE ORAL DAILY
Status: DISCONTINUED | OUTPATIENT
Start: 2021-11-14 | End: 2021-11-22

## 2021-11-14 RX ORDER — ACETAMINOPHEN 325 MG/1
650 TABLET ORAL EVERY 6 HOURS PRN
Status: DISCONTINUED | OUTPATIENT
Start: 2021-11-14 | End: 2021-12-01 | Stop reason: HOSPADM

## 2021-11-14 RX ORDER — FAMOTIDINE 20 MG/1
20 TABLET, FILM COATED ORAL 2 TIMES DAILY
Status: DISCONTINUED | OUTPATIENT
Start: 2021-11-14 | End: 2021-11-15

## 2021-11-14 RX ORDER — INSULIN ASPART 100 [IU]/ML
1-10 INJECTION, SOLUTION INTRAVENOUS; SUBCUTANEOUS
Status: DISCONTINUED | OUTPATIENT
Start: 2021-11-14 | End: 2021-11-15

## 2021-11-14 RX ORDER — METRONIDAZOLE 500 MG/100ML
500 INJECTION, SOLUTION INTRAVENOUS
Status: DISCONTINUED | OUTPATIENT
Start: 2021-11-14 | End: 2021-11-26

## 2021-11-14 RX ADMIN — PIPERACILLIN AND TAZOBACTAM 4.5 G: 4; .5 INJECTION, POWDER, LYOPHILIZED, FOR SOLUTION INTRAVENOUS; PARENTERAL at 12:11

## 2021-11-14 RX ADMIN — VANCOMYCIN HYDROCHLORIDE 1750 MG: 10 INJECTION, POWDER, LYOPHILIZED, FOR SOLUTION INTRAVENOUS at 05:11

## 2021-11-14 RX ADMIN — PIPERACILLIN AND TAZOBACTAM 4.5 G: 4; .5 INJECTION, POWDER, LYOPHILIZED, FOR SOLUTION INTRAVENOUS; PARENTERAL at 09:11

## 2021-11-14 RX ADMIN — INSULIN ASPART 6 UNITS: 100 INJECTION, SOLUTION INTRAVENOUS; SUBCUTANEOUS at 05:11

## 2021-11-14 RX ADMIN — INSULIN ASPART 1 UNITS: 100 INJECTION, SOLUTION INTRAVENOUS; SUBCUTANEOUS at 09:11

## 2021-11-14 RX ADMIN — METRONIDAZOLE 500 MG: 500 INJECTION, SOLUTION INTRAVENOUS at 11:11

## 2021-11-14 RX ADMIN — LEVOTHYROXINE SODIUM 100 MCG: 100 TABLET ORAL at 05:11

## 2021-11-14 RX ADMIN — FAMOTIDINE 20 MG: 20 TABLET ORAL at 09:11

## 2021-11-14 RX ADMIN — METRONIDAZOLE 500 MG: 500 INJECTION, SOLUTION INTRAVENOUS at 05:11

## 2021-11-14 RX ADMIN — INSULIN DETEMIR 25 UNITS: 100 INJECTION, SOLUTION SUBCUTANEOUS at 09:11

## 2021-11-14 RX ADMIN — VANCOMYCIN HYDROCHLORIDE 1500 MG: 1.5 INJECTION, POWDER, LYOPHILIZED, FOR SOLUTION INTRAVENOUS at 09:11

## 2021-11-14 RX ADMIN — CEFTRIAXONE SODIUM 2 G: 2 INJECTION, SOLUTION INTRAVENOUS at 04:11

## 2021-11-14 RX ADMIN — LABETALOL HYDROCHLORIDE 10 MG: 5 INJECTION, SOLUTION INTRAVENOUS at 02:11

## 2021-11-14 NOTE — PROGRESS NOTES
[facility]  Neurocritical Care  Progress Note    Admit Date: 11/12/2021  Service Date: 11/14/2021  Length of Stay: 2    Subjective:     Chief Complaint: Brain lesion    History of Present Illness: 60 year old female with Pmhx DM2, hypothyroidism, YUSEF, hx breast ca, fibromyalgia, htn, hld, depression, class 3 obesity admitted as transfer from [place] for concern of elevated ICP 2/2 vasogenic edema of numerous brain abscesses vs masses. Patient recently admitted at [place] 10/27 for acute encephalopathy, found to have DKA vs HHS and bacteremia. Clinically improved with abx and appropriate blood sugar management until 3 days prior to transfer when she developed worsening confusion and lethargy. MRI 11/13 revealing multiple lesions throughout the bilateral cerebral hemispheres with associated vasogenic edema and leftward midline shift.     HPI from [place] below:  Miss Champion is a 60 year old female with a PMH of DMII on oral anti-hyperglycemics, hypothyroid on synthroid, YUSEF, Right breast papilloma, fibromyalgia, HTN, HLD and depression presents to [facility] via ambulance after being found down and unresponsive at home. Patient is obtunded with a GCS of 8 and is not able to participate in any history giving. This note is per report from neighbor, EMS, ED providers and nurse. Per report Miss Champion' neighbor hadnt seen her for 3 days so they went over to her house to check on her and found her down and unresponsive. EMS was called and brought her into the ED. Upon drawing labwork patient Wbc was 25.8, , anion gap 8, Cr 2.9, blood glucose 805, UDS pos for barbiturates, pH 6.928. Patient is protecting airway although her respirations are labored, Sp02 mid to low 90's and she has YUSEF. A dose of narcan had no response, patient was further treated with bicarb, insulin, IV fluids, IV vanc and zosyn and admitted to the ICU for a higher level of care. Patients sister Darcy was called by staff and  myself and asked to be kept updated, phone number in EMR.      Hospital Course: No notes on file    Interval History:  No acute or concerning events noted by overnight staff. Afebrile, vital signs stable and within normal limits. Alert and conscious. MRI notable for numerous small intra-axial lesions concerning for septic emboli.     Review of Systems   Constitutional: Negative.    Respiratory: Negative.    Genitourinary: Negative.        Objective:     Vitals:  Temp: 97.9 °F (36.6 °C)  Pulse: 88  Rhythm: normal sinus rhythm  BP: (!) 153/74  MAP (mmHg): 106  Resp: (!) 32  SpO2: 96 %  O2 Device (Oxygen Therapy): room air    Temp  Min: 97.9 °F (36.6 °C)  Max: 98.7 °F (37.1 °C)  Pulse  Min: 74  Max: 99  BP  Min: 123/51  Max: 174/124  MAP (mmHg)  Min: 84  Max: 138  Resp  Min: 19  Max: 39  SpO2  Min: 91 %  Max: 96 %    11/13 0701 - 11/14 0700  In: 2520.8 [P.O.:220; I.V.:976.2]  Out: 2915 [Urine:2915]   Unmeasured Output  Stool Occurrence: 1       Physical Exam  Constitutional:       General: She is not in acute distress.     Appearance: She is obese. She is not ill-appearing.   HENT:      Head: Normocephalic and atraumatic.      Mouth/Throat:      Mouth: Mucous membranes are moist.      Pharynx: Oropharynx is clear. No oropharyngeal exudate.   Eyes:      General:         Right eye: No discharge.         Left eye: No discharge.      Extraocular Movements: Extraocular movements intact.      Pupils: Pupils are equal, round, and reactive to light.   Cardiovascular:      Rate and Rhythm: Normal rate and regular rhythm.      Pulses: Normal pulses.      Heart sounds: Normal heart sounds.   Pulmonary:      Effort: Pulmonary effort is normal. No respiratory distress.      Breath sounds: Normal breath sounds.   Abdominal:      General: Abdomen is flat. There is no distension.      Palpations: Abdomen is soft.   Musculoskeletal:         General: No swelling. Normal range of motion.      Cervical back: Normal range of motion.    Skin:     General: Skin is warm.      Capillary Refill: Capillary refill takes less than 2 seconds.   Neurological:      Mental Status: She is alert. She is disoriented.      Cranial Nerves: No cranial nerve deficit.      Sensory: No sensory deficit.      Motor: No weakness.      Comments: AOx1. Moves all extremities spontaneously.   Intermittently follows commands. Confused speech at times. GCS 14            Medications:  Continuous Scheduledfamotidine, 20 mg, BID  insulin detemir U-100, 25 Units, BID  levothyroxine, 100 mcg, Before breakfast  piperacillin-tazobactam (ZOSYN) IVPB, 4.5 g, Q8H  senna-docusate 8.6-50 mg, 1 tablet, Daily  vancomycin (VANCOCIN) IVPB, 1,500 mg, Q12H    PRNdextrose 50%, 12.5 g, PRN  glucagon (human recombinant), 1 mg, PRN  insulin aspart U-100, 1-10 Units, Q6H PRN  labetalol, 10 mg, Q3H PRN  sodium chloride 0.9%, 10 mL, PRN      Today I personally reviewed pertinent medications, lines/drains/airways, imaging, cardiology results, laboratory results, microbiology results,    Diet  Diet NPO  Diet Dysphagia Mechanical Soft (IDDSI Level 5) [facility]    Assessment/Plan:     Neuro  * Brain lesion  MRI Brain w/lesions throughout the cerebral hemispheres involving frontal, parietal, temporal, and occipital lobes with surrounding vasogenic edema.   Suspect 2/2 septic emboli given recent bacteremia. Speciation of BCx 10/27 revealing organisms from oral mary. Mannitol and 3% saline started prior to transfer. CTH notable for multiple areas of mild diminished attenuation involving the cerebral hemispheres bilaterally corresponding with multiple T2/FLAIR hyperintense lesions noted on the recent MRI examination.     - q1h neuro checks  - NSG consulted, appreciate recs   - repeat TTE, may need DORCAS   - BCx NGTD  - vanc/zosyn  - q6h Na; Na goal 140-155   - intubation watch         Encephalopathy, metabolic  See brain lesion     Cardiac/Vascular  Hypertension associated with diabetes  - Goal SBP <  140     Endocrine  Uncontrolled type 2 diabetes mellitus with hyperglycemia  A1c 9 on admission to [place]    - Goal CBG < 180   - Cont basal insulin, detemir 25u BID  - NPO for now, moderate SSI.   - May need insulin gtt    Hypothyroidism  - Cont home synthroid           The patient is being Prophylaxed for:  Venous Thromboembolism with: Not applicable  Stress Ulcer with: H2B  Ventilator Pneumonia with: not applicable    Activity Orders          Diet NPO: NPO starting at 11/15 0001    Diet Dysphagia Mechanical Soft (IDDSI Level 5) [facility]: Dysphagia 2 (Mechanical Soft Ground) starting at 11/14 0906    Turn patient starting at 11/13 0000    Elevate HOB starting at 11/12 2358        Full Code    Vasquez Porter MD  Neurocritical Care  [facility]

## 2021-11-14 NOTE — HPI
60 year old female with Pmhx DM2, hypothyroidism, YUSEF, hx breast ca, fibromyalgia, htn, hld, depression, class 3 obesity admitted as transfer from [place] for concern of elevated ICP 2/2 vasogenic edema of numerous brain abscesses vs masses. Patient recently admitted at [place] 10/27 for acute encephalopathy, found to have DKA vs HHS and bacteremia. Clinically improved with abx and appropriate blood sugar management until 3 days prior to transfer when she developed worsening confusion and lethargy. MRI 11/13 revealing multiple lesions throughout the bilateral cerebral hemispheres with associated vasogenic edema and leftward midline shift. Patient growing Parvimunas Micra and Fusobacterium nucleatum from 10/27 blood culture and was found to be septic. Patient with no history of IVDU.  Patient was initially found down unconscious in her home according by neighbor after not being seen for 3 days according to patients sister (Darcy) and EMR note. Patient had recent excisional biopsy on 12/10/2020 with pathology showing sclerosis intraductal papilloma.

## 2021-11-14 NOTE — ASSESSMENT & PLAN NOTE
A1c 9 on admission to [place]    - Goal CBG < 180   - Cont basal insulin, detemir 25u BID  - NPO for now, moderate SSI.   - May need insulin gtt

## 2021-11-14 NOTE — ASSESSMENT & PLAN NOTE
MRI Brain w/lesions throughout the cerebral hemispheres involving frontal, parietal, temporal, and occipital lobes with surrounding vasogenic edema.   Suspect 2/2 septic emboli given recent bacteremia. Speciation of BCx 10/27 revealing organisms from oral mary. Mannitol and 3% saline started prior to transfer. CTH notable for multiple areas of mild diminished attenuation involving the cerebral hemispheres bilaterally corresponding with multiple T2/FLAIR hyperintense lesions noted on the recent MRI examination.     - q1h neuro checks  - NSG consulted, appreciate recs   - repeat TTE, may need DORCAS   - BCx NGTD  - vanc/zosyn  - q6h Na; Na goal 140-155   - intubation watch

## 2021-11-14 NOTE — PROGRESS NOTES
[facility]  Neurosurgery  Progress Note    Subjective:     History of Present Illness: 60F h/o breast cancer, admitted 10/17 to [facility] w/ [facility] found to have septicemia, BCx w/ oral fusobacerium, treated w/ appropriate abx. Increased lethargy last few days/AMS prompted new MRI which showed multiple lesions throughout the bilateral cerebral hemispheres with vasogenic edema ,mild leftward midline shift and partial mass effect of the right lateral ventricle. [place] [facility] [place] of care. Now patient is in ICU stable protecting airway Ox name [id number] AD doesn't follow commands, on vanc and 3%, CTH pending needs MRI + C, Echo, ID consult.      Post-Op Info:  * No surgery found *         Interval history: naeo. Mild neuro improvement, more awake but still confused. MRI most compatible with septic emboli. DORCAS pending. cx pending (cx at OSH were growing fusobacteria and parvimonas)              Review of Systems   Unable to perform ROS: Mental status change     Objective:     Weight: 123.8 kg (273 lb)  Body mass index is 46.86 kg/m².  Vital Signs (Most Recent):  Temp: 97.9 °F (36.6 °C) (11/14/21 1205)  Pulse: 80 (11/14/21 1405)  Resp: (!) 26 (11/14/21 1405)  BP: (!) 162/86 (11/14/21 1405)  SpO2: 98 % (11/14/21 1405) Vital Signs (24h Range):  Temp:  [97.9 °F (36.6 °C)-98.8 °F (37.1 °C)] 97.9 °F (36.6 °C)  Pulse:  [80-99] 80  Resp:  [20-39] 26  SpO2:  [91 %-99 %] 98 %  BP: (123-174)/() 162/86     Date 11/14/21 0700 - 11/15/21 0659   Shift 6036-6045 9246-3227 2986-0760 24 Hour Total   INTAKE   Shift Total(mL/kg)       OUTPUT   Urine(mL/kg/hr) 400(0.4)   400   Shift Total(mL/kg) 400(3.2)   400(3.2)   Weight (kg) 123.8 123.8 123.8 123.8                   Female External Urinary Catheter 11/05/21 2000 (Active)   Skin no redness;no breakdown 11/12/21 1915   Tolerance no signs/symptoms of discomfort 11/12/21 1915   Suction Continuous suction at 60 mmHg 11/12/21 1915   Date  of last wick change 11/12/21 11/12/21 1915   Time of last wick change 1915 11/12/21 1915   Output (mL) 1100 mL 11/12/21 0600       Physical Exam:  Nursing note and vitals reviewed.    Eyes: Pupils are equal, round, and reactive to light. EOM are normal.     Cardiovascular: Normal rate and intact distal pulses.     Abdominal: Soft.     Psych/Behavior:   Oriented x Name     Neurological:   Oriented x Name  [id number]  AAOx2  Confused  CN II-XII grossly intact  FC x4         Significant Labs:  Recent Labs   Lab 11/12/21 1952 11/12/21 1952 11/13/21 0111 11/13/21 0555 11/14/21 0122 11/14/21 0518 11/14/21  1223   *  --  241*  --  176*  --   --       < > 145   < > 143 144 154*   K 3.9  --  3.9  --  3.0*  --   --      --  111*  --  112*  --   --    CO2 22*  --  25  --  23  --   --    BUN 8  --  7  --  5*  --   --    CREATININE 0.9  --  0.8  --  0.7  --   --    CALCIUM 7.4*  --  8.7  --  7.5*  --   --    MG  --   --  2.0  --  1.7  --   --     < > = values in this interval not displayed.     Recent Labs   Lab 11/13/21 0111 11/14/21 0122   WBC 7.83 6.15   HGB 8.8* 7.9*   HCT 30.5* 27.6*    277     Recent Labs   Lab 11/13/21 0111   INR 1.0   APTT 21.9     Microbiology Results (last 7 days)     Procedure Component Value Units Date/Time    Urine culture [[id number]] Collected: 11/13/21 0328    Order Status: Completed Specimen: Urine Updated: 11/14/21 1158     Urine Culture, Routine No growth    Blood culture [[id number]] Collected: 11/13/21 0227    Order Status: Completed Specimen: Blood Updated: 11/14/21 0613     Blood Culture, Routine No Growth to date      No Growth to date    Narrative:      Blood cultures x 2 different sites. 4 bottles total. Please  draw cultures before administering antibiotics.    Blood culture [[id number]] Collected: 11/13/21 0227    Order Status: Completed Specimen: Blood Updated: 11/14/21 0613     Blood Culture, Routine No Growth to date      No Growth to date    Narrative:       Blood cultures from 2 different sites. 4 bottles total.  Please draw before starting antibiotics.        All pertinent labs from the last 24 hours have been reviewed.    Significant Diagnostics:  I have reviewed all pertinent imaging results/findings within the past 24 hours.    Assessment/Plan:     * Brain lesion  60F h/o breast cancer, admitted 10/17 to [facility] w/ [facility] found to have septicemia, BCx w/ oral fusobacerium, treated w/ appropriate abx. Increased lethargy last few days/AMS prompted new MRI which showed multiple lesions throughout the bilateral cerebral hemispheres with vasogenic edema ,mild leftward midline shift and partial mass effect of the right lateral ventricle. Patient transferred to [facility] for neuro-ICU level of care. Now patient is in ICU stable protecting airway.    Plan  --admitted to [facility]  --q1 neurochecks  --MRI w wo most consistent with numerous scattered septic emboli  --TTE unrevealing, DORCAS pending  --ID consulted, recs for ceftriaxone, vanc, flagyl. HIV and toxo IgG pending  --Na >135  --no acute neurosurgical intervention at this time, we will continue to follow    Please page with any change in neuro exam  Discussed w/ Dr. Kajal Mcrae MD  Neurosurgery  [facility]

## 2021-11-14 NOTE — CONSULTS
[facility]  Infectious Disease  Consult Note    Patient Name: Violeta Champion  MRN: [id number]  Admission Date: 11/12/2021  Hospital Length of Stay: 2 days  Attending Physician: Nestor Hart MD  Primary Care Provider: Madie Petersen DO     Isolation Status: No active isolations    Patient information was obtained from relative(s), past medical records and ER records.      Inpatient consult to Infectious Diseases  Consult performed by: Ryan Marrero MD  Consult ordered by: Nahomy Pond NP        Assessment/Plan:     * Brain lesion  Violeta Champion is a 60 y.o F w hx of T2DM, hypothyroidism, depression, HTN, HLD, obesity found to have numerous brain lesions found on MRI. Differential includes but not limited to endocarditis, CNS lymphoma, toxoplasmosis, metastasis to brain. Patient with recent TTE w/o evidence of vegetations. Cannot appreciate murmur on physical examination. No evidence of janeway lesions, osler nodes, or splinter hemorrhage. Patient not a past IVDU. 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 These oral bacteria may have caused infective endocarditis and subsequent septic emboli to brain. Multiple lesions can also occur in immunocompromised patients with HIV and toxoplasmosis. Patient has been treated with vancomycin and pip-tazo. ID consulted to evaluate brain lesions.    Plan/Recommendations:  -Ceftriaxone 2g BID + Metronidazole 500mg TID  -Can keep vancomycin  -d/c pip-tazo since poor penetration against blood brain barrier.  -f/u DORCAS  -f/u HIV  -f/u Toxo IgG          Thank you for your consult. I will follow-up with patient. Please contact us if you have any additional questions.    Ryan Marrero MD  Infectious Disease  [facility]    Subjective:     Principal Problem: Brain lesion    HPI: 60 year old female with Pmhx DM2, hypothyroidism, YUSEF, hx breast ca, fibromyalgia, htn, hld, depression,  class 3 obesity admitted as transfer from [facility] for concern of elevated ICP 2/2 vasogenic edema of numerous brain abscesses vs masses. Patient recently admitted at [facility] 10/27 for acute encephalopathy, found to have DKA vs HHS and bacteremia. Clinically improved with abx and appropriate blood sugar management until 3 days prior to transfer when she developed worsening confusion and lethargy. MRI 11/13 revealing multiple lesions throughout the bilateral cerebral hemispheres with associated vasogenic edema and leftward midline shift. Patient growing Parvimunas Micra and Fusobacterium nucleatum from 10/27 blood culture and was found to be septic. Patient with no history of IVDU.  Patient was initially found down unconscious in her home according by neighbor after not being seen for 3 days according to patients sister (Darcy) and EMR note. Patient had recent excisional biopsy on 12/10/2020 with pathology showing sclerosis intraductal papilloma.            Past Medical History:   Diagnosis Date    Breast cancer 12/10/2020    Diabetes mellitus, type 2     GERD (gastroesophageal reflux disease)     High cholesterol     Hypertension     Hypothyroid     Injury of knee, leg, ankle and foot     Insulin-resistant diabetes mellitus and acanthosis nigricans     Menopause present     Osteoarthritis     Osteoarthritis, knee     Osteopenia     Osteopenia     Sleep apnea     Status post breast lump removal 12/01/2020       Past Surgical History:   Procedure Laterality Date    bilateral duct removal breast      BREAST CYST ASPIRATION Right 2020    EXCISIONAL BIOPSY Right 12/10/2020    Procedure: EXCISIONAL BIOPSY, breast; u/s guided;  Surgeon: Bernadette Lopez MD;  Location: [facility];  Service: General;  Laterality: Right;    HYSTERECTOMY      OOPHORECTOMY      OTHER SURGICAL HISTORY      bilateral central duct removal with bilateral papilomona       Review of patient's allergies indicates:   Allergen Reactions     Iodinated contrast media Swelling     Other reaction(s): Swelling    Naproxen sodium Swelling    Naprosyn  [naproxen]      Other reaction(s): Swelling       Medications:  Medications Prior to Admission   Medication Sig    albuterol (VENTOLIN HFA) 90 mcg/actuation inhaler INHALE ONE TO TWO PUFFS INTO LUNGS EVERY 6 HOURS AS NEEDED FOR WHEEZING    amLODIPine (NORVASC) 5 MG tablet TAKE 1 TABLET BY MOUTH EVERY DAY    aspirin 81 mg Tab Take by mouth. 1 Tablet Oral Every day    blood sugar diagnostic (FREESTYLE INSULINX TEST STRIPS) Strp Check glucose three times daily    butalbital-acetaminophen-caffeine -40 mg (FIORICET, ESGIC) -40 mg per tablet Take 1 tablet by mouth as needed.    calcium carbonate (TUMS) 300 mg (750 mg) Chew Take by mouth 2 (two) times daily.    diclofenac sodium (VOLTAREN) 1 % Gel APPLY TWO GRAMS TOPICALLY ONCE DAILY    ezetimibe (ZETIA) 10 mg tablet TAKE 1 TABLET BY MOUTH EVERY DAY IN THE EVENING    FARXIGA 10 mg tablet TAKE 1 TABLET BY MOUTH EVERY DAY    fish oil-dha-epa 1,200-144-216 mg Cap 1 Capsule Oral Every day    fluticasone-salmeterol diskus inhaler 250-50 mcg Inhale 1 puff into the lungs 2 (two) times daily. Controller    glipiZIDE (GLUCOTROL) 10 MG TR24 TAKE 1 TABLET (10 MG TOTAL) BY MOUTH DAILY WITH BREAKFAST.    GUAIATUSSIN AC  mg/5 mL syrup Take 5 mLs by mouth every 4 (four) hours as needed.    HYDROcodone-acetaminophen (NORCO)  mg per tablet Take 1 tablet by mouth every 8 (eight) hours as needed for Pain.    hyoscyamine (LEVSIN/SL) 0.125 mg Subl DISSOLVE 1 TABLET UNDER THE TONGUE EVERY 4 TO 6 HOURS    ibuprofen (ADVIL,MOTRIN) 800 MG tablet Take 800 mg by mouth every 8 (eight) hours as needed.    ipratropium (ATROVENT) 21 mcg (0.03 %) nasal spray SMARTSI Both Nares Every Night    JARDIANCE 10 mg tablet Take 10 mg by mouth once daily.    lactulose (CHRONULAC) 10 gram/15 mL solution TAKE 15 ML DAILY AS NEEDED FOR CONSTIPATION     levothyroxine (SYNTHROID) 100 MCG tablet TAKE 1 TABLET DAILY    LORazepam (ATIVAN) 0.5 MG tablet Take 1 tablet (0.5 mg total) by mouth 2 (two) times daily.    meclizine (ANTIVERT) 25 mg tablet Take 1 tablet (25 mg total) by mouth 3 (three) times daily as needed.    metFORMIN (GLUCOPHAGE) 1000 MG tablet TAKE 1 TABLET BY MOUTH TWICE A DAY WITH MEALS    metoprolol tartrate (LOPRESSOR) 100 MG tablet TAKE 1 TABLET BY MOUTH TWICE A DAY    mv-min-FA-Lo-Jm-oflqrhn-lutein 0.4-162-18 mg Tab Take by mouth. 1 Tablet Oral Every day    NARCAN 4 mg/actuation Spry 1 spray once.    potassium chloride SA (K-DUR,KLOR-CON) 20 MEQ tablet TAKE 1 TABLET BY MOUTH TWICE A DAY    sucralfate (CARAFATE) 1 gram tablet Take 1 tablet (1 g total) by mouth 3 (three) times daily.    temazepam (RESTORIL) 30 mg capsule Take 1 capsule (30 mg total) by mouth nightly as needed. 1 Capsule Oral Every day    tiZANidine (ZANAFLEX) 4 MG tablet TAKE 1 TABLET (4 MG TOTAL) BY MOUTH EVERY 8 (EIGHT) HOURS.    TRULICITY 1.5 mg/0.5 mL pen injector INJECT 1 PEN EVERY WEEK    vibegron 75 mg Tab Take 75 mg by mouth once daily.     Antibiotics (From admission, onward)            Start     Stop Route Frequency Ordered    11/14/21 2100  vancomycin 1.5 g in dextrose 5 % 250 mL IVPB (ready to mix)         -- IV Every 12 hours (non-standard times) 11/14/21 0905    11/13/21 0100  piperacillin-tazobactam 4.5 g in sodium chloride 0.9% 100 mL IVPB (ready to mix system)         -- IV Every 8 hours (non-standard times) 11/13/21 0011        Antifungals (From admission, onward)            None        Antivirals (From admission, onward)    None           Immunization History   Administered Date(s) Administered    COVID-19, MRNA, LN-S, PF (Moderna) 05/20/2021, 06/18/2021    Influenza 11/01/2007    Influenza - Quadrivalent - PF *Preferred* (6 months and older) 11/01/2007, 11/03/2016, 10/05/2018    Influenza - Trivalent - PF (ADULT) 11/01/2007    Pneumococcal  Polysaccharide - 23 Valent 03/02/2021    Tdap 07/13/2016, 03/02/2021       Family History     Problem Relation (Age of Onset)    Amblyopia Father    Breast cancer Paternal Aunt, Paternal Grandmother    Cancer Maternal Grandmother    Diabetes Mother, Maternal Aunt, Maternal Uncle    Glaucoma Mother    Hypertension Mother, Brother        Social History     Socioeconomic History    Marital status: Single    Number of children: 0    Years of education: 14   Occupational History    Occupation: Nurse     Employer: [employer]   Tobacco Use    Smoking status: Never Smoker    Smokeless tobacco: Never Used   Substance and Sexual Activity    Alcohol use: Yes     Alcohol/week: 0.0 standard drinks     Comment: very rarely    Drug use: No   Social History Narrative    RN, 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     Social Determinants of Health     Financial Resource Strain: High Risk    Difficulty of Paying Living Expenses: Very hard   Food Insecurity: No Food Insecurity    Worried About Running Out of Food in the Last Year: Never true    Ran Out of Food in the Last Year: Never true   Transportation Needs: No Transportation Needs    Lack of Transportation (Medical): No    Lack of Transportation (Non-Medical): No   Physical Activity: Insufficiently Active    Days of Exercise per Week: 1 day    Minutes of Exercise per Session: 10 min   Stress: Stress Concern Present    Feeling of Stress : Very much   Social Connections: Socially Isolated    Frequency of Communication with Friends and Family: Once a week    Frequency of Social Gatherings with Friends and Family: Never    Attends Hoahaoism Services: Never    Active Member of Clubs or Organizations: No    Attends Club or Organization Meetings: Never    Marital Status: Never    Housing Stability: Low Risk     Unable to Pay for Housing in the Last Year: No    Number of Places Lived in the Last Year: 1    Unstable Housing in the Last Year: No     Review  of Systems   Unable to perform ROS: Mental status change     Objective:     Vital Signs (Most Recent):  Temp: 97.9 °F (36.6 °C) (11/14/21 0302)  Pulse: 88 (11/14/21 0832)  Resp: (!) 32 (11/14/21 0402)  BP: (!) 153/74 (11/14/21 0602)  SpO2: 96 % (11/14/21 0832) Vital Signs (24h Range):  Temp:  [97.9 °F (36.6 °C)-98.7 °F (37.1 °C)] 97.9 °F (36.6 °C)  Pulse:  [81-99] 88  Resp:  [20-39] 32  SpO2:  [91 %-96 %] 96 %  BP: (123-174)/() 153/74     Weight: 123.8 kg (273 lb)  Body mass index is 46.86 kg/m².    Estimated Creatinine Clearance: 113.8 mL/min (based on SCr of 0.7 mg/dL).    Physical Exam  Vitals reviewed.   Constitutional:       General: She is not in acute distress.     Appearance: She is not ill-appearing, toxic-appearing or diaphoretic.   HENT:      Head: Normocephalic and atraumatic.      Nose: Nose normal.      Mouth/Throat:      Mouth: Mucous membranes are moist.      Pharynx: Oropharynx is clear. No oropharyngeal exudate.   Eyes:      General:         Right eye: No discharge.         Left eye: No discharge.      Conjunctiva/sclera: Conjunctivae normal.   Cardiovascular:      Rate and Rhythm: Normal rate and regular rhythm.      Pulses: Normal pulses.      Heart sounds: Normal heart sounds. No murmur heard.      Pulmonary:      Effort: Pulmonary effort is normal.      Breath sounds: Normal breath sounds.   Abdominal:      General: Abdomen is flat. There is no distension.      Palpations: Abdomen is soft.      Tenderness: There is no abdominal tenderness.   Musculoskeletal:      Cervical back: Normal range of motion.   Skin:     General: Skin is warm and dry.      Coloration: Skin is not jaundiced.      Findings: No rash.   Neurological:      Mental Status: She is alert. She is confused.      GCS: GCS eye subscore is 4. GCS verbal subscore is 5. GCS motor subscore is 6.      Cranial Nerves: No dysarthria or facial asymmetry.      Motor: Weakness present.      Comments: Patient with left sided weakness in  upper and lower extremity.    Psychiatric:         Mood and Affect: Mood is not depressed.         Cognition and Memory: Cognition is impaired. Memory is impaired.         Significant Labs:   Bilirubin:   Recent Labs   Lab 10/27/21  1039 10/28/21  0802 11/13/21 0111 11/14/21 0122   BILITOT 0.4 0.5 0.3 0.3     Blood Culture:   Recent Labs   Lab 10/27/21  1040 10/27/21  1045 10/30/21  1302 11/13/21  0227   LABBLOO Gram stain maverick bottle: Gram positive cocci in clusters resembling Staph  Results called to and read back by:Estelita Goncalves RN 10/28/2021  18:30  COAGULASE-NEGATIVE STAPHYLOCOCCUS SPECIES  Organism is a probable contaminant  * Gram stain maverick bottle: Gram positive cocci in chains resembling Strep  Results called to and read back by: Tish Rodrigez RN  10/29/2021    02:33  Upon further review, Gram negative rods also present.  Results called to and read back by:Amy David RN 10/31/2021  11:03  PARVIMONAS MICRA*  FUSOBACTERIUM NUCLEATUM* No growth after 5 days.  No growth after 5 days. No Growth to date  No Growth to date  No Growth to date  No Growth to date     BMP:   Recent Labs   Lab 11/14/21 0122 11/14/21 0518 11/14/21  1223   *  --   --       < > 154*   K 3.0*  --   --    *  --   --    CO2 23  --   --    BUN 5*  --   --    CREATININE 0.7  --   --    CALCIUM 7.5*  --   --    MG 1.7  --   --     < > = values in this interval not displayed.     CBC:   Recent Labs   Lab 11/13/21 0111 11/14/21 0122   WBC 7.83 6.15   HGB 8.8* 7.9*   HCT 30.5* 27.6*    277     CMP:   Recent Labs   Lab 11/12/21 1952 11/12/21 1952 11/13/21 0111 11/13/21  0555 11/14/21 0122 11/14/21 0518 11/14/21  1223      < > 145   < > 143 144 154*   K 3.9  --  3.9  --  3.0*  --   --      --  111*  --  112*  --   --    CO2 22*  --  25  --  23  --   --    *  --  241*  --  176*  --   --    BUN 8  --  7  --  5*  --   --    CREATININE 0.9  --  0.8  --  0.7  --   --    CALCIUM  7.4*  --  8.7  --  7.5*  --   --    PROT  --   --  7.5  --  6.3  --   --    ALBUMIN  --   --  2.3*  --  2.1*  --   --    BILITOT  --   --  0.3  --  0.3  --   --    ALKPHOS  --   --  84  --  70  --   --    AST  --   --  13  --  10  --   --    ALT  --   --  6*  --  <5*  --   --    ANIONGAP 7*  --  9  --  8  --   --    EGFRNONAA >60  --  >60.0  --  >60.0  --   --     < > = values in this interval not displayed.     Coagulation:   Recent Labs   Lab 11/13/21  0111   INR 1.0   APTT 21.9     CSF: No results for input(s): CSFCULTURE in the last 4320 hours.  Fungus Culture (Blood or Bone Marrow): No results for input(s): FUNGUSCULTUR in the last 4320 hours.  Genital Culture: No results for input(s): LABGENI in the last 4320 hours.  Hepatitis Panel: No results for input(s): HEPBSAG, HEPAIGM, HEPCAB in the last 48 hours.    Invalid input(s): HAPBIGM  HIV 1/2 Antibodies: No results for input(s): [id number] in the last 48 hours.  HIV Rapid: No results for input(s): HIVRAPID in the last 48 hours.  Lactic Acid: No results for input(s): LACTATE in the last 48 hours.  Lipase: No results for input(s): LIPASE in the last 48 hours.  POCT Glucose:   Recent Labs   Lab 11/13/21  1804 11/14/21  0005 11/14/21  0554   POCTGLUCOSE 156* 202* 148*     Urine Culture:   Recent Labs   Lab 11/13/21  0328   LABURIN No growth     Urine Studies:   Recent Labs   Lab 10/27/21  1057 10/27/21  1057 11/13/21 0328   COLORU Yellow   < > Straw   APPEARANCEUA Hazy*   < > Hazy*   PHUR 6.0   < > 6.0   SPECGRAV 1.020   < > 1.015   PROTEINUA 2+*   < > Negative   GLUCUA 4+*   < > Negative   KETONESU 3+*   < > Negative   BILIRUBINUA 1+*   < > Negative   OCCULTUA 2+*   < > 1+*   NITRITE Negative   < > Negative   UROBILINOGEN Negative  --   --    LEUKOCYTESUR Negative   < > 3+*   RBCUA 5*   < > 4   WBCUA 8*   < > 73*   BACTERIA Few*   < > Moderate*   SQUAMEPITHEL 2   < > 0   HYALINECASTS 0  --   --     < > = values in this interval not displayed.     Wound Culture: No  results for input(s): LABAERO in the last 4320 hours.  All pertinent labs within the past 24 hours have been reviewed.    Significant Imaging: I have reviewed all pertinent imaging results/findings within the past 24 hours.

## 2021-11-14 NOTE — SUBJECTIVE & OBJECTIVE
Interval History:  No acute or concerning events noted by overnight staff. Afebrile, vital signs stable and within normal limits. Alert and conscious. MRI notable for numerous small intra-axial lesions concerning for septic emboli.     Review of Systems   Constitutional: Negative.    Respiratory: Negative.    Genitourinary: Negative.        Objective:     Vitals:  Temp: 97.9 °F (36.6 °C)  Pulse: 88  Rhythm: normal sinus rhythm  BP: (!) 153/74  MAP (mmHg): 106  Resp: (!) 32  SpO2: 96 %  O2 Device (Oxygen Therapy): room air    Temp  Min: 97.9 °F (36.6 °C)  Max: 98.7 °F (37.1 °C)  Pulse  Min: 74  Max: 99  BP  Min: 123/51  Max: 174/124  MAP (mmHg)  Min: 84  Max: 138  Resp  Min: 19  Max: 39  SpO2  Min: 91 %  Max: 96 %    11/13 0701 - 11/14 0700  In: 2520.8 [P.O.:220; I.V.:976.2]  Out: 2915 [Urine:2915]   Unmeasured Output  Stool Occurrence: 1       Physical Exam  Constitutional:       General: She is not in acute distress.     Appearance: She is obese. She is not ill-appearing.   HENT:      Head: Normocephalic and atraumatic.      Mouth/Throat:      Mouth: Mucous membranes are moist.      Pharynx: Oropharynx is clear. No oropharyngeal exudate.   Eyes:      General:         Right eye: No discharge.         Left eye: No discharge.      Extraocular Movements: Extraocular movements intact.      Pupils: Pupils are equal, round, and reactive to light.   Cardiovascular:      Rate and Rhythm: Normal rate and regular rhythm.      Pulses: Normal pulses.      Heart sounds: Normal heart sounds.   Pulmonary:      Effort: Pulmonary effort is normal. No respiratory distress.      Breath sounds: Normal breath sounds.   Abdominal:      General: Abdomen is flat. There is no distension.      Palpations: Abdomen is soft.   Musculoskeletal:         General: No swelling. Normal range of motion.      Cervical back: Normal range of motion.   Skin:     General: Skin is warm.      Capillary Refill: Capillary refill takes less than 2 seconds.    Neurological:      Mental Status: She is alert. She is disoriented.      Cranial Nerves: No cranial nerve deficit.      Sensory: No sensory deficit.      Motor: No weakness.      Comments: AOx1. Moves all extremities spontaneously.   Intermittently follows commands. Confused speech at times. GCS 14            Medications:  Continuous Scheduledfamotidine, 20 mg, BID  insulin detemir U-100, 25 Units, BID  levothyroxine, 100 mcg, Before breakfast  piperacillin-tazobactam (ZOSYN) IVPB, 4.5 g, Q8H  senna-docusate 8.6-50 mg, 1 tablet, Daily  vancomycin (VANCOCIN) IVPB, 1,500 mg, Q12H    PRNdextrose 50%, 12.5 g, PRN  glucagon (human recombinant), 1 mg, PRN  insulin aspart U-100, 1-10 Units, Q6H PRN  labetalol, 10 mg, Q3H PRN  sodium chloride 0.9%, 10 mL, PRN      Today I personally reviewed pertinent medications, lines/drains/airways, imaging, cardiology results, laboratory results, microbiology results,    Diet  Diet NPO  Diet Dysphagia Mechanical Soft (IDDSI Level 5) [facility]

## 2021-11-14 NOTE — SUBJECTIVE & OBJECTIVE
Past Medical History:   Diagnosis Date    Breast cancer 12/10/2020    Diabetes mellitus, type 2     GERD (gastroesophageal reflux disease)     High cholesterol     Hypertension     Hypothyroid     Injury of knee, leg, ankle and foot     Insulin-resistant diabetes mellitus and acanthosis nigricans     Menopause present     Osteoarthritis     Osteoarthritis, knee     Osteopenia     Osteopenia     Sleep apnea     Status post breast lump removal 12/01/2020       Past Surgical History:   Procedure Laterality Date    bilateral duct removal breast      BREAST CYST ASPIRATION Right 2020    EXCISIONAL BIOPSY Right 12/10/2020    Procedure: EXCISIONAL BIOPSY, breast; u/s guided;  Surgeon: Bernadette Lopez MD;  Location: [facility];  Service: General;  Laterality: Right;    HYSTERECTOMY      OOPHORECTOMY      OTHER SURGICAL HISTORY      bilateral central duct removal with bilateral papilomona       Review of patient's allergies indicates:   Allergen Reactions    Iodinated contrast media Swelling     Other reaction(s): Swelling    Naproxen sodium Swelling    Naprosyn  [naproxen]      Other reaction(s): Swelling       Medications:  Medications Prior to Admission   Medication Sig    albuterol (VENTOLIN HFA) 90 mcg/actuation inhaler INHALE ONE TO TWO PUFFS INTO LUNGS EVERY 6 HOURS AS NEEDED FOR WHEEZING    amLODIPine (NORVASC) 5 MG tablet TAKE 1 TABLET BY MOUTH EVERY DAY    aspirin 81 mg Tab Take by mouth. 1 Tablet Oral Every day    blood sugar diagnostic (FREESTYLE INSULINX TEST STRIPS) Strp Check glucose three times daily    butalbital-acetaminophen-caffeine -40 mg (FIORICET, ESGIC) -40 mg per tablet Take 1 tablet by mouth as needed.    calcium carbonate (TUMS) 300 mg (750 mg) Chew Take by mouth 2 (two) times daily.    diclofenac sodium (VOLTAREN) 1 % Gel APPLY TWO GRAMS TOPICALLY ONCE DAILY    ezetimibe (ZETIA) 10 mg tablet TAKE 1 TABLET BY MOUTH EVERY DAY IN THE EVENING    FARXIGA 10 mg  tablet TAKE 1 TABLET BY MOUTH EVERY DAY    fish oil-dha-epa 1,200-144-216 mg Cap 1 Capsule Oral Every day    fluticasone-salmeterol diskus inhaler 250-50 mcg Inhale 1 puff into the lungs 2 (two) times daily. Controller    glipiZIDE (GLUCOTROL) 10 MG TR24 TAKE 1 TABLET (10 MG TOTAL) BY MOUTH DAILY WITH BREAKFAST.    GUAIATUSSIN AC  mg/5 mL syrup Take 5 mLs by mouth every 4 (four) hours as needed.    HYDROcodone-acetaminophen (NORCO)  mg per tablet Take 1 tablet by mouth every 8 (eight) hours as needed for Pain.    hyoscyamine (LEVSIN/SL) 0.125 mg Subl DISSOLVE 1 TABLET UNDER THE TONGUE EVERY 4 TO 6 HOURS    ibuprofen (ADVIL,MOTRIN) 800 MG tablet Take 800 mg by mouth every 8 (eight) hours as needed.    ipratropium (ATROVENT) 21 mcg (0.03 %) nasal spray SMARTSI Both Nares Every Night    JARDIANCE 10 mg tablet Take 10 mg by mouth once daily.    lactulose (CHRONULAC) 10 gram/15 mL solution TAKE 15 ML DAILY AS NEEDED FOR CONSTIPATION    levothyroxine (SYNTHROID) 100 MCG tablet TAKE 1 TABLET DAILY    LORazepam (ATIVAN) 0.5 MG tablet Take 1 tablet (0.5 mg total) by mouth 2 (two) times daily.    meclizine (ANTIVERT) 25 mg tablet Take 1 tablet (25 mg total) by mouth 3 (three) times daily as needed.    metFORMIN (GLUCOPHAGE) 1000 MG tablet TAKE 1 TABLET BY MOUTH TWICE A DAY WITH MEALS    metoprolol tartrate (LOPRESSOR) 100 MG tablet TAKE 1 TABLET BY MOUTH TWICE A DAY    mv-min-FA-Bd-Am-xmltyvr-lutein 0.4-162-18 mg Tab Take by mouth. 1 Tablet Oral Every day    NARCAN 4 mg/actuation Spry 1 spray once.    potassium chloride SA (K-DUR,KLOR-CON) 20 MEQ tablet TAKE 1 TABLET BY MOUTH TWICE A DAY    sucralfate (CARAFATE) 1 gram tablet Take 1 tablet (1 g total) by mouth 3 (three) times daily.    temazepam (RESTORIL) 30 mg capsule Take 1 capsule (30 mg total) by mouth nightly as needed. 1 Capsule Oral Every day    tiZANidine (ZANAFLEX) 4 MG tablet TAKE 1 TABLET (4 MG TOTAL) BY MOUTH EVERY 8 (EIGHT)  HOURS.    TRULICITY 1.5 mg/0.5 mL pen injector INJECT 1 PEN EVERY WEEK    vibegron 75 mg Tab Take 75 mg by mouth once daily.     Antibiotics (From admission, onward)            Start     Stop Route Frequency Ordered    11/14/21 2100  vancomycin 1.5 g in dextrose 5 % 250 mL IVPB (ready to mix)         -- IV Every 12 hours (non-standard times) 11/14/21 0905    11/13/21 0100  piperacillin-tazobactam 4.5 g in sodium chloride 0.9% 100 mL IVPB (ready to mix system)         -- IV Every 8 hours (non-standard times) 11/13/21 0011        Antifungals (From admission, onward)            None        Antivirals (From admission, onward)    None           Immunization History   Administered Date(s) Administered    COVID-19, MRNA, LN-S, PF (Moderna) 05/20/2021, 06/18/2021    Influenza 11/01/2007    Influenza - Quadrivalent - PF *Preferred* (6 months and older) 11/01/2007, 11/03/2016, 10/05/2018    Influenza - Trivalent - PF (ADULT) 11/01/2007    Pneumococcal Polysaccharide - 23 Valent 03/02/2021    Tdap 07/13/2016, 03/02/2021       Family History     Problem Relation (Age of Onset)    Amblyopia Father    Breast cancer Paternal Aunt, Paternal Grandmother    Cancer Maternal Grandmother    Diabetes Mother, Maternal Aunt, Maternal Uncle    Glaucoma Mother    Hypertension Mother, Brother        Social History     Socioeconomic History    Marital status: Single    Number of children: 0    Years of education: 14   Occupational History    Occupation: Nurse     Employer: [employer]   Tobacco Use    Smoking status: Never Smoker    Smokeless tobacco: Never Used   Substance and Sexual Activity    Alcohol use: Yes     Alcohol/week: 0.0 standard drinks     Comment: very rarely    Drug use: No   Social History Narrative    RN, 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     Social Determinants of Health     Financial Resource Strain: High Risk    Difficulty of Paying Living Expenses: Very hard   Food Insecurity: No Food Insecurity     Worried About Running Out of Food in the Last Year: Never true    Ran Out of Food in the Last Year: Never true   Transportation Needs: No Transportation Needs    Lack of Transportation (Medical): No    Lack of Transportation (Non-Medical): No   Physical Activity: Insufficiently Active    Days of Exercise per Week: 1 day    Minutes of Exercise per Session: 10 min   Stress: Stress Concern Present    Feeling of Stress : Very much   Social Connections: Socially Isolated    Frequency of Communication with Friends and Family: Once a week    Frequency of Social Gatherings with Friends and Family: Never    Attends Spiritism Services: Never    Active Member of Clubs or Organizations: No    Attends Club or Organization Meetings: Never    Marital Status: Never    Housing Stability: Low Risk     Unable to Pay for Housing in the Last Year: No    Number of Places Lived in the Last Year: 1    Unstable Housing in the Last Year: No     Review of Systems   Unable to perform ROS: Mental status change     Objective:     Vital Signs (Most Recent):  Temp: 97.9 °F (36.6 °C) (11/14/21 0302)  Pulse: 88 (11/14/21 0832)  Resp: (!) 32 (11/14/21 0402)  BP: (!) 153/74 (11/14/21 0602)  SpO2: 96 % (11/14/21 0832) Vital Signs (24h Range):  Temp:  [97.9 °F (36.6 °C)-98.7 °F (37.1 °C)] 97.9 °F (36.6 °C)  Pulse:  [81-99] 88  Resp:  [20-39] 32  SpO2:  [91 %-96 %] 96 %  BP: (123-174)/() 153/74     Weight: 123.8 kg (273 lb)  Body mass index is 46.86 kg/m².    Estimated Creatinine Clearance: 113.8 mL/min (based on SCr of 0.7 mg/dL).    Physical Exam  Vitals reviewed.   Constitutional:       General: She is not in acute distress.     Appearance: She is not ill-appearing, toxic-appearing or diaphoretic.   HENT:      Head: Normocephalic and atraumatic.      Nose: Nose normal.      Mouth/Throat:      Mouth: Mucous membranes are moist.      Pharynx: Oropharynx is clear. No oropharyngeal exudate.   Eyes:      General:         Right  eye: No discharge.         Left eye: No discharge.      Conjunctiva/sclera: Conjunctivae normal.   Cardiovascular:      Rate and Rhythm: Normal rate and regular rhythm.      Pulses: Normal pulses.      Heart sounds: Normal heart sounds. No murmur heard.      Pulmonary:      Effort: Pulmonary effort is normal.      Breath sounds: Normal breath sounds.   Abdominal:      General: Abdomen is flat. There is no distension.      Palpations: Abdomen is soft.      Tenderness: There is no abdominal tenderness.   Musculoskeletal:      Cervical back: Normal range of motion.   Skin:     General: Skin is warm and dry.      Coloration: Skin is not jaundiced.      Findings: No rash.   Neurological:      Mental Status: She is alert. She is confused.      GCS: GCS eye subscore is 4. GCS verbal subscore is 5. GCS motor subscore is 6.      Cranial Nerves: No dysarthria or facial asymmetry.      Motor: Weakness present.      Comments: Patient with left sided weakness in upper and lower extremity.    Psychiatric:         Mood and Affect: Mood is not depressed.         Cognition and Memory: Cognition is impaired. Memory is impaired.         Significant Labs:   Bilirubin:   Recent Labs   Lab 10/27/21  1039 10/28/21  0802 11/13/21  0111 11/14/21  0122   BILITOT 0.4 0.5 0.3 0.3     Blood Culture:   Recent Labs   Lab 10/27/21  1040 10/27/21  1045 10/30/21  1302 11/13/21  0227   LABBLOO Gram stain maverick bottle: Gram positive cocci in clusters resembling Staph  Results called to and read back by:Estelita Goncalves RN 10/28/2021  18:30  COAGULASE-NEGATIVE STAPHYLOCOCCUS SPECIES  Organism is a probable contaminant  * Gram stain maverick bottle: Gram positive cocci in chains resembling Strep  Results called to and read back by: Tish Rodrigez RN  10/29/2021    02:33  Upon further review, Gram negative rods also present.  Results called to and read back by:Amy David RN 10/31/2021  11:03  PARVIMONAS MICRA*  FUSOBACTERIUM NUCLEATUM* No growth  after 5 days.  No growth after 5 days. No Growth to date  No Growth to date  No Growth to date  No Growth to date     BMP:   Recent Labs   Lab 11/14/21 0122 11/14/21 0518 11/14/21  1223   *  --   --       < > 154*   K 3.0*  --   --    *  --   --    CO2 23  --   --    BUN 5*  --   --    CREATININE 0.7  --   --    CALCIUM 7.5*  --   --    MG 1.7  --   --     < > = values in this interval not displayed.     CBC:   Recent Labs   Lab 11/13/21 0111 11/14/21 0122   WBC 7.83 6.15   HGB 8.8* 7.9*   HCT 30.5* 27.6*    277     CMP:   Recent Labs   Lab 11/12/21 1952 11/12/21 1952 11/13/21 0111 11/13/21 0555 11/14/21 0122 11/14/21 0518 11/14/21  1223      < > 145   < > 143 144 154*   K 3.9  --  3.9  --  3.0*  --   --      --  111*  --  112*  --   --    CO2 22*  --  25  --  23  --   --    *  --  241*  --  176*  --   --    BUN 8  --  7  --  5*  --   --    CREATININE 0.9  --  0.8  --  0.7  --   --    CALCIUM 7.4*  --  8.7  --  7.5*  --   --    PROT  --   --  7.5  --  6.3  --   --    ALBUMIN  --   --  2.3*  --  2.1*  --   --    BILITOT  --   --  0.3  --  0.3  --   --    ALKPHOS  --   --  84  --  70  --   --    AST  --   --  13  --  10  --   --    ALT  --   --  6*  --  <5*  --   --    ANIONGAP 7*  --  9  --  8  --   --    EGFRNONAA >60  --  >60.0  --  >60.0  --   --     < > = values in this interval not displayed.     Coagulation:   Recent Labs   Lab 11/13/21 0111   INR 1.0   APTT 21.9     CSF: No results for input(s): CSFCULTURE in the last 4320 hours.  Fungus Culture (Blood or Bone Marrow): No results for input(s): FUNGUSCULTUR in the last 4320 hours.  Genital Culture: No results for input(s): LABGENI in the last 4320 hours.  Hepatitis Panel: No results for input(s): HEPBSAG, HEPAIGM, HEPCAB in the last 48 hours.    Invalid input(s): HAPBIGM  HIV 1/2 Antibodies: No results for input(s): [id number] in the last 48 hours.  HIV Rapid: No results for input(s): HIVRAPID in the  last 48 hours.  Lactic Acid: No results for input(s): LACTATE in the last 48 hours.  Lipase: No results for input(s): LIPASE in the last 48 hours.  POCT Glucose:   Recent Labs   Lab 11/13/21  1804 11/14/21  0005 11/14/21  0554   POCTGLUCOSE 156* 202* 148*     Urine Culture:   Recent Labs   Lab 11/13/21  0328   LABURIN No growth     Urine Studies:   Recent Labs   Lab 10/27/21  1057 10/27/21  1057 11/13/21 0328   COLORU Yellow   < > Straw   APPEARANCEUA Hazy*   < > Hazy*   PHUR 6.0   < > 6.0   SPECGRAV 1.020   < > 1.015   PROTEINUA 2+*   < > Negative   GLUCUA 4+*   < > Negative   KETONESU 3+*   < > Negative   BILIRUBINUA 1+*   < > Negative   OCCULTUA 2+*   < > 1+*   NITRITE Negative   < > Negative   UROBILINOGEN Negative  --   --    LEUKOCYTESUR Negative   < > 3+*   RBCUA 5*   < > 4   WBCUA 8*   < > 73*   BACTERIA Few*   < > Moderate*   SQUAMEPITHEL 2   < > 0   HYALINECASTS 0  --   --     < > = values in this interval not displayed.     Wound Culture: No results for input(s): LABAERO in the last 4320 hours.  All pertinent labs within the past 24 hours have been reviewed.    Significant Imaging: I have reviewed all pertinent imaging results/findings within the past 24 hours.

## 2021-11-14 NOTE — PROGRESS NOTES
Pharmacokinetic Follow-Up Assessment: IV Vancomycin    Assessment/Plan:    - Vanc trough overnight is therapeutic at 19.5 mcg/mL  - Goal trough 15-20 mcg/mL  - Empirically decrease vanc regimen to 1500 mg Q12H to avoid accumulation and toxicity   - Draw trough before fourth dose on 11/16 at 08:00    Pharmacy will continue to follow and monitor vancomycin.    Please contact pharmacy with any questions regarding this assessment.     Thank you for the consult,   Shyla Richard, PharmD, [facility]  Neurocritical Care Pharmacist  [phone]       Patient brief summary:  Violeta Champion is a 60 y.o. female initiated on antimicrobial therapy with IV Vancomycin for treatment of suspected bacteremia    Drug Allergies:   Review of patient's allergies indicates:   Allergen Reactions    Iodinated contrast media Swelling     Other reaction(s): Swelling    Naproxen sodium Swelling    Naprosyn  [naproxen]      Other reaction(s): Swelling       Actual Body Weight:   124.1 kg    Renal Function:   Estimated Creatinine Clearance: 113.8 mL/min (based on SCr of 0.7 mg/dL).     Dialysis Method (if applicable):  N/A

## 2021-11-14 NOTE — ASSESSMENT & PLAN NOTE
Violeta Champion is a 60 y.o F w hx of T2DM, hypothyroidism, depression, HTN, HLD, obesity found to have numerous brain lesions found on MRI. Differential includes but not limited to endocarditis, CNS lymphoma, toxoplasmosis, metastasis to brain. Patient with recent TTE w/o evidence of vegetations. Cannot appreciate murmur on physical examination. No evidence of janeway lesions, osler nodes, or splinter hemorrhage. Patient not a past IVDU. 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 These oral bacteria may have caused infective endocarditis and subsequent septic emboli to brain. Multiple lesions can also occur in immunocompromised patients with HIV and toxoplasmosis. Patient has been treated with vancomycin and pip-tazo. ID consulted to evaluate brain lesions.    Plan/Recommendations:  -Ceftriaxone 2g BID + Metronidazole 500mg TID  -Can keep vancomycin  -d/c pip-tazo since poor penetration against blood brain barrier.  -f/u DORCAS  -f/u HIV  -f/u Toxo IgG

## 2021-11-14 NOTE — PLAN OF CARE
[facility] Care Plan    POC reviewed with Violeta Champion and family at 0300. Pt verbalized understanding. Questions and concerns addressed. No acute events overnight. 0.9% Na gtt switched to 0.45% Na @ 50 mL/hr.   Labetalol x1 for SBP >160.   Serum osmo trending down. Serum Na within normal range.   Pt progressing toward goals. Will continue to monitor.   See below and flowsheets for full assessment and VS info.       Neuro:  Neptune Coma Scale  Best Eye Response: 4-->(E4) spontaneous  Best Motor Response: 6-->(M6) obeys commands  Best Verbal Response: 4-->(V4) confused  Neptune Coma Scale Score: 14  Assessment Qualifiers: patient not sedated/intubated,no eye obstruction present  Pupil PERRLA: yes     24hr Temp:  [97.9 °F (36.6 °C)-98.7 °F (37.1 °C)]     CV:   Rhythm: normal sinus rhythm  BP goals:   SBP < 160  MAP > 65    Resp:   O2 Device (Oxygen Therapy): room air       Plan: N/A    GI/:     Diet/Nutrition Received: NPO,ice chips,mechanical/dental soft,clear liquid  Last Bowel Movement: 11/11/21  Voiding Characteristics: urethral catheter (bladder)    Intake/Output Summary (Last 24 hours) at 11/14/2021 0322  Last data filed at 11/14/2021 0302  Gross per 24 hour   Intake 2301.36 ml   Output 3915 ml   Net -1613.64 ml     Unmeasured Output  Stool Occurrence: 1    Labs/Accuchecks:  Recent Labs   Lab 11/14/21  0122   WBC 6.15   RBC 3.04*   HGB 7.9*   HCT 27.6*         Recent Labs   Lab 11/14/21  0122      K 3.0*   CO2 23   *   BUN 5*   CREATININE 0.7   ALKPHOS 70   ALT <5*   AST 10   BILITOT 0.3      Recent Labs   Lab 11/13/21  0111   INR 1.0   APTT 21.9      Recent Labs   Lab 11/13/21 0111   TROPONINI <0.006       Electrolytes: No replacement orders  Accuchecks: Q6H    Gtts:   sodium chloride 0.45% 50 mL/hr at 11/14/21 0302       LDA/Wounds:  Lines/Drains/Airways       Peripherally Inserted Central Catheter Line              PICC Double Lumen 11/01/21 1950 right basilic 12 days              Drain                    Urethral Catheter 11/13/21 0001 Double-lumen 16 Fr. 1 day                  Wounds: Yes  Wound care consulted: Yes

## 2021-11-14 NOTE — ASSESSMENT & PLAN NOTE
60F h/o breast cancer, admitted 10/17 to [facility] w/ [facility] found to have septicemia, BCx w/ oral fusobacerium, treated w/ appropriate abx. Increased lethargy last few days/AMS prompted new MRI which showed multiple lesions throughout the bilateral cerebral hemispheres with vasogenic edema ,mild leftward midline shift and partial mass effect of the right lateral ventricle. Patient transferred to [facility] for neuro-ICU level of care. Now patient is in ICU stable protecting airway.    Plan  --ad[facility] to [facility]  --q1 neurochecks  --MRI w wo most consistent with numerous scattered septic emboli  --TTE unrevealing, DORCAS pending  --ID consulted, recs for ceftriaxone, vanc, flagyl. HIV and toxo IgG pending  --Na >135  --no acute neurosurgical intervention at this time, we will continue to follow    Please page with any change in neuro exam  Discussed w/ Dr. Rdz

## 2021-11-15 PROBLEM — G93.6 VASOGENIC BRAIN EDEMA: Status: ACTIVE | Noted: 2021-11-15

## 2021-11-15 PROBLEM — Z75.8 DISCHARGE PLANNING ISSUES: Status: ACTIVE | Noted: 2021-11-15

## 2021-11-15 PROBLEM — G93.5 BRAIN COMPRESSION: Status: ACTIVE | Noted: 2021-11-15

## 2021-11-15 LAB
ALBUMIN SERPL BCP-MCNC: 2.3 G/DL (ref 3.5–5.2)
ALP SERPL-CCNC: 75 U/L (ref 55–135)
ALT SERPL W/O P-5'-P-CCNC: 5 U/L (ref 10–44)
ANION GAP SERPL CALC-SCNC: 10 MMOL/L (ref 8–16)
AST SERPL-CCNC: 10 U/L (ref 10–40)
BASOPHILS # BLD AUTO: 0.05 K/UL (ref 0–0.2)
BASOPHILS NFR BLD: 0.7 % (ref 0–1.9)
BILIRUB SERPL-MCNC: 0.3 MG/DL (ref 0.1–1)
BUN SERPL-MCNC: 6 MG/DL (ref 6–20)
CALCIUM SERPL-MCNC: 8.4 MG/DL (ref 8.7–10.5)
CHLORIDE SERPL-SCNC: 117 MMOL/L (ref 95–110)
CO2 SERPL-SCNC: 28 MMOL/L (ref 23–29)
CREAT SERPL-MCNC: 1 MG/DL (ref 0.5–1.4)
DIFFERENTIAL METHOD: ABNORMAL
EOSINOPHIL # BLD AUTO: 0.2 K/UL (ref 0–0.5)
EOSINOPHIL NFR BLD: 2.6 % (ref 0–8)
ERYTHROCYTE [DISTWIDTH] IN BLOOD BY AUTOMATED COUNT: 23.1 % (ref 11.5–14.5)
EST. GFR  (AFRICAN AMERICAN): >60 ML/MIN/1.73 M^2
EST. GFR  (NON AFRICAN AMERICAN): >60 ML/MIN/1.73 M^2
GLUCOSE SERPL-MCNC: 136 MG/DL (ref 70–110)
HCT VFR BLD AUTO: 30.7 % (ref 37–48.5)
HGB BLD-MCNC: 8.8 G/DL (ref 12–16)
HIV 1+2 AB+HIV1 P24 AG SERPL QL IA: NEGATIVE
IMM GRANULOCYTES # BLD AUTO: 0.02 K/UL (ref 0–0.04)
IMM GRANULOCYTES NFR BLD AUTO: 0.3 % (ref 0–0.5)
LYMPHOCYTES # BLD AUTO: 2.5 K/UL (ref 1–4.8)
LYMPHOCYTES NFR BLD: 31.9 % (ref 18–48)
MAGNESIUM SERPL-MCNC: 1.9 MG/DL (ref 1.6–2.6)
MCH RBC QN AUTO: 25.9 PG (ref 27–31)
MCHC RBC AUTO-ENTMCNC: 28.7 G/DL (ref 32–36)
MCV RBC AUTO: 90 FL (ref 82–98)
MONOCYTES # BLD AUTO: 0.7 K/UL (ref 0.3–1)
MONOCYTES NFR BLD: 8.7 % (ref 4–15)
NEUTROPHILS # BLD AUTO: 4.3 K/UL (ref 1.8–7.7)
NEUTROPHILS NFR BLD: 55.8 % (ref 38–73)
NRBC BLD-RTO: 0 /100 WBC
PHOSPHATE SERPL-MCNC: 3.7 MG/DL (ref 2.7–4.5)
PLATELET # BLD AUTO: 290 K/UL (ref 150–450)
PMV BLD AUTO: 11 FL (ref 9.2–12.9)
POCT GLUCOSE: 112 MG/DL (ref 70–110)
POCT GLUCOSE: 112 MG/DL (ref 70–110)
POCT GLUCOSE: 129 MG/DL (ref 70–110)
POCT GLUCOSE: 136 MG/DL (ref 70–110)
POCT GLUCOSE: 222 MG/DL (ref 70–110)
POCT GLUCOSE: 225 MG/DL (ref 70–110)
POCT GLUCOSE: 321 MG/DL (ref 70–110)
POTASSIUM SERPL-SCNC: 3.2 MMOL/L (ref 3.5–5.1)
PROT SERPL-MCNC: 6.9 G/DL (ref 6–8.4)
RBC # BLD AUTO: 3.4 M/UL (ref 4–5.4)
SODIUM SERPL-SCNC: 152 MMOL/L (ref 136–145)
SODIUM SERPL-SCNC: 155 MMOL/L (ref 136–145)
SODIUM SERPL-SCNC: 155 MMOL/L (ref 136–145)
SODIUM SERPL-SCNC: 157 MMOL/L (ref 136–145)
SODIUM SERPL-SCNC: 158 MMOL/L (ref 136–145)
SODIUM SERPL-SCNC: 159 MMOL/L (ref 136–145)
VANCOMYCIN SERPL-MCNC: 23.7 UG/ML
WBC # BLD AUTO: 7.68 K/UL (ref 3.9–12.7)

## 2021-11-15 PROCEDURE — 25000003 PHARM REV CODE 250: Performed by: STUDENT IN AN ORGANIZED HEALTH CARE EDUCATION/TRAINING PROGRAM

## 2021-11-15 PROCEDURE — 95720 EEG PHY/QHP EA INCR W/VEEG: CPT | Mod: ,,, | Performed by: PSYCHIATRY & NEUROLOGY

## 2021-11-15 PROCEDURE — 25000003 PHARM REV CODE 250: Performed by: PSYCHIATRY & NEUROLOGY

## 2021-11-15 PROCEDURE — 80202 ASSAY OF VANCOMYCIN: CPT | Performed by: INTERNAL MEDICINE

## 2021-11-15 PROCEDURE — 95720 PR EEG, W/VIDEO, CONT RECORD, I&R, >12<26 HRS: ICD-10-PCS | Mod: ,,, | Performed by: PSYCHIATRY & NEUROLOGY

## 2021-11-15 PROCEDURE — 99233 PR SUBSEQUENT HOSPITAL CARE,LEVL III: ICD-10-PCS | Mod: ,,, | Performed by: INTERNAL MEDICINE

## 2021-11-15 PROCEDURE — 80053 COMPREHEN METABOLIC PANEL: CPT | Performed by: STUDENT IN AN ORGANIZED HEALTH CARE EDUCATION/TRAINING PROGRAM

## 2021-11-15 PROCEDURE — 84295 ASSAY OF SERUM SODIUM: CPT | Mod: 91 | Performed by: STUDENT IN AN ORGANIZED HEALTH CARE EDUCATION/TRAINING PROGRAM

## 2021-11-15 PROCEDURE — 99233 PR SUBSEQUENT HOSPITAL CARE,LEVL III: ICD-10-PCS | Mod: ,,, | Performed by: NEUROLOGICAL SURGERY

## 2021-11-15 PROCEDURE — S0030 INJECTION, METRONIDAZOLE: HCPCS | Performed by: NURSE PRACTITIONER

## 2021-11-15 PROCEDURE — 86777 TOXOPLASMA ANTIBODY: CPT

## 2021-11-15 PROCEDURE — 97530 THERAPEUTIC ACTIVITIES: CPT

## 2021-11-15 PROCEDURE — 99291 PR CRITICAL CARE, E/M 30-74 MINUTES: ICD-10-PCS | Mod: ,,, | Performed by: PSYCHIATRY & NEUROLOGY

## 2021-11-15 PROCEDURE — 95714 VEEG EA 12-26 HR UNMNTR: CPT

## 2021-11-15 PROCEDURE — 97112 NEUROMUSCULAR REEDUCATION: CPT

## 2021-11-15 PROCEDURE — 63600175 PHARM REV CODE 636 W HCPCS: Performed by: PSYCHIATRY & NEUROLOGY

## 2021-11-15 PROCEDURE — 25000003 PHARM REV CODE 250: Performed by: NURSE PRACTITIONER

## 2021-11-15 PROCEDURE — 92523 SPEECH SOUND LANG COMPREHEN: CPT

## 2021-11-15 PROCEDURE — 83735 ASSAY OF MAGNESIUM: CPT | Performed by: STUDENT IN AN ORGANIZED HEALTH CARE EDUCATION/TRAINING PROGRAM

## 2021-11-15 PROCEDURE — 84100 ASSAY OF PHOSPHORUS: CPT | Performed by: STUDENT IN AN ORGANIZED HEALTH CARE EDUCATION/TRAINING PROGRAM

## 2021-11-15 PROCEDURE — 99233 SBSQ HOSP IP/OBS HIGH 50: CPT | Mod: ,,, | Performed by: NEUROLOGICAL SURGERY

## 2021-11-15 PROCEDURE — 63600175 PHARM REV CODE 636 W HCPCS: Performed by: NURSE PRACTITIONER

## 2021-11-15 PROCEDURE — 95700 EEG CONT REC W/VID EEG TECH: CPT

## 2021-11-15 PROCEDURE — 20000000 HC ICU ROOM

## 2021-11-15 PROCEDURE — 94761 N-INVAS EAR/PLS OXIMETRY MLT: CPT

## 2021-11-15 PROCEDURE — 97535 SELF CARE MNGMENT TRAINING: CPT

## 2021-11-15 PROCEDURE — 99233 SBSQ HOSP IP/OBS HIGH 50: CPT | Mod: ,,, | Performed by: INTERNAL MEDICINE

## 2021-11-15 PROCEDURE — 85025 COMPLETE CBC W/AUTO DIFF WBC: CPT | Performed by: STUDENT IN AN ORGANIZED HEALTH CARE EDUCATION/TRAINING PROGRAM

## 2021-11-15 PROCEDURE — 99291 CRITICAL CARE FIRST HOUR: CPT | Mod: ,,, | Performed by: PSYCHIATRY & NEUROLOGY

## 2021-11-15 PROCEDURE — 97110 THERAPEUTIC EXERCISES: CPT

## 2021-11-15 RX ORDER — SODIUM CHLORIDE, SODIUM LACTATE, POTASSIUM CHLORIDE, CALCIUM CHLORIDE 600; 310; 30; 20 MG/100ML; MG/100ML; MG/100ML; MG/100ML
INJECTION, SOLUTION INTRAVENOUS CONTINUOUS
Status: DISCONTINUED | OUTPATIENT
Start: 2021-11-15 | End: 2021-11-16

## 2021-11-15 RX ORDER — FLUCONAZOLE 150 MG/1
150 TABLET ORAL DAILY
Status: COMPLETED | OUTPATIENT
Start: 2021-11-15 | End: 2021-11-15

## 2021-11-15 RX ORDER — LABETALOL HCL 20 MG/4 ML
10 SYRINGE (ML) INTRAVENOUS
Status: DISCONTINUED | OUTPATIENT
Start: 2021-11-15 | End: 2021-11-26

## 2021-11-15 RX ORDER — INSULIN ASPART 100 [IU]/ML
1-10 INJECTION, SOLUTION INTRAVENOUS; SUBCUTANEOUS
Status: DISCONTINUED | OUTPATIENT
Start: 2021-11-15 | End: 2021-11-23

## 2021-11-15 RX ADMIN — METRONIDAZOLE 500 MG: 500 INJECTION, SOLUTION INTRAVENOUS at 07:11

## 2021-11-15 RX ADMIN — FLUCONAZOLE 150 MG: 150 TABLET ORAL at 03:11

## 2021-11-15 RX ADMIN — CEFTRIAXONE SODIUM 2 G: 2 INJECTION, SOLUTION INTRAVENOUS at 03:11

## 2021-11-15 RX ADMIN — METRONIDAZOLE 500 MG: 500 INJECTION, SOLUTION INTRAVENOUS at 10:11

## 2021-11-15 RX ADMIN — CEFTRIAXONE SODIUM 2 G: 2 INJECTION, SOLUTION INTRAVENOUS at 02:11

## 2021-11-15 RX ADMIN — LABETALOL HYDROCHLORIDE 10 MG: 5 INJECTION, SOLUTION INTRAVENOUS at 07:11

## 2021-11-15 RX ADMIN — MICONAZOLE NITRATE: 20 OINTMENT TOPICAL at 09:11

## 2021-11-15 RX ADMIN — METRONIDAZOLE 500 MG: 500 INJECTION, SOLUTION INTRAVENOUS at 03:11

## 2021-11-15 RX ADMIN — INSULIN ASPART 2 UNITS: 100 INJECTION, SOLUTION INTRAVENOUS; SUBCUTANEOUS at 10:11

## 2021-11-15 RX ADMIN — SODIUM CHLORIDE, SODIUM LACTATE, POTASSIUM CHLORIDE, AND CALCIUM CHLORIDE: .6; .31; .03; .02 INJECTION, SOLUTION INTRAVENOUS at 12:11

## 2021-11-15 RX ADMIN — INSULIN DETEMIR 25 UNITS: 100 INJECTION, SOLUTION SUBCUTANEOUS at 08:11

## 2021-11-15 RX ADMIN — LABETALOL HYDROCHLORIDE 10 MG: 5 INJECTION, SOLUTION INTRAVENOUS at 03:11

## 2021-11-15 RX ADMIN — MICONAZOLE NITRATE: 20 OINTMENT TOPICAL at 12:11

## 2021-11-15 NOTE — SUBJECTIVE & OBJECTIVE
Interval History:  No acute or concerning events noted by overnight staff. Afebrile, vital signs stable and within normal limits. Alert and conscious. MRI notable for numerous small intra-axial lesions; unclear etiology. Ordering EEG/LP. Plan for DORCAS today. SBP goal 160. Na 155. H/H 8.8/30.7. Cultures currently NGTD. Making urine.     Review of Systems   Constitutional: Negative.    Respiratory: Negative.    Gastrointestinal: Negative.    Genitourinary: Negative.    Musculoskeletal: Negative.        Objective:     Vitals:  Temp: 99.8 °F (37.7 °C)  Pulse: 78  Rhythm: normal sinus rhythm  BP: (!) 146/68  MAP (mmHg): 98  Resp: (!) 27  SpO2: 96 %  O2 Device (Oxygen Therapy): room air    Temp  Min: 97.9 °F (36.6 °C)  Max: 99.8 °F (37.7 °C)  Pulse  Min: 64  Max: 96  BP  Min: 115/53  Max: 163/80  MAP (mmHg)  Min: 76  Max: 120  Resp  Min: 13  Max: 40  SpO2  Min: 92 %  Max: 99 %    11/14 0701 - 11/15 0700  In: 948.9 [I.V.:321.6]  Out: 900 [Urine:900]   Unmeasured Output  Urine Occurrence: 1  Stool Occurrence: 1  Pad Count: 1       Physical Exam  Constitutional:       General: She is not in acute distress.     Appearance: She is obese. She is not ill-appearing.   HENT:      Head: Normocephalic and atraumatic.      Mouth/Throat:      Mouth: Mucous membranes are moist.      Pharynx: Oropharynx is clear. No oropharyngeal exudate.   Eyes:      General:         Right eye: No discharge.         Left eye: No discharge.      Extraocular Movements: Extraocular movements intact.      Pupils: Pupils are equal, round, and reactive to light.   Cardiovascular:      Rate and Rhythm: Normal rate and regular rhythm.      Pulses: Normal pulses.      Heart sounds: Normal heart sounds.   Pulmonary:      Effort: Pulmonary effort is normal. No respiratory distress.      Breath sounds: Normal breath sounds.   Abdominal:      General: Abdomen is flat. There is no distension.      Palpations: Abdomen is soft.   Musculoskeletal:         General: No  swelling. Normal range of motion.      Cervical back: Normal range of motion.   Skin:     General: Skin is warm.      Capillary Refill: Capillary refill takes less than 2 seconds.   Neurological:      Mental Status: She is alert. She is disoriented.      Cranial Nerves: No cranial nerve deficit.      Sensory: No sensory deficit.      Motor: No weakness.      Comments:   A/Ox1.   GCS 14  Intermittently follows commands. Confused speech at times.    Moves all extremities spontaneously         Medications:  Continuous ScheduledcefTRIAXone (ROCEPHIN) IVPB, 2 g, Q12H  famotidine, 20 mg, BID  insulin aspart U-100, 1-10 Units, QID (AC & HS)  insulin detemir U-100, 25 Units, BID  levothyroxine, 100 mcg, Before breakfast  metronidazole, 500 mg, Q8H  miconazole nitrate 2%, , BID  senna-docusate 8.6-50 mg, 1 tablet, Daily    PRNacetaminophen, 650 mg, Q6H PRN  dextrose 50%, 12.5 g, PRN  glucagon (human recombinant), 1 mg, PRN  labetalol, 10 mg, Q3H PRN  sodium chloride 0.9%, 10 mL, PRN      Today I personally reviewed pertinent medications, lines/drains/airways, imaging, cardiology results, laboratory results, microbiology results,    Diet  Diet NPO

## 2021-11-15 NOTE — PLAN OF CARE
[facility]  Initial Discharge Assessment       Primary Care Provider: Madie Petersen DO    Admission Diagnosis: Brain lesion [G93.9]    Admission Date: 11/12/2021       Expected Discharge Date: 11/24/2021    Discharge Barriers Identified: None    Payor: [employer] / Plan: [facility] CHOICE PLUS / Product Type: Commercial /     Extended Emergency Contact Information  Primary Emergency Contact: Claudette Mancuso  Mobile Phone: [phone]  Relation: Sister  Preferred language: English   needed? No  Secondary Emergency Contact: Araseli Chavarria  Address: [address] [place] [address]  Home Phone: [phone]  Relation: Friend    Discharge Plan A: Skilled Nursing Facility  Discharge Plan B: Rehab      [id number] [place] [facility] - [address] [facility]  [address]  [place] [address]  Phone: [phone] Fax: [phone]    [id number] - [place] - [address]  [address]  [place] [address]  Phone: [phone] Fax: [phone]      [place] [facility] [place]    Past Medical History:   Diagnosis Date    Breast cancer 12/10/2020    Diabetes mellitus, type 2     GERD (gastroesophageal reflux disease)     High cholesterol     Hypertension     Hypothyroid     Injury of knee, leg, ankle and foot     Insulin-resistant diabetes mellitus and acanthosis nigricans     Menopause present     Osteoarthritis     Osteoarthritis, knee     Osteopenia     Osteopenia     Sleep apnea     Status post breast lump removal 12/01/2020         CM met with patient and Adelita Gong (sister) in room for Discharge Planning Assessment.  Patient is able to answer questions.  Per patient and sister, the patient lives alone in a single story house with a threshold to enter.   Per patient, she was independent with ADLS and used no dme for ambulation.  Patient will have assistance from her  sister, Claudette Mancuso upon discharge at [address] [facility], [place], [place], [address].   Per patient, she will go to stay with her sister, Claudette, in [facility] upon discharge.  Per Chart, [facility] denied inpatient rehab for patient on Peer to Peer.  A SNF referral was sent to [facility] for SNF and they are following patient.   Therapy is still recommending inpatient rehab at this time.   Discharge Planning Booklet given to patient/family and discussed.  All questions addressed.  CM will follow for needs.    Initial Assessment (most recent)     Adult Discharge Assessment - 11/15/21 3509        Discharge Assessment    Assessment Type Discharge Planning Assessment     Confirmed/corrected address, phone number and insurance Yes     Confirmed Demographics Correct on Facesheet     Source of Information family;patient     If unable to respond/provide information was family/caregiver contacted? Yes     Contact Name/Number Hannah Gong (sister) [phone] (at bedside)     Communicated SARAVANAN with patient/caregiver Date not available/Unable to determine     Reason For Admission Brain Lesion     Lives With alone     [place] [facility] [place]     Do you expect to return to your current living situation? No   Plan [place] to [place] with sisterCluadette    Who are your caregiver(s) and their phone number(s)? Claudette Mancuso(sister)[phone]     Prior to hospitilization cognitive status: Alert/Oriented     Current cognitive status: Alert/Oriented     Walking or Climbing Stairs Difficulty none     Dressing/Bathing Difficulty none     Home Accessibility wheelchair accessible     Home Layout Able to live on 1st floor     Equipment Currently Used at Home none     Readmission within 30 days? No     Patient currently being followed by outpatient case management? No     Do you currently have service(s) that help you manage your care at home? No     Do you take prescription medications? Yes     Do you have  prescription coverage? Yes     Coverage [facility]     Do you have any problems affording any of your prescribed medications? No     Is the patient taking medications as prescribed? yes     Who is going to help you get home at discharge? Claudette Mancuso(sister)[phone]     How do you get to doctors appointments? family or friend will provide     Are you on dialysis? No     Do you take coumadin? No     Discharge Plan A Skilled Nursing Facility     Discharge Plan B Rehab     DME Needed Upon Discharge  none     Discharge Plan discussed with: Sibling;Patient     Name(s) and Number(s) Adelitaarya Gong (sister at bedside) [phone]     Discharge Barriers Identified None               Stefani Schmidt RN, CCRN-K, [facility]  Neuro-Critical Care   [phone]

## 2021-11-15 NOTE — ASSESSMENT & PLAN NOTE
A1c 9 on admission to [place]. BG labile.    - Goal CBG < 180   - Cont basal insulin  - discontinue detemir  - NPO for now, moderate SSI.

## 2021-11-15 NOTE — PROGRESS NOTES
[facility]  Neurocritical Care  Progress Note    Admit Date: 11/12/2021  Service Date: 11/15/2021  Length of Stay: 3    Subjective:     Chief Complaint: Brain lesion    History of Present Illness: 60 year old female with Pmhx DM2, hypothyroidism, YUSEF, hx breast ca, fibromyalgia, htn, hld, depression, class 3 obesity admitted as transfer from [place] for concern of elevated ICP 2/2 vasogenic edema of numerous brain abscesses vs masses. Patient recently admitted at [place] 10/27 for acute encephalopathy, found to have DKA vs HHS and bacteremia. Clinically improved with abx and appropriate blood sugar management until 3 days prior to transfer when she developed worsening confusion and lethargy. MRI 11/13 revealing multiple lesions throughout the bilateral cerebral hemispheres with associated vasogenic edema and leftward midline shift.     HPI from [place] below:  Miss Champion is a 60 year old female with a PMH of DMII on oral anti-hyperglycemics, hypothyroid on synthroid, YUSEF, Right breast papilloma, fibromyalgia, HTN, HLD and depression presents to [facility] via ambulance after being found down and unresponsive at home. Patient is obtunded with a GCS of 8 and is not able to participate in any history giving. This note is per report from neighbor, EMS, ED providers and nurse. Per report Miss Champion' neighbor hadnt seen her for 3 days so they went over to her house to check on her and found her down and unresponsive. EMS was called and brought her into the ED. Upon drawing labwork patient Wbc was 25.8, , anion gap 8, Cr 2.9, blood glucose 805, UDS pos for barbiturates, pH 6.928. Patient is protecting airway although her respirations are labored, Sp02 mid to low 90's and she has YUSEF. A dose of narcan had no response, patient was further treated with bicarb, insulin, IV fluids, IV vanc and zosyn and admitted to the ICU for a higher level of care. Patients sister Darcy was called by staff and  myself and asked to be kept updated, phone number in EMR.      Hospital Course: No notes on file    Interval History:  No acute or concerning events noted by overnight staff. Afebrile, vital signs stable and within normal limits. Alert and conscious. MRI notable for numerous small intra-axial lesions; unclear etiology. Ordering EEG/LP. Plan for DORCAS today. SBP goal 160. Na 155. H/H 8.8/30.7. Cultures currently NGTD. Making urine.     Review of Systems   Constitutional: Negative.    Respiratory: Negative.    Gastrointestinal: Negative.    Genitourinary: Negative.    Musculoskeletal: Negative.        Objective:     Vitals:  Temp: 99.8 °F (37.7 °C)  Pulse: 78  Rhythm: normal sinus rhythm  BP: (!) 146/68  MAP (mmHg): 98  Resp: (!) 27  SpO2: 96 %  O2 Device (Oxygen Therapy): room air    Temp  Min: 97.9 °F (36.6 °C)  Max: 99.8 °F (37.7 °C)  Pulse  Min: 64  Max: 96  BP  Min: 115/53  Max: 163/80  MAP (mmHg)  Min: 76  Max: 120  Resp  Min: 13  Max: 40  SpO2  Min: 92 %  Max: 99 %    11/14 0701 - 11/15 0700  In: 948.9 [I.V.:321.6]  Out: 900 [Urine:900]   Unmeasured Output  Urine Occurrence: 1  Stool Occurrence: 1  Pad Count: 1       Physical Exam  Constitutional:       General: She is not in acute distress.     Appearance: She is obese. She is not ill-appearing.   HENT:      Head: Normocephalic and atraumatic.      Mouth/Throat:      Mouth: Mucous membranes are moist.      Pharynx: Oropharynx is clear. No oropharyngeal exudate.   Eyes:      General:         Right eye: No discharge.         Left eye: No discharge.      Extraocular Movements: Extraocular movements intact.      Pupils: Pupils are equal, round, and reactive to light.   Cardiovascular:      Rate and Rhythm: Normal rate and regular rhythm.      Pulses: Normal pulses.      Heart sounds: Normal heart sounds.   Pulmonary:      Effort: Pulmonary effort is normal. No respiratory distress.      Breath sounds: Normal breath sounds.   Abdominal:      General: Abdomen is flat.  There is no distension.      Palpations: Abdomen is soft.   Musculoskeletal:         General: No swelling. Normal range of motion.      Cervical back: Normal range of motion.   Skin:     General: Skin is warm.      Capillary Refill: Capillary refill takes less than 2 seconds.   Neurological:      Mental Status: She is alert. She is disoriented.      Cranial Nerves: No cranial nerve deficit.      Sensory: No sensory deficit.      Motor: No weakness.      Comments:   A/Ox1.   GCS 14  Intermittently follows commands. Confused speech at times.    Moves all extremities spontaneously         Medications:  Continuous ScheduledcefTRIAXone (ROCEPHIN) IVPB, 2 g, Q12H  famotidine, 20 mg, BID  insulin aspart U-100, 1-10 Units, QID (AC & HS)  insulin detemir U-100, 25 Units, BID  levothyroxine, 100 mcg, Before breakfast  metronidazole, 500 mg, Q8H  miconazole nitrate 2%, , BID  senna-docusate 8.6-50 mg, 1 tablet, Daily    PRNacetaminophen, 650 mg, Q6H PRN  dextrose 50%, 12.5 g, PRN  glucagon (human recombinant), 1 mg, PRN  labetalol, 10 mg, Q3H PRN  sodium chloride 0.9%, 10 mL, PRN      Today I personally reviewed pertinent medications, lines/drains/airways, imaging, cardiology results, laboratory results, microbiology results,    Diet  Diet NPO    Assessment/Plan:     Neuro  * Brain lesion  MRI Brain w/lesions throughout the cerebral hemispheres involving frontal, parietal, temporal, and occipital lobes with surrounding vasogenic edema. Suspect 2/2 septic emboli given recent bacteremia. Speciation of BCx 10/27 revealing organisms from oral mary. Mannitol and 3% saline started prior to transfer. CTH notable for multiple areas of mild diminished attenuation involving the cerebral hemispheres bilaterally corresponding with multiple T2/FLAIR hyperintense lesions noted on the recent MRI examination.     - q1h neuro checks  - NSG consulted, appreciate recs   - repeat TTE, may need DORCAS   - BCx NGTD  - vanc/zosyn; discontinued  -  Infectious Disease consult; appreciate recommendations    - DORCAS; scheduled for today   - on ceftriaxone/metronidazole  - order EEG  - plan for LP  - q6h Na; Na goal 140-155   - intubation watch         Encephalopathy, metabolic  - see principle problem    Cardiac/Vascular  Hypertension associated with diabetes  - Goal SBP < 160     Renal/  Hypernatremia  Na 155    - LR gtt pending DORCAS    Endocrine  BMI 45.0-49.9, adult  - nutrition consult    Uncontrolled type 2 diabetes mellitus with hyperglycemia  A1c 9 on admission to [place]. BG labile.    - Goal CBG < 180   - Cont basal insulin  - discontinue detemir  - NPO for now, moderate SSI.     Hypothyroidism  TSH 1.39 (11/13/2021)    - Cont home synthroid     Morbid obesity with BMI of 60.0-69.9, adult-resolved as of 11/15/2021  -     Other  Discharge planning issues  - pending diagnostics and intervention          The patient is being Prophylaxed for:  Venous Thromboembolism with: None  Stress Ulcer with: Not Applicable   Ventilator Pneumonia with: not applicable    Activity Orders          Diet NPO: NPO starting at 11/15 1054    Turn patient starting at 11/13 0000    Elevate HOB starting at 11/12 2358        Full Code    Vasquez Porter MD  Neurocritical Care  [facility]

## 2021-11-15 NOTE — PROGRESS NOTES
Pharmacokinetic Assessment Follow Up: IV Vancomycin  · Patient with increase in serum creatinine; 0.7--> 1 mg/dL  · Held scheduled regimen and checked random level  · 11/15 level resulted at 23.7 mcg/mL  · Goal 15-20 mcg/mL  · Plan to dose by level; hold vancomycin today and check random level 11/16 with AM labs    Drug levels (last 3 results):  Recent Labs   Lab Result Units 11/14/21  0427 11/15/21  0843   Vancomycin, Random ug/mL  --  23.7   Vancomycin-Trough ug/mL 19.5  --        Pharmacy will continue to follow and monitor vancomycin.    Please contact pharmacy at extension [phone] for questions regarding this assessment.    Thank you for the consult,   Claire Love       Patient brief summary:  Violeta Champion is a 60 y.o. female initiated on antimicrobial therapy with IV Vancomycin for treatment of bacteremia      Drug Allergies:   Review of patient's allergies indicates:   Allergen Reactions    Iodinated contrast media Swelling     Other reaction(s): Swelling    Naproxen sodium Swelling    Naprosyn  [naproxen]      Other reaction(s): Swelling       Actual Body Weight:   123.8 kg    Renal Function:   Estimated Creatinine Clearance: 79.7 mL/min (based on SCr of 1 mg/dL).,     Dialysis Method (if applicable):  N/A    CBC (last 72 hours):  Recent Labs   Lab Result Units 11/13/21  0111 11/14/21  0122 11/15/21  0120   WBC K/uL 7.83 6.15 7.68   Hemoglobin g/dL 8.8* 7.9* 8.8*   Hematocrit % 30.5* 27.6* 30.7*   Platelets K/uL 332 277 290   Gran % % 60.5 56.6 55.8   Lymph % % 29.5 31.9 31.9   Mono % % 7.7 8.1 8.7   Eosinophil % % 1.0 2.3 2.6   Basophil % % 1.0 0.8 0.7   Differential Method  Automated Automated Automated       Metabolic Panel (last 72 hours):  Recent Labs   Lab Result Units 11/12/21  1952 11/13/21  0111 11/13/21  0328 11/13/21  0555 11/13/21  1248 11/13/21  1922 11/13/21  2138 11/14/21  0003 11/14/21  0122 11/14/21  0518 11/14/21  1223 11/14/21  1740 11/15/21  0112 11/15/21  0120 11/15/21  0509  11/15/21  1227   Sodium mmol/L 138 145  --  154* 152* 154*  --  139 143 144 154* 149* 152* 155* 155* 159*   Sodium, Urine mmol/L  --   --   --   --   --   --  102  --   --   --   --   --   --   --   --   --    Potassium mmol/L 3.9 3.9  --   --   --   --   --   --  3.0*  --   --   --   --  3.2*  --   --    Chloride mmol/L 109 111*  --   --   --   --   --   --  112*  --   --   --   --  117*  --   --    CO2 mmol/L 22* 25  --   --   --   --   --   --  23  --   --   --   --  28  --   --    Glucose mg/dL 276* 241*  --   --   --   --   --   --  176*  --   --   --   --  136*  --   --    Glucose, UA   --   --  Negative  --   --   --   --   --   --   --   --   --   --   --   --   --    BUN mg/dL 8 7  --   --   --   --   --   --  5*  --   --   --   --  6  --   --    Creatinine mg/dL 0.9 0.8  --   --   --   --   --   --  0.7  --   --   --   --  1.0  --   --    Creatinine, Urine mg/dL  --   --   --   --   --   --  51.0  --   --   --   --   --   --   --   --   --    Albumin g/dL  --  2.3*  --   --   --   --   --   --  2.1*  --   --   --   --  2.3*  --   --    Total Bilirubin mg/dL  --  0.3  --   --   --   --   --   --  0.3  --   --   --   --  0.3  --   --    Alkaline Phosphatase U/L  --  84  --   --   --   --   --   --  70  --   --   --   --  75  --   --    AST U/L  --  13  --   --   --   --   --   --  10  --   --   --   --  10  --   --    ALT U/L  --  6*  --   --   --   --   --   --  <5*  --   --   --   --  5*  --   --    Magnesium mg/dL  --  2.0  --   --   --   --   --   --  1.7  --   --   --   --  1.9  --   --    Phosphorus mg/dL  --  3.8  --   --   --   --   --   --  3.3  --   --   --   --  3.7  --   --        Vancomycin Administrations:  vancomycin given in the last 96 hours                   vancomycin 1.5 g in dextrose 5 % 250 mL IVPB (ready to mix) (mg) 1,500 mg New Bag 11/14/21 2137    vancomycin 1.75 g in 5 % dextrose 500 mL IVPB (mg) 1,750 mg New Bag 11/14/21 0556     1,750 mg New Bag 11/13/21 2016     1,750 mg New Bag   0608    vancomycin (VANCOCIN) 2,500 mg in dextrose 5 % 500 mL IVPB (mg) 2,500 mg New Bag 11/12/21 1830                Microbiologic Results:  Microbiology Results (last 7 days)     Procedure Component Value Units Date/Time    Blood culture [[id number]] Collected: 11/13/21 0227    Order Status: Completed Specimen: Blood Updated: 11/15/21 0613     Blood Culture, Routine No Growth to date      No Growth to date      No Growth to date    Narrative:      Blood cultures x 2 different sites. 4 bottles total. Please  draw cultures before administering antibiotics.    Blood culture [[id number]] Collected: 11/13/21 0227    Order Status: Completed Specimen: Blood Updated: 11/15/21 0612     Blood Culture, Routine No Growth to date      No Growth to date      No Growth to date    Narrative:      Blood cultures from 2 different sites. 4 bottles total.  Please draw before starting antibiotics.    Urine culture [[id number]] Collected: 11/13/21 0328    Order Status: Completed Specimen: Urine Updated: 11/14/21 1158     Urine Culture, Routine No growth

## 2021-11-15 NOTE — PROGRESS NOTES
[facility]  Wound Care    Patient Name:  Violeta Champion   MRN:  [id number]  Date: 11/15/2021  Diagnosis: Brain lesion    History:     Past Medical History:   Diagnosis Date    Breast cancer 12/10/2020    Diabetes mellitus, type 2     GERD (gastroesophageal reflux disease)     High cholesterol     Hypertension     Hypothyroid     Injury of knee, leg, ankle and foot     Insulin-resistant diabetes mellitus and acanthosis nigricans     Menopause present     Osteoarthritis     Osteoarthritis, knee     Osteopenia     Osteopenia     Sleep apnea     Status post breast lump removal 12/01/2020       Social History     Socioeconomic History    Marital status: Single    Number of children: 0    Years of education: 14   Occupational History    Occupation: Nurse     Employer: [employer]   Tobacco Use    Smoking status: Never Smoker    Smokeless tobacco: Never Used   Substance and Sexual Activity    Alcohol use: Yes     Alcohol/week: 0.0 standard drinks     Comment: very rarely    Drug use: No   Social History Narrative    RN, 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     Social Determinants of Health     Financial Resource Strain: High Risk    Difficulty of Paying Living Expenses: Very hard   Food Insecurity: No Food Insecurity    Worried About Running Out of Food in the Last Year: Never true    Ran Out of Food in the Last Year: Never true   Transportation Needs: No Transportation Needs    Lack of Transportation (Medical): No    Lack of Transportation (Non-Medical): No   Physical Activity: Insufficiently Active    Days of Exercise per Week: 1 day    Minutes of Exercise per Session: 10 min   Stress: Stress Concern Present    Feeling of Stress : Very much   Social Connections: Socially Isolated    Frequency of Communication with Friends and Family: Once a week    Frequency of Social Gatherings with Friends and Family: Never    Attends Congregational Services: Never    Active Member of  Clubs or Organizations: No    Attends Club or Organization Meetings: Never    Marital Status: Never    Housing Stability: Low Risk     Unable to Pay for Housing in the Last Year: No    Number of Places Lived in the Last Year: 1    Unstable Housing in the Last Year: No       Precautions:     Allergies as of 11/12/2021 - Reviewed 10/27/2021   Allergen Reaction Noted    Iodinated contrast media Swelling 07/05/2012    Naproxen sodium Swelling 07/05/2012    Naprosyn  [naproxen]  08/27/2013       [facility] Assessment Details/Treatment     Wound consult received on patient's skin breakdown to buttocks, perineum and inner thighs. Skin breakdown present on admission. Chart reviewed, patient was being seen by inpatient [facility] wound care recently for pressure injury to left buttocks and right buttocks.    Right buttocks intact.   Stage 3 pressure injury noted over left buttocks.   Right medial perineum full thickness skin loss noted, unknown etiology, appears may have been pressure related, moisture associated dermatitis noted to surrounding tissue with scattered healed epithelial tissue.  Moisture associated dermatitis noted to groin/inner thighs, epithelial tissue noted to left inner thigh appears to be from a prior medical device related pressure injury.    Recommendations:  Twice a day and as needed apply criticaid clear barrier cream with miconazole to affected areas to groin/thighs/perineum/and buttocks.  Bariatric sizewise immerse surface ordered.  Continue pressure injury prevention measures per nursing.    Nursing to continue care/monitor. Wound care to assist as needed.       11/15/21 0907        Altered Skin Integrity 10/28/21 Left Buttocks Purple or maroon localized area of discolored intact skin or non-intact skin or a blood-filled blister.   Date First Assessed: 10/28/21   Altered Skin Integrity Present on Admission: yes  Side: Left  Location: Buttocks  Is this injury device related?: No   Description of Altered Skin Integrity: Purple or maroon localized area of discolored intact skin or n...   Wound Image    Description of Altered Skin Integrity Full thickness tissue loss. Subcutaneous fat may be visible but bone, tendon or muscle are not exposed  (stage 3 pressure injury)   Drainage Amount None   Drainage Characteristics/Odor No odor   Appearance Moist;Red   Periwound Area Intact   Wound Length (cm) 2 cm   Wound Width (cm) 1.5 cm   Wound Depth (cm) 0.2 cm   Wound Volume (cm^3) 0.6 cm^3   Wound Surface Area (cm^2) 3 cm^2        Altered Skin Integrity 11/15/21 0907 medial Thigh Moisture associated dermatitis   Date First Assessed/Time First Assessed: 11/15/21 0907   Altered Skin Integrity Present on Admission: yes  Orientation: medial  Location: Thigh  Primary Wound Type: Moisture associated dermatitis   Wound Image    Drainage Amount None   Drainage Characteristics/Odor No odor   Appearance   (healed linear epithelial tissue to left thigh)   Periwound Area Macerated        Altered Skin Integrity 11/15/21 0907 Right Perineum Full thickness tissue loss. Subcutaneous fat may be visible but bone, tendon or muscle are not exposed   Date First Assessed/Time First Assessed: 11/15/21 0907   Altered Skin Integrity Present on Admission: yes  Side: Right  Location: Perineum  Is this injury device related?: (c)   Description of Altered Skin Integrity: Full thickness tissue loss. Subcut...   Wound Image    Description of Altered Skin Integrity Full thickness tissue loss. Subcutaneous fat may be visible but bone, tendon or muscle are not exposed   Drainage Amount None   Drainage Characteristics/Odor No odor   Appearance Moist;Red   Tissue loss description Full thickness   Periwound Area   (scattered epithelial tissue)   Wound Length (cm) 3 cm   Wound Width (cm) 1 cm   Wound Depth (cm) 0.2 cm   Wound Volume (cm^3) 0.6 cm^3   Wound Surface Area (cm^2) 3 cm^2

## 2021-11-15 NOTE — SUBJECTIVE & OBJECTIVE
Interval History: NAEON. Pending DORCAS.    Medications:  Continuous Infusions:   lactated ringers 75 mL/hr at 11/15/21 1234     Scheduled Meds:   cefTRIAXone (ROCEPHIN) IVPB  2 g Intravenous Q12H    levothyroxine  100 mcg Oral Before breakfast    metronidazole  500 mg Intravenous Q8H    miconazole nitrate 2%   Topical (Top) BID    senna-docusate 8.6-50 mg  1 tablet Oral Daily     PRN Meds:acetaminophen, dextrose 50%, glucagon (human recombinant), insulin aspart U-100, labetalol, sodium chloride 0.9%     Review of Systems  Objective:     Weight: 123.8 kg (273 lb)  Body mass index is 46.86 kg/m².  Vital Signs (Most Recent):  Temp: 99.8 °F (37.7 °C) (11/15/21 0705)  Pulse: 80 (11/15/21 1205)  Resp: (!) 37 (11/15/21 1205)  BP: (!) 164/74 (11/15/21 1205)  SpO2: 96 % (11/15/21 1205) Vital Signs (24h Range):  Temp:  [98.3 °F (36.8 °C)-99.8 °F (37.7 °C)] 99.8 °F (37.7 °C)  Pulse:  [64-96] 80  Resp:  [13-40] 37  SpO2:  [92 %-99 %] 96 %  BP: (109-164)/(53-97) 164/74     Date 11/15/21 0700 - 11/16/21 0659   Shift 4472-3390 3955-4936 7189-4489 24 Hour Total   INTAKE   IV Piggyback 548.2   548.2   Shift Total(mL/kg) 548.2(4.4)   548.2(4.4)   OUTPUT   Shift Total(mL/kg)       Weight (kg) 123.8 123.8 123.8 123.8                   Female External Urinary Catheter 11/14/21 1130 (Active)   Skin no redness 11/15/21 0705   Tolerance no signs/symptoms of discomfort 11/15/21 0705   Suction Continuous suction at 60 mmHg 11/15/21 0705   Date of last wick change 11/14/21 11/15/21 0705   Time of last wick change 2230 11/15/21 0301   Output (mL) 500 mL 11/15/21 0500       Neurosurgery Physical Exam   Nursing note and vitals reviewed.     Eyes: Pupils are equal, round, and reactive to light. EOM are normal.      Cardiovascular: Normal rate and intact distal pulses.      Abdominal: Soft.     Psych/Behavior:   Oriented x Name, year     Neurological:   Oriented x Name, year  E4V4M6  AAOx2  Confused  CN II-XII grossly intact  FC x3,  spontaneous LLE, refuses to follow commands    Significant Labs:  Recent Labs   Lab 11/14/21 0122 11/14/21  0518 11/15/21  0112 11/15/21  0120 11/15/21  0509   *  --   --  136*  --       < > 152* 155* 155*   K 3.0*  --   --  3.2*  --    *  --   --  117*  --    CO2 23  --   --  28  --    BUN 5*  --   --  6  --    CREATININE 0.7  --   --  1.0  --    CALCIUM 7.5*  --   --  8.4*  --    MG 1.7  --   --  1.9  --     < > = values in this interval not displayed.     Recent Labs   Lab 11/14/21  0122 11/15/21  0120   WBC 6.15 7.68   HGB 7.9* 8.8*   HCT 27.6* 30.7*    290     No results for input(s): LABPT, INR, APTT in the last 48 hours.  Microbiology Results (last 7 days)     Procedure Component Value Units Date/Time    Blood culture [[id number]] Collected: 11/13/21 0227    Order Status: Completed Specimen: Blood Updated: 11/15/21 0613     Blood Culture, Routine No Growth to date      No Growth to date      No Growth to date    Narrative:      Blood cultures x 2 different sites. 4 bottles total. Please  draw cultures before administering antibiotics.    Blood culture [[id number]] Collected: 11/13/21 0227    Order Status: Completed Specimen: Blood Updated: 11/15/21 0612     Blood Culture, Routine No Growth to date      No Growth to date      No Growth to date    Narrative:      Blood cultures from 2 different sites. 4 bottles total.  Please draw before starting antibiotics.    Urine culture [[id number]] Collected: 11/13/21 0328    Order Status: Completed Specimen: Urine Updated: 11/14/21 1158     Urine Culture, Routine No growth            Significant Diagnostics:  I have reviewed and interpreted all pertinent imaging results/findings within the past 24 hours.

## 2021-11-15 NOTE — PT/OT/SLP EVAL
Speech Language Pathology Evaluation  Cognitive Communication    Patient Name:  Violeta Champion   MRN:  [id number]   9080/[id number] A    Admitting Diagnosis: Brain lesion    Recommendations:     Recommendations:                General Recommendations:  Dysphagia therapy, Speech/language therapy and Cognitive-linguistic therapy  Diet recommendations:  Mechanical soft, Thin   Aspiration Precautions:   - 1:1 assist for ALL PO intake  - Whole meds 1 by 1 - if unable to tolerate, please crush and mix into puree textures  - Alternate sips/bites   - Slow rate of intake - Check for oral clearance   - Upright, awake and alert for ALL PO intake and remain upright for 30 mins s/p meals   General Precautions: Standard, fall,aspiration (mechanical soft)  Communication strategies:  provide increased time to answer and frequent redirection     History:     Past Medical History:   Diagnosis Date    Breast cancer 12/10/2020    Diabetes mellitus, type 2     GERD (gastroesophageal reflux disease)     High cholesterol     Hypertension     Hypothyroid     Injury of knee, leg, ankle and foot     Insulin-resistant diabetes mellitus and acanthosis nigricans     Menopause present     Osteoarthritis     Osteoarthritis, knee     Osteopenia     Osteopenia     Sleep apnea     Status post breast lump removal 12/01/2020       Past Surgical History:   Procedure Laterality Date    bilateral duct removal breast      BREAST CYST ASPIRATION Right 2020    EXCISIONAL BIOPSY Right 12/10/2020    Procedure: EXCISIONAL BIOPSY, breast; u/s guided;  Surgeon: Bernadette Lopez MD;  Location: [facility];  Service: General;  Laterality: Right;    HYSTERECTOMY      OOPHORECTOMY      OTHER SURGICAL HISTORY      bilateral central duct removal with bilateral papilomona       Social History: Patient lives alone. Sister present in room reports patient may refer to her as her daughter 2/2 patient raised her sister.     Chest X-Rays: No acute cardiopulmonary  process.    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      Subjective     Patient awake with sister present in room.     Pain/Comfort:  · Pain Rating 1: 0/10    Respiratory Status:  · O2 Device (Oxygen Therapy): room air    Objective:     COGNITIVE STATUS:  Behavioral Observations: distractible and lethargic-  Memory:  · Immediate: impaired  · Short Term: impaired  · Long Term: WFL per sister, however, further assessment warranted  Orientation: oriented to name, ; not oriented to age, place, situation, time  Attention: poor sustained attention requiring multiple redirections and repetitions of questions/instructions.  Problem Solving: impaired  Insight Awareness: impiared  Sequencing:   · Functional Events: tba  · Mental Manipulation: tba  Simple Money/Time Management: tba    LANGUAGE:   Receptive Language:  Simple y/n Questions: wfl  Complex y/n Questions: wfl  Identification: wfl  1 Step Directions: wfl  2 Step Directions: tba- patient did not follow 2 step directions this date  Complex Directions: tba    Expressive Language:   Naming:   · Divergent: TBA  · Convergent: TBA  · Confrontational: wfl  Automatic Speech: wfl  Sentence Completion: wfl  Responsive Speech: wfl  Repetition: wfl  Conversational Speech: impaired word finding in conversation    Motor Speech: WNL    Voice: WNL    Augmentative Alternative Communication: no    Reading: TBA     Writing: TBA    Visual-Spatial: TBA    Treatment: Per patient's sister, 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 She is more awake/alert in the afternoon, and has been able to participate in ST more in the afternoons. SLP provided family education on SLP recommendations, SLP role, and POC to follow up with patient in the PM next therapy session. Swallowing not assessed this date 2/2 patient NPO for a procedure.     Assessment:    Violeta Champion is a 60 y.o. female with an SLP diagnosis of Aphasia, Dysphagia and Cognitive-Linguistic Impairment.  ST will continue to follow.     Goals:   Multidisciplinary Problems     SLP Goals        Problem: SLP Goal    Goal Priority Disciplines Outcome   SLP Goal     SLP Ongoing, Progressing   Description: Speech Therapy Short Term Goals  Goal expected to be met by 11/25  1. Pt will participate in an ongoing assessment to determine the least restrictive and safest diet with possible updated goals to follow pending results.  2. Pt will participate in a speech, language, and cognitive evaluation with possible updated goals to follow pending results. -ongoing  3. Pt will attend to therapy tasks for 1+ minutes given 1 redirection.   4. Pt will answer orientation questions x4 given min cues.   5. Pt will answer general information questions with 75% acc given cues.   6. Pt will complete word finding tasks with 75% acc min cues.   7. Pt will follow simple 1 step directions with 75% acc given 1 repetition.                      Plan:   · Patient to be seen:  4 x/week   · Plan of Care expires:     · Plan of Care reviewed with:  patient,sibling   · SLP Follow-Up:  Yes       Discharge recommendations:  Discharge Facility/Level of Care Needs: rehabilitation facility   Barriers to Discharge:  None    Time Tracking:   SLP Treatment Date:   11/15/21  Speech Start Time:  0753  Speech Stop Time:  0809     Speech Total Time (min):  16 min    Billable Minutes: Leigh Astorga  and Self Care/Home Management Training 8    11/15/2021

## 2021-11-15 NOTE — PLAN OF CARE
Neuro:   MRI with multiple ring enhancing lesions concerning for toxo vs other infectious etiology  DORCAS pending  Needs LP timed around DOCRAS  Oriented to self and time  Intermittently follows commands  cEEG after LP  abx per ID      Pulmonary:   RA    Cardiac:   sbp 100-160    Renal:    No IVFs  Function adequate  Start LR while NPO      ID:   See above    Hem/Onc:   H/h stable  plt stable    Endocrine:    ISS  DC LA  Significant hypernatremia - LR and monitor    Fluids/Electrolytes/Nutrition/GI:   LR  elyte replacement PRN  NPO for DORCAS      Lines:  PICC in place    Proph:  DVT:held for DORCAS and LP  Constipation:  Last output:11/15  PUP:na  VAP:na

## 2021-11-15 NOTE — ASSESSMENT & PLAN NOTE
60F h/o breast cancer, admitted 10/17 to [facility] w/ [facility] found to have septicemia, BCx w/ oral fusobacerium, treated w/ appropriate abx. Increased lethargy last few days/AMS prompted new MRI which showed multiple lesions throughout the bilateral cerebral hemispheres with vasogenic edema ,mild leftward midline shift and partial mass effect of the right lateral ventricle. Patient transferred to [facility] for neuro-ICU level of care. Now patient is in ICU stable protecting airway.    Plan  --admitted to [facility]  --q1 neurochecks  --MRI w wo most consistent with numerous scattered septic emboli  --TTE unrevealing, DORCAS pending  --ID consulted, recs for ceftriaxone, vanc, flagyl. HIV neg and toxo IgG pending  --Na >135  --no acute neurosurgical intervention at this time, we will continue to follow    Please page with any change in neuro exam

## 2021-11-15 NOTE — PT/OT/SLP PROGRESS
"Physical Therapy Co-Treatment with OT    Patient Name:  Violeta Champion   MRN:  [id number]    *Co-treatment with OT due to patient complexity and need for two skilled therapists to ensure safe mobilization.    Recommendations:     Discharge Recommendations:  rehabilitation facility   Discharge Equipment Recommendations:  (tbd pending progress)   Barriers to discharge: increased (A)    Highest Level of Mobility: Sitting EOB  Assistance Required: Max(A)-total(A)    Assessment:     Violeta Champion is a 60 y.o. female admitted with a medical diagnosis of Brain lesion.  She presents with the following impairments/functional limitations:  weakness,impaired balance,decreased safety awareness,impaired endurance,impaired cognition,impaired self care skills,impaired functional mobilty,decreased lower extremity function,gait instability,decreased upper extremity function,impaired coordination,decreased ROM. Violeta Champion would benefit from acute PT intervention to address listed functional deficits, provide patient/caregiver education, reduce fall risk, and maximize (I) and safety with functional mobility.    Pt met with HOB elevated and sister present. Pt required max verbal cues and encouragement to participate in therapy session today. She states she is "too weak" to participate. PT explained the benefits of OOB mobility and the risks of remaining immobile. Pt's sister present also encouraged pt to sit EOB. Pt requires total(a)x2 persons for supine to sit and max(A)-total(A) for sitting balance sitting EOB. Her effort was limited. Pt selectively follows commands and demos L inattention.     Pt is progressing towards acute PT goals appropriately and continues to benefit from acute PT sessions. After hospital discharge, pt would benefit from inpatient rehab to maximize rehab potential.    Rehab Prognosis: Fair; patient would benefit from acute skilled PT services to address these deficits and reach maximum level of function.  " "    Plan:     During this hospitalization, patient to be seen 4 x/week to address the identified rehab impairments via gait training,therapeutic activities,therapeutic exercises,neuromuscular re-education and progress toward the following goals:    · Plan of Care Expires:  12/13/21    This plan of care has been discussed with the patient/caregiver, who was included in its development and is in agreement with the identified goals and treatment plan.     Subjective     Communicated with RN prior to session.  Patient agreeable to participate.     Pain/Comfort:  · Pain Rating 1: 0/10  · Pain Rating Post-Intervention 1: 0/10    Chief Complaint: Pt is itchy  Patient/Family Comments/goals: "They need to clean this place up"  "I'm too weak for that"       Objective:     Patient found HOB elevated with telemetry,pulse ox (continuous),peripheral IV,bed alarm,PureWick,blood pressure cuff  upon PT entry to room.    General Precautions: Standard, aspiration,fall   Orthopedic Precautions:N/A   Braces: N/A         Exams:     Cognition:  o Pt is alert  o Command following: Pt selectively follows commands    Functional Mobility:    Bed Mobility:  · Supine to Sit: Total Assistance and 2 persons on L side of bed  · Sit to Supine: Total Assistance and 2 persons  · Rolling L: Total Assistance and 2 persons  · Scooting anteriorly to EOB to plant feet on floor: Total Assistance and 2 persons  · Scooting/Bridging in supine to HOB: Total Assistance and 2 persons via drawsheet    Transfers:   · Sit to Stand Transfer: Activity did not occur  due to poor sitting balance EOB and poor command following    Balance:  · Static Sit:   · Max(A)-Total(A) at EOB x ~10 minutes  · Poor: Patient requires handhold support and moderate to maximal assistance to maintain position.   · L-sided lean  · Dynamic sit:  · Poor: Patient unable to accept challenge or move without loss of balance.      Therapeutic Activities/Exercises      Patient assisted with " functional mobility as noted above   Patient performed LAQsx10 with R LE, LAQsx2 with L LE. Pt demos L-sided inattention and requires max verbal cues to move L LE.   PT performed PROM B LEs with pt seated EOB   Attempted reaching activity with UEs, pt declined    Patient repositioned into L sidelying for pressure relief   Patient educated on the importance of early mobility to prevent functional decline during hospital stay. Educated on the risks of remaining bedridden.    Patient educated on PT POC and role of PT in acute care, d/c recs      AM-PAC 6 CLICK MOBILITY  Total Score:8     Patient left HOB elevated (L sidelying) with all lines intact, call button in reach, bed alarm on, RN\ notified and sister present.        History/Goals:     PAST MEDICAL HISTORY:  Past Medical History:   Diagnosis Date    Breast cancer 12/10/2020    Diabetes mellitus, type 2     GERD (gastroesophageal reflux disease)     High cholesterol     Hypertension     Hypothyroid     Injury of knee, leg, ankle and foot     Insulin-resistant diabetes mellitus and acanthosis nigricans     Menopause present     Osteoarthritis     Osteoarthritis, knee     Osteopenia     Osteopenia     Sleep apnea     Status post breast lump removal 12/01/2020       Past Surgical History:   Procedure Laterality Date    bilateral duct removal breast      BREAST CYST ASPIRATION Right 2020    EXCISIONAL BIOPSY Right 12/10/2020    Procedure: EXCISIONAL BIOPSY, breast; u/s guided;  Surgeon: Bernadette Lopez MD;  Location: [facility];  Service: General;  Laterality: Right;    HYSTERECTOMY      OOPHORECTOMY      OTHER SURGICAL HISTORY      bilateral central duct removal with bilateral papilomona       GOALS:   Multidisciplinary Problems     Physical Therapy Goals        Problem: Physical Therapy Goal    Goal Priority Disciplines Outcome Goal Variances Interventions   Physical Therapy Goal     PT, PT/OT Ongoing, Progressing     Description: Goals to be  met by: 21     Patient will increase functional independence with mobility by performin. Supine to sit with Moderate Assistance  2. Sit to supine with Moderate Assistance  3. Rolling to Left and Right with Moderate Assistance.  4. Sit to stand transfer with Maximum Assistance  5. Bed to chair transfer with Maximum Assistance using LRAD  6. Gait  x 5 feet with Maximum Assistance using LRAD.   7. Lower extremity exercise program x15 reps per handout, with assistance as needed                     Time Tracking:     PT Received On: 11/15/21  PT Start Time: 1340     PT Stop Time: 1403  PT Total Time (min): 23 min     Billable Minutes: Therapeutic Activity 10 and Therapeutic Exercise 13      Sarahi Norris, PT  11/15/2021   Pager# [phone]

## 2021-11-15 NOTE — PT/OT/SLP PROGRESS
"Occupational Therapy   Co-Treatment    Name: Violeta Champion  MRN: [id number]  Admitting Diagnosis:  Brain lesion       Recommendations:     Discharge Recommendations: rehabilitation facility  Discharge Equipment Recommendations: TBD pending progress  Barriers to discharge:  Decreased caregiver support and increased assistance needed    Assessment:     Violeta Champion is a 60 y.o. female with a medical diagnosis of Brain lesion.  She presents with performance deficits including weakness,impaired endurance,impaired self care skills,impaired functional mobilty,decreased safety awareness,impaired balance,decreased lower extremity function,decreased upper extremity function,decreased coordination,decreased ROM. Pt alert, required encouragement to participate and cooperate throughout session in order to progress with mobility and ADL tasks. Pt would continue to benefit from OT to increase functional independence and safety. Recommend rehab upon D/C pending participation.    Rehab Prognosis: Fair; patient would benefit from acute skilled OT services to address these deficits and reach maximum level of function.       Plan:     Patient to be seen 4 x/week to address the above listed problems via self-care/home management,therapeutic activities,therapeutic exercises,neuromuscular re-education  · Plan of Care Expires: 12/13/21  · Plan of Care Reviewed with: patient (sister)    Subjective     Pain/Comfort:  · Pain Rating 1: 0/10  · Location 1:  (pt reports feeling itchy)  · Pain Rating Post-Intervention 1: 0/10    Objective:     Communicated with: RN prior to session. Patient found supine with telemetry,pulse ox (continuous),blood pressure cuff,peripheral IV,bed alarm,PureWick upon OT entry to room, sister present.  "I'm too weak to do that."  "I'm in bug land."  Co-treatment performed with PT due to patient's multiple deficits requiring two skilled therapists to appropriately and safely assess patient's strength and endurance " while facilitating functional tasks in addition to accommodating for patient's activity tolerance.     General Precautions: Standard, fall,aspiration   Orthopedic Precautions:N/A   Braces: N/A  Respiratory Status:  · O2 Device (Oxygen Therapy): room air     Occupational Performance:     Bed Mobility:    · Patient completed Scooting/Bridging with total assistance and 2 persons in supine to HOB  · Patient completed Supine to Sit with total assistance and 2 persons  · Patient completed Sit to Supine with total assistance and 2 persons     Functional Mobility/Transfers:  · Not attempted due poor sitting balance/effort     Activities of Daily Living:  · Lower Body Dressing: total assistance to don socks   · Pt declined further ADLs      [facility] 6 Click ADL: 8    Treatment & Education:  Pt sat EOB ~10 minutes mostly with Max-Total A for postural control, multidirectional instability and poor effort to initiate self-correcting; completed LE therex/ROM with PT while OT provided cues and assist for postural control; attempted functional reaching with R UE while sitting EOB but pt declined to attempt task; educated on importance of OOB activity and participation and will require ongoing education; discussed POC, D/C recs, and progression with mobility and ADL tasks with pt and sister    Patient left HOB elevated with all lines intact, call button in reach, bed alarm on, RN notified and sister presentEducation:      GOALS:   Multidisciplinary Problems     Occupational Therapy Goals        Problem: Occupational Therapy Goal    Goal Priority Disciplines Outcome Interventions   Occupational Therapy Goal     OT, PT/OT Ongoing, Progressing    Description: Goals to be met by: 2 weeks (11/27/21)     Patient will increase functional independence with ADLs by performing:    UE Dressing with Minimal Assistance.  Grooming while seated with Minimal Assistance.  Sitting at edge of bed x10 minutes with Minimal Assistance.  Supine to sit with  Minimal Assistance.  Stand pivot transfers with Moderate Assistance.  Pt will follow 3/3 one-step commands to participate in ADL task.                     Time Tracking:     OT Date of Treatment: 11/15/21  OT Start Time: 1340  OT Stop Time: 1403  OT Total Time (min): 23 min    Billable Minutes:Therapeutic Activity 12  Neuromuscular Re-education 11    OT/MARY ALICE: OT          11/15/2021

## 2021-11-15 NOTE — PLAN OF CARE
11/15/21 1040   Post-Acute Status   Post-Acute Authorization Placement   Post-Acute Placement Status Referrals Sent  ([facility])     Per previous SW notes, Pt was accepted to [facility] in [place], [place] ([phone]) but the Pt family would have to cover the cost of her transport to the facility per insurance. Sent referral via [employer].    Raeann Johnson LCSW  Neurocritical Care   [facility]  [id number]

## 2021-11-15 NOTE — ASSESSMENT & PLAN NOTE
MRI Brain w/lesions throughout the cerebral hemispheres involving frontal, parietal, temporal, and occipital lobes with surrounding vasogenic edema. Suspect 2/2 septic emboli given recent bacteremia. Speciation of BCx 10/27 revealing organisms from oral mary. Mannitol and 3% saline started prior to transfer. CTH notable for multiple areas of mild diminished attenuation involving the cerebral hemispheres bilaterally corresponding with multiple T2/FLAIR hyperintense lesions noted on the recent MRI examination.     - q1h neuro checks  - NSG consulted, appreciate recs   - repeat TTE, may need DORCAS   - BCx NGTD  - vanc/zosyn; discontinued  - Infectious Disease consult; appreciate recommendations    - DORCAS; scheduled for today   - on ceftriaxone/metronidazole  - order EEG  - plan for LP  - q6h Na; Na goal 140-155   - intubation watch

## 2021-11-15 NOTE — PROGRESS NOTES
[facility]  Neurosurgery  Progress Note    Subjective:     History of Present Illness: 60F h/o breast cancer, admitted 10/17 to [facility] w/ [facility] found to have septicemia, BCx w/ oral fusobacerium, treated w/ appropriate abx. Increased lethargy last few days/AMS prompted new MRI which showed multiple lesions throughout the bilateral cerebral hemispheres with vasogenic edema ,mild leftward midline shift and partial mass effect of the right lateral ventricle. Patient transferred to [facility] for neuro-ICU level of care. Now patient is in ICU stable protecting airway Ox name [id number] AD doesn't follow commands, on vanc and 3%, CTH pending needs MRI + C, Echo, ID consult.      Post-Op Info:  * No surgery found *         Interval History: NAEON. Pending DORCAS.    Medications:  Continuous Infusions:   lactated ringers 75 mL/hr at 11/15/21 1234     Scheduled Meds:   cefTRIAXone (ROCEPHIN) IVPB  2 g Intravenous Q12H    levothyroxine  100 mcg Oral Before breakfast    metronidazole  500 mg Intravenous Q8H    miconazole nitrate 2%   Topical (Top) BID    senna-docusate 8.6-50 mg  1 tablet Oral Daily     PRN Meds:acetaminophen, dextrose 50%, glucagon (human recombinant), insulin aspart U-100, labetalol, sodium chloride 0.9%     Review of Systems  Objective:     Weight: 123.8 kg (273 lb)  Body mass index is 46.86 kg/m².  Vital Signs (Most Recent):  Temp: 99.8 °F (37.7 °C) (11/15/21 0705)  Pulse: 80 (11/15/21 1205)  Resp: (!) 37 (11/15/21 1205)  BP: (!) 164/74 (11/15/21 1205)  SpO2: 96 % (11/15/21 1205) Vital Signs (24h Range):  Temp:  [98.3 °F (36.8 °C)-99.8 °F (37.7 °C)] 99.8 °F (37.7 °C)  Pulse:  [64-96] 80  Resp:  [13-40] 37  SpO2:  [92 %-99 %] 96 %  BP: (109-164)/(53-97) 164/74     Date 11/15/21 0700 - 11/16/21 0659   Shift 3067-8744 5295-1867 1119-2937 24 Hour Total   INTAKE   IV Piggyback 548.2   548.2   Shift Total(mL/kg) 548.2(4.4)   548.2(4.4)   OUTPUT   Shift Total(mL/kg)       Weight (kg) 123.8  123.8 123.8 123.8                   Female External Urinary Catheter 11/14/21 1130 (Active)   Skin no redness 11/15/21 0705   Tolerance no signs/symptoms of discomfort 11/15/21 0705   Suction Continuous suction at 60 mmHg 11/15/21 0705   Date of last wick change 11/14/21 11/15/21 0705   Time of last wick change 2230 11/15/21 0301   Output (mL) 500 mL 11/15/21 0500       Neurosurgery Physical Exam   Nursing note and vitals reviewed.     Eyes: Pupils are equal, round, and reactive to light. EOM are normal.      Cardiovascular: Normal rate and intact distal pulses.      Abdominal: Soft.     Psych/Behavior:   Oriented x Name, year     Neurological:   Oriented x Name, year  [id number]  AAOx2  Confused  CN II-XII grossly intact  FC x3, spontaneous LLE, refuses to follow commands    Significant Labs:  Recent Labs   Lab 11/14/21  0122 11/14/21  0518 11/15/21  0112 11/15/21  0120 11/15/21  0509   *  --   --  136*  --       < > 152* 155* 155*   K 3.0*  --   --  3.2*  --    *  --   --  117*  --    CO2 23  --   --  28  --    BUN 5*  --   --  6  --    CREATININE 0.7  --   --  1.0  --    CALCIUM 7.5*  --   --  8.4*  --    MG 1.7  --   --  1.9  --     < > = values in this interval not displayed.     Recent Labs   Lab 11/14/21  0122 11/15/21  0120   WBC 6.15 7.68   HGB 7.9* 8.8*   HCT 27.6* 30.7*    290     No results for input(s): LABPT, INR, APTT in the last 48 hours.  Microbiology Results (last 7 days)     Procedure Component Value Units Date/Time    Blood culture [[id number]] Collected: 11/13/21 0227    Order Status: Completed Specimen: Blood Updated: 11/15/21 0613     Blood Culture, Routine No Growth to date      No Growth to date      No Growth to date    Narrative:      Blood cultures x 2 different sites. 4 bottles total. Please  draw cultures before administering antibiotics.    Blood culture [[id number]] Collected: 11/13/21 0227    Order Status: Completed Specimen: Blood Updated: 11/15/21 0612     Blood  Culture, Routine No Growth to date      No Growth to date      No Growth to date    Narrative:      Blood cultures from 2 different sites. 4 bottles total.  Please draw before starting antibiotics.    Urine culture [[id number]] Collected: 11/13/21 0328    Order Status: Completed Specimen: Urine Updated: 11/14/21 1158     Urine Culture, Routine No growth            Significant Diagnostics:  I have reviewed and interpreted all pertinent imaging results/findings within the past 24 hours.    Assessment/Plan:     * Brain lesion  60F h/o breast cancer, admitted 10/17 to [facility] w/ [facility] found to have septicemia, BCx w/ oral fusobacerium, treated w/ appropriate abx. Increased lethargy last few days/AMS prompted new MRI which showed multiple lesions throughout the bilateral cerebral hemispheres with vasogenic edema ,mild leftward midline shift and partial mass effect of the right lateral ventricle. Patient transferred to [facility] for neuro-ICU level of care. Now patient is in ICU stable protecting airway.    Plan  --admitted to [facility]  --q1 neurochecks  --MRI w wo most consistent with numerous scattered septic emboli  --TTE unrevealing, DORCAS pending  --ID consulted, recs for ceftriaxone, vanc, flagyl. HIV neg and toxo IgG pending  --Na >135  --no acute neurosurgical intervention at this time, we will continue to follow    Please page with any change in neuro exam          Aleyda Bruner MD  Neurosurgery  [facility]

## 2021-11-15 NOTE — PLAN OF CARE
Problem: SLP Goal  Goal: SLP Goal  Description: Speech Therapy Short Term Goals  Goal expected to be met by 11/25  1. Pt will participate in an ongoing assessment to determine the least restrictive and safest diet with possible updated goals to follow pending results.  2. Pt will participate in a speech, language, and cognitive evaluation with possible updated goals to follow pending results. -ongoing  3. Pt will attend to therapy tasks for 1+ minutes given 1 redirection.   4. Pt will answer orientation questions x4 given min cues.   5. Pt will answer general information questions with 75% acc given cues.   6. Pt will complete word finding tasks with 75% acc min cues.   7. Pt will follow simple 1 step directions with 75% acc given 1 repetition.     Outcome: Ongoing, Progressing   Ryipyr-Omdigvxv-Ttgthokfw Evaluation initiated. ST will continue to follow.

## 2021-11-15 NOTE — CONSULTS
Inpatient consult to Physical Medicine Rehab  Consult performed by: Rosette Andrew NP  Consult ordered by: Francisco Waggoner MD  Reason for consult: Assess rehab needs      Reviewed patient history and current admission.  Rehab team following.  Full consult to follow.    FELA Lauren, FNP-C  Physical Medicine & Rehabilitation   11/15/2021

## 2021-11-15 NOTE — PROGRESS NOTES
Spoke with Rosette in the EP lab- DORCAS cannot be done until Wednesday or Thursday. NCC team, ANGELA Corea notified.    Patient also complaining of itching in her groin region. Wiped area and replaced pure wick- notified team.  No new orders at this time.

## 2021-11-16 LAB
ALBUMIN SERPL BCP-MCNC: 2.4 G/DL (ref 3.5–5.2)
ALP SERPL-CCNC: 74 U/L (ref 55–135)
ALT SERPL W/O P-5'-P-CCNC: <5 U/L (ref 10–44)
ANION GAP SERPL CALC-SCNC: 10 MMOL/L (ref 8–16)
AST SERPL-CCNC: 12 U/L (ref 10–40)
BASOPHILS # BLD AUTO: 0.04 K/UL (ref 0–0.2)
BASOPHILS NFR BLD: 0.6 % (ref 0–1.9)
BILIRUB SERPL-MCNC: 0.2 MG/DL (ref 0.1–1)
BUN SERPL-MCNC: 7 MG/DL (ref 6–20)
CALCIUM SERPL-MCNC: 8.4 MG/DL (ref 8.7–10.5)
CHLORIDE SERPL-SCNC: 123 MMOL/L (ref 95–110)
CO2 SERPL-SCNC: 25 MMOL/L (ref 23–29)
CREAT SERPL-MCNC: 1 MG/DL (ref 0.5–1.4)
DIFFERENTIAL METHOD: ABNORMAL
EOSINOPHIL # BLD AUTO: 0.2 K/UL (ref 0–0.5)
EOSINOPHIL NFR BLD: 2.3 % (ref 0–8)
ERYTHROCYTE [DISTWIDTH] IN BLOOD BY AUTOMATED COUNT: 23.7 % (ref 11.5–14.5)
EST. GFR  (AFRICAN AMERICAN): >60 ML/MIN/1.73 M^2
EST. GFR  (NON AFRICAN AMERICAN): >60 ML/MIN/1.73 M^2
GLUCOSE SERPL-MCNC: 111 MG/DL (ref 70–110)
HCT VFR BLD AUTO: 34.3 % (ref 37–48.5)
HGB BLD-MCNC: 9.6 G/DL (ref 12–16)
IMM GRANULOCYTES # BLD AUTO: 0.02 K/UL (ref 0–0.04)
IMM GRANULOCYTES NFR BLD AUTO: 0.3 % (ref 0–0.5)
LYMPHOCYTES # BLD AUTO: 2 K/UL (ref 1–4.8)
LYMPHOCYTES NFR BLD: 31.6 % (ref 18–48)
MAGNESIUM SERPL-MCNC: 2.1 MG/DL (ref 1.6–2.6)
MCH RBC QN AUTO: 26.2 PG (ref 27–31)
MCHC RBC AUTO-ENTMCNC: 28 G/DL (ref 32–36)
MCV RBC AUTO: 94 FL (ref 82–98)
MONOCYTES # BLD AUTO: 0.5 K/UL (ref 0.3–1)
MONOCYTES NFR BLD: 7.8 % (ref 4–15)
NEUTROPHILS # BLD AUTO: 3.7 K/UL (ref 1.8–7.7)
NEUTROPHILS NFR BLD: 57.4 % (ref 38–73)
NRBC BLD-RTO: 0 /100 WBC
OSMOLALITY UR: 154 MOSM/KG (ref 50–1200)
PHOSPHATE SERPL-MCNC: 4 MG/DL (ref 2.7–4.5)
PLATELET # BLD AUTO: 220 K/UL (ref 150–450)
PMV BLD AUTO: 11.2 FL (ref 9.2–12.9)
POCT GLUCOSE: 197 MG/DL (ref 70–110)
POCT GLUCOSE: 199 MG/DL (ref 70–110)
POCT GLUCOSE: 213 MG/DL (ref 70–110)
POCT GLUCOSE: 267 MG/DL (ref 70–110)
POTASSIUM SERPL-SCNC: 3.2 MMOL/L (ref 3.5–5.1)
PROT SERPL-MCNC: 7 G/DL (ref 6–8.4)
RBC # BLD AUTO: 3.66 M/UL (ref 4–5.4)
SODIUM SERPL-SCNC: 158 MMOL/L (ref 136–145)
SODIUM SERPL-SCNC: 159 MMOL/L (ref 136–145)
SODIUM SERPL-SCNC: 159 MMOL/L (ref 136–145)
SODIUM SERPL-SCNC: 161 MMOL/L (ref 136–145)
SODIUM UR-SCNC: 44 MMOL/L (ref 20–250)
VANCOMYCIN SERPL-MCNC: 14.3 UG/ML
WBC # BLD AUTO: 6.42 K/UL (ref 3.9–12.7)

## 2021-11-16 PROCEDURE — 99291 PR CRITICAL CARE, E/M 30-74 MINUTES: ICD-10-PCS | Mod: ,,, | Performed by: PSYCHIATRY & NEUROLOGY

## 2021-11-16 PROCEDURE — 99291 CRITICAL CARE FIRST HOUR: CPT | Mod: ,,, | Performed by: PSYCHIATRY & NEUROLOGY

## 2021-11-16 PROCEDURE — 85025 COMPLETE CBC W/AUTO DIFF WBC: CPT | Performed by: STUDENT IN AN ORGANIZED HEALTH CARE EDUCATION/TRAINING PROGRAM

## 2021-11-16 PROCEDURE — 84100 ASSAY OF PHOSPHORUS: CPT | Performed by: STUDENT IN AN ORGANIZED HEALTH CARE EDUCATION/TRAINING PROGRAM

## 2021-11-16 PROCEDURE — 99233 PR SUBSEQUENT HOSPITAL CARE,LEVL III: ICD-10-PCS | Mod: ,,, | Performed by: INTERNAL MEDICINE

## 2021-11-16 PROCEDURE — 94761 N-INVAS EAR/PLS OXIMETRY MLT: CPT

## 2021-11-16 PROCEDURE — 25000003 PHARM REV CODE 250: Performed by: STUDENT IN AN ORGANIZED HEALTH CARE EDUCATION/TRAINING PROGRAM

## 2021-11-16 PROCEDURE — 99233 SBSQ HOSP IP/OBS HIGH 50: CPT | Mod: ,,, | Performed by: INTERNAL MEDICINE

## 2021-11-16 PROCEDURE — 84300 ASSAY OF URINE SODIUM: CPT | Performed by: STUDENT IN AN ORGANIZED HEALTH CARE EDUCATION/TRAINING PROGRAM

## 2021-11-16 PROCEDURE — 83935 ASSAY OF URINE OSMOLALITY: CPT | Performed by: STUDENT IN AN ORGANIZED HEALTH CARE EDUCATION/TRAINING PROGRAM

## 2021-11-16 PROCEDURE — 80202 ASSAY OF VANCOMYCIN: CPT | Performed by: PSYCHIATRY & NEUROLOGY

## 2021-11-16 PROCEDURE — 84295 ASSAY OF SERUM SODIUM: CPT | Mod: 91 | Performed by: STUDENT IN AN ORGANIZED HEALTH CARE EDUCATION/TRAINING PROGRAM

## 2021-11-16 PROCEDURE — 99233 PR SUBSEQUENT HOSPITAL CARE,LEVL III: ICD-10-PCS | Mod: ,,, | Performed by: NEUROLOGICAL SURGERY

## 2021-11-16 PROCEDURE — S0030 INJECTION, METRONIDAZOLE: HCPCS | Performed by: NURSE PRACTITIONER

## 2021-11-16 PROCEDURE — 63600175 PHARM REV CODE 636 W HCPCS: Performed by: NURSE PRACTITIONER

## 2021-11-16 PROCEDURE — 20000000 HC ICU ROOM

## 2021-11-16 PROCEDURE — 25000003 PHARM REV CODE 250: Performed by: NURSE PRACTITIONER

## 2021-11-16 PROCEDURE — 99233 SBSQ HOSP IP/OBS HIGH 50: CPT | Mod: ,,, | Performed by: NEUROLOGICAL SURGERY

## 2021-11-16 PROCEDURE — 80053 COMPREHEN METABOLIC PANEL: CPT | Performed by: STUDENT IN AN ORGANIZED HEALTH CARE EDUCATION/TRAINING PROGRAM

## 2021-11-16 PROCEDURE — 83735 ASSAY OF MAGNESIUM: CPT | Performed by: STUDENT IN AN ORGANIZED HEALTH CARE EDUCATION/TRAINING PROGRAM

## 2021-11-16 RX ORDER — ASPIRIN 81 MG/1
81 TABLET ORAL DAILY
Status: DISCONTINUED | OUTPATIENT
Start: 2021-11-16 | End: 2021-12-01 | Stop reason: HOSPADM

## 2021-11-16 RX ORDER — SODIUM,POTASSIUM PHOSPHATES 280-250MG
2 POWDER IN PACKET (EA) ORAL
Status: DISCONTINUED | OUTPATIENT
Start: 2021-11-16 | End: 2021-11-24

## 2021-11-16 RX ORDER — HEPARIN SODIUM 5000 [USP'U]/ML
5000 INJECTION, SOLUTION INTRAVENOUS; SUBCUTANEOUS EVERY 8 HOURS
Status: DISCONTINUED | OUTPATIENT
Start: 2021-11-16 | End: 2021-12-01 | Stop reason: HOSPADM

## 2021-11-16 RX ORDER — LANOLIN ALCOHOL/MO/W.PET/CERES
800 CREAM (GRAM) TOPICAL
Status: DISCONTINUED | OUTPATIENT
Start: 2021-11-16 | End: 2021-11-24

## 2021-11-16 RX ORDER — TIZANIDINE 2 MG/1
2 TABLET ORAL EVERY 8 HOURS PRN
Status: DISCONTINUED | OUTPATIENT
Start: 2021-11-16 | End: 2021-11-26

## 2021-11-16 RX ADMIN — INSULIN ASPART 3 UNITS: 100 INJECTION, SOLUTION INTRAVENOUS; SUBCUTANEOUS at 10:11

## 2021-11-16 RX ADMIN — INSULIN ASPART 4 UNITS: 100 INJECTION, SOLUTION INTRAVENOUS; SUBCUTANEOUS at 08:11

## 2021-11-16 RX ADMIN — METRONIDAZOLE 500 MG: 500 INJECTION, SOLUTION INTRAVENOUS at 02:11

## 2021-11-16 RX ADMIN — LEVOTHYROXINE SODIUM 100 MCG: 100 TABLET ORAL at 06:11

## 2021-11-16 RX ADMIN — HEPARIN SODIUM 5000 UNITS: 5000 INJECTION INTRAVENOUS; SUBCUTANEOUS at 02:11

## 2021-11-16 RX ADMIN — CEFTRIAXONE SODIUM 2 G: 2 INJECTION, SOLUTION INTRAVENOUS at 02:11

## 2021-11-16 RX ADMIN — CEFTRIAXONE SODIUM 2 G: 2 INJECTION, SOLUTION INTRAVENOUS at 03:11

## 2021-11-16 RX ADMIN — MICONAZOLE NITRATE: 20 OINTMENT TOPICAL at 08:11

## 2021-11-16 RX ADMIN — INSULIN ASPART 2 UNITS: 100 INJECTION, SOLUTION INTRAVENOUS; SUBCUTANEOUS at 12:11

## 2021-11-16 RX ADMIN — TIZANIDINE 2 MG: 2 TABLET ORAL at 02:11

## 2021-11-16 RX ADMIN — ASPIRIN 81 MG: 81 TABLET, COATED ORAL at 02:11

## 2021-11-16 RX ADMIN — POTASSIUM BICARBONATE 35 MEQ: 978 TABLET, EFFERVESCENT ORAL at 02:11

## 2021-11-16 RX ADMIN — POTASSIUM BICARBONATE 35 MEQ: 978 TABLET, EFFERVESCENT ORAL at 11:11

## 2021-11-16 RX ADMIN — HEPARIN SODIUM 5000 UNITS: 5000 INJECTION INTRAVENOUS; SUBCUTANEOUS at 09:11

## 2021-11-16 RX ADMIN — METRONIDAZOLE 500 MG: 500 INJECTION, SOLUTION INTRAVENOUS at 08:11

## 2021-11-16 RX ADMIN — INSULIN ASPART 2 UNITS: 100 INJECTION, SOLUTION INTRAVENOUS; SUBCUTANEOUS at 06:11

## 2021-11-16 RX ADMIN — METRONIDAZOLE 500 MG: 500 INJECTION, SOLUTION INTRAVENOUS at 11:11

## 2021-11-16 RX ADMIN — MICONAZOLE NITRATE: 20 OINTMENT TOPICAL at 09:11

## 2021-11-16 NOTE — ASSESSMENT & PLAN NOTE
Na 161    - discontinue LR  - encourage free water PO  - if no change in 12 Na, add half normal saline IV

## 2021-11-16 NOTE — ASSESSMENT & PLAN NOTE
60F h/o breast cancer, admitted 10/17 to [facility] w/ [facility] found to have septicemia, BCx w/ oral fusobacerium, treated w/ appropriate abx. Increased lethargy last few days/AMS prompted new MRI which showed multiple lesions throughout the bilateral cerebral hemispheres with vasogenic edema ,mild leftward midline shift and partial mass effect of the right lateral ventricle. Patient transferred to [facility] for neuro-ICU level of care. Now patient is in ICU stable protecting airway.    Plan  --admitted to [facility]  --q1 neurochecks  --MRI w wo most consistent with numerous scattered septic emboli  --TTE unrevealing, DORCAS pending, ongoing discussion  --LP deferred per primary  --ID consulted, recs for ceftriaxone, flagyl. HIV neg and toxo IgG pending  --Na >135  --no acute neurosurgical intervention at this time, we will continue to follow    Please page with any change in neuro exam

## 2021-11-16 NOTE — SUBJECTIVE & OBJECTIVE
Interval History: Afebrile.  No acute events overnight.  Denies oral pain, recent dental infection or dental work, abdominal pain.    Review of Systems   Constitutional: Negative for activity change, appetite change, chills, fever and unexpected weight change.   HENT: Negative for dental problem, ear discharge, ear pain, mouth sores, sinus pain, sore throat and trouble swallowing.    Eyes: Negative for pain and discharge.   Respiratory: Negative for cough, chest tightness, shortness of breath and wheezing.    Cardiovascular: Negative for chest pain and leg swelling.   Gastrointestinal: Negative for abdominal distention, abdominal pain, constipation, diarrhea, nausea and vomiting.   Genitourinary: Negative for difficulty urinating, dysuria, flank pain, frequency, genital sores and hematuria.   Musculoskeletal: Negative for arthralgias, joint swelling, neck pain and neck stiffness.   Skin: Negative for color change, rash and wound.   Neurological: Negative for dizziness, weakness, light-headedness, numbness and headaches.   Psychiatric/Behavioral: The patient is not nervous/anxious.      Objective:     Vital Signs (Most Recent):  Temp: 98.6 °F (37 °C) (11/16/21 1505)  Pulse: 97 (11/16/21 1505)  Resp: (!) 27 (11/16/21 1505)  BP: (!) 145/81 (11/16/21 1505)  SpO2: 95 % (11/16/21 1505) Vital Signs (24h Range):  Temp:  [98 °F (36.7 °C)-98.6 °F (37 °C)] 98.6 °F (37 °C)  Pulse:  [] 97  Resp:  [22-50] 27  SpO2:  [90 %-97 %] 95 %  BP: (109-174)/() 145/81     Weight: 123.8 kg (273 lb)  Body mass index is 46.86 kg/m².    Estimated Creatinine Clearance: 79.7 mL/min (based on SCr of 1 mg/dL).    Physical Exam  Constitutional:       General: She is not in acute distress.     Appearance: Normal appearance. She is well-developed. She is not ill-appearing or diaphoretic.   HENT:      Head: Normocephalic and atraumatic.      Right Ear: External ear normal.      Left Ear: External ear normal.      Nose: Nose normal.   Eyes:       General: No scleral icterus.        Right eye: No discharge.         Left eye: No discharge.      Extraocular Movements: Extraocular movements intact.      Conjunctiva/sclera: Conjunctivae normal.   Cardiovascular:      Rate and Rhythm: Normal rate and regular rhythm.      Heart sounds: No murmur heard.      Pulmonary:      Effort: Pulmonary effort is normal. No respiratory distress.      Breath sounds: No stridor.   Abdominal:      Palpations: Abdomen is soft.      Tenderness: There is no abdominal tenderness.   Musculoskeletal:         General: Normal range of motion.   Skin:     Findings: No erythema or rash.   Neurological:      General: No focal deficit present.      Mental Status: She is alert.      Cranial Nerves: No cranial nerve deficit.   Psychiatric:         Mood and Affect: Mood normal.         Behavior: Behavior normal.         Thought Content: Thought content normal.         Judgment: Judgment normal.         Significant Labs: All pertinent labs within the past 24 hours have been reviewed.    Significant Imaging: I have reviewed all pertinent imaging results/findings within the past 24 hours.

## 2021-11-16 NOTE — PROGRESS NOTES
[facility]  Neurocritical Care  Progress Note    Admit Date: 11/12/2021  Service Date: 11/16/2021  Length of Stay: 4    Subjective:     Chief Complaint: Brain lesion    History of Present Illness: 60 year old female with Pmhx DM2, hypothyroidism, YUSEF, hx breast ca, fibromyalgia, htn, hld, depression, class 3 obesity admitted as transfer from [place] for concern of elevated ICP 2/2 vasogenic edema of numerous brain abscesses vs masses. Patient recently admitted at [place] 10/27 for acute encephalopathy, found to have DKA vs HHS and bacteremia. Clinically improved with abx and appropriate blood sugar management until 3 days prior to transfer when she developed worsening confusion and lethargy. MRI 11/13 revealing multiple lesions throughout the bilateral cerebral hemispheres with associated vasogenic edema and leftward midline shift.     HPI from [place] below:  Miss Champion is a 60 year old female with a PMH of DMII on oral anti-hyperglycemics, hypothyroid on synthroid, YUSEF, Right breast papilloma, fibromyalgia, HTN, HLD and depression presents to [facility] via ambulance after being found down and unresponsive at home. Patient is obtunded with a GCS of 8 and is not able to participate in any history giving. This note is per report from neighbor, EMS, ED providers and nurse. Per report Miss Champion' neighbor hadnt seen her for 3 days so they went over to her house to check on her and found her down and unresponsive. EMS was called and brought her into the ED. Upon drawing labwork patient Wbc was 25.8, , anion gap 8, Cr 2.9, blood glucose 805, UDS pos for barbiturates, pH 6.928. Patient is protecting airway although her respirations are labored, Sp02 mid to low 90's and she has YUSEF. A dose of narcan had no response, patient was further treated with bicarb, insulin, IV fluids, IV vanc and zosyn and admitted to the ICU for a higher level of care. Patients sister Darcy was called by staff and  myself and asked to be kept updated, phone number in EMR.      Hospital Course: No notes on file    Interval History:  No acute or concerning events noted by overnight staff. Afebrile, vital signs stable and within normal limits. Alert and conscious. MRI notable for numerous small intra-axial lesions; unclear etiology. LP discussed; deferred time being unless patient decompensates. DORCAS discussions ongoing. SBP goal 160. Na 161; discontinued LR. Encourage PO free water.      Review of Systems   Constitutional: Negative.    Respiratory: Negative.    Gastrointestinal: Negative.    Genitourinary: Negative.    Musculoskeletal: Negative.        Objective:     Vitals:  Temp: 98 °F (36.7 °C)  Pulse: 93  Rhythm: normal sinus rhythm  BP: 115/69  MAP (mmHg): 88  Resp: (!) 25  SpO2: 97 %  O2 Device (Oxygen Therapy): room air    Temp  Min: 98 °F (36.7 °C)  Max: 99.3 °F (37.4 °C)  Pulse  Min: 67  Max: 93  BP  Min: 109/73  Max: 174/86  MAP (mmHg)  Min: 74  Max: 123  Resp  Min: 22  Max: 50  SpO2  Min: 90 %  Max: 98 %    11/15 0701 - 11/16 0700  In: 1094.2 [I.V.:396]  Out: 1100 [Urine:1100]   Unmeasured Output  Urine Occurrence: 2  Stool Occurrence: 1  Pad Count: 1       Physical Exam  Constitutional:       General: She is not in acute distress.     Appearance: She is obese. She is not ill-appearing.   HENT:      Head: Normocephalic and atraumatic.      Mouth/Throat:      Mouth: Mucous membranes are moist.      Pharynx: Oropharynx is clear. No oropharyngeal exudate.   Eyes:      General:         Right eye: No discharge.         Left eye: No discharge.      Extraocular Movements: Extraocular movements intact.      Pupils: Pupils are equal, round, and reactive to light.   Cardiovascular:      Rate and Rhythm: Normal rate and regular rhythm.      Pulses: Normal pulses.      Heart sounds: Normal heart sounds.   Pulmonary:      Effort: Pulmonary effort is normal. No respiratory distress.      Breath sounds: Normal breath sounds.    Abdominal:      General: Abdomen is flat. There is no distension.      Palpations: Abdomen is soft.   Musculoskeletal:         General: No swelling. Normal range of motion.      Cervical back: Normal range of motion.   Skin:     General: Skin is warm.      Capillary Refill: Capillary refill takes less than 2 seconds.   Neurological:      Mental Status: She is alert. She is disoriented.      Cranial Nerves: No cranial nerve deficit.      Sensory: No sensory deficit.      Motor: No weakness.      Comments:   A/Ox1.   GCS 14  Intermittently follows commands. Confused speech at times. More alert.   Moves all extremities spontaneously against gravity.         Medications:  Continuouslactated ringers, Last Rate: Stopped (11/16/21 2238)    ScheduledcefTRIAXone (ROCEPHIN) IVPB, 2 g, Q12H  levothyroxine, 100 mcg, Before breakfast  metronidazole, 500 mg, Q8H  miconazole nitrate 2%, , BID  senna-docusate 8.6-50 mg, 1 tablet, Daily    PRNacetaminophen, 650 mg, Q6H PRN  dextrose 50%, 12.5 g, PRN  glucagon (human recombinant), 1 mg, PRN  insulin aspart U-100, 1-10 Units, QID (AC + HS) PRN  labetalol, 10 mg, Q3H PRN  magnesium oxide, 800 mg, PRN  magnesium oxide, 800 mg, PRN  potassium bicarbonate, 35 mEq, PRN  potassium bicarbonate, 50 mEq, PRN  potassium bicarbonate, 60 mEq, PRN  potassium, sodium phosphates, 2 packet, PRN  potassium, sodium phosphates, 2 packet, PRN  potassium, sodium phosphates, 2 packet, PRN  sodium chloride 0.9%, 10 mL, PRN      Today I personally reviewed pertinent medications, lines/drains/airways, imaging, cardiology results, laboratory results, microbiology results,    Diet  Diet Dysphagia Mechanical Soft (IDDSI Level 5) [facility]; Consistent Carbohydrate; Thin    Assessment/Plan:     Neuro  * Brain lesion  MRI Brain w/lesions throughout the cerebral hemispheres involving frontal, parietal, temporal, and occipital lobes with surrounding vasogenic edema. Suspect 2/2 septic emboli given recent  bacteremia. Speciation of BCx 10/27 revealing organisms from oral mary. Mannitol and 3% saline started prior to transfer. CTH notable for multiple areas of mild diminished attenuation involving the cerebral hemispheres bilaterally corresponding with multiple T2/FLAIR hyperintense lesions noted on the recent MRI examination.     - q1h neuro checks  - NSG consulted, appreciate recs   - repeat TTE, may need DORCAS   - BCx NGTD  - vanc/zosyn; discontinued  - Infectious Disease consult; appreciate recommendations    - DORCAS; discussion ongoing   - on ceftriaxone/metronidazole  - order EEG  - plan for LP  - q6h Na; Na goal 140-155   - intubation watch    Encephalopathy, metabolic  - see principle problem    Cardiac/Vascular  Hypertension associated with diabetes  - Goal SBP < 160     Renal/  Hypernatremia  Na 161    - discontinue LR  - encourage free water PO  - if no change in 12 Na, add half normal saline IV    Endocrine  BMI 45.0-49.9, adult  - nutrition consult    Uncontrolled type 2 diabetes mellitus with hyperglycemia  A1c 9 on admission to [place]. BG labile.    - Goal CBG < 180   - Cont basal insulin  - discontinue detemir  - NPO for now, moderate SSI.     Hypothyroidism  TSH 1.39 (11/13/2021)    - Cont home synthroid     Other  Discharge planning issues  - pending diagnostics and intervention          The patient is being Prophylaxed for:  Venous Thromboembolism with: Not Applicable   Stress Ulcer with: Not Applicable   Ventilator Pneumonia with: not applicable    Activity Orders          Diet Dysphagia Mechanical Soft (IDDSI Level 5) [facility]; Consistent Carbohydrate; Thin: Dysphagia 2 (Mechanical Soft Ground) starting at 11/15 1418    Turn patient starting at 11/13 0000    Elevate HOB starting at 11/12 2358        Full Code    Vasquez Porter MD  Neurocritical Care  [facility]

## 2021-11-16 NOTE — PLAN OF CARE
[facility] Care Plan    POC reviewed with Violeta Champion and family at 1330. Pt verbalized understanding. Questions and concerns addressed. No acute events today. Pt progressing toward goals. Will continue to monitor. See below and flowsheets for full assessment and VS info.     Pt on Mechanical soft diabetic diet  Pt put on LR at 75ml/hr  DORCAS rescheduled; LP canceled  EEG in place  Multiple pad counts, linens change @ 1700 large BM today   Na check q 6           Neuro:  Melvin Coma Scale  Best Eye Response: 4-->(E4) spontaneous  Best Motor Response: 5-->(M5) localizes pain  Best Verbal Response: 4-->(V4) confused  Melvin Coma Scale Score: 13  Assessment Qualifiers: patient not sedated/intubated  Pupil PERRLA: yes     24 hr Temp:  [98.3 °F (36.8 °C)-99.8 °F (37.7 °C)]     CV:   Rhythm: normal sinus rhythm  BP goals:   SBP < 160  MAP > 65    Resp:   O2 Device (Oxygen Therapy): room air       Plan: Room air    GI/:  CHELO Total Score: 0  Diet/Nutrition Received: mechanical/dental soft  Last Bowel Movement: 11/15/21  Voiding Characteristics: external catheter    Intake/Output Summary (Last 24 hours) at 11/15/2021 1819  Last data filed at 11/15/2021 1805  Gross per 24 hour   Intake 2043.14 ml   Output 1000 ml   Net 1043.14 ml     Unmeasured Output  Urine Occurrence: 1  Stool Occurrence: 1  Pad Count: 1    Labs/Accuchecks:  Recent Labs   Lab 11/15/21  0120   WBC 7.68   RBC 3.40*   HGB 8.8*   HCT 30.7*         Recent Labs   Lab 11/15/21  0120 11/15/21  0509 11/15/21  1227   *   < > 159*   K 3.2*  --   --    CO2 28  --   --    *  --   --    BUN 6  --   --    CREATININE 1.0  --   --    ALKPHOS 75  --   --    ALT 5*  --   --    AST 10  --   --    BILITOT 0.3  --   --     < > = values in this interval not displayed.      Recent Labs   Lab 11/13/21 0111   INR 1.0   APTT 21.9      Recent Labs   Lab 11/13/21 0111   TROPONINI <0.006       Electrolytes: None  Accuchecks: ACHS    Gtts:   lactated ringers 75 mL/hr  at 11/15/21 1805       LDA/Wounds:  Lines/Drains/Airways       Peripherally Inserted Central Catheter Line              PICC Double Lumen 11/01/21 1950 right basilic 13 days              Drain              Female External Urinary Catheter 11/14/21 1130 1 day                  Wounds: Yes; scattered stage 2 in perineal and buttocks area  Wound care consulted: yes

## 2021-11-16 NOTE — ASSESSMENT & PLAN NOTE
60F with T2DM, hypothyroidism, depression, HTN, HLD, obesity found to have numerous brain lesions found on MRI. Differential includes but not limited to endocarditis, toxoplasmosis, malignancy. Patient with recent TTE w/o evidence of vegetations. She did grow oral organisms in blood cultures: Parvimunas micra and Fusobacterium nucleatum on 10/27 from [facility].  As further diagnostic evaluation not pursued to definitively rule out endocarditis, recommend empirically treating as such.    Recommendations:  - Ceftriaxone 2g IV Q12H + Metronidazole 500mg PO Q8H  - 6 week course, last day 12/26/21    Outpatient Antibiotic Therapy Plan:    Please send referral to [facility].    1) Infection:  Fusobacterium and Parvimonas bacteremia  Brain lesions of unclear etiology  Empirical treatment for infective endocarditis    2) Discharge Antibiotics:    Intravenous antibiotics:   Ceftriaxone 2g IV Q12H     Oral antibiotics:  Metronidazole 500mg PO Q8H    3) Therapy Duration:  6 weeks    Estimated end date of IV antibiotics: 12/26/2021    4) Outpatient Weekly Labs:    Order the following labs to be drawn on Mondays:    CBC   CMP   5) Fax Lab Results to Infectious Diseases Provider: Dr. Earnest Kinsey    [facility] Fax Number: [phone]    6) Outpatient Infectious Diseases Follow-up     Follow-up appointment will be arranged by the ID clinic and will be found in the patient's appointments tab.     Prior to discharge, please ensure the patient's follow-up has been scheduled.     If there is still no follow-up scheduled prior to discharge, please send an EPIC message to Kristine Ramirez in Infectious Diseases.

## 2021-11-16 NOTE — PROGRESS NOTES
[facility]  Neurosurgery  Progress Note    Subjective:     History of Present Illness: 60F h/o breast cancer, admitted 10/17 to [facility] w/ [facility] found to have septicemia, BCx w/ oral fusobacerium, treated w/ appropriate abx. Increased lethargy last few days/AMS prompted new MRI which showed multiple lesions throughout the bilateral cerebral hemispheres with vasogenic edema ,mild leftward midline shift and partial mass effect of the right lateral ventricle. Patient transferred to [facility] for neuro-ICU level of care. Now patient is in ICU stable protecting airway Ox name [id number] AD doesn't follow commands, on vanc and 3%, CTH pending needs MRI + C, Echo, ID consult.      Post-Op Info:  * No surgery found *         Interval History: NAEON. Possible dental source per ID given BCx results. LP deferred. DORCAS pending discussions amongst ID and primary team.     Medications:  Continuous Infusions:   lactated ringers Stopped (11/16/21 0738)     Scheduled Meds:   cefTRIAXone (ROCEPHIN) IVPB  2 g Intravenous Q12H    levothyroxine  100 mcg Oral Before breakfast    metronidazole  500 mg Intravenous Q8H    miconazole nitrate 2%   Topical (Top) BID    senna-docusate 8.6-50 mg  1 tablet Oral Daily     PRN Meds:acetaminophen, dextrose 50%, glucagon (human recombinant), insulin aspart U-100, labetalol, magnesium oxide, magnesium oxide, potassium bicarbonate, potassium bicarbonate, potassium bicarbonate, potassium, sodium phosphates, potassium, sodium phosphates, potassium, sodium phosphates, sodium chloride 0.9%     Review of Systems  Objective:     Weight: 123.8 kg (273 lb)  Body mass index is 46.86 kg/m².  Vital Signs (Most Recent):  Temp: 98 °F (36.7 °C) (11/16/21 0705)  Pulse: 93 (11/16/21 0905)  Resp: (!) 25 (11/16/21 0905)  BP: 115/69 (11/16/21 0905)  SpO2: 97 % (11/16/21 0905) Vital Signs (24h Range):  Temp:  [98 °F (36.7 °C)-99.3 °F (37.4 °C)] 98 °F (36.7 °C)  Pulse:  [67-93] 93  Resp:  [22-50]  25  SpO2:  [90 %-97 %] 97 %  BP: (115-174)/() 115/69     Date 11/16/21 0700 - 11/17/21 0659   Shift 3530-0397 3403-8645 8303-1744 24 Hour Total   INTAKE   I.V.(mL/kg) 991.3(8)   991.3(8)   IV Piggyback 232.7   232.7   Shift Total(mL/kg) 1224(9.9)   1224(9.9)   OUTPUT   Shift Total(mL/kg)       Weight (kg) 123.8 123.8 123.8 123.8                   Female External Urinary Catheter 11/14/21 1130 (Active)   Skin no redness 11/16/21 0705   Tolerance no signs/symptoms of discomfort 11/16/21 0705   Suction Continuous suction at 60 mmHg 11/16/21 0705   Date of last wick change 11/16/21 11/16/21 0705   Time of last wick change 0705 11/16/21 0705   Output (mL) 600 mL 11/16/21 0600       Neurosurgery Physical Exam   Nursing note and vitals reviewed.     Eyes: Pupils are equal, round, and reactive to light. EOM are normal.      Cardiovascular: Normal rate and intact distal pulses.      Abdominal: Soft.     Psych/Behavior:   Oriented x 3    Neurological:   Oriented x3  [id number]  AAOx3  Confused  CN II-XII grossly intact  FC x4, moves all limbs AG with grossly full strength    Significant Labs:  Recent Labs   Lab 11/15/21  0120 11/15/21  0509 11/15/21  1734 11/15/21  2237 11/16/21 0229   *  --   --   --  111*   *   < > 157* 158* 161*  158*   K 3.2*  --   --   --  3.2*   *  --   --   --  123*   CO2 28  --   --   --  25   BUN 6  --   --   --  7   CREATININE 1.0  --   --   --  1.0   CALCIUM 8.4*  --   --   --  8.4*   MG 1.9  --   --   --  2.1    < > = values in this interval not displayed.     Recent Labs   Lab 11/15/21  0120 11/16/21  0229   WBC 7.68 6.42   HGB 8.8* 9.6*   HCT 30.7* 34.3*    220     No results for input(s): LABPT, INR, APTT in the last 48 hours.  Microbiology Results (last 7 days)     Procedure Component Value Units Date/Time    Blood culture [[id number]] Collected: 11/13/21 0227    Order Status: Completed Specimen: Blood Updated: 11/16/21 0612     Blood Culture, Routine No Growth to  date      No Growth to date      No Growth to date      No Growth to date    Narrative:      Blood cultures from 2 different sites. 4 bottles total.  Please draw before starting antibiotics.    Blood culture [[id number]] Collected: 11/13/21 0227    Order Status: Completed Specimen: Blood Updated: 11/16/21 0612     Blood Culture, Routine No Growth to date      No Growth to date      No Growth to date      No Growth to date    Narrative:      Blood cultures x 2 different sites. 4 bottles total. Please  draw cultures before administering antibiotics.    Gram stain [[id number]]     Order Status: Canceled Specimen: CSF (Spinal Fluid) from CSF Tap, Tube 3     CSF culture [[id number]]     Order Status: Canceled Specimen: CSF (Spinal Fluid) from CSF Tap, Tube 3     AFB Culture & Smear [[id number]]     Order Status: Canceled Specimen: CSF (Spinal Fluid) from CSF Tap, Tube 3     Fungus culture [[id number]]     Order Status: Canceled Specimen: CSF (Spinal Fluid) from CSF Tap, Tube 3     Cryptococcal antigen, CSF [[id number]]     Order Status: Canceled Specimen: CSF (Spinal Fluid) from CSF Tap, Tube 3     Urine culture [[id number]] Collected: 11/13/21 0328    Order Status: Completed Specimen: Urine Updated: 11/14/21 1158     Urine Culture, Routine No growth            Significant Diagnostics:  I have reviewed and interpreted all pertinent imaging results/findings within the past 24 hours.    Assessment/Plan:     * Brain lesion  60F h/o breast cancer, admitted 10/17 to [facility] w/ [facility] found to have septicemia, BCx w/ oral fusobacerium, treated w/ appropriate abx. Increased lethargy last few days/AMS prompted new MRI which showed multiple lesions throughout the bilateral cerebral hemispheres with vasogenic edema ,mild leftward midline shift and partial mass effect of the right lateral ventricle. Patient transferred to [facility] for neuro-ICU level of care. Now patient is in ICU stable protecting airway.    Plan  --admitted to [facility]  --q1  neurochecks  --MRI w wo most consistent with numerous scattered septic emboli  --TTE unrevealing, DORCAS pending, ongoing discussion  --LP deferred per primary  --ID consulted, recs for ceftriaxone, flagyl. HIV neg and toxo IgG pending  --Na >135  --no acute neurosurgical intervention at this time, we will continue to follow    Please page with any change in neuro exam          Aleyda Bruner MD  Neurosurgery  [facility]

## 2021-11-16 NOTE — ASSESSMENT & PLAN NOTE
60F with T2DM, hypothyroidism, depression, HTN, HLD, obesity found to have numerous brain lesions found on MRI. Differential includes but not limited to endocarditis, CNS lymphoma, toxoplasmosis, metastasis to brain. Patient with recent TTE w/o evidence of vegetations. Cannot appreciate murmur on physical examination. No evidence of janeway lesions, osler nodes, or splinter hemorrhage. Patient not a past IVDU. She did grow oral organisms in blood cultures: Parvimunas micra and Fusobacterium nucleatum on 10/27.  ID consulted to evaluate brain lesions.    Recommendations:  - Ceftriaxone 2g Q12H + Metronidazole 500mg Q8H  - stop vancomycin  - recommend DORCAS to further evaluate for IE   - OK to hold off on LP for now given risks associated w/ procedure in patient with mass effect present on imaging. Would pursue if mental status worsens  - recommend b/l jugular vein US to r/o lemierre syndrome (classically seen with fusobacterium bacteremia, typically dental in origin, can cause septic emboli similar to infective endocarditis)  - HIV is negative

## 2021-11-16 NOTE — PROGRESS NOTES
Therapy with vancomycin complete and consult discontinued by provider. Pharmacy will sign off, please re-consult as needed.    Claire Love, PharmD  Neurocritical Care Pharmacist  [phone]

## 2021-11-16 NOTE — SUBJECTIVE & OBJECTIVE
Interval History:  No acute or concerning events noted by overnight staff. Afebrile, vital signs stable and within normal limits. Alert and conscious. MRI notable for numerous small intra-axial lesions; unclear etiology. LP discussed; deferred time being unless patient decompensates. DORCAS discussions ongoing. SBP goal 160. Na 161; discontinued LR. Encourage PO free water.      Review of Systems   Constitutional: Negative.    Respiratory: Negative.    Gastrointestinal: Negative.    Genitourinary: Negative.    Musculoskeletal: Negative.        Objective:     Vitals:  Temp: 98 °F (36.7 °C)  Pulse: 93  Rhythm: normal sinus rhythm  BP: 115/69  MAP (mmHg): 88  Resp: (!) 25  SpO2: 97 %  O2 Device (Oxygen Therapy): room air    Temp  Min: 98 °F (36.7 °C)  Max: 99.3 °F (37.4 °C)  Pulse  Min: 67  Max: 93  BP  Min: 109/73  Max: 174/86  MAP (mmHg)  Min: 74  Max: 123  Resp  Min: 22  Max: 50  SpO2  Min: 90 %  Max: 98 %    11/15 0701 - 11/16 0700  In: 1094.2 [I.V.:396]  Out: 1100 [Urine:1100]   Unmeasured Output  Urine Occurrence: 2  Stool Occurrence: 1  Pad Count: 1       Physical Exam  Constitutional:       General: She is not in acute distress.     Appearance: She is obese. She is not ill-appearing.   HENT:      Head: Normocephalic and atraumatic.      Mouth/Throat:      Mouth: Mucous membranes are moist.      Pharynx: Oropharynx is clear. No oropharyngeal exudate.   Eyes:      General:         Right eye: No discharge.         Left eye: No discharge.      Extraocular Movements: Extraocular movements intact.      Pupils: Pupils are equal, round, and reactive to light.   Cardiovascular:      Rate and Rhythm: Normal rate and regular rhythm.      Pulses: Normal pulses.      Heart sounds: Normal heart sounds.   Pulmonary:      Effort: Pulmonary effort is normal. No respiratory distress.      Breath sounds: Normal breath sounds.   Abdominal:      General: Abdomen is flat. There is no distension.      Palpations: Abdomen is soft.    Musculoskeletal:         General: No swelling. Normal range of motion.      Cervical back: Normal range of motion.   Skin:     General: Skin is warm.      Capillary Refill: Capillary refill takes less than 2 seconds.   Neurological:      Mental Status: She is alert. She is disoriented.      Cranial Nerves: No cranial nerve deficit.      Sensory: No sensory deficit.      Motor: No weakness.      Comments:   A/Ox1.   GCS 14  Intermittently follows commands. Confused speech at times. More alert.   Moves all extremities spontaneously against gravity.         Medications:  Continuouslactated ringers, Last Rate: Stopped (11/16/21 0738)    ScheduledcefTRIAXone (ROCEPHIN) IVPB, 2 g, Q12H  levothyroxine, 100 mcg, Before breakfast  metronidazole, 500 mg, Q8H  miconazole nitrate 2%, , BID  senna-docusate 8.6-50 mg, 1 tablet, Daily    PRNacetaminophen, 650 mg, Q6H PRN  dextrose 50%, 12.5 g, PRN  glucagon (human recombinant), 1 mg, PRN  insulin aspart U-100, 1-10 Units, QID (AC + HS) PRN  labetalol, 10 mg, Q3H PRN  magnesium oxide, 800 mg, PRN  magnesium oxide, 800 mg, PRN  potassium bicarbonate, 35 mEq, PRN  potassium bicarbonate, 50 mEq, PRN  potassium bicarbonate, 60 mEq, PRN  potassium, sodium phosphates, 2 packet, PRN  potassium, sodium phosphates, 2 packet, PRN  potassium, sodium phosphates, 2 packet, PRN  sodium chloride 0.9%, 10 mL, PRN      Today I personally reviewed pertinent medications, lines/drains/airways, imaging, cardiology results, laboratory results, microbiology results,    Diet  Diet Dysphagia Mechanical Soft (IDDSI Level 5) [facility]; Consistent Carbohydrate; Thin

## 2021-11-16 NOTE — PROGRESS NOTES
[facility]  Infectious Disease  Progress Note    Patient Name: Violeta Champion  MRN: [id number]  Admission Date: 11/12/2021  Length of Stay: 3 days  Attending Physician: Deon Khanna MD  Primary Care Provider: Madie Petersen DO    Isolation Status: No active isolations  Assessment/Plan:      * Brain lesion  60F with T2DM, hypothyroidism, depression, HTN, HLD, obesity found to have numerous brain lesions found on MRI. Differential includes but not limited to endocarditis, CNS lymphoma, toxoplasmosis, metastasis to brain. Patient with recent TTE w/o evidence of vegetations. Cannot appreciate murmur on physical examination. No evidence of janeway lesions, osler nodes, or splinter hemorrhage. Patient not a past IVDU. She did grow oral organisms in blood cultures: Parvimunas micra and Fusobacterium nucleatum on 10/27.  ID consulted to evaluate brain lesions.    Recommendations:  - Ceftriaxone 2g Q12H + Metronidazole 500mg Q8H  - stop vancomycin  - recommend DORCAS to further evaluate for IE   - OK to hold off on LP for now given risks associated w/ procedure in patient with mass effect present on imaging. Would pursue if mental status worsens  - recommend b/l jugular vein US to r/o lemierre syndrome (classically seen with fusobacterium bacteremia, typically dental in origin, can cause septic emboli similar to infective endocarditis)  - HIV is negative          Anticipated Disposition: TBD    Thank you for your consult. I will follow-up with patient. Please contact us if you have any additional questions.    Ramona Faust DO  Critical Care Infectious Disease      Subjective:     Principal Problem:Brain lesion    HPI: 60 year old female with Pmhx DM2, hypothyroidism, YUSEF, hx breast ca, fibromyalgia, htn, hld, depression, class 3 obesity admitted as transfer from [place] for concern of elevated ICP 2/2 vasogenic edema of numerous brain abscesses vs masses. Patient recently admitted at [place] 10/27 for  acute encephalopathy, found to have DKA vs HHS and bacteremia. Clinically improved with abx and appropriate blood sugar management until 3 days prior to transfer when she developed worsening confusion and lethargy. MRI 11/13 revealing multiple lesions throughout the bilateral cerebral hemispheres with associated vasogenic edema and leftward midline shift. Patient growing Parvimunas Micra and Fusobacterium nucleatum from 10/27 blood culture and was found to be septic. Patient with no history of IVDU.  Patient was initially found down unconscious in her home according by neighbor after not being seen for 3 days according to patients sister (Darcy) and EMR note. Patient had recent excisional biopsy on 12/10/2020 with pathology showing sclerosis intraductal papilloma.          Interval History: working with PT, complains of vaginal itching    Review of Systems   Constitutional: Negative for activity change, appetite change, chills, fever and unexpected weight change.   HENT: Negative for dental problem, ear discharge, ear pain, mouth sores, sinus pain, sore throat and trouble swallowing.    Eyes: Negative for pain and discharge.   Respiratory: Negative for cough, chest tightness, shortness of breath and wheezing.    Cardiovascular: Negative for chest pain and leg swelling.   Gastrointestinal: Negative for abdominal distention, abdominal pain, constipation, diarrhea, nausea and vomiting.   Genitourinary: Negative for difficulty urinating, dysuria, flank pain, frequency, genital sores and hematuria.        Vaginal itching   Musculoskeletal: Negative for arthralgias, joint swelling, neck pain and neck stiffness.   Skin: Negative for color change, rash and wound.   Neurological: Negative for dizziness, weakness, light-headedness, numbness and headaches.   Psychiatric/Behavioral: Positive for confusion. The patient is not nervous/anxious.      Objective:     Vital Signs (Most Recent):  Temp: 98.2 °F (36.8 °C) (11/15/21  1930)  Pulse: 83 (11/15/21 1930)  Resp: (!) 23 (11/15/21 1930)  BP: 138/73 (11/15/21 1930)  SpO2: 96 % (11/15/21 1930) Vital Signs (24h Range):  Temp:  [98.2 °F (36.8 °C)-99.8 °F (37.7 °C)] 98.2 °F (36.8 °C)  Pulse:  [64-96] 83  Resp:  [13-40] 23  SpO2:  [92 %-99 %] 96 %  BP: (109-164)/(53-97) 138/73     Weight: 123.8 kg (273 lb)  Body mass index is 46.86 kg/m².    Estimated Creatinine Clearance: 79.7 mL/min (based on SCr of 1 mg/dL).    Physical Exam  Constitutional:       General: She is not in acute distress.     Appearance: Normal appearance. She is well-developed. She is not ill-appearing or diaphoretic.   HENT:      Head: Normocephalic and atraumatic.      Right Ear: External ear normal.      Left Ear: External ear normal.      Nose: Nose normal.   Eyes:      General: No scleral icterus.        Right eye: No discharge.         Left eye: No discharge.      Extraocular Movements: Extraocular movements intact.      Conjunctiva/sclera: Conjunctivae normal.   Pulmonary:      Effort: Pulmonary effort is normal. No respiratory distress.      Breath sounds: No stridor.   Musculoskeletal:         General: Normal range of motion.   Skin:     Findings: No erythema or rash.   Neurological:      General: No focal deficit present.      Mental Status: She is alert.      Cranial Nerves: No cranial nerve deficit.   Psychiatric:         Mood and Affect: Mood normal.         Behavior: Behavior normal.         Thought Content: Thought content normal.         Judgment: Judgment normal.         Significant Labs:   CBC:   Recent Labs   Lab 11/14/21  0122 11/15/21  0120   WBC 6.15 7.68   HGB 7.9* 8.8*   HCT 27.6* 30.7*    290     CMP:   Recent Labs   Lab 11/14/21  0122 11/14/21  0518 11/15/21  0120 11/15/21  0120 11/15/21  0509 11/15/21  1227 11/15/21  1734      < > 155*   < > 155* 159* 157*   K 3.0*  --  3.2*  --   --   --   --    *  --  117*  --   --   --   --    CO2 23  --  28  --   --   --   --    *  --   136*  --   --   --   --    BUN 5*  --  6  --   --   --   --    CREATININE 0.7  --  1.0  --   --   --   --    CALCIUM 7.5*  --  8.4*  --   --   --   --    PROT 6.3  --  6.9  --   --   --   --    ALBUMIN 2.1*  --  2.3*  --   --   --   --    BILITOT 0.3  --  0.3  --   --   --   --    ALKPHOS 70  --  75  --   --   --   --    AST 10  --  10  --   --   --   --    ALT <5*  --  5*  --   --   --   --    ANIONGAP 8  --  10  --   --   --   --    EGFRNONAA >60.0  --  >60.0  --   --   --   --     < > = values in this interval not displayed.     Microbiology Results (last 7 days)     Procedure Component Value Units Date/Time    Gram stain [[id number]]     Order Status: Canceled Specimen: CSF (Spinal Fluid) from CSF Tap, Tube 3     CSF culture [[id number]]     Order Status: Canceled Specimen: CSF (Spinal Fluid) from CSF Tap, Tube 3     AFB Culture & Smear [[id number]]     Order Status: Canceled Specimen: CSF (Spinal Fluid) from CSF Tap, Tube 3     Fungus culture [[id number]]     Order Status: Canceled Specimen: CSF (Spinal Fluid) from CSF Tap, Tube 3     Cryptococcal antigen, CSF [[id number]]     Order Status: Canceled Specimen: CSF (Spinal Fluid) from CSF Tap, Tube 3     Blood culture [[id number]] Collected: 11/13/21 0227    Order Status: Completed Specimen: Blood Updated: 11/15/21 0613     Blood Culture, Routine No Growth to date      No Growth to date      No Growth to date    Narrative:      Blood cultures x 2 different sites. 4 bottles total. Please  draw cultures before administering antibiotics.    Blood culture [[id number]] Collected: 11/13/21 0227    Order Status: Completed Specimen: Blood Updated: 11/15/21 0612     Blood Culture, Routine No Growth to date      No Growth to date      No Growth to date    Narrative:      Blood cultures from 2 different sites. 4 bottles total.  Please draw before starting antibiotics.    Urine culture [[id number]] Collected: 11/13/21 0328    Order Status: Completed Specimen: Urine Updated: 11/14/21  1158     Urine Culture, Routine No growth          Significant Imaging: I have reviewed all pertinent imaging results/findings within the past 24 hours.

## 2021-11-16 NOTE — PROCEDURES
EEG prelim  15:00 p.m.-19:15 p.m.    Background:  Continuous, relatively symmetric, disorganized, mixed frequency theta delta activity with plenty of generalized and multifocal polymorphic slowing seen bilaterally.  Evidence of state transitions with good variability.    Impression:  Moderate encephalopathy with evidence of subcortical/deep midline dysfunction.  No pushbutton activations, no epileptiform discharges, and no electrographic seizures.    Please hit the EEG button with any clinical activity concerning for seizures and describe what you see.    Full report after the completion of the study.    Darcy Rosa MD PhD  Neurology-Epilepsy  [facility].

## 2021-11-16 NOTE — PROCEDURES
[facility] EEG/VIDEO MONITORING REPORT  Violeta Champion  [id number]  1960    DATE OF SERVICE:  11/15/2021-11/16/2021  DATE OF ADMISSION: 11/12/2021 11:51 PM    ADMITTING/REQUESTING PROVIDER: Nestor Hart MD    REASON FOR CONSULT:  60-year-old woman admitted with confusion found to have numerous brain lesions bilaterally.  Evaluate for evidence of epileptiform activity.    METHODOLOGY   Electroencephalographic (EEG) recording is with electrodes placed according to the International 10-20 placement system.  Thirty two (32) channels of digital signal (sampling rate of 512/sec) including T1 and T2 was simultaneously recorded from the scalp and may include  EKG, EMG, and/or eye monitors.  Recording band pass was 0.1 to 512 hz.  Digital video recording of the patient is simultaneously recorded with the EEG.  The patient is instructed report clinical symptoms which may occur during the recording session.  EEG and video recording is stored and archived in digital format.  Activation procedures which include photic stimulation, hyperventilation and instructing patients to perform simple task are done in selected patients.   The EEG is displayed on a monitor screen and can be reviewed using different montages.  Computer assisted analysis is employed to detect spike and electrographic seizure activity.   The entire record is submitted for computer analysis.  The entire recording is visually reviewed and the times identified by computer analysis as being spikes or seizures are reviewed again.  Compresses spectral analysis (CSA) is also performed on the activity recorded from each individual channel.  This is displayed as a power display of frequencies from 0 to 30 Hz over time.   The CSA is reviewed looking for asymmetries in power between homologous areas of the scalp and then compared with the original EEG recording.     [employer] software is also utilized in the review of this study.  This software suite analyzes the EEG  recording in multiple domains.  Coherence and rhythmicity is computed to identify EEG sections which may contain organized seizures.  Each channel undergoes analysis to detect presence of spike and sharp waves which have special and morphological characteristic of epileptic activity.  The routine EEG recording is converted from spacial into frequency domain.  This is then displayed comparing homologous areas to identify areas of significant asymmetry.  Algorithm to identify non-cortically generated artifact is used to separate eye movement, EMG and other artifact from the EEG.      RECORDING TIMES  Start on 11/15/2021 at 15:00 p.m.  Stop on 11/16/2021 at 08:11 a.m.-> End of the Recording Session  A total of 17 hours and 6 minutes of EEG recording is obtained.    EEG FINDINGS  Background activity:  The background is continuous, relatively symmetric, disorganized, mixed frequency theta/delta activity with plenty of intermittent generalized and multifocal polymorphic slowing seen bilaterally. The background becomes progressively obscured by artifact as the study advances.    There are no pushbutton activations.    Sleep:  There is evidence of state changes with the appearance of sleep spindles, vertex waves, and K complexes.    Activation procedures:   The patient is awake and answers simple orientation questions such as her name and the year but does not correctly respond the location.  She does not count to 25 upon request.    Cardiac Monitor:   Heart rate appears generally regular on a single lead EKG.    Impression:   This is an abnormal continuous EEG monitoring study because of generalized background slowing consistent with diffuse cortical dysfunction and a moderate encephalopathy with evidence of subcortical/deep midline dysfunction seen bilaterally.  There are no pushbutton activations, no definite epileptiform discharges, and no electrographic seizures.    Darcy Rosa MD PhD  Neurology-Epilepsy  [facility]

## 2021-11-16 NOTE — ASSESSMENT & PLAN NOTE
A1c 9 on admission to [facility]. BG labile.    - Goal CBG < 180   - Cont basal insulin  - discontinue detemir  - NPO for now, moderate SSI.

## 2021-11-16 NOTE — ASSESSMENT & PLAN NOTE
MRI Brain w/lesions throughout the cerebral hemispheres involving frontal, parietal, temporal, and occipital lobes with surrounding vasogenic edema. Suspect 2/2 septic emboli given recent bacteremia. Speciation of BCx 10/27 revealing organisms from oral mary. Mannitol and 3% saline started prior to transfer. CTH notable for multiple areas of mild diminished attenuation involving the cerebral hemispheres bilaterally corresponding with multiple T2/FLAIR hyperintense lesions noted on the recent MRI examination.     - q1h neuro checks  - NSG consulted, appreciate recs   - repeat TTE, may need DORCAS   - BCx NGTD  - vanc/zosyn; discontinued  - Infectious Disease consult; appreciate recommendations    - DORCAS; discussion ongoing   - on ceftriaxone/metronidazole  - order EEG  - plan for LP  - q6h Na; Na goal 140-155   - intubation watch

## 2021-11-16 NOTE — PLAN OF CARE
GREGORY Skin Integrity Evaluation      Subjective    Skin Integrity GREGORY evaluation of patient as part of the comprehensive skin care team.       Violeta Champion is being evaluated as per the Epic skin report. Ms. Champion is a 60 year old female with PMH of DM2, hypothyroidism, YUSEF, hx breast ca, fibromyalgia, htn, hld, depression, class 3 obesity admitted as transfer from [place] for concern of elevated ICP 2/2 vasogenic edema of numerous brain abscesses vs masses. She has been admitted for 4 days. Skin injury was noted on 10/28/21. Wound care, RD, and PT are involved in her care.             Limited Physical exam     Lesion 1: Right medial perineum      Lesion 2: Left buttocks          Assessment :           Lesion 1:  Moisture associated dermatitis    POA : yes    Consults: Wound Care, Nutrition, Support surfaces as per WOCN recommendations  and Physical Therapy               Lesion 2: Stage 3 pressure injury, Left buttocks    POA : yes    Consults:Wound Care, Nutrition, Support surfaces as per WOCN recommendations  and Physical Therapy        Follow up:  Ms. Champion is being seen for wound assessment. RN at bedside and assisted with turning. Continue clear barrier cream with miconazole to areas. Nursing to continue pressure injury prevention measures. Pt will be re-evaluated weekly.

## 2021-11-16 NOTE — SUBJECTIVE & OBJECTIVE
Interval History: NAEON. Possible dental source per ID given BCx results. LP deferred. DORCAS pending discussions amongst ID and primary team.     Medications:  Continuous Infusions:   lactated ringers Stopped (11/16/21 0738)     Scheduled Meds:   cefTRIAXone (ROCEPHIN) IVPB  2 g Intravenous Q12H    levothyroxine  100 mcg Oral Before breakfast    metronidazole  500 mg Intravenous Q8H    miconazole nitrate 2%   Topical (Top) BID    senna-docusate 8.6-50 mg  1 tablet Oral Daily     PRN Meds:acetaminophen, dextrose 50%, glucagon (human recombinant), insulin aspart U-100, labetalol, magnesium oxide, magnesium oxide, potassium bicarbonate, potassium bicarbonate, potassium bicarbonate, potassium, sodium phosphates, potassium, sodium phosphates, potassium, sodium phosphates, sodium chloride 0.9%     Review of Systems  Objective:     Weight: 123.8 kg (273 lb)  Body mass index is 46.86 kg/m².  Vital Signs (Most Recent):  Temp: 98 °F (36.7 °C) (11/16/21 0705)  Pulse: 93 (11/16/21 0905)  Resp: (!) 25 (11/16/21 0905)  BP: 115/69 (11/16/21 0905)  SpO2: 97 % (11/16/21 0905) Vital Signs (24h Range):  Temp:  [98 °F (36.7 °C)-99.3 °F (37.4 °C)] 98 °F (36.7 °C)  Pulse:  [67-93] 93  Resp:  [22-50] 25  SpO2:  [90 %-97 %] 97 %  BP: (115-174)/() 115/69     Date 11/16/21 0700 - 11/17/21 0659   Shift 3119-5227 7517-6688 8755-5908 24 Hour Total   INTAKE   I.V.(mL/kg) 991.3(8)   991.3(8)   IV Piggyback 232.7   232.7   Shift Total(mL/kg) 1224(9.9)   1224(9.9)   OUTPUT   Shift Total(mL/kg)       Weight (kg) 123.8 123.8 123.8 123.8                   Female External Urinary Catheter 11/14/21 1130 (Active)   Skin no redness 11/16/21 0705   Tolerance no signs/symptoms of discomfort 11/16/21 0705   Suction Continuous suction at 60 mmHg 11/16/21 0705   Date of last wick change 11/16/21 11/16/21 0705   Time of last wick change 0705 11/16/21 0705   Output (mL) 600 mL 11/16/21 0600       Neurosurgery Physical Exam   Nursing note and  vitals reviewed.     Eyes: Pupils are equal, round, and reactive to light. EOM are normal.      Cardiovascular: Normal rate and intact distal pulses.      Abdominal: Soft.     Psych/Behavior:   Oriented x 3    Neurological:   Oriented x3  E4V4M6  AAOx3  Confused  CN II-XII grossly intact  FC x4, moves all limbs AG with grossly full strength    Significant Labs:  Recent Labs   Lab 11/15/21  0120 11/15/21  0509 11/15/21  1734 11/15/21  2237 11/16/21 0229   *  --   --   --  111*   *   < > 157* 158* 161*  158*   K 3.2*  --   --   --  3.2*   *  --   --   --  123*   CO2 28  --   --   --  25   BUN 6  --   --   --  7   CREATININE 1.0  --   --   --  1.0   CALCIUM 8.4*  --   --   --  8.4*   MG 1.9  --   --   --  2.1    < > = values in this interval not displayed.     Recent Labs   Lab 11/15/21  0120 11/16/21  0229   WBC 7.68 6.42   HGB 8.8* 9.6*   HCT 30.7* 34.3*    220     No results for input(s): LABPT, INR, APTT in the last 48 hours.  Microbiology Results (last 7 days)     Procedure Component Value Units Date/Time    Blood culture [[id number]] Collected: 11/13/21 0227    Order Status: Completed Specimen: Blood Updated: 11/16/21 0612     Blood Culture, Routine No Growth to date      No Growth to date      No Growth to date      No Growth to date    Narrative:      Blood cultures from 2 different sites. 4 bottles total.  Please draw before starting antibiotics.    Blood culture [[id number]] Collected: 11/13/21 0227    Order Status: Completed Specimen: Blood Updated: 11/16/21 0612     Blood Culture, Routine No Growth to date      No Growth to date      No Growth to date      No Growth to date    Narrative:      Blood cultures x 2 different sites. 4 bottles total. Please  draw cultures before administering antibiotics.    Gram stain [[id number]]     Order Status: Canceled Specimen: CSF (Spinal Fluid) from CSF Tap, Tube 3     CSF culture [[id number]]     Order Status: Canceled Specimen: CSF (Spinal  Fluid) from CSF Tap, Tube 3     AFB Culture & Smear [[id number]]     Order Status: Canceled Specimen: CSF (Spinal Fluid) from CSF Tap, Tube 3     Fungus culture [[id number]]     Order Status: Canceled Specimen: CSF (Spinal Fluid) from CSF Tap, Tube 3     Cryptococcal antigen, CSF [[id number]]     Order Status: Canceled Specimen: CSF (Spinal Fluid) from CSF Tap, Tube 3     Urine culture [[id number]] Collected: 11/13/21 0328    Order Status: Completed Specimen: Urine Updated: 11/14/21 1158     Urine Culture, Routine No growth            Significant Diagnostics:  I have reviewed and interpreted all pertinent imaging results/findings within the past 24 hours.

## 2021-11-16 NOTE — SUBJECTIVE & OBJECTIVE
Interval History: working with PT, complains of vaginal itching    Review of Systems   Constitutional: Negative for activity change, appetite change, chills, fever and unexpected weight change.   HENT: Negative for dental problem, ear discharge, ear pain, mouth sores, sinus pain, sore throat and trouble swallowing.    Eyes: Negative for pain and discharge.   Respiratory: Negative for cough, chest tightness, shortness of breath and wheezing.    Cardiovascular: Negative for chest pain and leg swelling.   Gastrointestinal: Negative for abdominal distention, abdominal pain, constipation, diarrhea, nausea and vomiting.   Genitourinary: Negative for difficulty urinating, dysuria, flank pain, frequency, genital sores and hematuria.        Vaginal itching   Musculoskeletal: Negative for arthralgias, joint swelling, neck pain and neck stiffness.   Skin: Negative for color change, rash and wound.   Neurological: Negative for dizziness, weakness, light-headedness, numbness and headaches.   Psychiatric/Behavioral: Positive for confusion. The patient is not nervous/anxious.      Objective:     Vital Signs (Most Recent):  Temp: 98.2 °F (36.8 °C) (11/15/21 1930)  Pulse: 83 (11/15/21 1930)  Resp: (!) 23 (11/15/21 1930)  BP: 138/73 (11/15/21 1930)  SpO2: 96 % (11/15/21 1930) Vital Signs (24h Range):  Temp:  [98.2 °F (36.8 °C)-99.8 °F (37.7 °C)] 98.2 °F (36.8 °C)  Pulse:  [64-96] 83  Resp:  [13-40] 23  SpO2:  [92 %-99 %] 96 %  BP: (109-164)/(53-97) 138/73     Weight: 123.8 kg (273 lb)  Body mass index is 46.86 kg/m².    Estimated Creatinine Clearance: 79.7 mL/min (based on SCr of 1 mg/dL).    Physical Exam  Constitutional:       General: She is not in acute distress.     Appearance: Normal appearance. She is well-developed. She is not ill-appearing or diaphoretic.   HENT:      Head: Normocephalic and atraumatic.      Right Ear: External ear normal.      Left Ear: External ear normal.      Nose: Nose normal.   Eyes:      General: No  scleral icterus.        Right eye: No discharge.         Left eye: No discharge.      Extraocular Movements: Extraocular movements intact.      Conjunctiva/sclera: Conjunctivae normal.   Pulmonary:      Effort: Pulmonary effort is normal. No respiratory distress.      Breath sounds: No stridor.   Musculoskeletal:         General: Normal range of motion.   Skin:     Findings: No erythema or rash.   Neurological:      General: No focal deficit present.      Mental Status: She is alert.      Cranial Nerves: No cranial nerve deficit.   Psychiatric:         Mood and Affect: Mood normal.         Behavior: Behavior normal.         Thought Content: Thought content normal.         Judgment: Judgment normal.         Significant Labs:   CBC:   Recent Labs   Lab 11/14/21  0122 11/15/21  0120   WBC 6.15 7.68   HGB 7.9* 8.8*   HCT 27.6* 30.7*    290     CMP:   Recent Labs   Lab 11/14/21  0122 11/14/21  0518 11/15/21  0120 11/15/21  0120 11/15/21  0509 11/15/21  1227 11/15/21  1734      < > 155*   < > 155* 159* 157*   K 3.0*  --  3.2*  --   --   --   --    *  --  117*  --   --   --   --    CO2 23  --  28  --   --   --   --    *  --  136*  --   --   --   --    BUN 5*  --  6  --   --   --   --    CREATININE 0.7  --  1.0  --   --   --   --    CALCIUM 7.5*  --  8.4*  --   --   --   --    PROT 6.3  --  6.9  --   --   --   --    ALBUMIN 2.1*  --  2.3*  --   --   --   --    BILITOT 0.3  --  0.3  --   --   --   --    ALKPHOS 70  --  75  --   --   --   --    AST 10  --  10  --   --   --   --    ALT <5*  --  5*  --   --   --   --    ANIONGAP 8  --  10  --   --   --   --    EGFRNONAA >60.0  --  >60.0  --   --   --   --     < > = values in this interval not displayed.     Microbiology Results (last 7 days)     Procedure Component Value Units Date/Time    Gram stain [[id number]]     Order Status: Canceled Specimen: CSF (Spinal Fluid) from CSF Tap, Tube 3     CSF culture [[id number]]     Order Status: Canceled Specimen:  CSF (Spinal Fluid) from CSF Tap, Tube 3     AFB Culture & Smear [[id number]]     Order Status: Canceled Specimen: CSF (Spinal Fluid) from CSF Tap, Tube 3     Fungus culture [[id number]]     Order Status: Canceled Specimen: CSF (Spinal Fluid) from CSF Tap, Tube 3     Cryptococcal antigen, CSF [[id number]]     Order Status: Canceled Specimen: CSF (Spinal Fluid) from CSF Tap, Tube 3     Blood culture [[id number]] Collected: 11/13/21 0227    Order Status: Completed Specimen: Blood Updated: 11/15/21 0613     Blood Culture, Routine No Growth to date      No Growth to date      No Growth to date    Narrative:      Blood cultures x 2 different sites. 4 bottles total. Please  draw cultures before administering antibiotics.    Blood culture [[id number]] Collected: 11/13/21 0227    Order Status: Completed Specimen: Blood Updated: 11/15/21 0612     Blood Culture, Routine No Growth to date      No Growth to date      No Growth to date    Narrative:      Blood cultures from 2 different sites. 4 bottles total.  Please draw before starting antibiotics.    Urine culture [[id number]] Collected: 11/13/21 0328    Order Status: Completed Specimen: Urine Updated: 11/14/21 1158     Urine Culture, Routine No growth          Significant Imaging: I have reviewed all pertinent imaging results/findings within the past 24 hours.

## 2021-11-16 NOTE — PROGRESS NOTES
[facility]  Infectious Disease  Progress Note    Patient Name: Violeta Champion  MRN: [id number]  Admission Date: 11/12/2021  Length of Stay: 4 days  Attending Physician: Deon Khanna MD  Primary Care Provider: Madie Petersen DO    Isolation Status: No active isolations  Assessment/Plan:      * Brain lesion  60F with T2DM, hypothyroidism, depression, HTN, HLD, obesity found to have numerous brain lesions found on MRI. Differential includes but not limited to endocarditis, toxoplasmosis, malignancy. Patient with recent TTE w/o evidence of vegetations. She did grow oral organisms in blood cultures: Parvimunas micra and Fusobacterium nucleatum on 10/27 from [facility].  As further diagnostic evaluation not pursued to definitively rule out endocarditis, recommend empirically treating as such.    Recommendations:  - Ceftriaxone 2g IV Q12H + Metronidazole 500mg PO Q8H  - 6 week course, last day 12/26/21    Bacteremia  -As above    Outpatient Antibiotic Therapy Plan:    Please send referral to [facility].    1) Infection:  Fusobacterium and Parvimonas bacteremia  Brain lesions of unclear etiology  Empirical treatment for infective endocarditis    2) Discharge Antibiotics:    Intravenous antibiotics:  Ceftriaxone 2g IV Q12H     Oral antibiotics:  Metronidazole 500mg PO Q8H    3) Therapy Duration:  6 weeks    Estimated end date of IV antibiotics: 12/26/2021    4) Outpatient Weekly Labs:    Order the following labs to be drawn on Mondays:    CBC   CMP     5) Fax Lab Results to Infectious Diseases Provider: Dr. Earnest Kinsey    [facility] Fax Number: [phone]    6) Outpatient Infectious Diseases Follow-up     Follow-up appointment will be arranged by the ID clinic and will be found in the patient's appointments tab.     Prior to discharge, please ensure the patient's follow-up has been scheduled.     If there is still no follow-up scheduled prior to discharge,  please send an EPIC message to Kristine James in Infectious Diseases.            Thank you for your consult. I will sign off. Please contact us if you have any additional questions.    Earnest Kinsey MD  Infectious Disease  [facility]    Subjective:     Principal Problem:Brain lesion    HPI: 60 year old female with Pmhx DM2, hypothyroidism, YUSEF, hx breast ca, fibromyalgia, htn, hld, depression, class 3 obesity admitted as transfer from [place] for concern of elevated ICP 2/2 vasogenic edema of numerous brain abscesses vs masses. Patient recently admitted at [place] 10/27 for acute encephalopathy, found to have DKA vs HHS and bacteremia. Clinically improved with abx and appropriate blood sugar management until 3 days prior to transfer when she developed worsening confusion and lethargy. MRI 11/13 revealing multiple lesions throughout the bilateral cerebral hemispheres with associated vasogenic edema and leftward midline shift. Patient growing Parvimunas Micra and Fusobacterium nucleatum from 10/27 blood culture and was found to be septic. Patient with no history of IVDU.  Patient was initially found down unconscious in her home according by neighbor after not being seen for 3 days according to patients sister (Darcy) and EMR note. Patient had recent excisional biopsy on 12/10/2020 with pathology showing sclerosis intraductal papilloma.          Interval History: Afebrile.  No acute events overnight.  Denies oral pain, recent dental infection or dental work, abdominal pain.    Review of Systems   Constitutional: Negative for activity change, appetite change, chills, fever and unexpected weight change.   HENT: Negative for dental problem, ear discharge, ear pain, mouth sores, sinus pain, sore throat and trouble swallowing.    Eyes: Negative for pain and discharge.   Respiratory: Negative for cough, chest tightness, shortness of breath and wheezing.    Cardiovascular: Negative for chest pain and leg  swelling.   Gastrointestinal: Negative for abdominal distention, abdominal pain, constipation, diarrhea, nausea and vomiting.   Genitourinary: Negative for difficulty urinating, dysuria, flank pain, frequency, genital sores and hematuria.   Musculoskeletal: Negative for arthralgias, joint swelling, neck pain and neck stiffness.   Skin: Negative for color change, rash and wound.   Neurological: Negative for dizziness, weakness, light-headedness, numbness and headaches.   Psychiatric/Behavioral: The patient is not nervous/anxious.      Objective:     Vital Signs (Most Recent):  Temp: 98.6 °F (37 °C) (11/16/21 1505)  Pulse: 97 (11/16/21 1505)  Resp: (!) 27 (11/16/21 1505)  BP: (!) 145/81 (11/16/21 1505)  SpO2: 95 % (11/16/21 1505) Vital Signs (24h Range):  Temp:  [98 °F (36.7 °C)-98.6 °F (37 °C)] 98.6 °F (37 °C)  Pulse:  [] 97  Resp:  [22-50] 27  SpO2:  [90 %-97 %] 95 %  BP: (109-174)/() 145/81     Weight: 123.8 kg (273 lb)  Body mass index is 46.86 kg/m².    Estimated Creatinine Clearance: 79.7 mL/min (based on SCr of 1 mg/dL).    Physical Exam  Constitutional:       General: She is not in acute distress.     Appearance: Normal appearance. She is well-developed. She is not ill-appearing or diaphoretic.   HENT:      Head: Normocephalic and atraumatic.      Right Ear: External ear normal.      Left Ear: External ear normal.      Nose: Nose normal.   Eyes:      General: No scleral icterus.        Right eye: No discharge.         Left eye: No discharge.      Extraocular Movements: Extraocular movements intact.      Conjunctiva/sclera: Conjunctivae normal.   Cardiovascular:      Rate and Rhythm: Normal rate and regular rhythm.      Heart sounds: No murmur heard.      Pulmonary:      Effort: Pulmonary effort is normal. No respiratory distress.      Breath sounds: No stridor.   Abdominal:      Palpations: Abdomen is soft.      Tenderness: There is no abdominal tenderness.   Musculoskeletal:         General:  Normal range of motion.   Skin:     Findings: No erythema or rash.   Neurological:      General: No focal deficit present.      Mental Status: She is alert.      Cranial Nerves: No cranial nerve deficit.   Psychiatric:         Mood and Affect: Mood normal.         Behavior: Behavior normal.         Thought Content: Thought content normal.         Judgment: Judgment normal.         Significant Labs: All pertinent labs within the past 24 hours have been reviewed.    Significant Imaging: I have reviewed all pertinent imaging results/findings within the past 24 hours.

## 2021-11-17 PROBLEM — E23.2 DIABETES INSIPIDUS: Status: ACTIVE | Noted: 2021-11-08

## 2021-11-17 LAB
ABO + RH BLD: NORMAL
ALBUMIN SERPL BCP-MCNC: 2.4 G/DL (ref 3.5–5.2)
ALP SERPL-CCNC: 76 U/L (ref 55–135)
ALT SERPL W/O P-5'-P-CCNC: <5 U/L (ref 10–44)
ANION GAP SERPL CALC-SCNC: 8 MMOL/L (ref 8–16)
AST SERPL-CCNC: 12 U/L (ref 10–40)
BACTERIA #/AREA URNS AUTO: NORMAL /HPF
BASOPHILS # BLD AUTO: 0.04 K/UL (ref 0–0.2)
BASOPHILS NFR BLD: 0.6 % (ref 0–1.9)
BILIRUB SERPL-MCNC: 0.2 MG/DL (ref 0.1–1)
BILIRUB UR QL STRIP: NEGATIVE
BLD GP AB SCN CELLS X3 SERPL QL: NORMAL
BUN SERPL-MCNC: 10 MG/DL (ref 6–20)
CALCIUM SERPL-MCNC: 8.2 MG/DL (ref 8.7–10.5)
CHLORIDE SERPL-SCNC: 120 MMOL/L (ref 95–110)
CLARITY UR REFRACT.AUTO: CLEAR
CO2 SERPL-SCNC: 29 MMOL/L (ref 23–29)
COLOR UR AUTO: ABNORMAL
CREAT SERPL-MCNC: 1.3 MG/DL (ref 0.5–1.4)
DIFFERENTIAL METHOD: ABNORMAL
EOSINOPHIL # BLD AUTO: 0.2 K/UL (ref 0–0.5)
EOSINOPHIL NFR BLD: 2.5 % (ref 0–8)
ERYTHROCYTE [DISTWIDTH] IN BLOOD BY AUTOMATED COUNT: 23.6 % (ref 11.5–14.5)
EST. GFR  (AFRICAN AMERICAN): 52.3 ML/MIN/1.73 M^2
EST. GFR  (NON AFRICAN AMERICAN): 45.3 ML/MIN/1.73 M^2
GLUCOSE SERPL-MCNC: 253 MG/DL (ref 70–110)
GLUCOSE UR QL STRIP: NEGATIVE
HCT VFR BLD AUTO: 31.2 % (ref 37–48.5)
HGB BLD-MCNC: 8.7 G/DL (ref 12–16)
HGB UR QL STRIP: ABNORMAL
IMM GRANULOCYTES # BLD AUTO: 0.02 K/UL (ref 0–0.04)
IMM GRANULOCYTES NFR BLD AUTO: 0.3 % (ref 0–0.5)
KETONES UR QL STRIP: NEGATIVE
LEUKOCYTE ESTERASE UR QL STRIP: ABNORMAL
LYMPHOCYTES # BLD AUTO: 2.1 K/UL (ref 1–4.8)
LYMPHOCYTES NFR BLD: 32.8 % (ref 18–48)
MAGNESIUM SERPL-MCNC: 2.1 MG/DL (ref 1.6–2.6)
MCH RBC QN AUTO: 26 PG (ref 27–31)
MCHC RBC AUTO-ENTMCNC: 27.9 G/DL (ref 32–36)
MCV RBC AUTO: 93 FL (ref 82–98)
MICROSCOPIC COMMENT: NORMAL
MONOCYTES # BLD AUTO: 0.5 K/UL (ref 0.3–1)
MONOCYTES NFR BLD: 7.8 % (ref 4–15)
NEUTROPHILS # BLD AUTO: 3.6 K/UL (ref 1.8–7.7)
NEUTROPHILS NFR BLD: 56 % (ref 38–73)
NITRITE UR QL STRIP: NEGATIVE
NRBC BLD-RTO: 0 /100 WBC
OSMOLALITY SERPL: 355 MOSM/KG (ref 275–295)
OSMOLALITY UR: 178 MOSM/KG (ref 50–1200)
PH UR STRIP: 6 [PH] (ref 5–8)
PHOSPHATE SERPL-MCNC: 3.6 MG/DL (ref 2.7–4.5)
PLATELET # BLD AUTO: 222 K/UL (ref 150–450)
PMV BLD AUTO: 11.4 FL (ref 9.2–12.9)
POCT GLUCOSE: 153 MG/DL (ref 70–110)
POCT GLUCOSE: 257 MG/DL (ref 70–110)
POCT GLUCOSE: 264 MG/DL (ref 70–110)
POCT GLUCOSE: 291 MG/DL (ref 70–110)
POTASSIUM SERPL-SCNC: 3.5 MMOL/L (ref 3.5–5.1)
PROT SERPL-MCNC: 6.9 G/DL (ref 6–8.4)
PROT UR QL STRIP: NEGATIVE
RBC # BLD AUTO: 3.35 M/UL (ref 4–5.4)
RBC #/AREA URNS AUTO: 1 /HPF (ref 0–4)
SODIUM SERPL-SCNC: 157 MMOL/L (ref 136–145)
SODIUM SERPL-SCNC: 158 MMOL/L (ref 136–145)
SODIUM SERPL-SCNC: 160 MMOL/L (ref 136–145)
SODIUM UR-SCNC: 42 MMOL/L (ref 20–250)
SP GR UR STRIP: 1 (ref 1–1.03)
SQUAMOUS #/AREA URNS AUTO: 0 /HPF
T GONDII IGG SER QL IA: NORMAL
T GONDII IGG SERPL IA-ACNC: <5 IU/ML (ref 0–6.4)
URN SPEC COLLECT METH UR: ABNORMAL
WBC # BLD AUTO: 6.43 K/UL (ref 3.9–12.7)
WBC #/AREA URNS AUTO: 2 /HPF (ref 0–5)

## 2021-11-17 PROCEDURE — 25000003 PHARM REV CODE 250: Performed by: STUDENT IN AN ORGANIZED HEALTH CARE EDUCATION/TRAINING PROGRAM

## 2021-11-17 PROCEDURE — S0030 INJECTION, METRONIDAZOLE: HCPCS | Performed by: NURSE PRACTITIONER

## 2021-11-17 PROCEDURE — 86850 RBC ANTIBODY SCREEN: CPT

## 2021-11-17 PROCEDURE — 97112 NEUROMUSCULAR REEDUCATION: CPT

## 2021-11-17 PROCEDURE — 84295 ASSAY OF SERUM SODIUM: CPT | Mod: 91

## 2021-11-17 PROCEDURE — 99233 PR SUBSEQUENT HOSPITAL CARE,LEVL III: ICD-10-PCS | Mod: ,,, | Performed by: NEUROLOGICAL SURGERY

## 2021-11-17 PROCEDURE — 84100 ASSAY OF PHOSPHORUS: CPT | Performed by: STUDENT IN AN ORGANIZED HEALTH CARE EDUCATION/TRAINING PROGRAM

## 2021-11-17 PROCEDURE — 20000000 HC ICU ROOM

## 2021-11-17 PROCEDURE — 83930 ASSAY OF BLOOD OSMOLALITY: CPT | Performed by: PHYSICIAN ASSISTANT

## 2021-11-17 PROCEDURE — 99291 PR CRITICAL CARE, E/M 30-74 MINUTES: ICD-10-PCS | Mod: ,,, | Performed by: PSYCHIATRY & NEUROLOGY

## 2021-11-17 PROCEDURE — 63600175 PHARM REV CODE 636 W HCPCS: Performed by: PSYCHIATRY & NEUROLOGY

## 2021-11-17 PROCEDURE — 25000003 PHARM REV CODE 250: Performed by: PHYSICIAN ASSISTANT

## 2021-11-17 PROCEDURE — 97530 THERAPEUTIC ACTIVITIES: CPT

## 2021-11-17 PROCEDURE — 83935 ASSAY OF URINE OSMOLALITY: CPT | Performed by: PHYSICIAN ASSISTANT

## 2021-11-17 PROCEDURE — 94761 N-INVAS EAR/PLS OXIMETRY MLT: CPT

## 2021-11-17 PROCEDURE — 83735 ASSAY OF MAGNESIUM: CPT | Performed by: STUDENT IN AN ORGANIZED HEALTH CARE EDUCATION/TRAINING PROGRAM

## 2021-11-17 PROCEDURE — 84295 ASSAY OF SERUM SODIUM: CPT | Mod: 91 | Performed by: PHYSICIAN ASSISTANT

## 2021-11-17 PROCEDURE — 92507 TX SP LANG VOICE COMM INDIV: CPT

## 2021-11-17 PROCEDURE — 84300 ASSAY OF URINE SODIUM: CPT | Performed by: PHYSICIAN ASSISTANT

## 2021-11-17 PROCEDURE — 80053 COMPREHEN METABOLIC PANEL: CPT | Performed by: STUDENT IN AN ORGANIZED HEALTH CARE EDUCATION/TRAINING PROGRAM

## 2021-11-17 PROCEDURE — 99900035 HC TECH TIME PER 15 MIN (STAT)

## 2021-11-17 PROCEDURE — 81001 URINALYSIS AUTO W/SCOPE: CPT | Performed by: NURSE PRACTITIONER

## 2021-11-17 PROCEDURE — 99233 SBSQ HOSP IP/OBS HIGH 50: CPT | Mod: ,,, | Performed by: NEUROLOGICAL SURGERY

## 2021-11-17 PROCEDURE — 85025 COMPLETE CBC W/AUTO DIFF WBC: CPT | Performed by: STUDENT IN AN ORGANIZED HEALTH CARE EDUCATION/TRAINING PROGRAM

## 2021-11-17 PROCEDURE — 99291 CRITICAL CARE FIRST HOUR: CPT | Mod: ,,, | Performed by: PSYCHIATRY & NEUROLOGY

## 2021-11-17 PROCEDURE — 63600175 PHARM REV CODE 636 W HCPCS: Performed by: NURSE PRACTITIONER

## 2021-11-17 PROCEDURE — 92526 ORAL FUNCTION THERAPY: CPT

## 2021-11-17 PROCEDURE — 25000003 PHARM REV CODE 250: Performed by: NURSE PRACTITIONER

## 2021-11-17 PROCEDURE — 97110 THERAPEUTIC EXERCISES: CPT

## 2021-11-17 PROCEDURE — 63600175 PHARM REV CODE 636 W HCPCS: Performed by: PHYSICIAN ASSISTANT

## 2021-11-17 PROCEDURE — 84295 ASSAY OF SERUM SODIUM: CPT | Performed by: STUDENT IN AN ORGANIZED HEALTH CARE EDUCATION/TRAINING PROGRAM

## 2021-11-17 RX ORDER — DESMOPRESSIN ACETATE 4 UG/ML
1 INJECTION, SOLUTION INTRAVENOUS; SUBCUTANEOUS 2 TIMES DAILY
Status: DISCONTINUED | OUTPATIENT
Start: 2021-11-17 | End: 2021-11-17

## 2021-11-17 RX ORDER — SODIUM CHLORIDE 450 MG/100ML
INJECTION, SOLUTION INTRAVENOUS CONTINUOUS
Status: DISCONTINUED | OUTPATIENT
Start: 2021-11-17 | End: 2021-11-17

## 2021-11-17 RX ORDER — POTASSIUM CHLORIDE 7.45 MG/ML
40 INJECTION INTRAVENOUS
Status: DISCONTINUED | OUTPATIENT
Start: 2021-11-17 | End: 2021-11-18

## 2021-11-17 RX ORDER — POTASSIUM CHLORIDE 7.45 MG/ML
80 INJECTION INTRAVENOUS
Status: DISCONTINUED | OUTPATIENT
Start: 2021-11-17 | End: 2021-11-18

## 2021-11-17 RX ORDER — SODIUM CHLORIDE 9 MG/ML
INJECTION, SOLUTION INTRAVENOUS CONTINUOUS
Status: DISCONTINUED | OUTPATIENT
Start: 2021-11-17 | End: 2021-11-17

## 2021-11-17 RX ORDER — AMLODIPINE BESYLATE 10 MG/1
10 TABLET ORAL DAILY
Status: DISCONTINUED | OUTPATIENT
Start: 2021-11-18 | End: 2021-12-01 | Stop reason: HOSPADM

## 2021-11-17 RX ORDER — INSULIN ASPART 100 [IU]/ML
2 INJECTION, SOLUTION INTRAVENOUS; SUBCUTANEOUS
Status: DISCONTINUED | OUTPATIENT
Start: 2021-11-17 | End: 2021-11-18

## 2021-11-17 RX ORDER — DESMOPRESSIN ACETATE 4 UG/ML
0.5 INJECTION, SOLUTION INTRAVENOUS; SUBCUTANEOUS 2 TIMES DAILY
Status: DISCONTINUED | OUTPATIENT
Start: 2021-11-17 | End: 2021-11-17

## 2021-11-17 RX ORDER — POTASSIUM CHLORIDE 7.45 MG/ML
60 INJECTION INTRAVENOUS
Status: DISCONTINUED | OUTPATIENT
Start: 2021-11-17 | End: 2021-11-18

## 2021-11-17 RX ORDER — AMLODIPINE BESYLATE 5 MG/1
5 TABLET ORAL DAILY
Status: DISCONTINUED | OUTPATIENT
Start: 2021-11-17 | End: 2021-11-17

## 2021-11-17 RX ADMIN — INSULIN ASPART 2 UNITS: 100 INJECTION, SOLUTION INTRAVENOUS; SUBCUTANEOUS at 09:11

## 2021-11-17 RX ADMIN — INSULIN ASPART 3 UNITS: 100 INJECTION, SOLUTION INTRAVENOUS; SUBCUTANEOUS at 09:11

## 2021-11-17 RX ADMIN — LEVOTHYROXINE SODIUM 100 MCG: 100 TABLET ORAL at 06:11

## 2021-11-17 RX ADMIN — INSULIN ASPART 2 UNITS: 100 INJECTION, SOLUTION INTRAVENOUS; SUBCUTANEOUS at 12:11

## 2021-11-17 RX ADMIN — METRONIDAZOLE 500 MG: 500 INJECTION, SOLUTION INTRAVENOUS at 03:11

## 2021-11-17 RX ADMIN — LABETALOL HYDROCHLORIDE 10 MG: 5 INJECTION, SOLUTION INTRAVENOUS at 10:11

## 2021-11-17 RX ADMIN — METRONIDAZOLE 500 MG: 500 INJECTION, SOLUTION INTRAVENOUS at 11:11

## 2021-11-17 RX ADMIN — MICONAZOLE NITRATE: 20 OINTMENT TOPICAL at 10:11

## 2021-11-17 RX ADMIN — SODIUM CHLORIDE 250 ML: 0.9 INJECTION, SOLUTION INTRAVENOUS at 05:11

## 2021-11-17 RX ADMIN — SODIUM CHLORIDE: 0.9 INJECTION, SOLUTION INTRAVENOUS at 03:11

## 2021-11-17 RX ADMIN — ASPIRIN 81 MG: 81 TABLET, COATED ORAL at 09:11

## 2021-11-17 RX ADMIN — METRONIDAZOLE 500 MG: 500 INJECTION, SOLUTION INTRAVENOUS at 08:11

## 2021-11-17 RX ADMIN — CEFTRIAXONE SODIUM 2 G: 2 INJECTION, SOLUTION INTRAVENOUS at 02:11

## 2021-11-17 RX ADMIN — INSULIN ASPART 2 UNITS: 100 INJECTION, SOLUTION INTRAVENOUS; SUBCUTANEOUS at 04:11

## 2021-11-17 RX ADMIN — INSULIN ASPART 3 UNITS: 100 INJECTION, SOLUTION INTRAVENOUS; SUBCUTANEOUS at 12:11

## 2021-11-17 RX ADMIN — HEPARIN SODIUM 5000 UNITS: 5000 INJECTION INTRAVENOUS; SUBCUTANEOUS at 03:11

## 2021-11-17 RX ADMIN — HEPARIN SODIUM 5000 UNITS: 5000 INJECTION INTRAVENOUS; SUBCUTANEOUS at 09:11

## 2021-11-17 RX ADMIN — TIZANIDINE 2 MG: 2 TABLET ORAL at 10:11

## 2021-11-17 RX ADMIN — POTASSIUM CHLORIDE 40 MEQ: 7.46 INJECTION, SOLUTION INTRAVENOUS at 09:11

## 2021-11-17 RX ADMIN — MICONAZOLE NITRATE: 20 OINTMENT TOPICAL at 09:11

## 2021-11-17 RX ADMIN — CEFTRIAXONE SODIUM 2 G: 2 INJECTION, SOLUTION INTRAVENOUS at 03:11

## 2021-11-17 RX ADMIN — HEPARIN SODIUM 5000 UNITS: 5000 INJECTION INTRAVENOUS; SUBCUTANEOUS at 06:11

## 2021-11-17 NOTE — PHYSICIAN QUERY
PT Name: Violeta Champion  MR #: [id number]    DOCUMENTATION CLARIFICATION      CDS/: Blanca Martínez RN             Contact information:  [email or web address]    This form is a permanent document in the medical record.     Query Date: November 17, 2021    By submitting this query, we are merely seeking further clarification of documentation. Please utilize your independent clinical judgment when addressing the question(s) below.    The Medical Record contains the following:   Indicators   Supporting Clinical Findings Location in Medical Record   x Hypertension associated with diabetes documented Hypertension associated with diabetes  - Goal SBP < 160     Diabetes insipidus  Na 157     - water restrict  - repeat urine studies     Uncontrolled type 2 diabetes mellitus with hyperglycemia  A1c 9 on admission to [place]. BG labile.     - Goal CBG < 180   - Cont basal insulin  - MDSSI  - adding aspart 2U TIDWM    Progress Note NCC 11/17([facility])   x Chronic condition(s) Pmhx DM2, hypothyroidism, YUSEF, hx breast ca, fibromyalgia, htn, hld, depression, class 3 obesity admitted as transfer from [place] for concern of elevated ICP 2/2 vasogenic edema of numerous brain abscesses vs masses.    Progress Note NCC 11/17([facility])   x Vital signs BP  Min: 109/74  Max: 200/147   Progress Note [facility]17[facility]    Treatment/Medication      Other     Provider, please clarify the relationship between the Hypertension and Diabetes Mellitus  [  X ] Hypertension is a manifestation of Diabetes Mellitus (Secondary Hypertension)   [   ]  Hypertension is not a manifestation of Diabetes Mellitus (Essential Hypertension)   [   ] Other Clarification (please specify): ____________   [  ] Clinically Undetermined       Please document in your progress notes daily for the duration of treatment, until resolved, and include in your discharge summary.

## 2021-11-17 NOTE — PROGRESS NOTES
[facility]  Neurocritical Care  Progress Note    Admit Date: 11/12/2021  Service Date: 11/17/2021  Length of Stay: 5    Subjective:     Chief Complaint: Brain lesion    History of Present Illness: 60 year old female with Pmhx DM2, hypothyroidism, YUSEF, hx breast ca, fibromyalgia, htn, hld, depression, class 3 obesity admitted as transfer from [place] for concern of elevated ICP 2/2 vasogenic edema of numerous brain abscesses vs masses. Patient recently admitted at [place] 10/27 for acute encephalopathy, found to have DKA vs HHS and bacteremia. Clinically improved with abx and appropriate blood sugar management until 3 days prior to transfer when she developed worsening confusion and lethargy. MRI 11/13 revealing multiple lesions throughout the bilateral cerebral hemispheres with associated vasogenic edema and leftward midline shift.     HPI from [place] below:  Miss Champion is a 60 year old female with a PMH of DMII on oral anti-hyperglycemics, hypothyroid on synthroid, YUSEF, Right breast papilloma, fibromyalgia, HTN, HLD and depression presents to [facility] via ambulance after being found down and unresponsive at home. Patient is obtunded with a GCS of 8 and is not able to participate in any history giving. This note is per report from neighbor, EMS, ED providers and nurse. Per report Miss Champion' neighbor hadnt seen her for 3 days so they went over to her house to check on her and found her down and unresponsive. EMS was called and brought her into the ED. Upon drawing labwork patient Wbc was 25.8, , anion gap 8, Cr 2.9, blood glucose 805, UDS pos for barbiturates, pH 6.928. Patient is protecting airway although her respirations are labored, Sp02 mid to low 90's and she has YUSEF. A dose of narcan had no response, patient was further treated with bicarb, insulin, IV fluids, IV vanc and zosyn and admitted to the ICU for a higher level of care. Patients sister Darcy was called by staff and  myself and asked to be kept updated, phone number in EMR.      Hospital Course: No notes on file    Interval History:  No acute or concerning events noted by overnight staff. Afebrile, hypertensive overnight; added amlodipine 10mg. Alert and conscious. DORCAS deferred. ID recommending long term antibiotics; signed off. Water restricting patient. Adding enamorado.     Review of Systems   Constitutional: Negative.    Respiratory: Negative.    Gastrointestinal: Negative.    Genitourinary: Negative.    Musculoskeletal: Negative.        Objective:     Vitals:  Temp: 98.2 °F (36.8 °C)  Pulse: 70  Rhythm: normal sinus rhythm  BP: (!) 200/147  MAP (mmHg): 169  Resp: (!) 25  SpO2: 98 %  O2 Device (Oxygen Therapy): room air    Temp  Min: 98 °F (36.7 °C)  Max: 99.1 °F (37.3 °C)  Pulse  Min: 70  Max: 112  BP  Min: 109/74  Max: 200/147  MAP (mmHg)  Min: 83  Max: 169  Resp  Min: 12  Max: 47  SpO2  Min: 90 %  Max: 98 %    11/16 0701 - 11/17 0700  In: 2793.9 [P.O.:1300; I.V.:991.3]  Out: 2300 [Urine:2300]   Unmeasured Output  Urine Occurrence: 1  Stool Occurrence: 1  Emesis Occurrence: 0  Pad Count: 2       Physical Exam  Constitutional:       General: She is not in acute distress.     Appearance: She is obese. She is not ill-appearing.   HENT:      Head: Normocephalic and atraumatic.      Mouth/Throat:      Mouth: Mucous membranes are moist.      Pharynx: Oropharynx is clear. No oropharyngeal exudate.   Eyes:      General:         Right eye: No discharge.         Left eye: No discharge.      Extraocular Movements: Extraocular movements intact.      Pupils: Pupils are equal, round, and reactive to light.   Cardiovascular:      Rate and Rhythm: Normal rate and regular rhythm.      Pulses: Normal pulses.      Heart sounds: Normal heart sounds.   Pulmonary:      Effort: Pulmonary effort is normal. No respiratory distress.      Breath sounds: Normal breath sounds.   Abdominal:      General: Abdomen is flat. There is no distension.       Palpations: Abdomen is soft.   Musculoskeletal:         General: No swelling. Normal range of motion.      Cervical back: Normal range of motion.   Skin:     General: Skin is warm.      Capillary Refill: Capillary refill takes less than 2 seconds.   Neurological:      Mental Status: She is alert. She is disoriented.      Cranial Nerves: No cranial nerve deficit.      Sensory: No sensory deficit.      Motor: No weakness.      Comments:   A/Ox1.   GCS 14  Intermittently follows commands. Confused speech at times. More alert.   Moves all extremities spontaneously against gravity.         Medications:  Continuous Scheduled[START ON 11/18/2021] amLODIPine, 10 mg, Daily  aspirin, 81 mg, Daily  cefTRIAXone (ROCEPHIN) IVPB, 2 g, Q12H  heparin (porcine), 5,000 Units, Q8H  insulin aspart U-100, 2 Units, TIDWM  levothyroxine, 100 mcg, Before breakfast  metronidazole, 500 mg, Q8H  miconazole nitrate 2%, , BID  senna-docusate 8.6-50 mg, 1 tablet, Daily    PRNacetaminophen, 650 mg, Q6H PRN  dextrose 50%, 12.5 g, PRN  glucagon (human recombinant), 1 mg, PRN  insulin aspart U-100, 1-10 Units, QID (AC + HS) PRN  labetalol, 10 mg, Q3H PRN  magnesium oxide, 800 mg, PRN  magnesium oxide, 800 mg, PRN  potassium bicarbonate, 35 mEq, PRN  potassium bicarbonate, 50 mEq, PRN  potassium bicarbonate, 60 mEq, PRN  potassium, sodium phosphates, 2 packet, PRN  potassium, sodium phosphates, 2 packet, PRN  potassium, sodium phosphates, 2 packet, PRN  sodium chloride 0.9%, 10 mL, PRN  tiZANidine, 2 mg, Q8H PRN      Today I personally reviewed pertinent medications, lines/drains/airways, imaging, cardiology results, laboratory results, microbiology results,    Diet  Diet Dysphagia Mechanical Soft (IDDSI Level 5) [facility]; Consistent Carbohydrate; Thin    Assessment/Plan:     Neuro  * Brain lesion  MRI Brain w/lesions throughout the cerebral hemispheres involving frontal, parietal, temporal, and occipital lobes with surrounding vasogenic  edema. Suspect 2/2 septic emboli given recent bacteremia. Speciation of BCx 10/27 revealing organisms from oral mary. Mannitol and 3% saline started prior to transfer. CTH notable for multiple areas of mild diminished attenuation involving the cerebral hemispheres bilaterally corresponding with multiple T2/FLAIR hyperintense lesions noted on the recent MRI examination.     - q1h neuro checks  - NSG consulted, appreciate recs   - repeat TTE, may need DORCAS   - BCx NGTD  - vanc/zosyn; discontinued  - Infectious Disease consult; appreciate recommendations    - start on long term antibiotics x 6 weeks  - q6h Na; Na goal 140-155     Encephalopathy, metabolic  - see principle problem    Cardiac/Vascular  Hypertension associated with diabetes  - Goal SBP < 160     Endocrine  BMI 45.0-49.9, adult  - nutrition consult    Diabetes insipidus  Na 157    - water restrict  - repeat urine studies    Uncontrolled type 2 diabetes mellitus with hyperglycemia  A1c 9 on admission to [place]. BG labile.    - Goal CBG < 180   - Cont basal insulin  - MDSSI  - adding aspart 2U TIDWM    Hypothyroidism  TSH 1.39 (11/13/2021)    - Cont home synthroid           The patient is being Prophylaxed for:  Venous Thromboembolism with: Chemical  Stress Ulcer with: Not Applicable   Ventilator Pneumonia with: not applicable    Activity Orders          Diet Dysphagia Mechanical Soft (IDDSI Level 5) [facility]; Consistent Carbohydrate; Thin: Dysphagia 2 (Mechanical Soft Ground) starting at 11/15 1418    Turn patient starting at 11/13 0000    Elevate HOB starting at 11/12 2358        Full Code    Vasquez Porter MD  Neurocritical Care  [facility]

## 2021-11-17 NOTE — PT/OT/SLP PROGRESS
"Occupational Therapy   Co-Treatment    Name: Violeta Champion  MRN: [id number]  Admitting Diagnosis:  Brain lesion       Recommendations:     Discharge Recommendations: rehabilitation facility  Discharge Equipment Recommendations: TBD pending progress  Barriers to discharge: Decreased caregiver support and increased assistance needed    Assessment:     Violeta Champion is a 60 y.o. female with a medical diagnosis of Brain lesion.  She presents with performance deficits including weakness,impaired endurance,impaired self care skills,impaired functional mobilty,impaired balance,decreased safety awareness,decreased lower extremity function,decreased upper extremity function,decreased coordination. Pt in a good mood with increased active participation this date, continues to require 2 person assistance for mobility tasks and demo inattention to L side. Pt would continue to benefit from OT to increase functional independence and safety. Recommend rehab upon D/C to return to [facility].    Rehab Prognosis:  Good; patient would benefit from acute skilled OT services to address these deficits and reach maximum level of function.       Plan:     Patient to be seen 4 x/week to address the above listed problems via self-care/home management,therapeutic activities,therapeutic exercises,neuromuscular re-education  · Plan of Care Expires: 12/13/21  · Plan of Care Reviewed with: patient,sibling    Subjective     Pain/Comfort:  · Pain Rating 1: 0/10  · Pain Rating Post-Intervention 1: 0/10    Objective:     Communicated with: RN prior to session. Patient found L sidelying with HOB elevated with telemetry,pulse ox (continuous),blood pressure cuff,peripheral IV,enamorado catheter upon OT entry to room, sister present. Pt oriented but demo confusion at times with inappropriate responses.  "I feel like I'm in  nights."  Co-treatment performed with PT due to patient's multiple deficits requiring two skilled therapists to appropriately and " safely assess patient's strength and endurance while facilitating functional tasks in addition to accommodating for patient's activity tolerance.     General Precautions: Standard, fall,aspiration   Orthopedic Precautions:N/A   Braces: N/A  Respiratory Status:  · O2 Device (Oxygen Therapy): room air     Occupational Performance:     Bed Mobility:    · Patient completed Scooting/Bridging with total assistance and 2 persons in supine to HOB  · Patient completed Supine to Sit with total assistance and 2 persons although pt initiating moving B LEs and attempting to assist  · Patient completed Sit to Supine with total assistance and 2 persons     Functional Mobility/Transfers:  · Not attempted    Activities of Daily Living:  · Lower Body Dressing: total assistance to don socks      AMPA 6 Click ADL: 8    Treatment & Education:  Pt sat EOB ~15 min with Max-Total A for postural control-- feet unable to reach floor due to bed height; completed functional reaching with R UE while EOB x 5 trials with ~75% accuracy and Max-Total A to maintain balance, unable to successfully complete with L UE; completed AAROM L UE punch outs but pt resisting and with decreased attention to L side; completed R LE knee extension x 5 reps, unable to actively complete on the L LE on command and required assistance; returned to supine/sidelying and positioned sidelying, educated on importance of turn schedule for pressure relief; discussed POC and D/C recs with pt and sister    Patient left right sidelying with all lines intact, call button in reach, RN notified and sister presentEducation:      GOALS:   Multidisciplinary Problems     Occupational Therapy Goals        Problem: Occupational Therapy Goal    Goal Priority Disciplines Outcome Interventions   Occupational Therapy Goal     OT, PT/OT Ongoing, Progressing    Description: Goals to be met by: 2 weeks (11/27/21)     Patient will increase functional independence with ADLs by performing:    UE  Dressing with Minimal Assistance.  Grooming while seated with Minimal Assistance.  Sitting at edge of bed x10 minutes with Minimal Assistance.  Supine to sit with Minimal Assistance.  Stand pivot transfers with Moderate Assistance.  Pt will follow 3/3 one-step commands to participate in ADL task.                     Time Tracking:     OT Date of Treatment: 11/17/21  OT Start Time: 1013  OT Stop Time: 1041  OT Total Time (min): 28 min    Billable Minutes:Therapeutic Activity 16  Therapeutic Exercise 12    OT/MARY ALICE: OT          11/17/2021

## 2021-11-17 NOTE — ASSESSMENT & PLAN NOTE
60F h/o breast cancer, admitted 10/17 to [facility] w/ HHS found to have septicemia, BCx w/ oral fusobacerium, treated w/ appropriate abx. Increased lethargy last few days/AMS prompted new MRI which showed multiple lesions throughout the bilateral cerebral hemispheres with vasogenic edema ,mild leftward midline shift and partial mass effect of the right lateral ventricle. [place] [facility] [place] level of care. Now patient is in ICU stable protecting airway.    Plan  --admitted to [facility]  --q1 neurochecks  --MRI w wo most consistent with numerous scattered septic emboli  --TTE unrevealing, DORCAS deferred per Cardiology  --LP deferred per primary  --ID consulted, recommending 6 week of antibiotics:   - Cardiology would like to defer DORCAS for now until more clinically stable. LP remains too high risk for further evaluation. Patient continues to improve. IJ US is negative for septic thrombophlebitis. I feel most likely etiology of brain lesions is septic emboli from an undetermined source, in the setting of parvimonas and fusobacterium bacteremia. Will plan to treat with 6 of ceftriaxone and metronidazole for brain abscess and possible IE. Toxo IgG is pending, feel this is much less likely but will follow up on results. If patient develops any clinical worsening, and is able to undergo LP, recommend cell count, protein, gluc, culture and toxo PCR for further evaluation.   --Na >135  --no acute neurosurgical intervention at this time, will sign off and defer antibiotic treatment to ID    Please call w/questions

## 2021-11-17 NOTE — PT/OT/SLP PROGRESS
Physical Therapy Co-Treatment with OT    Patient Name:  Violeta Champion   MRN:  [id number]    *Co-treatment with OT due to patient complexity and need for two skilled therapists to ensure safe mobilization.    Recommendations:     Discharge Recommendations:  rehabilitation facility   Discharge Equipment Recommendations:  (tbd pending progress)   Barriers to discharge: increased (A), decreased caregiver support    Highest Level of Mobility: Sitting EOB  Assistance Required: Max-total(A)    Assessment:     Violeta Champion is a 60 y.o. female admitted with a medical diagnosis of Brain lesion.  She presents with the following impairments/functional limitations:  impaired balance,weakness,decreased safety awareness,impaired endurance,impaired self care skills,impaired functional mobilty,decreased upper extremity function,decreased lower extremity function,gait instability. Violeta Champion would benefit from acute PT intervention to address listed functional deficits, provide patient/caregiver education, reduce fall risk, and maximize (I) and safety with functional mobility.    Pt met with HOB elevated, sister present and agreeable to PT treatment. Pt with increased active participation during treatment at this date, but continues to require max verbal cues to complete tasks and total(A)X2 for mobility. Pt continues to demo L-sided inattention and a L-sided lean while sitting EOB. When asked to move her L LE to command, pt moves her R LE, however she moves her L LE spontaneously against gravity. Pt able to tolerate sitting EOB for ~15 mins today with max-total(A).    Pt is progressing towards acute PT goals appropriately and continues to benefit from acute PT sessions. After hospital discharge, pt would benefit from inpatient rehab to maximize rehab potential.    Rehab Prognosis: Good; patient would benefit from acute skilled PT services to address these deficits and reach maximum level of function.      Plan:     During  "this hospitalization, patient to be seen 4 x/week to address the identified rehab impairments via gait training,therapeutic activities,therapeutic exercises,neuromuscular re-education and progress toward the following goals:    · Plan of Care Expires:  12/13/21    This plan of care has been discussed with the patient/caregiver, who was included in its development and is in agreement with the identified goals and treatment plan.     Subjective     Communicated with RN prior to session.  Patient agreeable to participate.     Pain/Comfort:  · Pain Rating 1: 0/10  · Pain Rating Post-Intervention 1: 0/10    Chief Complaint: Brain lesion  Patient/Family Comments/goals: "I feel like I'm in  Nights" "I see oatmeal out there"   "I know how my feet work"      Objective:     Patient found HOB elevated with telemetry,pulse ox (continuous),blood pressure cuff,peripheral IV,enamorado catheter  upon PT entry to room.    General Precautions: Standard, fall,aspiration   Orthopedic Precautions:N/A   Braces: N/A         Exams:     Cognition:  o Pt is alert and cooperative  o Command following: Pt follows commands selectively, demos L-sided inattention    Functional Mobility:    Bed Mobility:  · Supine to Sit: Total Assistance and 2 persons on L side of bed  · Pt demos good initiation   · Sit to Supine: Total Assistance and 2 persons  · Rolling L: Total Assistance  · Scooting anteriorly to EOB to plant feet on floor: Total Assistance and 2 persons  · Scooting/Bridging in supine to HOB: Total Assistance and 2 persons via drawsheet    Transfers:   · Sit to Stand Transfer: Activity did not occur due to poor sitting balance and poor command following             Gait:  · Patient unable at this time    Balance:  · Static Sit:   · Max-total(A) at EOB x ~15 minutes  · Poor: Patient requires handhold support and moderate to maximal assistance to maintain position.  · Dynamic sit:  · Poor: Patient unable to accept challenge or move without " loss of balance.      Therapeutic Activities/Exercises      Patient assisted with functional mobility as noted above   PT provided neuromuscular facilitation to improve pt's upright posture when seated EOB. Pt demos a L-sided lean and a R gaze. PT provided pectoralis major stretching and provided verbal cues for scapular retraction   Attempted LAQs with B LEs, pt able to complete x5 reps on R and 0 reps on L despite max verbal cues   Patient repositioned into R sidelying for pressure relief   Patient educated on the importance of early mobility to prevent functional decline during hospital stay   Patient educated on PT POC and role of PT in acute care   White board updated regarding patient's safest level of mobility with staff assistance, RN also updated.     AM-PAC 6 CLICK MOBILITY  Total Score:8     Patient left right sidelying with all lines intact, call button in reach, bed alarm on, RN notified and sister present.        History/Goals:     PAST MEDICAL HISTORY:  Past Medical History:   Diagnosis Date    Breast cancer 12/10/2020    Diabetes mellitus, type 2     GERD (gastroesophageal reflux disease)     High cholesterol     Hypertension     Hypothyroid     Injury of knee, leg, ankle and foot     Insulin-resistant diabetes mellitus and acanthosis nigricans     Menopause present     Osteoarthritis     Osteoarthritis, knee     Osteopenia     Osteopenia     Sleep apnea     Status post breast lump removal 12/01/2020       Past Surgical History:   Procedure Laterality Date    bilateral duct removal breast      BREAST CYST ASPIRATION Right 2020    EXCISIONAL BIOPSY Right 12/10/2020    Procedure: EXCISIONAL BIOPSY, breast; u/s guided;  Surgeon: Bernadette Lopez MD;  Location: [facility];  Service: General;  Laterality: Right;    HYSTERECTOMY      OOPHORECTOMY      OTHER SURGICAL HISTORY      bilateral central duct removal with bilateral papilomona       GOALS:   Multidisciplinary Problems      Physical Therapy Goals        Problem: Physical Therapy Goal    Goal Priority Disciplines Outcome Goal Variances Interventions   Physical Therapy Goal     PT, PT/OT Ongoing, Progressing     Description: Goals to be met by: 21     Patient will increase functional independence with mobility by performin. Supine to sit with Moderate Assistance  2. Sit to supine with Moderate Assistance  3. Rolling to Left and Right with Moderate Assistance.  4. Sit to stand transfer with Maximum Assistance  5. Bed to chair transfer with Maximum Assistance using LRAD  6. Gait  x 5 feet with Maximum Assistance using LRAD.   7. Lower extremity exercise program x15 reps per handout, with assistance as needed                     Time Tracking:     PT Received On: 21  PT Start Time: 1013     PT Stop Time: 1041  PT Total Time (min): 28 min     Billable Minutes: Therapeutic Activity 14 and Neuromuscular Re-education 14      Sarahi Norris, PT  2021   Pager# [phone]

## 2021-11-17 NOTE — SUBJECTIVE & OBJECTIVE
Interval History: More agitated overnight, overall neurologically stable. Cards deferring DORCAS, deferring LP. ID treating with 6 weeks of antibiotics for undetermined source.    Medications:  Continuous Infusions:  Scheduled Meds:   aspirin  81 mg Oral Daily    cefTRIAXone (ROCEPHIN) IVPB  2 g Intravenous Q12H    heparin (porcine)  5,000 Units Subcutaneous Q8H    levothyroxine  100 mcg Oral Before breakfast    metronidazole  500 mg Intravenous Q8H    miconazole nitrate 2%   Topical (Top) BID    senna-docusate 8.6-50 mg  1 tablet Oral Daily     PRN Meds:acetaminophen, dextrose 50%, glucagon (human recombinant), insulin aspart U-100, labetalol, magnesium oxide, magnesium oxide, potassium bicarbonate, potassium bicarbonate, potassium bicarbonate, potassium, sodium phosphates, potassium, sodium phosphates, potassium, sodium phosphates, sodium chloride 0.9%, tiZANidine     Review of Systems  Objective:     Weight: 123.8 kg (273 lb)  Body mass index is 46.86 kg/m².  Vital Signs (Most Recent):  Temp: 98.2 °F (36.8 °C) (11/17/21 0305)  Pulse: 70 (11/17/21 0605)  Resp: (!) 25 (11/17/21 0605)  BP: (!) 200/147 (11/17/21 0605)  SpO2: 98 % (11/17/21 0605) Vital Signs (24h Range):  Temp:  [98 °F (36.7 °C)-99.1 °F (37.3 °C)] 98.2 °F (36.8 °C)  Pulse:  [] 70  Resp:  [12-47] 25  SpO2:  [90 %-98 %] 98 %  BP: (109-200)/() 200/147                     Female External Urinary Catheter 11/14/21 1130 (Active)   Skin no redness 11/17/21 0305   Tolerance no signs/symptoms of discomfort 11/17/21 0305   Suction Continuous suction at 60 mmHg 11/17/21 0305   Date of last wick change 11/17/21 11/17/21 0305   Time of last wick change 0300 11/17/21 0305   Output (mL) 800 mL 11/17/21 0605       Neurosurgery Physical Exam    Nursing note and vitals reviewed.     Eyes: Pupils are equal, round, and reactive to light. EOM are normal.      Cardiovascular: Normal rate and intact distal pulses.      Abdominal: Soft.     Psych/Behavior:    Oriented x 3    Neurological:   Oriented x2  E4V4M6  AAOx2  Confused to year  CN II-XII grossly intact  FC x4, moves all limbs AG with grossly full strength  Significant Labs:  Recent Labs   Lab 11/16/21 0229 11/16/21  1212 11/16/21  2334 11/17/21 0217 11/17/21 0627   *  --   --  253*  --    *  158*   < > 157* 157* 160*   K 3.2*  --   --  3.5  --    *  --   --  120*  --    CO2 25  --   --  29  --    BUN 7  --   --  10  --    CREATININE 1.0  --   --  1.3  --    CALCIUM 8.4*  --   --  8.2*  --    MG 2.1  --   --  2.1  --     < > = values in this interval not displayed.     Recent Labs   Lab 11/16/21 0229 11/17/21 0217   WBC 6.42 6.43   HGB 9.6* 8.7*   HCT 34.3* 31.2*    222     No results for input(s): LABPT, INR, APTT in the last 48 hours.  Microbiology Results (last 7 days)     Procedure Component Value Units Date/Time    Blood culture [[id number]] Collected: 11/13/21 0227    Order Status: Completed Specimen: Blood Updated: 11/17/21 0612     Blood Culture, Routine No Growth to date      No Growth to date      No Growth to date      No Growth to date      No Growth to date    Narrative:      Blood cultures x 2 different sites. 4 bottles total. Please  draw cultures before administering antibiotics.    Blood culture [[id number]] Collected: 11/13/21 0227    Order Status: Completed Specimen: Blood Updated: 11/17/21 0612     Blood Culture, Routine No Growth to date      No Growth to date      No Growth to date      No Growth to date      No Growth to date    Narrative:      Blood cultures from 2 different sites. 4 bottles total.  Please draw before starting antibiotics.    Gram stain [[id number]]     Order Status: Canceled Specimen: CSF (Spinal Fluid) from CSF Tap, Tube 3     CSF culture [[id number]]     Order Status: Canceled Specimen: CSF (Spinal Fluid) from CSF Tap, Tube 3     AFB Culture & Smear [[id number]]     Order Status: Canceled Specimen: CSF (Spinal Fluid) from CSF Tap, Tube 3      Fungus culture [[id number]]     Order Status: Canceled Specimen: CSF (Spinal Fluid) from CSF Tap, Tube 3     Cryptococcal antigen, CSF [[id number]]     Order Status: Canceled Specimen: CSF (Spinal Fluid) from CSF Tap, Tube 3     Urine culture [[id number]] Collected: 11/13/21 0328    Order Status: Completed Specimen: Urine Updated: 11/14/21 1158     Urine Culture, Routine No growth        Significant Diagnostics:  I have reviewed and interpreted all pertinent imaging results/findings within the past 24 hours.

## 2021-11-17 NOTE — PLAN OF CARE
Problem: SLP Goal  Goal: SLP Goal  Description: Speech Therapy Short Term Goals  Goal expected to be met by 11/25  1. Pt will participate in an ongoing assessment to determine the least restrictive and safest diet with possible updated goals to follow pending results.  2. Pt will participate in a speech, language, and cognitive evaluation with possible updated goals to follow pending results. -ongoing  3. Pt will attend to therapy tasks for 1+ minutes given 1 redirection.   4. Pt will answer orientation questions x4 given min cues.   5. Pt will answer general information questions with 75% acc given cues.   6. Pt will complete word finding tasks with 75% acc min cues.   7. Pt will follow simple 1 step directions with 75% acc given 1 repetition.     Outcome: Ongoing, Progressing     Pt seen for dysphagia and cognitive-lingusitic tx. Current goals remain ongoing.

## 2021-11-17 NOTE — PLAN OF CARE
Continue OT plan of care.    Problem: Occupational Therapy Goal  Goal: Occupational Therapy Goal  Description: Goals to be met by: 2 weeks (11/27/21)     Patient will increase functional independence with ADLs by performing:    UE Dressing with Minimal Assistance.  Grooming while seated with Minimal Assistance.  Sitting at edge of bed x10 minutes with Minimal Assistance.  Supine to sit with Minimal Assistance.  Stand pivot transfers with Moderate Assistance.  Pt will follow 3/3 one-step commands to participate in ADL task.    Outcome: Ongoing, Progressing

## 2021-11-17 NOTE — PLAN OF CARE
Neuro:     MRI with multiple ring enhancing lesions concerning for toxo vs other infectious etiology (most likely septic emboli)   Discuss necessity for vascular imaging with nsgy   DORCAS deferred by Dr. Fine with cardiology until patient is more stable from his perspective   Preferably would obtain LP, but given mass effect on scan - risk discussed with patient and family at bedside as well as ID and will not obtain at this time    Continues to improve  cEEG without any electrographic seizure activity   abx per ID              Pulmonary:  RA         Cardiac:   sbp 100-160    Increase amlodipine to 10       Renal:  No IVFs    Function adequate    Repeat urine studies out of concern for DI      Place enamorado for accurate I/O's  Repeat urine studies       ID:     See above         Hem/Onc:     H/h stable    plt stable         Endocrine:  ISS    Add scheduled aspart    Significant hypernatremia, see renal       Fluids/Electrolytes/Nutrition/GI:     LR    elyte replacement PRN    NPO for DORCAS              Lines:    PICC in place         Proph:    DVT: SQH   Constipation:  Last output:11/15    PUP:na    VAP:na              Critical condition in that Patient has a condition that poses threat to life and bodily function: multiple brain lesions concerning for septic emboli, acute encephalopathy         36 minutes of Critical care time was spent personally by me on the following activities: development of treatment plan with patient or surrogate and bedside caregivers, discussions with consultants, evaluation of patient's response to treatment, examination of patient, ordering and performing treatments and interventions, ordering and review of laboratory studies, ordering and review of radiographic studies, pulse oximetry, antibiotic titration if applicable,   vasopressor titration if applicable, re-evaluation of patient's condition.   This   critical care time did not overlap with that of any other provider or involve time  for any procedures. There is high probability for acute neurological change leading to clinical and possibly life-threatening deterioration requiring highest level of physician preparedness for urgent intervention.

## 2021-11-17 NOTE — PROGRESS NOTES
[facility]  Neurosurgery  Progress Note    Subjective:     History of Present Illness: 60F h/o breast cancer, admitted 10/17 to [facility] w/ [facility] found to have septicemia, BCx w/ oral fusobacerium, treated w/ appropriate abx. Increased lethargy last few days/AMS prompted new MRI which showed multiple lesions throughout the bilateral cerebral hemispheres with vasogenic edema ,mild leftward midline shift and partial mass effect of the right lateral ventricle. Patient transferred to [facility] for neuro-ICU level of care. Now patient is in ICU stable protecting airway Ox name [id number] AD doesn't follow commands, on vanc and 3%, CTH pending needs MRI + C, Echo, ID consult.      Post-Op Info:  * No surgery found *         Interval History: More agitated overnight, overall neurologically stable. Cards deferring DORCAS, deferring LP. ID treating with 6 weeks of antibiotics for undetermined source.    Medications:  Continuous Infusions:  Scheduled Meds:   aspirin  81 mg Oral Daily    cefTRIAXone (ROCEPHIN) IVPB  2 g Intravenous Q12H    heparin (porcine)  5,000 Units Subcutaneous Q8H    levothyroxine  100 mcg Oral Before breakfast    metronidazole  500 mg Intravenous Q8H    miconazole nitrate 2%   Topical (Top) BID    senna-docusate 8.6-50 mg  1 tablet Oral Daily     PRN Meds:acetaminophen, dextrose 50%, glucagon (human recombinant), insulin aspart U-100, labetalol, magnesium oxide, magnesium oxide, potassium bicarbonate, potassium bicarbonate, potassium bicarbonate, potassium, sodium phosphates, potassium, sodium phosphates, potassium, sodium phosphates, sodium chloride 0.9%, tiZANidine     Review of Systems  Objective:     Weight: 123.8 kg (273 lb)  Body mass index is 46.86 kg/m².  Vital Signs (Most Recent):  Temp: 98.2 °F (36.8 °C) (11/17/21 0305)  Pulse: 70 (11/17/21 0605)  Resp: (!) 25 (11/17/21 0605)  BP: (!) 200/147 (11/17/21 0605)  SpO2: 98 % (11/17/21 0605) Vital Signs (24h Range):  Temp:  [98  °F (36.7 °C)-99.1 °F (37.3 °C)] 98.2 °F (36.8 °C)  Pulse:  [] 70  Resp:  [12-47] 25  SpO2:  [90 %-98 %] 98 %  BP: (109-200)/() 200/147                     Female External Urinary Catheter 11/14/21 1130 (Active)   Skin no redness 11/17/21 0305   Tolerance no signs/symptoms of discomfort 11/17/21 0305   Suction Continuous suction at 60 mmHg 11/17/21 0305   Date of last wick change 11/17/21 11/17/21 0305   Time of last wick change 0300 11/17/21 0305   Output (mL) 800 mL 11/17/21 0605       Neurosurgery Physical Exam    Nursing note and vitals reviewed.     Eyes: Pupils are equal, round, and reactive to light. EOM are normal.      Cardiovascular: Normal rate and intact distal pulses.      Abdominal: Soft.     Psych/Behavior:   Oriented x 3    Neurological:   Oriented x2  E4V4M6  AAOx2  Confused to year  CN II-XII grossly intact  FC x4, moves all limbs AG with grossly full strength  Significant Labs:  Recent Labs   Lab 11/16/21 0229 11/16/21  1212 11/16/21  2334 11/17/21 0217 11/17/21  0627   *  --   --  253*  --    *  158*   < > 157* 157* 160*   K 3.2*  --   --  3.5  --    *  --   --  120*  --    CO2 25  --   --  29  --    BUN 7  --   --  10  --    CREATININE 1.0  --   --  1.3  --    CALCIUM 8.4*  --   --  8.2*  --    MG 2.1  --   --  2.1  --     < > = values in this interval not displayed.     Recent Labs   Lab 11/16/21 0229 11/17/21 0217   WBC 6.42 6.43   HGB 9.6* 8.7*   HCT 34.3* 31.2*    222     No results for input(s): LABPT, INR, APTT in the last 48 hours.  Microbiology Results (last 7 days)     Procedure Component Value Units Date/Time    Blood culture [[id number]] Collected: 11/13/21 0227    Order Status: Completed Specimen: Blood Updated: 11/17/21 0612     Blood Culture, Routine No Growth to date      No Growth to date      No Growth to date      No Growth to date      No Growth to date    Narrative:      Blood cultures x 2 different sites. 4 bottles total. Please  draw  cultures before administering antibiotics.    Blood culture [[id number]] Collected: 11/13/21 0227    Order Status: Completed Specimen: Blood Updated: 11/17/21 0612     Blood Culture, Routine No Growth to date      No Growth to date      No Growth to date      No Growth to date      No Growth to date    Narrative:      Blood cultures from 2 different sites. 4 bottles total.  Please draw before starting antibiotics.    Gram stain [[id number]]     Order Status: Canceled Specimen: CSF (Spinal Fluid) from CSF Tap, Tube 3     CSF culture [[id number]]     Order Status: Canceled Specimen: CSF (Spinal Fluid) from CSF Tap, Tube 3     AFB Culture & Smear [[id number]]     Order Status: Canceled Specimen: CSF (Spinal Fluid) from CSF Tap, Tube 3     Fungus culture [[id number]]     Order Status: Canceled Specimen: CSF (Spinal Fluid) from CSF Tap, Tube 3     Cryptococcal antigen, CSF [[id number]]     Order Status: Canceled Specimen: CSF (Spinal Fluid) from CSF Tap, Tube 3     Urine culture [[id number]] Collected: 11/13/21 0328    Order Status: Completed Specimen: Urine Updated: 11/14/21 1158     Urine Culture, Routine No growth        Significant Diagnostics:  I have reviewed and interpreted all pertinent imaging results/findings within the past 24 hours.    Assessment/Plan:     * Brain lesion  60F h/o breast cancer, admitted 10/17 to [facility] w/ [facility] found to have septicemia, BCx w/ oral fusobacerium, treated w/ appropriate abx. Increased lethargy last few days/AMS prompted new MRI which showed multiple lesions throughout the bilateral cerebral hemispheres with vasogenic edema ,mild leftward midline shift and partial mass effect of the right lateral ventricle. Patient transferred to [facility] for neuro-ICU level of care. Now patient is in ICU stable protecting airway.    Plan  --admitted to [facility]  --q1 neurochecks  --MRI w wo most consistent with numerous scattered septic emboli  --TTE unrevealing, DORCAS deferred per Cardiology  --LP deferred  per primary  --ID consulted, recommending 6 week of antibiotics:   - Cardiology would like to defer DORCAS for now until more clinically stable. LP remains too high risk for further evaluation. Patient continues to improve. IJ US is negative for septic thrombophlebitis. I feel most likely etiology of brain lesions is septic emboli from an undetermined source, in the setting of parvimonas and fusobacterium bacteremia. Will plan to treat with 6 of ceftriaxone and metronidazole for brain abscess and possible IE. Toxo IgG is pending, feel this is much less likely but will follow up on results. If patient develops any clinical worsening, and is able to undergo LP, recommend cell count, protein, gluc, culture and toxo PCR for further evaluation.   --Na >135  --no acute neurosurgical intervention at this time, will sign off and defer antibiotic treatment to ID    Please call w/questions          Aleyda Bruner MD  Neurosurgery  [facility]

## 2021-11-17 NOTE — SUBJECTIVE & OBJECTIVE
Interval History:  No acute or concerning events noted by overnight staff. Afebrile, hypertensive overnight; added amlodipine 10mg. Alert and conscious. DORCAS deferred. ID recommending long term antibiotics; signed off. Water restricting patient. Adding enamorado.     Review of Systems   Constitutional: Negative.    Respiratory: Negative.    Gastrointestinal: Negative.    Genitourinary: Negative.    Musculoskeletal: Negative.        Objective:     Vitals:  Temp: 98.2 °F (36.8 °C)  Pulse: 70  Rhythm: normal sinus rhythm  BP: (!) 200/147  MAP (mmHg): 169  Resp: (!) 25  SpO2: 98 %  O2 Device (Oxygen Therapy): room air    Temp  Min: 98 °F (36.7 °C)  Max: 99.1 °F (37.3 °C)  Pulse  Min: 70  Max: 112  BP  Min: 109/74  Max: 200/147  MAP (mmHg)  Min: 83  Max: 169  Resp  Min: 12  Max: 47  SpO2  Min: 90 %  Max: 98 %    11/16 0701 - 11/17 0700  In: 2793.9 [P.O.:1300; I.V.:991.3]  Out: 2300 [Urine:2300]   Unmeasured Output  Urine Occurrence: 1  Stool Occurrence: 1  Emesis Occurrence: 0  Pad Count: 2       Physical Exam  Constitutional:       General: She is not in acute distress.     Appearance: She is obese. She is not ill-appearing.   HENT:      Head: Normocephalic and atraumatic.      Mouth/Throat:      Mouth: Mucous membranes are moist.      Pharynx: Oropharynx is clear. No oropharyngeal exudate.   Eyes:      General:         Right eye: No discharge.         Left eye: No discharge.      Extraocular Movements: Extraocular movements intact.      Pupils: Pupils are equal, round, and reactive to light.   Cardiovascular:      Rate and Rhythm: Normal rate and regular rhythm.      Pulses: Normal pulses.      Heart sounds: Normal heart sounds.   Pulmonary:      Effort: Pulmonary effort is normal. No respiratory distress.      Breath sounds: Normal breath sounds.   Abdominal:      General: Abdomen is flat. There is no distension.      Palpations: Abdomen is soft.   Musculoskeletal:         General: No swelling. Normal range of motion.       Cervical back: Normal range of motion.   Skin:     General: Skin is warm.      Capillary Refill: Capillary refill takes less than 2 seconds.   Neurological:      Mental Status: She is alert. She is disoriented.      Cranial Nerves: No cranial nerve deficit.      Sensory: No sensory deficit.      Motor: No weakness.      Comments:   A/Ox1.   GCS 14  Intermittently follows commands. Confused speech at times. More alert.   Moves all extremities spontaneously against gravity.         Medications:  Continuous Scheduled[START ON 11/18/2021] amLODIPine, 10 mg, Daily  aspirin, 81 mg, Daily  cefTRIAXone (ROCEPHIN) IVPB, 2 g, Q12H  heparin (porcine), 5,000 Units, Q8H  insulin aspart U-100, 2 Units, TIDWM  levothyroxine, 100 mcg, Before breakfast  metronidazole, 500 mg, Q8H  miconazole nitrate 2%, , BID  senna-docusate 8.6-50 mg, 1 tablet, Daily    PRNacetaminophen, 650 mg, Q6H PRN  dextrose 50%, 12.5 g, PRN  glucagon (human recombinant), 1 mg, PRN  insulin aspart U-100, 1-10 Units, QID (AC + HS) PRN  labetalol, 10 mg, Q3H PRN  magnesium oxide, 800 mg, PRN  magnesium oxide, 800 mg, PRN  potassium bicarbonate, 35 mEq, PRN  potassium bicarbonate, 50 mEq, PRN  potassium bicarbonate, 60 mEq, PRN  potassium, sodium phosphates, 2 packet, PRN  potassium, sodium phosphates, 2 packet, PRN  potassium, sodium phosphates, 2 packet, PRN  sodium chloride 0.9%, 10 mL, PRN  tiZANidine, 2 mg, Q8H PRN      Today I personally reviewed pertinent medications, lines/drains/airways, imaging, cardiology results, laboratory results, microbiology results,    Diet  Diet Dysphagia Mechanical Soft (IDDSI Level 5) [facility]; Consistent Carbohydrate; Thin

## 2021-11-17 NOTE — PLAN OF CARE
[facility] Care Plan    POC reviewed with Violeta Champion and family at 1600. Pt and family verbalized understanding. Questions and concerns addressed. No acute events today. Pt progressing toward goals. Will continue to monitor. See below and flowsheets for full assessment and VS info.     No DORCAS or LP needed.   Trending Na  -if Na goes up, will notify team and they will place orders.        Neuro:  [place] Coma Scale  Best Eye Response: 4-->(E4) spontaneous  Best Motor Response: 6-->(M6) obeys commands  Best Verbal Response: 4-->(V4) confused  Ajit Coma Scale Score: 14  Assessment Qualifiers: patient not sedated/intubated  Pupil PERRLA: yes     24 hr Temp:  [98 °F (36.7 °C)-98.6 °F (37 °C)]     CV:   Rhythm: normal sinus rhythm  BP goals:   SBP < 160  MAP > 65    Resp:   O2 Device (Oxygen Therapy): room air       Plan: RA    GI/:  CHELO Total Score: 0  Diet/Nutrition Received: mechanical/dental soft,consistent carb/diabetic diet  Last Bowel Movement: 11/16/21  Voiding Characteristics: external catheter    Intake/Output Summary (Last 24 hours) at 11/16/2021 1941  Last data filed at 11/16/2021 1805  Gross per 24 hour   Intake 2684.99 ml   Output 2100 ml   Net 584.99 ml     Unmeasured Output  Urine Occurrence: 2  Stool Occurrence: 1  Pad Count: 1    Labs/Accuchecks:  Recent Labs   Lab 11/16/21 0229   WBC 6.42   RBC 3.66*   HGB 9.6*   HCT 34.3*         Recent Labs   Lab 11/16/21 0229 11/16/21  1212 11/16/21  1808   *  158*   < > 159*   K 3.2*  --   --    CO2 25  --   --    *  --   --    BUN 7  --   --    CREATININE 1.0  --   --    ALKPHOS 74  --   --    ALT <5*  --   --    AST 12  --   --    BILITOT 0.2  --   --     < > = values in this interval not displayed.      Recent Labs   Lab 11/13/21 0111   INR 1.0   APTT 21.9      Recent Labs   Lab 11/13/21 0111   TROPONINI <0.006       Electrolytes: replaced K  Accuchecks:ac/hs    Gtts:       LDA/Wounds:  Lines/Drains/Airways       Peripherally Inserted  Central Catheter Line              PICC Double Lumen 11/01/21 1950 right basilic 15 days              Drain              Female External Urinary Catheter 11/14/21 1130 2 days                  Wounds: yes  Wound care consulted: yes

## 2021-11-17 NOTE — ASSESSMENT & PLAN NOTE
A1c 9 on admission to [facility]. BG labile.    - Goal CBG < 180   - Cont basal insulin  - MDSSI  - adding aspart 2U TIDWM

## 2021-11-17 NOTE — PLAN OF CARE
Pt continues to progress towards treatment goals established in POC. Will reassess as appropriate.    Problem: Physical Therapy Goal  Goal: Physical Therapy Goal  Description: Goals to be met by: 21     Patient will increase functional independence with mobility by performin. Supine to sit with Moderate Assistance  2. Sit to supine with Moderate Assistance  3. Rolling to Left and Right with Moderate Assistance.  4. Sit to stand transfer with Maximum Assistance  5. Bed to chair transfer with Maximum Assistance using LRAD  6. Gait  x 5 feet with Maximum Assistance using LRAD.   7. Lower extremity exercise program x15 reps per handout, with assistance as needed    Outcome: Ongoing, Progressing

## 2021-11-17 NOTE — ASSESSMENT & PLAN NOTE
MRI Brain w/lesions throughout the cerebral hemispheres involving frontal, parietal, temporal, and occipital lobes with surrounding vasogenic edema. Suspect 2/2 septic emboli given recent bacteremia. Speciation of BCx 10/27 revealing organisms from oral mary. Mannitol and 3% saline started prior to transfer. CTH notable for multiple areas of mild diminished attenuation involving the cerebral hemispheres bilaterally corresponding with multiple T2/FLAIR hyperintense lesions noted on the recent MRI examination.     - q1h neuro checks  - NSG consulted, appreciate recs   - repeat TTE, may need DORCAS   - BCx NGTD  - vanc/zosyn; discontinued  - Infectious Disease consult; appreciate recommendations    - start on long term antibiotics x 6 weeks  - q6h Na; Na goal 140-155

## 2021-11-17 NOTE — PT/OT/SLP PROGRESS
"Speech Language Pathology Treatment    Patient Name:  Violeta Champion   MRN:  [id number]  Admitting Diagnosis: Brain lesion    Recommendations:                 General Recommendations:  Dysphagia therapy, Speech/language therapy and Cognitive-linguistic therapy  Diet recommendations:  Mechanical soft, Liquid Diet Level: Thin   Aspiration Precautions: Strict 1:1 assistance for all PO intake, only when awake, alert, attentive and upright,, 1 bite/sip at a time, Alternating bites/sips, Eliminate distractions, Feed only when awake/alert, Frequent oral care, HOB to 90 degrees, Meds whole buried in puree, Meds whole 1 at a time, Monitor for s/s of aspiration, Remain upright 30 minutes post meal, Small bites/sips and Strict aspiration precautions Continue to monitor for signs and symptoms of aspiration and discontinue oral feeding should you notice any of the following: watery eyes, reddened facial area, wet vocal quality, increased work of breathing, change in respiratory status, increased congestion, coughing, fever and/or change in level of alertness  General Precautions: Standard, aspiration,fall  Communication strategies:  provide increased time to answer and go to room if call light pushed, frequent redirection     Subjective     SLP reviewed Pt with RN prior to attempt, RN cleared for tx  Pt presents calm  She explains, "wait, I have to think about it, lets see"    Pain/Comfort:  · Pain Rating 1: other (see comments) (Pt did not rate pain, RN confirmed Pt c/o spasms t/o day)  · Pain Addressed 1: Nurse notified  · Pain Rating Post-Intervention 1: other (see comments) (Pt did not rate pain)    Respiratory Status:  · O2 Device (Oxygen Therapy): room air    Objective:     Has the patient been evaluated by SLP for swallowing?   Yes  Keep patient NPO? No   Current Respiratory Status:  room air    Pt found awake and alert in bed with Sister at bedside. She was oriented to person only. She recalled place and year for immediate " recall and post 30 second filled delay following review/rehearsal. She completed responsive speech tasks with 60% accuracy provided min verbal cues. She answered LTM with 80% accuracy provided min verbal cues.  She answered simple yes /no questions with 80% accuracy, Supervision. She answered m/u YNQ with 66% accuracy provided min-mod verbal cues for. She was easily distracted and required consistent, maximum cues to attend to structured task greater than 15 seconds in length. She was seen with sips thin liquids fed by sibling x5. Pt with delayed cough on larger straw sip thin liquids. No overt S/s aspiration with single sips of thin liquids observed. SLP educated pt and family on SLP role, S/S aspiration, aspiration precautions, attention strategies and ongoing SLP POC. Pt unable to attend or verbalize understanding while Sibling verbalized understanding. No additional questions. Pt remained upright in bed with call light in reach upon SLP exit/handoff with RN.     Assessment:     Violeta Champion is a 60 y.o. female with an SLP diagnosis of Aphasia, Dysphagia and Cognitive-Linguistic Impairment.  She presents with decreased sustained attention.    Goals:   Multidisciplinary Problems     SLP Goals        Problem: SLP Goal    Goal Priority Disciplines Outcome   SLP Goal     SLP Ongoing, Progressing   Description: Speech Therapy Short Term Goals  Goal expected to be met by 11/25  1. Pt will participate in an ongoing assessment to determine the least restrictive and safest diet with possible updated goals to follow pending results.  2. Pt will participate in a speech, language, and cognitive evaluation with possible updated goals to follow pending results. -ongoing  3. Pt will attend to therapy tasks for 1+ minutes given 1 redirection.   4. Pt will answer orientation questions x4 given min cues.   5. Pt will answer general information questions with 75% acc given cues.   6. Pt will complete word finding tasks with 75%  acc min cues.   7. Pt will follow simple 1 step directions with 75% acc given 1 repetition.                      Plan:     · Patient to be seen:  4 x/week   · Plan of Care expires:     · Plan of Care reviewed with:  patient   · SLP Follow-Up:  Yes       Discharge recommendations:  rehabilitation facility       Time Tracking:     SLP Treatment Date:   11/17/21  Speech Start Time:  1335  Speech Stop Time:  1352     Speech Total Time (min):  17 min    Billable Minutes: Speech Therapy Individual 8 and Treatment Swallowing Dysfunction 9    11/17/2021

## 2021-11-17 NOTE — PLAN OF CARE
[facility] Care Plan    POC reviewed with Violeta Champion and family at 0300. Pt verbalized understanding. Questions and concerns addressed. No acute events overnight. Pt progressing toward goals. Will continue to monitor. See below and flowsheets for full assessment and VS info.     Trending Na q6h for Na goal of 140-155  Pt restless and agitated throughout the night  Full bath given and linen changed multiple times overnight    Neuro:  Ajit Coma Scale  Best Eye Response: 4-->(E4) spontaneous  Best Motor Response: 6-->(M6) obeys commands  Best Verbal Response: 4-->(V4) confused  Ajit Coma Scale Score: 14  Assessment Qualifiers: patient not sedated/intubated  Pupil PERRLA: yes     24hr Temp:  [98 °F (36.7 °C)-99.1 °F (37.3 °C)]     CV:   Rhythm: normal sinus rhythm  BP goals:   SBP < 160  MAP > 65    Resp:   O2 Device (Oxygen Therapy): room air       Plan: N/A    GI/:  CHELO Total Score: 0  Diet/Nutrition Received: mechanical/dental soft  Last Bowel Movement: 11/17/21  Voiding Characteristics: external catheter    Intake/Output Summary (Last 24 hours) at 11/17/2021 0634  Last data filed at 11/17/2021 0605  Gross per 24 hour   Intake 2793.93 ml   Output 2300 ml   Net 493.93 ml     Unmeasured Output  Urine Occurrence: 1  Stool Occurrence: 1  Emesis Occurrence: 0  Pad Count: 2    Labs/Accuchecks:  Recent Labs   Lab 11/17/21 0217   WBC 6.43   RBC 3.35*   HGB 8.7*   HCT 31.2*         Recent Labs   Lab 11/17/21 0217   *   K 3.5   CO2 29   *   BUN 10   CREATININE 1.3   ALKPHOS 76   ALT <5*   AST 12   BILITOT 0.2      Recent Labs   Lab 11/13/21 0111   INR 1.0   APTT 21.9      Recent Labs   Lab 11/13/21 0111   TROPONINI <0.006       Electrolytes: N/A - electrolytes WDL  Accuchecks: ACHS    Gtts:       LDA/Wounds:  Lines/Drains/Airways       Peripherally Inserted Central Catheter Line              PICC Double Lumen 11/01/21 1950 right basilic 15 days              Drain              Female External  Urinary Catheter 11/14/21 1130 2 days                  Wounds: Yes  Wound care consulted: Yes

## 2021-11-18 LAB
ALBUMIN SERPL BCP-MCNC: 2.3 G/DL (ref 3.5–5.2)
ALP SERPL-CCNC: 71 U/L (ref 55–135)
ALT SERPL W/O P-5'-P-CCNC: <5 U/L (ref 10–44)
ANION GAP SERPL CALC-SCNC: 11 MMOL/L (ref 8–16)
AST SERPL-CCNC: 8 U/L (ref 10–40)
BACTERIA BLD CULT: NORMAL
BACTERIA BLD CULT: NORMAL
BASOPHILS # BLD AUTO: 0.04 K/UL (ref 0–0.2)
BASOPHILS NFR BLD: 0.6 % (ref 0–1.9)
BILIRUB SERPL-MCNC: 0.2 MG/DL (ref 0.1–1)
BUN SERPL-MCNC: 9 MG/DL (ref 6–20)
CALCIUM SERPL-MCNC: 8 MG/DL (ref 8.7–10.5)
CHLORIDE SERPL-SCNC: 125 MMOL/L (ref 95–110)
CO2 SERPL-SCNC: 27 MMOL/L (ref 23–29)
CREAT SERPL-MCNC: 1.1 MG/DL (ref 0.5–1.4)
DIFFERENTIAL METHOD: ABNORMAL
EOSINOPHIL # BLD AUTO: 0.2 K/UL (ref 0–0.5)
EOSINOPHIL NFR BLD: 3.5 % (ref 0–8)
ERYTHROCYTE [DISTWIDTH] IN BLOOD BY AUTOMATED COUNT: 23.1 % (ref 11.5–14.5)
EST. GFR  (AFRICAN AMERICAN): >60 ML/MIN/1.73 M^2
EST. GFR  (NON AFRICAN AMERICAN): 55.5 ML/MIN/1.73 M^2
GLUCOSE SERPL-MCNC: 220 MG/DL (ref 70–110)
HCT VFR BLD AUTO: 30.7 % (ref 37–48.5)
HGB BLD-MCNC: 8.6 G/DL (ref 12–16)
IMM GRANULOCYTES # BLD AUTO: 0.01 K/UL (ref 0–0.04)
IMM GRANULOCYTES NFR BLD AUTO: 0.2 % (ref 0–0.5)
LYMPHOCYTES # BLD AUTO: 2.2 K/UL (ref 1–4.8)
LYMPHOCYTES NFR BLD: 35.1 % (ref 18–48)
MAGNESIUM SERPL-MCNC: 2.1 MG/DL (ref 1.6–2.6)
MCH RBC QN AUTO: 26.6 PG (ref 27–31)
MCHC RBC AUTO-ENTMCNC: 28 G/DL (ref 32–36)
MCV RBC AUTO: 95 FL (ref 82–98)
MONOCYTES # BLD AUTO: 0.4 K/UL (ref 0.3–1)
MONOCYTES NFR BLD: 7 % (ref 4–15)
NEUTROPHILS # BLD AUTO: 3.4 K/UL (ref 1.8–7.7)
NEUTROPHILS NFR BLD: 53.6 % (ref 38–73)
NRBC BLD-RTO: 0 /100 WBC
OSMOLALITY SERPL: 305 MOSM/KG (ref 275–295)
OSMOLALITY SERPL: 316 MOSM/KG (ref 275–295)
OSMOLALITY UR: 77 MOSM/KG (ref 50–1200)
OSMOLALITY UR: 84 MOSM/KG (ref 50–1200)
PHOSPHATE SERPL-MCNC: 4.2 MG/DL (ref 2.7–4.5)
PLATELET # BLD AUTO: 157 K/UL (ref 150–450)
PMV BLD AUTO: 11.7 FL (ref 9.2–12.9)
POCT GLUCOSE: 201 MG/DL (ref 70–110)
POCT GLUCOSE: 225 MG/DL (ref 70–110)
POCT GLUCOSE: 270 MG/DL (ref 70–110)
POCT GLUCOSE: 305 MG/DL (ref 70–110)
POTASSIUM SERPL-SCNC: 3 MMOL/L (ref 3.5–5.1)
POTASSIUM SERPL-SCNC: 3.5 MMOL/L (ref 3.5–5.1)
PROT SERPL-MCNC: 6.6 G/DL (ref 6–8.4)
RBC # BLD AUTO: 3.23 M/UL (ref 4–5.4)
SODIUM SERPL-SCNC: 147 MMOL/L (ref 136–145)
SODIUM SERPL-SCNC: 149 MMOL/L (ref 136–145)
SODIUM SERPL-SCNC: 155 MMOL/L (ref 136–145)
SODIUM SERPL-SCNC: 156 MMOL/L (ref 136–145)
SODIUM SERPL-SCNC: 160 MMOL/L (ref 136–145)
SODIUM SERPL-SCNC: 163 MMOL/L (ref 136–145)
SODIUM UR-SCNC: 24 MMOL/L (ref 20–250)
SODIUM UR-SCNC: 27 MMOL/L (ref 20–250)
WBC # BLD AUTO: 6.32 K/UL (ref 3.9–12.7)

## 2021-11-18 PROCEDURE — 97530 THERAPEUTIC ACTIVITIES: CPT

## 2021-11-18 PROCEDURE — 83935 ASSAY OF URINE OSMOLALITY: CPT | Performed by: PHYSICIAN ASSISTANT

## 2021-11-18 PROCEDURE — 84100 ASSAY OF PHOSPHORUS: CPT | Performed by: STUDENT IN AN ORGANIZED HEALTH CARE EDUCATION/TRAINING PROGRAM

## 2021-11-18 PROCEDURE — 63600175 PHARM REV CODE 636 W HCPCS: Performed by: PHYSICIAN ASSISTANT

## 2021-11-18 PROCEDURE — 99233 SBSQ HOSP IP/OBS HIGH 50: CPT | Mod: ,,, | Performed by: PSYCHIATRY & NEUROLOGY

## 2021-11-18 PROCEDURE — 92507 TX SP LANG VOICE COMM INDIV: CPT

## 2021-11-18 PROCEDURE — 25000003 PHARM REV CODE 250: Performed by: PSYCHIATRY & NEUROLOGY

## 2021-11-18 PROCEDURE — 94761 N-INVAS EAR/PLS OXIMETRY MLT: CPT

## 2021-11-18 PROCEDURE — 84295 ASSAY OF SERUM SODIUM: CPT | Mod: 91

## 2021-11-18 PROCEDURE — 84300 ASSAY OF URINE SODIUM: CPT | Performed by: PHYSICIAN ASSISTANT

## 2021-11-18 PROCEDURE — 83930 ASSAY OF BLOOD OSMOLALITY: CPT | Performed by: PHYSICIAN ASSISTANT

## 2021-11-18 PROCEDURE — 99900035 HC TECH TIME PER 15 MIN (STAT)

## 2021-11-18 PROCEDURE — 63600175 PHARM REV CODE 636 W HCPCS: Performed by: NURSE PRACTITIONER

## 2021-11-18 PROCEDURE — S0030 INJECTION, METRONIDAZOLE: HCPCS | Performed by: NURSE PRACTITIONER

## 2021-11-18 PROCEDURE — 84295 ASSAY OF SERUM SODIUM: CPT | Mod: 91 | Performed by: PHYSICIAN ASSISTANT

## 2021-11-18 PROCEDURE — 97112 NEUROMUSCULAR REEDUCATION: CPT

## 2021-11-18 PROCEDURE — 25000003 PHARM REV CODE 250: Performed by: NURSE PRACTITIONER

## 2021-11-18 PROCEDURE — 84132 ASSAY OF SERUM POTASSIUM: CPT | Performed by: PSYCHIATRY & NEUROLOGY

## 2021-11-18 PROCEDURE — 80053 COMPREHEN METABOLIC PANEL: CPT | Performed by: STUDENT IN AN ORGANIZED HEALTH CARE EDUCATION/TRAINING PROGRAM

## 2021-11-18 PROCEDURE — 84295 ASSAY OF SERUM SODIUM: CPT | Performed by: PHYSICIAN ASSISTANT

## 2021-11-18 PROCEDURE — 25000003 PHARM REV CODE 250: Performed by: PHYSICIAN ASSISTANT

## 2021-11-18 PROCEDURE — 85025 COMPLETE CBC W/AUTO DIFF WBC: CPT | Performed by: STUDENT IN AN ORGANIZED HEALTH CARE EDUCATION/TRAINING PROGRAM

## 2021-11-18 PROCEDURE — 20000000 HC ICU ROOM

## 2021-11-18 PROCEDURE — 99233 PR SUBSEQUENT HOSPITAL CARE,LEVL III: ICD-10-PCS | Mod: ,,, | Performed by: PSYCHIATRY & NEUROLOGY

## 2021-11-18 PROCEDURE — 63600175 PHARM REV CODE 636 W HCPCS: Performed by: PSYCHIATRY & NEUROLOGY

## 2021-11-18 PROCEDURE — 25000003 PHARM REV CODE 250: Performed by: STUDENT IN AN ORGANIZED HEALTH CARE EDUCATION/TRAINING PROGRAM

## 2021-11-18 PROCEDURE — 83735 ASSAY OF MAGNESIUM: CPT | Performed by: STUDENT IN AN ORGANIZED HEALTH CARE EDUCATION/TRAINING PROGRAM

## 2021-11-18 PROCEDURE — 25000003 PHARM REV CODE 250

## 2021-11-18 RX ORDER — INSULIN ASPART 100 [IU]/ML
4 INJECTION, SOLUTION INTRAVENOUS; SUBCUTANEOUS
Status: DISCONTINUED | OUTPATIENT
Start: 2021-11-18 | End: 2021-11-19

## 2021-11-18 RX ORDER — SODIUM CHLORIDE 450 MG/100ML
INJECTION, SOLUTION INTRAVENOUS CONTINUOUS
Status: DISCONTINUED | OUTPATIENT
Start: 2021-11-18 | End: 2021-11-18

## 2021-11-18 RX ORDER — SODIUM CHLORIDE 450 MG/100ML
INJECTION, SOLUTION INTRAVENOUS CONTINUOUS
Status: CANCELLED | OUTPATIENT
Start: 2021-11-18

## 2021-11-18 RX ADMIN — METRONIDAZOLE 500 MG: 500 INJECTION, SOLUTION INTRAVENOUS at 03:11

## 2021-11-18 RX ADMIN — SODIUM CHLORIDE: 0.45 INJECTION, SOLUTION INTRAVENOUS at 06:11

## 2021-11-18 RX ADMIN — AMLODIPINE BESYLATE 10 MG: 10 TABLET ORAL at 08:11

## 2021-11-18 RX ADMIN — ACETAMINOPHEN 650 MG: 325 TABLET ORAL at 09:11

## 2021-11-18 RX ADMIN — INSULIN ASPART 2 UNITS: 100 INJECTION, SOLUTION INTRAVENOUS; SUBCUTANEOUS at 09:11

## 2021-11-18 RX ADMIN — MICONAZOLE NITRATE: 20 OINTMENT TOPICAL at 09:11

## 2021-11-18 RX ADMIN — METRONIDAZOLE 500 MG: 500 INJECTION, SOLUTION INTRAVENOUS at 11:11

## 2021-11-18 RX ADMIN — MICONAZOLE NITRATE: 20 OINTMENT TOPICAL at 08:11

## 2021-11-18 RX ADMIN — LEVOTHYROXINE SODIUM 100 MCG: 100 TABLET ORAL at 05:11

## 2021-11-18 RX ADMIN — LABETALOL HYDROCHLORIDE 10 MG: 5 INJECTION, SOLUTION INTRAVENOUS at 07:11

## 2021-11-18 RX ADMIN — INSULIN ASPART 4 UNITS: 100 INJECTION, SOLUTION INTRAVENOUS; SUBCUTANEOUS at 01:11

## 2021-11-18 RX ADMIN — CEFTRIAXONE SODIUM 2 G: 2 INJECTION, SOLUTION INTRAVENOUS at 03:11

## 2021-11-18 RX ADMIN — INSULIN ASPART 6 UNITS: 100 INJECTION, SOLUTION INTRAVENOUS; SUBCUTANEOUS at 08:11

## 2021-11-18 RX ADMIN — LABETALOL HYDROCHLORIDE 10 MG: 5 INJECTION, SOLUTION INTRAVENOUS at 11:11

## 2021-11-18 RX ADMIN — ASPIRIN 81 MG: 81 TABLET, COATED ORAL at 08:11

## 2021-11-18 RX ADMIN — POTASSIUM CHLORIDE 40 MEQ: 7.46 INJECTION, SOLUTION INTRAVENOUS at 08:11

## 2021-11-18 RX ADMIN — HEPARIN SODIUM 5000 UNITS: 5000 INJECTION INTRAVENOUS; SUBCUTANEOUS at 10:11

## 2021-11-18 RX ADMIN — HEPARIN SODIUM 5000 UNITS: 5000 INJECTION INTRAVENOUS; SUBCUTANEOUS at 05:11

## 2021-11-18 RX ADMIN — METRONIDAZOLE 500 MG: 500 INJECTION, SOLUTION INTRAVENOUS at 08:11

## 2021-11-18 RX ADMIN — POTASSIUM BICARBONATE 60 MEQ: 391 TABLET, EFFERVESCENT ORAL at 07:11

## 2021-11-18 RX ADMIN — HEPARIN SODIUM 5000 UNITS: 5000 INJECTION INTRAVENOUS; SUBCUTANEOUS at 01:11

## 2021-11-18 RX ADMIN — INSULIN ASPART 8 UNITS: 100 INJECTION, SOLUTION INTRAVENOUS; SUBCUTANEOUS at 04:11

## 2021-11-18 RX ADMIN — INSULIN ASPART 4 UNITS: 100 INJECTION, SOLUTION INTRAVENOUS; SUBCUTANEOUS at 04:11

## 2021-11-18 NOTE — PROGRESS NOTES
[facility]  Neurocritical Care  Progress Note    Admit Date: 11/12/2021  Service Date: 11/18/2021  Length of Stay: 6    Subjective:     Chief Complaint: Brain lesion    History of Present Illness: 60 year old female with Pmhx DM2, hypothyroidism, YUSEF, hx breast ca, fibromyalgia, htn, hld, depression, class 3 obesity admitted as transfer from [facility] for concern of elevated ICP 2/2 vasogenic edema of numerous brain abscesses vs masses. Patient recently admitted at [facility] 10/27 for acute encephalopathy, found to have DKA vs HHS and bacteremia. Clinically improved with abx and appropriate blood sugar management until 3 days prior to transfer when she developed worsening confusion and lethargy. MRI 11/13 revealing multiple lesions throughout the bilateral cerebral hemispheres with associated vasogenic edema and leftward midline shift.     HPI from [facility] below:  Miss Champion is a 60 year old female with a PMH of DMII on oral anti-hyperglycemics, hypothyroid on synthroid, YUSEF, Right breast papilloma, fibromyalgia, HTN, HLD and depression presents to [facility] via ambulance after being found down and unresponsive at home. Patient is obtunded with a GCS of 8 and is not able to participate in any history giving. This note is per report from neighbor, EMS, ED providers and nurse. Per report Miss Champion' neighbor hadnt seen her for 3 days so they went over to her house to check on her and found her down and unresponsive. EMS was called and brought her into the ED. Upon drawing labwork patient Wbc was 25.8, , anion gap 8, Cr 2.9, blood glucose 805, UDS pos for barbiturates, pH 6.928. Patient is protecting airway although her respirations are labored, Sp02 mid to low 90's and she has YUSEF. A dose of narcan had no response, patient was further treated with bicarb, insulin, IV fluids, IV vanc and zosyn and admitted to the ICU for a higher level of care. Patients sister Darcy was called by staff and  myself and asked to be kept updated, phone number in EMR.      Hospital Course: 11/18/2021 Hypernatremic. Monitoring.       Interval History:  No acute or concerning events noted by overnight staff. Afebrile, hypertensive overnight; continueamlodipine 10mg. Alert and conscious. DORCAS deferred. ID recommending long term antibiotics; signed off. Hypernatremic; correcting slowly.    Review of Systems   Constitutional: Negative.    Respiratory: Negative.    Gastrointestinal: Negative.    Genitourinary: Negative.    Musculoskeletal: Negative.        Objective:     Vitals:  Temp: 98 °F (36.7 °C)  Pulse: 64  Rhythm: normal sinus rhythm  BP: (!) 152/92  MAP (mmHg): 117  Resp: (!) 24  SpO2: 97 %  O2 Device (Oxygen Therapy): room air    Temp  Min: 98 °F (36.7 °C)  Max: 99.1 °F (37.3 °C)  Pulse  Min: 61  Max: 96  BP  Min: 118/65  Max: 182/88  MAP (mmHg)  Min: 83  Max: 142  Resp  Min: 15  Max: 36  SpO2  Min: 94 %  Max: 100 %    11/17 0701 - 11/18 0700  In: 3882.6 [P.O.:2600; I.V.:29.1]  Out: 4115 [Urine:4115]   Unmeasured Output  Urine Occurrence: 1  Stool Occurrence: 1  Emesis Occurrence: 0  Pad Count: 2       Physical Exam  Constitutional:       General: She is not in acute distress.     Appearance: She is obese. She is not ill-appearing.   HENT:      Head: Normocephalic and atraumatic.      Mouth/Throat:      Mouth: Mucous membranes are moist.      Pharynx: Oropharynx is clear. No oropharyngeal exudate.   Eyes:      General:         Right eye: No discharge.         Left eye: No discharge.      Extraocular Movements: Extraocular movements intact.      Pupils: Pupils are equal, round, and reactive to light.   Cardiovascular:      Rate and Rhythm: Normal rate and regular rhythm.      Pulses: Normal pulses.      Heart sounds: Normal heart sounds.   Pulmonary:      Effort: Pulmonary effort is normal. No respiratory distress.      Breath sounds: Normal breath sounds.   Abdominal:      General: Abdomen is flat. There is no  distension.      Palpations: Abdomen is soft.   Musculoskeletal:         General: No swelling. Normal range of motion.      Cervical back: Normal range of motion.   Skin:     General: Skin is warm.      Capillary Refill: Capillary refill takes less than 2 seconds.   Neurological:      Mental Status: She is alert. She is disoriented.      Cranial Nerves: No cranial nerve deficit.      Sensory: No sensory deficit.      Motor: No weakness.      Comments:   A/Ox3  GCS 14  Alert and conscious. Follows commands.   Moves all extremities spontaneously against gravity.         Medications:  Continuoussodium chloride 0.45%, Last Rate: 75 mL/hr at 11/18/21 0605    ScheduledamLODIPine, 10 mg, Daily  aspirin, 81 mg, Daily  cefTRIAXone (ROCEPHIN) IVPB, 2 g, Q12H  heparin (porcine), 5,000 Units, Q8H  insulin aspart U-100, 4 Units, TIDWM  levothyroxine, 100 mcg, Before breakfast  metronidazole, 500 mg, Q8H  miconazole nitrate 2%, , BID  senna-docusate 8.6-50 mg, 1 tablet, Daily  vibegron, 75 mg, Daily    PRNacetaminophen, 650 mg, Q6H PRN  dextrose 50%, 12.5 g, PRN  glucagon (human recombinant), 1 mg, PRN  insulin aspart U-100, 1-10 Units, QID (AC + HS) PRN  labetalol, 10 mg, Q3H PRN  magnesium oxide, 800 mg, PRN  magnesium oxide, 800 mg, PRN  potassium chloride in water, 40 mEq, PRN   And  potassium chloride in water, 60 mEq, PRN   And  potassium chloride in water, 80 mEq, PRN  potassium, sodium phosphates, 2 packet, PRN  potassium, sodium phosphates, 2 packet, PRN  potassium, sodium phosphates, 2 packet, PRN  sodium chloride 0.9%, 10 mL, PRN  tiZANidine, 2 mg, Q8H PRN      Today I personally reviewed pertinent medications, lines/drains/airways, imaging, cardiology results, laboratory results, microbiology results,    Diet  Diet Dysphagia Mechanical Soft (IDDSI Level 5) [facility]; Consistent Carbohydrate; Thin    Assessment/Plan:     Neuro  * Brain lesion  MRI Brain w/lesions throughout the cerebral hemispheres involving  Calm frontal, parietal, temporal, and occipital lobes with surrounding vasogenic edema. Suspect 2/2 septic emboli given recent bacteremia. Speciation of BCx 10/27 revealing organisms from oral mary. Mannitol and 3% saline started prior to transfer. CTH notable for multiple areas of mild diminished attenuation involving the cerebral hemispheres bilaterally corresponding with multiple T2/FLAIR hyperintense lesions noted on the recent MRI examination.     - q1h neuro checks  - NSG consulted, appreciate recs   - repeat TTE, may need DORCAS   - BCx NGTD  - vanc/zosyn; discontinued  - Infectious Disease consult; appreciate recommendations    - start on long term antibiotics x 6 weeks  - q6h Na; Na goal 140-155     Encephalopathy, metabolic  - see principle problem    Cardiac/Vascular  Hypertension associated with diabetes  - Goal SBP < 160     Endocrine  BMI 45.0-49.9, adult  - nutrition consult    Diabetes insipidus  - trend DI labs Q6hrs  - encourage fluid intake  - on half normal; continue     Uncontrolled type 2 diabetes mellitus with hyperglycemia  A1c 9 on admission to [place]. BG labile.    - Goal CBG < 180   - Cont basal insulin  - MDSSI  - adding aspart 2U TIDWM    Hypothyroidism  TSH 1.39 (11/13/2021)    - Cont home synthroid     Other  Discharge planning issues  - pending diagnostics and intervention          The patient is being Prophylaxed for:  Venous Thromboembolism with: Chemical  Stress Ulcer with: Not Applicable   Ventilator Pneumonia with: not applicable    Activity Orders          Diet Dysphagia Mechanical Soft (IDDSI Level 5) [facility]; Consistent Carbohydrate; Thin: Dysphagia 2 (Mechanical Soft Ground) starting at 11/15 1418    Turn patient starting at 11/13 0000    Elevate HOB starting at 11/12 2358        Full Code    Vasquez Porter MD  Neurocritical Care  [facility]

## 2021-11-18 NOTE — PT/OT/SLP PROGRESS
Physical Therapy Co-Treatment with OT    Patient Name:  Violeta Champion   MRN:  [id number]    *Co-treatment with OT due to patient complexity and need for two skilled therapists to ensure safe mobilization.    Recommendations:     Discharge Recommendations:  rehabilitation facility   Discharge Equipment Recommendations:  (tbd pending progress)   Barriers to discharge: Inaccessible home, Decreased caregiver support and increased (A)    Highest Level of Mobility: Sitting EOB  Assistance Required: Moments of SBA, mostly max(A) with L posterior lean    Assessment:     Violeta Champion is a 60 y.o. female admitted with a medical diagnosis of Brain lesion.  She presents with the following impairments/functional limitations:  weakness,impaired balance,decreased safety awareness,impaired endurance,impaired self care skills,impaired functional mobilty,decreased lower extremity function,gait instability,decreased upper extremity function. Violeta Champion would benefit from acute PT intervention to address listed functional deficits, provide patient/caregiver education, reduce fall risk, and maximize (I) and safety with functional mobility.    Pt met with HOB elevated, sister present and agreeable to PT treatment. Pt with increased active participation during today's session and was able to complete therapeutic exercises sitting EOB. Pt continues to require total(A)X2 persons for bed mobility, but demos an improvement with sitting balance EOB. She was able to sit with brief moments of SBA, but mostly required max(A) with L posterior lean, as opposed to max-total(A) throughout previous sessions. Pt was found soiled with BM on PT entry and required total(A) for pericare.     Pt is progressing towards acute PT goals appropriately and continues to benefit from acute PT sessions. After hospital discharge, pt would benefit from inpatient rehab to maximize rehab potential.    Rehab Prognosis: Fair; patient would benefit from acute  skilled PT services to address these deficits and reach maximum level of function.      Plan:     During this hospitalization, patient to be seen 4 x/week to address the identified rehab impairments via gait training,therapeutic activities,neuromuscular re-education,therapeutic exercises and progress toward the following goals:    · Plan of Care Expires:  12/13/21    This plan of care has been discussed with the patient/caregiver, who was included in its development and is in agreement with the identified goals and treatment plan.     Subjective     Communicated with RN prior to session.  Patient agreeable to participate.     Pain/Comfort:  · Pain Rating 1: 0/10  · Pain Rating Post-Intervention 1: 0/10    Chief Complaint: Brain lesion  Patient/Family Comments/goals: Return to PLOF      Objective:     Patient found HOB elevated with telemetry,pulse ox (continuous),blood pressure cuff,peripheral IV,enamorado catheter  upon PT entry to room.    General Precautions: Standard, aspiration,fall   Orthopedic Precautions:N/A   Braces: N/A         Exams:     Cognition:  o Pt is alert and cooperative  o Command following: intact simple 1-step commands     Functional Mobility:    Bed Mobility:  · Supine to Sit: Total Assistance and 2 persons on R side of bed  · Sit to Supine: Total Assistance and 2 persons  · Rolling L: Total Assistance and 2 persons  · Rolling R: Total Assistance and 2 persons  · Scooting anteriorly to EOB to plant feet on floor: Total Assistance and 2 persons  · Scooting/Bridging in supine to HOB: Total Assistance and 2 persons via drawsheet    Transfers:   · Sit to Stand Transfer: Activity did not occur due to poor sitting balance EOB             Gait:  · Patient unable at this time    Balance:  · Static Sit:   · Moments of SBA, mostly max(A) with L posterior lean at EOB x ~18 minutes  · Poor: Patient requires handhold support and moderate to maximal assistance to maintain position.  · Dynamic sit:  · Poor:  Patient unable to accept challenge or move without loss of balance.      Therapeutic Activities/Exercises      Patient assisted with functional mobility as noted above   PT assisted with pericare and linen change as pt found soiled with loose BM   Pt performed shoulder [id number] sitting EOB   Pt performed scapular retraction 5 reps x5 sec hold sittng EOB, PT provided tactile cues for improved upright posture   Pt performed B shoulder flexion x10 reps   Patient educated on the importance of early mobility to prevent functional decline during hospital stay   Patient educated on PT POC and role of PT in acute care   White board updated regarding patient's safest level of mobility with staff assistance, RN also updated.     AM-PAC 6 CLICK MOBILITY  Total Score:8     Patient left HOB elevated with all lines intact, call button in reach, bed alarm on, RN notified and sister present.        History/Goals:     PAST MEDICAL HISTORY:  Past Medical History:   Diagnosis Date    Breast cancer 12/10/2020    Diabetes mellitus, type 2     GERD (gastroesophageal reflux disease)     High cholesterol     Hypertension     Hypothyroid     Injury of knee, leg, ankle and foot     Insulin-resistant diabetes mellitus and acanthosis nigricans     Menopause present     Osteoarthritis     Osteoarthritis, knee     Osteopenia     Osteopenia     Sleep apnea     Status post breast lump removal 12/01/2020       Past Surgical History:   Procedure Laterality Date    bilateral duct removal breast      BREAST CYST ASPIRATION Right 2020    EXCISIONAL BIOPSY Right 12/10/2020    Procedure: EXCISIONAL BIOPSY, breast; u/s guided;  Surgeon: Bernadette Lopez MD;  [facility];  Service: General;  Laterality: Right;    HYSTERECTOMY      OOPHORECTOMY      OTHER SURGICAL HISTORY      bilateral central duct removal with bilateral papilomona       GOALS:   Multidisciplinary Problems     Physical Therapy Goals        Problem:  Physical Therapy Goal    Goal Priority Disciplines Outcome Goal Variances Interventions   Physical Therapy Goal     PT, PT/OT Ongoing, Progressing     Description: Goals to be met by: 21     Patient will increase functional independence with mobility by performin. Supine to sit with Moderate Assistance  2. Sit to supine with Moderate Assistance  3. Rolling to Left and Right with Moderate Assistance.  4. Sit to stand transfer with Maximum Assistance  5. Bed to chair transfer with Maximum Assistance using LRAD  6. Gait  x 5 feet with Maximum Assistance using LRAD.   7. Lower extremity exercise program x15 reps per handout, with assistance as needed                     Time Tracking:     PT Received On: 21  PT Start Time: 0953     PT Stop Time: 1024  PT Total Time (min): 31 min     Billable Minutes: Therapeutic Activity 16 and Neuromuscular Re-education 15      Sarahi Norris, PT  2021   Pager# [phone]

## 2021-11-18 NOTE — SUBJECTIVE & OBJECTIVE
Interval History:  No acute or concerning events noted by overnight staff. Afebrile, hypertensive overnight; continueamlodipine 10mg. Alert and conscious. DORCAS deferred. ID recommending long term antibiotics; signed off. Hypernatremic; correcting slowly.    Review of Systems   Constitutional: Negative.    Respiratory: Negative.    Gastrointestinal: Negative.    Genitourinary: Negative.    Musculoskeletal: Negative.        Objective:     Vitals:  Temp: 98 °F (36.7 °C)  Pulse: 64  Rhythm: normal sinus rhythm  BP: (!) 152/92  MAP (mmHg): 117  Resp: (!) 24  SpO2: 97 %  O2 Device (Oxygen Therapy): room air    Temp  Min: 98 °F (36.7 °C)  Max: 99.1 °F (37.3 °C)  Pulse  Min: 61  Max: 96  BP  Min: 118/65  Max: 182/88  MAP (mmHg)  Min: 83  Max: 142  Resp  Min: 15  Max: 36  SpO2  Min: 94 %  Max: 100 %    11/17 0701 - 11/18 0700  In: 3882.6 [P.O.:2600; I.V.:29.1]  Out: 4115 [Urine:4115]   Unmeasured Output  Urine Occurrence: 1  Stool Occurrence: 1  Emesis Occurrence: 0  Pad Count: 2       Physical Exam  Constitutional:       General: She is not in acute distress.     Appearance: She is obese. She is not ill-appearing.   HENT:      Head: Normocephalic and atraumatic.      Mouth/Throat:      Mouth: Mucous membranes are moist.      Pharynx: Oropharynx is clear. No oropharyngeal exudate.   Eyes:      General:         Right eye: No discharge.         Left eye: No discharge.      Extraocular Movements: Extraocular movements intact.      Pupils: Pupils are equal, round, and reactive to light.   Cardiovascular:      Rate and Rhythm: Normal rate and regular rhythm.      Pulses: Normal pulses.      Heart sounds: Normal heart sounds.   Pulmonary:      Effort: Pulmonary effort is normal. No respiratory distress.      Breath sounds: Normal breath sounds.   Abdominal:      General: Abdomen is flat. There is no distension.      Palpations: Abdomen is soft.   Musculoskeletal:         General: No swelling. Normal range of motion.      Cervical  back: Normal range of motion.   Skin:     General: Skin is warm.      Capillary Refill: Capillary refill takes less than 2 seconds.   Neurological:      Mental Status: She is alert. She is disoriented.      Cranial Nerves: No cranial nerve deficit.      Sensory: No sensory deficit.      Motor: No weakness.      Comments:   A/Ox3  GCS 14  Alert and conscious. Follows commands.   Moves all extremities spontaneously against gravity.         Medications:  Continuoussodium chloride 0.45%, Last Rate: 75 mL/hr at 11/18/21 0605    ScheduledamLODIPine, 10 mg, Daily  aspirin, 81 mg, Daily  cefTRIAXone (ROCEPHIN) IVPB, 2 g, Q12H  heparin (porcine), 5,000 Units, Q8H  insulin aspart U-100, 4 Units, TIDWM  levothyroxine, 100 mcg, Before breakfast  metronidazole, 500 mg, Q8H  miconazole nitrate 2%, , BID  senna-docusate 8.6-50 mg, 1 tablet, Daily  vibegron, 75 mg, Daily    PRNacetaminophen, 650 mg, Q6H PRN  dextrose 50%, 12.5 g, PRN  glucagon (human recombinant), 1 mg, PRN  insulin aspart U-100, 1-10 Units, QID (AC + HS) PRN  labetalol, 10 mg, Q3H PRN  magnesium oxide, 800 mg, PRN  magnesium oxide, 800 mg, PRN  potassium chloride in water, 40 mEq, PRN   And  potassium chloride in water, 60 mEq, PRN   And  potassium chloride in water, 80 mEq, PRN  potassium, sodium phosphates, 2 packet, PRN  potassium, sodium phosphates, 2 packet, PRN  potassium, sodium phosphates, 2 packet, PRN  sodium chloride 0.9%, 10 mL, PRN  tiZANidine, 2 mg, Q8H PRN      Today I personally reviewed pertinent medications, lines/drains/airways, imaging, cardiology results, laboratory results, microbiology results,    Diet  Diet Dysphagia Mechanical Soft (IDDSI Level 5) [facility]; Consistent Carbohydrate; Thin

## 2021-11-18 NOTE — PLAN OF CARE
Problem: SLP Goal  Goal: SLP Goal  Description: Speech Therapy Short Term Goals  Goal expected to be met by 11/25  1. Pt will participate in an ongoing assessment to determine the least restrictive and safest diet with possible updated goals to follow pending results.  2. Pt will participate in a speech, language, and cognitive evaluation with possible updated goals to follow pending results. -ongoing  3. Pt will attend to therapy tasks for 1+ minutes given 1 redirection.   4. Pt will answer orientation questions x4 given min cues.   5. Pt will answer general information questions with 75% acc given cues.   6. Pt will complete word finding tasks with 75% acc min cues.   7. Pt will follow simple 1 step directions with 75% acc given 1 repetition.     Outcome: Ongoing, Progressing     Pt was seen for ongoing cog-ling therapy. Goals remain ongoing.

## 2021-11-18 NOTE — PLAN OF CARE
[facility] Care Plan    POC reviewed with Violeta Champion and family at 0300. Pt verbalized understanding. Questions and concerns addressed. No acute events overnight. 1/2 NS gtt at 75 ml/hr. Trending Na for goals of 140-155. Full bath given and linen changed.  Pt progressing toward goals. Will continue to monitor. See below and flowsheets for full assessment and VS info.       Neuro:  [place] Coma Scale  Best Eye Response: 4-->(E4) spontaneous  Best Motor Response: 6-->(M6) obeys commands  Best Verbal Response: 4-->(V4) confused  Ajit Coma Scale Score: 14  Assessment Qualifiers: patient not sedated/intubated  Pupil PERRLA: yes     24hr Temp:  [98.6 °F (37 °C)-99.1 °F (37.3 °C)]     CV:   Rhythm: normal sinus rhythm  BP goals:   SBP < 160  MAP > 65    Resp:   O2 Device (Oxygen Therapy): room air       Plan: N/A    GI/:  CHELO Total Score: 0  Diet/Nutrition Received: mechanical/dental soft  Last Bowel Movement: 11/17/21  Voiding Characteristics: urethral catheter (bladder)    Intake/Output Summary (Last 24 hours) at 11/18/2021 0655  Last data filed at 11/18/2021 0605  Gross per 24 hour   Intake 4023.67 ml   Output 4115 ml   Net -91.33 ml     Unmeasured Output  Urine Occurrence: 1  Stool Occurrence: 1  Emesis Occurrence: 0  Pad Count: 2    Labs/Accuchecks:  Recent Labs   Lab 11/18/21 0117   WBC 6.32   RBC 3.23*   HGB 8.6*   HCT 30.7*         Recent Labs   Lab 11/18/21 0117 11/18/21 0117 11/18/21  0410   *   < > 155*   K 3.5  --   --    CO2 27  --   --    *  --   --    BUN 9  --   --    CREATININE 1.1  --   --    ALKPHOS 71  --   --    ALT <5*  --   --    AST 8*  --   --    BILITOT 0.2  --   --     < > = values in this interval not displayed.      Recent Labs   Lab 11/13/21 0111   INR 1.0   APTT 21.9      Recent Labs   Lab 11/13/21 0111   TROPONINI <0.006       Electrolytes: N/A - electrolytes WDL  Accuchecks: ACHS    Gtts:   sodium chloride 0.45% 75 mL/hr at 11/18/21 0605        LDA/Wounds:  Lines/Drains/Airways       Peripherally Inserted Central Catheter Line              PICC Double Lumen 11/01/21 1950 right basilic 16 days              Drain                   Urethral Catheter 11/17/21 1000 Latex 16 Fr. <1 day                  Wounds: Yes  Wound care consulted: Yes

## 2021-11-18 NOTE — ASSESSMENT & PLAN NOTE
A1c 9 on admission to [place]. BG labile.    - Goal CBG < 180   - Cont basal insulin  - MDSSI  - adding aspart 2U TIDWM

## 2021-11-18 NOTE — PT/OT/SLP PROGRESS
"Speech Language Pathology Treatment    Patient Name:  Violeta Champion   MRN:  [id number]  Admitting Diagnosis: Brain lesion    Recommendations:                 General Recommendations:  Dysphagia therapy, Speech/language therapy and Cognitive-linguistic therapy  Diet recommendations:  Mechanical soft, Liquid Diet Level: Thin   Aspiration Precautions: Strict 1:1 assistance for all PO intake, only when awake, alert, attentive and upright,, 1 bite/sip at a time, Alternating bites/sips, Eliminate distractions, Feed only when awake/alert, Frequent oral care, HOB to 90 degrees, Meds whole buried in puree, Meds whole 1 at a time, Monitor for s/s of aspiration, Remain upright 30 minutes post meal, Small bites/sips and Strict aspiration precautions Continue to monitor for signs and symptoms of aspiration and discontinue oral feeding should you notice any of the following: watery eyes, reddened facial area, wet vocal quality, increased work of breathing, change in respiratory status, increased congestion, coughing, fever and/or change in level of alertness  General Precautions: Standard, fall,aspiration  Communication strategies:  provide increased time to answer, go to room if call light pushed, and frequent redirection    Subjective     SLP reviewed Pt with RN pre/post session; RN cleared for therapy  Pt stated "leave me alone"    Pain/Comfort:  · Pain Rating 1: 0/10  · Pain Rating Post-Intervention 1: 0/10    Respiratory Status:  · O2 Device (Oxygen Therapy): room air    Objective:     Has the patient been evaluated by SLP for swallowing?   Yes  Keep patient NPO? No   Current Respiratory Status:    Room air    Pt was found asleep and lying in bed. Pt unable to keep eyes open despite max verbal and tactile cues. Pt oriented to person only via verbal response. SLP unable to elicit responsive speech given max verbal cues. Pt would often fall asleep mid sentence this date and required consistent max cues to attend to task. RN in " room to reposition patient. Pt with limited verbal responses this date. PO trials deferred 2/2 lethargy. SLP educated Pt on SLP POC. Pt unable to demonstrate/verbalize understanding. RN in room upon SLP exit.    Assessment:     Violeta Champion is a 60 y.o. female with an SLP diagnosis of Aphasia, Dysphagia and Cognitive-Linguistic Impairment.  She presents with increased lethargy.    Goals:   Multidisciplinary Problems     SLP Goals        Problem: SLP Goal    Goal Priority Disciplines Outcome   SLP Goal     SLP Ongoing, Progressing   Description: Speech Therapy Short Term Goals  Goal expected to be met by 11/25  1. Pt will participate in an ongoing assessment to determine the least restrictive and safest diet with possible updated goals to follow pending results.  2. Pt will participate in a speech, language, and cognitive evaluation with possible updated goals to follow pending results. -ongoing  3. Pt will attend to therapy tasks for 1+ minutes given 1 redirection.   4. Pt will answer orientation questions x4 given min cues.   5. Pt will answer general information questions with 75% acc given cues.   6. Pt will complete word finding tasks with 75% acc min cues.   7. Pt will follow simple 1 step directions with 75% acc given 1 repetition.                      Plan:     · Patient to be seen:  4 x/week   · Plan of Care expires:     · Plan of Care reviewed with:  patient   · SLP Follow-Up:  Yes       Discharge recommendations:  rehabilitation facility     Time Tracking:     SLP Treatment Date:   11/18/21  Speech Start Time:  1215  Speech Stop Time:  1227     Speech Total Time (min):  12 min    Billable Minutes: Speech Therapy Individual 12    11/18/2021

## 2021-11-18 NOTE — PLAN OF CARE
[facility]  Discharge Reassessment    Primary Care Provider: Madie Petersen DO    Expected Discharge Date: 11/24/2021     Per ID: - Ceftriaxone 2g IV Q12H + Metronidazole 500mg PO Q8H  - 6 week course, last day 12/26/21     Patient's rehab previously denied by Payor: [facility] / Plan: [facility] CHOICE PLUS / Product Type: Commercial /   SNF unlikely to be able to provide IV Abx.   Rehab vs LTAC may be more appropriate.         Reassessment (most recent)     Discharge Reassessment - 11/18/21 1208        Discharge Reassessment    Assessment Type Discharge Planning Reassessment     Did the patient's condition or plan change since previous assessment? No     Communicated SARAVANAN with patient/caregiver Date not available/Unable to determine     Discharge Plan A Long-term acute care facility (LTAC)     Discharge Plan B Rehab     DME Needed Upon Discharge  none     Why the patient remains in the hospital Requires continued medical care               Stefani Schmidt RN, CCRN-K, [facility]  Neuro-Critical Care   [phone]

## 2021-11-18 NOTE — HOSPITAL COURSE
11/18/2021 Hypernatremic. Monitoring.   11/19: Drink to thirst , NAEON  11/20: Drink to thirst, cont follow Na, follow ID reccs, TARYN, stable for TTF

## 2021-11-18 NOTE — PT/OT/SLP PROGRESS
"Occupational Therapy   Co-Treatment    Name: Violeta Champion  MRN: [id number]  Admitting Diagnosis:  Brain lesion       Recommendations:     Discharge Recommendations: rehabilitation facility  Discharge Equipment Recommendations: TBD pending progress   Barriers to discharge: Decreased caregiver support and increased assistance needed    Assessment:     Violeta Champion is a 60 y.o. female with a medical diagnosis of Brain lesion. She presents with performance deficits including weakness,impaired endurance,impaired self care skills,impaired functional mobilty,impaired balance,decreased safety awareness,decreased lower extremity function,decreased upper extremity function,decreased coordination,impaired cognition. Pt demo improved periods of sitting balance and active participation, continues to require redirection to task and cues for safety. Pt would continue to benefit from OT to increase functional independence and safety. Recommend rehab upon D/C.    Rehab Prognosis: Good; patient would benefit from acute skilled OT services to address these deficits and reach maximum level of function.       Plan:     Patient to be seen 4 x/week to address the above listed problems via self-care/home management,therapeutic activities,therapeutic exercises,neuromuscular re-education  · Plan of Care Expires: 12/13/21  · Plan of Care Reviewed with: patient,sibling    Subjective     Pain/Comfort:  · Pain Rating 1: 0/10  · Pain Rating Post-Intervention 1: 0/10    Objective:     Communicated with: RN prior to session. Patient found left sidelying, perpendicular in bed with telemetry,pulse ox (continuous),blood pressure cuff,enamorado catheter,peripheral IV upon OT entry to room, sister present and encouraging pt throughout.  "You know the show, witchy-poo. I know y'all [email or web address] that young."  Co-treatment performed with PT due to patient's multiple deficits requiring two skilled therapists to appropriately and safely assess patient's strength " and endurance while facilitating functional tasks in addition to accommodating for patient's activity tolerance.     General Precautions: Standard, fall,aspiration   Orthopedic Precautions:N/A   Braces: N/A  Respiratory Status:  · O2 Device (Oxygen Therapy): room air     Occupational Performance:     Bed Mobility:    · Patient completed Rolling/Turning to Left with total assistance and 2 persons  · Patient completed Rolling/Turning to Right with total assistance and 2 persons  · Patient completed Scooting/Bridging with total assistance and 2 persons  · Patient completed Supine to Sit with total assistance and 2 persons, pt assists with moving LEs  · Patient completed Sit to Supine with total assistance and 2 persons     Functional Mobility/Transfers:  · Not attempted    Activities of Daily Living:  · Total A in sidelying for tony hygiene and to change pads/linens      [facility] 6 Click ADL: 8    Treatment & Education:  Pt sidelying upon OT/PT entry and assisted with rolling and toyn hygiene prior to OOB activity; pt sat EOB ~20 minutes, brief periods of SBA but mostly required Max A for postural control (partly due to bed height/mattress); pt completed shld shrugs/scap retraction while EOB, requiring cues to remain on task; completed R UE shld flex/ext with assistance and L UE support sitting EOB; returned to sidelying as pt's hips near EOB and unable to assist with scooting back onto bed; repositioned and encouraged supine UE and LE therex throughout the day; discussed POC and D/C recs with pt and sister and will require ongoing education    Patient left HOB elevated with all lines intact, call button in reach and RN and sister presentEducation:      GOALS:   Multidisciplinary Problems     Occupational Therapy Goals        Problem: Occupational Therapy Goal    Goal Priority Disciplines Outcome Interventions   Occupational Therapy Goal     OT, PT/OT Ongoing, Progressing    Description: Goals to be met by: 2 weeks  (11/27/21)     Patient will increase functional independence with ADLs by performing:    UE Dressing with Minimal Assistance.  Grooming while seated with Minimal Assistance.  Sitting at edge of bed x10 minutes with Minimal Assistance.  Supine to sit with Minimal Assistance.  Stand pivot transfers with Moderate Assistance.  Pt will follow 3/3 one-step commands to participate in ADL task.                     Time Tracking:     OT Date of Treatment: 11/18/21  OT Start Time: 0953  OT Stop Time: 1024  OT Total Time (min): 31 min    Billable Minutes:Therapeutic Activity 15  Neuromuscular Re-education 16    OT/MARY ALICE: OT          11/18/2021

## 2021-11-19 LAB
ALBUMIN SERPL BCP-MCNC: 2.3 G/DL (ref 3.5–5.2)
ALP SERPL-CCNC: 67 U/L (ref 55–135)
ALT SERPL W/O P-5'-P-CCNC: <5 U/L (ref 10–44)
ANION GAP SERPL CALC-SCNC: 10 MMOL/L (ref 8–16)
AST SERPL-CCNC: 8 U/L (ref 10–40)
BASOPHILS # BLD AUTO: 0.03 K/UL (ref 0–0.2)
BASOPHILS NFR BLD: 0.5 % (ref 0–1.9)
BILIRUB SERPL-MCNC: 0.2 MG/DL (ref 0.1–1)
BUN SERPL-MCNC: 6 MG/DL (ref 6–20)
CALCIUM SERPL-MCNC: 8 MG/DL (ref 8.7–10.5)
CHLORIDE SERPL-SCNC: 116 MMOL/L (ref 95–110)
CO2 SERPL-SCNC: 27 MMOL/L (ref 23–29)
CREAT SERPL-MCNC: 1 MG/DL (ref 0.5–1.4)
DIFFERENTIAL METHOD: ABNORMAL
EOSINOPHIL # BLD AUTO: 0.3 K/UL (ref 0–0.5)
EOSINOPHIL NFR BLD: 4.7 % (ref 0–8)
ERYTHROCYTE [DISTWIDTH] IN BLOOD BY AUTOMATED COUNT: 21.9 % (ref 11.5–14.5)
EST. GFR  (AFRICAN AMERICAN): >60 ML/MIN/1.73 M^2
EST. GFR  (NON AFRICAN AMERICAN): >60 ML/MIN/1.73 M^2
GLUCOSE SERPL-MCNC: 191 MG/DL (ref 70–110)
HCT VFR BLD AUTO: 29.2 % (ref 37–48.5)
HGB BLD-MCNC: 8.6 G/DL (ref 12–16)
IMM GRANULOCYTES # BLD AUTO: 0.01 K/UL (ref 0–0.04)
IMM GRANULOCYTES NFR BLD AUTO: 0.2 % (ref 0–0.5)
LYMPHOCYTES # BLD AUTO: 3 K/UL (ref 1–4.8)
LYMPHOCYTES NFR BLD: 45.5 % (ref 18–48)
MAGNESIUM SERPL-MCNC: 1.9 MG/DL (ref 1.6–2.6)
MCH RBC QN AUTO: 26.9 PG (ref 27–31)
MCHC RBC AUTO-ENTMCNC: 29.5 G/DL (ref 32–36)
MCV RBC AUTO: 91 FL (ref 82–98)
MONOCYTES # BLD AUTO: 0.5 K/UL (ref 0.3–1)
MONOCYTES NFR BLD: 7.4 % (ref 4–15)
NEUTROPHILS # BLD AUTO: 2.8 K/UL (ref 1.8–7.7)
NEUTROPHILS NFR BLD: 41.7 % (ref 38–73)
NRBC BLD-RTO: 0 /100 WBC
OSMOLALITY SERPL: 318 MOSM/KG (ref 275–295)
OSMOLALITY SERPL: 329 MOSM/KG (ref 275–295)
OSMOLALITY SERPL: 332 MOSM/KG (ref 275–295)
OSMOLALITY SERPL: 344 MOSM/KG (ref 275–295)
OSMOLALITY UR: 114 MOSM/KG (ref 50–1200)
OSMOLALITY UR: 151 MOSM/KG (ref 50–1200)
OSMOLALITY UR: 211 MOSM/KG (ref 50–1200)
OSMOLALITY UR: 227 MOSM/KG (ref 50–1200)
PHOSPHATE SERPL-MCNC: 3 MG/DL (ref 2.7–4.5)
PLATELET # BLD AUTO: 181 K/UL (ref 150–450)
PMV BLD AUTO: 11.5 FL (ref 9.2–12.9)
POCT GLUCOSE: 183 MG/DL (ref 70–110)
POCT GLUCOSE: 214 MG/DL (ref 70–110)
POCT GLUCOSE: 218 MG/DL (ref 70–110)
POCT GLUCOSE: 251 MG/DL (ref 70–110)
POTASSIUM SERPL-SCNC: 3.6 MMOL/L (ref 3.5–5.1)
POTASSIUM SERPL-SCNC: 3.7 MMOL/L (ref 3.5–5.1)
PROT SERPL-MCNC: 6.3 G/DL (ref 6–8.4)
RBC # BLD AUTO: 3.2 M/UL (ref 4–5.4)
SODIUM SERPL-SCNC: 148 MMOL/L (ref 136–145)
SODIUM SERPL-SCNC: 153 MMOL/L (ref 136–145)
SODIUM SERPL-SCNC: 153 MMOL/L (ref 136–145)
SODIUM SERPL-SCNC: 155 MMOL/L (ref 136–145)
SODIUM SERPL-SCNC: 156 MMOL/L (ref 136–145)
SODIUM UR-SCNC: 38 MMOL/L (ref 20–250)
SODIUM UR-SCNC: 56 MMOL/L (ref 20–250)
SODIUM UR-SCNC: 85 MMOL/L (ref 20–250)
SODIUM UR-SCNC: 92 MMOL/L (ref 20–250)
WBC # BLD AUTO: 6.6 K/UL (ref 3.9–12.7)

## 2021-11-19 PROCEDURE — 84100 ASSAY OF PHOSPHORUS: CPT | Performed by: STUDENT IN AN ORGANIZED HEALTH CARE EDUCATION/TRAINING PROGRAM

## 2021-11-19 PROCEDURE — 99233 SBSQ HOSP IP/OBS HIGH 50: CPT | Mod: ,,, | Performed by: PSYCHIATRY & NEUROLOGY

## 2021-11-19 PROCEDURE — 84295 ASSAY OF SERUM SODIUM: CPT | Performed by: PHYSICIAN ASSISTANT

## 2021-11-19 PROCEDURE — 25000003 PHARM REV CODE 250: Performed by: STUDENT IN AN ORGANIZED HEALTH CARE EDUCATION/TRAINING PROGRAM

## 2021-11-19 PROCEDURE — S0030 INJECTION, METRONIDAZOLE: HCPCS | Performed by: NURSE PRACTITIONER

## 2021-11-19 PROCEDURE — 85025 COMPLETE CBC W/AUTO DIFF WBC: CPT | Performed by: STUDENT IN AN ORGANIZED HEALTH CARE EDUCATION/TRAINING PROGRAM

## 2021-11-19 PROCEDURE — 83935 ASSAY OF URINE OSMOLALITY: CPT | Mod: 91 | Performed by: PHYSICIAN ASSISTANT

## 2021-11-19 PROCEDURE — 25000003 PHARM REV CODE 250: Performed by: NURSE PRACTITIONER

## 2021-11-19 PROCEDURE — 83735 ASSAY OF MAGNESIUM: CPT | Performed by: STUDENT IN AN ORGANIZED HEALTH CARE EDUCATION/TRAINING PROGRAM

## 2021-11-19 PROCEDURE — 84132 ASSAY OF SERUM POTASSIUM: CPT | Performed by: NURSE PRACTITIONER

## 2021-11-19 PROCEDURE — 25000003 PHARM REV CODE 250: Performed by: PHYSICIAN ASSISTANT

## 2021-11-19 PROCEDURE — 99233 PR SUBSEQUENT HOSPITAL CARE,LEVL III: ICD-10-PCS | Mod: ,,, | Performed by: PSYCHIATRY & NEUROLOGY

## 2021-11-19 PROCEDURE — 84300 ASSAY OF URINE SODIUM: CPT | Mod: 91 | Performed by: PHYSICIAN ASSISTANT

## 2021-11-19 PROCEDURE — 99900035 HC TECH TIME PER 15 MIN (STAT)

## 2021-11-19 PROCEDURE — 80053 COMPREHEN METABOLIC PANEL: CPT | Performed by: STUDENT IN AN ORGANIZED HEALTH CARE EDUCATION/TRAINING PROGRAM

## 2021-11-19 PROCEDURE — 94761 N-INVAS EAR/PLS OXIMETRY MLT: CPT

## 2021-11-19 PROCEDURE — 84295 ASSAY OF SERUM SODIUM: CPT | Mod: 91 | Performed by: PHYSICIAN ASSISTANT

## 2021-11-19 PROCEDURE — 25000003 PHARM REV CODE 250: Performed by: PSYCHIATRY & NEUROLOGY

## 2021-11-19 PROCEDURE — 20000000 HC ICU ROOM

## 2021-11-19 PROCEDURE — 84300 ASSAY OF URINE SODIUM: CPT | Performed by: PHYSICIAN ASSISTANT

## 2021-11-19 PROCEDURE — 83935 ASSAY OF URINE OSMOLALITY: CPT | Performed by: PHYSICIAN ASSISTANT

## 2021-11-19 PROCEDURE — 63600175 PHARM REV CODE 636 W HCPCS: Performed by: NURSE PRACTITIONER

## 2021-11-19 PROCEDURE — 83930 ASSAY OF BLOOD OSMOLALITY: CPT | Performed by: PHYSICIAN ASSISTANT

## 2021-11-19 PROCEDURE — 83930 ASSAY OF BLOOD OSMOLALITY: CPT | Mod: 91 | Performed by: PHYSICIAN ASSISTANT

## 2021-11-19 RX ORDER — TALC
6 POWDER (GRAM) TOPICAL NIGHTLY PRN
Status: DISCONTINUED | OUTPATIENT
Start: 2021-11-19 | End: 2021-12-01 | Stop reason: HOSPADM

## 2021-11-19 RX ORDER — HYDROCHLOROTHIAZIDE 12.5 MG/1
25 TABLET ORAL DAILY
Status: DISCONTINUED | OUTPATIENT
Start: 2021-11-19 | End: 2021-11-20

## 2021-11-19 RX ORDER — INSULIN ASPART 100 [IU]/ML
6 INJECTION, SOLUTION INTRAVENOUS; SUBCUTANEOUS
Status: DISCONTINUED | OUTPATIENT
Start: 2021-11-19 | End: 2021-11-21

## 2021-11-19 RX ADMIN — INSULIN ASPART 4 UNITS: 100 INJECTION, SOLUTION INTRAVENOUS; SUBCUTANEOUS at 07:11

## 2021-11-19 RX ADMIN — CEFTRIAXONE SODIUM 2 G: 2 INJECTION, SOLUTION INTRAVENOUS at 03:11

## 2021-11-19 RX ADMIN — INSULIN ASPART 6 UNITS: 100 INJECTION, SOLUTION INTRAVENOUS; SUBCUTANEOUS at 11:11

## 2021-11-19 RX ADMIN — INSULIN ASPART 6 UNITS: 100 INJECTION, SOLUTION INTRAVENOUS; SUBCUTANEOUS at 07:11

## 2021-11-19 RX ADMIN — HEPARIN SODIUM 5000 UNITS: 5000 INJECTION INTRAVENOUS; SUBCUTANEOUS at 02:11

## 2021-11-19 RX ADMIN — ACETAMINOPHEN 650 MG: 325 TABLET ORAL at 08:11

## 2021-11-19 RX ADMIN — INSULIN ASPART 4 UNITS: 100 INJECTION, SOLUTION INTRAVENOUS; SUBCUTANEOUS at 11:11

## 2021-11-19 RX ADMIN — MICONAZOLE NITRATE: 20 OINTMENT TOPICAL at 08:11

## 2021-11-19 RX ADMIN — METRONIDAZOLE 500 MG: 500 INJECTION, SOLUTION INTRAVENOUS at 08:11

## 2021-11-19 RX ADMIN — HEPARIN SODIUM 5000 UNITS: 5000 INJECTION INTRAVENOUS; SUBCUTANEOUS at 10:11

## 2021-11-19 RX ADMIN — METRONIDAZOLE 500 MG: 500 INJECTION, SOLUTION INTRAVENOUS at 03:11

## 2021-11-19 RX ADMIN — POTASSIUM BICARBONATE 50 MEQ: 978 TABLET, EFFERVESCENT ORAL at 06:11

## 2021-11-19 RX ADMIN — INSULIN ASPART 6 UNITS: 100 INJECTION, SOLUTION INTRAVENOUS; SUBCUTANEOUS at 04:11

## 2021-11-19 RX ADMIN — HEPARIN SODIUM 5000 UNITS: 5000 INJECTION INTRAVENOUS; SUBCUTANEOUS at 06:11

## 2021-11-19 RX ADMIN — ALTEPLASE 2 MG: 2.2 INJECTION, POWDER, LYOPHILIZED, FOR SOLUTION INTRAVENOUS at 03:11

## 2021-11-19 RX ADMIN — MICONAZOLE NITRATE: 20 OINTMENT TOPICAL at 09:11

## 2021-11-19 RX ADMIN — AMLODIPINE BESYLATE 10 MG: 10 TABLET ORAL at 08:11

## 2021-11-19 RX ADMIN — INSULIN ASPART 1 UNITS: 100 INJECTION, SOLUTION INTRAVENOUS; SUBCUTANEOUS at 10:11

## 2021-11-19 RX ADMIN — ASPIRIN 81 MG: 81 TABLET, COATED ORAL at 08:11

## 2021-11-19 RX ADMIN — HYDROCHLOROTHIAZIDE 25 MG: 12.5 TABLET ORAL at 10:11

## 2021-11-19 RX ADMIN — LEVOTHYROXINE SODIUM 100 MCG: 100 TABLET ORAL at 06:11

## 2021-11-19 RX ADMIN — INSULIN ASPART 4 UNITS: 100 INJECTION, SOLUTION INTRAVENOUS; SUBCUTANEOUS at 04:11

## 2021-11-19 NOTE — PROGRESS NOTES
[facility]  Neurocritical Care  Progress Note    Admit Date: 11/12/2021  Service Date: 11/19/2021  Length of Stay: 7    Subjective:     Chief Complaint: Brain lesion    History of Present Illness: 60 year old female with Pmhx DM2, hypothyroidism, YUSEF, hx breast ca, fibromyalgia, htn, hld, depression, class 3 obesity admitted as transfer from [place] for concern of elevated ICP 2/2 vasogenic edema of numerous brain abscesses vs masses. Patient recently admitted at [place] 10/27 for acute encephalopathy, found to have DKA vs HHS and bacteremia. Clinically improved with abx and appropriate blood sugar management until 3 days prior to transfer when she developed worsening confusion and lethargy. MRI 11/13 revealing multiple lesions throughout the bilateral cerebral hemispheres with associated vasogenic edema and leftward midline shift.     HPI from [place] below:  Miss Champion is a 60 year old female with a PMH of DMII on oral anti-hyperglycemics, hypothyroid on synthroid, YUSEF, Right breast papilloma, fibromyalgia, HTN, HLD and depression presents to [facility] via ambulance after being found down and unresponsive at home. Patient is obtunded with a GCS of 8 and is not able to participate in any history giving. This note is per report from neighbor, EMS, ED providers and nurse. Per report Miss Champion' neighbor hadnt seen her for 3 days so they went over to her house to check on her and found her down and unresponsive. EMS was called and brought her into the ED. Upon drawing labwork patient Wbc was 25.8, , anion gap 8, Cr 2.9, blood glucose 805, UDS pos for barbiturates, pH 6.928. Patient is protecting airway although her respirations are labored, Sp02 mid to low 90's and she has YUSEF. A dose of narcan had no response, patient was further treated with bicarb, insulin, IV fluids, IV vanc and zosyn and admitted to the ICU for a higher level of care. Patients sister Darcy was called by staff and  myself and asked to be kept updated, phone number in EMR.      Hospital Course: 11/18/2021 Hypernatremic. Monitoring.       Interval History:  No acute or concerning events noted by overnight staff. Afebrile, hypertensive overnight; continue amlodipine 10mg. Adding thiazide diuretic. Alert and conscious. DORCAS deferred. ID recommending long term antibiotics; signed off. Hypernatremic; correcting slowly.    Review of Systems   Constitutional: Negative.    Respiratory: Negative.    Gastrointestinal: Negative.    Genitourinary: Negative.    Musculoskeletal: Negative.        Objective:     Vitals:  Temp: 98.8 °F (37.1 °C)  Pulse: 85  Rhythm: normal sinus rhythm  BP: (!) 149/78  MAP (mmHg): 108  Resp: 20  SpO2: 96 %  O2 Device (Oxygen Therapy): room air    Temp  Min: 97.5 °F (36.4 °C)  Max: 99 °F (37.2 °C)  Pulse  Min: 67  Max: 98  BP  Min: 126/79  Max: 169/105  MAP (mmHg)  Min: 94  Max: 141  Resp  Min: 16  Max: 35  SpO2  Min: 88 %  Max: 99 %    11/18 0701 - 11/19 0700  In: 6047.2 [P.O.:4750; I.V.:601]  Out: 6005 [Urine:6005]   Unmeasured Output  Urine Occurrence: 1  Stool Occurrence: 1  Emesis Occurrence: 0  Pad Count: 2       Physical Exam  Constitutional:       General: She is not in acute distress.     Appearance: She is obese. She is not ill-appearing.   HENT:      Head: Normocephalic and atraumatic.      Mouth/Throat:      Mouth: Mucous membranes are moist.      Pharynx: Oropharynx is clear. No oropharyngeal exudate.   Eyes:      General:         Right eye: No discharge.         Left eye: No discharge.      Extraocular Movements: Extraocular movements intact.      Pupils: Pupils are equal, round, and reactive to light.   Cardiovascular:      Rate and Rhythm: Normal rate and regular rhythm.      Pulses: Normal pulses.      Heart sounds: Normal heart sounds.   Pulmonary:      Effort: Pulmonary effort is normal. No respiratory distress.      Breath sounds: Normal breath sounds.   Abdominal:      General: Abdomen is  flat. There is no distension.      Palpations: Abdomen is soft.   Musculoskeletal:         General: No swelling. Normal range of motion.      Cervical back: Normal range of motion.   Skin:     General: Skin is warm.      Capillary Refill: Capillary refill takes less than 2 seconds.   Neurological:      Mental Status: She is alert. She is disoriented.      Cranial Nerves: No cranial nerve deficit.      Sensory: No sensory deficit.      Motor: No weakness.      Comments:   A/Ox3  GCS 14  Alert and conscious. Follows commands.   Moves all extremities spontaneously against gravity.         Medications:  Continuous ScheduledamLODIPine, 10 mg, Daily  aspirin, 81 mg, Daily  cefTRIAXone (ROCEPHIN) IVPB, 2 g, Q12H  heparin (porcine), 5,000 Units, Q8H  hydroCHLOROthiazide, 25 mg, Daily  insulin aspart U-100, 6 Units, TIDWM  levothyroxine, 100 mcg, Before breakfast  metronidazole, 500 mg, Q8H  miconazole nitrate 2%, , BID  senna-docusate 8.6-50 mg, 1 tablet, Daily  vibegron, 75 mg, Daily    PRNacetaminophen, 650 mg, Q6H PRN  dextrose 50%, 12.5 g, PRN  glucagon (human recombinant), 1 mg, PRN  insulin aspart U-100, 1-10 Units, QID (AC + HS) PRN  labetalol, 10 mg, Q3H PRN  magnesium oxide, 800 mg, PRN  magnesium oxide, 800 mg, PRN  potassium bicarbonate, 35 mEq, PRN  potassium bicarbonate, 50 mEq, PRN  potassium bicarbonate, 60 mEq, PRN  potassium, sodium phosphates, 2 packet, PRN  potassium, sodium phosphates, 2 packet, PRN  potassium, sodium phosphates, 2 packet, PRN  sodium chloride 0.9%, 10 mL, PRN  tiZANidine, 2 mg, Q8H PRN      Today I personally reviewed pertinent medications, lines/drains/airways, imaging, cardiology results, laboratory results, microbiology results,    Diet  Diet Dysphagia Mechanical Soft (IDDSI Level 5) [facility]; Consistent Carbohydrate; Thin    Assessment/Plan:     Neuro  * Brain lesion  MRI Brain w/lesions throughout the cerebral hemispheres involving frontal, parietal, temporal, and occipital  lobes with surrounding vasogenic edema. Suspect 2/2 septic emboli given recent bacteremia. Speciation of BCx 10/27 revealing organisms from oral mary. Mannitol and 3% saline started prior to transfer. CTH notable for multiple areas of mild diminished attenuation involving the cerebral hemispheres bilaterally corresponding with multiple T2/FLAIR hyperintense lesions noted on the recent MRI examination.     - q1h neuro checks    - BCx NGTD  - Infectious Disease consult; appreciate recommendations. Signed off   - continue long term antibiotics x 6 weeks  - q6h Na; Na goal 140-155  - stable for transfer to floor     Cardiac/Vascular  Hypertension associated with diabetes  - Goal SBP < 160   - continue amlodipine   - starting thiazide diuretic for blood pressure control and secondary effect on DI    Endocrine  BMI 45.0-49.9, adult  - nutrition consult    Diabetes insipidus  - trend DI labs Q6hrs  - encourage fluid intake  - on half normal; continue     Uncontrolled type 2 diabetes mellitus with hyperglycemia  A1c 9 on admission to [place]. BG labile.    - Goal CBG < 180   - Cont basal insulin  - MDSSI  - adjust aspart    Hypothyroidism  TSH 1.39 (11/13/2021)    - Cont home synthroid     Other  Discharge planning issues  - long term antibiotics  - ID follow up  - PT/OT recommendations           The patient is being Prophylaxed for:  Venous Thromboembolism with: Chemical  Stress Ulcer with: Not Applicable   Ventilator Pneumonia with: not applicable    Activity Orders          Diet Dysphagia Mechanical Soft (IDDSI Level 5) [facility]; Consistent Carbohydrate; Thin: Dysphagia 2 (Mechanical Soft Ground) starting at 11/15 1418    Turn patient starting at 11/13 0000    Elevate HOB starting at 11/12 2358        Full Code    Vasquez Porter MD  Neurocritical Care  [facility]

## 2021-11-19 NOTE — ASSESSMENT & PLAN NOTE
- Goal SBP < 160   - continue amlodipine   - starting thiazide diuretic for blood pressure control and secondary effect on DI

## 2021-11-19 NOTE — PLAN OF CARE
[facility] Care Plan    POC reviewed with Violeta Champion and family at 0300. Pt verbalized understanding. Questions and concerns addressed. No acute events overnight. Pt progressing toward goals. Will continue to monitor. See below and flowsheets for full assessment and VS info.     -Neuro exam unchanged   -0000 Na: 153  -Hourly urine output b/w 175-200    Neuro:  Ajit Coma Scale  Best Eye Response: 4-->(E4) spontaneous  Best Motor Response: 6-->(M6) obeys commands  Best Verbal Response: 4-->(V4) confused  [place] Coma Scale Score: 14  Assessment Qualifiers: patient not sedated/intubated,no eye obstruction present  Pupil PERRLA: yes     24hr Temp:  [97.5 °F (36.4 °C)-98.7 °F (37.1 °C)]     CV:   Rhythm: normal sinus rhythm  BP goals:   SBP < 160  MAP > 65    Resp:   O2 Device (Oxygen Therapy): room air       Plan: N/A    GI/:  CHLEO Total Score: 0  Diet/Nutrition Received: consistent carb/diabetic diet  Last Bowel Movement: 11/19/21  Voiding Characteristics: urethral catheter (bladder)    Intake/Output Summary (Last 24 hours) at 11/19/2021 0541  Last data filed at 11/19/2021 0501  Gross per 24 hour   Intake 6166.55 ml   Output 5745 ml   Net 421.55 ml     Unmeasured Output  Urine Occurrence: 1  Stool Occurrence: 1  Emesis Occurrence: 0  Pad Count: 2    Labs/Accuchecks:  Recent Labs   Lab 11/19/21  0023   WBC 6.60   RBC 3.20*   HGB 8.6*   HCT 29.2*         Recent Labs   Lab 11/19/21  0023   *  153*   K 3.6   CO2 27   *   BUN 6   CREATININE 1.0   ALKPHOS 67   ALT <5*   AST 8*   BILITOT 0.2      Recent Labs   Lab 11/13/21  0111   INR 1.0   APTT 21.9      Recent Labs   Lab 11/13/21  0111   TROPONINI <0.006       Electrolytes: Electrolytes replaced  Accuchecks: ACHS    Gtts:       LDA/Wounds:  Lines/Drains/Airways       Peripherally Inserted Central Catheter Line              PICC Double Lumen 11/01/21 1950 right basilic 17 days              Drain                   Urethral Catheter 11/17/21 1000 Latex  16 Fr. 1 day                  Wounds: Yes  Wound care consulted: Yes     Problem: Adult Inpatient Plan of Care  Goal: Plan of Care Review  Flowsheets (Taken 11/19/2021 0540)  Plan of Care Reviewed With:   patient   family     Problem: Glycemic Control Impaired (Sepsis/Septic Shock)  Goal: Blood Glucose Level Within Desired Range  Intervention: Optimize Glycemic Control  Flowsheets (Taken 11/19/2021 0540)  Glycemic Management: blood glucose monitoring     Problem: Fluid Imbalance (Acute Kidney Injury/Impairment)  Goal: Optimal Fluid Balance  Intervention: Monitor and Manage Fluid Balance  Flowsheets (Taken 11/19/2021 0540)  Fluid/Electrolyte Management: fluids adjusted

## 2021-11-19 NOTE — H&P
[facility]  Neurocritical Care  History & Physical    Admit Date: 11/12/2021  Service Date: 11/19/2021  Length of Stay: 7    Subjective:     Chief Complaint: Brain lesion    History of Present Illness: 60 year old female with Pmhx DM2, hypothyroidism, YUSEF, hx breast ca, fibromyalgia, htn, hld, depression, class 3 obesity admitted as transfer from [place] for concern of elevated ICP 2/2 vasogenic edema of numerous brain abscesses vs masses. Patient recently admitted at [place] 10/27 for acute encephalopathy, found to have DKA vs HHS and bacteremia. Clinically improved with abx and appropriate blood sugar management until 3 days prior to transfer when she developed worsening confusion and lethargy. MRI 11/13 revealing multiple lesions throughout the bilateral cerebral hemispheres with associated vasogenic edema and leftward midline shift.     HPI from [place] below:  Miss Champion is a 60 year old female with a PMH of DMII on oral anti-hyperglycemics, hypothyroid on synthroid, YUSEF, Right breast papilloma, fibromyalgia, HTN, HLD and depression presents to [facility] via ambulance after being found down and unresponsive at home. Patient is obtunded with a GCS of 8 and is not able to participate in any history giving. This note is per report from neighbor, EMS, ED providers and nurse. Per report Miss Champion' neighbor hadnt seen her for 3 days so they went over to her house to check on her and found her down and unresponsive. EMS was called and brought her into the ED. Upon drawing labwork patient Wbc was 25.8, , anion gap 8, Cr 2.9, blood glucose 805, UDS pos for barbiturates, pH 6.928. Patient is protecting airway although her respirations are labored, Sp02 mid to low 90's and she has YUSEF. A dose of narcan had no response, patient was further treated with bicarb, insulin, IV fluids, IV vanc and zosyn and admitted to the ICU for a higher level of care. Patients sister Darcy was called by staff  and myself and asked to be kept updated, phone number in EMR.      No new subjective & objective note has been filed under this hospital service since the last note was generated.    Assessment/Plan:     Neuro  * Brain lesion  MRI Brain w/lesions throughout the cerebral hemispheres involving frontal, parietal, temporal, and occipital lobes with surrounding vasogenic edema. Suspect 2/2 septic emboli given recent bacteremia. Speciation of BCx 10/27 revealing organisms from oral mary. Mannitol and 3% saline started prior to transfer. CTH notable for multiple areas of mild diminished attenuation involving the cerebral hemispheres bilaterally corresponding with multiple T2/FLAIR hyperintense lesions noted on the recent MRI examination.     - q1h neuro checks    - BCx NGTD  - Infectious Disease consult; appreciate recommendations. Signed off   - continue long term antibiotics x 6 weeks  - q6h Na; Na goal 140-155  - stable for transfer to floor     Cardiac/Vascular  Hypertension associated with diabetes  - Goal SBP < 160   - continue amlodipine   - starting thiazide diuretic for blood pressure control and secondary effect on DI    Endocrine  BMI 45.0-49.9, adult  - nutrition consult    Diabetes insipidus  - trend DI labs Q6hrs  - encourage fluid intake  - on half normal; continue     Other  Discharge planning issues  - pending diagnostics and intervention          The patient is being Prophylaxed for:  Venous Thromboembolism with: Mechanical or Chemical  Stress Ulcer with: Not Applicable   Ventilator Pneumonia with: not applicable    Activity Orders          Diet Dysphagia Mechanical Soft (IDDSI Level 5) [facility]; Consistent Carbohydrate; Thin: Dysphagia 2 (Mechanical Soft Ground) starting at 11/15 1418    Turn patient starting at 11/13 0000    Elevate HOB starting at 11/12 2358        Full Code    Vasquez Porter MD  Neurocritical Care  [facility]

## 2021-11-19 NOTE — ASSESSMENT & PLAN NOTE
A1c 9 on admission to [place]. BG labile.    - Goal CBG < 180   - Cont basal insulin  - MDSSI  - adjust aspart

## 2021-11-19 NOTE — PLAN OF CARE
[facility] Care Plan    POC reviewed with Violeta Champion and family at 1400. Pt verbalized understanding. Questions and concerns addressed. No acute events today. Pt progressing toward goals. Will continue to monitor. See below and flowsheets for full assessment and VS info.     -Aspart increased  -BM 11/19  -cathflo given through red port of PICC and pulled back, catheter flushing approprietly now.    Neuro:  Ajit Coma Scale  Best Eye Response: 4-->(E4) spontaneous  Best Motor Response: 6-->(M6) obeys commands  Best Verbal Response: 4-->(V4) confused  [place] Coma Scale Score: 14  Assessment Qualifiers: patient not sedated/intubated,no eye obstruction present  Pupil PERRLA: yes     24 hr Temp:  [97.5 °F (36.4 °C)-99 °F (37.2 °C)]     CV:   Rhythm: normal sinus rhythm  BP goals:   SBP < 160  MAP > 65    Resp:   O2 Device (Oxygen Therapy): room air       Plan: N/A    GI/:  CHELO Total Score: 15  Diet/Nutrition Received: consistent carb/diabetic diet  Last Bowel Movement: 11/19/21  Voiding Characteristics: urethral catheter (bladder)    Intake/Output Summary (Last 24 hours) at 11/19/2021 1111  Last data filed at 11/19/2021 1100  Gross per 24 hour   Intake 3383.49 ml   Output 6530 ml   Net -3146.51 ml     Unmeasured Output  Urine Occurrence: 1  Stool Occurrence: 1  Emesis Occurrence: 0  Pad Count: 2    Labs/Accuchecks:  Recent Labs   Lab 11/19/21 0023   WBC 6.60   RBC 3.20*   HGB 8.6*   HCT 29.2*         Recent Labs   Lab 11/19/21 0023 11/19/21  0023 11/19/21  0637   *  153*   < > 156*   K 3.6  --   --    CO2 27  --   --    *  --   --    BUN 6  --   --    CREATININE 1.0  --   --    ALKPHOS 67  --   --    ALT <5*  --   --    AST 8*  --   --    BILITOT 0.2  --   --     < > = values in this interval not displayed.      Recent Labs   Lab 11/13/21 0111   INR 1.0   APTT 21.9      Recent Labs   Lab 11/13/21  0111   TROPONINI <0.006       Electrolytes: N/A - electrolytes WDL  Accuchecks: ACHS    Gtts:        LDA/Wounds:  Lines/Drains/Airways       Peripherally Inserted Central Catheter Line              PICC Double Lumen 11/01/21 1950 right basilic 17 days              Drain                   Urethral Catheter 11/17/21 1000 Latex 16 Fr. 2 days                  Wounds: yes  Wound care consulted: Yes

## 2021-11-19 NOTE — PROGRESS NOTES
"[facility]  Adult Nutrition  Progress Note    SUMMARY       Recommendations    1. Continue diabetic diet as tolerated with texture per SLP.     2. Encourage po intake.     3. RD following.     Goals: Pt to receive nutrition by RD follow up  Nutrition Goal Status: goal met  Communication of RD Recs:  (POC)    Assessment and Plan      Reason for Assessment    Reason For Assessment: RD follow-up  Diagnosis:  (brain lesion)  Relevant Medical History: T2DM, YUSEF, breast cancer, fibromyalgia, HTN, HLD, obesity  Interdisciplinary Rounds: did not attend  General Information Comments: Pt resting in bed with family member at bedside. Pt reports okay appetite at this time. Per chart review, pt with poor po intake. Breakfast tray at bedside, consumed 25-50% of meal. Pt does not want ONS 2/2 causes diarrhea. Denies N/V/D/C. No updated wt since admit. Pt appears nourished. Pt at risk for acute malnutrition if po intake continues to be poor.   Nutrition Discharge Planning: Diabetic diet, texture per SLP.    Nutrition Risk Screen    Nutrition Risk Screen: dysphagia or difficulty swallowing    Nutrition/Diet History    Patient Reported Diet/Restrictions/Preferences: diabetic diet  Spiritual, Cultural Beliefs, Hinduism Practices, Values that Affect Care: no  Food Allergies: NKFA  Factors Affecting Nutritional Intake: decreased appetite    Anthropometrics    Temp: 98.7 °F (37.1 °C)  Height: 5' 4" (162.6 cm)  Height (inches): 64 in  Weight Method: Bed Scale  Weight: 123.8 kg (273 lb)  Weight (lb): 273 lb  Ideal Body Weight (IBW), Female: 120 lb  % Ideal Body Weight, Female (lb): 227.5 %  BMI (Calculated): 46.8     Lab/Procedures/Meds    Pertinent Labs Reviewed: reviewed  Pertinent Labs Comments: Na 156, glucose 191, AST 8, ALT <5  Pertinent Medications Reviewed: reviewed  Pertinent Medications Comments: amlodipine, aspirin, insulin, levothyroxine, justyna-docusate     Estimated/Assessed Needs    Weight Used For " Calorie Calculations: 124.1 kg (273 lb 9.5 oz)  Energy Calorie Requirements (kcal): 1806 kcal  Energy Need Method: [place] (no AF 2/2 obesity)  Protein Requirements: 65-82g (1.2-1.5g/kg IBW)  Weight Used For Protein Calculations: 54.5 kg (120 lb 2.4 oz) (IBW)  Fluid Requirements (mL): 1ml/kcal or per MD     RDA Method (mL): 1806  CHO Requirement: 225g    Nutrition Prescription Ordered    Current Diet Order: Mechanical Soft, Diabetic    Evaluation of Received Nutrient/Fluid Intake    I/O: -988mL since admit   Comments: LBM 11/19  Tolerance: tolerating  % Intake of Estimated Energy Needs: 0 - 25 %  % Meal Intake: 25 - 50 %    Nutrition Risk    Level of Risk/Frequency of Follow-up: low     Monitor and Evaluation    Food and Nutrient Intake: energy intake,food and beverage intake  Food and Nutrient Adminstration: diet order  Knowledge/Beliefs/Attitudes: food and nutrition knowledge/skill,beliefs and attitudes  Physical Activity and Function: nutrition-related ADLs and IADLs  Anthropometric Measurements: weight,weight change,body mass index  Biochemical Data, Medical Tests and Procedures: electrolyte and renal panel,gastrointestinal profile,glucose/endocrine profile,inflammatory profile,lipid profile  Nutrition-Focused Physical Findings: overall appearance,extremities, muscles and bones     Nutrition Follow-Up    RD Follow-up?: Yes

## 2021-11-19 NOTE — ASSESSMENT & PLAN NOTE
MRI Brain w/lesions throughout the cerebral hemispheres involving frontal, parietal, temporal, and occipital lobes with surrounding vasogenic edema. Suspect 2/2 septic emboli given recent bacteremia. Speciation of BCx 10/27 revealing organisms from oral mary. Mannitol and 3% saline started prior to transfer. CTH notable for multiple areas of mild diminished attenuation involving the cerebral hemispheres bilaterally corresponding with multiple T2/FLAIR hyperintense lesions noted on the recent MRI examination.     - q1h neuro checks    - BCx NGTD  - Infectious Disease consult; appreciate recommendations. Signed off   - continue long term antibiotics x 6 weeks  - q6h Na; Na goal 140-155  - stable for transfer to floor

## 2021-11-19 NOTE — SUBJECTIVE & OBJECTIVE
Interval History:  No acute or concerning events noted by overnight staff. Afebrile, hypertensive overnight; continue amlodipine 10mg. Adding thiazide diuretic. Alert and conscious. DORCAS deferred. ID recommending long term antibiotics; signed off. Hypernatremic; correcting slowly.    Review of Systems   Constitutional: Negative.    Respiratory: Negative.    Gastrointestinal: Negative.    Genitourinary: Negative.    Musculoskeletal: Negative.        Objective:     Vitals:  Temp: 98.8 °F (37.1 °C)  Pulse: 85  Rhythm: normal sinus rhythm  BP: (!) 149/78  MAP (mmHg): 108  Resp: 20  SpO2: 96 %  O2 Device (Oxygen Therapy): room air    Temp  Min: 97.5 °F (36.4 °C)  Max: 99 °F (37.2 °C)  Pulse  Min: 67  Max: 98  BP  Min: 126/79  Max: 169/105  MAP (mmHg)  Min: 94  Max: 141  Resp  Min: 16  Max: 35  SpO2  Min: 88 %  Max: 99 %    11/18 0701 - 11/19 0700  In: 6047.2 [P.O.:4750; I.V.:601]  Out: 6005 [Urine:6005]   Unmeasured Output  Urine Occurrence: 1  Stool Occurrence: 1  Emesis Occurrence: 0  Pad Count: 2       Physical Exam  Constitutional:       General: She is not in acute distress.     Appearance: She is obese. She is not ill-appearing.   HENT:      Head: Normocephalic and atraumatic.      Mouth/Throat:      Mouth: Mucous membranes are moist.      Pharynx: Oropharynx is clear. No oropharyngeal exudate.   Eyes:      General:         Right eye: No discharge.         Left eye: No discharge.      Extraocular Movements: Extraocular movements intact.      Pupils: Pupils are equal, round, and reactive to light.   Cardiovascular:      Rate and Rhythm: Normal rate and regular rhythm.      Pulses: Normal pulses.      Heart sounds: Normal heart sounds.   Pulmonary:      Effort: Pulmonary effort is normal. No respiratory distress.      Breath sounds: Normal breath sounds.   Abdominal:      General: Abdomen is flat. There is no distension.      Palpations: Abdomen is soft.   Musculoskeletal:         General: No swelling. Normal range of  motion.      Cervical back: Normal range of motion.   Skin:     General: Skin is warm.      Capillary Refill: Capillary refill takes less than 2 seconds.   Neurological:      Mental Status: She is alert. She is disoriented.      Cranial Nerves: No cranial nerve deficit.      Sensory: No sensory deficit.      Motor: No weakness.      Comments:   A/Ox3  GCS 14  Alert and conscious. Follows commands.   Moves all extremities spontaneously against gravity.         Medications:  Continuous ScheduledamLODIPine, 10 mg, Daily  aspirin, 81 mg, Daily  cefTRIAXone (ROCEPHIN) IVPB, 2 g, Q12H  heparin (porcine), 5,000 Units, Q8H  hydroCHLOROthiazide, 25 mg, Daily  insulin aspart U-100, 6 Units, TIDWM  levothyroxine, 100 mcg, Before breakfast  metronidazole, 500 mg, Q8H  miconazole nitrate 2%, , BID  senna-docusate 8.6-50 mg, 1 tablet, Daily  vibegron, 75 mg, Daily    PRNacetaminophen, 650 mg, Q6H PRN  dextrose 50%, 12.5 g, PRN  glucagon (human recombinant), 1 mg, PRN  insulin aspart U-100, 1-10 Units, QID (AC + HS) PRN  labetalol, 10 mg, Q3H PRN  magnesium oxide, 800 mg, PRN  magnesium oxide, 800 mg, PRN  potassium bicarbonate, 35 mEq, PRN  potassium bicarbonate, 50 mEq, PRN  potassium bicarbonate, 60 mEq, PRN  potassium, sodium phosphates, 2 packet, PRN  potassium, sodium phosphates, 2 packet, PRN  potassium, sodium phosphates, 2 packet, PRN  sodium chloride 0.9%, 10 mL, PRN  tiZANidine, 2 mg, Q8H PRN      Today I personally reviewed pertinent medications, lines/drains/airways, imaging, cardiology results, laboratory results, microbiology results,    Diet  Diet Dysphagia Mechanical Soft (IDDSI Level 5) [facility]; Consistent Carbohydrate; Thin

## 2021-11-19 NOTE — PLAN OF CARE
Recommendations    1. Continue diabetic diet as tolerated with texture per SLP.     2. Encourage po intake.     3. RD following.     Goals: Pt to receive nutrition by RD follow up  Nutrition Goal Status: goal met

## 2021-11-20 LAB
ALBUMIN SERPL BCP-MCNC: 2.4 G/DL (ref 3.5–5.2)
ALP SERPL-CCNC: 68 U/L (ref 55–135)
ALT SERPL W/O P-5'-P-CCNC: <5 U/L (ref 10–44)
ANION GAP SERPL CALC-SCNC: 10 MMOL/L (ref 8–16)
AST SERPL-CCNC: 9 U/L (ref 10–40)
BASOPHILS # BLD AUTO: 0.04 K/UL (ref 0–0.2)
BASOPHILS NFR BLD: 0.7 % (ref 0–1.9)
BILIRUB SERPL-MCNC: 0.3 MG/DL (ref 0.1–1)
BUN SERPL-MCNC: 6 MG/DL (ref 6–20)
CALCIUM SERPL-MCNC: 8.3 MG/DL (ref 8.7–10.5)
CHLORIDE SERPL-SCNC: 108 MMOL/L (ref 95–110)
CO2 SERPL-SCNC: 29 MMOL/L (ref 23–29)
CREAT SERPL-MCNC: 1 MG/DL (ref 0.5–1.4)
DIFFERENTIAL METHOD: ABNORMAL
EOSINOPHIL # BLD AUTO: 0.3 K/UL (ref 0–0.5)
EOSINOPHIL NFR BLD: 4.6 % (ref 0–8)
ERYTHROCYTE [DISTWIDTH] IN BLOOD BY AUTOMATED COUNT: 21.8 % (ref 11.5–14.5)
EST. GFR  (AFRICAN AMERICAN): >60 ML/MIN/1.73 M^2
EST. GFR  (NON AFRICAN AMERICAN): >60 ML/MIN/1.73 M^2
GLUCOSE SERPL-MCNC: 164 MG/DL (ref 70–110)
HCT VFR BLD AUTO: 30.5 % (ref 37–48.5)
HGB BLD-MCNC: 8.9 G/DL (ref 12–16)
IMM GRANULOCYTES # BLD AUTO: 0.01 K/UL (ref 0–0.04)
IMM GRANULOCYTES NFR BLD AUTO: 0.2 % (ref 0–0.5)
LYMPHOCYTES # BLD AUTO: 2.9 K/UL (ref 1–4.8)
LYMPHOCYTES NFR BLD: 48.4 % (ref 18–48)
MAGNESIUM SERPL-MCNC: 1.7 MG/DL (ref 1.6–2.6)
MCH RBC QN AUTO: 26 PG (ref 27–31)
MCHC RBC AUTO-ENTMCNC: 29.2 G/DL (ref 32–36)
MCV RBC AUTO: 89 FL (ref 82–98)
MONOCYTES # BLD AUTO: 0.5 K/UL (ref 0.3–1)
MONOCYTES NFR BLD: 8.7 % (ref 4–15)
NEUTROPHILS # BLD AUTO: 2.3 K/UL (ref 1.8–7.7)
NEUTROPHILS NFR BLD: 37.4 % (ref 38–73)
NRBC BLD-RTO: 0 /100 WBC
OSMOLALITY SERPL: 312 MOSM/KG (ref 275–295)
OSMOLALITY SERPL: 312 MOSM/KG (ref 275–295)
OSMOLALITY SERPL: 313 MOSM/KG (ref 275–295)
OSMOLALITY SERPL: 314 MOSM/KG (ref 275–295)
OSMOLALITY UR: 146 MOSM/KG (ref 50–1200)
OSMOLALITY UR: 210 MOSM/KG (ref 50–1200)
OSMOLALITY UR: 235 MOSM/KG (ref 50–1200)
OSMOLALITY UR: 263 MOSM/KG (ref 50–1200)
PHOSPHATE SERPL-MCNC: 3.6 MG/DL (ref 2.7–4.5)
PLATELET # BLD AUTO: 183 K/UL (ref 150–450)
PMV BLD AUTO: 11.4 FL (ref 9.2–12.9)
POCT GLUCOSE: 201 MG/DL (ref 70–110)
POCT GLUCOSE: 208 MG/DL (ref 70–110)
POCT GLUCOSE: 209 MG/DL (ref 70–110)
POTASSIUM SERPL-SCNC: 3 MMOL/L (ref 3.5–5.1)
POTASSIUM SERPL-SCNC: 3.4 MMOL/L (ref 3.5–5.1)
PROT SERPL-MCNC: 6.8 G/DL (ref 6–8.4)
RBC # BLD AUTO: 3.42 M/UL (ref 4–5.4)
SODIUM SERPL-SCNC: 145 MMOL/L (ref 136–145)
SODIUM SERPL-SCNC: 147 MMOL/L (ref 136–145)
SODIUM UR-SCNC: 56 MMOL/L (ref 20–250)
SODIUM UR-SCNC: 74 MMOL/L (ref 20–250)
SODIUM UR-SCNC: 77 MMOL/L (ref 20–250)
SODIUM UR-SCNC: 83 MMOL/L (ref 20–250)
WBC # BLD AUTO: 6.07 K/UL (ref 3.9–12.7)

## 2021-11-20 PROCEDURE — 94761 N-INVAS EAR/PLS OXIMETRY MLT: CPT

## 2021-11-20 PROCEDURE — 83930 ASSAY OF BLOOD OSMOLALITY: CPT | Mod: 91 | Performed by: PHYSICIAN ASSISTANT

## 2021-11-20 PROCEDURE — 84300 ASSAY OF URINE SODIUM: CPT | Performed by: PHYSICIAN ASSISTANT

## 2021-11-20 PROCEDURE — 25000003 PHARM REV CODE 250: Performed by: PHYSICIAN ASSISTANT

## 2021-11-20 PROCEDURE — 25000003 PHARM REV CODE 250: Performed by: STUDENT IN AN ORGANIZED HEALTH CARE EDUCATION/TRAINING PROGRAM

## 2021-11-20 PROCEDURE — 84100 ASSAY OF PHOSPHORUS: CPT | Performed by: STUDENT IN AN ORGANIZED HEALTH CARE EDUCATION/TRAINING PROGRAM

## 2021-11-20 PROCEDURE — 84300 ASSAY OF URINE SODIUM: CPT | Mod: 91 | Performed by: PSYCHIATRY & NEUROLOGY

## 2021-11-20 PROCEDURE — 84132 ASSAY OF SERUM POTASSIUM: CPT | Performed by: NURSE PRACTITIONER

## 2021-11-20 PROCEDURE — 80053 COMPREHEN METABOLIC PANEL: CPT | Performed by: STUDENT IN AN ORGANIZED HEALTH CARE EDUCATION/TRAINING PROGRAM

## 2021-11-20 PROCEDURE — 25000003 PHARM REV CODE 250: Performed by: NURSE PRACTITIONER

## 2021-11-20 PROCEDURE — 85025 COMPLETE CBC W/AUTO DIFF WBC: CPT | Performed by: STUDENT IN AN ORGANIZED HEALTH CARE EDUCATION/TRAINING PROGRAM

## 2021-11-20 PROCEDURE — 84300 ASSAY OF URINE SODIUM: CPT | Mod: 91 | Performed by: PHYSICIAN ASSISTANT

## 2021-11-20 PROCEDURE — 83735 ASSAY OF MAGNESIUM: CPT | Performed by: STUDENT IN AN ORGANIZED HEALTH CARE EDUCATION/TRAINING PROGRAM

## 2021-11-20 PROCEDURE — 99233 SBSQ HOSP IP/OBS HIGH 50: CPT | Mod: ,,, | Performed by: PSYCHIATRY & NEUROLOGY

## 2021-11-20 PROCEDURE — 25000003 PHARM REV CODE 250: Performed by: HOSPITALIST

## 2021-11-20 PROCEDURE — S0030 INJECTION, METRONIDAZOLE: HCPCS | Performed by: NURSE PRACTITIONER

## 2021-11-20 PROCEDURE — 99900035 HC TECH TIME PER 15 MIN (STAT)

## 2021-11-20 PROCEDURE — 63600175 PHARM REV CODE 636 W HCPCS: Performed by: NURSE PRACTITIONER

## 2021-11-20 PROCEDURE — 99233 SBSQ HOSP IP/OBS HIGH 50: CPT | Mod: ,,, | Performed by: HOSPITALIST

## 2021-11-20 PROCEDURE — C9399 UNCLASSIFIED DRUGS OR BIOLOG: HCPCS | Performed by: NURSE PRACTITIONER

## 2021-11-20 PROCEDURE — 84295 ASSAY OF SERUM SODIUM: CPT | Mod: 91 | Performed by: PHYSICIAN ASSISTANT

## 2021-11-20 PROCEDURE — 83935 ASSAY OF URINE OSMOLALITY: CPT | Mod: 91 | Performed by: PHYSICIAN ASSISTANT

## 2021-11-20 PROCEDURE — 83930 ASSAY OF BLOOD OSMOLALITY: CPT | Performed by: PHYSICIAN ASSISTANT

## 2021-11-20 PROCEDURE — 99233 PR SUBSEQUENT HOSPITAL CARE,LEVL III: ICD-10-PCS | Mod: ,,, | Performed by: PSYCHIATRY & NEUROLOGY

## 2021-11-20 PROCEDURE — 83935 ASSAY OF URINE OSMOLALITY: CPT | Mod: 91 | Performed by: PSYCHIATRY & NEUROLOGY

## 2021-11-20 PROCEDURE — 99233 PR SUBSEQUENT HOSPITAL CARE,LEVL III: ICD-10-PCS | Mod: ,,, | Performed by: HOSPITALIST

## 2021-11-20 PROCEDURE — 25000003 PHARM REV CODE 250: Performed by: PSYCHIATRY & NEUROLOGY

## 2021-11-20 PROCEDURE — 83935 ASSAY OF URINE OSMOLALITY: CPT | Performed by: PHYSICIAN ASSISTANT

## 2021-11-20 PROCEDURE — 11000001 HC ACUTE MED/SURG PRIVATE ROOM

## 2021-11-20 RX ORDER — POTASSIUM CHLORIDE 20 MEQ/1
40 TABLET, EXTENDED RELEASE ORAL ONCE
Status: COMPLETED | OUTPATIENT
Start: 2021-11-20 | End: 2021-11-20

## 2021-11-20 RX ADMIN — INSULIN ASPART 4 UNITS: 100 INJECTION, SOLUTION INTRAVENOUS; SUBCUTANEOUS at 11:11

## 2021-11-20 RX ADMIN — INSULIN DETEMIR 15 UNITS: 100 INJECTION, SOLUTION SUBCUTANEOUS at 09:11

## 2021-11-20 RX ADMIN — POTASSIUM CHLORIDE 40 MEQ: 1500 TABLET, EXTENDED RELEASE ORAL at 01:11

## 2021-11-20 RX ADMIN — METRONIDAZOLE 500 MG: 500 INJECTION, SOLUTION INTRAVENOUS at 12:11

## 2021-11-20 RX ADMIN — AMLODIPINE BESYLATE 10 MG: 10 TABLET ORAL at 08:11

## 2021-11-20 RX ADMIN — HEPARIN SODIUM 5000 UNITS: 5000 INJECTION INTRAVENOUS; SUBCUTANEOUS at 06:11

## 2021-11-20 RX ADMIN — INSULIN ASPART 4 UNITS: 100 INJECTION, SOLUTION INTRAVENOUS; SUBCUTANEOUS at 05:11

## 2021-11-20 RX ADMIN — INSULIN ASPART 6 UNITS: 100 INJECTION, SOLUTION INTRAVENOUS; SUBCUTANEOUS at 08:11

## 2021-11-20 RX ADMIN — METRONIDAZOLE 500 MG: 500 INJECTION, SOLUTION INTRAVENOUS at 03:11

## 2021-11-20 RX ADMIN — LEVOTHYROXINE SODIUM 100 MCG: 100 TABLET ORAL at 06:11

## 2021-11-20 RX ADMIN — MICONAZOLE NITRATE: 20 OINTMENT TOPICAL at 09:11

## 2021-11-20 RX ADMIN — CEFTRIAXONE SODIUM 2 G: 2 INJECTION, SOLUTION INTRAVENOUS at 04:11

## 2021-11-20 RX ADMIN — INSULIN ASPART 4 UNITS: 100 INJECTION, SOLUTION INTRAVENOUS; SUBCUTANEOUS at 08:11

## 2021-11-20 RX ADMIN — INSULIN ASPART 6 UNITS: 100 INJECTION, SOLUTION INTRAVENOUS; SUBCUTANEOUS at 05:11

## 2021-11-20 RX ADMIN — INSULIN ASPART 3 UNITS: 100 INJECTION, SOLUTION INTRAVENOUS; SUBCUTANEOUS at 09:11

## 2021-11-20 RX ADMIN — POTASSIUM BICARBONATE 60 MEQ: 391 TABLET, EFFERVESCENT ORAL at 04:11

## 2021-11-20 RX ADMIN — METRONIDAZOLE 500 MG: 500 INJECTION, SOLUTION INTRAVENOUS at 08:11

## 2021-11-20 RX ADMIN — ASPIRIN 81 MG: 81 TABLET, COATED ORAL at 08:11

## 2021-11-20 RX ADMIN — MICONAZOLE NITRATE: 20 OINTMENT TOPICAL at 08:11

## 2021-11-20 RX ADMIN — INSULIN ASPART 6 UNITS: 100 INJECTION, SOLUTION INTRAVENOUS; SUBCUTANEOUS at 11:11

## 2021-11-20 RX ADMIN — HEPARIN SODIUM 5000 UNITS: 5000 INJECTION INTRAVENOUS; SUBCUTANEOUS at 09:11

## 2021-11-20 RX ADMIN — POTASSIUM BICARBONATE 60 MEQ: 391 TABLET, EFFERVESCENT ORAL at 06:11

## 2021-11-20 RX ADMIN — DOCUSATE SODIUM AND SENNOSIDES 1 TABLET: 8.6; 5 TABLET, FILM COATED ORAL at 08:11

## 2021-11-20 RX ADMIN — HEPARIN SODIUM 5000 UNITS: 5000 INJECTION INTRAVENOUS; SUBCUTANEOUS at 01:11

## 2021-11-20 RX ADMIN — HYDROCHLOROTHIAZIDE 25 MG: 12.5 TABLET ORAL at 08:11

## 2021-11-20 NOTE — CARE UPDATE
BG goal 140-180    Endocrine consult acknowledged. Full consult note to follow in the am.     Remains in NCC. BG above goal ranges on current SQ insulin regimen.     Plan:  -Continue Novolog 6 units TID with meals (0.3 u/kg dosing)   -Start Levemir 15 units q HS (fasting BG above goal ranges)  -Mod Dose Correction Scale   -BG monitoring ac/hs     ** Please call Endocrine for any BG related issues **    Lab Results   Component Value Date    HGBA1C 9.0 (H) 10/27/2021

## 2021-11-20 NOTE — ASSESSMENT & PLAN NOTE
MRI Brain w/lesions throughout the cerebral hemispheres involving frontal, parietal, temporal, and occipital lobes with surrounding vasogenic edema. Suspect 2/2 septic emboli given recent bacteremia. Speciation of BCx 10/27 revealing organisms from oral mary. Mannitol and 3% saline started prior to transfer. CTH notable for multiple areas of mild diminished attenuation involving the cerebral hemispheres bilaterally corresponding with multiple T2/FLAIR hyperintense lesions noted on the recent MRI examination.     - q1h neuro checks    - BCx NGTD  -DORCAS deferred  - Infectious Disease consult; appreciate recommendations. Signed off   - continue long term antibiotics x 6 weeks  - q6h Na; Na goal EuNa, drinking to thirst, consider DDAVP if Na continues to climb despite PO intake  - stable for transfer to floor

## 2021-11-20 NOTE — NURSING
Report given to MAURICIO Nieves. Pt vitals stable and WNL, and ready for transfer; transferred in ICU sizewise bed. Sister at bedside with belongings; followed to new room 960.

## 2021-11-20 NOTE — PROGRESS NOTES
[facility]  Neurocritical Care  Progress Note    Admit Date: 11/12/2021  Service Date: 11/20/2021  Length of Stay: 8    Subjective:     Chief Complaint: Brain lesion    History of Present Illness: 60 year old female with Pmhx DM2, hypothyroidism, YUSEF, hx breast ca, fibromyalgia, htn, hld, depression, class 3 obesity admitted as transfer from [place] for concern of elevated ICP 2/2 vasogenic edema of numerous brain abscesses vs masses. Patient recently admitted at [place] 10/27 for acute encephalopathy, found to have DKA vs HHS and bacteremia. Clinically improved with abx and appropriate blood sugar management until 3 days prior to transfer when she developed worsening confusion and lethargy. MRI 11/13 revealing multiple lesions throughout the bilateral cerebral hemispheres with associated vasogenic edema and leftward midline shift.     HPI from [place] below:  Miss Champion is a 60 year old female with a PMH of DMII on oral anti-hyperglycemics, hypothyroid on synthroid, YUSEF, Right breast papilloma, fibromyalgia, HTN, HLD and depression presents to [facility] via ambulance after being found down and unresponsive at home. Patient is obtunded with a GCS of 8 and is not able to participate in any history giving. This note is per report from neighbor, EMS, ED providers and nurse. Per report Miss Champion' neighbor hadnt seen her for 3 days so they went over to her house to check on her and found her down and unresponsive. EMS was called and brought her into the ED. Upon drawing labwork patient Wbc was 25.8, , anion gap 8, Cr 2.9, blood glucose 805, UDS pos for barbiturates, pH 6.928. Patient is protecting airway although her respirations are labored, Sp02 mid to low 90's and she has YUSEF. A dose of narcan had no response, patient was further treated with bicarb, insulin, IV fluids, IV vanc and zosyn and admitted to the ICU for a higher level of care. Patients sister Darcy was called by staff and  myself and asked to be kept updated, phone number in EMR.      Hospital Course: 11/18/2021 Hypernatremic. Monitoring.   11/19: Drink to thirst , NAEON  11/20: Drink to thirst, cont follow Na, follow ID reccs, NAEON, stable for TTF       Interval History:  Drink to thirst, cont follow Na, follow ID reccs, NAEON, stable for TTF     Review of Systems   Constitutional: Negative for chills and fever.   HENT: Negative for rhinorrhea.    Eyes: Negative for visual disturbance.   Respiratory: Negative for shortness of breath and wheezing.    Cardiovascular: Negative for chest pain and palpitations.   Gastrointestinal: Negative for abdominal pain, nausea and vomiting.   Genitourinary: Negative for difficulty urinating.   Musculoskeletal: Negative for arthralgias and myalgias.   Skin: Negative for rash.   Neurological: Negative for dizziness, weakness, numbness and headaches.   Psychiatric/Behavioral: Negative for agitation.       Objective:     Vitals:  Temp: 98.9 °F (37.2 °C)  Pulse: 88  Rhythm: normal sinus rhythm  BP: (!) 153/72  MAP (mmHg): 104  Resp: (!) 26  SpO2: 97 %  O2 Device (Oxygen Therapy): room air    Temp  Min: 98.3 °F (36.8 °C)  Max: 98.9 °F (37.2 °C)  Pulse  Min: 78  Max: 101  BP  Min: 94/72  Max: 163/88  MAP (mmHg)  Min: 80  Max: 120  Resp  Min: 20  Max: 43  SpO2  Min: 95 %  Max: 100 %    11/19 0701 - 11/20 0700  In: 765.9 [P.O.:510]  Out: 6715 [Urine:6715]   Unmeasured Output  Urine Occurrence: 1  Stool Occurrence: 1  Emesis Occurrence: 0  Pad Count: 2       Physical Exam  Constitutional:       General: She is not in acute distress.     Appearance: She is obese. She is not ill-appearing.   HENT:      Head: Normocephalic and atraumatic.      Mouth/Throat:      Mouth: Mucous membranes are moist.      Pharynx: Oropharynx is clear. No oropharyngeal exudate.   Eyes:      General:         Right eye: No discharge.         Left eye: No discharge.      Extraocular Movements: Extraocular movements intact.      Pupils:  Pupils are equal, round, and reactive to light.   Cardiovascular:      Rate and Rhythm: Normal rate and regular rhythm.      Pulses: Normal pulses.      Heart sounds: Normal heart sounds.   Pulmonary:      Effort: Pulmonary effort is normal. No respiratory distress.      Breath sounds: Normal breath sounds.   Abdominal:      General: Abdomen is flat. There is no distension.      Palpations: Abdomen is soft.   Musculoskeletal:         General: No swelling. Normal range of motion.      Cervical back: Normal range of motion.   Skin:     General: Skin is warm.      Capillary Refill: Capillary refill takes less than 2 seconds.   Neurological:      Mental Status: She is alert.      Cranial Nerves: No cranial nerve deficit.      Sensory: No sensory deficit.      Motor: No weakness.      Comments: GCS [id number]  A/Ox3, somewhat confused  Alert and conscious. Follows commands.   PERRL, EOMI  Moves all extremities spontaneously against gravity.  SILTx4         Medications:  Continuous ScheduledamLODIPine, 10 mg, Daily  aspirin, 81 mg, Daily  cefTRIAXone (ROCEPHIN) IVPB, 2 g, Q12H  heparin (porcine), 5,000 Units, Q8H  hydroCHLOROthiazide, 25 mg, Daily  insulin aspart U-100, 6 Units, TIDWM  levothyroxine, 100 mcg, Before breakfast  metronidazole, 500 mg, Q8H  miconazole nitrate 2%, , BID  senna-docusate 8.6-50 mg, 1 tablet, Daily  vibegron, 75 mg, Daily    PRNacetaminophen, 650 mg, Q6H PRN  dextrose 50%, 12.5 g, PRN  glucagon (human recombinant), 1 mg, PRN  insulin aspart U-100, 1-10 Units, QID (AC + HS) PRN  labetalol, 10 mg, Q3H PRN  magnesium oxide, 800 mg, PRN  magnesium oxide, 800 mg, PRN  melatonin, 6 mg, Nightly PRN  potassium bicarbonate, 35 mEq, PRN  potassium bicarbonate, 50 mEq, PRN  potassium bicarbonate, 60 mEq, PRN  potassium, sodium phosphates, 2 packet, PRN  potassium, sodium phosphates, 2 packet, PRN  potassium, sodium phosphates, 2 packet, PRN  sodium chloride 0.9%, 10 mL, PRN  tiZANidine, 2 mg, Q8H  PRN      Today I personally reviewed pertinent medications, lines/drains/airways, imaging, cardiology results, laboratory results, microbiology results,    Diet  Diet Dysphagia Mechanical Soft (IDDSI Level 5) [facility]; Consistent Carbohydrate; Thin    Assessment/Plan:     Neuro  * Brain lesion  MRI Brain w/lesions throughout the cerebral hemispheres involving frontal, parietal, temporal, and occipital lobes with surrounding vasogenic edema. Suspect 2/2 septic emboli given recent bacteremia. Speciation of BCx 10/27 revealing organisms from oral mary. Mannitol and 3% saline started prior to transfer. CTH notable for multiple areas of mild diminished attenuation involving the cerebral hemispheres bilaterally corresponding with multiple T2/FLAIR hyperintense lesions noted on the recent MRI examination.     - q1h neuro checks    - BCx NGTD  -DORCAS deferred  - Infectious Disease consult; appreciate recommendations. Signed off   - continue long term antibiotics x 6 weeks  - q6h Na; Na goal EuNa, drinking to thirst, consider DDAVP if Na continues to climb despite PO intake  - stable for transfer to floor     Brain compression  2/2 brain lesions  See brain lesions    Vasogenic brain edema  See brain lesion    Encephalopathy, metabolic  - see principle problem    Cardiac/Vascular  Hypertension associated with diabetes  - Goal SBP < 160   - continue amlodipine   - starting thiazide diuretic for blood pressure control and secondary effect on DI    Endocrine  BMI 45.0-49.9, adult  - nutrition consult    Diabetes insipidus  - trend DI labs Q6hrs  - encourage fluid intake, drink to thirst  - Na stabilized with drinking to thirst, UOP high  -if Na cont to rise despite drinking to thirst, consider ddavp    Uncontrolled type 2 diabetes mellitus with hyperglycemia  A1c 9 on admission to [place]. BG labile.    - Goal CBG 80- 180   - Cont basal insulin  - MDSSI  - adjust aspart prn    Hypothyroidism  TSH 1.39 (11/13/2021)    -  Cont home synthroid     Other  Discharge planning issues  - long term antibiotics  - ID follow up  - PT/OT recommendations           The patient is being Prophylaxed for:  Venous Thromboembolism with: Chemical  Stress Ulcer with: Not Applicable   Ventilator Pneumonia with: not applicable    Activity Orders          Diet Dysphagia Mechanical Soft (IDDSI Level 5) [facility]; Consistent Carbohydrate; Thin: Dysphagia 2 (Mechanical Soft Ground) starting at 11/15 1418    Turn patient starting at 11/13 0000    Elevate HOB starting at 11/12 2358        Full Code    Level 3    Suma Schroeder PA-C  Neurocritical Care  [facility]

## 2021-11-20 NOTE — PROGRESS NOTES
Progress Note  [facility] Medicine    Patient: Violeta Champion [id number]  Date of Service: 11/20/2021  Team: [facility] HOSP MED N Chiki Arteaga MD  Length of Stay:  LOS: 8 days   Admission Date: 11/12/2021    SUBJECTIVE:     Follow-up For:  Brain lesion    HPI/Interval history (See H&P for complete P,F,SHx) :    year old female with Pmhx DM2, hypothyroidism, YUSEF, hx breast ca, fibromyalgia, htn, hld, depression, class 3 obesity admitted as transfer from [place] for concern of elevated ICP 2/2 vasogenic edema of numerous brain abscesses vs masses. Patient recently admitted at [place] 10/27 for acute encephalopathy, found to have DKA vs HHS and bacteremia. Clinically improved with abx and appropriate blood sugar management until 3 days prior to transfer when she developed worsening confusion and lethargy. MRI 11/13 revealing multiple lesions throughout the bilateral cerebral hemispheres with associated vasogenic edema and leftward midline shift.      HPI from [place] below:  Miss Champion is a 60 year old female with a PMH of DMII on oral anti-hyperglycemics, hypothyroid on synthroid, YUSEF, Right breast papilloma, fibromyalgia, HTN, HLD and depression presents to [facility] via ambulance after being found down and unresponsive at home. Patient is obtunded with a GCS of 8 and is not able to participate in any history giving. This note is per report from neighbor, EMS, ED providers and nurse. Per report Miss Champion' neighbor hadnt seen her for 3 days so they went over to her house to check on her and found her down and unresponsive. EMS was called and brought her into the ED. Upon drawing labwork patient Wbc was 25.8, , anion gap 8, Cr 2.9, blood glucose 805, UDS pos for barbiturates, pH 6.928. Patient is protecting airway although her respirations are labored, Sp02 mid to low 90's and she has YUSEF. A dose of narcan had no response, patient was further treated with bicarb, insulin, IV fluids, IV vanc and zosyn and admitted  to the ICU for a higher level of care. Patients sister Darcy was called by staff and myself and asked to be kept updated, phone number in EMR.        Hospital Course: 11/18/2021 Hypernatremic. Monitoring.   11/19: Drink to thirst , NAEON  11/20: Drink to thirst, cont follow Na, follow ID reccs, NAEON.  [facility] [place] [facility] for acute encephalopathy, found to have DKA ( ( beta hydroxybutyrate 5.3 )and bacteremia. 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 improved with antibiotics and blood sugar management until 3 days prior to transfer when she developed worsening confusion and lethargy. MRI 11/13 revealing multiple lesions throughout the bilateral cerebral hemispheres with associated vasogenic edema and leftward midline shift.  UOP 6 L /24h. discussed with sister at the bedside.                    Review of Systems:   Pain scale:   Constitutional:  fever,  chills, headache, vision loss, hearing loss, weight loss, Generalized weakness, falls, loss of smell, loss of taste, poor appetite,  sore throat  Respiratory: cough, shortness of breath.   Cardiovascular: chest pain, dizziness, palpitations, orthopnea, swelling of feet, syncope  Gastrointestinal: nausea, vomiting, abdominal pain, diarrhea, black stool,  beeding per rectum,  change in bowel habits  Genitourinary: hematuria, dysuria, urgency, frequency  Integument/Breast: rash,  pruritis  Hematologic/Lymphatic: easy bruising, lymphadenopathy  Musculoskeletal: arthralgias , myalgias, back pain, neck pain, knee pain  Neurological: confusion, seizures, tremors, slurred speech  Behavioral/Psych:  depression, anxiety, auditory or visual hallucinations     OBJECTIVE:     Physical Exam:  Body mass index is 46.86 kg/m².    Constitutional: Appears well-developed and well-nourished. morbid obesity  Head: Normocephalic and atraumatic.   Neck: Normal range of motion. Neck supple.    Cardiovascular: Normal heart rate.  Regular heart rhythm.  Pulmonary/Chest: Effort normal.   Abdominal: No distension.  No tenderness  Musculoskeletal: Normal range of motion. No edema.   Neurological: Alert and oriented to person,, and time. follows commmands   able to move bilateral upper and lower extremities without limitation  Skin: Skin is warm and dry.   Psychiatric: Normal mood and affect. Behavior is normal.                  Vital Signs  Temp: 97.7 °F (36.5 °C) (11/20/21 1500)  Pulse: 91 (11/20/21 1500)  Resp: 20 (11/20/21 1451)  BP: 112/66 (11/20/21 1500)  SpO2: 95 % (11/20/21 1300)     24 Hour VS Range    Temp:  [97.7 °F (36.5 °C)-98.9 °F (37.2 °C)]   Pulse:  []   Resp:  [20-43]   BP: (112-179)/()   SpO2:  [94 %-100 %]     Intake/Output Summary (Last 24 hours) at 11/20/2021 1542  Last data filed at 11/20/2021 1300  Gross per 24 hour   Intake 1255.95 ml   Output 4635 ml   Net -3379.05 ml         I/O This Shift:  I/O this shift:  In: 950 [P.O.:850; IV Piggyback:100]  Out: 1220 [Urine:1220]    Wt Readings from Last 3 Encounters:   11/13/21 123.8 kg (273 lb)   11/11/21 129.5 kg (285 lb 7.9 oz)   07/08/21 135.6 kg (299 lb)       I have personally reviewed the vitals and recorded Intake/Output     Laboratory/Diagnostic Data:    CBC/Anemia Labs: Coags:    Recent Labs   Lab 11/18/21  0117 11/19/21  0023 11/20/21  0017   WBC 6.32 6.60 6.07   HGB 8.6* 8.6* 8.9*   HCT 30.7* 29.2* 30.5*    181 183   MCV 95 91 89   RDW 23.1* 21.9* 21.8*    No results for input(s): PT, INR, APTT in the last 168 hours.     Chemistries: ABG:   Recent Labs   Lab 11/18/21  0117 11/18/21  0410 11/19/21  0023 11/19/21  0637 11/19/21  1038 11/19/21  1210 11/20/21  0017 11/20/21  0611 11/20/21  1106   *   < > 153*  153*   < >  --    < > 147* 145 147*   K 3.5   < > 3.6  --  3.7  --  3.0*  --  3.4*   *  --  116*  --   --   --  108  --   --    CO2 27  --  27  --   --   --  29  --   --    BUN 9  --  6  --   --    --  6  --   --    CREATININE 1.1  --  1.0  --   --   --  1.0  --   --    CALCIUM 8.0*  --  8.0*  --   --   --  8.3*  --   --    PROT 6.6  --  6.3  --   --   --  6.8  --   --    BILITOT 0.2  --  0.2  --   --   --  0.3  --   --    ALKPHOS 71  --  67  --   --   --  68  --   --    ALT <5*  --  <5*  --   --   --  <5*  --   --    AST 8*  --  8*  --   --   --  9*  --   --    MG 2.1  --  1.9  --   --   --  1.7  --   --    PHOS 4.2  --  3.0  --   --   --  3.6  --   --     < > = values in this interval not displayed.    No results for input(s): PH, PCO2, PO2, HCO3, POCSATURATED, BE in the last 168 hours.     POCT Glucose: HbA1c:    Recent Labs   Lab 11/19/21  0748 11/19/21  1143 11/19/21  1626 11/19/21  2159 11/20/21  0809 11/20/21  1111   POCTGLUCOSE 251* 214* 218* 183* 208* 209*    Hemoglobin A1C   Date Value Ref Range Status   10/27/2021 9.0 (H) 4.0 - 5.6 % Final     Comment:     ADA Screening Guidelines:  5.7-6.4%  Consistent with prediabetes  >or=6.5%  Consistent with diabetes    High levels of fetal hemoglobin interfere with the HbA1C  assay. Heterozygous hemoglobin variants (HbS, HgC, etc)do  not significantly interfere with this assay.   However, presence of multiple variants may affect accuracy.     07/29/2021 7.5 (H) 4.0 - 5.6 % Final     Comment:     ADA Screening Guidelines:  5.7-6.4%  Consistent with prediabetes  >or=6.5%  Consistent with diabetes    High levels of fetal hemoglobin interfere with the HbA1C  assay. Heterozygous hemoglobin variants (HbS, HgC, etc)do  not significantly interfere with this assay.   However, presence of multiple variants may affect accuracy.     06/16/2021 7.7 (H) 4.0 - 5.6 % Final     Comment:     ADA Screening Guidelines:  5.7-6.4%  Consistent with prediabetes  >or=6.5%  Consistent with diabetes    High levels of fetal hemoglobin interfere with the HbA1C  assay. Heterozygous hemoglobin variants (HbS, HgC, etc)do  not significantly interfere with this assay.   However, presence of  multiple variants may affect accuracy.          Cardiac Enzymes: Ejection Fractions:    No results for input(s): CPK, CPKMB, MB, TROPONINI in the last 72 hours. EF   Date Value Ref Range Status   11/13/2021 65 % Final   10/29/2021 60 % Final          No results for input(s): COLORU, APPEARANCEUA, PHUR, SPECGRAV, PROTEINUA, GLUCUA, KETONESU, BILIRUBINUA, OCCULTUA, NITRITE, UROBILINOGEN, LEUKOCYTESUR, RBCUA, WBCUA, BACTERIA, SQUAMEPITHEL, HYALINECASTS in the last 48 hours.    Invalid input(s): WRIGHTSUR    Lactate (Lactic Acid) (mmol/L)   Date Value   10/29/2021 1.1   10/27/2021 5.1 (HH)   10/27/2021 3.1 (H)   10/27/2021 1.6     BNP (pg/mL)   Date Value   10/27/2021 52   03/20/2018 13   12/16/2016 25   06/29/2010 10     CRP (mg/L)   Date Value   10/29/2019 6.7   06/27/2019 11.6 (H)   07/13/2016 14.9 (H)   06/20/2014 11.5 (H)     Sed Rate (mm/Hr)   Date Value   10/29/2019 65 (H)   06/27/2019 90 (H)   07/13/2016 47 (H)   06/20/2014 39 (H)     No results found for: DDIMER  No results found for: FERRITIN  No results found for: LDH  Troponin I (ng/mL)   Date Value   11/13/2021 <0.006   10/27/2021 0.015     CPK (U/L)   Date Value   10/29/2021 1306 (H)   10/27/2021 1465 (H)   06/27/2019 52   06/20/2014 106     Results for orders placed or performed in visit on 06/19/20   Vitamin D   Result Value Ref Range    Vit D, 25-Hydroxy 44 30 - 96 ng/mL   Results for orders placed or performed in visit on 06/27/19   Vitamin D   Result Value Ref Range    Vit D, 25-Hydroxy 54 30 - 96 ng/mL   Results for orders placed or performed in visit on 07/11/16   Vitamin D   Result Value Ref Range    Vit D, 25-Hydroxy 27 (L) 30 - 96 ng/mL     SARS-CoV2 (COVID-19) Qualitative PCR (no units)   Date Value   12/07/2020 Not Detected     POC Rapid COVID (no units)   Date Value   10/27/2021 Negative       Microbiology labs for the last week  Microbiology Results (last 7 days)     Procedure Component Value Units Date/Time    Blood culture [[id number]]  Collected: 11/13/21 0227    Order Status: Completed Specimen: Blood Updated: 11/18/21 0612     Blood Culture, Routine No growth after 5 days.    Narrative:      Blood cultures from 2 different sites. 4 bottles total.  Please draw before starting antibiotics.    Blood culture [[id number]] Collected: 11/13/21 0227    Order Status: Completed Specimen: Blood Updated: 11/18/21 0612     Blood Culture, Routine No growth after 5 days.    Narrative:      Blood cultures x 2 different sites. 4 bottles total. Please  draw cultures before administering antibiotics.    Gram stain [[id number]]     Order Status: Canceled Specimen: CSF (Spinal Fluid) from CSF Tap, Tube 3     CSF culture [[id number]]     Order Status: Canceled Specimen: CSF (Spinal Fluid) from CSF Tap, Tube 3     AFB Culture & Smear [[id number]]     Order Status: Canceled Specimen: CSF (Spinal Fluid) from CSF Tap, Tube 3     Fungus culture [[id number]]     Order Status: Canceled Specimen: CSF (Spinal Fluid) from CSF Tap, Tube 3     Cryptococcal antigen, CSF [[id number]]     Order Status: Canceled Specimen: CSF (Spinal Fluid) from CSF Tap, Tube 3     Urine culture [[id number]] Collected: 11/13/21 0328    Order Status: Completed Specimen: Urine Updated: 11/14/21 1158     Urine Culture, Routine No growth          Reviewed and noted in plan where applicable- Please see chart for full lab data.    Lines/Drains:  PICC Double Lumen 11/01/21 1950 right basilic (Active)   Verification by X-ray Yes 11/20/21 0700   Site Assessment No drainage;No redness;No swelling;No warmth 11/20/21 0700   Extremity Assessment Distal to IV No abnormal discoloration;No redness;No swelling;No warmth 11/20/21 1100   Line Securement Device Secured with sutureless device 11/20/21 0700   Dressing Type Biopatch in place;Central line dressing 11/20/21 1100   Dressing Status Clean;Dry;Intact 11/20/21 1100   Dressing Intervention Integrity maintained 11/20/21 1100   Date on Dressing 11/15/21 11/19/21 1901  "  Dressing Due to be Changed 11/22/21 11/19/21 1901   Left Lumen Patency/Care Normal saline locked 11/20/21 0301   Right Lumen Patency/Care Normal saline locked 11/20/21 0301   Line Necessity Review Longterm central access required;Poor venous access 11/20/21 0700   Number of days: 18            Urethral Catheter 11/17/21 1000 Latex 16 Fr. (Active)   Site Assessment Clean;Intact 11/20/21 1100   Collection Container Urimeter 11/20/21 1100   Securement Method secured to top of thigh w/ adhesive device 11/20/21 1100   Catheter Care Performed yes 11/20/21 1100   Reason for Continuing Urinary Catheterization Critically ill in ICU and requiring hourly monitoring of intake/output 11/20/21 1100   CAUTI Prevention Bundle StatLock in place w 1" slack;Intact seal between catheter & drainage tubing;Drainage bag/urimeter off the floor;Green sheeting clip in use;No dependent loops or kinks;Drainage bag/urimeter not overfilled (<2/3 full);Drainage bag/urimeter below bladder 11/20/21 0700   Output (mL) 125 mL 11/20/21 1200   Number of days: 3       Imaging      Results for orders placed during the hospital encounter of 11/12/21    Echo Saline Bubble? Yes    Interpretation Summary  · The left ventricle is normal in size with normal systolic function.  · The estimated ejection fraction is 65%.  · Normal left ventricular diastolic function.  · Normal right ventricular size with normal right ventricular systolic function.  · There is no evidence of intracardiac shunting.  · Indeterminate central venous pressure. Estimated PA systolic pressure is at least 25 mmHg.      US Upper Extremity Veins Bilateral  Narrative: EXAMINATION:  US UPPER EXTREMITY VEINS BILATERAL    CLINICAL HISTORY:  concern for Lemierre's syndrome, infectious thrombophlebitis of the internal jugular vein;    TECHNIQUE:  Duplex and color flow Doppler evaluation and dynamic compression was performed of the right and left internal jugular veins.    COMPARISON:  No " relevant prior images.    FINDINGS:  Right internal jugular vein: Patent and compressible.    Left internal jugular vein: Patent and compressible.  Impression: No thrombus in bilateral internal jugular veins.    Electronically signed by resident: Brenden Mane  Date:    11/15/2021  Time:    16:23    Electronically signed by: Rakan Vee MD  Date:    11/15/2021  Time:    17:11      Labs, Imaging, EKG and Diagnostic results from 11/20/2021 were reviewed.    Medications:  Medication list was reviewed and changes noted under Assessment/Plan.  No current facility-administered medications on file prior to encounter.     Current Outpatient Medications on File Prior to Encounter   Medication Sig Dispense Refill    albuterol (VENTOLIN HFA) 90 mcg/actuation inhaler INHALE ONE TO TWO PUFFS INTO LUNGS EVERY 6 HOURS AS NEEDED FOR WHEEZING 54 g 3    amLODIPine (NORVASC) 5 MG tablet TAKE 1 TABLET BY MOUTH EVERY DAY 90 tablet 3    aspirin 81 mg Tab Take by mouth. 1 Tablet Oral Every day      blood sugar diagnostic (FREESTYLE INSULINX TEST STRIPS) Strp Check glucose three times daily 100 each 3    butalbital-acetaminophen-caffeine -40 mg (FIORICET, ESGIC) -40 mg per tablet Take 1 tablet by mouth as needed.      calcium carbonate (TUMS) 300 mg (750 mg) Chew Take by mouth 2 (two) times daily.      diclofenac sodium (VOLTAREN) 1 % Gel APPLY TWO GRAMS TOPICALLY ONCE DAILY 100 g 0    ezetimibe (ZETIA) 10 mg tablet TAKE 1 TABLET BY MOUTH EVERY DAY IN THE EVENING 90 tablet 3    FARXIGA 10 mg tablet TAKE 1 TABLET BY MOUTH EVERY DAY 90 tablet 1    fish oil-dha-epa 1,200-144-216 mg Cap 1 Capsule Oral Every day 90 capsule 3    fluticasone-salmeterol diskus inhaler 250-50 mcg Inhale 1 puff into the lungs 2 (two) times daily. Controller 180 each 3    glipiZIDE (GLUCOTROL) 10 MG TR24 TAKE 1 TABLET (10 MG TOTAL) BY MOUTH DAILY WITH BREAKFAST. 90 tablet 2    GUAIATUSSIN AC  mg/5 mL syrup Take 5 mLs by mouth every 4  (four) hours as needed.      HYDROcodone-acetaminophen (NORCO)  mg per tablet Take 1 tablet by mouth every 8 (eight) hours as needed for Pain. 90 tablet 0    hyoscyamine (LEVSIN/SL) 0.125 mg Subl DISSOLVE 1 TABLET UNDER THE TONGUE EVERY 4 TO 6 HOURS 30 tablet 5    ibuprofen (ADVIL,MOTRIN) 800 MG tablet Take 800 mg by mouth every 8 (eight) hours as needed.  0    ipratropium (ATROVENT) 21 mcg (0.03 %) nasal spray SMARTSI Both Nares Every Night      JARDIANCE 10 mg tablet Take 10 mg by mouth once daily.      lactulose (CHRONULAC) 10 gram/15 mL solution TAKE 15 ML DAILY AS NEEDED FOR CONSTIPATION 473 mL 4    levothyroxine (SYNTHROID) 100 MCG tablet TAKE 1 TABLET DAILY 90 tablet 3    LORazepam (ATIVAN) 0.5 MG tablet Take 1 tablet (0.5 mg total) by mouth 2 (two) times daily. 60 tablet 0    meclizine (ANTIVERT) 25 mg tablet Take 1 tablet (25 mg total) by mouth 3 (three) times daily as needed. 30 tablet 0    metFORMIN (GLUCOPHAGE) 1000 MG tablet TAKE 1 TABLET BY MOUTH TWICE A DAY WITH MEALS 180 tablet 3    metoprolol tartrate (LOPRESSOR) 100 MG tablet TAKE 1 TABLET BY MOUTH TWICE A  tablet 1    mv-min-FA-Ow-Tb-hwvlhua-lutein 0.4-162-18 mg Tab Take by mouth. 1 Tablet Oral Every day      NARCAN 4 mg/actuation Spry 1 spray once.      potassium chloride SA (K-DUR,KLOR-CON) 20 MEQ tablet TAKE 1 TABLET BY MOUTH TWICE A  tablet 3    sucralfate (CARAFATE) 1 gram tablet Take 1 tablet (1 g total) by mouth 3 (three) times daily. 270 tablet 3    temazepam (RESTORIL) 30 mg capsule Take 1 capsule (30 mg total) by mouth nightly as needed. 1 Capsule Oral Every day 30 capsule 0    tiZANidine (ZANAFLEX) 4 MG tablet TAKE 1 TABLET (4 MG TOTAL) BY MOUTH EVERY 8 (EIGHT) HOURS. 90 tablet 1    TRULICITY 1.5 mg/0.5 mL pen injector INJECT 1 PEN EVERY WEEK 2 pen 4    vibegron 75 mg Tab Take 75 mg by mouth once daily. 30 tablet 11    [DISCONTINUED] DULoxetine (CYMBALTA) 30 MG capsule Take 1 capsule (30 mg  total) by mouth once daily. 90 capsule 3    [DISCONTINUED] FARXIGA 10 mg tablet TAKE 1 TABLET BY MOUTH EVERY DAY 90 tablet 1    [DISCONTINUED] sucralfate (CARAFATE) 1 gram tablet TAKE 1 TABLET BY MOUTH THREE TIMES A  tablet 0     Scheduled Medications:  amLODIPine, 10 mg, Oral, Daily  aspirin, 81 mg, Oral, Daily  cefTRIAXone (ROCEPHIN) IVPB, 2 g, Intravenous, Q12H  heparin (porcine), 5,000 Units, Subcutaneous, Q8H  insulin aspart U-100, 6 Units, Subcutaneous, TIDWM  insulin detemir U-100, 15 Units, Subcutaneous, QHS  levothyroxine, 100 mcg, Oral, Before breakfast  metronidazole, 500 mg, Intravenous, Q8H  miconazole nitrate 2%, , Topical (Top), BID  senna-docusate 8.6-50 mg, 1 tablet, Oral, Daily  vibegron, 75 mg, Oral, Daily      PRN: acetaminophen, dextrose 50%, glucagon (human recombinant), insulin aspart U-100, labetalol, magnesium oxide, magnesium oxide, melatonin, potassium bicarbonate, potassium bicarbonate, potassium bicarbonate, potassium, sodium phosphates, potassium, sodium phosphates, potassium, sodium phosphates, sodium chloride 0.9%, tiZANidine  Infusions:   Estimated Creatinine Clearance: 79.7 mL/min (based on SCr of 1 mg/dL).    ASSESSMENT/PLAN:   Overview:  Violeta Champion is a 60 y.o. female with medical history significant for     Active Hospital Problems    Diagnosis  POA    *Brain lesion [G93.9]MRI which showed multiple lesions throughout the bilateral cerebral hemispheres with vasogenic edema ,mild leftward midline shift and partial mass effect of the right lateral ventricle. Patient transferred to [facility] for neuro-ICU level of care    involving frontal, parietal, temporal, and occipital lobes with surrounding vasogenic edema. Suspect 2/2 septic emboli given recent bacteremia. Speciation of BCx 10/27 revealing organisms from oral mary. Mannitol and 3% saline started prior to transfer. CTH notable for multiple areas of mild diminished attenuation involving the cerebral hemispheres  bilaterally corresponding with multiple T2/FLAIR hyperintense lesions noted on the recent MRI examination.      - q1h neuro checks    - BCx NGTD  -DORCAS deferred  - Infectious Disease consult; appreciate recommendations. Signed off          - continue long term antibiotics x 6 weeks  - q6h Na; Na goal EuNa, drinking to thirst, consider DDAVP if Na continues to climb despite PO intake  s/p nuerosurgery eval ---no acute neurosurgical intervention  - stable for transfer to floor   11/20 s/p ID eval -Cardiology would like to defer DORCAS for now until more clinically stable. LP is  high risk for further evaluation. Patient continues to improve. IJ US is negative for septic thrombophlebitis. Iikely etiology of brain lesions is septic emboli from an undetermined source, in the setting of parvimonas and fusobacterium bacteremia. Will plan to treat with 6 of ceftriaxone and metronidazole for brain abscess and possible IE.  consider LP, recommend cell count, protein, gluc, culture and toxo PCR for              Sepsis-   r/o oral bacteria associated infective endocarditis .  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  TTE was negative.  In light of brain lesions, recommend DORCAS.  cardiology defered DORCAS due to high risk. Recommend treating broadly for now with vanc, ceftriaxone and metronidazole  x 6weeks. HIV negative      Encephalopathy, metabolic   secondary to brain lesions. improving. AOX2    waxing and waning mentaion. psychiatry consulte d          Hypernatremia    - Patient with sodium   Recent Labs   Lab 11/20/21  0017 11/20/21  0611 11/20/21  1106   * 145 147*   . sodium trending up   11/20 follow Na, UOP; drinking to thirst  with stable Na but if Na continues to increase consider ddavp. UOP 6 L /24h.     Yes    BMI 45.0-49.9, adult [Z68.42]   Body mass index is 46.86 kg/m². Morbid obesity complicates all aspects of disease management from diagnostic  modalities to treatment. Weight loss encouraged    Anemia   Patient's with Normocytic anemia.. Hemoglobin stable. Etiology likely due to chronic disease .  Current CBC reviewed-    Recent Labs   Lab 11/18/21  0117 11/19/21  0023 11/20/21  0017   HGB 8.6* 8.6* 8.9*         Component Value Date/Time    MCV 89 11/20/2021 0017    RDW 21.8 (H) 11/20/2021 0017    IRON 92 11/10/2004 1215     Monitor CBC and transfuse if H/H drops below 7/21.   Not Applicable    Discharge planning issues [Z02.9] needs long term antibiotics  Not Applicable    Vasogenic brain edema [G93.6]secondary to above .concern of elevated ICP 2/2 vasogenic edema of numerous brain abscesses vs masses.   Yes    Brain compression [G93.5]  Yes    Diabetes insipidus [E23.2] trend sodium  labs Q6hrs  - encourage fluid intake  - on half normal; continue with Hypernatremia as above  Yes      Yes    Essential Hypertension  Chronic, controlled.  Latest blood pressure and vitals reviewed-   Temp:  [97.7 °F (36.5 °C)-98.9 °F (37.2 °C)]   Pulse:  []   Resp:  [20-43]   BP: (112-179)/()   SpO2:  [94 %-100 %] .   Home meds for hypertension were reviewed and amlodipine continued   Hypertension Medications             amLODIPine (NORVASC) 5 MG tablet TAKE 1 TABLET BY MOUTH EVERY DAY    metoprolol tartrate (LOPRESSOR) 100 MG tablet TAKE 1 TABLET BY MOUTH TWICE A DAY        PRN meds if BP> 180/110 mm HG  Yes    Uncontrolled type 2 diabetes mellitus with hyperglycemia [E11.65]  Patient's FSGs are not controlled on current hypoglycemics.   Last A1c reviewed-   Lab Results   Component Value Date    HGBA1C 9.0 (H) 10/27/2021    HGBA1C 7.5 (H) 07/29/2021    HGBA1C 7.7 (H) 06/16/2021     Recent Labs   Lab 11/19/21  1626 11/19/21  2159 11/20/21  0809 11/20/21  1111   POCTGLUCOSE 218* 183* 208* 209*     currently on  insulin aspart U-100, 6 Units, Subcutaneous, TIDWM and moderate dose SSI .Started  Levemir 15 units q HS       Hypokalemia - Patient with potassium    Recent Labs   Lab 11/19/21  1038 11/20/21  0017 11/20/21  1106   K 3.7 3.0* 3.4*   . replaced. monitor      Yes    Hypothyroidism [E03.9] TSH WNL continue levothyroxine  Yes      Resolved Hospital Problems    Diagnosis Date Resolved POA    Morbid obesity with BMI of 60.0-69.9, adult [E66.01, Z68.44] 11/15/2021 Not Applicable       Disposition- Pending PT/OT    Discharge Planning   SARAVANAN: 11/24/2021     Code Status: Full Code   Is the patient medically ready for discharge?: (No Documentation)    Reason for patient still in hospital (select all that apply): Treatment  Discharge Plan A: Long-term acute care facility (LTAC)         DVT prophylaxis addressed with:  subcutaneous heparin.    Subsequent Hospital Care    Level 3 84690 Total visit time was 35 minutes or greater with greater than 50% of time spent in counseling and coordination of care. .    We discussed in detail the plan of care, the patient's response to treatment, the discharge plan.  Total time includes time spent reviewing the medical record, examining the patient, writing notes and communicating with other professionals.     Chiki Arteaga MD  Attending Staff Physician  [facility]  pager- [phone]  [employer] - [phone]

## 2021-11-20 NOTE — ASSESSMENT & PLAN NOTE
- trend DI labs Q6hrs  - encourage fluid intake, drink to thirst  - Na stabilized with drinking to thirst, UOP high  -if Na cont to rise despite drinking to thirst, consider ddavp

## 2021-11-20 NOTE — PLAN OF CARE
[facility] Care Plan    POC reviewed with Violeta Champion and family at 0300. Pt verbalized understanding. Questions and concerns addressed. No acute events overnight. Pt progressing toward goals. Will continue to monitor. See below and flowsheets for full assessment and VS info.     -Neuro exam unchanged  -0000 Na: 147   -Hourly urine output b/w 125-200    Neuro:  Ajit Coma Scale  Best Eye Response: 4-->(E4) spontaneous  Best Motor Response: 6-->(M6) obeys commands  Best Verbal Response: 4-->(V4) confused  [place] Coma Scale Score: 14  Assessment Qualifiers: patient not sedated/intubated,no eye obstruction present  Pupil PERRLA: yes     24hr Temp:  [98.3 °F (36.8 °C)-99 °F (37.2 °C)]     CV:   Rhythm: normal sinus rhythm  BP goals:   SBP < 160  MAP > 65    Resp:   O2 Device (Oxygen Therapy): room air       Plan: N/A    GI/:  CHELO Total Score: 15  Diet/Nutrition Received: consistent carb/diabetic diet  Last Bowel Movement: 11/19/21  Voiding Characteristics: urethral catheter (bladder)    Intake/Output Summary (Last 24 hours) at 11/20/2021 0339  Last data filed at 11/20/2021 0300  Gross per 24 hour   Intake 660 ml   Output 6765 ml   Net -6105 ml     Unmeasured Output  Urine Occurrence: 1  Stool Occurrence: 1  Emesis Occurrence: 0  Pad Count: 2    Labs/Accuchecks:  Recent Labs   Lab 11/20/21  0017   WBC 6.07   RBC 3.42*   HGB 8.9*   HCT 30.5*         Recent Labs   Lab 11/20/21  0017   *   K 3.0*   CO2 29      BUN 6   CREATININE 1.0   ALKPHOS 68   ALT <5*   AST 9*   BILITOT 0.3    No results for input(s): PROTIME, INR, APTT, HEPANTIXA in the last 168 hours. No results for input(s): CPK, CPKMB, TROPONINI, MB in the last 168 hours.    Electrolytes: Electrolytes replaced  Accuchecks: ACHS    Gtts:       LDA/Wounds:  Lines/Drains/Airways       Peripherally Inserted Central Catheter Line              PICC Double Lumen 11/01/21 1950 right basilic 18 days              Drain                   Urethral  Catheter 11/17/21 1000 Latex 16 Fr. 2 days                  Wounds: Yes  Wound care consulted: Yes     Problem: Adult Inpatient Plan of Care  Goal: Plan of Care Review  Flowsheets (Taken 11/20/2021 0339)  Plan of Care Reviewed With: patient     Problem: Diabetes Comorbidity  Goal: Blood Glucose Level Within Desired Range  Intervention: Maintain Glycemic Control  Flowsheets (Taken 11/20/2021 0339)  Glycemic Management: blood glucose monitoring     Problem: Infection (Sepsis/Septic Shock)  Goal: Absence of Infection Signs/Symptoms  Intervention: Prevent or Manage Infection Progression  Flowsheets (Taken 11/20/2021 0339)  Infection Prevention: rest/sleep promoted  Fever Reduction/Comfort Measures:   lightweight bedding   lightweight clothing  Infection Management: aseptic technique maintained  Isolation Precautions: protective environment maintained

## 2021-11-20 NOTE — PROVIDER TRANSFER
Handoff     Primary Team: Networked reference to record [facility]  Room Number: 9080/9080 A     Patient Name: Violeta Champion MRN: [id number]     Date of Birth: [id number] Allergies: Iodinated contrast media, Naproxen sodium, and Naprosyn  [naproxen]     Age: 60 y.o. Admit Date: 11/12/2021     Sex: female  BMI: Body mass index is 46.86 kg/m².     Code Status: Full Code        Illness Level (current clinical status): watch DI  Reason for Admission: Brain lesion    Brief HPI (pertinent PMH and diagnosis or differential diagnosis):   60 year old female with Pmhx DM2, hypothyroidism, YUSEF, hx breast ca, fibromyalgia, htn, hld, depression, class 3 obesity admitted as transfer from [facility] for concern of elevated ICP 2/2 vasogenic edema of numerous brain abscesses vs masses. Patient recently admitted at [facility] 10/27 for acute encephalopathy, found to have DKA vs HHS and bacteremia. Clinically improved with abx and appropriate blood sugar management until 3 days prior to transfer when she developed worsening confusion and lethargy. MRI 11/13 revealing multiple lesions throughout the bilateral cerebral hemispheres with associated vasogenic edema and leftward midline shift.      HPI from [facility] below:  Miss Champion is a 60 year old female with a PMH of DMII on oral anti-hyperglycemics, hypothyroid on synthroid, YUSEF, Right breast papilloma, fibromyalgia, HTN, HLD and depression presents to [facility] via ambulance after being found down and unresponsive at home. Patient is obtunded with a GCS of 8 and is not able to participate in any history giving. This note is per report from neighbor, EMS, ED providers and nurse. Per report Miss Champion' neighbor hadnt seen her for 3 days so they went over to her house to check on her and found her down and unresponsive. EMS was called and brought her into the ED. Upon drawing labwork patient Wbc was 25.8, , anion gap 8, Cr 2.9, blood glucose 805, UDS pos for barbiturates, pH 6.928. Patient is  protecting airway although her respirations are labored, Sp02 mid to low 90's and she has YUSEF. A dose of narcan had no response, patient was further treated with bicarb, insulin, IV fluids, IV vanc and zosyn and admitted to the ICU for a higher level of care. Patients sister Darcy was called by staff and myself and asked to be kept updated, phone number in EMR.    Procedure Date: EEG 11/15-11/16  PICC 11/1  A line 10/27  CVC 10/27    Hospital Course (updated, brief assessment by system or problem, significant events):   11/18/2021 Hypernatremic. Monitoring.   11/19: Drink to thirst , NAEON  11/20: Drink to thirst, cont follow Na, follow ID reccs ABx, NAEON, stable for TTF     Tasks (specific, using if-then statements): follow Na, UOP; drinking to thirst  with stable Na but if Na continues to increase consider ddavp    Contingency Plan (special circumstances anticipated and plan): see tasks    Estimated Discharge Date: per NSGY    Discharge Disposition: Rehab Facility    Suma Schroeder PA-C

## 2021-11-20 NOTE — ASSESSMENT & PLAN NOTE
A1c 9 on admission to [place]. BG labile.    - Goal CBG 80- 180   - Cont basal insulin  - MDSSI  - adjust aspart prn

## 2021-11-20 NOTE — SUBJECTIVE & OBJECTIVE
Interval History:  Drink to thirst, cont follow Na, follow ID reccs, NAEON, stable for TTF     Review of Systems   Constitutional: Negative for chills and fever.   HENT: Negative for rhinorrhea.    Eyes: Negative for visual disturbance.   Respiratory: Negative for shortness of breath and wheezing.    Cardiovascular: Negative for chest pain and palpitations.   Gastrointestinal: Negative for abdominal pain, nausea and vomiting.   Genitourinary: Negative for difficulty urinating.   Musculoskeletal: Negative for arthralgias and myalgias.   Skin: Negative for rash.   Neurological: Negative for dizziness, weakness, numbness and headaches.   Psychiatric/Behavioral: Negative for agitation.       Objective:     Vitals:  Temp: 98.9 °F (37.2 °C)  Pulse: 88  Rhythm: normal sinus rhythm  BP: (!) 153/72  MAP (mmHg): 104  Resp: (!) 26  SpO2: 97 %  O2 Device (Oxygen Therapy): room air    Temp  Min: 98.3 °F (36.8 °C)  Max: 98.9 °F (37.2 °C)  Pulse  Min: 78  Max: 101  BP  Min: 94/72  Max: 163/88  MAP (mmHg)  Min: 80  Max: 120  Resp  Min: 20  Max: 43  SpO2  Min: 95 %  Max: 100 %    11/19 0701 - 11/20 0700  In: 765.9 [P.O.:510]  Out: 6715 [Urine:6715]   Unmeasured Output  Urine Occurrence: 1  Stool Occurrence: 1  Emesis Occurrence: 0  Pad Count: 2       Physical Exam  Constitutional:       General: She is not in acute distress.     Appearance: She is obese. She is not ill-appearing.   HENT:      Head: Normocephalic and atraumatic.      Mouth/Throat:      Mouth: Mucous membranes are moist.      Pharynx: Oropharynx is clear. No oropharyngeal exudate.   Eyes:      General:         Right eye: No discharge.         Left eye: No discharge.      Extraocular Movements: Extraocular movements intact.      Pupils: Pupils are equal, round, and reactive to light.   Cardiovascular:      Rate and Rhythm: Normal rate and regular rhythm.      Pulses: Normal pulses.      Heart sounds: Normal heart sounds.   Pulmonary:      Effort: Pulmonary effort is  normal. No respiratory distress.      Breath sounds: Normal breath sounds.   Abdominal:      General: Abdomen is flat. There is no distension.      Palpations: Abdomen is soft.   Musculoskeletal:         General: No swelling. Normal range of motion.      Cervical back: Normal range of motion.   Skin:     General: Skin is warm.      Capillary Refill: Capillary refill takes less than 2 seconds.   Neurological:      Mental Status: She is alert.      Cranial Nerves: No cranial nerve deficit.      Sensory: No sensory deficit.      Motor: No weakness.      Comments: GCS [id number]  A/Ox3, somewhat confused  Alert and conscious. Follows commands.   PERRL, EOMI  Moves all extremities spontaneously against gravity.  SILTx4         Medications:  Continuous ScheduledamLODIPine, 10 mg, Daily  aspirin, 81 mg, Daily  cefTRIAXone (ROCEPHIN) IVPB, 2 g, Q12H  heparin (porcine), 5,000 Units, Q8H  hydroCHLOROthiazide, 25 mg, Daily  insulin aspart U-100, 6 Units, TIDWM  levothyroxine, 100 mcg, Before breakfast  metronidazole, 500 mg, Q8H  miconazole nitrate 2%, , BID  senna-docusate 8.6-50 mg, 1 tablet, Daily  vibegron, 75 mg, Daily    PRNacetaminophen, 650 mg, Q6H PRN  dextrose 50%, 12.5 g, PRN  glucagon (human recombinant), 1 mg, PRN  insulin aspart U-100, 1-10 Units, QID (AC + HS) PRN  labetalol, 10 mg, Q3H PRN  magnesium oxide, 800 mg, PRN  magnesium oxide, 800 mg, PRN  melatonin, 6 mg, Nightly PRN  potassium bicarbonate, 35 mEq, PRN  potassium bicarbonate, 50 mEq, PRN  potassium bicarbonate, 60 mEq, PRN  potassium, sodium phosphates, 2 packet, PRN  potassium, sodium phosphates, 2 packet, PRN  potassium, sodium phosphates, 2 packet, PRN  sodium chloride 0.9%, 10 mL, PRN  tiZANidine, 2 mg, Q8H PRN      Today I personally reviewed pertinent medications, lines/drains/airways, imaging, cardiology results, laboratory results, microbiology results,    Diet  Diet Dysphagia Mechanical Soft (IDDSI Level 5) [facility]; Consistent  Carbohydrate; Thin

## 2021-11-21 PROBLEM — E87.0 HYPERNATREMIA: Status: ACTIVE | Noted: 2021-11-21

## 2021-11-21 LAB
ALBUMIN SERPL BCP-MCNC: 2.2 G/DL (ref 3.5–5.2)
ALBUMIN SERPL BCP-MCNC: 2.4 G/DL (ref 3.5–5.2)
ALP SERPL-CCNC: 70 U/L (ref 55–135)
ALT SERPL W/O P-5'-P-CCNC: <5 U/L (ref 10–44)
ANION GAP SERPL CALC-SCNC: 11 MMOL/L (ref 8–16)
ANION GAP SERPL CALC-SCNC: 15 MMOL/L (ref 8–16)
AST SERPL-CCNC: 10 U/L (ref 10–40)
BASOPHILS # BLD AUTO: 0.03 K/UL (ref 0–0.2)
BASOPHILS NFR BLD: 0.5 % (ref 0–1.9)
BILIRUB SERPL-MCNC: 0.2 MG/DL (ref 0.1–1)
BUN SERPL-MCNC: 8 MG/DL (ref 6–20)
BUN SERPL-MCNC: 9 MG/DL (ref 6–20)
CALCIUM SERPL-MCNC: 7.7 MG/DL (ref 8.7–10.5)
CALCIUM SERPL-MCNC: 8.7 MG/DL (ref 8.7–10.5)
CHLORIDE SERPL-SCNC: 106 MMOL/L (ref 95–110)
CHLORIDE SERPL-SCNC: 106 MMOL/L (ref 95–110)
CO2 SERPL-SCNC: 25 MMOL/L (ref 23–29)
CO2 SERPL-SCNC: 27 MMOL/L (ref 23–29)
CREAT SERPL-MCNC: 0.9 MG/DL (ref 0.5–1.4)
CREAT SERPL-MCNC: 1.1 MG/DL (ref 0.5–1.4)
DIFFERENTIAL METHOD: ABNORMAL
EOSINOPHIL # BLD AUTO: 0.3 K/UL (ref 0–0.5)
EOSINOPHIL NFR BLD: 4.8 % (ref 0–8)
ERYTHROCYTE [DISTWIDTH] IN BLOOD BY AUTOMATED COUNT: 21.4 % (ref 11.5–14.5)
EST. GFR  (AFRICAN AMERICAN): >60 ML/MIN/1.73 M^2
EST. GFR  (AFRICAN AMERICAN): >60 ML/MIN/1.73 M^2
EST. GFR  (NON AFRICAN AMERICAN): 55.5 ML/MIN/1.73 M^2
EST. GFR  (NON AFRICAN AMERICAN): >60 ML/MIN/1.73 M^2
GLUCOSE SERPL-MCNC: 180 MG/DL (ref 70–110)
GLUCOSE SERPL-MCNC: 266 MG/DL (ref 70–110)
HCT VFR BLD AUTO: 32 % (ref 37–48.5)
HGB BLD-MCNC: 9.6 G/DL (ref 12–16)
IMM GRANULOCYTES # BLD AUTO: 0.01 K/UL (ref 0–0.04)
IMM GRANULOCYTES NFR BLD AUTO: 0.2 % (ref 0–0.5)
LYMPHOCYTES # BLD AUTO: 2.2 K/UL (ref 1–4.8)
LYMPHOCYTES NFR BLD: 37.8 % (ref 18–48)
MAGNESIUM SERPL-MCNC: 1.8 MG/DL (ref 1.6–2.6)
MCH RBC QN AUTO: 27.4 PG (ref 27–31)
MCHC RBC AUTO-ENTMCNC: 30 G/DL (ref 32–36)
MCV RBC AUTO: 91 FL (ref 82–98)
MONOCYTES # BLD AUTO: 0.5 K/UL (ref 0.3–1)
MONOCYTES NFR BLD: 8.9 % (ref 4–15)
NEUTROPHILS # BLD AUTO: 2.8 K/UL (ref 1.8–7.7)
NEUTROPHILS NFR BLD: 47.8 % (ref 38–73)
NRBC BLD-RTO: 0 /100 WBC
OSMOLALITY SERPL: 318 MOSM/KG (ref 275–295)
OSMOLALITY SERPL: 318 MOSM/KG (ref 275–295)
OSMOLALITY UR: 253 MOSM/KG (ref 50–1200)
PHOSPHATE SERPL-MCNC: 3.8 MG/DL (ref 2.7–4.5)
PHOSPHATE SERPL-MCNC: 4.2 MG/DL (ref 2.7–4.5)
PLATELET # BLD AUTO: 178 K/UL (ref 150–450)
PMV BLD AUTO: 12 FL (ref 9.2–12.9)
POCT GLUCOSE: 189 MG/DL (ref 70–110)
POCT GLUCOSE: 216 MG/DL (ref 70–110)
POCT GLUCOSE: 233 MG/DL (ref 70–110)
POCT GLUCOSE: 261 MG/DL (ref 70–110)
POCT GLUCOSE: 284 MG/DL (ref 70–110)
POTASSIUM SERPL-SCNC: 2.9 MMOL/L (ref 3.5–5.1)
POTASSIUM SERPL-SCNC: 3.4 MMOL/L (ref 3.5–5.1)
PROT SERPL-MCNC: 7.1 G/DL (ref 6–8.4)
RBC # BLD AUTO: 3.51 M/UL (ref 4–5.4)
SODIUM SERPL-SCNC: 144 MMOL/L (ref 136–145)
SODIUM SERPL-SCNC: 146 MMOL/L (ref 136–145)
SODIUM UR-SCNC: 79 MMOL/L (ref 20–250)
WBC # BLD AUTO: 5.87 K/UL (ref 3.9–12.7)

## 2021-11-21 PROCEDURE — 80069 RENAL FUNCTION PANEL: CPT | Performed by: HOSPITALIST

## 2021-11-21 PROCEDURE — 97530 THERAPEUTIC ACTIVITIES: CPT

## 2021-11-21 PROCEDURE — 11000001 HC ACUTE MED/SURG PRIVATE ROOM

## 2021-11-21 PROCEDURE — 83735 ASSAY OF MAGNESIUM: CPT | Performed by: STUDENT IN AN ORGANIZED HEALTH CARE EDUCATION/TRAINING PROGRAM

## 2021-11-21 PROCEDURE — 99233 SBSQ HOSP IP/OBS HIGH 50: CPT | Mod: ,,, | Performed by: HOSPITALIST

## 2021-11-21 PROCEDURE — 80053 COMPREHEN METABOLIC PANEL: CPT | Performed by: STUDENT IN AN ORGANIZED HEALTH CARE EDUCATION/TRAINING PROGRAM

## 2021-11-21 PROCEDURE — 90792 PR PSYCHIATRIC DIAGNOSTIC EVALUATION W/MEDICAL SERVICES: ICD-10-PCS | Mod: ,,, | Performed by: PSYCHIATRY & NEUROLOGY

## 2021-11-21 PROCEDURE — 85025 COMPLETE CBC W/AUTO DIFF WBC: CPT | Performed by: STUDENT IN AN ORGANIZED HEALTH CARE EDUCATION/TRAINING PROGRAM

## 2021-11-21 PROCEDURE — 25000003 PHARM REV CODE 250: Performed by: PSYCHIATRY & NEUROLOGY

## 2021-11-21 PROCEDURE — 63600175 PHARM REV CODE 636 W HCPCS: Performed by: NURSE PRACTITIONER

## 2021-11-21 PROCEDURE — 25000003 PHARM REV CODE 250: Performed by: PHYSICIAN ASSISTANT

## 2021-11-21 PROCEDURE — S0030 INJECTION, METRONIDAZOLE: HCPCS | Performed by: NURSE PRACTITIONER

## 2021-11-21 PROCEDURE — 99223 PR INITIAL HOSPITAL CARE,LEVL III: ICD-10-PCS | Mod: ,,, | Performed by: INTERNAL MEDICINE

## 2021-11-21 PROCEDURE — 83930 ASSAY OF BLOOD OSMOLALITY: CPT | Performed by: PHYSICIAN ASSISTANT

## 2021-11-21 PROCEDURE — 25000003 PHARM REV CODE 250: Performed by: HOSPITALIST

## 2021-11-21 PROCEDURE — 90792 PSYCH DIAG EVAL W/MED SRVCS: CPT | Mod: ,,, | Performed by: PSYCHIATRY & NEUROLOGY

## 2021-11-21 PROCEDURE — 99223 1ST HOSP IP/OBS HIGH 75: CPT | Mod: ,,, | Performed by: INTERNAL MEDICINE

## 2021-11-21 PROCEDURE — 84100 ASSAY OF PHOSPHORUS: CPT | Performed by: STUDENT IN AN ORGANIZED HEALTH CARE EDUCATION/TRAINING PROGRAM

## 2021-11-21 PROCEDURE — 94761 N-INVAS EAR/PLS OXIMETRY MLT: CPT

## 2021-11-21 PROCEDURE — 97535 SELF CARE MNGMENT TRAINING: CPT

## 2021-11-21 PROCEDURE — 25000003 PHARM REV CODE 250: Performed by: NURSE PRACTITIONER

## 2021-11-21 PROCEDURE — 36415 COLL VENOUS BLD VENIPUNCTURE: CPT | Performed by: STUDENT IN AN ORGANIZED HEALTH CARE EDUCATION/TRAINING PROGRAM

## 2021-11-21 PROCEDURE — 25000003 PHARM REV CODE 250: Performed by: STUDENT IN AN ORGANIZED HEALTH CARE EDUCATION/TRAINING PROGRAM

## 2021-11-21 PROCEDURE — 99233 PR SUBSEQUENT HOSPITAL CARE,LEVL III: ICD-10-PCS | Mod: ,,, | Performed by: HOSPITALIST

## 2021-11-21 RX ORDER — SODIUM CHLORIDE 450 MG/100ML
INJECTION, SOLUTION INTRAVENOUS CONTINUOUS
Status: DISCONTINUED | OUTPATIENT
Start: 2021-11-21 | End: 2021-11-21

## 2021-11-21 RX ORDER — INSULIN ASPART 100 [IU]/ML
8 INJECTION, SOLUTION INTRAVENOUS; SUBCUTANEOUS
Status: DISCONTINUED | OUTPATIENT
Start: 2021-11-21 | End: 2021-11-21

## 2021-11-21 RX ORDER — POTASSIUM CHLORIDE 20 MEQ/1
40 TABLET, EXTENDED RELEASE ORAL ONCE
Status: COMPLETED | OUTPATIENT
Start: 2021-11-21 | End: 2021-11-21

## 2021-11-21 RX ORDER — SODIUM CHLORIDE 450 MG/100ML
INJECTION, SOLUTION INTRAVENOUS CONTINUOUS
Status: ACTIVE | OUTPATIENT
Start: 2021-11-21 | End: 2021-11-22

## 2021-11-21 RX ORDER — INSULIN ASPART 100 [IU]/ML
10 INJECTION, SOLUTION INTRAVENOUS; SUBCUTANEOUS
Status: DISCONTINUED | OUTPATIENT
Start: 2021-11-21 | End: 2021-11-22

## 2021-11-21 RX ADMIN — Medication 6 MG: at 09:11

## 2021-11-21 RX ADMIN — Medication 6 MG: at 01:11

## 2021-11-21 RX ADMIN — INSULIN ASPART 6 UNITS: 100 INJECTION, SOLUTION INTRAVENOUS; SUBCUTANEOUS at 09:11

## 2021-11-21 RX ADMIN — INSULIN ASPART 1 UNITS: 100 INJECTION, SOLUTION INTRAVENOUS; SUBCUTANEOUS at 09:11

## 2021-11-21 RX ADMIN — SODIUM CHLORIDE: 0.45 INJECTION, SOLUTION INTRAVENOUS at 10:11

## 2021-11-21 RX ADMIN — TIZANIDINE 2 MG: 2 TABLET ORAL at 09:11

## 2021-11-21 RX ADMIN — METRONIDAZOLE 500 MG: 500 INJECTION, SOLUTION INTRAVENOUS at 12:11

## 2021-11-21 RX ADMIN — AMLODIPINE BESYLATE 10 MG: 10 TABLET ORAL at 09:11

## 2021-11-21 RX ADMIN — METRONIDAZOLE 500 MG: 500 INJECTION, SOLUTION INTRAVENOUS at 04:11

## 2021-11-21 RX ADMIN — CEFTRIAXONE SODIUM 2 G: 2 INJECTION, SOLUTION INTRAVENOUS at 04:11

## 2021-11-21 RX ADMIN — POTASSIUM CHLORIDE 40 MEQ: 1500 TABLET, EXTENDED RELEASE ORAL at 09:11

## 2021-11-21 RX ADMIN — HEPARIN SODIUM 5000 UNITS: 5000 INJECTION INTRAVENOUS; SUBCUTANEOUS at 05:11

## 2021-11-21 RX ADMIN — INSULIN ASPART 4 UNITS: 100 INJECTION, SOLUTION INTRAVENOUS; SUBCUTANEOUS at 04:11

## 2021-11-21 RX ADMIN — ASPIRIN 81 MG: 81 TABLET, COATED ORAL at 09:11

## 2021-11-21 RX ADMIN — HEPARIN SODIUM 5000 UNITS: 5000 INJECTION INTRAVENOUS; SUBCUTANEOUS at 04:11

## 2021-11-21 RX ADMIN — METRONIDAZOLE 500 MG: 500 INJECTION, SOLUTION INTRAVENOUS at 11:11

## 2021-11-21 RX ADMIN — HEPARIN SODIUM 5000 UNITS: 5000 INJECTION INTRAVENOUS; SUBCUTANEOUS at 09:11

## 2021-11-21 RX ADMIN — INSULIN ASPART 8 UNITS: 100 INJECTION, SOLUTION INTRAVENOUS; SUBCUTANEOUS at 09:11

## 2021-11-21 RX ADMIN — CEFTRIAXONE SODIUM 2 G: 2 INJECTION, SOLUTION INTRAVENOUS at 03:11

## 2021-11-21 RX ADMIN — INSULIN ASPART 10 UNITS: 100 INJECTION, SOLUTION INTRAVENOUS; SUBCUTANEOUS at 12:11

## 2021-11-21 RX ADMIN — TIZANIDINE 2 MG: 2 TABLET ORAL at 01:11

## 2021-11-21 RX ADMIN — VALPROATE SODIUM 250 MG: 100 INJECTION, SOLUTION INTRAVENOUS at 09:11

## 2021-11-21 RX ADMIN — METRONIDAZOLE 500 MG: 500 INJECTION, SOLUTION INTRAVENOUS at 10:11

## 2021-11-21 RX ADMIN — MICONAZOLE NITRATE: 20 OINTMENT TOPICAL at 09:11

## 2021-11-21 RX ADMIN — LEVOTHYROXINE SODIUM 100 MCG: 100 TABLET ORAL at 05:11

## 2021-11-21 RX ADMIN — INSULIN ASPART 10 UNITS: 100 INJECTION, SOLUTION INTRAVENOUS; SUBCUTANEOUS at 04:11

## 2021-11-21 RX ADMIN — INSULIN ASPART 4 UNITS: 100 INJECTION, SOLUTION INTRAVENOUS; SUBCUTANEOUS at 12:11

## 2021-11-21 NOTE — ASSESSMENT & PLAN NOTE
-last sodium 146  -Unclear if patient has Diabetes Insipidus considering the urine output being only 2 liters in last 24 hours.  -Will agree with IV fluids replacement for now  -Both serum and urine osmolality readings reviewed   -Will avoid desmopressin for now, no clear indication for use currently

## 2021-11-21 NOTE — ASSESSMENT & PLAN NOTE
ASSESSMENT     Violeta Champion is a 60 y.o. female with a past psychiatric history of depression currently presenting with Brain lesion.  Psychiatry was originally consulted to address the patient's symptoms of agitation.    IMPRESSION  Hyperactive delirium     RECOMMENDATION(S)      1. Scheduled Medication(s):  No scheduled medication.  Recommend EKG first.  Last EKG in the system is [facility] 10/27 where she had a QTc of 517.  If QTc not prolonged, we will consider adding a scheduled antipsychotic at night such as seroquel     2. PRN Medication(s):  Depacon 250mg IV l2qnxet PRN for non-redirectable agitation.    3.  Monitor:  Please obtain daily EKG to monitor QTc.  If pt receives depacon, monitor ammonia and transaminases.    4. Legal Status/Precaution(s):  Patient does not meet criteria for PEC or inpatient psychiatric admission at this time. Recommend to rescind PEC if one was placed. Patient is not currently an imminent danger to self or others and is not gravely disabled due to a psychiatric illness.    5. Capacity:  Pt continues to have waxing and waning of symptoms and continues to refuse treatment at times. Therefore pt currently does NOT have medical decision making capacity due to Delirium. Please contact next of kin for making medical decisions currently.    6. Other:  [facility] ICU - Positive  DELIRIUM BEHAVIOR MANAGEMENT   PLEASE utilize CHEMICAL restraints with PRN meds first for agitation. Minimize use of PHYSICAL restraints   Keep window shades open and room lit during day and room dim at night in order to promote normal sleep-wake cycles   Encourage family at bedside. [place] patient often to situation, location, date   Continue to Limit or Discontinue use of Narcotics, Benzos and Anti-cholinergic medications as they may worsen delirium   Continue medical workup for causative etiology of Delirium

## 2021-11-21 NOTE — PLAN OF CARE
Rehab referrals placed in [facility] with [facility] and [facility] in [place], [place].

## 2021-11-21 NOTE — CONSULTS
[facility]  Psychiatry  Consult Note    Patient Name: Violeta Champion  MRN: [id number]   Code Status: Full Code  Admission Date: 11/12/2021  Hospital Length of Stay: 9 days  Attending Physician: Chiki Arteaga MD  Primary Care Provider: Madie Petersen DO    Current Legal Status: Uncontested    Patient information was obtained from patient, relative(s) and ER records.   Inpatient consult to Psychiatry  Consult performed by: Lala Raya MD  Consult ordered by: Chiki Arteaga MD        HPI:   Consultation-Liaison Psychiatry Consult Note    11/21/2021 4:12 PM  Violeta Champion  MRN: [id number]    Chief Complaint / Reason for Consult: agitation     SUBJECTIVE     History of Present Illness:   Violeta Champion is a 60 y.o. female with a past psychiatric history of depression currently presenting with Brain lesion.  Psychiatry was originally consulted to address the patient's symptoms of agitation.    Per Primary MD:  HPI/Interval history (See H&P for complete P,F,SHx) :   Pmhx DM2, hypothyroidism, YUSEF, hx breast ca, fibromyalgia, htn, hld, depression, class 3 obesity admitted as transfer from [facility] for concern of elevated ICP 2/2 vasogenic edema of numerous brain abscesses vs masses. Patient recently admitted at [facility] 10/27 for acute encephalopathy, found to have DKA vs HHS and bacteremia. Clinically improved with abx and appropriate blood sugar management until 3 days prior to transfer when she developed worsening confusion and lethargy. MRI 11/13 revealing multiple lesions throughout the bilateral cerebral hemispheres with associated vasogenic edema and leftward midline shift.      HPI from [facility] below:  Miss Champion is a 60 year old female with a PMH of DMII on oral anti-hyperglycemics, hypothyroid on synthroid, YUSEF, Right breast papilloma, fibromyalgia, HTN, HLD and depression presents to [facility] via ambulance after being found down and unresponsive at home. Patient is  "obtunded with a GCS of 8 and is not able to participate in any history giving. This note is per report from neighbor, EMS, ED providers and nurse. Per report Miss Champion' neighbor hadnt seen her for 3 days so they went over to her house to check on her and found her down and unresponsive. EMS was called and brought her into the ED. Upon drawing labwork patient Wbc was 25.8, , anion gap 8, Cr 2.9, blood glucose 805, UDS pos for barbiturates, pH 6.928. Patient is protecting airway although her respirations are labored, Sp02 mid to low 90's and she has YUSEF. A dose of narcan had no response, patient was further treated with bicarb, insulin, IV fluids, IV vanc and zosyn and admitted to the ICU for a higher level of care. Patients sister Darcy was called by staff and myself and asked to be kept updated, phone number in EMR.    Per C-L Psych MD:  Pt awake in bed.  She is noticeably confused and has difficulty finding with word finding.  Pt endorses history of depression.  She has taken medication in the past, but is not currently taking anything recently because "I've been doing well".  Oriented to self and being in the hospital.  She does not recall transfer from [facility] and states that she is in the hospital because of a virus.  Pt preservative on asking for Sprite and sugar free drinks, stating that she can have them. 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       Psychiatric Review Of Systems - Is patient experiencing or having changes in:  Unable to assess due to AMS    Psychiatric History:  Diagnose(s): depression   Previous Medication Trials: Yes   Previous Psychiatric Hospitalizations: No  Previous Suicide Attempts: No  History of Violence: No  Outpatient Psychiatrist: No.  Pt does have an outpatient therapist at [facility] per chart review    Social History:  Marital Status: single  Children: 0   Employment Status: currently employed as nurse at [employer]   Education: college graduate  Special Ed: no   History: no  Housing Status: Lives alone  Developmental History: No  History of Abuse: unable to assess   Access to Gun:  unable to assess     Substance Abuse History:  Per family at bedside and chart check, no history of drug or alcohol use.  Unable to assess with pt at this time due to AMS    Legal History:  Past Charges/Incarcerations: No  Pending Charges: No    Family Psychiatric History:   No    Psychosocial Factors:  Unable to assess    Collateral:   Darcy Mancuso, Sister, at bedside  Over the past two days, she is less agitated than previously.  She continues to be confused.  Confusion and agitation are worse during the night.  Pt works as a nurse at night, so she is normally awake at night.  Pt at times will try to climb out of the bed, or start pulling at her diaper.  At one point she was smearing feces on things.  Sister agreeable to trying medication as needed for agitation.  She has one other sibling and they are alternating who stays with the pt.  Family is trying to remain at bedside for redirection as much as they are able.      Medical Review Of Systems:  Unable to assess       Hospital Course: No notes on file         Patient History           Medical as of 11/21/2021     Past Medical History     Diagnosis Date Comments Source    Breast cancer 12/10/2020 -- Provider    Diabetes mellitus, type 2 -- -- Provider    GERD (gastroesophageal reflux disease) -- -- Provider    High cholesterol -- -- Provider    Hypertension -- -- Provider    Hypothyroid -- -- Provider    Injury of knee, leg, ankle and foot -- -- Provider    Insulin-resistant diabetes mellitus and acanthosis nigricans -- -- Provider    Menopause present -- -- Provider    Osteoarthritis -- -- Provider    Osteoarthritis, knee -- -- Provider    Osteopenia -- -- Provider    Osteopenia -- -- Provider    Sleep apnea -- -- Provider    Status post breast lump removal 12/01/2020 -- Provider                   Surgical as of 11/21/2021     Past Surgical History     Procedure Laterality Date Comments Source    HYSTERECTOMY -- -- -- Provider    OTHER SURGICAL HISTORY -- -- bilateral central duct removal with bilateral papilomona Provider    bilateral duct removal breast [Other] -- -- -- Provider    OOPHORECTOMY -- -- -- Provider    BREAST CYST ASPIRATION Right 2020 -- Provider    EXCISIONAL BIOPSY Right 12/10/2020 Procedure: EXCISIONAL BIOPSY, breast; u/s guided;  Surgeon: Bernadette Lopez MD;  Location: [facility];  Service: General;  Laterality: Right; Provider                  Family as of 11/21/2021     Problem Relation Name Age of Onset Comments Source    Hypertension Mother -- -- -- Provider    Glaucoma Mother -- -- -- Provider    Diabetes Mother -- -- -- Provider    Hypertension Brother -- -- -- Provider    Cancer Maternal Grandmother -- -- breast Provider    Amblyopia Father -- -- -- Provider    Diabetes Maternal Aunt -- -- -- Provider    Diabetes Maternal Uncle -- -- -- Provider    Breast cancer Paternal Aunt -- -- -- Provider    Breast cancer Paternal Grandmother -- -- -- Provider    Blindness Neg Hx -- -- -- Provider    Cataracts Neg Hx -- -- -- Provider    Macular degeneration Neg Hx -- -- -- Provider    Retinal detachment Neg Hx -- -- -- Provider    Strabismus Neg Hx -- -- -- Provider    Colon cancer Neg Hx -- -- -- Provider    Stomach cancer Neg Hx -- -- -- Provider    Esophageal cancer Neg Hx -- -- -- Provider    Irritable bowel syndrome Neg Hx -- -- -- Provider    Rectal cancer Neg Hx -- -- -- Provider    Ulcerative colitis Neg Hx -- -- -- Provider    Crohn's disease Neg Hx -- -- -- Provider    Celiac disease Neg Hx -- -- -- Provider            Tobacco Use as of 11/21/2021     Smoking Status Smoking Start Date Smoking Quit Date Packs/Day Years Used    Never Smoker -- -- -- --    Types Comments Smokeless Tobacco Status Smokeless Tobacco Quit Date Source    -- -- Never Used -- Provider            Alcohol  Use as of 11/21/2021     Alcohol Use Drinks/Week Alcohol/Week Comments Source    Yes 0 Standard drinks or equivalent 0.0 standard drinks very rarely Provider            Drug Use as of 11/21/2021     Drug Use Types Frequency Comments Source    No -- -- -- Provider            Sexual Activity as of 11/21/2021     Sexually Active Birth Control Partners Comments Source    Not Asked -- -- -- Provider            Activities of Daily Living as of 11/21/2021    None           Social Documentation as of 11/21/2021    RN, [facility]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[employer]  Source: Provider           Occupational as of 11/21/2021     Occupation Employer Comments Source    Nurse [employer] -- Provider            Socioeconomic as of 11/21/2021     Marital Status Spouse Name Number of Children Years Education Education Level Preferred Language Ethnicity Race Source    Single -- 0 14 -- English /Black Black or  Provider            Pertinent History     Question Response Comments    Lives with -- --    Place in Birth Order -- --    Lives in -- --    Number of Siblings -- --    Raised by -- --    Legal Involvement -- --    Childhood Trauma -- --    Criminal History of -- --    Financial Status -- --    Highest Level of Education -- --    Does patient have access to a firearm? -- --     Service -- --    Primary Leisure Activity -- --    Spirituality -- --        Past Medical History:   Diagnosis Date    Breast cancer 12/10/2020    Diabetes mellitus, type 2     GERD (gastroesophageal reflux disease)     High cholesterol     Hypertension     Hypothyroid     Injury of knee, leg, ankle and foot     Insulin-resistant diabetes mellitus and acanthosis nigricans     Menopause present     Osteoarthritis     Osteoarthritis, knee     Osteopenia     Osteopenia     Sleep apnea     Status post breast lump removal 12/01/2020     Past Surgical History:   Procedure Laterality Date    bilateral duct removal  breast      BREAST CYST ASPIRATION Right 2020    EXCISIONAL BIOPSY Right 12/10/2020    Procedure: EXCISIONAL BIOPSY, breast; u/s guided;  Surgeon: Bernadette Lopez MD;  Location: [facility];  Service: General;  Laterality: Right;    HYSTERECTOMY      OOPHORECTOMY      OTHER SURGICAL HISTORY      bilateral central duct removal with bilateral papilomona     Family History     Problem Relation (Age of Onset)    Amblyopia Father    Breast cancer Paternal Aunt, Paternal Grandmother    Cancer Maternal Grandmother    Diabetes Mother, Maternal Aunt, Maternal Uncle    Glaucoma Mother    Hypertension Mother, Brother        Tobacco Use    Smoking status: Never Smoker    Smokeless tobacco: Never Used   Substance and Sexual Activity    Alcohol use: Yes     Alcohol/week: 0.0 standard drinks     Comment: very rarely    Drug use: No    Sexual activity: Not on file     Review of patient's allergies indicates:   Allergen Reactions    Iodinated contrast media Swelling     Other reaction(s): Swelling    Naproxen sodium Swelling    Naprosyn  [naproxen]      Other reaction(s): Swelling       No current facility-administered medications on file prior to encounter.     Current Outpatient Medications on File Prior to Encounter   Medication Sig    albuterol (VENTOLIN HFA) 90 mcg/actuation inhaler INHALE ONE TO TWO PUFFS INTO LUNGS EVERY 6 HOURS AS NEEDED FOR WHEEZING    amLODIPine (NORVASC) 5 MG tablet TAKE 1 TABLET BY MOUTH EVERY DAY    aspirin 81 mg Tab Take by mouth. 1 Tablet Oral Every day    blood sugar diagnostic (FREESTYLE INSULINX TEST STRIPS) Strp Check glucose three times daily    butalbital-acetaminophen-caffeine -40 mg (FIORICET, ESGIC) -40 mg per tablet Take 1 tablet by mouth as needed.    calcium carbonate (TUMS) 300 mg (750 mg) Chew Take by mouth 2 (two) times daily.    diclofenac sodium (VOLTAREN) 1 % Gel APPLY TWO GRAMS TOPICALLY ONCE DAILY    ezetimibe (ZETIA) 10 mg tablet TAKE 1 TABLET BY MOUTH  EVERY DAY IN THE EVENING    FARXIGA 10 mg tablet TAKE 1 TABLET BY MOUTH EVERY DAY    fish oil-dha-epa 1,200-144-216 mg Cap 1 Capsule Oral Every day    fluticasone-salmeterol diskus inhaler 250-50 mcg Inhale 1 puff into the lungs 2 (two) times daily. Controller    glipiZIDE (GLUCOTROL) 10 MG TR24 TAKE 1 TABLET (10 MG TOTAL) BY MOUTH DAILY WITH BREAKFAST.    GUAIATUSSIN AC  mg/5 mL syrup Take 5 mLs by mouth every 4 (four) hours as needed.    HYDROcodone-acetaminophen (NORCO)  mg per tablet Take 1 tablet by mouth every 8 (eight) hours as needed for Pain.    hyoscyamine (LEVSIN/SL) 0.125 mg Subl DISSOLVE 1 TABLET UNDER THE TONGUE EVERY 4 TO 6 HOURS    ibuprofen (ADVIL,MOTRIN) 800 MG tablet Take 800 mg by mouth every 8 (eight) hours as needed.    ipratropium (ATROVENT) 21 mcg (0.03 %) nasal spray SMARTSI Both Nares Every Night    JARDIANCE 10 mg tablet Take 10 mg by mouth once daily.    lactulose (CHRONULAC) 10 gram/15 mL solution TAKE 15 ML DAILY AS NEEDED FOR CONSTIPATION    levothyroxine (SYNTHROID) 100 MCG tablet TAKE 1 TABLET DAILY    LORazepam (ATIVAN) 0.5 MG tablet Take 1 tablet (0.5 mg total) by mouth 2 (two) times daily.    meclizine (ANTIVERT) 25 mg tablet Take 1 tablet (25 mg total) by mouth 3 (three) times daily as needed.    metFORMIN (GLUCOPHAGE) 1000 MG tablet TAKE 1 TABLET BY MOUTH TWICE A DAY WITH MEALS    metoprolol tartrate (LOPRESSOR) 100 MG tablet TAKE 1 TABLET BY MOUTH TWICE A DAY    mv-min-FA-Wo-Gn-yoviijv-lutein 0.4-162-18 mg Tab Take by mouth. 1 Tablet Oral Every day    NARCAN 4 mg/actuation Spry 1 spray once.    potassium chloride SA (K-DUR,KLOR-CON) 20 MEQ tablet TAKE 1 TABLET BY MOUTH TWICE A DAY    sucralfate (CARAFATE) 1 gram tablet Take 1 tablet (1 g total) by mouth 3 (three) times daily.    temazepam (RESTORIL) 30 mg capsule Take 1 capsule (30 mg total) by mouth nightly as needed. 1 Capsule Oral Every day    tiZANidine (ZANAFLEX) 4 MG tablet TAKE 1  "TABLET (4 MG TOTAL) BY MOUTH EVERY 8 (EIGHT) HOURS.    TRULICITY 1.5 mg/0.5 mL pen injector INJECT 1 PEN EVERY WEEK    vibegron 75 mg Tab Take 75 mg by mouth once daily.    [DISCONTINUED] DULoxetine (CYMBALTA) 30 MG capsule Take 1 capsule (30 mg total) by mouth once daily.    [DISCONTINUED] FARXIGA 10 mg tablet TAKE 1 TABLET BY MOUTH EVERY DAY    [DISCONTINUED] sucralfate (CARAFATE) 1 gram tablet TAKE 1 TABLET BY MOUTH THREE TIMES A DAY     Psychotherapeutics (From admission, onward)            None        Review of Systems  Strengths and Liabilities: Strength: Patient has positive support network., Liability: Patient has poor health.    Objective:     Vital Signs (Most Recent):  Temp: 98.4 °F (36.9 °C) (11/21/21 1223)  Pulse: 98 (11/21/21 1223)  Resp: 16 (11/21/21 1223)  BP: 107/62 (11/21/21 1223)  SpO2: (!) 94 % (11/21/21 1223) Vital Signs (24h Range):  Temp:  [97.6 °F (36.4 °C)-98.9 °F (37.2 °C)] 98.4 °F (36.9 °C)  Pulse:  [90-99] 98  Resp:  [16-18] 16  SpO2:  [94 %-97 %] 94 %  BP: (107-140)/(62-87) 107/62     Height: 5' 4" (162.6 cm)  Weight: 123.8 kg (273 lb)  Body mass index is 46.86 kg/m².      Intake/Output Summary (Last 24 hours) at 11/21/2021 1620  Last data filed at 11/21/2021 0600  Gross per 24 hour   Intake 960 ml   Output 800 ml   Net 160 ml         Mental Status Exam:  Appearance: age appropriate, lying in bed, obese  Behavior/Cooperation: cooperative, restless and fidgety   Speech: normal tone, normal rate, normal pitch, loud  Language: english   Mood: "fine"  Affect: irritable  Thought Process: loose associations, tangential  Thought Content: normal, no suicidality, no homicidality, delusions.  Pt expressed paranoia that people tried to kill her last night   Orientation: person, place, year, disoriented to month, date, situation  Memory: Impaired to some degree  Attention Span/Concentration: Impaired  Fund of Knowledge: Estimated to be average level   Insight: limited  Judgment: " limited    CAM-ICU Positive     Significant Labs: All pertinent labs within the past 24 hours have been reviewed.    Significant Imaging: I have reviewed all pertinent imaging results/findings within the past 24 hours.    Assessment/Plan:     Encephalopathy, metabolic  ASSESSMENT     Violeta Champion is a 60 y.o. female with a past psychiatric history of depression currently presenting with Brain lesion.  Psychiatry was originally consulted to address the patient's symptoms of agitation.    IMPRESSION  Hyperactive delirium     RECOMMENDATION(S)      1. Scheduled Medication(s):  No scheduled medication.  Recommend EKG first.  Last EKG in the system is [facility] 10/27 where she had a QTc of 517.  If QTc not prolonged, we will consider adding a scheduled antipsychotic at night such as seroquel     2. PRN Medication(s):  Depacon 250mg IV [id number] PRN for non-redirectable agitation.    3.  Monitor:  Please obtain daily EKG to monitor QTc.  If pt receives depacon, monitor ammonia and transaminases.    4. Legal Status/Precaution(s):  Patient does not meet criteria for PEC or inpatient psychiatric admission at this time. Recommend to rescind PEC if one was placed. Patient is not currently an imminent danger to self or others and is not gravely disabled due to a psychiatric illness.    5. Capacity:  Pt continues to have waxing and waning of symptoms and continues to refuse treatment at times. Therefore pt currently does NOT have medical decision making capacity due to Delirium. Please contact next of kin for making medical decisions currently.    6. Other:  CAM ICU - Positive  DELIRIUM BEHAVIOR MANAGEMENT   PLEASE utilize CHEMICAL restraints with PRN meds first for agitation. Minimize use of PHYSICAL restraints   Keep window shades open and room lit during day and room dim at night in order to promote normal sleep-wake cycles   Encourage family at bedside. [place] patient often to situation, location, date   Continue to Limit  or Discontinue use of Narcotics, Benzos and Anti-cholinergic medications as they may worsen delirium   Continue medical workup for causative etiology of Delirium         Total Time:  45 minutes     Lala Raya MD, PhD  [facility]  -2  11/21/2021

## 2021-11-21 NOTE — SUBJECTIVE & OBJECTIVE
Interval HPI:   Overnight events:  Eatin%  Nausea: No  Hypoglycemia and intervention: No  Fever: No      ROS:  As per HPI, rest unable to obtain due to altered mental status of the patient  Labs Reviewed and Include:  BASELINE Creatinine:   [unfilled]  [unfilled]  [unfilled]  Recent Labs   Lab 21  0300   *   CALCIUM 8.7   ALBUMIN 2.4*   PROT 7.1   *   K 3.4*   CO2 25      BUN 9   CREATININE 1.1   ALKPHOS 70   ALT <5*   AST 10   BILITOT 0.2     Lab Results   Component Value Date    HGBA1C 9.0 (H) 10/27/2021       Nutritional status:   Body mass index is 46.86 kg/m².  Lab Results   Component Value Date    ALBUMIN 2.4 (L) 2021    ALBUMIN 2.4 (L) 2021    ALBUMIN 2.3 (L) 2021     No results found for: PREALBUMIN    Estimated Creatinine Clearance: 72.4 mL/min (based on SCr of 1.1 mg/dL).        Current Insulin Regimen:  Insulin Aspart 8 units TID  Insulin Determir 15 units nightly       PHYSICAL EXAMINATION:  Vitals:    21 0820   BP: 135/85   Pulse: 92   Resp: 18   Temp: 98.3 °F (36.8 °C)     Body mass index is 46.86 kg/m².    Physical Exam  Constitutional:       Appearance: She is obese. She is ill-appearing.   HENT:      Right Ear: External ear normal.      Left Ear: External ear normal.      Mouth/Throat:      Mouth: Mucous membranes are moist.   Cardiovascular:      Rate and Rhythm: Normal rate.      Pulses: Normal pulses.   Pulmonary:      Effort: Pulmonary effort is normal.   Abdominal:      Palpations: Abdomen is soft.   Musculoskeletal:         General: Normal range of motion.      Cervical back: Normal range of motion.   Skin:     General: Skin is warm.   Neurological:      Mental Status: She is disoriented.   Psychiatric:      Comments: Lack of judgement, inappropriate behavior and thought processes

## 2021-11-21 NOTE — PT/OT/SLP PROGRESS
Occupational Therapy   Treatment    Name: Violeta Champion  MRN: [id number]  Admitting Diagnosis:  Brain lesion       Recommendations:     Discharge Recommendations: rehabilitation facility  Discharge Equipment Recommendations: TBD pending progress  Barriers to discharge: increased assistance needed    Assessment:     Violeta Champion is a 60 y.o. female with a medical diagnosis of Brain lesion. She presents with performance deficits including weakness,impaired endurance,impaired self care skills,impaired functional mobilty,impaired balance,decreased safety awareness,pain,decreased lower extremity function,decreased upper extremity function,decreased coordination,impaired cognition. Pt requires frequent redirection to task and encouragement to participate in OOB activity. Pt progressing toward goals and would continue to benefit from OT to increase functional independence and safety. Recommend rehab upon D/C.    Rehab Prognosis: Good; patient would benefit from acute skilled OT services to address these deficits and reach maximum level of function.       Plan:     Patient to be seen 4 x/week to address the above listed problems via self-care/home management,therapeutic activities,therapeutic exercises,neuromuscular re-education  · Plan of Care Expires: 12/13/21  · Plan of Care Reviewed with: patient (sister)    Subjective     Pain/Comfort:  · Pain Rating 1:  (Did not rate)  · Location 1: vagina  · Pain Addressed 1: Reposition,Distraction,Nurse notified  · Pain Rating Post-Intervention 1:  (Did not rate)    Objective:     Communicated with: RN prior to session. Patient found left sidelying with telemetry,enamorado catheter,peripheral IV upon OT entry to room, sister present. Rehab tech present throughout session.    General Precautions: Standard, fall,aspiration   Orthopedic Precautions:N/A   Braces: N/A  Respiratory Status:  · O2 Device (Oxygen Therapy): room air     Occupational Performance:     Bed Mobility:    · Patient  completed Rolling/Turning to Left with maximal assistance  · Patient completed Rolling/Turning to Right with total assistance  · Patient completed Scooting/Bridging with total assistance and 2 persons  · Patient completed Supine to Sit with total assistance and 2 persons  · Patient completed Sit to Supine with total assistance and 2 persons     Functional Mobility/Transfers:  · Not attempted    Activities of Daily Living:  · Lower Body Dressing: total assistance to don socks  · Toileting: total assistance in supine/sidelying for tony hygiene and to change pads/linens      [facility] 6 Click ADL: 9    Treatment & Education:  Pt assisted with rolling and repositioning in bed for tony care and to change pads/linens prior to OOB activity-- pt rolls to the L with less assistance than the R; pt sat EOB ~10 min, initially requiring CGA with B UE support; pt became fatigued and reported discomfort due to [place] and required Total A for postural control, leaning to the L with poor effort to return to midline; unable to complete functional task while EOB due to assistance required to maintain sitting balance; pt returned to supine and positioned comfortably; reviewed supine therex to complete as tolerated and discussed POC and to call for assistance    Patient left HOB elevated with all lines intact, call button in reach and sister presentEducation:      GOALS:   Multidisciplinary Problems     Occupational Therapy Goals        Problem: Occupational Therapy Goal    Goal Priority Disciplines Outcome Interventions   Occupational Therapy Goal     OT, PT/OT Ongoing, Progressing    Description: Goals to be met by: 2 weeks (11/27/21)     Patient will increase functional independence with ADLs by performing:    UE Dressing with Minimal Assistance.  Grooming while seated with Minimal Assistance.  Sitting at edge of bed x10 minutes with Minimal Assistance.  Supine to sit with Minimal Assistance.  Stand pivot transfers with Moderate  Assistance.  Pt will follow 3/3 one-step commands to participate in ADL task.                     Time Tracking:     OT Date of Treatment: 11/21/21  OT Start Time: 1014  OT Stop Time: 1047  OT Total Time (min): 33 min    Billable Minutes:Self Care/Home Management 18  Therapeutic Activity 15    OT/MARY ALICE: OT          11/21/2021

## 2021-11-21 NOTE — HPI
60 year old female with Type 2 Diabetes Mellitus, hypothyroidism, depression, and YUSEF was admitted as transfer from [place] for concern of elevated intracranial pressure due to vasogenic edema from numerous brain lesions thought to be septic emboli in the setting of parvimonas and fusobacterium bacteremia.  Nancy was initially admitted at [place] on 10/27/2021 for acute encephalopathy and found to have DKA and bacteremia. 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 She improved with antibiotics and insulin until 3 days prior to transfer when she developed worsening confusion and lethargy. She had a MRI done on 11/13/2021 revealing multiple lesions throughout the bilateral cerebral hemispheres with associated vasogenic edema and leftward midline shift after which she was transferred to [facility].    Endocrinology consulted for management of type 2 diabetes mellitus, hypothyroidism, and hypernatremia.    Regarding Diabetes Mellitus    Type: Type 2 Diabetes Mellitus  Diabetes diagnosis year: 2010  Home Diabetes Medications:  Metformin 1000 mg twice daily, Trulicity 1.5 mg weekly, and glipizide 10 mg daily  Previous Medications tried:  Januvia and Farxiga  How often checking glucose at home? Very infrequently  Hypoglycemia on the regimen?  No  Diabetes Complications include: DKA and Neuropathy  Complicating diabetes co morbidities:  YUSEF and infection    Regarding Hypothyroidism  - Diagnosed with hypothyroidism approximately 10 years prior to admission  - Treated with 100 mcg levothyroxine daily  - TSH of 1.4 on 11/13/2021    Current symptoms: weight gain, fatigue, constipation, mental fog, memory impairment, recent severe illness  Denies:  Hair loss, brittle nails, cold intolerance, muscle weakness, neck swelling/pressure, hoarseness, periorbital edema, lithium/amiodarone use  No family history of thyroid cancer  Last BMD: Normal forearm BMD in 2013 while on fosamax    Previous thyroid studies: none    Regarding Hypernateremia  - Unclear if Diabetes Insipidus given central involvement with brain lesions, resolved  - Urine output normalized after initial polyuria  - Never taken Desmopressin

## 2021-11-21 NOTE — PLAN OF CARE
Problem: Adult Inpatient Plan of Care  Goal: Plan of Care Review  Outcome: Ongoing, Progressing  Goal: Patient-Specific Goal (Individualization)  Outcome: Ongoing, Progressing  Goal: Absence of Hospital-Acquired Illness or Injury  Outcome: Ongoing, Progressing  Intervention: Identify and Manage Fall Risk  Flowsheets (Taken 11/21/2021 0430)  Safety Promotion/Fall Prevention:   assistive device/personal item within reach   bed alarm set   Fall Risk reviewed with patient/family   medications reviewed   nonskid shoes/socks when out of bed   side rails raised x 3   instructed to call staff for mobility     Problem: Diabetes Comorbidity  Goal: Blood Glucose Level Within Desired Range  Outcome: Ongoing, Progressing  Intervention: Maintain Glycemic Control  Flowsheets (Taken 11/21/2021 0430)  Glycemic Management: blood glucose monitoring

## 2021-11-21 NOTE — NURSING
Pt appears to be more confused at night per family that's at the bedside. Pt has been pulling at gown and telemetry monitor requiring frequent redirection. Easily redirected. Administered PRN Melatonin and Tizanidine per request of patient see MAR. Pt c/o of generalized pain and not being able to sleep. NADN. Safety maintained. Will report off to oncoming nursing staff & continue to monitor for the duration of this nightshift. Pt also noted to have a dry nonproductive cough @ times.

## 2021-11-21 NOTE — NURSING
POC reviewed with pt and pt verbalized understanding. Questions/Concerns addressed. A&Ox3. Disoriented to time. Delayed responses noted. NADN. Respirations equal and unlabored. Pt took night meds swallows without any difficulty. Pt noted to cough at times nonproductive. Pt is bedbound has specialty bed. Pt has a enamorado catheter intact secured to left thigh draining clear yellow urine into  bag to gravity no complications noted.   Bed in low position, side rails up x3. Call bell in reach. Will continue to monitor closely throughout this [id number] shift Safety maintained. Bed alarm activated for safety. Safety/Fall precautions in place per facility protocol for safety. Instructed pt to press call bell for assistance if needed pt verbalized understanding. Family @ bedside for the remainder of the night. VS stable and neuro assessment completed see assessment. Telemetry monitor intact for monitoring.

## 2021-11-21 NOTE — PROGRESS NOTES
Progress Note  [facility]    Patient: Violeta Champion [id number]  Date of Service: 11/21/2021  [place] [facility] Chiki Arteaga MD  Length of Stay:  LOS: 9 days   Admission Date: 11/12/2021    SUBJECTIVE:     Follow-up For:  Brain lesion    HPI/Interval history (See H&P for complete P,F,SHx) :    year old female with Pmhx DM2, hypothyroidism, YUSEF, hx breast ca, fibromyalgia, htn, hld, depression, class 3 obesity admitted as transfer from [place] for concern of elevated ICP 2/2 vasogenic edema of numerous brain abscesses vs masses. Patient recently admitted at [place] 10/27 for acute encephalopathy, found to have DKA vs HHS and bacteremia. Clinically improved with abx and appropriate blood sugar management until 3 days prior to transfer when she developed worsening confusion and lethargy. MRI 11/13 revealing multiple lesions throughout the bilateral cerebral hemispheres with associated vasogenic edema and leftward midline shift.      HPI from [place] below:  Miss Champion is a 60 year old female with a PMH of DMII on oral anti-hyperglycemics, hypothyroid on synthroid, YUSEF, Right breast papilloma, fibromyalgia, HTN, HLD and depression presents to [facility] via ambulance after being found down and unresponsive at home. Patient is obtunded with a GCS of 8 and is not able to participate in any history giving. This note is per report from neighbor, EMS, ED providers and nurse. Per report Miss Champion' neighbor hadnt seen her for 3 days so they went over to her house to check on her and found her down and unresponsive. EMS was called and brought her into the ED. Upon drawing labwork patient Wbc was 25.8, , anion gap 8, Cr 2.9, blood glucose 805, UDS pos for barbiturates, pH 6.928. Patient is protecting airway although her respirations are labored, Sp02 mid to low 90's and she has YUSEF. A dose of narcan had no response, patient was further treated with bicarb, insulin, IV fluids, IV vanc and zosyn and admitted  to the ICU for a higher level of care. Patients sister Darcy was called by staff and myself and asked to be kept updated, phone number in EMR.        Hospital Course: 11/18/2021 Hypernatremic. Monitoring.   11/19: Drink to thirst , NAEON  11/20: Drink to thirst, cont follow Na, follow ID reccs, NAEON.  Transfer to hospital medicine  Nancy was initially admitted at [place] 10/27 for acute encephalopathy, found to have DKA ( ( beta hydroxybutyrate 5.3 )and bacteremia. 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 improved with antibiotics and blood sugar management until 3 days prior to transfer when she developed worsening confusion and lethargy. MRI 11/13 revealing multiple lesions throughout the bilateral cerebral hemispheres with associated vasogenic edema and leftward midline shift.  UOP 6 L /24h. discussed with sister at the bedside. she wanst the patient to be discharged to [place] are for rehab  11/21 confusion/agitation at night- pulling at gown and telemetry monitor requiring frequent redirection. psychiatry consulted . sodium at 146 . UOP trendind down  2L /24h.  nephrology consulted. cardiology consulted for DORCAS . discussed with nephrology. no plan for desmopressin now. started on 1/2NS at 75ml/h . Glucose in 200s.  Levemir to 20 units nightly and will increase insulin aspart to 10 units TID .c/o bladder spams. s/p psychiatry evaluation. Depacon 250mg IV v2owntf PRN for non-redirectable agitation. QTc prolongation 517 on 10/27  -  daily EKG to monitor QTc            Review of Systems:   Pain scale:   Constitutional:  fever,  chills, headache, vision loss, hearing loss, weight loss, Generalized weakness, falls, loss of smell, loss of taste, poor appetite,  sore throat  Respiratory: cough, shortness of breath.   Cardiovascular: chest pain, dizziness, palpitations, orthopnea, swelling of feet, syncope  Gastrointestinal: nausea, vomiting, abdominal pain, diarrhea, black stool,   beeding per rectum,  change in bowel habits  Genitourinary: hematuria, dysuria, urgency, frequency  Integument/Breast: rash,  pruritis  Hematologic/Lymphatic: easy bruising, lymphadenopathy  Musculoskeletal: arthralgias , myalgias, back pain, neck pain, knee pain + leg pain   Neurological: confusion, seizures, tremors, slurred speech  Behavioral/Psych:  depression, anxiety, auditory or visual hallucinations     OBJECTIVE:     Physical Exam:  Body mass index is 46.86 kg/m².    Constitutional: Appears well-developed and well-nourished. morbid obesity  Head: Normocephalic and atraumatic.   Neck: Normal range of motion. Neck supple.   Cardiovascular: Normal heart rate.  Regular heart rhythm.  Pulmonary/Chest: Effort normal.   Abdominal: No distension.  No tenderness. foleys catheter  Musculoskeletal: Normal range of motion. No edema.   Neurological: Alert and oriented to person,, and time. follows commmands   able to move bilateral upper and lower extremities without limitation  Skin: Skin is warm and dry.   Psychiatric: Normal mood and affect. Behavior is normal.                  Vital Signs  Temp: 98.4 °F (36.9 °C) (11/21/21 1223)  Pulse: 98 (11/21/21 1223)  Resp: 16 (11/21/21 1223)  BP: 107/62 (11/21/21 1223)  SpO2: (Abnormal) 94 % (11/21/21 1223)     24 Hour VS Range    Temp:  [97.6 °F (36.4 °C)-98.9 °F (37.2 °C)]   Pulse:  [90-99]   Resp:  [16-24]   BP: (107-166)/(62-87)   SpO2:  [94 %-97 %]     Intake/Output Summary (Last 24 hours) at 11/21/2021 1237  Last data filed at 11/21/2021 0600  Gross per 24 hour   Intake 1310 ml   Output 1050 ml   Net 260 ml         I/O This Shift:  No intake/output data recorded.    Wt Readings from Last 3 Encounters:   11/13/21 123.8 kg (273 lb)   11/11/21 129.5 kg (285 lb 7.9 oz)   07/08/21 135.6 kg (299 lb)       I have personally reviewed the vitals and recorded Intake/Output     Laboratory/Diagnostic Data:    CBC/Anemia Labs: Coags:    Recent Labs   Lab 11/19/21  0023 11/20/21  0017  11/21/21  0300   WBC 6.60 6.07 5.87   HGB 8.6* 8.9* 9.6*   HCT 29.2* 30.5* 32.0*    183 178   MCV 91 89 91   RDW 21.9* 21.8* 21.4*    No results for input(s): PT, INR, APTT in the last 168 hours.     Chemistries: ABG:   Recent Labs   Lab 11/19/21  0023 11/19/21  0637 11/20/21  0017 11/20/21  0611 11/20/21  1106 11/20/21  1751 11/21/21  0300   *  153*   < > 147*   < > 147* 147* 146*   K 3.6   < > 3.0*  --  3.4*  --  3.4*   *  --  108  --   --   --  106   CO2 27  --  29  --   --   --  25   BUN 6  --  6  --   --   --  9   CREATININE 1.0  --  1.0  --   --   --  1.1   CALCIUM 8.0*  --  8.3*  --   --   --  8.7   PROT 6.3  --  6.8  --   --   --  7.1   BILITOT 0.2  --  0.3  --   --   --  0.2   ALKPHOS 67  --  68  --   --   --  70   ALT <5*  --  <5*  --   --   --  <5*   AST 8*  --  9*  --   --   --  10   MG 1.9  --  1.7  --   --   --  1.8   PHOS 3.0  --  3.6  --   --   --  4.2    < > = values in this interval not displayed.    No results for input(s): PH, PCO2, PO2, HCO3, POCSATURATED, BE in the last 168 hours.     POCT Glucose: HbA1c:    Recent Labs   Lab 11/19/21  2159 11/20/21  0809 11/20/21  1111 11/20/21  1656 11/20/21  2109 11/21/21  0734   POCTGLUCOSE 183* 208* 209* 201* 261* 284*    Hemoglobin A1C   Date Value Ref Range Status   10/27/2021 9.0 (H) 4.0 - 5.6 % Final     Comment:     ADA Screening Guidelines:  5.7-6.4%  Consistent with prediabetes  >or=6.5%  Consistent with diabetes    High levels of fetal hemoglobin interfere with the HbA1C  assay. Heterozygous hemoglobin variants (HbS, HgC, etc)do  not significantly interfere with this assay.   However, presence of multiple variants may affect accuracy.     07/29/2021 7.5 (H) 4.0 - 5.6 % Final     Comment:     ADA Screening Guidelines:  5.7-6.4%  Consistent with prediabetes  >or=6.5%  Consistent with diabetes    High levels of fetal hemoglobin interfere with the HbA1C  assay. Heterozygous hemoglobin variants (HbS, HgC, etc)do  not significantly  interfere with this assay.   However, presence of multiple variants may affect accuracy.     06/16/2021 7.7 (H) 4.0 - 5.6 % Final     Comment:     ADA Screening Guidelines:  5.7-6.4%  Consistent with prediabetes  >or=6.5%  Consistent with diabetes    High levels of fetal hemoglobin interfere with the HbA1C  assay. Heterozygous hemoglobin variants (HbS, HgC, etc)do  not significantly interfere with this assay.   However, presence of multiple variants may affect accuracy.          Cardiac Enzymes: Ejection Fractions:    No results for input(s): CPK, CPKMB, MB, TROPONINI in the last 72 hours. EF   Date Value Ref Range Status   11/13/2021 65 % Final   10/29/2021 60 % Final          No results for input(s): COLORU, APPEARANCEUA, PHUR, SPECGRAV, PROTEINUA, GLUCUA, KETONESU, BILIRUBINUA, OCCULTUA, NITRITE, UROBILINOGEN, LEUKOCYTESUR, RBCUA, WBCUA, BACTERIA, SQUAMEPITHEL, HYALINECASTS in the last 48 hours.    Invalid input(s): WRIGHTSUR    Lactate (Lactic Acid) (mmol/L)   Date Value   10/29/2021 1.1   10/27/2021 5.1 (HH)   10/27/2021 3.1 (H)   10/27/2021 1.6     BNP (pg/mL)   Date Value   10/27/2021 52   03/20/2018 13   12/16/2016 25   06/29/2010 10     CRP (mg/L)   Date Value   10/29/2019 6.7   06/27/2019 11.6 (H)   07/13/2016 14.9 (H)   06/20/2014 11.5 (H)     Sed Rate (mm/Hr)   Date Value   10/29/2019 65 (H)   06/27/2019 90 (H)   07/13/2016 47 (H)   06/20/2014 39 (H)     No results found for: DDIMER  No results found for: FERRITIN  No results found for: LDH  Troponin I (ng/mL)   Date Value   11/13/2021 <0.006   10/27/2021 0.015     CPK (U/L)   Date Value   10/29/2021 1306 (H)   10/27/2021 1465 (H)   06/27/2019 52   06/20/2014 106     Results for orders placed or performed in visit on 06/19/20   Vitamin D   Result Value Ref Range    Vit D, 25-Hydroxy 44 30 - 96 ng/mL   Results for orders placed or performed in visit on 06/27/19   Vitamin D   Result Value Ref Range    Vit D, 25-Hydroxy 54 30 - 96 ng/mL   Results for orders  placed or performed in visit on 07/11/16   Vitamin D   Result Value Ref Range    Vit D, 25-Hydroxy 27 (L) 30 - 96 ng/mL     SARS-CoV2 (COVID-19) Qualitative PCR (no units)   Date Value   12/07/2020 Not Detected     POC Rapid COVID (no units)   Date Value   10/27/2021 Negative       Microbiology labs for the last week  Microbiology Results (last 7 days)     Procedure Component Value Units Date/Time    Blood culture [[id number]] Collected: 11/13/21 0227    Order Status: Completed Specimen: Blood Updated: 11/18/21 0612     Blood Culture, Routine No growth after 5 days.    Narrative:      Blood cultures from 2 different sites. 4 bottles total.  Please draw before starting antibiotics.    Blood culture [[id number]] Collected: 11/13/21 0227    Order Status: Completed Specimen: Blood Updated: 11/18/21 0612     Blood Culture, Routine No growth after 5 days.    Narrative:      Blood cultures x 2 different sites. 4 bottles total. Please  draw cultures before administering antibiotics.    Gram stain [[id number]]     Order Status: Canceled Specimen: CSF (Spinal Fluid) from CSF Tap, Tube 3     CSF culture [[id number]]     Order Status: Canceled Specimen: CSF (Spinal Fluid) from CSF Tap, Tube 3     AFB Culture & Smear [[id number]]     Order Status: Canceled Specimen: CSF (Spinal Fluid) from CSF Tap, Tube 3     Fungus culture [[id number]]     Order Status: Canceled Specimen: CSF (Spinal Fluid) from CSF Tap, Tube 3     Cryptococcal antigen, CSF [[id number]]     Order Status: Canceled Specimen: CSF (Spinal Fluid) from CSF Tap, Tube 3           Reviewed and noted in plan where applicable- Please see chart for full lab data.    Lines/Drains:  PICC Double Lumen 11/01/21 1950 right basilic (Active)   Verification by X-ray Yes 11/20/21 0700   Site Assessment No drainage;No redness;No swelling;No warmth 11/20/21 0700   Extremity Assessment Distal to IV No abnormal discoloration;No redness;No swelling;No warmth 11/20/21 1100   Line Securement  "Device Secured with sutureless device 11/20/21 0700   Dressing Type Biopatch in place;Central line dressing 11/20/21 1100   Dressing Status Clean;Dry;Intact 11/20/21 1100   Dressing Intervention Integrity maintained 11/20/21 1100   Date on Dressing 11/15/21 11/19/21 1901   Dressing Due to be Changed 11/22/21 11/19/21 1901   Left Lumen Patency/Care Normal saline locked 11/20/21 0301   Right Lumen Patency/Care Normal saline locked 11/20/21 0301   Line Necessity Review Longterm central access required;Poor venous access 11/20/21 0700   Number of days: 18            Urethral Catheter 11/17/21 1000 Latex 16 Fr. (Active)   Site Assessment Clean;Intact 11/20/21 1100   Collection Container Urimeter 11/20/21 1100   Securement Method secured to top of thigh w/ adhesive device 11/20/21 1100   Catheter Care Performed yes 11/20/21 1100   Reason for Continuing Urinary Catheterization Critically ill in ICU and requiring hourly monitoring of intake/output 11/20/21 1100   CAUTI Prevention Bundle StatLock in place w 1" slack;Intact seal between catheter & drainage tubing;Drainage bag/urimeter off the floor;Green sheeting clip in use;No dependent loops or kinks;Drainage bag/urimeter not overfilled (<2/3 full);Drainage bag/urimeter below bladder 11/20/21 0700   Output (mL) 125 mL 11/20/21 1200   Number of days: 3       Imaging      Results for orders placed during the hospital encounter of 11/12/21    Echo Saline Bubble? Yes    Interpretation Summary  · The left ventricle is normal in size with normal systolic function.  · The estimated ejection fraction is 65%.  · Normal left ventricular diastolic function.  · Normal right ventricular size with normal right ventricular systolic function.  · There is no evidence of intracardiac shunting.  · Indeterminate central venous pressure. Estimated PA systolic pressure is at least 25 mmHg.      US Upper Extremity Veins Bilateral  Narrative: EXAMINATION:  US UPPER EXTREMITY VEINS " BILATERAL    CLINICAL HISTORY:  concern for Lemierre's syndrome, infectious thrombophlebitis of the internal jugular vein;    TECHNIQUE:  Duplex and color flow Doppler evaluation and dynamic compression was performed of the right and left internal jugular veins.    COMPARISON:  No relevant prior images.    FINDINGS:  Right internal jugular vein: Patent and compressible.    Left internal jugular vein: Patent and compressible.  Impression: No thrombus in bilateral internal jugular veins.    Electronically signed by resident: Brenden Mane  Date:    11/15/2021  Time:    16:23    Electronically signed by: Rakan Vee MD  Date:    11/15/2021  Time:    17:11      Labs, Imaging, EKG and Diagnostic results from 11/21/2021 were reviewed.    Medications:  Medication list was reviewed and changes noted under Assessment/Plan.  No current facility-administered medications on file prior to encounter.     Current Outpatient Medications on File Prior to Encounter   Medication Sig Dispense Refill    albuterol (VENTOLIN HFA) 90 mcg/actuation inhaler INHALE ONE TO TWO PUFFS INTO LUNGS EVERY 6 HOURS AS NEEDED FOR WHEEZING 54 g 3    amLODIPine (NORVASC) 5 MG tablet TAKE 1 TABLET BY MOUTH EVERY DAY 90 tablet 3    aspirin 81 mg Tab Take by mouth. 1 Tablet Oral Every day      blood sugar diagnostic (FREESTYLE INSULINX TEST STRIPS) Strp Check glucose three times daily 100 each 3    butalbital-acetaminophen-caffeine -40 mg (FIORICET, ESGIC) -40 mg per tablet Take 1 tablet by mouth as needed.      calcium carbonate (TUMS) 300 mg (750 mg) Chew Take by mouth 2 (two) times daily.      diclofenac sodium (VOLTAREN) 1 % Gel APPLY TWO GRAMS TOPICALLY ONCE DAILY 100 g 0    ezetimibe (ZETIA) 10 mg tablet TAKE 1 TABLET BY MOUTH EVERY DAY IN THE EVENING 90 tablet 3    FARXIGA 10 mg tablet TAKE 1 TABLET BY MOUTH EVERY DAY 90 tablet 1    fish oil-dha-epa 1,200-144-216 mg Cap 1 Capsule Oral Every day 90 capsule 3     fluticasone-salmeterol diskus inhaler 250-50 mcg Inhale 1 puff into the lungs 2 (two) times daily. Controller 180 each 3    glipiZIDE (GLUCOTROL) 10 MG TR24 TAKE 1 TABLET (10 MG TOTAL) BY MOUTH DAILY WITH BREAKFAST. 90 tablet 2    GUAIATUSSIN AC  mg/5 mL syrup Take 5 mLs by mouth every 4 (four) hours as needed.      HYDROcodone-acetaminophen (NORCO)  mg per tablet Take 1 tablet by mouth every 8 (eight) hours as needed for Pain. 90 tablet 0    hyoscyamine (LEVSIN/SL) 0.125 mg Subl DISSOLVE 1 TABLET UNDER THE TONGUE EVERY 4 TO 6 HOURS 30 tablet 5    ibuprofen (ADVIL,MOTRIN) 800 MG tablet Take 800 mg by mouth every 8 (eight) hours as needed.  0    ipratropium (ATROVENT) 21 mcg (0.03 %) nasal spray SMARTSI Both Nares Every Night      JARDIANCE 10 mg tablet Take 10 mg by mouth once daily.      lactulose (CHRONULAC) 10 gram/15 mL solution TAKE 15 ML DAILY AS NEEDED FOR CONSTIPATION 473 mL 4    levothyroxine (SYNTHROID) 100 MCG tablet TAKE 1 TABLET DAILY 90 tablet 3    LORazepam (ATIVAN) 0.5 MG tablet Take 1 tablet (0.5 mg total) by mouth 2 (two) times daily. 60 tablet 0    meclizine (ANTIVERT) 25 mg tablet Take 1 tablet (25 mg total) by mouth 3 (three) times daily as needed. 30 tablet 0    metFORMIN (GLUCOPHAGE) 1000 MG tablet TAKE 1 TABLET BY MOUTH TWICE A DAY WITH MEALS 180 tablet 3    metoprolol tartrate (LOPRESSOR) 100 MG tablet TAKE 1 TABLET BY MOUTH TWICE A  tablet 1    mv-min-FA-Pl-Vz-lzwdpov-lutein 0.4-162-18 mg Tab Take by mouth. 1 Tablet Oral Every day      NARCAN 4 mg/actuation Spry 1 spray once.      potassium chloride SA (K-DUR,KLOR-CON) 20 MEQ tablet TAKE 1 TABLET BY MOUTH TWICE A  tablet 3    sucralfate (CARAFATE) 1 gram tablet Take 1 tablet (1 g total) by mouth 3 (three) times daily. 270 tablet 3    temazepam (RESTORIL) 30 mg capsule Take 1 capsule (30 mg total) by mouth nightly as needed. 1 Capsule Oral Every day 30 capsule 0    tiZANidine (ZANAFLEX) 4 MG  tablet TAKE 1 TABLET (4 MG TOTAL) BY MOUTH EVERY 8 (EIGHT) HOURS. 90 tablet 1    TRULICITY 1.5 mg/0.5 mL pen injector INJECT 1 PEN EVERY WEEK 2 pen 4    vibegron 75 mg Tab Take 75 mg by mouth once daily. 30 tablet 11    [DISCONTINUED] DULoxetine (CYMBALTA) 30 MG capsule Take 1 capsule (30 mg total) by mouth once daily. 90 capsule 3    [DISCONTINUED] FARXIGA 10 mg tablet TAKE 1 TABLET BY MOUTH EVERY DAY 90 tablet 1    [DISCONTINUED] sucralfate (CARAFATE) 1 gram tablet TAKE 1 TABLET BY MOUTH THREE TIMES A  tablet 0     Scheduled Medications:  amLODIPine, 10 mg, Oral, Daily  aspirin, 81 mg, Oral, Daily  cefTRIAXone (ROCEPHIN) IVPB, 2 g, Intravenous, Q12H  heparin (porcine), 5,000 Units, Subcutaneous, Q8H  insulin aspart U-100, 10 Units, Subcutaneous, TIDWM  insulin detemir U-100, 20 Units, Subcutaneous, QHS  levothyroxine, 100 mcg, Oral, Before breakfast  metronidazole, 500 mg, Intravenous, Q8H  miconazole nitrate 2%, , Topical (Top), BID  senna-docusate 8.6-50 mg, 1 tablet, Oral, Daily  vibegron, 75 mg, Oral, Daily      PRN: acetaminophen, dextrose 50%, glucagon (human recombinant), insulin aspart U-100, labetalol, magnesium oxide, magnesium oxide, melatonin, potassium bicarbonate, potassium bicarbonate, potassium bicarbonate, potassium, sodium phosphates, potassium, sodium phosphates, potassium, sodium phosphates, sodium chloride 0.9%, tiZANidine  Infusions:    sodium chloride 0.45% 75 mL/hr at 11/21/21 1051     Estimated Creatinine Clearance: 72.4 mL/min (based on SCr of 1.1 mg/dL).    ASSESSMENT/PLAN:   Overview:  Violeta Champion is a 60 y.o. female with medical history significant for     Active Hospital Problems    Diagnosis  POA    *Brain lesion [G93.9]MRI which showed multiple lesions throughout the bilateral cerebral hemispheres with vasogenic edema ,mild leftward midline shift and partial mass effect of the right lateral ventricle. Patient transferred to [facility] for neuro-ICU level of  care    involving frontal, parietal, temporal, and occipital lobes with surrounding vasogenic edema. Suspect 2/2 septic emboli given recent bacteremia. Speciation of BCx 10/27 revealing organisms from oral mary. Mannitol and 3% saline started prior to transfer. CTH notable for multiple areas of mild diminished attenuation involving the cerebral hemispheres bilaterally corresponding with multiple T2/FLAIR hyperintense lesions noted on the recent MRI examination.      - q1h neuro checks    - BCx NGTD  -DORCAS deferred  - Infectious Disease consult; appreciate recommendations. Signed off          - continue long term antibiotics x 6 weeks  - q6h Na; Na goal EuNa, drinking to thirst, consider DDAVP if Na continues to climb despite PO intake  s/p nuerosurgery eval ---no acute neurosurgical intervention  - stable for transfer to floor   11/20 s/p ID eval -Cardiology would like to defer DORCAS for now until more clinically stable. LP is  high risk for further evaluation. Patient continues to improve. IJ US is negative for septic thrombophlebitis. Iikely etiology of brain lesions is septic emboli from an undetermined source, in the setting of parvimonas and fusobacterium bacteremia. Will plan to treat with 6 of ceftriaxone and metronidazole for brain abscess and possible IE.  consider LP, recommend cell count, protein, culture and toxo PCR              Sepsis-   r/o oral bacteria associated infective endocarditis .  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  TTE was negative.  In light of brain lesions, recommend DORCAS.  cardiology defered DORCAS due to high risk. Recommend treating broadly for now with vanc, ceftriaxone and metronidazole  x 6weeks. HIV negative  11/21  . cardiology consulted for DORCAS       Encephalopathy, metabolic   secondary to brain lesions. improving. AOX2    waxing and waning mentaion.   11/21 confusion/agitation at night- pulling at gown and telemetry  monitor requiring frequent redirection. psychiatry consulted . Depacon 250mg IV q6rohbj PRN for non-redirectable agitation.             QTc prolongation 517 on 10/27  -  daily EKG to monitor QTc          Hypernatremia    - Patient with sodium   Recent Labs   Lab 11/20/21  1106 11/20/21  1751 11/21/21  0300   * 147* 146*   . sodium trending up   11/20 follow Na, UOP; drinking to thirst  with stable Na but if Na continues to increase consider ddavp. UOP 6 L /24h.   11/21sodium at 146 . UOP trendind down  2L /24h. discussed with nephrology. no plan for desmopressin now. started on 1/2NS at 75ml/h           Yes    BMI 45.0-49.9, adult [Z68.42]   Body mass index is 46.86 kg/m². Morbid obesity complicates all aspects of disease management from diagnostic modalities to treatment. Weight loss encouraged    Anemia   Patient's with Normocytic anemia.. Hemoglobin stable. Etiology likely due to chronic disease .  Current CBC reviewed-    Recent Labs   Lab 11/19/21  0023 11/20/21  0017 11/21/21  0300   HGB 8.6* 8.9* 9.6*         Component Value Date/Time    MCV 91 11/21/2021 0300    RDW 21.4 (H) 11/21/2021 0300    IRON 92 11/10/2004 1215     Monitor CBC and transfuse if H/H drops below 7/21.   Not Applicable    Discharge planning issues [Z02.9] needs long term antibiotics  Not Applicable    Vasogenic brain edema [G93.6]secondary to above .concern of elevated ICP 2/2 vasogenic edema of numerous brain abscesses vs masses.   Yes    Brain compression [G93.5]  Yes    Diabetes insipidus [E23.2] trend sodium  labs Q6hrs  - encourage fluid intake  - on half normal; continue with Hypernatremia as above  Yes      Yes    Essential Hypertension  Chronic, controlled.  Latest blood pressure and vitals reviewed-   Temp:  [97.6 °F (36.4 °C)-98.9 °F (37.2 °C)]   Pulse:  [90-99]   Resp:  [16-24]   BP: (107-166)/(62-87)   SpO2:  [94 %-97 %] .   Home meds for hypertension were reviewed and amlodipine continued   Hypertension  Medications             amLODIPine (NORVASC) 5 MG tablet TAKE 1 TABLET BY MOUTH EVERY DAY    metoprolol tartrate (LOPRESSOR) 100 MG tablet TAKE 1 TABLET BY MOUTH TWICE A DAY        PRN meds if BP> 180/110 mm HG  Yes    Uncontrolled type 2 diabetes mellitus with hyperglycemia [E11.65]  Patient's FSGs are not controlled on current hypoglycemics.   Last A1c reviewed-   Lab Results   Component Value Date    HGBA1C 9.0 (H) 10/27/2021    HGBA1C 7.5 (H) 07/29/2021    HGBA1C 7.7 (H) 06/16/2021     Recent Labs   Lab 11/20/21  1656 11/20/21  2109 11/21/21  0734   POCTGLUCOSE 201* 261* 284*     currently on  insulin aspart U-100, 6 Units, Subcutaneous, TIDWM and moderate dose SSI .Started  Levemir 15 units q HS   11/21  Glucose in 200s.  Levemir to 20 units nightly and will increase insulin aspart to 10 units TID .      Hypokalemia - Patient with potassium   Recent Labs   Lab 11/20/21  0017 11/20/21  1106 11/21/21  0300   K 3.0* 3.4* 3.4*   . replaced. monitor      Yes    Hypothyroidism [E03.9] TSH WNL continue levothyroxine  Yes      Resolved Hospital Problems    Diagnosis Date Resolved POA    Morbid obesity with BMI of 60.0-69.9, adult [E66.01, Z68.44] 11/15/2021 Not Applicable       Disposition-  rehab    Discharge Planning   SARAVANAN: 11/24/2021     Code Status: Full Code   Is the patient medically ready for discharge?: No    Reason for patient still in hospital (select all that apply): Treatment  Discharge Plan A: Long-term acute care facility (LTAC)         DVT prophylaxis addressed with:  subcutaneous heparin.    Subsequent Hospital Care    Level 3 [id number] Total visit time was 35 minutes or greater with greater than 50% of time spent in counseling and coordination of care. .    We discussed in detail the plan of care, the patient's response to treatment, the discharge plan.  Total time includes time spent reviewing the medical record, examining the patient, writing notes and communicating with other professionals.     Chiki  Jenni WOMACK  Attending Staff Physician  [facility]  pager- [phone]  [facility] - [phone]

## 2021-11-21 NOTE — NURSING
Tizanidine effective. Melatonin ineffective pt remains awake. NADN. Safety maintained. Family remains @ the bedside.

## 2021-11-21 NOTE — CONSULTS
[facility]  Endocrinology  Consult Note    Consult Requested by: Chiki Arteaga MD   Reason for admit: Brain lesion    HISTORY OF PRESENT ILLNESS:  60 year old female with pertinent medical history of Type 2 Diabetes Mellitus, hypothyroidism, Intraductal papilloma of the right breast s/p excision, depression and YUSEF was admitted as transfer from [place] for concern of elevated ICP due to vasogenic edema from numerous brain lesions (likely etiology septic emboli, in the setting of parvimonas and fusobacterium bacteremia.)   Nancy was initially admitted at [place] 10/27 for acute encephalopathy, found to have DKA ( ( beta hydroxybutyrate 5.3) and bacteremia. 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 She improved with antibiotics and insulin until 3 days prior to transfer when she developed worsening confusion and lethargy. She had a MRI done on 11/13 revealing multiple lesions throughout the bilateral cerebral hemispheres with associated vasogenic edema and leftward midline shift after which she was transferred to our hospital.      Endocrinology was consulted for management of type 2 diabetes mellitus, hypothyroidism and hypernatremia        Regarding Diabetes Mellitus    Type: Type 2 Diabetes Mellitus  Diabetes diagnosis year: first diagnosed in 2010.    Home Diabetes Medications:  Metformin 1000 mg twice daily, Trulicity 1.5 mg weekly and glipizide just 10 mg daily    Previous Medications tried:  Januvia  Farxiga   Has never been on insulin.     How often checking glucose at home? Very infrequently  Hypoglycemia on the regimen?  No      Diabetes Complications include:     Diabetic Ketoacidosis  Neuropathy    Complicating diabetes co morbidities:   YUSEF  Infection        Regarding Hypothyroidism  -Has hypothyroidism for almost 10 years  -On 100 mcg levothyroxine      Her Current symptoms are:         No   Yes    []    [x]   Weight gain    []    [x]    Fatigue    []    [x]   Constipation    [x]    []   Hair loss    [x]    []   Brittle nails    []    [x]   Mental fog    [x]    []   Cold intolerance    []    [x]   Memory impair    [x]    []   Muscle weakness    [x]    []   Neck swelling, pain, pressure    [x]    []   Hoarseness    [x]    []   Periorbital edema    [x]    []   Lithium or Amiodarone use    []    [x]   Recent severe illness        [x]    []   Recent pregnancy      Personal or Family History:      No   Yes    []    [x]   Thyroid disorder    [x]    []   Thyroid cancer        Last BMD: Normal forearm BMD in 2013 while on fosamax       Previous thyroid studies: none      Plan for pregnancy at this time: none        Lab Results   Component Value Date    TSH 1.392 11/13/2021    TSH 0.214 (L) 10/27/2021    TSH 2.236 07/29/2021    FREET4 0.90 10/27/2021    FREET4 1.07 07/29/2021    FREET4 1.17 03/16/2021       Regarding Hypernateremia  -Unclear if it is actual Diabetes Insipidus. The Urine Output has decreased to 2 liters in last 24 hours from 6 Liters in the previous 24 hours  -No history of Diabetes Insipidus in the patient.   -Never taken Desmopressin in the past, was ordered but discontinued while in the hospital on this visit  -Patient is awake, alert, altered, disoriented and has spells of agitation at night  -She denies any polyuria currently, has increased thirst though.                Medications and/or Treatments Impacting Glycemic Control:  Immunotherapy:    Immunosuppressants     None        Steroids:   Hormones (From admission, onward)            Start     Stop Route Frequency Ordered    11/19/21 1859  melatonin tablet 6 mg         -- Oral Nightly PRN 11/19/21 2689        Pressors:    Autonomic Drugs (From admission, onward)            None          Medications Prior to Admission   Medication Sig Dispense Refill Last Dose    albuterol (VENTOLIN HFA) 90 mcg/actuation inhaler INHALE ONE TO TWO PUFFS INTO LUNGS EVERY 6 HOURS AS NEEDED FOR WHEEZING  54 g 3     amLODIPine (NORVASC) 5 MG tablet TAKE 1 TABLET BY MOUTH EVERY DAY 90 tablet 3     aspirin 81 mg Tab Take by mouth. 1 Tablet Oral Every day       blood sugar diagnostic (FREESTYLE INSULINX TEST STRIPS) Strp Check glucose three times daily 100 each 3     butalbital-acetaminophen-caffeine -40 mg (FIORICET, ESGIC) -40 mg per tablet Take 1 tablet by mouth as needed.       calcium carbonate (TUMS) 300 mg (750 mg) Chew Take by mouth 2 (two) times daily.       diclofenac sodium (VOLTAREN) 1 % Gel APPLY TWO GRAMS TOPICALLY ONCE DAILY 100 g 0     ezetimibe (ZETIA) 10 mg tablet TAKE 1 TABLET BY MOUTH EVERY DAY IN THE EVENING 90 tablet 3     FARXIGA 10 mg tablet TAKE 1 TABLET BY MOUTH EVERY DAY 90 tablet 1     fish oil-dha-epa 1,200-144-216 mg Cap 1 Capsule Oral Every day 90 capsule 3     fluticasone-salmeterol diskus inhaler 250-50 mcg Inhale 1 puff into the lungs 2 (two) times daily. Controller 180 each 3     glipiZIDE (GLUCOTROL) 10 MG TR24 TAKE 1 TABLET (10 MG TOTAL) BY MOUTH DAILY WITH BREAKFAST. 90 tablet 2     GUAIATUSSIN AC  mg/5 mL syrup Take 5 mLs by mouth every 4 (four) hours as needed.       HYDROcodone-acetaminophen (NORCO)  mg per tablet Take 1 tablet by mouth every 8 (eight) hours as needed for Pain. 90 tablet 0     hyoscyamine (LEVSIN/SL) 0.125 mg Subl DISSOLVE 1 TABLET UNDER THE TONGUE EVERY 4 TO 6 HOURS 30 tablet 5     ibuprofen (ADVIL,MOTRIN) 800 MG tablet Take 800 mg by mouth every 8 (eight) hours as needed.  0     ipratropium (ATROVENT) 21 mcg (0.03 %) nasal spray SMARTSI Both Nares Every Night       JARDIANCE 10 mg tablet Take 10 mg by mouth once daily.       lactulose (CHRONULAC) 10 gram/15 mL solution TAKE 15 ML DAILY AS NEEDED FOR CONSTIPATION 473 mL 4     levothyroxine (SYNTHROID) 100 MCG tablet TAKE 1 TABLET DAILY 90 tablet 3     LORazepam (ATIVAN) 0.5 MG tablet Take 1 tablet (0.5 mg total) by mouth 2 (two) times daily. 60 tablet 0      meclizine (ANTIVERT) 25 mg tablet Take 1 tablet (25 mg total) by mouth 3 (three) times daily as needed. 30 tablet 0     metFORMIN (GLUCOPHAGE) 1000 MG tablet TAKE 1 TABLET BY MOUTH TWICE A DAY WITH MEALS 180 tablet 3     metoprolol tartrate (LOPRESSOR) 100 MG tablet TAKE 1 TABLET BY MOUTH TWICE A  tablet 1     mv-min-FA-Hb-Pn-cwqsiud-lutein 0.4-162-18 mg Tab Take by mouth. 1 Tablet Oral Every day       NARCAN 4 mg/actuation Spry 1 spray once.       potassium chloride SA (K-DUR,KLOR-CON) 20 MEQ tablet TAKE 1 TABLET BY MOUTH TWICE A  tablet 3     sucralfate (CARAFATE) 1 gram tablet Take 1 tablet (1 g total) by mouth 3 (three) times daily. 270 tablet 3     temazepam (RESTORIL) 30 mg capsule Take 1 capsule (30 mg total) by mouth nightly as needed. 1 Capsule Oral Every day 30 capsule 0     tiZANidine (ZANAFLEX) 4 MG tablet TAKE 1 TABLET (4 MG TOTAL) BY MOUTH EVERY 8 (EIGHT) HOURS. 90 tablet 1     TRULICITY 1.5 mg/0.5 mL pen injector INJECT 1 PEN EVERY WEEK 2 pen 4     vibegron 75 mg Tab Take 75 mg by mouth once daily. 30 tablet 11        Current Facility-Administered Medications   Medication Dose Route Frequency Provider Last Rate Last Admin    0.45% NaCl infusion   Intravenous Continuous Chiki Arteaga MD 75 mL/hr at 11/21/21 1051 New Bag at 11/21/21 1051    acetaminophen tablet 650 mg  650 mg Oral Q6H PRN Kae Ruff NP   650 mg at 11/19/21 2039    amLODIPine tablet 10 mg  10 mg Oral Daily Suma Schroeder PA-C   10 mg at 11/21/21 0929    aspirin EC tablet 81 mg  81 mg Oral Daily Nahomybhanu Pond NP   81 mg at 11/21/21 0930    cefTRIAXone (ROCEPHIN) 2 g/50 mL D5W IVPB  2 g Intravenous Q12H Nahomy Pond NP 50 mL/hr at 11/21/21 0339 2 g at 11/21/21 0339    dextrose 50% injection 12.5 g  12.5 g Intravenous PRN Francisco Waggoner MD        glucagon (human recombinant) injection 1 mg  1 mg Intramuscular PRN Francisco Waggoner MD        heparin (porcine) injection 5,000  Units  5,000 Units Subcutaneous Q8H Nahomy Pond NP   5,000 Units at 11/21/21 0534    insulin aspart U-100 pen 1-10 Units  1-10 Units Subcutaneous QID (AC + HS) PRN Deon Khanna MD   6 Units at 11/21/21 0930    insulin aspart U-100 pen 10 Units  10 Units Subcutaneous TIDWM Lauri Merchant MD        insulin detemir U-100 pen 20 Units  20 Units Subcutaneous QHS Laurifarzana Merchant MD        labetalol 20 mg/4 mL (5 mg/mL) IV syring  10 mg Intravenous Q3H PRN Nahomy Pond NP   10 mg at 11/18/21 1113    levothyroxine tablet 100 mcg  100 mcg Oral Before breakfast Francisco Waggoner MD   100 mcg at 11/21/21 0534    magnesium oxide tablet 800 mg  800 mg Oral PRN Nahomybhanu Pond, NP        magnesium oxide tablet 800 mg  800 mg Oral PRN Nahomy Pond NP        melatonin tablet 6 mg  6 mg Oral Nightly PRN Nahomy Pond NP   6 mg at 11/21/21 0144    metronidazole IVPB 500 mg  500 mg Intravenous Q8H Nahomy Pond  mL/hr at 11/21/21 1052 500 mg at 11/21/21 1052    miconazole nitrate 2% ointment   Topical (Top) BID Deon Khanna MD   Given at 11/21/21 0935    potassium bicarbonate disintegrating tablet 35 mEq  35 mEq Oral PRN Deon Khanna MD        potassium bicarbonate disintegrating tablet 50 mEq  50 mEq Oral PRN Deon Khanna MD   50 mEq at 11/19/21 0621    potassium bicarbonate disintegrating tablet 60 mEq  60 mEq Oral PRN Deon Khanna MD   60 mEq at 11/20/21 0605    potassium, sodium phosphates 280-160-250 mg packet 2 packet  2 packet Oral PRN Nahomy REBEKAH Pond NP        potassium, sodium phosphates 280-160-250 mg packet 2 packet  2 packet Oral PRN Nahomy REBEKAH Pond, ANGELA        potassium, sodium phosphates 280-160-250 mg packet 2 packet  2 packet Oral PRN Nahomybhanu Pond NP        senna-docusate 8.6-50 mg per tablet 1 tablet  1 tablet Oral Daily Kae Ruff NP   1 tablet at 11/20/21 0857    sodium chloride 0.9% flush 10 mL  10 mL Intravenous PRN Francisco Waggoner,  MD        tiZANidine tablet 2 mg  2 mg Oral Q8H PRN Nahomy Pond NP   2 mg at 21 0144    vibegron tablet 75 mg  75 mg Oral Daily Suma Schroeder PA-C   75 mg at 21 0932       Interval HPI:   Overnight events:  Eatin%  Nausea: No  Hypoglycemia and intervention: No  Fever: No      ROS:  As per HPI, rest unable to obtain due to altered mental status of the patient  Labs Reviewed and Include:  BASELINE Creatinine:   [unfilled]  [unfilled]  [facility]  Recent Labs   Lab 21  0300   *   CALCIUM 8.7   ALBUMIN 2.4*   PROT 7.1   *   K 3.4*   CO2 25      BUN 9   CREATININE 1.1   ALKPHOS 70   ALT <5*   AST 10   BILITOT 0.2     Lab Results   Component Value Date    HGBA1C 9.0 (H) 10/27/2021       Nutritional status:   Body mass index is 46.86 kg/m².  Lab Results   Component Value Date    ALBUMIN 2.4 (L) 2021    ALBUMIN 2.4 (L) 2021    ALBUMIN 2.3 (L) 2021     No results found for: PREALBUMIN    Estimated Creatinine Clearance: 72.4 mL/min (based on SCr of 1.1 mg/dL).        Current Insulin Regimen:  Insulin Aspart 8 units TID  Insulin Determir 15 units nightly       PHYSICAL EXAMINATION:  Vitals:    21 0820   BP: 135/85   Pulse: 92   Resp: 18   Temp: 98.3 °F (36.8 °C)     Body mass index is 46.86 kg/m².    Physical Exam  Constitutional:       Appearance: She is obese. She is ill-appearing.   HENT:      Right Ear: External ear normal.      Left Ear: External ear normal.      Mouth/Throat:      Mouth: Mucous membranes are moist.   Cardiovascular:      Rate and Rhythm: Normal rate.      Pulses: Normal pulses.   Pulmonary:      Effort: Pulmonary effort is normal.   Abdominal:      Palpations: Abdomen is soft.   Musculoskeletal:         General: Normal range of motion.      Cervical back: Normal range of motion.   Skin:     General: Skin is warm.   Neurological:      Mental Status: She is disoriented.   Psychiatric:      Comments: Lack of judgement, inappropriate  behavior and thought processes       .     ASSESSMENT and PLAN:        Hypernatremia  -last sodium 146  -Unclear if patient has Diabetes Insipidus considering the urine output being only 2 liters in last 24 hours.  -Will agree with IV fluids replacement for now  -Both serum and urine osmolality readings reviewed   -Will avoid desmopressin for now, no clear indication for use currently      Uncontrolled type 2 diabetes mellitus with hyperglycemia  -Patient poorly controlled on current regime  -Will change her Levemir to 20 units nightly and will increase insulin aspart to 10 units three times a day along with low dose correction scale  -Fingersticks before meals, at bedtime and 2 am advised  -Hypoglycemia precautions ordered      Hypothyroidism    -Last TSH 1.392  -Would continue levothyroxine 100 mcg  -No signs of acute decompensation secondary to hypothyroidism noted on evaluation of patient     Brain lesion  -management per primary team        Lauri Merchant MD  Endocrinology  [facility]

## 2021-11-21 NOTE — NURSING
Collected 1 sterile urine cup from catheter and collected 1 gold, 1 green and 1 purple top from PICC for morning labs sent to lab via tube system. Safety maintained.

## 2021-11-21 NOTE — SUBJECTIVE & OBJECTIVE
Patient History           Medical as of 11/21/2021     Past Medical History     Diagnosis Date Comments Source    Breast cancer 12/10/2020 -- Provider    Diabetes mellitus, type 2 -- -- Provider    GERD (gastroesophageal reflux disease) -- -- Provider    High cholesterol -- -- Provider    Hypertension -- -- Provider    Hypothyroid -- -- Provider    Injury of knee, leg, ankle and foot -- -- Provider    Insulin-resistant diabetes mellitus and acanthosis nigricans -- -- Provider    Menopause present -- -- Provider    Osteoarthritis -- -- Provider    Osteoarthritis, knee -- -- Provider    Osteopenia -- -- Provider    Osteopenia -- -- Provider    Sleep apnea -- -- Provider    Status post breast lump removal 12/01/2020 -- Provider                  Surgical as of 11/21/2021     Past Surgical History     Procedure Laterality Date Comments Source    HYSTERECTOMY -- -- -- Provider    OTHER SURGICAL HISTORY -- -- bilateral central duct removal with bilateral papilomona Provider    bilateral duct removal breast [Other] -- -- -- Provider    OOPHORECTOMY -- -- -- Provider    BREAST CYST ASPIRATION Right 2020 -- Provider    EXCISIONAL BIOPSY Right 12/10/2020 Procedure: EXCISIONAL BIOPSY, breast; u/s guided;  Surgeon: Bernadette Lopez MD;  Location: [facility];  Service: General;  Laterality: Right; Provider                  Family as of 11/21/2021     Problem Relation Name Age of Onset Comments Source    Hypertension Mother -- -- -- Provider    Glaucoma Mother -- -- -- Provider    Diabetes Mother -- -- -- Provider    Hypertension Brother -- -- -- Provider    Cancer Maternal Grandmother -- -- breast Provider    Amblyopia Father -- -- -- Provider    Diabetes Maternal Aunt -- -- -- Provider    Diabetes Maternal Uncle -- -- -- Provider    Breast cancer Paternal Aunt -- -- -- Provider    Breast cancer Paternal Grandmother -- -- -- Provider    Blindness Neg Hx -- -- -- Provider    Cataracts Neg Hx -- -- -- Provider    Macular  degeneration Neg Hx -- -- -- Provider    Retinal detachment Neg Hx -- -- -- Provider    Strabismus Neg Hx -- -- -- Provider    Colon cancer Neg Hx -- -- -- Provider    Stomach cancer Neg Hx -- -- -- Provider    Esophageal cancer Neg Hx -- -- -- Provider    Irritable bowel syndrome Neg Hx -- -- -- Provider    Rectal cancer Neg Hx -- -- -- Provider    Ulcerative colitis Neg Hx -- -- -- Provider    Crohn's disease Neg Hx -- -- -- Provider    Celiac disease Neg Hx -- -- -- Provider            Tobacco Use as of 11/21/2021     Smoking Status Smoking Start Date Smoking Quit Date Packs/Day Years Used    Never Smoker -- -- -- --    Types Comments Smokeless Tobacco Status Smokeless Tobacco Quit Date Source    -- -- Never Used -- Provider            Alcohol Use as of 11/21/2021     Alcohol Use Drinks/Week Alcohol/Week Comments Source    Yes 0 Standard drinks or equivalent 0.0 standard drinks very rarely Provider            Drug Use as of 11/21/2021     Drug Use Types Frequency Comments Source    No -- -- -- Provider            Sexual Activity as of 11/21/2021     Sexually Active Birth Control Partners Comments Source    Not Asked -- -- -- Provider            Activities of Daily Living as of 11/21/2021    None           Social Documentation as of 11/21/2021    RN, [address] [place] [address]  Source: Provider           Occupational as of 11/21/2021     Occupation Employer Comments Source    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 -- --     Service -- --    Primary Leisure Activity -- --    Spirituality -- --        Past Medical History:   Diagnosis Date    Breast cancer 12/10/2020    Diabetes mellitus, type 2     GERD (gastroesophageal reflux disease)     High cholesterol     Hypertension     Hypothyroid     Injury of knee, leg, ankle and foot     Insulin-resistant diabetes mellitus and acanthosis nigricans     Menopause present     Osteoarthritis     Osteoarthritis, knee     Osteopenia     Osteopenia     Sleep apnea     Status post breast lump removal 12/01/2020     Past Surgical History:   Procedure Laterality Date    bilateral duct removal breast      BREAST CYST ASPIRATION Right 2020    EXCISIONAL BIOPSY Right 12/10/2020    Procedure: EXCISIONAL BIOPSY, breast; u/s guided;  Surgeon: Bernadette Lopez MD;  [facility];  Service: General;  Laterality: Right;    HYSTERECTOMY      OOPHORECTOMY      OTHER SURGICAL HISTORY      bilateral central duct removal with bilateral papilomona     Family History     Problem Relation (Age of Onset)    Amblyopia Father    Breast cancer Paternal Aunt, Paternal Grandmother    Cancer Maternal Grandmother    Diabetes Mother, Maternal Aunt, Maternal Uncle    Glaucoma Mother    Hypertension Mother, Brother        Tobacco Use    Smoking status: Never Smoker    Smokeless tobacco: Never Used   Substance and Sexual Activity    Alcohol use: Yes     Alcohol/week: 0.0 standard drinks     Comment: very rarely    Drug use: No    Sexual activity: Not on file     Review of patient's allergies indicates:   Allergen Reactions    Iodinated contrast media Swelling     Other reaction(s): Swelling    Naproxen sodium Swelling    Naprosyn  [naproxen]      Other reaction(s): Swelling       No current facility-administered medications on file prior to encounter.     Current Outpatient  Medications on File Prior to Encounter   Medication Sig    albuterol (VENTOLIN HFA) 90 mcg/actuation inhaler INHALE ONE TO TWO PUFFS INTO LUNGS EVERY 6 HOURS AS NEEDED FOR WHEEZING    amLODIPine (NORVASC) 5 MG tablet TAKE 1 TABLET BY MOUTH EVERY DAY    aspirin 81 mg Tab Take by mouth. 1 Tablet Oral Every day    blood sugar diagnostic (FREESTYLE INSULINX TEST STRIPS) Strp Check glucose three times daily    butalbital-acetaminophen-caffeine -40 mg (FIORICET, ESGIC) -40 mg per tablet Take 1 tablet by mouth as needed.    calcium carbonate (TUMS) 300 mg (750 mg) Chew Take by mouth 2 (two) times daily.    diclofenac sodium (VOLTAREN) 1 % Gel APPLY TWO GRAMS TOPICALLY ONCE DAILY    ezetimibe (ZETIA) 10 mg tablet TAKE 1 TABLET BY MOUTH EVERY DAY IN THE EVENING    FARXIGA 10 mg tablet TAKE 1 TABLET BY MOUTH EVERY DAY    fish oil-dha-epa 1,200-144-216 mg Cap 1 Capsule Oral Every day    fluticasone-salmeterol diskus inhaler 250-50 mcg Inhale 1 puff into the lungs 2 (two) times daily. Controller    glipiZIDE (GLUCOTROL) 10 MG TR24 TAKE 1 TABLET (10 MG TOTAL) BY MOUTH DAILY WITH BREAKFAST.    GUAIATUSSIN AC  mg/5 mL syrup Take 5 mLs by mouth every 4 (four) hours as needed.    HYDROcodone-acetaminophen (NORCO)  mg per tablet Take 1 tablet by mouth every 8 (eight) hours as needed for Pain.    hyoscyamine (LEVSIN/SL) 0.125 mg Subl DISSOLVE 1 TABLET UNDER THE TONGUE EVERY 4 TO 6 HOURS    ibuprofen (ADVIL,MOTRIN) 800 MG tablet Take 800 mg by mouth every 8 (eight) hours as needed.    ipratropium (ATROVENT) 21 mcg (0.03 %) nasal spray SMARTSI Both Nares Every Night    JARDIANCE 10 mg tablet Take 10 mg by mouth once daily.    lactulose (CHRONULAC) 10 gram/15 mL solution TAKE 15 ML DAILY AS NEEDED FOR CONSTIPATION    levothyroxine (SYNTHROID) 100 MCG tablet TAKE 1 TABLET DAILY    LORazepam (ATIVAN) 0.5 MG tablet Take 1 tablet (0.5 mg total) by mouth 2 (two) times daily.    meclizine  "(ANTIVERT) 25 mg tablet Take 1 tablet (25 mg total) by mouth 3 (three) times daily as needed.    metFORMIN (GLUCOPHAGE) 1000 MG tablet TAKE 1 TABLET BY MOUTH TWICE A DAY WITH MEALS    metoprolol tartrate (LOPRESSOR) 100 MG tablet TAKE 1 TABLET BY MOUTH TWICE A DAY    mv-min-FA-Mx-Zl-dgmrewz-lutein 0.4-162-18 mg Tab Take by mouth. 1 Tablet Oral Every day    NARCAN 4 mg/actuation Spry 1 spray once.    potassium chloride SA (K-DUR,KLOR-CON) 20 MEQ tablet TAKE 1 TABLET BY MOUTH TWICE A DAY    sucralfate (CARAFATE) 1 gram tablet Take 1 tablet (1 g total) by mouth 3 (three) times daily.    temazepam (RESTORIL) 30 mg capsule Take 1 capsule (30 mg total) by mouth nightly as needed. 1 Capsule Oral Every day    tiZANidine (ZANAFLEX) 4 MG tablet TAKE 1 TABLET (4 MG TOTAL) BY MOUTH EVERY 8 (EIGHT) HOURS.    TRULICITY 1.5 mg/0.5 mL pen injector INJECT 1 PEN EVERY WEEK    vibegron 75 mg Tab Take 75 mg by mouth once daily.    [DISCONTINUED] DULoxetine (CYMBALTA) 30 MG capsule Take 1 capsule (30 mg total) by mouth once daily.    [DISCONTINUED] FARXIGA 10 mg tablet TAKE 1 TABLET BY MOUTH EVERY DAY    [DISCONTINUED] sucralfate (CARAFATE) 1 gram tablet TAKE 1 TABLET BY MOUTH THREE TIMES A DAY     Psychotherapeutics (From admission, onward)            None        Review of Systems  Strengths and Liabilities: Strength: Patient has positive support network., Liability: Patient has poor health.    Objective:     Vital Signs (Most Recent):  Temp: 98.4 °F (36.9 °C) (11/21/21 1223)  Pulse: 98 (11/21/21 1223)  Resp: 16 (11/21/21 1223)  BP: 107/62 (11/21/21 1223)  SpO2: (!) 94 % (11/21/21 1223) Vital Signs (24h Range):  Temp:  [97.6 °F (36.4 °C)-98.9 °F (37.2 °C)] 98.4 °F (36.9 °C)  Pulse:  [90-99] 98  Resp:  [16-18] 16  SpO2:  [94 %-97 %] 94 %  BP: (107-140)/(62-87) 107/62     Height: 5' 4" (162.6 cm)  Weight: 123.8 kg (273 lb)  Body mass index is 46.86 kg/m².      Intake/Output Summary (Last 24 hours) at 11/21/2021 1620  Last " "data filed at 11/21/2021 0600  Gross per 24 hour   Intake 960 ml   Output 800 ml   Net 160 ml         Mental Status Exam:  Appearance: age appropriate, lying in bed, obese  Behavior/Cooperation: cooperative, restless and fidgety   Speech: normal tone, normal rate, normal pitch, loud  Language: english   Mood: "fine"  Affect: irritable  Thought Process: loose associations, tangential  Thought Content: normal, no suicidality, no homicidality, delusions.  Pt expressed paranoia that people tried to kill her last night   Orientation: person, place, year, disoriented to month, date, situation  Memory: Impaired to some degree  Attention Span/Concentration: Impaired  Fund of Knowledge: Estimated to be average level   Insight: limited  Judgment: limited    CAM-ICU Positive     Significant Labs: All pertinent labs within the past 24 hours have been reviewed.    Significant Imaging: I have reviewed all pertinent imaging results/findings within the past 24 hours.  "

## 2021-11-21 NOTE — ASSESSMENT & PLAN NOTE
-Patient poorly controlled on current regime  -Will change her Levemir to 20 units nightly and will increase insulin aspart to 10 units three times a day along with low dose correction scale  -Fingersticks before meals, at bedtime and 2 am advised  -Hypoglycemia precautions ordered

## 2021-11-21 NOTE — ASSESSMENT & PLAN NOTE
-Last TSH 1.392  -Would continue levothyroxine 100 mcg  -No signs of acute decompensation secondary to hypothyroidism noted on evaluation of patient

## 2021-11-21 NOTE — HPI
Consultation-Liaison Psychiatry Consult Note    11/21/2021 4:12 PM  Violeta Champion  MRN: [id number]    Chief Complaint / Reason for Consult: agitation     SUBJECTIVE     History of Present Illness:   Violeta Champion is a 60 y.o. female with a past psychiatric history of depression currently presenting with Brain lesion.  Psychiatry was originally consulted to address the patient's symptoms of agitation.    Per Primary MD:  HPI/Interval history (See H&P for complete P,F,SHx) :   Pmhx DM2, hypothyroidism, YUSEF, hx breast ca, fibromyalgia, htn, hld, depression, class 3 obesity admitted as transfer from [place] for concern of elevated ICP 2/2 vasogenic edema of numerous brain abscesses vs masses. Patient recently admitted at [place] 10/27 for acute encephalopathy, found to have DKA vs HHS and bacteremia. Clinically improved with abx and appropriate blood sugar management until 3 days prior to transfer when she developed worsening confusion and lethargy. MRI 11/13 revealing multiple lesions throughout the bilateral cerebral hemispheres with associated vasogenic edema and leftward midline shift.      HPI from [place] below:  Miss Champion is a 60 year old female with a PMH of DMII on oral anti-hyperglycemics, hypothyroid on synthroid, YUSEF, Right breast papilloma, fibromyalgia, HTN, HLD and depression presents to [facility] via ambulance after being found down and unresponsive at home. Patient is obtunded with a GCS of 8 and is not able to participate in any history giving. This note is per report from neighbor, EMS, ED providers and nurse. Per report Miss Champion' neighbor hadnt seen her for 3 days so they went over to her house to check on her and found her down and unresponsive. EMS was called and brought her into the ED. Upon drawing labwork patient Wbc was 25.8, , anion gap 8, Cr 2.9, blood glucose 805, UDS pos for barbiturates, pH 6.928. Patient is protecting airway although her respirations are labored, Sp02 mid to  "low 90's and she has YUSEF. A dose of narcan had no response, patient was further treated with bicarb, insulin, IV fluids, IV vanc and zosyn and admitted to the ICU for a higher level of care. Patients sister Darcy was called by staff and myself and asked to be kept updated, phone number in EMR.    Per C-L Psych MD:  Pt awake in bed.  She is noticeably confused and has difficulty finding with word finding.  Pt endorses history of depression.  She has taken medication in the past, but is not currently taking anything recently because "I've been doing well".  Oriented to self and being in the hospital.  She does not recall transfer from [facility] and states that she is in the hospital because of a virus.  Pt preservative on asking for Sprite and sugar free drinks, stating that she can have them. 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       Psychiatric Review Of Systems - Is patient experiencing or having changes in:  Unable to assess due to AMS    Psychiatric History:  Diagnose(s): depression   Previous Medication Trials: Yes   Previous Psychiatric Hospitalizations: No  Previous Suicide Attempts: No  History of Violence: No  Outpatient Psychiatrist: No.  Pt does have an outpatient therapist at [facility] per chart review    Social History:  Marital Status: single  Children: 0   Employment Status: currently employed as nurse at [employer]   Education: college graduate  Special Ed: no   History: no  Housing Status: Lives alone  Developmental History: No  History of Abuse: unable to assess   Access to Gun:  unable to assess     Substance Abuse History:  Per family at bedside and chart check, no history of drug or alcohol use.  Unable to assess with pt at this time due to AMS    Legal History:  Past Charges/Incarcerations: No  Pending Charges: No    Family Psychiatric History:   No    Psychosocial Factors:  Unable to assess    Collateral:   Darcy Mancuso, Sister, at " bedside  Over the past two days, she is less agitated than previously.  She continues to be confused.  Confusion and agitation are worse during the night.  Pt works as a nurse at night, so she is normally awake at night.  Pt at times will try to climb out of the bed, or start pulling at her diaper.  At one point she was smearing feces on things.  Sister agreeable to trying medication as needed for agitation.  She has one other sibling and they are alternating who stays with the pt.  Family is trying to remain at bedside for redirection as much as they are able.      Medical Review Of Systems:  Unable to assess

## 2021-11-22 LAB
ALBUMIN SERPL BCP-MCNC: 2.4 G/DL (ref 3.5–5.2)
ALBUMIN SERPL BCP-MCNC: 2.5 G/DL (ref 3.5–5.2)
ALP SERPL-CCNC: 68 U/L (ref 55–135)
ALT SERPL W/O P-5'-P-CCNC: <5 U/L (ref 10–44)
ANION GAP SERPL CALC-SCNC: 11 MMOL/L (ref 8–16)
ANION GAP SERPL CALC-SCNC: 13 MMOL/L (ref 8–16)
AST SERPL-CCNC: 11 U/L (ref 10–40)
BASOPHILS # BLD AUTO: 0.03 K/UL (ref 0–0.2)
BASOPHILS NFR BLD: 0.5 % (ref 0–1.9)
BILIRUB SERPL-MCNC: 0.2 MG/DL (ref 0.1–1)
BUN SERPL-MCNC: 8 MG/DL (ref 6–20)
BUN SERPL-MCNC: 8 MG/DL (ref 6–20)
CALCIUM SERPL-MCNC: 8.4 MG/DL (ref 8.7–10.5)
CALCIUM SERPL-MCNC: 8.5 MG/DL (ref 8.7–10.5)
CHLORIDE SERPL-SCNC: 105 MMOL/L (ref 95–110)
CHLORIDE SERPL-SCNC: 106 MMOL/L (ref 95–110)
CO2 SERPL-SCNC: 25 MMOL/L (ref 23–29)
CO2 SERPL-SCNC: 27 MMOL/L (ref 23–29)
CREAT SERPL-MCNC: 0.9 MG/DL (ref 0.5–1.4)
CREAT SERPL-MCNC: 0.9 MG/DL (ref 0.5–1.4)
DIFFERENTIAL METHOD: ABNORMAL
EOSINOPHIL # BLD AUTO: 0.4 K/UL (ref 0–0.5)
EOSINOPHIL NFR BLD: 5.8 % (ref 0–8)
ERYTHROCYTE [DISTWIDTH] IN BLOOD BY AUTOMATED COUNT: 20.9 % (ref 11.5–14.5)
EST. GFR  (AFRICAN AMERICAN): >60 ML/MIN/1.73 M^2
EST. GFR  (AFRICAN AMERICAN): >60 ML/MIN/1.73 M^2
EST. GFR  (NON AFRICAN AMERICAN): >60 ML/MIN/1.73 M^2
EST. GFR  (NON AFRICAN AMERICAN): >60 ML/MIN/1.73 M^2
GLUCOSE SERPL-MCNC: 130 MG/DL (ref 70–110)
GLUCOSE SERPL-MCNC: 211 MG/DL (ref 70–110)
HCT VFR BLD AUTO: 31.8 % (ref 37–48.5)
HGB BLD-MCNC: 9.8 G/DL (ref 12–16)
IMM GRANULOCYTES # BLD AUTO: 0.02 K/UL (ref 0–0.04)
IMM GRANULOCYTES NFR BLD AUTO: 0.3 % (ref 0–0.5)
LYMPHOCYTES # BLD AUTO: 2.5 K/UL (ref 1–4.8)
LYMPHOCYTES NFR BLD: 41 % (ref 18–48)
MAGNESIUM SERPL-MCNC: 1.6 MG/DL (ref 1.6–2.6)
MCH RBC QN AUTO: 27.5 PG (ref 27–31)
MCHC RBC AUTO-ENTMCNC: 30.8 G/DL (ref 32–36)
MCV RBC AUTO: 89 FL (ref 82–98)
MONOCYTES # BLD AUTO: 0.6 K/UL (ref 0.3–1)
MONOCYTES NFR BLD: 9.9 % (ref 4–15)
NEUTROPHILS # BLD AUTO: 2.6 K/UL (ref 1.8–7.7)
NEUTROPHILS NFR BLD: 42.5 % (ref 38–73)
NRBC BLD-RTO: 0 /100 WBC
PHOSPHATE SERPL-MCNC: 3.6 MG/DL (ref 2.7–4.5)
PHOSPHATE SERPL-MCNC: 3.7 MG/DL (ref 2.7–4.5)
PLATELET # BLD AUTO: 199 K/UL (ref 150–450)
PMV BLD AUTO: 11.8 FL (ref 9.2–12.9)
POCT GLUCOSE: 154 MG/DL (ref 70–110)
POCT GLUCOSE: 188 MG/DL (ref 70–110)
POCT GLUCOSE: 206 MG/DL (ref 70–110)
POCT GLUCOSE: 228 MG/DL (ref 70–110)
POTASSIUM SERPL-SCNC: 3.1 MMOL/L (ref 3.5–5.1)
POTASSIUM SERPL-SCNC: 3.8 MMOL/L (ref 3.5–5.1)
PROT SERPL-MCNC: 7 G/DL (ref 6–8.4)
RBC # BLD AUTO: 3.57 M/UL (ref 4–5.4)
SODIUM SERPL-SCNC: 143 MMOL/L (ref 136–145)
SODIUM SERPL-SCNC: 144 MMOL/L (ref 136–145)
WBC # BLD AUTO: 6.08 K/UL (ref 3.9–12.7)

## 2021-11-22 PROCEDURE — 25000003 PHARM REV CODE 250: Performed by: NURSE PRACTITIONER

## 2021-11-22 PROCEDURE — 84100 ASSAY OF PHOSPHORUS: CPT | Performed by: STUDENT IN AN ORGANIZED HEALTH CARE EDUCATION/TRAINING PROGRAM

## 2021-11-22 PROCEDURE — 99232 SBSQ HOSP IP/OBS MODERATE 35: CPT | Mod: ,,, | Performed by: HOSPITALIST

## 2021-11-22 PROCEDURE — 97110 THERAPEUTIC EXERCISES: CPT

## 2021-11-22 PROCEDURE — 25000003 PHARM REV CODE 250: Performed by: STUDENT IN AN ORGANIZED HEALTH CARE EDUCATION/TRAINING PROGRAM

## 2021-11-22 PROCEDURE — 97530 THERAPEUTIC ACTIVITIES: CPT

## 2021-11-22 PROCEDURE — 63600175 PHARM REV CODE 636 W HCPCS: Performed by: HOSPITALIST

## 2021-11-22 PROCEDURE — 94760 N-INVAS EAR/PLS OXIMETRY 1: CPT

## 2021-11-22 PROCEDURE — 99222 1ST HOSP IP/OBS MODERATE 55: CPT | Mod: ,,, | Performed by: NURSE PRACTITIONER

## 2021-11-22 PROCEDURE — 85025 COMPLETE CBC W/AUTO DIFF WBC: CPT | Performed by: STUDENT IN AN ORGANIZED HEALTH CARE EDUCATION/TRAINING PROGRAM

## 2021-11-22 PROCEDURE — 36415 COLL VENOUS BLD VENIPUNCTURE: CPT | Performed by: STUDENT IN AN ORGANIZED HEALTH CARE EDUCATION/TRAINING PROGRAM

## 2021-11-22 PROCEDURE — 99232 PR SUBSEQUENT HOSPITAL CARE,LEVL II: ICD-10-PCS | Mod: ,,, | Performed by: INTERNAL MEDICINE

## 2021-11-22 PROCEDURE — S0030 INJECTION, METRONIDAZOLE: HCPCS | Performed by: NURSE PRACTITIONER

## 2021-11-22 PROCEDURE — 25000003 PHARM REV CODE 250: Performed by: HOSPITALIST

## 2021-11-22 PROCEDURE — 99232 SBSQ HOSP IP/OBS MODERATE 35: CPT | Mod: ,,, | Performed by: INTERNAL MEDICINE

## 2021-11-22 PROCEDURE — 80053 COMPREHEN METABOLIC PANEL: CPT | Performed by: STUDENT IN AN ORGANIZED HEALTH CARE EDUCATION/TRAINING PROGRAM

## 2021-11-22 PROCEDURE — 63600175 PHARM REV CODE 636 W HCPCS: Performed by: NURSE PRACTITIONER

## 2021-11-22 PROCEDURE — 83735 ASSAY OF MAGNESIUM: CPT | Performed by: STUDENT IN AN ORGANIZED HEALTH CARE EDUCATION/TRAINING PROGRAM

## 2021-11-22 PROCEDURE — 99232 PR SUBSEQUENT HOSPITAL CARE,LEVL II: ICD-10-PCS | Mod: ,,, | Performed by: HOSPITALIST

## 2021-11-22 PROCEDURE — 25000003 PHARM REV CODE 250: Performed by: PHYSICIAN ASSISTANT

## 2021-11-22 PROCEDURE — 80069 RENAL FUNCTION PANEL: CPT | Performed by: HOSPITALIST

## 2021-11-22 PROCEDURE — 97535 SELF CARE MNGMENT TRAINING: CPT

## 2021-11-22 PROCEDURE — 99222 PR INITIAL HOSPITAL CARE,LEVL II: ICD-10-PCS | Mod: ,,, | Performed by: NURSE PRACTITIONER

## 2021-11-22 PROCEDURE — 11000001 HC ACUTE MED/SURG PRIVATE ROOM

## 2021-11-22 RX ORDER — POTASSIUM CHLORIDE 20 MEQ/1
40 TABLET, EXTENDED RELEASE ORAL ONCE
Status: COMPLETED | OUTPATIENT
Start: 2021-11-22 | End: 2021-11-22

## 2021-11-22 RX ORDER — INSULIN ASPART 100 [IU]/ML
12 INJECTION, SOLUTION INTRAVENOUS; SUBCUTANEOUS
Status: DISCONTINUED | OUTPATIENT
Start: 2021-11-22 | End: 2021-11-26

## 2021-11-22 RX ORDER — GUAIFENESIN 100 MG/5ML
200 SOLUTION ORAL EVERY 4 HOURS PRN
Status: DISCONTINUED | OUTPATIENT
Start: 2021-11-22 | End: 2021-12-01 | Stop reason: HOSPADM

## 2021-11-22 RX ORDER — MAGNESIUM SULFATE HEPTAHYDRATE 40 MG/ML
2 INJECTION, SOLUTION INTRAVENOUS ONCE
Status: COMPLETED | OUTPATIENT
Start: 2021-11-22 | End: 2021-11-22

## 2021-11-22 RX ORDER — MUPIROCIN 20 MG/G
OINTMENT TOPICAL 2 TIMES DAILY
Status: DISCONTINUED | OUTPATIENT
Start: 2021-11-22 | End: 2021-11-23

## 2021-11-22 RX ADMIN — ASPIRIN 81 MG: 81 TABLET, COATED ORAL at 08:11

## 2021-11-22 RX ADMIN — MUPIROCIN: 20 OINTMENT TOPICAL at 08:11

## 2021-11-22 RX ADMIN — AMLODIPINE BESYLATE 10 MG: 10 TABLET ORAL at 08:11

## 2021-11-22 RX ADMIN — INSULIN ASPART 10 UNITS: 100 INJECTION, SOLUTION INTRAVENOUS; SUBCUTANEOUS at 08:11

## 2021-11-22 RX ADMIN — LEVOTHYROXINE SODIUM 100 MCG: 100 TABLET ORAL at 05:11

## 2021-11-22 RX ADMIN — METRONIDAZOLE 500 MG: 500 INJECTION, SOLUTION INTRAVENOUS at 11:11

## 2021-11-22 RX ADMIN — MAGNESIUM SULFATE 2 G: 2 INJECTION INTRAVENOUS at 08:11

## 2021-11-22 RX ADMIN — METRONIDAZOLE 500 MG: 500 INJECTION, SOLUTION INTRAVENOUS at 03:11

## 2021-11-22 RX ADMIN — SODIUM CHLORIDE: 0.45 INJECTION, SOLUTION INTRAVENOUS at 03:11

## 2021-11-22 RX ADMIN — INSULIN ASPART 4 UNITS: 100 INJECTION, SOLUTION INTRAVENOUS; SUBCUTANEOUS at 08:11

## 2021-11-22 RX ADMIN — CEFTRIAXONE SODIUM 2 G: 2 INJECTION, SOLUTION INTRAVENOUS at 03:11

## 2021-11-22 RX ADMIN — INSULIN ASPART 2 UNITS: 100 INJECTION, SOLUTION INTRAVENOUS; SUBCUTANEOUS at 01:11

## 2021-11-22 RX ADMIN — Medication 6 MG: at 08:11

## 2021-11-22 RX ADMIN — INSULIN ASPART 4 UNITS: 100 INJECTION, SOLUTION INTRAVENOUS; SUBCUTANEOUS at 05:11

## 2021-11-22 RX ADMIN — MICONAZOLE NITRATE: 20 OINTMENT TOPICAL at 09:11

## 2021-11-22 RX ADMIN — INSULIN ASPART 12 UNITS: 100 INJECTION, SOLUTION INTRAVENOUS; SUBCUTANEOUS at 05:11

## 2021-11-22 RX ADMIN — POTASSIUM CHLORIDE 40 MEQ: 1500 TABLET, EXTENDED RELEASE ORAL at 10:11

## 2021-11-22 RX ADMIN — HEPARIN SODIUM 5000 UNITS: 5000 INJECTION INTRAVENOUS; SUBCUTANEOUS at 01:11

## 2021-11-22 RX ADMIN — POTASSIUM CHLORIDE 40 MEQ: 1500 TABLET, EXTENDED RELEASE ORAL at 08:11

## 2021-11-22 RX ADMIN — METRONIDAZOLE 500 MG: 500 INJECTION, SOLUTION INTRAVENOUS at 08:11

## 2021-11-22 RX ADMIN — TIZANIDINE 2 MG: 2 TABLET ORAL at 08:11

## 2021-11-22 RX ADMIN — INSULIN ASPART 1 UNITS: 100 INJECTION, SOLUTION INTRAVENOUS; SUBCUTANEOUS at 08:11

## 2021-11-22 RX ADMIN — MICONAZOLE NITRATE: 20 OINTMENT TOPICAL at 08:11

## 2021-11-22 RX ADMIN — HEPARIN SODIUM 5000 UNITS: 5000 INJECTION INTRAVENOUS; SUBCUTANEOUS at 08:11

## 2021-11-22 RX ADMIN — HEPARIN SODIUM 5000 UNITS: 5000 INJECTION INTRAVENOUS; SUBCUTANEOUS at 05:11

## 2021-11-22 RX ADMIN — INSULIN ASPART 10 UNITS: 100 INJECTION, SOLUTION INTRAVENOUS; SUBCUTANEOUS at 12:11

## 2021-11-22 RX ADMIN — ACETAMINOPHEN 650 MG: 325 TABLET ORAL at 05:11

## 2021-11-22 RX ADMIN — MUPIROCIN: 20 OINTMENT TOPICAL at 01:11

## 2021-11-22 NOTE — SUBJECTIVE & OBJECTIVE
Past Medical History:   Diagnosis Date    Breast cancer 12/10/2020    Diabetes mellitus, type 2     GERD (gastroesophageal reflux disease)     High cholesterol     Hypertension     Hypothyroid     Injury of knee, leg, ankle and foot     Insulin-resistant diabetes mellitus and acanthosis nigricans     Menopause present     Osteoarthritis     Osteoarthritis, knee     Osteopenia     Osteopenia     Sleep apnea     Status post breast lump removal 12/01/2020     Past Surgical History:   Procedure Laterality Date    bilateral duct removal breast      BREAST CYST ASPIRATION Right 2020    EXCISIONAL BIOPSY Right 12/10/2020    Procedure: EXCISIONAL BIOPSY, breast; u/s guided;  Surgeon: Bernadette Loepz MD;  Location: [facility];  Service: General;  Laterality: Right;    HYSTERECTOMY      OOPHORECTOMY      OTHER SURGICAL HISTORY      bilateral central duct removal with bilateral papilomona     Review of patient's allergies indicates:   Allergen Reactions    Iodinated contrast media Swelling     Other reaction(s): Swelling    Naproxen sodium Swelling    Naprosyn  [naproxen]      Other reaction(s): Swelling       Scheduled Medications:    amLODIPine  10 mg Oral Daily    aspirin  81 mg Oral Daily    cefTRIAXone (ROCEPHIN) IVPB  2 g Intravenous Q12H    heparin (porcine)  5,000 Units Subcutaneous Q8H    insulin aspart U-100  10 Units Subcutaneous TIDWM    insulin detemir U-100  20 Units Subcutaneous QHS    levothyroxine  100 mcg Oral Before breakfast    magnesium sulfate IVPB  2 g Intravenous Once    metronidazole  500 mg Intravenous Q8H    miconazole nitrate 2%   Topical (Top) BID    senna-docusate 8.6-50 mg  1 tablet Oral Daily    vibegron  75 mg Oral Daily       PRN Medications: acetaminophen, dextrose 50%, glucagon (human recombinant), insulin aspart U-100, labetalol, magnesium oxide, magnesium oxide, melatonin, potassium bicarbonate, potassium bicarbonate, potassium bicarbonate, potassium, sodium  phosphates, potassium, sodium phosphates, potassium, sodium phosphates, sodium chloride 0.9%, tiZANidine, valproate sodium (DEPACON) IVPB    Family History     Problem Relation (Age of Onset)    Amblyopia Father    Breast cancer Paternal Aunt, Paternal Grandmother    Cancer Maternal Grandmother    Diabetes Mother, Maternal Aunt, Maternal Uncle    Glaucoma Mother    Hypertension Mother, Brother        Tobacco Use    Smoking status: Never Smoker    Smokeless tobacco: Never Used   Substance and Sexual Activity    Alcohol use: Yes     Alcohol/week: 0.0 standard drinks     Comment: very rarely    Drug use: No    Sexual activity: Not on file     Review of Systems   Constitutional: Positive for activity change. Negative for fatigue and fever.   HENT: Negative for sore throat and trouble swallowing.    Eyes: Negative for visual disturbance.   Respiratory: Negative for cough and shortness of breath.    Cardiovascular: Negative for chest pain and leg swelling.   Gastrointestinal: Negative for abdominal distention and abdominal pain.   Genitourinary: Negative for difficulty urinating.   Musculoskeletal: Positive for gait problem. Negative for back pain.   Skin: Negative for color change.   Neurological: Positive for weakness. Negative for dizziness, light-headedness and headaches.   Psychiatric/Behavioral: Positive for confusion. Negative for agitation.     Objective:     Vital Signs (Most Recent):  Temp: 98.1 °F (36.7 °C) (11/22/21 0742)  Pulse: 90 (11/22/21 0900)  Resp: 16 (11/22/21 0742)  BP: (!) 113/52 (11/22/21 0742)  SpO2: (!) 94 % (11/22/21 0742)    Vital Signs (24h Range):  Temp:  [97 °F (36.1 °C)-98.8 °F (37.1 °C)] 98.1 °F (36.7 °C)  Pulse:  [] 90  Resp:  [16-20] 16  SpO2:  [94 %-97 %] 94 %  BP: (107-132)/(52-93) 113/52     Body mass index is 46.86 kg/m².    Physical Exam  Vitals and nursing note reviewed.   Constitutional:       Appearance: She is well-developed and well-nourished. She is obese.   HENT:       Head: Normocephalic and atraumatic.      Mouth/Throat:      Mouth: Mucous membranes are moist.   Eyes:      Extraocular Movements: EOM normal.      Pupils: Pupils are equal, round, and reactive to light.   Pulmonary:      Effort: Pulmonary effort is normal. No respiratory distress.   Genitourinary:     Comments: Bridger   Musculoskeletal:      Cervical back: Normal range of motion and neck supple.      Comments: deconditioned and generalized weakness   Skin:     General: Skin is warm and dry.   Neurological:      Mental Status: She is alert.      Comments: Following commands  Mild confusion present    Psychiatric:         Mood and Affect: Mood and affect normal.      Comments: Calm        NEUROLOGICAL EXAMINATION:     CRANIAL NERVES     CN III, IV, VI   Pupils are equal, round, and reactive to light.  Extraocular motions are normal.       Diagnostic Results: Labs: Reviewed  ECG: Reviewed  CT: Reviewed  MRI: Reviewed

## 2021-11-22 NOTE — NURSING
Collected 1 gold top, 1 lavender and 1 green top tube from right PICC for morning labs sent to lab via tube system.

## 2021-11-22 NOTE — NURSING
EKG tech informed me that she was unable to complete EKG due to the pts cognitive state. Pt is more confused at night requiring frequent redirection. Pt would not keep still for EKG to be captured even with staff attempting to  pt. Safety maintained.

## 2021-11-22 NOTE — ASSESSMENT & PLAN NOTE
-last sodium 143, improved after fluid administration  -Unclear if patient has Diabetes Insipidus. Will avoid desmopressin for now, no clear indication for use currently   -Both serum and urine osmolality readings reviewed   - Net - 2840 in last 24 hours, slight polyuria seen in the last 12 hours likely secondary to IV fluid administration

## 2021-11-22 NOTE — ASSESSMENT & PLAN NOTE
ASSESSMENT     Violeta Champion is a 60 y.o. female with a past psychiatric history of depression currently presenting with Brain lesion.  Psychiatry was originally consulted to address the patient's symptoms of agitation.    IMPRESSION  Hyperactive delirium     RECOMMENDATION(S)      1. Scheduled Medication(s):  No scheduled medication.  Recommend EKG first.  Last EKG in the system is [facility] 10/27 where she had a QTc of 517.  If QTc not prolonged, we will consider adding a scheduled antipsychotic at night such as seroquel.  EKG was ordered last night.  Unable to see it in the EMR or in her chart. Awaiting EKG prior to any scheduled medications.      2. PRN Medication(s):  Depacon 250mg IV [id number] PRN for non-redirectable agitation.    3.  Monitor:  Please obtain daily EKG to monitor QTc.  If pt receives depacon, monitor ammonia and transaminases.    4. Legal Status/Precaution(s):  Patient does not meet criteria for PEC or inpatient psychiatric admission at this time. Recommend to rescind PEC if one was placed. Patient is not currently an imminent danger to self or others and is not gravely disabled due to a psychiatric illness.    5. Capacity:  Pt continues to have waxing and waning of symptoms and continues to refuse treatment at times. Therefore pt currently does NOT have medical decision making capacity due to Delirium. Please contact next of kin for making medical decisions currently.    6. Other:  CAM ICU - Positive  DELIRIUM BEHAVIOR MANAGEMENT   PLEASE utilize CHEMICAL restraints with PRN meds first for agitation. Minimize use of PHYSICAL restraints   Keep window shades open and room lit during day and room dim at night in order to promote normal sleep-wake cycles   Encourage family at bedside. [place] patient often to situation, location, date   Continue to Limit or Discontinue use of Narcotics, Benzos and Anti-cholinergic medications as they may worsen delirium   Continue medical workup for causative  etiology of Delirium

## 2021-11-22 NOTE — PLAN OF CARE
[facility]  Discharge Reassessment    Primary Care Provider: Madie Petersen DO    Expected Discharge Date: 11/26/2021     Pt is not yet medically ready for d/c today as pt needs DORCAS per Dr. Arteaga.    Reassessment (most recent)     Discharge Reassessment - 11/22/21 1542        Discharge Reassessment    Assessment Type Discharge Planning Reassessment     Communicated SARAVANAN with patient/caregiver Date not available/Unable to determine     Discharge Plan A Rehab     Discharge Plan B Rehab     Why the patient remains in the hospital Requires continued medical care        Post-Acute Status    Post-Acute Authorization Placement     Post-Acute Placement Status Referrals Sent               SW will continue to coordinate with patient, family, team and insurance to complete patient's discharge plan.    Gracia Nix, AGUSTIN  [id number]

## 2021-11-22 NOTE — CONSULTS
[facility]  Psychiatry  Consult Note    Patient Name: Violeta Champion  MRN: [id number]   Code Status: Full Code  Admission Date: 11/12/2021  Hospital Length of Stay: 10 days  Attending Physician: Chiki Arteaga MD  Primary Care Provider: Madie Petersen DO    Current Legal Status: Uncontested    Patient information was obtained from patient, relative(s) and ER records.   Consults    HPI:   Consultation-Liaison Psychiatry Consult Note    11/21/2021 4:12 PM  Violeta Champion  MRN: [id number]    Chief Complaint / Reason for Consult: agitation     SUBJECTIVE     History of Present Illness:   Violeta Champion is a 60 y.o. female with a past psychiatric history of depression currently presenting with Brain lesion.  Psychiatry was originally consulted to address the patient's symptoms of agitation.    Per Primary MD:  HPI/Interval history (See H&P for complete P,F,SHx) :   Pmhx DM2, hypothyroidism, YUSEF, hx breast ca, fibromyalgia, htn, hld, depression, class 3 obesity admitted as transfer from [facility] for concern of elevated ICP 2/2 vasogenic edema of numerous brain abscesses vs masses. Patient recently admitted at [facility] 10/27 for acute encephalopathy, found to have DKA vs HHS and bacteremia. Clinically improved with abx and appropriate blood sugar management until 3 days prior to transfer when she developed worsening confusion and lethargy. MRI 11/13 revealing multiple lesions throughout the bilateral cerebral hemispheres with associated vasogenic edema and leftward midline shift.      HPI from [facility] below:  Miss Champion is a 60 year old female with a PMH of DMII on oral anti-hyperglycemics, hypothyroid on synthroid, YUSEF, Right breast papilloma, fibromyalgia, HTN, HLD and depression presents to [facility] via ambulance after being found down and unresponsive at home. Patient is obtunded with a GCS of 8 and is not able to participate in any history giving. This note is per report from neighbor, EMS,  "ED providers and nurse. Per report Miss Champion' neighbor hadnt seen her for 3 days so they went over to her house to check on her and found her down and unresponsive. EMS was called and brought her into the ED. Upon drawing labwork patient Wbc was 25.8, , anion gap 8, Cr 2.9, blood glucose 805, UDS pos for barbiturates, pH 6.928. Patient is protecting airway although her respirations are labored, Sp02 mid to low 90's and she has YUSEF. A dose of narcan had no response, patient was further treated with bicarb, insulin, IV fluids, IV vanc and zosyn and admitted to the ICU for a higher level of care. Patients sister Darcy was called by staff and myself and asked to be kept updated, phone number in EMR.    Per C-L Psych MD:  Pt awake in bed.  She is noticeably confused and has difficulty finding with word finding.  Pt endorses history of depression.  She has taken medication in the past, but is not currently taking anything recently because "I've been doing well".  Oriented to self and being in the hospital.  She does not recall transfer from [facility] and states that she is in the hospital because of a virus.  Pt preservative on asking for Sprite and sugar free drinks, stating that she can have them. 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       Psychiatric Review Of Systems - Is patient experiencing or having changes in:  Unable to assess due to AMS    Psychiatric History:  Diagnose(s): depression   Previous Medication Trials: Yes   Previous Psychiatric Hospitalizations: No  Previous Suicide Attempts: No  History of Violence: No  Outpatient Psychiatrist: No.  Pt does have an outpatient therapist at [facility] per chart review    Social History:  Marital Status: single  Children: 0   Employment Status: currently employed as nurse at [employer]   Education: college graduate  Special Ed: no   History: no  Housing Status: Lives alone  Developmental History: " No  History of Abuse: unable to assess   Access to Gun:  unable to assess     Substance Abuse History:  Per family at bedside and chart check, no history of drug or alcohol use.  Unable to assess with pt at this time due to AMS    Legal History:  Past Charges/Incarcerations: No  Pending Charges: No    Family Psychiatric History:   No    Psychosocial Factors:  Unable to assess    Collateral:   Darcy Mancuso, Sister, at bedside  Over the past two days, she is less agitated than previously.  She continues to be confused.  Confusion and agitation are worse during the night.  Pt works as a nurse at night, so she is normally awake at night.  Pt at times will try to climb out of the bed, or start pulling at her diaper.  At one point she was smearing feces on things.  Sister agreeable to trying medication as needed for agitation.  She has one other sibling and they are alternating who stays with the pt.  Family is trying to remain at bedside for redirection as much as they are able.      Medical Review Of Systems:  Unable to assess       Hospital Course: 11/22: Pt's sister is at bedside and provides much of the history.  Last night she did experience some agitation and poor sleep at first despite Depacon.  Around 4am she fell asleep and had restful sleep.  Overall she thought they had a better night last night.  This morning when she woke up, she was far less confused.  Patient is laughing and smiling appropriately this morning.  She is participating with physical therapy and appears more alert.           Family History     Problem Relation (Age of Onset)    Amblyopia Father    Breast cancer Paternal Aunt, Paternal Grandmother    Cancer Maternal Grandmother    Diabetes Mother, Maternal Aunt, Maternal Uncle    Glaucoma Mother    Hypertension Mother, Brother        Tobacco Use    Smoking status: Never Smoker    Smokeless tobacco: Never Used   Substance and Sexual Activity    Alcohol use: Yes     Alcohol/week: 0.0 standard  "drinks     Comment: very rarely    Drug use: No    Sexual activity: Not on file     Psychotherapeutics (From admission, onward)            None           Review of Systems  Objective:     Vital Signs (Most Recent):  Temp: 98.1 °F (36.7 °C) (11/22/21 0742)  Pulse: 94 (11/22/21 1100)  Resp: 16 (11/22/21 0742)  BP: (!) 113/52 (11/22/21 0742)  SpO2: (!) 94 % (11/22/21 0742) Vital Signs (24h Range):  Temp:  [97 °F (36.1 °C)-98.8 °F (37.1 °C)] 98.1 °F (36.7 °C)  Pulse:  [] 94  Resp:  [16-20] 16  SpO2:  [94 %-97 %] 94 %  BP: (107-132)/(52-93) 113/52     Height: 5' 4" (162.6 cm)  Weight: 123.8 kg (273 lb)  Body mass index is 46.86 kg/m².      Intake/Output Summary (Last 24 hours) at 11/22/2021 1307  Last data filed at 11/22/2021 0800  Gross per 24 hour   Intake 960 ml   Output 3180 ml   Net -2220 ml       Mental Status Exam:  Appearance: age appropriate, obese, sitting up on the side of bed with assistance  Behavior/Cooperation: normal, cooperative, eye contact normal  Speech:   Language: fluent english  Mood: Good   Affect: full reactive   Thought Process: unable to fully assess (pt participating in therapy), superficially goal directed  Thought Content: normal, no suicidality, no homicidality, delusions, or paranoia   Orientation: person, place  Memory: unable to assess  Attention Span/Concentration: unable to assess  Fund of Knowledge: Estimated to be average level   Insight: limited  Judgment: fair    Significant Labs: All pertinent labs within the past 24 hours have been reviewed.    Significant Imaging: I have reviewed all pertinent imaging results/findings within the past 24 hours.    Assessment/Plan:     Encephalopathy, metabolic  ASSESSMENT     Violeta Champion is a 60 y.o. female with a past psychiatric history of depression currently presenting with Brain lesion.  Psychiatry was originally consulted to address the patient's symptoms of agitation.    IMPRESSION  Hyperactive delirium     RECOMMENDATION(S)  "     1. Scheduled Medication(s):  No scheduled medication.  Recommend EKG first.  Last EKG in the system is [facility] 10/27 where she had a QTc of 517.  If QTc not prolonged, we will consider adding a scheduled antipsychotic at night such as seroquel.  EKG was ordered last night.  Unable to see it in the EMR or in her chart. Awaiting EKG prior to any scheduled medications.      2. PRN Medication(s):  Depacon 250mg IV [id number] PRN for non-redirectable agitation.    3.  Monitor:  Please obtain daily EKG to monitor QTc.  If pt receives depacon, monitor ammonia and transaminases.    4. Legal Status/Precaution(s):  Patient does not meet criteria for PEC or inpatient psychiatric admission at this time. Recommend to rescind PEC if one was placed. Patient is not currently an imminent danger to self or others and is not gravely disabled due to a psychiatric illness.    5. Capacity:  Pt continues to have waxing and waning of symptoms and continues to refuse treatment at times. Therefore pt currently does NOT have medical decision making capacity due to Delirium. Please contact next of kin for making medical decisions currently.    6. Other:  CAM ICU - Positive  DELIRIUM BEHAVIOR MANAGEMENT   PLEASE utilize CHEMICAL restraints with PRN meds first for agitation. Minimize use of PHYSICAL restraints   Keep window shades open and room lit during day and room dim at night in order to promote normal sleep-wake cycles   Encourage family at bedside. [place] patient often to situation, location, date   Continue to Limit or Discontinue use of Narcotics, Benzos and Anti-cholinergic medications as they may worsen delirium   Continue medical workup for causative etiology of Delirium       Total Time:  60 minutes      Lala Raya MD, PhD  [facility]  -2  11/22/2021

## 2021-11-22 NOTE — NURSING
Pt has has several small scant amount of episodes of loose stool. Pt has to be redirected when this occurs because she has grabbed a hand full and smeared it on herself. Pt is confused more at night requiring cueing and frequent redirection. Pts sister remains @ the bedside. Safety maintained. Will report off to oncoming nursing staff and continue to monitor for the duration of this nightshift. DONTAE

## 2021-11-22 NOTE — HOSPITAL COURSE
"11/22: Pt's sister is at bedside and provides much of the history.  Last night she did experience some agitation and poor sleep at first despite Depacon.  Around 4am she fell asleep and had restful sleep.  Overall she thought they had a better night last night.  This morning when she woke up, she was far less confused.  Patient is laughing and smiling appropriately this morning.  She is participating with physical therapy and appears more alert.     11/23: Pt's sister is at bedside.  Last night she had a rough night.  Pt at times starts pulling at sheets, lays side ways/twisted in the bed, and puts hands in feces.  Sister had to go to another room briefly last night because she was getting agitated with her as well.  Nursing had a difficult time taking care of her last night because she was not following instructions.  Sister went back to the room with nursing and was able to help re-direct her.  Around 6pm she requested Depacon.   Sister says that it was delayed coming from the pharmacy and pt did not receive it until 1am.  No updated EKG on file.    Pt reports that she had an okay night.  She mentions getting a DORCAS.  Patient is quite confused today.  She becomes irritable when asking orientation questions.  She identifies that she is at [facility].  When asked what city, she says "[facility]" and repeats that the city is [facility].  She fluctuated between the month being October or November.  When asked if there is a holiday coming up, after much thought and counting months out loud she said multiple times that the holiday coming up is "October".        11/24: Patient is awake in bed and enthusiastically greets MD.  She says that she slept well last night and feels good.  She is able to tell me that she is having a study today to look at her heart (DORCAS).  Per sister at bedside, the patient had less restless behavior and agitation after receiving Depacon yesterday around dinner time.  She was somewhat " "agitated and restless throughout the night, so sister requested it in the evening.  Depacon was not given until 0422. After she received it, she fell asleep around 5am and slept for several hours.  Last night the sister things she talked more logically than previous nights, although she still remains quite confused.        11/25: Patient's sister at bedside reported patient had a slightly better night last night but was still awake speaking to herself for some of the night and demanding water through the night. Patient is alert to name only today. Affect labile. Perseverates on the word "October." Sister was inquisitive about recommendations for lessening delirium. Discussed sunlight/staying awake during day and darkness/no TV/no sound at night.     11/26: Patient continues to have agitation during the night.  Last night she had a very bad night and was yelling during the night.  This morning she has been sleeping.  Sister says that she usually falls asleep in the early morning hours.  Pt says that she never used to sleep much and reports sleeping 4-5 hours nightly for years.  She usually sleeps in the morning since she has a reversed sleep/wake cycle due to working nights.      11/27: Agitated last night. Confused this morning. Received PRN zyprexa 5 mg and PRN depacon 250 for agitation last night.    11/28: Patient was well-behaved overnight. She did not require any PRNs and per daughter, this was the best night she has had in a while. Patient remains disoriented but is pleasant. She states she slept well through the night. No restraints in place and she is cooperative with exam, though she is unable to fully comply due to delirium.    11/29: Pt laying in bed naked with the sheet loosely wrapped over her head like a larios.  Today she misuses words consistently, stating words that sound alike to what she likely means.  She knows that she is on a medication (Zyprexa), but refers to it by a different name starting " "with a Z.  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  She said the medicine is "for kids that don't behave".  She currently says that she is in a "pediatric obstetric clinic".  She endorses having a headache.  Pt identifies the year as "1981", recent holiday as "1981", and month as "1981".    11/30: No PRN medications overnight.  Pt has been transferred to a room closer to the nurses unit.  However, pt denies any recent room change.  She endorses having a headache.  Pt is unsure how she slept last night.  She reports that she will go to rehab after hospitalization is complete.  Pt mentions "glistening" and "going on to a shower", but it's unclear what she is referring to.    "

## 2021-11-22 NOTE — NURSING
POC reviewed with pt and pt verbalized understanding. Questions/Concerns addressed. A&Ox3. Disoriented to time. Delayed responses noted. NADN. Respirations equal and unlabored. Pt took night meds swallows without any difficulty. Pt noted to cough at times nonproductive. Pt is bedbound has specialty bed. Pt has a enamorado catheter intact secured to left thigh draining clear yellow urine into  bag to gravity no complications noted.   Bed in low position, side rails up x3. Call bell in reach. Will continue to monitor closely throughout this [id number] shift Safety maintained. Bed alarm activated for safety. Safety/Fall precautions in place per facility protocol for safety. Instructed pt to press call bell for assistance if needed pt verbalized understanding. Family @ bedside for the remainder of the night. VS stable and neuro assessment completed see assessment. Telemetry monitor intact for monitoring. 0.45%NS infusing per MD order rate 75ml/hr to PICC right arm no complications.

## 2021-11-22 NOTE — HPI
Joslyn Champion is a 60-year-old female with PMHx of DM, YUSEF, breast cancer, Depression. Patient transferred to [facility] on 11/12 from [facility] for elevated ICP 2/2 vasogenic edema of numerous brain abscesses vs masses and bacteremia. Per NCC brain lesions appear 2/2 septic emboli given recent bacteremia. ID consulted and recommending Rocephin and Flagyl x 5 weeks end date 12/26. Hospital course complicated by DKA (improving), hyperactive delirium (psychiatry consulted and recommending Depacon as needed, no scheduled medication).     Functional History: Patient lives in an 2nd floor apt with no elevator access. Prior to admission, I. DME: none.

## 2021-11-22 NOTE — PT/OT/SLP PROGRESS
"Occupational Therapy   Co-Treatment    Name: Violeta Champion  MRN: [id number]  Admitting Diagnosis:  Brain lesion       Recommendations:     Discharge Recommendations: rehabilitation facility  Discharge Equipment Recommendations: TBD next level of care  Barriers to discharge: increased assistance needed    Assessment:     Violeta Champion is a 60 y.o. female with a medical diagnosis of Brain lesion.  She presents with performance deficits including weakness,impaired endurance,impaired self care skills,impaired functional mobilty,impaired balance,decreased safety awareness,decreased lower extremity function,decreased upper extremity function,decreased coordination,impaired cognition,decreased ROM. Pt with improvement in active participation and sitting balance this date, continues to require cues for attention/awareness of L side. Pt progressing toward goals and would continue to benefit from OT to increase functional independence and safety. Recommend rehab upon D/C to return to [facility].    Rehab Prognosis: Good; patient would benefit from acute skilled OT services to address these deficits and reach maximum level of function.       Plan:     Patient to be seen 4 x/week to address the above listed problems via self-care/home management,therapeutic activities,therapeutic exercises,neuromuscular re-education  · Plan of Care Expires: 12/13/21  · Plan of Care Reviewed with: patient (sister)    Subjective     Pain/Comfort:  · Pain Rating 1: 0/10  · Pain Rating Post-Intervention 1: 0/10    Objective:     Communicated with: RN prior to session. Patient found with HOB elevated with telemetry,enamorado catheter,peripheral IV upon OT entry to room, sister present.  "I know you have Jolly Ranchers here. I'll bargain for a Jorodrigoy Rancher."  Co-treatment performed with PT due to patient's multiple deficits requiring two skilled therapists to appropriately and safely assess patient's strength and endurance while facilitating functional tasks " in addition to accommodating for patient's activity tolerance.     General Precautions: Standard, fall,aspiration   Orthopedic Precautions:N/A   Braces: N/A  Respiratory Status:  · O2 Device (Oxygen Therapy): room air     Occupational Performance:     Bed Mobility:    · Patient completed Rolling/Turning to Left with maximal assistance  · Patient completed Rolling/Turning to Right with total assistance  · Patient completed Scooting/Bridging with total assistance and 2 persons  · Patient completed Supine to Sit with total assistance and 2 persons  · Patient completed Sit to Supine with total assistance and 2 persons      Functional Mobility/Transfers:  · Not attempted     Activities of Daily Living:  · Lower Body Dressing: total assistance to don socks  · Toileting: total assistance in supine/sidelying for tony hygiene and to change pads/linens  · Feeding: minimum assistance with HOB elevated to take medication       AMPAC 6 Click ADL: 9    Treatment & Education:  Pt sat EOB ~12 min with increased periods of SBA, required Max A at times; completed LE therex with PT while OT provided cues and assist for postural control; completed functional reaching with R UE, unable to follow commands or cooperate to complete with L UE and pt unable to demo ability to complete bi-manual task-- L inattention and decreased awareness and use of L UE on command; pt returned to supine and positioned comfortably; reviewed supine therex to complete as tolerated and discussed POC and to call for assistance    Patient left HOB elevated with all lines intact, call button in reach and sister presentEducation:      GOALS:   Multidisciplinary Problems     Occupational Therapy Goals        Problem: Occupational Therapy Goal    Goal Priority Disciplines Outcome Interventions   Occupational Therapy Goal     OT, PT/OT Ongoing, Progressing    Description: Goals to be met by: 2 weeks (11/27/21)     Patient will increase functional independence with  ADLs by performing:    UE Dressing with Minimal Assistance.  Grooming while seated with Minimal Assistance.  Sitting at edge of bed x10 minutes with Minimal Assistance.  Supine to sit with Minimal Assistance.  Stand pivot transfers with Moderate Assistance.  Pt will follow 3/3 one-step commands to participate in ADL task.                     Time Tracking:     OT Date of Treatment: 11/22/21  OT Start Time: 0915  OT Stop Time: 0945  OT Total Time (min): 30 min    Billable Minutes:Self Care/Home Management 12  Therapeutic Activity 18    OT/MARY ALICE: OT          11/22/2021

## 2021-11-22 NOTE — NURSING
Melatonin ineffective pt remains awake. Tizanidine effective. NADN. Safety maintained. Family remains @ the bedside.

## 2021-11-22 NOTE — SUBJECTIVE & OBJECTIVE
"    Family History     Problem Relation (Age of Onset)    Amblyopia Father    Breast cancer Paternal Aunt, Paternal Grandmother    Cancer Maternal Grandmother    Diabetes Mother, Maternal Aunt, Maternal Uncle    Glaucoma Mother    Hypertension Mother, Brother        Tobacco Use    Smoking status: Never Smoker    Smokeless tobacco: Never Used   Substance and Sexual Activity    Alcohol use: Yes     Alcohol/week: 0.0 standard drinks     Comment: very rarely    Drug use: No    Sexual activity: Not on file     Psychotherapeutics (From admission, onward)            None           Review of Systems  Objective:     Vital Signs (Most Recent):  Temp: 98.1 °F (36.7 °C) (11/22/21 0742)  Pulse: 94 (11/22/21 1100)  Resp: 16 (11/22/21 0742)  BP: (!) 113/52 (11/22/21 0742)  SpO2: (!) 94 % (11/22/21 0742) Vital Signs (24h Range):  Temp:  [97 °F (36.1 °C)-98.8 °F (37.1 °C)] 98.1 °F (36.7 °C)  Pulse:  [] 94  Resp:  [16-20] 16  SpO2:  [94 %-97 %] 94 %  BP: (107-132)/(52-93) 113/52     Height: 5' 4" (162.6 cm)  Weight: 123.8 kg (273 lb)  Body mass index is 46.86 kg/m².      Intake/Output Summary (Last 24 hours) at 11/22/2021 1307  Last data filed at 11/22/2021 0800  Gross per 24 hour   Intake 960 ml   Output 3180 ml   Net -2220 ml       Mental Status Exam:  Appearance: age appropriate, obese, sitting up on the side of bed with assistance  Behavior/Cooperation: normal, cooperative, eye contact normal  Speech:   Language: fluent english  Mood: Good   Affect: full reactive   Thought Process: unable to fully assess (pt participating in therapy), superficially goal directed  Thought Content: normal, no suicidality, no homicidality, delusions, or paranoia   Orientation: person, place  Memory: unable to assess  Attention Span/Concentration: unable to assess  Fund of Knowledge: Estimated to be average level   Insight: limited  Judgment: fair    Significant Labs: All pertinent labs within the past 24 hours have been " reviewed.    Significant Imaging: I have reviewed all pertinent imaging results/findings within the past 24 hours.

## 2021-11-22 NOTE — SUBJECTIVE & OBJECTIVE
Past Medical History:   Diagnosis Date    Breast cancer 12/10/2020    Diabetes mellitus, type 2     GERD (gastroesophageal reflux disease)     High cholesterol     Hypertension     Hypothyroid     Injury of knee, leg, ankle and foot     Insulin-resistant diabetes mellitus and acanthosis nigricans     Menopause present     Osteoarthritis     Osteoarthritis, knee     Osteopenia     Osteopenia     Sleep apnea     Status post breast lump removal 12/01/2020       Past Surgical History:   Procedure Laterality Date    bilateral duct removal breast      BREAST CYST ASPIRATION Right 2020    EXCISIONAL BIOPSY Right 12/10/2020    Procedure: EXCISIONAL BIOPSY, breast; u/s guided;  Surgeon: Bernadette Lopez MD;  Location: [facility];  Service: General;  Laterality: Right;    HYSTERECTOMY      OOPHORECTOMY      OTHER SURGICAL HISTORY      bilateral central duct removal with bilateral papilomona       Review of patient's allergies indicates:   Allergen Reactions    Iodinated contrast media Swelling     Other reaction(s): Swelling    Naproxen sodium Swelling    Naprosyn  [naproxen]      Other reaction(s): Swelling       No current facility-administered medications on file prior to encounter.     Current Outpatient Medications on File Prior to Encounter   Medication Sig    albuterol (VENTOLIN HFA) 90 mcg/actuation inhaler INHALE ONE TO TWO PUFFS INTO LUNGS EVERY 6 HOURS AS NEEDED FOR WHEEZING    amLODIPine (NORVASC) 5 MG tablet TAKE 1 TABLET BY MOUTH EVERY DAY    aspirin 81 mg Tab Take by mouth. 1 Tablet Oral Every day    blood sugar diagnostic (FREESTYLE INSULINX TEST STRIPS) Strp Check glucose three times daily    butalbital-acetaminophen-caffeine -40 mg (FIORICET, ESGIC) -40 mg per tablet Take 1 tablet by mouth as needed.    calcium carbonate (TUMS) 300 mg (750 mg) Chew Take by mouth 2 (two) times daily.    diclofenac sodium (VOLTAREN) 1 % Gel APPLY TWO GRAMS TOPICALLY ONCE DAILY     ezetimibe (ZETIA) 10 mg tablet TAKE 1 TABLET BY MOUTH EVERY DAY IN THE EVENING    FARXIGA 10 mg tablet TAKE 1 TABLET BY MOUTH EVERY DAY    fish oil-dha-epa 1,200-144-216 mg Cap 1 Capsule Oral Every day    fluticasone-salmeterol diskus inhaler 250-50 mcg Inhale 1 puff into the lungs 2 (two) times daily. Controller    glipiZIDE (GLUCOTROL) 10 MG TR24 TAKE 1 TABLET (10 MG TOTAL) BY MOUTH DAILY WITH BREAKFAST.    GUAIATUSSIN AC  mg/5 mL syrup Take 5 mLs by mouth every 4 (four) hours as needed.    HYDROcodone-acetaminophen (NORCO)  mg per tablet Take 1 tablet by mouth every 8 (eight) hours as needed for Pain.    hyoscyamine (LEVSIN/SL) 0.125 mg Subl DISSOLVE 1 TABLET UNDER THE TONGUE EVERY 4 TO 6 HOURS    ibuprofen (ADVIL,MOTRIN) 800 MG tablet Take 800 mg by mouth every 8 (eight) hours as needed.    ipratropium (ATROVENT) 21 mcg (0.03 %) nasal spray SMARTSI Both Nares Every Night    JARDIANCE 10 mg tablet Take 10 mg by mouth once daily.    lactulose (CHRONULAC) 10 gram/15 mL solution TAKE 15 ML DAILY AS NEEDED FOR CONSTIPATION    levothyroxine (SYNTHROID) 100 MCG tablet TAKE 1 TABLET DAILY    LORazepam (ATIVAN) 0.5 MG tablet Take 1 tablet (0.5 mg total) by mouth 2 (two) times daily.    meclizine (ANTIVERT) 25 mg tablet Take 1 tablet (25 mg total) by mouth 3 (three) times daily as needed.    metFORMIN (GLUCOPHAGE) 1000 MG tablet TAKE 1 TABLET BY MOUTH TWICE A DAY WITH MEALS    metoprolol tartrate (LOPRESSOR) 100 MG tablet TAKE 1 TABLET BY MOUTH TWICE A DAY    mv-min-FA-Da-Ab-ahfkzkc-lutein 0.4-162-18 mg Tab Take by mouth. 1 Tablet Oral Every day    NARCAN 4 mg/actuation Spry 1 spray once.    potassium chloride SA (K-DUR,KLOR-CON) 20 MEQ tablet TAKE 1 TABLET BY MOUTH TWICE A DAY    sucralfate (CARAFATE) 1 gram tablet Take 1 tablet (1 g total) by mouth 3 (three) times daily.    temazepam (RESTORIL) 30 mg capsule Take 1 capsule (30 mg total) by mouth nightly as needed. 1 Capsule Oral Every  day    tiZANidine (ZANAFLEX) 4 MG tablet TAKE 1 TABLET (4 MG TOTAL) BY MOUTH EVERY 8 (EIGHT) HOURS.    TRULICITY 1.5 mg/0.5 mL pen injector INJECT 1 PEN EVERY WEEK    vibegron 75 mg Tab Take 75 mg by mouth once daily.    [DISCONTINUED] DULoxetine (CYMBALTA) 30 MG capsule Take 1 capsule (30 mg total) by mouth once daily.    [DISCONTINUED] FARXIGA 10 mg tablet TAKE 1 TABLET BY MOUTH EVERY DAY    [DISCONTINUED] sucralfate (CARAFATE) 1 gram tablet TAKE 1 TABLET BY MOUTH THREE TIMES A DAY     Family History     Problem Relation (Age of Onset)    Amblyopia Father    Breast cancer Paternal Aunt, Paternal Grandmother    Cancer Maternal Grandmother    Diabetes Mother, Maternal Aunt, Maternal Uncle    Glaucoma Mother    Hypertension Mother, Brother        Tobacco Use    Smoking status: Never Smoker    Smokeless tobacco: Never Used   Substance and Sexual Activity    Alcohol use: Yes     Alcohol/week: 0.0 standard drinks     Comment: very rarely    Drug use: No    Sexual activity: Not on file     Review of Systems   Unable to perform ROS: mental status change     Objective:     Vital Signs (Most Recent):  Temp: 98.1 °F (36.7 °C) (11/22/21 0742)  Pulse: 90 (11/22/21 0900)  Resp: 16 (11/22/21 0742)  BP: (!) 113/52 (11/22/21 0742)  SpO2: (!) 94 % (11/22/21 0742) Vital Signs (24h Range):  Temp:  [97 °F (36.1 °C)-98.8 °F (37.1 °C)] 98.1 °F (36.7 °C)  Pulse:  [] 90  Resp:  [16-20] 16  SpO2:  [94 %-97 %] 94 %  BP: (107-132)/(52-93) 113/52     Weight: 123.8 kg (273 lb)  Body mass index is 46.86 kg/m².    SpO2: (!) 94 %  O2 Device (Oxygen Therapy): room air      Intake/Output Summary (Last 24 hours) at 11/22/2021 1031  Last data filed at 11/22/2021 0500  Gross per 24 hour   Intake 960 ml   Output 3230 ml   Net -2270 ml       Lines/Drains/Airways     Peripherally Inserted Central Catheter Line            PICC Double Lumen 11/01/21 1950 right basilic 20 days          Drain                 Urethral Catheter 11/20/21 1930  Double-lumen 1 day                Physical Exam  Constitutional:       General: She is not in acute distress.     Appearance: She is well-developed and well-nourished. She is obese. She is not diaphoretic.   HENT:      Head: Normocephalic and atraumatic.   Eyes:      Extraocular Movements: EOM normal.      Conjunctiva/sclera: Conjunctivae normal.   Neck:      Trachea: No tracheal deviation.   Cardiovascular:      Rate and Rhythm: Normal rate and regular rhythm.      Heart sounds: Normal heart sounds. No murmur heard.      Pulmonary:      Effort: Pulmonary effort is normal. No respiratory distress.      Breath sounds: Normal breath sounds. No wheezing.   Abdominal:      General: Bowel sounds are normal. There is no distension.      Palpations: Abdomen is soft.      Tenderness: There is no abdominal tenderness.   Musculoskeletal:         General: No edema. Normal range of motion.      Cervical back: Normal range of motion.   Neurological:      Mental Status: She is alert. She is disoriented.         Significant Labs: All pertinent lab results from the last 24 hours have been reviewed.    Significant Imaging: All pertinent images from the last 24h have been reviewed.

## 2021-11-22 NOTE — ASSESSMENT & PLAN NOTE
-Patient continues to have hyperglycemia despite increasing her Levemir to 20 units nightly and will increase insulin aspart to 10 units three times a day along with low dose correction scale  -Will recommend increasing levemir to 24 units nightly and aspart to 12 units before meals.  -Fingersticks before meals, at bedtime and 2 am advised  -Hypoglycemia precautions ordered

## 2021-11-22 NOTE — PROGRESS NOTES
[facility]  Endocrinology  Progress Note    Admit Date: 11/12/2021     60 year old female with pertinent medical history of Type 2 Diabetes Mellitus, hypothyroidism, Intraductal papilloma of the right breast s/p excision, depression and YUSEF was admitted as transfer from [place] for concern of elevated ICP due to vasogenic edema from numerous brain lesions (likely etiology septic emboli, in the setting of parvimonas and fusobacterium bacteremia.)   Nancy was initially admitted at [place] 10/27 for acute encephalopathy, found to have DKA ( ( beta hydroxybutyrate 5.3) and bacteremia. 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 She improved with antibiotics and insulin until 3 days prior to transfer when she developed worsening confusion and lethargy. She had a MRI done on 11/13 revealing multiple lesions throughout the bilateral cerebral hemispheres with associated vasogenic edema and leftward midline shift after which she was transferred to our hospital.      Endocrinology was consulted for management of type 2 diabetes mellitus, hypothyroidism and hypernatremia        Regarding Diabetes Mellitus    Type: Type 2 Diabetes Mellitus  Diabetes diagnosis year: first diagnosed in 2010.    Home Diabetes Medications:  Metformin 1000 mg twice daily, Trulicity 1.5 mg weekly and glipizide just 10 mg daily    Previous Medications tried:  Januvia  Farxiga   Has never been on insulin.     How often checking glucose at home? Very infrequently  Hypoglycemia on the regimen?  No      Diabetes Complications include:     Diabetic Ketoacidosis  Neuropathy    Complicating diabetes co morbidities:   YUSEF  Infection        Regarding Hypothyroidism  -Has hypothyroidism for almost 10 years  -On 100 mcg levothyroxine      Her Current symptoms are:         No   Yes    []    [x]   Weight gain    []    [x]   Fatigue    []    [x]   Constipation    [x]    []   Hair loss    [x]    []    "Brittle nails    []    [x]   Mental fog    [x]    []   Cold intolerance    []    [x]   Memory impair    [x]    []   Muscle weakness    [x]    []   Neck swelling, pain, pressure    [x]    []   Hoarseness    [x]    []   Periorbital edema    [x]    []   Lithium or Amiodarone use    []    [x]   Recent severe illness        [x]    []   Recent pregnancy      Personal or Family History:      No   Yes    []    [x]   Thyroid disorder    [x]    []   Thyroid cancer        Last BMD: Normal forearm BMD in 2013 while on fosamax       Previous thyroid studies: none      Plan for pregnancy at this time: none        Lab Results   Component Value Date    TSH 1.392 2021    TSH 0.214 (L) 10/27/2021    TSH 2.236 2021    FREET4 0.90 10/27/2021    FREET4 1.07 2021    FREET4 1.17 2021       Regarding Hypernateremia  -Unclear if it is actual Diabetes Insipidus.   -The Urine Output had normalized after initially being high for a day post IV fluids adminitstration  -No history of Diabetes Insipidus in the patient.   -Never taken Desmopressin in the past, was ordered but discontinued while in the hospital on this visit  -Patient is awake, alert, altered, disoriented and has spells of agitation at night  -Patient had complaints of polydipsia and polyuria initially, currently denies polyuria        Ref. Range 2021 17:51 2021 20:09 2021 23:19 2021 00:00 2021 00:00   Osmolality 275 - 295 mOsm/kg 312 (H)   318 (H) 318 (H)   Osmolality, Urine 50 - 1200 mOsm/kg  263 253                 Interval HPI:   Overnight events: sodium improved to 143   Eatin% -  100%  Nausea: No  Hypoglycemia and intervention: No  Fever: No      BP (!) 113/52 (BP Location: Left arm, Patient Position: Lying)   Pulse 94   Temp 98.1 °F (36.7 °C) (Axillary)   Resp 16   Ht 5' 4" (1.626 m)   Wt 123.8 kg (273 lb)   LMP  (LMP Unknown)   SpO2 (!) 94%   BMI 46.86 kg/m²     Labs Reviewed and Include    Recent Labs   Lab " 11/22/21  0300   *   CALCIUM 8.4*   ALBUMIN 2.4*   PROT 7.0      K 3.1*   CO2 25      BUN 8   CREATININE 0.9   ALKPHOS 68   ALT <5*   AST 11   BILITOT 0.2     Lab Results   Component Value Date    WBC 6.08 11/22/2021    HGB 9.8 (L) 11/22/2021    HCT 31.8 (L) 11/22/2021    MCV 89 11/22/2021     11/22/2021     No results for input(s): TSH, FREET4 in the last 168 hours.  Lab Results   Component Value Date    HGBA1C 9.0 (H) 10/27/2021       Nutritional status:   Body mass index is 46.86 kg/m².  Lab Results   Component Value Date    ALBUMIN 2.4 (L) 11/22/2021    ALBUMIN 2.2 (L) 11/21/2021    ALBUMIN 2.4 (L) 11/21/2021     No results found for: PREALBUMIN    Estimated Creatinine Clearance: 88.5 mL/min (based on SCr of 0.9 mg/dL).    Accu-Checks  Recent Labs     11/19/21  2159 11/20/21  0809 11/20/21  1111 11/20/21  1656 11/20/21  2109 11/21/21  0734 11/21/21  1209 11/21/21  1659 11/21/21  2112 11/22/21  0745   POCTGLUCOSE 183* 208* 209* 201* 261* 284* 216* 233* 189* 228*       Current Medications and/or Treatments Impacting Glycemic Control  Immunotherapy:    Immunosuppressants     None        Steroids:   Hormones (From admission, onward)            Start     Stop Route Frequency Ordered    11/19/21 1859  melatonin tablet 6 mg         -- Oral Nightly PRN 11/19/21 1759        Pressors:    Autonomic Drugs (From admission, onward)            None        Hyperglycemia/Diabetes Medications:   Antihyperglycemics (From admission, onward)            Start     Stop Route Frequency Ordered    11/21/21 2100  insulin detemir U-100 pen 20 Units         -- SubQ Nightly 11/21/21 1057    11/21/21 1130  insulin aspart U-100 pen 10 Units         -- SubQ 3 times daily with meals 11/21/21 1057    11/15/21 1149  insulin aspart U-100 pen 1-10 Units         -- SubQ Before meals & nightly PRN 11/15/21 1050          ASSESSMENT and PLAN    Hypernatremia  -last sodium 143, improved after fluid administration  -Unclear if  patient has Diabetes Insipidus. Will avoid desmopressin for now, no clear indication for use currently   -Both serum and urine osmolality readings reviewed   - Net - 2840 in last 24 hours, slight polyuria seen in the last 12 hours likely secondary to IV fluid administration    Uncontrolled type 2 diabetes mellitus with hyperglycemia  -Patient continues to have hyperglycemia despite increasing her Levemir to 20 units nightly and will increase insulin aspart to 10 units three times a day along with low dose correction scale  -Will recommend increasing levemir to 24 units nightly and aspart to 12 units before meals.  -Fingersticks before meals, at bedtime and 2 am advised  -Hypoglycemia precautions ordered      Hypothyroidism  -Last TSH 1.392  -Would continue levothyroxine 100 mcg  -No signs of acute decompensation secondary to hypothyroidism noted on evaluation of patient        Lauri Merchant MD  Endocrinology  [facility]

## 2021-11-22 NOTE — HPI
60 year old female with Pmhx DM2, hypothyroidism, YUSEF, hx breast ca, fibromyalgia, htn, hld, depression, class 3 obesity admitted as transfer from [place] for concern of elevated ICP 2/2 vasogenic edema of numerous brain abscesses vs masses. Patient recently admitted at [facility] 10/27 for acute encephalopathy, found to have DKA vs HHS and bacteremia. Clinically improved with abx and appropriate blood sugar management until 3 days prior to transfer when she developed worsening confusion and lethargy. MRI 11/13 revealing multiple lesions throughout the bilateral cerebral hemispheres with associated vasogenic edema and leftward midline shift. Blood culture of 10/27/21 was positive for Parvimonas and Fusobacterium. WE were asked to perform DORCAS when she was in ICU but she was considered not clinically stable. She is now out of ICU. Awake and alert, able to hold a conversation but not fully oriented. Consent obtained from sister.

## 2021-11-22 NOTE — ASSESSMENT & PLAN NOTE
- Psychiatry consulted for hyperactive delirium and recommending Depacon as needed, no scheduled medication

## 2021-11-22 NOTE — PT/OT/SLP PROGRESS
Speech Language Pathology      Violeta Champion  MRN: [id number]    Patient not seen today secondary to Pt unavailable.  Pt sleeping upon initial attempt and sibling requested for Patient to rest. Upon later attempt, Patient requesting assistance with restroom.  RN notified.  SLP unable to make 3rd attempt later service day. ST to continue to follow per POC.      11/22/2021

## 2021-11-22 NOTE — PROGRESS NOTES
Progress Note  [facility] Medicine    Patient: Violeta Champion [id number]  Date of Service: 11/22/2021  Team: [facility] MED N Chiki Arteaga MD  Length of Stay:  LOS: 10 days   Admission Date: 11/12/2021    SUBJECTIVE:     Follow-up For:  Brain lesion    HPI/Interval history (See H&P for complete P,F,SHx) :    year old female with Pmhx DM2, hypothyroidism, YUSEF, hx breast ca, fibromyalgia, htn, hld, depression, class 3 obesity admitted as transfer from [place] for concern of elevated ICP 2/2 vasogenic edema of numerous brain abscesses vs masses. Patient recently admitted at [place] 10/27 for acute encephalopathy, found to have DKA vs HHS and bacteremia. Clinically improved with abx and appropriate blood sugar management until 3 days prior to transfer when she developed worsening confusion and lethargy. MRI 11/13 revealing multiple lesions throughout the bilateral cerebral hemispheres with associated vasogenic edema and leftward midline shift.      HPI from [place] below:  Miss Champion is a 60 year old female with a PMH of DMII on oral anti-hyperglycemics, hypothyroid on synthroid, YUSEF, Right breast papilloma, fibromyalgia, HTN, HLD and depression presents to [facility] via ambulance after being found down and unresponsive at home. Patient is obtunded with a GCS of 8 and is not able to participate in any history giving. This note is per report from neighbor, EMS, ED providers and nurse. Per report Miss Champion' neighbor hadnt seen her for 3 days so they went over to her house to check on her and found her down and unresponsive. EMS was called and brought her into the ED. Upon drawing labwork patient Wbc was 25.8, , anion gap 8, Cr 2.9, blood glucose 805, UDS pos for barbiturates, pH 6.928. Patient is protecting airway although her respirations are labored, Sp02 mid to low 90's and she has YUSEF. A dose of narcan had no response, patient was further treated with bicarb, insulin, IV fluids, IV vanc and zosyn and admitted  to the ICU for a higher level of care. Patients sister Darcy was called by staff and myself and asked to be kept updated, phone number in EMR.        Hospital Course: 11/18/2021 Hypernatremic. Monitoring.   11/19: Drink to thirst , NAEON  11/20: Drink to thirst, cont follow Na, follow ID reccs, NAEON.  Transfer to hospital medicine  Nancy was initially admitted at [place] 10/27 for acute encephalopathy, found to have DKA ( ( beta hydroxybutyrate 5.3 )and bacteremia. 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 improved with antibiotics and blood sugar management until 3 days prior to transfer when she developed worsening confusion and lethargy. MRI 11/13 revealing multiple lesions throughout the bilateral cerebral hemispheres with associated vasogenic edema and leftward midline shift.  UOP 6 L /24h. discussed with sister at the bedside. she wanst the patient to be discharged to [place] are for rehab  11/21 confusion/agitation at night- pulling at gown and telemetry monitor requiring frequent redirection. psychiatry consulted . sodium at 146 . UOP trendind down  2L /24h.  nephrology consulted. cardiology consulted for DORCAS . discussed with nephrology. no plan for desmopressin now. started on 1/2NS at 75ml/h . Glucose in 200s.  Levemir to 20 units nightly and will increase insulin aspart to 10 units TID .c/o bladder spams. s/p psychiatry evaluation. Depacon 250mg IV [id number] PRN for non-redirectable agitation. QTc prolongation 517 on 10/27  -  daily EKG to monitor QTc  11/22 K replaced . sodium improved to 143 . received depacon prn overnight refused EKG .improved sleep after depacon. Plan for DORCAS on 11/24 AM. Unclear if patient has Diabetes Insipidus.            Review of Systems:   Pain scale:   Constitutional:  fever,  chills, headache, vision loss, hearing loss, weight loss, Generalized weakness, falls, loss of smell, loss of taste, poor appetite,  sore throat  Respiratory: cough,  shortness of breath.   Cardiovascular: chest pain, dizziness, palpitations, orthopnea, swelling of feet, syncope  Gastrointestinal: nausea, vomiting, abdominal pain, diarrhea, black stool,  beeding per rectum,  change in bowel habits  Genitourinary: hematuria, dysuria, urgency, frequency  Integument/Breast: rash,  pruritis  Hematologic/Lymphatic: easy bruising, lymphadenopathy  Musculoskeletal: arthralgias , myalgias, back pain, neck pain, knee pain + leg pain   Neurological: confusion, seizures, tremors, slurred speech  Behavioral/Psych:  depression, anxiety, auditory or visual hallucinations     OBJECTIVE:     Physical Exam:  Body mass index is 46.86 kg/m².    Constitutional: Appears well-developed and well-nourished. morbid obesity  Head: Normocephalic and atraumatic.   Neck: Normal range of motion. Neck supple.   Cardiovascular: Normal heart rate.  Regular heart rhythm.  Pulmonary/Chest: Effort normal.   Abdominal: No distension.  No tenderness. foleys catheter  Musculoskeletal: Normal range of motion. No edema.   Neurological: Alert and oriented to person,, and time. follows commmands   able to move bilateral upper and lower extremities without limitation  Skin: Skin is warm and dry.   Psychiatric: Normal mood and affect. Behavior is normal.                  Vital Signs  Temp: 97 °F (36.1 °C) (11/22/21 0518)  Pulse: 89 (11/22/21 0518)  Resp: 17 (11/22/21 0518)  BP: 132/79 (11/22/21 0518)  SpO2: 95 % (11/22/21 0518)     24 Hour VS Range    Temp:  [97 °F (36.1 °C)-98.8 °F (37.1 °C)]   Pulse:  [88-98]   Resp:  [16-20]   BP: (107-135)/(53-93)   SpO2:  [94 %-97 %]     Intake/Output Summary (Last 24 hours) at 11/22/2021 0646  Last data filed at 11/22/2021 0500  Gross per 24 hour   Intake 960 ml   Output 3780 ml   Net -2820 ml         I/O This Shift:  I/O this shift:  In: 960 [P.O.:960]  Out: 2400 [Urine:2400]    Wt Readings from Last 3 Encounters:   11/13/21 123.8 kg (273 lb)   11/11/21 129.5 kg (285 lb 7.9 oz)    07/08/21 135.6 kg (299 lb)       I have personally reviewed the vitals and recorded Intake/Output     Laboratory/Diagnostic Data:    CBC/Anemia Labs: Coags:    Recent Labs   Lab 11/20/21  0017 11/21/21  0300 11/22/21  0300   WBC 6.07 5.87 6.08   HGB 8.9* 9.6* 9.8*   HCT 30.5* 32.0* 31.8*    178 199   MCV 89 91 89   RDW 21.8* 21.4* 20.9*    No results for input(s): PT, INR, APTT in the last 168 hours.     Chemistries: ABG:   Recent Labs   Lab 11/20/21  0017 11/20/21  0611 11/21/21  0300 11/21/21  1852 11/22/21  0300   *   < > 146* 144 143   K 3.0*   < > 3.4* 2.9* 3.1*      < > 106 106 105   CO2 29   < > 25 27 25   BUN 6   < > 9 8 8   CREATININE 1.0   < > 1.1 0.9 0.9   CALCIUM 8.3*   < > 8.7 7.7* 8.4*   PROT 6.8  --  7.1  --  7.0   BILITOT 0.3  --  0.2  --  0.2   ALKPHOS 68  --  70  --  68   ALT <5*  --  <5*  --  <5*   AST 9*  --  10  --  11   MG 1.7  --  1.8  --  1.6   PHOS 3.6   < > 4.2 3.8 3.7    < > = values in this interval not displayed.    No results for input(s): PH, PCO2, PO2, HCO3, POCSATURATED, BE in the last 168 hours.     POCT Glucose: HbA1c:    Recent Labs   Lab 11/20/21  1656 11/20/21  2109 11/21/21  0734 11/21/21  1209 11/21/21  1659 11/21/21  2112   POCTGLUCOSE 201* 261* 284* 216* 233* 189*    Hemoglobin A1C   Date Value Ref Range Status   10/27/2021 9.0 (H) 4.0 - 5.6 % Final     Comment:     ADA Screening Guidelines:  5.7-6.4%  Consistent with prediabetes  >or=6.5%  Consistent with diabetes    High levels of fetal hemoglobin interfere with the HbA1C  assay. Heterozygous hemoglobin variants (HbS, HgC, etc)do  not significantly interfere with this assay.   However, presence of multiple variants may affect accuracy.     07/29/2021 7.5 (H) 4.0 - 5.6 % Final     Comment:     ADA Screening Guidelines:  5.7-6.4%  Consistent with prediabetes  >or=6.5%  Consistent with diabetes    High levels of fetal hemoglobin interfere with the HbA1C  assay. Heterozygous hemoglobin variants (HbS, HgC,  etc)do  not significantly interfere with this assay.   However, presence of multiple variants may affect accuracy.     06/16/2021 7.7 (H) 4.0 - 5.6 % Final     Comment:     ADA Screening Guidelines:  5.7-6.4%  Consistent with prediabetes  >or=6.5%  Consistent with diabetes    High levels of fetal hemoglobin interfere with the HbA1C  assay. Heterozygous hemoglobin variants (HbS, HgC, etc)do  not significantly interfere with this assay.   However, presence of multiple variants may affect accuracy.          Cardiac Enzymes: Ejection Fractions:    No results for input(s): CPK, CPKMB, MB, TROPONINI in the last 72 hours. EF   Date Value Ref Range Status   11/13/2021 65 % Final   10/29/2021 60 % Final          No results for input(s): COLORU, APPEARANCEUA, PHUR, SPECGRAV, PROTEINUA, GLUCUA, KETONESU, BILIRUBINUA, OCCULTUA, NITRITE, UROBILINOGEN, LEUKOCYTESUR, RBCUA, WBCUA, BACTERIA, SQUAMEPITHEL, HYALINECASTS in the last 48 hours.    Invalid input(s): WRIGHTSUR    Lactate (Lactic Acid) (mmol/L)   Date Value   10/29/2021 1.1   10/27/2021 5.1 (HH)   10/27/2021 3.1 (H)   10/27/2021 1.6     BNP (pg/mL)   Date Value   10/27/2021 52   03/20/2018 13   12/16/2016 25   06/29/2010 10     CRP (mg/L)   Date Value   10/29/2019 6.7   06/27/2019 11.6 (H)   07/13/2016 14.9 (H)   06/20/2014 11.5 (H)     Sed Rate (mm/Hr)   Date Value   10/29/2019 65 (H)   06/27/2019 90 (H)   07/13/2016 47 (H)   06/20/2014 39 (H)     No results found for: DDIMER  No results found for: FERRITIN  No results found for: LDH  Troponin I (ng/mL)   Date Value   11/13/2021 <0.006   10/27/2021 0.015     CPK (U/L)   Date Value   10/29/2021 1306 (H)   10/27/2021 1465 (H)   06/27/2019 52   06/20/2014 106     Results for orders placed or performed in visit on 06/19/20   Vitamin D   Result Value Ref Range    Vit D, 25-Hydroxy 44 30 - 96 ng/mL   Results for orders placed or performed in visit on 06/27/19   Vitamin D   Result Value Ref Range    Vit D, 25-Hydroxy 54 30 - 96  ng/mL   Results for orders placed or performed in visit on 07/11/16   Vitamin D   Result Value Ref Range    Vit D, 25-Hydroxy 27 (L) 30 - 96 ng/mL     SARS-CoV2 (COVID-19) Qualitative PCR (no units)   Date Value   12/07/2020 Not Detected     POC Rapid COVID (no units)   Date Value   10/27/2021 Negative       Microbiology labs for the last week  Microbiology Results (last 7 days)     Procedure Component Value Units Date/Time    Blood culture [[id number]] Collected: 11/13/21 0227    Order Status: Completed Specimen: Blood Updated: 11/18/21 0612     Blood Culture, Routine No growth after 5 days.    Narrative:      Blood cultures from 2 different sites. 4 bottles total.  Please draw before starting antibiotics.    Blood culture [[id number]] Collected: 11/13/21 0227    Order Status: Completed Specimen: Blood Updated: 11/18/21 0612     Blood Culture, Routine No growth after 5 days.    Narrative:      Blood cultures x 2 different sites. 4 bottles total. Please  draw cultures before administering antibiotics.    Gram stain [[id number]]     Order Status: Canceled Specimen: CSF (Spinal Fluid) from CSF Tap, Tube 3     CSF culture [[id number]]     Order Status: Canceled Specimen: CSF (Spinal Fluid) from CSF Tap, Tube 3     AFB Culture & Smear [[id number]]     Order Status: Canceled Specimen: CSF (Spinal Fluid) from CSF Tap, Tube 3     Fungus culture [[id number]]     Order Status: Canceled Specimen: CSF (Spinal Fluid) from CSF Tap, Tube 3     Cryptococcal antigen, CSF [[id number]]     Order Status: Canceled Specimen: CSF (Spinal Fluid) from CSF Tap, Tube 3           Reviewed and noted in plan where applicable- Please see chart for full lab data.    Lines/Drains:  PICC Double Lumen 11/01/21 1950 right basilic (Active)   Verification by X-ray Yes 11/20/21 0700   Site Assessment No drainage;No redness;No swelling;No warmth 11/20/21 0700   Extremity Assessment Distal to IV No abnormal discoloration;No redness;No swelling;No warmth 11/20/21  "1100   Line Securement Device Secured with sutureless device 11/20/21 0700   Dressing Type Biopatch in place;Central line dressing 11/20/21 1100   Dressing Status Clean;Dry;Intact 11/20/21 1100   Dressing Intervention Integrity maintained 11/20/21 1100   Date on Dressing 11/15/21 11/19/21 1901   Dressing Due to be Changed 11/22/21 11/19/21 1901   Left Lumen Patency/Care Normal saline locked 11/20/21 0301   Right Lumen Patency/Care Normal saline locked 11/20/21 0301   Line Necessity Review Longterm central access required;Poor venous access 11/20/21 0700   Number of days: 18            Urethral Catheter 11/17/21 1000 Latex 16 Fr. (Active)   Site Assessment Clean;Intact 11/20/21 1100   Collection Container Urimeter 11/20/21 1100   Securement Method secured to top of thigh w/ adhesive device 11/20/21 1100   Catheter Care Performed yes 11/20/21 1100   Reason for Continuing Urinary Catheterization Critically ill in ICU and requiring hourly monitoring of intake/output 11/20/21 1100   CAUTI Prevention Bundle StatLock in place w 1" slack;Intact seal between catheter & drainage tubing;Drainage bag/urimeter off the floor;Green sheeting clip in use;No dependent loops or kinks;Drainage bag/urimeter not overfilled (<2/3 full);Drainage bag/urimeter below bladder 11/20/21 0700   Output (mL) 125 mL 11/20/21 1200   Number of days: 3       Imaging      Results for orders placed during the hospital encounter of 11/12/21    Echo Saline Bubble? Yes    Interpretation Summary  · The left ventricle is normal in size with normal systolic function.  · The estimated ejection fraction is 65%.  · Normal left ventricular diastolic function.  · Normal right ventricular size with normal right ventricular systolic function.  · There is no evidence of intracardiac shunting.  · Indeterminate central venous pressure. Estimated PA systolic pressure is at least 25 mmHg.      US Upper Extremity Veins Bilateral  Narrative: EXAMINATION:  US UPPER EXTREMITY " VEINS BILATERAL    CLINICAL HISTORY:  concern for Lemierre's syndrome, infectious thrombophlebitis of the internal jugular vein;    TECHNIQUE:  Duplex and color flow Doppler evaluation and dynamic compression was performed of the right and left internal jugular veins.    COMPARISON:  No relevant prior images.    FINDINGS:  Right internal jugular vein: Patent and compressible.    Left internal jugular vein: Patent and compressible.  Impression: No thrombus in bilateral internal jugular veins.    Electronically signed by resident: Brenden Mane  Date:    11/15/2021  Time:    16:23    Electronically signed by: Rakan Vee MD  Date:    11/15/2021  Time:    17:11      Labs, Imaging, EKG and Diagnostic results from 11/22/2021 were reviewed.    Medications:  Medication list was reviewed and changes noted under Assessment/Plan.  No current facility-administered medications on file prior to encounter.     Current Outpatient Medications on File Prior to Encounter   Medication Sig Dispense Refill    albuterol (VENTOLIN HFA) 90 mcg/actuation inhaler INHALE ONE TO TWO PUFFS INTO LUNGS EVERY 6 HOURS AS NEEDED FOR WHEEZING 54 g 3    amLODIPine (NORVASC) 5 MG tablet TAKE 1 TABLET BY MOUTH EVERY DAY 90 tablet 3    aspirin 81 mg Tab Take by mouth. 1 Tablet Oral Every day      blood sugar diagnostic (FREESTYLE INSULINX TEST STRIPS) Strp Check glucose three times daily 100 each 3    butalbital-acetaminophen-caffeine -40 mg (FIORICET, ESGIC) -40 mg per tablet Take 1 tablet by mouth as needed.      calcium carbonate (TUMS) 300 mg (750 mg) Chew Take by mouth 2 (two) times daily.      diclofenac sodium (VOLTAREN) 1 % Gel APPLY TWO GRAMS TOPICALLY ONCE DAILY 100 g 0    ezetimibe (ZETIA) 10 mg tablet TAKE 1 TABLET BY MOUTH EVERY DAY IN THE EVENING 90 tablet 3    FARXIGA 10 mg tablet TAKE 1 TABLET BY MOUTH EVERY DAY 90 tablet 1    fish oil-dha-epa 1,200-144-216 mg Cap 1 Capsule Oral Every day 90 capsule 3     fluticasone-salmeterol diskus inhaler 250-50 mcg Inhale 1 puff into the lungs 2 (two) times daily. Controller 180 each 3    glipiZIDE (GLUCOTROL) 10 MG TR24 TAKE 1 TABLET (10 MG TOTAL) BY MOUTH DAILY WITH BREAKFAST. 90 tablet 2    GUAIATUSSIN AC  mg/5 mL syrup Take 5 mLs by mouth every 4 (four) hours as needed.      HYDROcodone-acetaminophen (NORCO)  mg per tablet Take 1 tablet by mouth every 8 (eight) hours as needed for Pain. 90 tablet 0    hyoscyamine (LEVSIN/SL) 0.125 mg Subl DISSOLVE 1 TABLET UNDER THE TONGUE EVERY 4 TO 6 HOURS 30 tablet 5    ibuprofen (ADVIL,MOTRIN) 800 MG tablet Take 800 mg by mouth every 8 (eight) hours as needed.  0    ipratropium (ATROVENT) 21 mcg (0.03 %) nasal spray SMARTSI Both Nares Every Night      JARDIANCE 10 mg tablet Take 10 mg by mouth once daily.      lactulose (CHRONULAC) 10 gram/15 mL solution TAKE 15 ML DAILY AS NEEDED FOR CONSTIPATION 473 mL 4    levothyroxine (SYNTHROID) 100 MCG tablet TAKE 1 TABLET DAILY 90 tablet 3    LORazepam (ATIVAN) 0.5 MG tablet Take 1 tablet (0.5 mg total) by mouth 2 (two) times daily. 60 tablet 0    meclizine (ANTIVERT) 25 mg tablet Take 1 tablet (25 mg total) by mouth 3 (three) times daily as needed. 30 tablet 0    metFORMIN (GLUCOPHAGE) 1000 MG tablet TAKE 1 TABLET BY MOUTH TWICE A DAY WITH MEALS 180 tablet 3    metoprolol tartrate (LOPRESSOR) 100 MG tablet TAKE 1 TABLET BY MOUTH TWICE A  tablet 1    mv-min-FA-Lz-Fq-iceqnkw-lutein 0.4-162-18 mg Tab Take by mouth. 1 Tablet Oral Every day      NARCAN 4 mg/actuation Spry 1 spray once.      potassium chloride SA (K-DUR,KLOR-CON) 20 MEQ tablet TAKE 1 TABLET BY MOUTH TWICE A  tablet 3    sucralfate (CARAFATE) 1 gram tablet Take 1 tablet (1 g total) by mouth 3 (three) times daily. 270 tablet 3    temazepam (RESTORIL) 30 mg capsule Take 1 capsule (30 mg total) by mouth nightly as needed. 1 Capsule Oral Every day 30 capsule 0    tiZANidine (ZANAFLEX) 4 MG  tablet TAKE 1 TABLET (4 MG TOTAL) BY MOUTH EVERY 8 (EIGHT) HOURS. 90 tablet 1    TRULICITY 1.5 mg/0.5 mL pen injector INJECT 1 PEN EVERY WEEK 2 pen 4    vibegron 75 mg Tab Take 75 mg by mouth once daily. 30 tablet 11    [DISCONTINUED] DULoxetine (CYMBALTA) 30 MG capsule Take 1 capsule (30 mg total) by mouth once daily. 90 capsule 3    [DISCONTINUED] FARXIGA 10 mg tablet TAKE 1 TABLET BY MOUTH EVERY DAY 90 tablet 1    [DISCONTINUED] sucralfate (CARAFATE) 1 gram tablet TAKE 1 TABLET BY MOUTH THREE TIMES A  tablet 0     Scheduled Medications:  amLODIPine, 10 mg, Oral, Daily  aspirin, 81 mg, Oral, Daily  cefTRIAXone (ROCEPHIN) IVPB, 2 g, Intravenous, Q12H  heparin (porcine), 5,000 Units, Subcutaneous, Q8H  insulin aspart U-100, 10 Units, Subcutaneous, TIDWM  insulin detemir U-100, 20 Units, Subcutaneous, QHS  levothyroxine, 100 mcg, Oral, Before breakfast  metronidazole, 500 mg, Intravenous, Q8H  miconazole nitrate 2%, , Topical (Top), BID  senna-docusate 8.6-50 mg, 1 tablet, Oral, Daily  vibegron, 75 mg, Oral, Daily      PRN: acetaminophen, dextrose 50%, glucagon (human recombinant), insulin aspart U-100, labetalol, magnesium oxide, magnesium oxide, melatonin, potassium bicarbonate, potassium bicarbonate, potassium bicarbonate, potassium, sodium phosphates, potassium, sodium phosphates, potassium, sodium phosphates, sodium chloride 0.9%, tiZANidine, valproate sodium (DEPACON) IVPB  Infusions:    sodium chloride 0.45% 75 mL/hr at 11/22/21 0352     Estimated Creatinine Clearance: 88.5 mL/min (based on SCr of 0.9 mg/dL).    ASSESSMENT/PLAN:   Overview:  Violeta Champion is a 60 y.o. female with medical history significant for     Active Hospital Problems    Diagnosis  POA    *Brain lesion [G93.9]MRI which showed multiple lesions throughout the bilateral cerebral hemispheres with vasogenic edema ,mild leftward midline shift and partial mass effect of the right lateral ventricle. [facility]  neuro-ICU level of care    involving frontal, parietal, temporal, and occipital lobes with surrounding vasogenic edema. Suspect 2/2 septic emboli given recent bacteremia. Speciation of BCx 10/27 revealing organisms from oral mary. Mannitol and 3% saline started prior to transfer. CTH notable for multiple areas of mild diminished attenuation involving the cerebral hemispheres bilaterally corresponding with multiple T2/FLAIR hyperintense lesions noted on the recent MRI examination.      - q1h neuro checks    - BCx NGTD  -DORCAS deferred  - Infectious Disease consult; appreciate recommendations. Signed off          - continue long term antibiotics x 6 weeks  - q6h Na; Na goal EuNa, drinking to thirst, consider DDAVP if Na continues to climb despite PO intake  s/p nuerosurgery eval ---no acute neurosurgical intervention  - stable for transfer to floor   11/20 s/p ID eval -Cardiology would like to defer DORCAS for now until more clinically stable. LP is  high risk for further evaluation. Patient continues to improve. IJ US is negative for septic thrombophlebitis. Iikely etiology of brain lesions is septic emboli from an undetermined source, in the setting of parvimonas and fusobacterium bacteremia. Will plan to treat with 6 of ceftriaxone and metronidazole for brain abscess and possible IE.  consider LP, recommend cell count, protein, culture and toxo PCR    11/22  . Plan for DORCAS on 11/24 AM.               Sepsis-   r/o oral bacteria associated infective endocarditis .  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  TTE was negative.  In light of brain lesions, recommend DORCAS.  cardiology defered DORCAS due to high risk. Recommend treating broadly for now with vanc, ceftriaxone and metronidazole  x 6weeks. HIV negative  11/21  . cardiology consulted for DORCAS       Encephalopathy, metabolic   secondary to brain lesions. improving. AOX2    waxing and waning mentaion.   11/21  confusion/agitation at night- pulling at gown and telemetry monitor requiring frequent redirection. psychiatry consulted . Depacon 250mg IV [id number] PRN for non-redirectable agitation.   .improved sleep after depacon.             QTc prolongation 517 on 10/27  -  daily EKG to monitor QTc          Hypernatremia    - Patient with sodium   Recent Labs   Lab 11/21/21  0300 11/21/21  1852 11/22/21  0300   * 144 143   . sodium trending up   11/20 follow Na, UOP; drinking to thirst  with stable Na but if Na continues to increase consider ddavp. UOP 6 L /24h.   11/21sodium at 146 . UOP trendind down  2L /24h. discussed with nephrology. no plan for desmopressin now. started on 1/2NS at 75ml/h   11/22  Unclear if patient has Diabetes Insipidus.            Yes    BMI 45.0-49.9, adult [Z68.42]   Body mass index is 46.86 kg/m². Morbid obesity complicates all aspects of disease management from diagnostic modalities to treatment. Weight loss encouraged    Anemia   Patient's with Normocytic anemia.. Hemoglobin stable. Etiology likely due to chronic disease .  Current CBC reviewed-    Recent Labs   Lab 11/20/21  0017 11/21/21  0300 11/22/21  0300   HGB 8.9* 9.6* 9.8*         Component Value Date/Time    MCV 89 11/22/2021 0300    RDW 20.9 (H) 11/22/2021 0300    IRON 92 11/10/2004 1215     Monitor CBC and transfuse if H/H drops below 7/21.   Not Applicable    Discharge planning issues [Z02.9] needs long term antibiotics  Not Applicable    Vasogenic brain edema [G93.6]secondary to above .concern of elevated ICP 2/2 vasogenic edema of numerous brain abscesses vs masses.   Yes    Brain compression [G93.5]  Yes    Diabetes insipidus [E23.2] trend sodium  labs Q6hrs  - encourage fluid intake  - on half normal; continue with Hypernatremia as above  11/22   Unclear if patient has Diabetes Insipidus.  Yes      Yes    Essential Hypertension  Chronic, controlled.  Latest blood pressure and vitals reviewed-   Temp:  [97 °F (36.1  °C)-98.8 °F (37.1 °C)]   Pulse:  [88-98]   Resp:  [16-20]   BP: (107-135)/(53-93)   SpO2:  [94 %-97 %] .   Home meds for hypertension were reviewed and amlodipine continued   Hypertension Medications             amLODIPine (NORVASC) 5 MG tablet TAKE 1 TABLET BY MOUTH EVERY DAY    metoprolol tartrate (LOPRESSOR) 100 MG tablet TAKE 1 TABLET BY MOUTH TWICE A DAY        PRN meds if BP> 180/110 mm HG  Yes    Uncontrolled type 2 diabetes mellitus with hyperglycemia [E11.65]  Patient's FSGs are not controlled on current hypoglycemics.   Last A1c reviewed-   Lab Results   Component Value Date    HGBA1C 9.0 (H) 10/27/2021    HGBA1C 7.5 (H) 07/29/2021    HGBA1C 7.7 (H) 06/16/2021     Recent Labs   Lab 11/21/21  0734 11/21/21  1209 11/21/21  1659 11/21/21  2112   POCTGLUCOSE 284* 216* 233* 189*     currently on  insulin aspart U-100, 6 Units, Subcutaneous, TIDWM and moderate dose SSI .Started  Levemir 15 units q HS   11/21  Glucose in 200s.  Levemir to 20 units nightly and will increase insulin aspart to 10 units TID .      Hypokalemia - Patient with potassium   Recent Labs   Lab 11/21/21  0300 11/21/21  1852 11/22/21  0300   K 3.4* 2.9* 3.1*   . replaced. monitor      Yes    Hypothyroidism [E03.9] TSH 1.292  WNL continue levothyroxine  Yes      Resolved Hospital Problems    Diagnosis Date Resolved POA    Morbid obesity with BMI of 60.0-69.9, adult [E66.01, Z68.44] 11/15/2021 Not Applicable       Disposition-  rehab    Discharge Planning   SARAVANAN: 11/24/2021     Code Status: Full Code   Is the patient medically ready for discharge?: No    Reason for patient still in hospital (select all that apply): Treatment  Discharge Plan A: Long-term acute care facility (LTAC)         DVT prophylaxis addressed with:  subcutaneous heparin.        Subsequent Hospital Care   Level 2 68694 Total visit time was 25 minutes or greater with greater than 50% of time spent in counseling and coordination of care.       We discussed in detail the plan  of care, the patient's response to treatment, the discharge plan. Total time includes time spent reviewing the medical record, examining the patient, writing notes and communicating with other professionals.    Chiki Arteaga MD  Attending Staff Physician  [facility]  pager- [phone] [id number] - [phone]

## 2021-11-22 NOTE — CONSULTS
[facility]  Cardiology  Consult Note    Patient Name: Violeta Champion  MRN: [id number]  Admission Date: 11/12/2021  Hospital Length of Stay: 10 days  Code Status: Full Code   Attending Provider: Chiki Arteaga MD   Consulting Provider: Michael Albarado MD  Primary Care Physician: Madie Petersen DO  Principal Problem:Brain lesion    Patient information was obtained from patient, past medical records and ER records.     Consults  Subjective:     Chief Complaint:  Brain lesions     HPI:   60 year old female with Pmhx DM2, hypothyroidism, YUSEF, hx breast ca, fibromyalgia, htn, hld, depression, class 3 obesity admitted as transfer from [facility] for concern of elevated ICP 2/2 vasogenic edema of numerous brain abscesses vs masses. Patient recently admitted at [facility] 10/27 for acute encephalopathy, found to have DKA vs HHS and bacteremia. Clinically improved with abx and appropriate blood sugar management until 3 days prior to transfer when she developed worsening confusion and lethargy. MRI 11/13 revealing multiple lesions throughout the bilateral cerebral hemispheres with associated vasogenic edema and leftward midline shift. Blood culture of 10/27/21 was positive for Parvimonas and Fusobacterium. WE were asked to perform DORCAS when she was in ICU but she was considered not clinically stable. She is now out of ICU. Awake and alert, able to hold a conversation but not fully oriented. Consent obtained from sister.      Past Medical History:   Diagnosis Date    Breast cancer 12/10/2020    Diabetes mellitus, type 2     GERD (gastroesophageal reflux disease)     High cholesterol     Hypertension     Hypothyroid     Injury of knee, leg, ankle and foot     Insulin-resistant diabetes mellitus and acanthosis nigricans     Menopause present     Osteoarthritis     Osteoarthritis, knee     Osteopenia     Osteopenia     Sleep apnea     Status post breast lump removal 12/01/2020       Past Surgical  History:   Procedure Laterality Date    bilateral duct removal breast      BREAST CYST ASPIRATION Right 2020    EXCISIONAL BIOPSY Right 12/10/2020    Procedure: EXCISIONAL BIOPSY, breast; u/s guided;  Surgeon: Bernadette Lopez MD;  Location: [facility];  Service: General;  Laterality: Right;    HYSTERECTOMY      OOPHORECTOMY      OTHER SURGICAL HISTORY      bilateral central duct removal with bilateral papilomona       Review of patient's allergies indicates:   Allergen Reactions    Iodinated contrast media Swelling     Other reaction(s): Swelling    Naproxen sodium Swelling    Naprosyn  [naproxen]      Other reaction(s): Swelling       No current facility-administered medications on file prior to encounter.     Current Outpatient Medications on File Prior to Encounter   Medication Sig    albuterol (VENTOLIN HFA) 90 mcg/actuation inhaler INHALE ONE TO TWO PUFFS INTO LUNGS EVERY 6 HOURS AS NEEDED FOR WHEEZING    amLODIPine (NORVASC) 5 MG tablet TAKE 1 TABLET BY MOUTH EVERY DAY    aspirin 81 mg Tab Take by mouth. 1 Tablet Oral Every day    blood sugar diagnostic (FREESTYLE INSULINX TEST STRIPS) Strp Check glucose three times daily    butalbital-acetaminophen-caffeine -40 mg (FIORICET, ESGIC) -40 mg per tablet Take 1 tablet by mouth as needed.    calcium carbonate (TUMS) 300 mg (750 mg) Chew Take by mouth 2 (two) times daily.    diclofenac sodium (VOLTAREN) 1 % Gel APPLY TWO GRAMS TOPICALLY ONCE DAILY    ezetimibe (ZETIA) 10 mg tablet TAKE 1 TABLET BY MOUTH EVERY DAY IN THE EVENING    FARXIGA 10 mg tablet TAKE 1 TABLET BY MOUTH EVERY DAY    fish oil-dha-epa 1,200-144-216 mg Cap 1 Capsule Oral Every day    fluticasone-salmeterol diskus inhaler 250-50 mcg Inhale 1 puff into the lungs 2 (two) times daily. Controller    glipiZIDE (GLUCOTROL) 10 MG TR24 TAKE 1 TABLET (10 MG TOTAL) BY MOUTH DAILY WITH BREAKFAST.    GUAIATUSSIN AC  mg/5 mL syrup Take 5 mLs by mouth every 4 (four) hours as  needed.    HYDROcodone-acetaminophen (NORCO)  mg per tablet Take 1 tablet by mouth every 8 (eight) hours as needed for Pain.    hyoscyamine (LEVSIN/SL) 0.125 mg Subl DISSOLVE 1 TABLET UNDER THE TONGUE EVERY 4 TO 6 HOURS    ibuprofen (ADVIL,MOTRIN) 800 MG tablet Take 800 mg by mouth every 8 (eight) hours as needed.    ipratropium (ATROVENT) 21 mcg (0.03 %) nasal spray SMARTSI Both Nares Every Night    JARDIANCE 10 mg tablet Take 10 mg by mouth once daily.    lactulose (CHRONULAC) 10 gram/15 mL solution TAKE 15 ML DAILY AS NEEDED FOR CONSTIPATION    levothyroxine (SYNTHROID) 100 MCG tablet TAKE 1 TABLET DAILY    LORazepam (ATIVAN) 0.5 MG tablet Take 1 tablet (0.5 mg total) by mouth 2 (two) times daily.    meclizine (ANTIVERT) 25 mg tablet Take 1 tablet (25 mg total) by mouth 3 (three) times daily as needed.    metFORMIN (GLUCOPHAGE) 1000 MG tablet TAKE 1 TABLET BY MOUTH TWICE A DAY WITH MEALS    metoprolol tartrate (LOPRESSOR) 100 MG tablet TAKE 1 TABLET BY MOUTH TWICE A DAY    mv-min-FA-Nw-Tn-jpqdlrm-lutein 0.4-162-18 mg Tab Take by mouth. 1 Tablet Oral Every day    NARCAN 4 mg/actuation Spry 1 spray once.    potassium chloride SA (K-DUR,KLOR-CON) 20 MEQ tablet TAKE 1 TABLET BY MOUTH TWICE A DAY    sucralfate (CARAFATE) 1 gram tablet Take 1 tablet (1 g total) by mouth 3 (three) times daily.    temazepam (RESTORIL) 30 mg capsule Take 1 capsule (30 mg total) by mouth nightly as needed. 1 Capsule Oral Every day    tiZANidine (ZANAFLEX) 4 MG tablet TAKE 1 TABLET (4 MG TOTAL) BY MOUTH EVERY 8 (EIGHT) HOURS.    TRULICITY 1.5 mg/0.5 mL pen injector INJECT 1 PEN EVERY WEEK    vibegron 75 mg Tab Take 75 mg by mouth once daily.    [DISCONTINUED] DULoxetine (CYMBALTA) 30 MG capsule Take 1 capsule (30 mg total) by mouth once daily.    [DISCONTINUED] FARXIGA 10 mg tablet TAKE 1 TABLET BY MOUTH EVERY DAY    [DISCONTINUED] sucralfate (CARAFATE) 1 gram tablet TAKE 1 TABLET BY MOUTH THREE TIMES A DAY      Family History     Problem Relation (Age of Onset)    Amblyopia Father    Breast cancer Paternal Aunt, Paternal Grandmother    Cancer Maternal Grandmother    Diabetes Mother, Maternal Aunt, Maternal Uncle    Glaucoma Mother    Hypertension Mother, Brother        Tobacco Use    Smoking status: Never Smoker    Smokeless tobacco: Never Used   Substance and Sexual Activity    Alcohol use: Yes     Alcohol/week: 0.0 standard drinks     Comment: very rarely    Drug use: No    Sexual activity: Not on file     Review of Systems   Unable to perform ROS: mental status change     Objective:     Vital Signs (Most Recent):  Temp: 98.1 °F (36.7 °C) (11/22/21 0742)  Pulse: 90 (11/22/21 0900)  Resp: 16 (11/22/21 0742)  BP: (!) 113/52 (11/22/21 0742)  SpO2: (!) 94 % (11/22/21 0742) Vital Signs (24h Range):  Temp:  [97 °F (36.1 °C)-98.8 °F (37.1 °C)] 98.1 °F (36.7 °C)  Pulse:  [] 90  Resp:  [16-20] 16  SpO2:  [94 %-97 %] 94 %  BP: (107-132)/(52-93) 113/52     Weight: 123.8 kg (273 lb)  Body mass index is 46.86 kg/m².    SpO2: (!) 94 %  O2 Device (Oxygen Therapy): room air      Intake/Output Summary (Last 24 hours) at 11/22/2021 1031  Last data filed at 11/22/2021 0500  Gross per 24 hour   Intake 960 ml   Output 3230 ml   Net -2270 ml       Lines/Drains/Airways     Peripherally Inserted Central Catheter Line            PICC Double Lumen 11/01/21 1950 right basilic 20 days          Drain                 Urethral Catheter 11/20/21 1930 Double-lumen 1 day                Physical Exam  Constitutional:       General: She is not in acute distress.     Appearance: She is well-developed and well-nourished. She is obese. She is not diaphoretic.   HENT:      Head: Normocephalic and atraumatic.   Eyes:      Extraocular Movements: EOM normal.      Conjunctiva/sclera: Conjunctivae normal.   Neck:      Trachea: No tracheal deviation.   Cardiovascular:      Rate and Rhythm: Normal rate and regular rhythm.      Heart sounds: Normal  heart sounds. No murmur heard.      Pulmonary:      Effort: Pulmonary effort is normal. No respiratory distress.      Breath sounds: Normal breath sounds. No wheezing.   Abdominal:      General: Bowel sounds are normal. There is no distension.      Palpations: Abdomen is soft.      Tenderness: There is no abdominal tenderness.   Musculoskeletal:         General: No edema. Normal range of motion.      Cervical back: Normal range of motion.   Neurological:      Mental Status: She is alert. She is disoriented.         Significant Labs: All pertinent lab results from the last 24 hours have been reviewed.    Significant Imaging: All pertinent images from the last 24h have been reviewed.      Assessment and Plan:     * Brain lesion  1. DORCAS for evaluation of endocarditis. Plan for DORCAS on 11/24 AM. Please keep NPO after midnight on Tuesday PM.   -No absolute contraindications of esophageal stricture, tumor, perforation, laceration,or diverticulum and/or active GI bleed  -The risks, benefits & alternatives of the procedure were explained to the patient.   -The risks of transesophageal echo include but are not limited to:  Dental trauma, esophageal trauma/perforation, bleeding, laryngospasm/brochospasm, aspiration, sore throat/hoarseness, & dislodgement of the endotracheal tube/nasogastric tube (where applicable).    -The risks of moderate sedation include hypotension, respiratory depression, arrhythmias, bronchospasm, & death.    -Informed consent was obtained. The patient is agreeable to proceed with the procedure and all questions and concerns addressed.    Case discussed with an attending in echocardiography lab.     Further recommendations per attending addendum          VTE Risk Mitigation (From admission, onward)         Ordered     heparin (porcine) injection 5,000 Units  Every 8 hours         11/16/21 1403     IP VTE HIGH RISK PATIENT  Once         11/13/21 0009     Place sequential compression device  Until  discontinued         11/13/21 0009     Reason for No Pharmacological VTE Prophylaxis  Once        Question:  Reasons:  Answer:  Risk of Bleeding    11/13/21 0009                Thank you for your consult.     Michael Albarado MD  Cardiology   [facility]

## 2021-11-22 NOTE — ASSESSMENT & PLAN NOTE
-  Per NCC brain lesions appear 2/2 septic emboli given recent bacteremia.  - ID consulted and recommending Rocephin and Flagyl x 5 weeks end date 12/26.     - Related to prolonged/acute hospital course.     Recommendations  -  Encourage mobility, OOB in chair at least 3 hours per day, and early ambulation as appropriate  -  PT/OT evaluate and treat  -  Pain management  -  Monitor for and prevent skin breakdown and pressure ulcers  · Early mobility, repositioning/weight shifting every 20-30 minutes when sitting, turn patient every 2 hours, proper mattress/overlay and chair cushioning, pressure relief/heel protector boots  -  DVT prophylaxis    -  Reviewed discharge options (IP rehab, SNF, HH therapy, and OP therapy)

## 2021-11-22 NOTE — CONSULTS
[facility]  Physical Medicine & Rehab  Consult Note    Patient Name: Violeta Champion  MRN: [id number]  Admission Date: 11/12/2021  Hospital Length of Stay: 10 days  Attending Physician: Chiki Arteaga MD     Inpatient consult to Physical Medicine & Rehabilitation  Consult performed by: Rosette Andrew NP  Consult requested by:  Chiki Arteaga MD    Collaborating Physician: Tonya Singh MD  Reason for Consult:  Assess rehabilitation needs     Consults  Subjective:     Principal Problem: Brain lesion    HPI: Joslyn Champion is a 60-year-old female with PMHx of DM, YUSEF, breast cancer, Depression. Patient transferred [place] [facility] [place]12 from [facility] for elevated ICP 2/2 vasogenic edema of numerous brain abscesses vs masses and bacteremia. Per NCC brain lesions appear 2/2 septic emboli given recent bacteremia. ID consulted and recommending Rocephin and Flagyl x 5 weeks end date 12/26. Hospital course complicated by DKA (improving), hyperactive delirium (psychiatry consulted and recommending Depacon as needed, no scheduled medication).     Functional History: Patient lives in an 2nd floor apt with no elevator access. Prior to admission, I. DME: none.       Hospital Course: 11/21/21: Participated w/ OT. Bed mob max- totalA x 2 ppl. LBD & toileting totalA.       Past Medical History:   Diagnosis Date    Breast cancer 12/10/2020    Diabetes mellitus, type 2     GERD (gastroesophageal reflux disease)     High cholesterol     Hypertension     Hypothyroid     Injury of knee, leg, ankle and foot     Insulin-resistant diabetes mellitus and acanthosis nigricans     Menopause present     Osteoarthritis     Osteoarthritis, knee     Osteopenia     Osteopenia     Sleep apnea     Status post breast lump removal 12/01/2020     Past Surgical History:   Procedure Laterality Date    bilateral duct removal breast      BREAST CYST ASPIRATION Right 2020    EXCISIONAL BIOPSY Right 12/10/2020     Procedure: EXCISIONAL BIOPSY, breast; u/s guided;  Surgeon: Bernadette Lopez MD;  Location: [facility];  Service: General;  Laterality: Right;    HYSTERECTOMY      OOPHORECTOMY      OTHER SURGICAL HISTORY      bilateral central duct removal with bilateral papilomona     Review of patient's allergies indicates:   Allergen Reactions    Iodinated contrast media Swelling     Other reaction(s): Swelling    Naproxen sodium Swelling    Naprosyn  [naproxen]      Other reaction(s): Swelling       Scheduled Medications:    amLODIPine  10 mg Oral Daily    aspirin  81 mg Oral Daily    cefTRIAXone (ROCEPHIN) IVPB  2 g Intravenous Q12H    heparin (porcine)  5,000 Units Subcutaneous Q8H    insulin aspart U-100  10 Units Subcutaneous TIDWM    insulin detemir U-100  20 Units Subcutaneous QHS    levothyroxine  100 mcg Oral Before breakfast    magnesium sulfate IVPB  2 g Intravenous Once    metronidazole  500 mg Intravenous Q8H    miconazole nitrate 2%   Topical (Top) BID    senna-docusate 8.6-50 mg  1 tablet Oral Daily    vibegron  75 mg Oral Daily       PRN Medications: acetaminophen, dextrose 50%, glucagon (human recombinant), insulin aspart U-100, labetalol, magnesium oxide, magnesium oxide, melatonin, potassium bicarbonate, potassium bicarbonate, potassium bicarbonate, potassium, sodium phosphates, potassium, sodium phosphates, potassium, sodium phosphates, sodium chloride 0.9%, tiZANidine, valproate sodium (DEPACON) IVPB    Family History     Problem Relation (Age of Onset)    Amblyopia Father    Breast cancer Paternal Aunt, Paternal Grandmother    Cancer Maternal Grandmother    Diabetes Mother, Maternal Aunt, Maternal Uncle    Glaucoma Mother    Hypertension Mother, Brother        Tobacco Use    Smoking status: Never Smoker    Smokeless tobacco: Never Used   Substance and Sexual Activity    Alcohol use: Yes     Alcohol/week: 0.0 standard drinks     Comment: very rarely    Drug use: No    Sexual activity:  Not on file     Review of Systems   Constitutional: Positive for activity change. Negative for fatigue and fever.   HENT: Negative for sore throat and trouble swallowing.    Eyes: Negative for visual disturbance.   Respiratory: Negative for cough and shortness of breath.    Cardiovascular: Negative for chest pain and leg swelling.   Gastrointestinal: Negative for abdominal distention and abdominal pain.   Genitourinary: Negative for difficulty urinating.   Musculoskeletal: Positive for gait problem. Negative for back pain.   Skin: Negative for color change.   Neurological: Positive for weakness. Negative for dizziness, light-headedness and headaches.   Psychiatric/Behavioral: Positive for confusion. Negative for agitation.     Objective:     Vital Signs (Most Recent):  Temp: 98.1 °F (36.7 °C) (11/22/21 0742)  Pulse: 90 (11/22/21 0900)  Resp: 16 (11/22/21 0742)  BP: (!) 113/52 (11/22/21 0742)  SpO2: (!) 94 % (11/22/21 0742)    Vital Signs (24h Range):  Temp:  [97 °F (36.1 °C)-98.8 °F (37.1 °C)] 98.1 °F (36.7 °C)  Pulse:  [] 90  Resp:  [16-20] 16  SpO2:  [94 %-97 %] 94 %  BP: (107-132)/(52-93) 113/52     Body mass index is 46.86 kg/m².    Physical Exam  Vitals and nursing note reviewed.   Constitutional:       Appearance: She is well-developed and well-nourished. She is obese.   HENT:      Head: Normocephalic and atraumatic.      Mouth/Throat:      Mouth: Mucous membranes are moist.   Eyes:      Extraocular Movements: EOM normal.      Pupils: Pupils are equal, round, and reactive to light.   Pulmonary:      Effort: Pulmonary effort is normal. No respiratory distress.   Genitourinary:     Comments: Sparks   Musculoskeletal:      Cervical back: Normal range of motion and neck supple.      Comments: deconditioned and generalized weakness   Skin:     General: Skin is warm and dry.   Neurological:      Mental Status: She is alert.      Comments: Following commands  Mild confusion present    Psychiatric:         Mood  and Affect: Mood and affect normal.      Comments: Calm        Diagnostic Results:   Labs: Reviewed  ECG: Reviewed  CT: Reviewed  MRI: Reviewed    Assessment/Plan:     * Brain lesion  -  Per NCC brain lesions appear 2/2 septic emboli given recent bacteremia.  - ID consulted and recommending Rocephin and Flagyl x 5 weeks end date 12/26.     - Related to prolonged/acute hospital course.     Recommendations  -  Encourage mobility, OOB in chair at least 3 hours per day, and early ambulation as appropriate  -  PT/OT evaluate and treat  -  Pain management  -  Monitor for and prevent skin breakdown and pressure ulcers  · Early mobility, repositioning/weight shifting every 20-30 minutes when sitting, turn patient every 2 hours, proper mattress/overlay and chair cushioning, pressure relief/heel protector boots  -  DVT prophylaxis    -  Reviewed discharge options (IP rehab, SNF, HH therapy, and OP therapy)    Encephalopathy, metabolic  - Psychiatry consulted for hyperactive delirium and recommending Depacon as needed, no scheduled medication    Uncontrolled type 2 diabetes mellitus with hyperglycemia  - improving     PM&R Recommendation:     At this time, the PM&R team has reviewed this patient's ongoing medical case including inpatient diagnosis, medical history, clinical examination, labs, vitals, current social and functional history to provide the post-acute recommendation as follows:     RECOMMENDATIONS: inpatient rehabilitation due to good motivation/participation with therapies and good potential for recovery.    We will continue to follow.     Thank you for your consult.     Rosette Andrew NP  Department of Physical Medicine & Rehab  [facility]

## 2021-11-22 NOTE — SUBJECTIVE & OBJECTIVE
"Interval HPI:   Overnight events: sodium improved to 143   Eatin% -  100%  Nausea: No  Hypoglycemia and intervention: No  Fever: No      BP (!) 113/52 (BP Location: Left arm, Patient Position: Lying)   Pulse 94   Temp 98.1 °F (36.7 °C) (Axillary)   Resp 16   Ht 5' 4" (1.626 m)   Wt 123.8 kg (273 lb)   LMP  (LMP Unknown)   SpO2 (!) 94%   BMI 46.86 kg/m²     Labs Reviewed and Include    Recent Labs   Lab 21  0300   *   CALCIUM 8.4*   ALBUMIN 2.4*   PROT 7.0      K 3.1*   CO2 25      BUN 8   CREATININE 0.9   ALKPHOS 68   ALT <5*   AST 11   BILITOT 0.2     Lab Results   Component Value Date    WBC 6.08 2021    HGB 9.8 (L) 2021    HCT 31.8 (L) 2021    MCV 89 2021     2021     No results for input(s): TSH, FREET4 in the last 168 hours.  Lab Results   Component Value Date    HGBA1C 9.0 (H) 10/27/2021       Nutritional status:   Body mass index is 46.86 kg/m².  Lab Results   Component Value Date    ALBUMIN 2.4 (L) 2021    ALBUMIN 2.2 (L) 2021    ALBUMIN 2.4 (L) 2021     No results found for: PREALBUMIN    Estimated Creatinine Clearance: 88.5 mL/min (based on SCr of 0.9 mg/dL).    Accu-Checks  Recent Labs     21  2159 21  0809 21  1111 21  1656 21  2109 21  0734 21  1209 21  1659 21  2112 21  0745   POCTGLUCOSE 183* 208* 209* 201* 261* 284* 216* 233* 189* 228*       Current Medications and/or Treatments Impacting Glycemic Control  Immunotherapy:    Immunosuppressants     None        Steroids:   Hormones (From admission, onward)            Start     Stop Route Frequency Ordered    21 1859  melatonin tablet 6 mg         -- Oral Nightly PRN 21 3511        Pressors:    Autonomic Drugs (From admission, onward)            None        Hyperglycemia/Diabetes Medications:   Antihyperglycemics (From admission, onward)            Start     Stop Route Frequency Ordered    " 11/21/21 2100  insulin detemir U-100 pen 20 Units         -- SubQ Nightly 11/21/21 1057    11/21/21 1130  insulin aspart U-100 pen 10 Units         -- SubQ 3 times daily with meals 11/21/21 1057    11/15/21 1149  insulin aspart U-100 pen 1-10 Units         -- SubQ Before meals & nightly PRN 11/15/21 1050

## 2021-11-22 NOTE — ASSESSMENT & PLAN NOTE
1. DORCAS for evaluation of endocarditis. Plan for DORCAS on 11/24 AM. Please keep NPO after midnight on Tuesday PM.   -No absolute contraindications of esophageal stricture, tumor, perforation, laceration,or diverticulum and/or active GI bleed  -The risks, benefits & alternatives of the procedure were explained to the patient.   -The risks of transesophageal echo include but are not limited to:  Dental trauma, esophageal trauma/perforation, bleeding, laryngospasm/brochospasm, aspiration, sore throat/hoarseness, & dislodgement of the endotracheal tube/nasogastric tube (where applicable).    -The risks of moderate sedation include hypotension, respiratory depression, arrhythmias, bronchospasm, & death.    -Informed consent was obtained. The patient is agreeable to proceed with the procedure and all questions and concerns addressed.    Case discussed with an attending in echocardiography lab.     Further recommendations per attending addendum

## 2021-11-22 NOTE — PT/OT/SLP PROGRESS
Physical Therapy Co-Treatment with OT    Patient Name:  Violeta Champion   MRN:  [id number]    *Co-treatment with OT due to patient complexity and need for two skilled therapists to ensure safe mobilization.    Recommendations:     Discharge Recommendations:  rehabilitation facility   Discharge Equipment Recommendations:  (tbd @ next level of care)   Barriers to discharge: Inaccessible home, Decreased caregiver support and increased (a)    Highest Level of Mobility: Sitting EOB  Assistance Required: Mostly SBA, moments of Max(A)    Assessment:     Violtea Champion is a 60 y.o. female admitted with a medical diagnosis of Brain lesion.  She presents with the following impairments/functional limitations:  weakness,impaired balance,decreased safety awareness,impaired endurance,impaired cognition,gait instability,impaired functional mobilty,decreased upper extremity function,decreased lower extremity function,impaired self care skills. Violeta Champion would benefit from acute PT intervention to address listed functional deficits, provide patient/caregiver education, reduce fall risk, and maximize (I) and safety with functional mobility.    Pt met with HOB elevated, sister present and agreeable to PT treatment. Pt with improvement in active participation at this date, following most 1-step commands. She is easily distracted and perseverates on things during session, like srinivas mcnamara and per prior PT named Gino. Pt is difficult to redirect to task at times. She continues to demo L-sided inattention and requires max verbal cues to advance L LE or L UE to command, but is observed moving spontaneously.     Pt is progressing towards acute PT goals appropriately and continues to benefit from acute PT sessions. After hospital discharge, pt would benefit from inpatient rehab to maximize rehab potential.    Rehab Prognosis: Good; patient would benefit from acute skilled PT services to address these deficits and reach maximum level  "of function.      Plan:     During this hospitalization, patient to be seen 4 x/week to address the identified rehab impairments via gait training,therapeutic activities,therapeutic exercises,neuromuscular re-education and progress toward the following goals:    · Plan of Care Expires:  12/13/21    This plan of care has been discussed with the patient/caregiver, who was included in its development and is in agreement with the identified goals and treatment plan.     Subjective     Communicated with RN prior to session.  Patient agreeable to participate.     Pain/Comfort:  · Pain Rating 1: 0/10  · Pain Rating Post-Intervention 1: 0/10    Chief Complaint: Brain lesion  Patient/Family Comments/goals: "I know you have jolly ranchers. I'll do it for a jolly rancher"      Objective:     Patient found HOB elevated with telemetry,enamorado catheter,peripheral IV  upon PT entry to room.    General Precautions: Standard, aspiration,fall   Orthopedic Precautions:N/A   Braces: N/A         Exams:     Cognition:  o Pt is alert  o Command following: intact 1-step commands, however pt is easily distracted    Functional Mobility:    Bed Mobility:  · Supine to Sit: Total Assistance and 2 persons  on R side of bed  · Sit to Supine: Total Assistance and 2 persons  · Rolling R: Maximum Assistance  · Scooting anteriorly to EOB to plant feet on floor: Total Assistance  · Scooting/Bridging in supine to HOB: Total Assistance and 2 persons via drawsheet    Transfers:   · Sit to Stand Transfer: Activity did not occur due to poor sitting balance and limited command following    Gait:  · Patient unable at this time    Balance:  · Static Sit:   · Mostly SBA with instances of max(A) at EOB x ~15 minutes  · Fair: Patient able to maintain balance with handhold support; may require occasional minimal assistance.   · Pt leaning towards L despite max cues for midline awareness. Pt with L UE support on bed and requires max cues to move L UE  · PT provided " tactile cues for improved upright posture  · Dynamic sit:  · Poor: Patient unable to accept challenge or move without loss of balance.      Therapeutic Activities/Exercises      Patient assisted with functional mobility as noted above   Patient completed functional reaching task with R UE, unable to complete with L UE despite max cues   Patient performed LAQs B LEs, 2x10 on R, 4 reps on L   Attempted to perform activity with B UE use, clapping and B shoulder flexion, however pt unable to follow command.   Patient educated on the importance of early mobility to prevent functional decline during hospital stay   Patient educated on PT POC and role of PT in acute care   White board updated regarding patient's safest level of mobility with staff assistance, RN also updated.     AM-PAC 6 CLICK MOBILITY  Total Score:8     Patient left HOB elevated with all lines intact, call button in reach, RN notified and sister present.        History/Goals:     PAST MEDICAL HISTORY:  Past Medical History:   Diagnosis Date    Breast cancer 12/10/2020    Diabetes mellitus, type 2     GERD (gastroesophageal reflux disease)     High cholesterol     Hypertension     Hypothyroid     Injury of knee, leg, ankle and foot     Insulin-resistant diabetes mellitus and acanthosis nigricans     Menopause present     Osteoarthritis     Osteoarthritis, knee     Osteopenia     Osteopenia     Sleep apnea     Status post breast lump removal 12/01/2020       Past Surgical History:   Procedure Laterality Date    bilateral duct removal breast      BREAST CYST ASPIRATION Right 2020    EXCISIONAL BIOPSY Right 12/10/2020    Procedure: EXCISIONAL BIOPSY, breast; u/s guided;  Surgeon: Bernadette Lopez MD;  Location: [facility];  Service: General;  Laterality: Right;    HYSTERECTOMY      OOPHORECTOMY      OTHER SURGICAL HISTORY      bilateral central duct removal with bilateral papilomona       GOALS:   Multidisciplinary Problems      Physical Therapy Goals        Problem: Physical Therapy Goal    Goal Priority Disciplines Outcome Goal Variances Interventions   Physical Therapy Goal     PT, PT/OT Ongoing, Progressing     Description: Goals to be met by: 21     Patient will increase functional independence with mobility by performin. Supine to sit with Moderate Assistance  2. Sit to supine with Moderate Assistance  3. Rolling to Left and Right with Moderate Assistance.  4. Sit to stand transfer with Maximum Assistance  5. Bed to chair transfer with Maximum Assistance using LRAD  6. Gait  x 5 feet with Maximum Assistance using LRAD.   7. Lower extremity exercise program x15 reps per handout, with assistance as needed                     Time Tracking:     PT Received On: 21  PT Start Time: 915     PT Stop Time: 944  PT Total Time (min): 29 min     Billable Minutes: Therapeutic Activity 15 and Therapeutic Exercise 14      Sarahi Norris, PT  2021   Pager# [phone]

## 2021-11-23 ENCOUNTER — ANESTHESIA EVENT (OUTPATIENT)
Dept: MEDSURG UNIT | Facility: HOSPITAL | Age: 61
DRG: 871 | End: 2021-11-23
Payer: COMMERCIAL

## 2021-11-23 LAB
ALBUMIN SERPL BCP-MCNC: 2.4 G/DL (ref 3.5–5.2)
ALP SERPL-CCNC: 65 U/L (ref 55–135)
ALT SERPL W/O P-5'-P-CCNC: <5 U/L (ref 10–44)
ANION GAP SERPL CALC-SCNC: 10 MMOL/L (ref 8–16)
AST SERPL-CCNC: 10 U/L (ref 10–40)
BASOPHILS # BLD AUTO: 0.03 K/UL (ref 0–0.2)
BASOPHILS NFR BLD: 0.5 % (ref 0–1.9)
BILIRUB SERPL-MCNC: 0.2 MG/DL (ref 0.1–1)
BUN SERPL-MCNC: 8 MG/DL (ref 6–20)
CALCIUM SERPL-MCNC: 8.5 MG/DL (ref 8.7–10.5)
CHLORIDE SERPL-SCNC: 104 MMOL/L (ref 95–110)
CO2 SERPL-SCNC: 26 MMOL/L (ref 23–29)
CREAT SERPL-MCNC: 0.9 MG/DL (ref 0.5–1.4)
DIFFERENTIAL METHOD: ABNORMAL
EOSINOPHIL # BLD AUTO: 0.3 K/UL (ref 0–0.5)
EOSINOPHIL NFR BLD: 5.6 % (ref 0–8)
ERYTHROCYTE [DISTWIDTH] IN BLOOD BY AUTOMATED COUNT: 20.7 % (ref 11.5–14.5)
EST. GFR  (AFRICAN AMERICAN): >60 ML/MIN/1.73 M^2
EST. GFR  (NON AFRICAN AMERICAN): >60 ML/MIN/1.73 M^2
GLUCOSE SERPL-MCNC: 150 MG/DL (ref 70–110)
HCT VFR BLD AUTO: 30.5 % (ref 37–48.5)
HGB BLD-MCNC: 9.3 G/DL (ref 12–16)
IMM GRANULOCYTES # BLD AUTO: 0.02 K/UL (ref 0–0.04)
IMM GRANULOCYTES NFR BLD AUTO: 0.3 % (ref 0–0.5)
LYMPHOCYTES # BLD AUTO: 2.7 K/UL (ref 1–4.8)
LYMPHOCYTES NFR BLD: 44.9 % (ref 18–48)
MAGNESIUM SERPL-MCNC: 1.9 MG/DL (ref 1.6–2.6)
MCH RBC QN AUTO: 27 PG (ref 27–31)
MCHC RBC AUTO-ENTMCNC: 30.5 G/DL (ref 32–36)
MCV RBC AUTO: 89 FL (ref 82–98)
MONOCYTES # BLD AUTO: 0.7 K/UL (ref 0.3–1)
MONOCYTES NFR BLD: 10.7 % (ref 4–15)
NEUTROPHILS # BLD AUTO: 2.3 K/UL (ref 1.8–7.7)
NEUTROPHILS NFR BLD: 38 % (ref 38–73)
NRBC BLD-RTO: 0 /100 WBC
PHOSPHATE SERPL-MCNC: 3.2 MG/DL (ref 2.7–4.5)
PLATELET # BLD AUTO: 191 K/UL (ref 150–450)
PMV BLD AUTO: 11.7 FL (ref 9.2–12.9)
POCT GLUCOSE: 188 MG/DL (ref 70–110)
POCT GLUCOSE: 188 MG/DL (ref 70–110)
POCT GLUCOSE: 192 MG/DL (ref 70–110)
POCT GLUCOSE: 197 MG/DL (ref 70–110)
POTASSIUM SERPL-SCNC: 3.3 MMOL/L (ref 3.5–5.1)
PROT SERPL-MCNC: 6.9 G/DL (ref 6–8.4)
RBC # BLD AUTO: 3.44 M/UL (ref 4–5.4)
SODIUM SERPL-SCNC: 140 MMOL/L (ref 136–145)
WBC # BLD AUTO: 6.1 K/UL (ref 3.9–12.7)

## 2021-11-23 PROCEDURE — 83735 ASSAY OF MAGNESIUM: CPT | Performed by: STUDENT IN AN ORGANIZED HEALTH CARE EDUCATION/TRAINING PROGRAM

## 2021-11-23 PROCEDURE — 92507 TX SP LANG VOICE COMM INDIV: CPT

## 2021-11-23 PROCEDURE — 80053 COMPREHEN METABOLIC PANEL: CPT | Performed by: STUDENT IN AN ORGANIZED HEALTH CARE EDUCATION/TRAINING PROGRAM

## 2021-11-23 PROCEDURE — 93005 ELECTROCARDIOGRAM TRACING: CPT

## 2021-11-23 PROCEDURE — 84100 ASSAY OF PHOSPHORUS: CPT | Performed by: STUDENT IN AN ORGANIZED HEALTH CARE EDUCATION/TRAINING PROGRAM

## 2021-11-23 PROCEDURE — 25000003 PHARM REV CODE 250: Performed by: NURSE PRACTITIONER

## 2021-11-23 PROCEDURE — 99232 PR SUBSEQUENT HOSPITAL CARE,LEVL II: ICD-10-PCS | Mod: ,,, | Performed by: HOSPITALIST

## 2021-11-23 PROCEDURE — 25000003 PHARM REV CODE 250: Performed by: PHYSICIAN ASSISTANT

## 2021-11-23 PROCEDURE — 99232 PR SUBSEQUENT HOSPITAL CARE,LEVL II: ICD-10-PCS | Mod: ,,, | Performed by: INTERNAL MEDICINE

## 2021-11-23 PROCEDURE — 85025 COMPLETE CBC W/AUTO DIFF WBC: CPT | Performed by: STUDENT IN AN ORGANIZED HEALTH CARE EDUCATION/TRAINING PROGRAM

## 2021-11-23 PROCEDURE — 63600175 PHARM REV CODE 636 W HCPCS: Performed by: NURSE PRACTITIONER

## 2021-11-23 PROCEDURE — 99232 SBSQ HOSP IP/OBS MODERATE 35: CPT | Mod: ,,, | Performed by: HOSPITALIST

## 2021-11-23 PROCEDURE — 25000003 PHARM REV CODE 250: Performed by: STUDENT IN AN ORGANIZED HEALTH CARE EDUCATION/TRAINING PROGRAM

## 2021-11-23 PROCEDURE — 97535 SELF CARE MNGMENT TRAINING: CPT

## 2021-11-23 PROCEDURE — S0030 INJECTION, METRONIDAZOLE: HCPCS | Performed by: NURSE PRACTITIONER

## 2021-11-23 PROCEDURE — 99232 SBSQ HOSP IP/OBS MODERATE 35: CPT | Mod: ,,, | Performed by: INTERNAL MEDICINE

## 2021-11-23 PROCEDURE — 92526 ORAL FUNCTION THERAPY: CPT

## 2021-11-23 PROCEDURE — 25000003 PHARM REV CODE 250: Performed by: HOSPITALIST

## 2021-11-23 PROCEDURE — 93010 ELECTROCARDIOGRAM REPORT: CPT | Mod: ,,, | Performed by: INTERNAL MEDICINE

## 2021-11-23 PROCEDURE — 11000001 HC ACUTE MED/SURG PRIVATE ROOM

## 2021-11-23 PROCEDURE — 93010 EKG 12-LEAD: ICD-10-PCS | Mod: ,,, | Performed by: INTERNAL MEDICINE

## 2021-11-23 RX ORDER — MUPIROCIN 20 MG/G
OINTMENT TOPICAL 2 TIMES DAILY
Status: DISCONTINUED | OUTPATIENT
Start: 2021-11-23 | End: 2021-12-01 | Stop reason: HOSPADM

## 2021-11-23 RX ORDER — POTASSIUM CHLORIDE 20 MEQ/1
40 TABLET, EXTENDED RELEASE ORAL ONCE
Status: COMPLETED | OUTPATIENT
Start: 2021-11-23 | End: 2021-11-23

## 2021-11-23 RX ADMIN — INSULIN ASPART 12 UNITS: 100 INJECTION, SOLUTION INTRAVENOUS; SUBCUTANEOUS at 08:11

## 2021-11-23 RX ADMIN — HEPARIN SODIUM 5000 UNITS: 5000 INJECTION INTRAVENOUS; SUBCUTANEOUS at 06:11

## 2021-11-23 RX ADMIN — METRONIDAZOLE 500 MG: 500 INJECTION, SOLUTION INTRAVENOUS at 10:11

## 2021-11-23 RX ADMIN — ASPIRIN 81 MG: 81 TABLET, COATED ORAL at 08:11

## 2021-11-23 RX ADMIN — TIZANIDINE 2 MG: 2 TABLET ORAL at 08:11

## 2021-11-23 RX ADMIN — POTASSIUM CHLORIDE 40 MEQ: 1500 TABLET, EXTENDED RELEASE ORAL at 08:11

## 2021-11-23 RX ADMIN — ACETAMINOPHEN 650 MG: 325 TABLET ORAL at 08:11

## 2021-11-23 RX ADMIN — MICONAZOLE NITRATE: 20 OINTMENT TOPICAL at 08:11

## 2021-11-23 RX ADMIN — HEPARIN SODIUM 5000 UNITS: 5000 INJECTION INTRAVENOUS; SUBCUTANEOUS at 12:11

## 2021-11-23 RX ADMIN — CEFTRIAXONE SODIUM 2 G: 2 INJECTION, SOLUTION INTRAVENOUS at 02:11

## 2021-11-23 RX ADMIN — GUAIFENESIN 200 MG: 100 SOLUTION ORAL at 01:11

## 2021-11-23 RX ADMIN — HEPARIN SODIUM 5000 UNITS: 5000 INJECTION INTRAVENOUS; SUBCUTANEOUS at 09:11

## 2021-11-23 RX ADMIN — TIZANIDINE 2 MG: 2 TABLET ORAL at 09:11

## 2021-11-23 RX ADMIN — INSULIN ASPART 12 UNITS: 100 INJECTION, SOLUTION INTRAVENOUS; SUBCUTANEOUS at 05:11

## 2021-11-23 RX ADMIN — VALPROATE SODIUM 250 MG: 100 INJECTION, SOLUTION INTRAVENOUS at 04:11

## 2021-11-23 RX ADMIN — MICONAZOLE NITRATE: 20 OINTMENT TOPICAL at 09:11

## 2021-11-23 RX ADMIN — Medication 6 MG: at 09:11

## 2021-11-23 RX ADMIN — INSULIN ASPART 12 UNITS: 100 INJECTION, SOLUTION INTRAVENOUS; SUBCUTANEOUS at 12:11

## 2021-11-23 RX ADMIN — METRONIDAZOLE 500 MG: 500 INJECTION, SOLUTION INTRAVENOUS at 01:11

## 2021-11-23 RX ADMIN — CEFTRIAXONE SODIUM 2 G: 2 INJECTION, SOLUTION INTRAVENOUS at 03:11

## 2021-11-23 RX ADMIN — AMLODIPINE BESYLATE 10 MG: 10 TABLET ORAL at 08:11

## 2021-11-23 RX ADMIN — METRONIDAZOLE 500 MG: 500 INJECTION, SOLUTION INTRAVENOUS at 09:11

## 2021-11-23 RX ADMIN — ACETAMINOPHEN 650 MG: 325 TABLET ORAL at 03:11

## 2021-11-23 RX ADMIN — VALPROATE SODIUM 250 MG: 100 INJECTION, SOLUTION INTRAVENOUS at 01:11

## 2021-11-23 RX ADMIN — MUPIROCIN: 20 OINTMENT TOPICAL at 09:11

## 2021-11-23 RX ADMIN — LEVOTHYROXINE SODIUM 100 MCG: 100 TABLET ORAL at 06:11

## 2021-11-23 NOTE — SUBJECTIVE & OBJECTIVE
"    Family History     Problem Relation (Age of Onset)    Amblyopia Father    Breast cancer Paternal Aunt, Paternal Grandmother    Cancer Maternal Grandmother    Diabetes Mother, Maternal Aunt, Maternal Uncle    Glaucoma Mother    Hypertension Mother, Brother        Tobacco Use    Smoking status: Never Smoker    Smokeless tobacco: Never Used   Substance and Sexual Activity    Alcohol use: Yes     Alcohol/week: 0.0 standard drinks     Comment: very rarely    Drug use: No    Sexual activity: Not on file     Psychotherapeutics (From admission, onward)            None           Review of Systems  Objective:     Vital Signs (Most Recent):  Temp: 96 °F (35.6 °C) (11/23/21 0800)  Pulse: 89 (11/23/21 0800)  Resp: 17 (11/23/21 0800)  BP: (!) 153/72 (11/23/21 0800)  SpO2: (!) 94 % (11/23/21 0800) Vital Signs (24h Range):  Temp:  [96 °F (35.6 °C)-99.1 °F (37.3 °C)] 96 °F (35.6 °C)  Pulse:  [86-99] 89  Resp:  [17-18] 17  SpO2:  [92 %-98 %] 94 %  BP: (126-167)/(66-89) 153/72     Height: 5' 4" (162.6 cm)  Weight: 123.8 kg (273 lb)  Body mass index is 46.86 kg/m².      Intake/Output Summary (Last 24 hours) at 11/23/2021 0956  Last data filed at 11/23/2021 0600  Gross per 24 hour   Intake --   Output 2450 ml   Net -2450 ml       Mental Status Exam:  Appearance: poor grooming, laying in bed at an angle, obese   Behavior/Cooperation: normal, cooperative  Speech: somewhat mumbled, normal rate, normal tone  Language: fluent english  Mood: "good"  Affect: mildly constricted   Thought Process: somewhat disorganized, with redirection more goal-directed, perservates on last word spoken  Thought Content: normal, no suicidality, no homicidality, delusions, or paranoia   Orientation: person, place, situation  Memory: Grossly intact  Attention Span/Concentration: Impaired  Fund of Knowledge: Estimated to be average level   Insight: fair  Judgment: fair      Significant Labs: All pertinent labs within the past 24 hours have been " reviewed.    Significant Imaging: I have reviewed all pertinent imaging results/findings within the past 24 hours.

## 2021-11-23 NOTE — PT/OT/SLP PROGRESS
"Speech Language Pathology Treatment    Patient Name:  Violeta Champion   MRN:  [id number]  Admitting Diagnosis: Brain lesion    Recommendations:                 General Recommendations:  Dysphagia therapy, Speech/language therapy and Cognitive-linguistic therapy  Diet recommendations:  Dental Soft, Liquid Diet Level: Thin   Aspiration Precautions: Strict 1:1 assistance for all PO intake, only when awake, alert, attentive and upright,, 1 bite/sip at a time, Alternating bites/sips, Eliminate distractions, Feed only when awake/alert, Frequent oral care, HOB to 90 degrees, Meds whole buried in puree, Meds whole 1 at a time, Monitor for s/s of aspiration, Remain upright 30 minutes post meal, Small bites/sips and Strict aspiration precautions Continue to monitor for signs and symptoms of aspiration and discontinue oral feeding should you notice any of the following: watery eyes, reddened facial area, wet vocal quality, increased work of breathing, change in respiratory status, increased congestion, coughing, fever and/or change in level of alertness  General Precautions: Standard, aspiration,fall  Communication strategies:  provide increased time to answer, go to room if call light pushed, and frequent redirection    Subjective     SLP reviewed Pt with RN pre/post session; RN cleared for therapy  Pt stated "because sometimes my memory isn't good" re: why SLP asked orientation questions    Pain/Comfort:  · Pain Rating 1: 0/10  · Pain Rating Post-Intervention 1: 0/10    Respiratory Status:  · O2 Device (Oxygen Therapy): room air    Objective:     Has the patient been evaluated by SLP for swallowing?   Yes  Keep patient NPO? No   Current Respiratory Status:    Room air    Pt was found awake and slouching over favoring to L side with sister at bedside feeding Pt lunch. Pt's sister stated Pt with no difficulty with meal. Pt oriented to person, i'ly and place given max verbal cues. SLP attempted to reposition Pt upright; however, " Pt with decreased attention and command following which prohibited repositioning. Pt required max verbal visual and tactile cues to attend to tasks this date. Pt with decreased thought organization t/o attempts to elicit conversational speech. RN in room to assist with repositioning. Once upright, Pt was seen with trials of thin (cup edge sips x3, consecutive cup edge sips x2), mechanical soft (tsp bites x3) and solids (cracker bites x3). Pt with no overt s/s aspiration t/o trials given strict 1:1 assistance, small/single bites/sips, pt is awake/alert/upright, HOB 90 degrees, and strict aspiration precautions. SLP educated Pt and sister on SLP role, ongoing swallow assessment, diet recs, aspiration precautions, and SLP POC. Pt demonstrated minimal understanding; however, Pt's sister demonstrated/verbalized understanding. No additional questions. Whiteboard updated. Pt remained upright in bed with sister at bedside upon SLP exit.     Assessment:     Violeta Champion is a 60 y.o. female with an SLP diagnosis of Aphasia, Dysphagia and Cognitive-Linguistic Impairment.  She presents with decreased attention and command following. Strict assistance with PO required for safety.    Goals:   Multidisciplinary Problems     SLP Goals        Problem: SLP Goal    Goal Priority Disciplines Outcome   SLP Goal     SLP Ongoing, Progressing   Description: Speech Therapy Short Term Goals  Goal expected to be met by 11/25  1. Pt will participate in an ongoing assessment to determine the least restrictive and safest diet with possible updated goals to follow pending results.  2. Pt will participate in a speech, language, and cognitive evaluation with possible updated goals to follow pending results. -ongoing  3. Pt will attend to therapy tasks for 1+ minutes given 1 redirection.   4. Pt will answer orientation questions x4 given min cues.   5. Pt will answer general information questions with 75% acc given cues.   6. Pt will complete  word finding tasks with 75% acc min cues.   7. Pt will follow simple 1 step directions with 75% acc given 1 repetition.                      Plan:     · Patient to be seen:  4 x/week   · Plan of Care expires:     · Plan of Care reviewed with:  patient,sibling   · SLP Follow-Up:  Yes       Discharge recommendations:  rehabilitation facility     Time Tracking:     SLP Treatment Date:   11/23/21  Speech Start Time:  1214  Speech Stop Time:  1247     Speech Total Time (min):  33 min    Billable Minutes: Speech Therapy Individual 10, Treatment Swallowing Dysfunction 13 and Self Care/Home Management Training 10    11/23/2021

## 2021-11-23 NOTE — PLAN OF CARE
Problem: SLP Goal  Goal: SLP Goal  Description: Speech Therapy Short Term Goals  Goal expected to be met by 11/25  1. Pt will participate in an ongoing assessment to determine the least restrictive and safest diet with possible updated goals to follow pending results.  2. Pt will participate in a speech, language, and cognitive evaluation with possible updated goals to follow pending results. -ongoing  3. Pt will attend to therapy tasks for 1+ minutes given 1 redirection.   4. Pt will answer orientation questions x4 given min cues.   5. Pt will answer general information questions with 75% acc given cues.   6. Pt will complete word finding tasks with 75% acc min cues.   7. Pt will follow simple 1 step directions with 75% acc given 1 repetition.     Outcome: Ongoing, Progressing     Pt seen for ongoing swallow assessment. Pt with decreased attention t/o session. REC: advance to dental soft textures and thin liquids given strict 1:1 assistance, Pt is awake/alert/upright, HOB 90 degrees, small/slow bites/sips, and strict aspiration precautions. SLP to continue to follow.

## 2021-11-23 NOTE — PROGRESS NOTES
Progress Note  [facility] Medicine    Patient: Violeta Champion [id number]  Date of Service: 11/23/2021  Team: [facility] HOSP MED N Chiki Arteaga MD  Length of Stay:  LOS: 11 days   Admission Date: 11/12/2021    SUBJECTIVE:     Follow-up For:  Brain lesion    HPI/Interval history (See H&P for complete P,F,SHx) :    year old female with Pmhx DM2, hypothyroidism, YUSEF, hx breast ca, fibromyalgia, htn, hld, depression, class 3 obesity admitted as transfer from [facility] for concern of elevated ICP 2/2 vasogenic edema of numerous brain abscesses vs masses. Patient recently admitted at [facility] 10/27 for acute encephalopathy, found to have DKA vs HHS and bacteremia. Clinically improved with abx and appropriate blood sugar management until 3 days prior to transfer when she developed worsening confusion and lethargy. MRI 11/13 revealing multiple lesions throughout the bilateral cerebral hemispheres with associated vasogenic edema and leftward midline shift.      HPI from [facility] below:  Miss Champion is a 60 year old female with a PMH of DMII on oral anti-hyperglycemics, hypothyroid on synthroid, YUSEF, Right breast papilloma, fibromyalgia, HTN, HLD and depression presents to [facility] via ambulance after being found down and unresponsive at home. Patient is obtunded with a GCS of 8 and is not able to participate in any history giving. This note is per report from neighbor, EMS, ED providers and nurse. Per report Miss Champion' neighbor hadnt seen her for 3 days so they went over to her house to check on her and found her down and unresponsive. EMS was called and brought her into the ED. Upon drawing labwork patient Wbc was 25.8, , anion gap 8, Cr 2.9, blood glucose 805, UDS pos for barbiturates, pH 6.928. Patient is protecting airway although her respirations are labored, Sp02 mid to low 90's and she has YUSEF. A dose of narcan had no response, patient was further treated with bicarb, insulin, IV fluids, IV vanc and zosyn and admitted  to the ICU for a higher level of care. Patients sister Darcy was called by staff and myself and asked to be kept updated, phone number in EMR.        Hospital Course: 11/18/2021 Hypernatremic. Monitoring.   11/19: Drink to thirst , NAEON  11/20: Drink to thirst, cont follow Na, follow ID reccs, NAEON.  Transfer to hospital medicine  Nancy was initially admitted at [facility] 10/27 for acute encephalopathy, found to have DKA ( ( beta hydroxybutyrate 5.3 )and bacteremia. 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 improved with antibiotics and blood sugar management until 3 days prior to transfer when she developed worsening confusion and lethargy. MRI 11/13 revealing multiple lesions throughout the bilateral cerebral hemispheres with associated vasogenic edema and leftward midline shift.  UOP 6 L /24h. discussed with sister at the bedside. she wanst the patient to be discharged to [facility] are for rehab  11/21 confusion/agitation at night- pulling at gown and telemetry monitor requiring frequent redirection. psychiatry consulted . sodium at 146 . UOP trendind down  2L /24h.  nephrology consulted. cardiology consulted for DORCAS . discussed with nephrology. no plan for desmopressin now. started on 1/2NS at 75ml/h . Glucose in 200s.  Levemir to 20 units nightly and will increase insulin aspart to 10 units TID .c/o bladder spams. s/p psychiatry evaluation. Depacon 250mg IV o4heace PRN for non-redirectable agitation. QTc prolongation 517 on 10/27  -  daily EKG to monitor QTc  11/22 K replaced . sodium improved to 143 . received depacon prn overnight refused EKG .improved sleep after depacon. Plan for DORCAS on 11/24 AM. Unclear if patient has Diabetes Insipidus.  11/23 agitatation overnight received depacon x 1 after midnght . started on scheduled 250mg depacon for 5pm , sodium at 140. K replaced. foleys discontinued.  EKG daily to monitor QTc . EKG today Normal sinus rhythm 90bpm QTc  440ms            Review of Systems:   Pain scale:   Constitutional:  fever,  chills, headache, vision loss, hearing loss, weight loss, Generalized weakness, falls, loss of smell, loss of taste, poor appetite,  sore throat  Respiratory: cough, shortness of breath.   Cardiovascular: chest pain, dizziness, palpitations, orthopnea, swelling of feet, syncope  Gastrointestinal: nausea, vomiting, abdominal pain, diarrhea, black stool,  beeding per rectum,  change in bowel habits  Genitourinary: hematuria, dysuria, urgency, frequency  Integument/Breast: rash,  pruritis  Hematologic/Lymphatic: easy bruising, lymphadenopathy  Musculoskeletal: arthralgias , myalgias, back pain, neck pain, knee pain + leg pain   Neurological: confusion, seizures, tremors, slurred speech  Behavioral/Psych:  depression, anxiety, auditory or visual hallucinations     OBJECTIVE:     Physical Exam:  Body mass index is 46.86 kg/m².    Constitutional: Appears well-developed and well-nourished. morbid obesity  Head: Normocephalic and atraumatic.   Neck: Normal range of motion. Neck supple.   Cardiovascular: Normal heart rate.  Regular heart rhythm.  Pulmonary/Chest: Effort normal.   Abdominal: No distension.  No tenderness. foleys catheter  Musculoskeletal: Normal range of motion. No edema.   Neurological: Alert and oriented to person,, and time. follows commmands   able to move bilateral upper and lower extremities without limitation  Skin: Skin is warm and dry.   Psychiatric: Normal mood and affect. Behavior is normal.                  Vital Signs  Temp: 98.3 °F (36.8 °C) (11/23/21 0008)  Pulse: 86 (11/23/21 0330)  Resp: 18 (11/23/21 0008)  BP: (Abnormal) 167/89 (11/23/21 0008)  SpO2: (Abnormal) 93 % (11/23/21 0008)     24 Hour VS Range    Temp:  [98.1 °F (36.7 °C)-99.1 °F (37.3 °C)]   Pulse:  []   Resp:  [16-18]   BP: (113-167)/(52-89)   SpO2:  [92 %-98 %]     Intake/Output Summary (Last 24 hours) at 11/23/2021 6151  Last data filed at  11/23/2021 0600  Gross per 24 hour   Intake no documentation   Output 2750 ml   Net -2750 ml         I/O This Shift:  No intake/output data recorded.    Wt Readings from Last 3 Encounters:   11/13/21 123.8 kg (273 lb)   11/11/21 129.5 kg (285 lb 7.9 oz)   07/08/21 135.6 kg (299 lb)       I have personally reviewed the vitals and recorded Intake/Output     Laboratory/Diagnostic Data:    CBC/Anemia Labs: Coags:    Recent Labs   Lab 11/21/21  0300 11/22/21  0300 11/23/21  0300   WBC 5.87 6.08 6.10   HGB 9.6* 9.8* 9.3*   HCT 32.0* 31.8* 30.5*    199 191   MCV 91 89 89   RDW 21.4* 20.9* 20.7*    No results for input(s): PT, INR, APTT in the last 168 hours.     Chemistries: ABG:   Recent Labs   Lab 11/21/21  0300 11/21/21  1852 11/22/21  0300 11/22/21  1608 11/23/21  0300   *   < > 143 144 140   K 3.4*   < > 3.1* 3.8 3.3*      < > 105 106 104   CO2 25   < > 25 27 26   BUN 9   < > 8 8 8   CREATININE 1.1   < > 0.9 0.9 0.9   CALCIUM 8.7   < > 8.4* 8.5* 8.5*   PROT 7.1  --  7.0  --  6.9   BILITOT 0.2  --  0.2  --  0.2   ALKPHOS 70  --  68  --  65   ALT <5*  --  <5*  --  <5*   AST 10  --  11  --  10   MG 1.8  --  1.6  --  1.9   PHOS 4.2   < > 3.7 3.6 3.2    < > = values in this interval not displayed.    No results for input(s): PH, PCO2, PO2, HCO3, POCSATURATED, BE in the last 168 hours.     POCT Glucose: HbA1c:    Recent Labs   Lab 11/21/21  1659 11/21/21  2112 11/22/21  0745 11/22/21  1257 11/22/21  1658 11/22/21 2034   POCTGLUCOSE 233* 189* 228* 188* 206* 154*    Hemoglobin A1C   Date Value Ref Range Status   10/27/2021 9.0 (H) 4.0 - 5.6 % Final     Comment:     ADA Screening Guidelines:  5.7-6.4%  Consistent with prediabetes  >or=6.5%  Consistent with diabetes    High levels of fetal hemoglobin interfere with the HbA1C  assay. Heterozygous hemoglobin variants (HbS, HgC, etc)do  not significantly interfere with this assay.   However, presence of multiple variants may affect accuracy.     07/29/2021 7.5  (H) 4.0 - 5.6 % Final     Comment:     ADA Screening Guidelines:  5.7-6.4%  Consistent with prediabetes  >or=6.5%  Consistent with diabetes    High levels of fetal hemoglobin interfere with the HbA1C  assay. Heterozygous hemoglobin variants (HbS, HgC, etc)do  not significantly interfere with this assay.   However, presence of multiple variants may affect accuracy.     06/16/2021 7.7 (H) 4.0 - 5.6 % Final     Comment:     ADA Screening Guidelines:  5.7-6.4%  Consistent with prediabetes  >or=6.5%  Consistent with diabetes    High levels of fetal hemoglobin interfere with the HbA1C  assay. Heterozygous hemoglobin variants (HbS, HgC, etc)do  not significantly interfere with this assay.   However, presence of multiple variants may affect accuracy.          Cardiac Enzymes: Ejection Fractions:    No results for input(s): CPK, CPKMB, MB, TROPONINI in the last 72 hours. EF   Date Value Ref Range Status   11/13/2021 65 % Final   10/29/2021 60 % Final          No results for input(s): COLORU, APPEARANCEUA, PHUR, SPECGRAV, PROTEINUA, GLUCUA, KETONESU, BILIRUBINUA, OCCULTUA, NITRITE, UROBILINOGEN, LEUKOCYTESUR, RBCUA, WBCUA, BACTERIA, SQUAMEPITHEL, HYALINECASTS in the last 48 hours.    Invalid input(s): WRIGHTSUR    Lactate (Lactic Acid) (mmol/L)   Date Value   10/29/2021 1.1   10/27/2021 5.1 (HH)   10/27/2021 3.1 (H)   10/27/2021 1.6     BNP (pg/mL)   Date Value   10/27/2021 52   03/20/2018 13   12/16/2016 25   06/29/2010 10     CRP (mg/L)   Date Value   10/29/2019 6.7   06/27/2019 11.6 (H)   07/13/2016 14.9 (H)   06/20/2014 11.5 (H)     Sed Rate (mm/Hr)   Date Value   10/29/2019 65 (H)   06/27/2019 90 (H)   07/13/2016 47 (H)   06/20/2014 39 (H)     No results found for: DDIMER  No results found for: FERRITIN  No results found for: LDH  Troponin I (ng/mL)   Date Value   11/13/2021 <0.006   10/27/2021 0.015     CPK (U/L)   Date Value   10/29/2021 1306 (H)   10/27/2021 1465 (H)   06/27/2019 52   06/20/2014 106     Results for  orders placed or performed in visit on 06/19/20   Vitamin D   Result Value Ref Range    Vit D, 25-Hydroxy 44 30 - 96 ng/mL   Results for orders placed or performed in visit on 06/27/19   Vitamin D   Result Value Ref Range    Vit D, 25-Hydroxy 54 30 - 96 ng/mL   Results for orders placed or performed in visit on 07/11/16   Vitamin D   Result Value Ref Range    Vit D, 25-Hydroxy 27 (L) 30 - 96 ng/mL     SARS-CoV2 (COVID-19) Qualitative PCR (no units)   Date Value   12/07/2020 Not Detected     POC Rapid COVID (no units)   Date Value   10/27/2021 Negative       Microbiology labs for the last week  Microbiology Results (last 7 days)     Procedure Component Value Units Date/Time    Blood culture [[id number]] Collected: 11/13/21 0227    Order Status: Completed Specimen: Blood Updated: 11/18/21 0612     Blood Culture, Routine No growth after 5 days.    Narrative:      Blood cultures from 2 different sites. 4 bottles total.  Please draw before starting antibiotics.    Blood culture [[id number]] Collected: 11/13/21 0227    Order Status: Completed Specimen: Blood Updated: 11/18/21 0612     Blood Culture, Routine No growth after 5 days.    Narrative:      Blood cultures x 2 different sites. 4 bottles total. Please  draw cultures before administering antibiotics.          Reviewed and noted in plan where applicable- Please see chart for full lab data.    Lines/Drains:  PICC Double Lumen 11/01/21 1950 right basilic (Active)   Verification by X-ray Yes 11/20/21 0700   Site Assessment No drainage;No redness;No swelling;No warmth 11/20/21 0700   Extremity Assessment Distal to IV No abnormal discoloration;No redness;No swelling;No warmth 11/20/21 1100   Line Securement Device Secured with sutureless device 11/20/21 0700   Dressing Type Biopatch in place;Central line dressing 11/20/21 1100   Dressing Status Clean;Dry;Intact 11/20/21 1100   Dressing Intervention Integrity maintained 11/20/21 1100   Date on Dressing 11/15/21 11/19/21 1901  "  Dressing Due to be Changed 11/22/21 11/19/21 1901   Left Lumen Patency/Care Normal saline locked 11/20/21 0301   Right Lumen Patency/Care Normal saline locked 11/20/21 0301   Line Necessity Review Longterm central access required;Poor venous access 11/20/21 0700   Number of days: 18            Urethral Catheter 11/17/21 1000 Latex 16 Fr. (Active)   Site Assessment Clean;Intact 11/20/21 1100   Collection Container Urimeter 11/20/21 1100   Securement Method secured to top of thigh w/ adhesive device 11/20/21 1100   Catheter Care Performed yes 11/20/21 1100   Reason for Continuing Urinary Catheterization Critically ill in ICU and requiring hourly monitoring of intake/output 11/20/21 1100   CAUTI Prevention Bundle StatLock in place w 1" slack;Intact seal between catheter & drainage tubing;Drainage bag/urimeter off the floor;Green sheeting clip in use;No dependent loops or kinks;Drainage bag/urimeter not overfilled (<2/3 full);Drainage bag/urimeter below bladder 11/20/21 0700   Output (mL) 125 mL 11/20/21 1200   Number of days: 3       Imaging      Results for orders placed during the hospital encounter of 11/12/21    Echo Saline Bubble? Yes    Interpretation Summary  · The left ventricle is normal in size with normal systolic function.  · The estimated ejection fraction is 65%.  · Normal left ventricular diastolic function.  · Normal right ventricular size with normal right ventricular systolic function.  · There is no evidence of intracardiac shunting.  · Indeterminate central venous pressure. Estimated PA systolic pressure is at least 25 mmHg.      US Upper Extremity Veins Bilateral  Narrative: EXAMINATION:  US UPPER EXTREMITY VEINS BILATERAL    CLINICAL HISTORY:  concern for Lemierre's syndrome, infectious thrombophlebitis of the internal jugular vein;    TECHNIQUE:  Duplex and color flow Doppler evaluation and dynamic compression was performed of the right and left internal jugular veins.    COMPARISON:  No " relevant prior images.    FINDINGS:  Right internal jugular vein: Patent and compressible.    Left internal jugular vein: Patent and compressible.  Impression: No thrombus in bilateral internal jugular veins.    Electronically signed by resident: Brenden Mane  Date:    11/15/2021  Time:    16:23    Electronically signed by: Rakan Vee MD  Date:    11/15/2021  Time:    17:11      Labs, Imaging, EKG and Diagnostic results from 11/23/2021 were reviewed.    Medications:  Medication list was reviewed and changes noted under Assessment/Plan.  No current facility-administered medications on file prior to encounter.     Current Outpatient Medications on File Prior to Encounter   Medication Sig Dispense Refill    albuterol (VENTOLIN HFA) 90 mcg/actuation inhaler INHALE ONE TO TWO PUFFS INTO LUNGS EVERY 6 HOURS AS NEEDED FOR WHEEZING 54 g 3    amLODIPine (NORVASC) 5 MG tablet TAKE 1 TABLET BY MOUTH EVERY DAY 90 tablet 3    aspirin 81 mg Tab Take by mouth. 1 Tablet Oral Every day      blood sugar diagnostic (FREESTYLE INSULINX TEST STRIPS) Strp Check glucose three times daily 100 each 3    butalbital-acetaminophen-caffeine -40 mg (FIORICET, ESGIC) -40 mg per tablet Take 1 tablet by mouth as needed.      calcium carbonate (TUMS) 300 mg (750 mg) Chew Take by mouth 2 (two) times daily.      diclofenac sodium (VOLTAREN) 1 % Gel APPLY TWO GRAMS TOPICALLY ONCE DAILY 100 g 0    ezetimibe (ZETIA) 10 mg tablet TAKE 1 TABLET BY MOUTH EVERY DAY IN THE EVENING 90 tablet 3    FARXIGA 10 mg tablet TAKE 1 TABLET BY MOUTH EVERY DAY 90 tablet 1    fish oil-dha-epa 1,200-144-216 mg Cap 1 Capsule Oral Every day 90 capsule 3    fluticasone-salmeterol diskus inhaler 250-50 mcg Inhale 1 puff into the lungs 2 (two) times daily. Controller 180 each 3    glipiZIDE (GLUCOTROL) 10 MG TR24 TAKE 1 TABLET (10 MG TOTAL) BY MOUTH DAILY WITH BREAKFAST. 90 tablet 2    GUAIATUSSIN AC  mg/5 mL syrup Take 5 mLs by mouth every 4  (four) hours as needed.      HYDROcodone-acetaminophen (NORCO)  mg per tablet Take 1 tablet by mouth every 8 (eight) hours as needed for Pain. 90 tablet 0    hyoscyamine (LEVSIN/SL) 0.125 mg Subl DISSOLVE 1 TABLET UNDER THE TONGUE EVERY 4 TO 6 HOURS 30 tablet 5    ibuprofen (ADVIL,MOTRIN) 800 MG tablet Take 800 mg by mouth every 8 (eight) hours as needed.  0    ipratropium (ATROVENT) 21 mcg (0.03 %) nasal spray SMARTSI Both Nares Every Night      JARDIANCE 10 mg tablet Take 10 mg by mouth once daily.      lactulose (CHRONULAC) 10 gram/15 mL solution TAKE 15 ML DAILY AS NEEDED FOR CONSTIPATION 473 mL 4    levothyroxine (SYNTHROID) 100 MCG tablet TAKE 1 TABLET DAILY 90 tablet 3    LORazepam (ATIVAN) 0.5 MG tablet Take 1 tablet (0.5 mg total) by mouth 2 (two) times daily. 60 tablet 0    meclizine (ANTIVERT) 25 mg tablet Take 1 tablet (25 mg total) by mouth 3 (three) times daily as needed. 30 tablet 0    metFORMIN (GLUCOPHAGE) 1000 MG tablet TAKE 1 TABLET BY MOUTH TWICE A DAY WITH MEALS 180 tablet 3    metoprolol tartrate (LOPRESSOR) 100 MG tablet TAKE 1 TABLET BY MOUTH TWICE A  tablet 1    mv-min-FA-Jg-Uq-vdtsevf-lutein 0.4-162-18 mg Tab Take by mouth. 1 Tablet Oral Every day      NARCAN 4 mg/actuation Spry 1 spray once.      potassium chloride SA (K-DUR,KLOR-CON) 20 MEQ tablet TAKE 1 TABLET BY MOUTH TWICE A  tablet 3    sucralfate (CARAFATE) 1 gram tablet Take 1 tablet (1 g total) by mouth 3 (three) times daily. 270 tablet 3    temazepam (RESTORIL) 30 mg capsule Take 1 capsule (30 mg total) by mouth nightly as needed. 1 Capsule Oral Every day 30 capsule 0    tiZANidine (ZANAFLEX) 4 MG tablet TAKE 1 TABLET (4 MG TOTAL) BY MOUTH EVERY 8 (EIGHT) HOURS. 90 tablet 1    TRULICITY 1.5 mg/0.5 mL pen injector INJECT 1 PEN EVERY WEEK 2 pen 4    vibegron 75 mg Tab Take 75 mg by mouth once daily. 30 tablet 11    [DISCONTINUED] DULoxetine (CYMBALTA) 30 MG capsule Take 1 capsule (30 mg  total) by mouth once daily. 90 capsule 3    [DISCONTINUED] FARXIGA 10 mg tablet TAKE 1 TABLET BY MOUTH EVERY DAY 90 tablet 1    [DISCONTINUED] sucralfate (CARAFATE) 1 gram tablet TAKE 1 TABLET BY MOUTH THREE TIMES A  tablet 0     Scheduled Medications:  amLODIPine, 10 mg, Oral, Daily  aspirin, 81 mg, Oral, Daily  cefTRIAXone (ROCEPHIN) IVPB, 2 g, Intravenous, Q12H  heparin (porcine), 5,000 Units, Subcutaneous, Q8H  insulin aspart U-100, 12 Units, Subcutaneous, TIDWM  insulin detemir U-100, 24 Units, Subcutaneous, QHS  levothyroxine, 100 mcg, Oral, Before breakfast  metronidazole, 500 mg, Intravenous, Q8H  miconazole nitrate 2%, , Topical (Top), BID  mupirocin, , Nasal, BID  vibegron, 75 mg, Oral, Daily      PRN: acetaminophen, dextrose 50%, glucagon (human recombinant), guaiFENesin 100 mg/5 ml, labetalol, magnesium oxide, magnesium oxide, melatonin, potassium bicarbonate, potassium bicarbonate, potassium bicarbonate, potassium, sodium phosphates, potassium, sodium phosphates, potassium, sodium phosphates, sodium chloride 0.9%, tiZANidine, valproate sodium (DEPACON) IVPB  Infusions:     Estimated Creatinine Clearance: 88.5 mL/min (based on SCr of 0.9 mg/dL).    ASSESSMENT/PLAN:   Overview:  Violeta Champion is a 60 y.o. female with medical history significant for     Active Hospital Problems    Diagnosis  POA    *Brain lesion [G93.9]MRI which showed multiple lesions throughout the bilateral cerebral hemispheres with vasogenic edema ,mild leftward midline shift and partial mass effect of the right lateral ventricle. Patient transferred to [facility] for neuro-ICU level of care    involving frontal, parietal, temporal, and occipital lobes with surrounding vasogenic edema. Suspect 2/2 septic emboli given recent bacteremia. Speciation of BCx 10/27 revealing organisms from oral mary. Mannitol and 3% saline started prior to transfer. CTH notable for multiple areas of mild diminished attenuation involving the cerebral  hemispheres bilaterally corresponding with multiple T2/FLAIR hyperintense lesions noted on the recent MRI examination.      - q1h neuro checks    - BCx NGTD  -DORCAS deferred  - Infectious Disease consult; appreciate recommendations. Signed off          - continue long term antibiotics x 6 weeks  - q6h Na; Na goal EuNa, drinking to thirst, consider DDAVP if Na continues to climb despite PO intake  s/p nuerosurgery eval ---no acute neurosurgical intervention  - stable for transfer to floor   11/20 s/p ID eval -Cardiology would like to defer DORCAS for now until more clinically stable. LP is  high risk for further evaluation. Patient continues to improve. IJ US is negative for septic thrombophlebitis. Iikely etiology of brain lesions is septic emboli from an undetermined source, in the setting of parvimonas and fusobacterium bacteremia. Will plan to treat with 6 of ceftriaxone and metronidazole for brain abscess and possible IE.  consider LP, recommend cell count, protein, culture and toxo PCR    11/22  . Plan for DORCAS on 11/24 AM.               Sepsis-   r/o oral bacteria associated infective endocarditis .  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  TTE was negative.  In light of brain lesions, recommend DORCAS.  cardiology defered DORCAS due to high risk. Recommend treating broadly for now with vanc, ceftriaxone and metronidazole  x 6weeks. HIV negative  11/21  . cardiology consulted for DORCAS       Encephalopathy, metabolic   secondary to brain lesions. improving. AOX2    waxing and waning mentaion.   11/21 confusion/agitation at night- pulling at gown and telemetry monitor requiring frequent redirection. psychiatry consulted . Depacon 250mg IV [id number] PRN for non-redirectable agitation.   .improved sleep after depacon.  11/231/23 agitatation overnight received depacon x 1 after midnght . started on scheduled 250mg depacon for 5pm          QTc prolongation 517 on 10/27   -  daily EKG to monitor QTc  11/23    EKG today Normal sinus rhythm 90bpm QTc 440ms          Hypernatremia  resolved      11/20 follow Na, UOP; drinking to thirst  with stable Na but if Na continues to increase consider ddavp. UOP 6 L /24h.   11/21sodium at 146 . UOP trendind down  2L /24h. discussed with nephrology. no plan for desmopressin now. started on 1/2NS at 75ml/h   11/22  Unclear if patient has Diabetes Insipidus.            Yes    BMI 45.0-49.9, adult [Z68.42]   Body mass index is 46.86 kg/m². Morbid obesity complicates all aspects of disease management from diagnostic modalities to treatment. Weight loss encouraged    Anemia   Patient's with Normocytic anemia.. Hemoglobin stable. Etiology likely due to chronic disease .  Current CBC reviewed-    Recent Labs   Lab 11/21/21  0300 11/22/21  0300 11/23/21  0300   HGB 9.6* 9.8* 9.3*         Component Value Date/Time    MCV 89 11/23/2021 0300    RDW 20.7 (H) 11/23/2021 0300    IRON 92 11/10/2004 1215     Monitor CBC and transfuse if H/H drops below 7/21.   Not Applicable    Discharge planning issues [Z02.9] needs long term antibiotics  Not Applicable    Vasogenic brain edema [G93.6]secondary to above .concern of elevated ICP 2/2 vasogenic edema of numerous brain abscesses vs masses.   Yes    Brain compression [G93.5]  Yes    Diabetes insipidus [E23.2] trend sodium  labs Q6hrs  - encourage fluid intake  - on half normal; continue with Hypernatremia as above  11/22   Unclear if patient has Diabetes Insipidus.  Yes      Yes    Essential Hypertension  Chronic, controlled.  Latest blood pressure and vitals reviewed-   Temp:  [98.1 °F (36.7 °C)-99.1 °F (37.3 °C)]   Pulse:  []   Resp:  [16-18]   BP: (113-167)/(52-89)   SpO2:  [92 %-98 %] .   Home meds for hypertension were reviewed and amlodipine continued   Hypertension Medications             amLODIPine (NORVASC) 5 MG tablet TAKE 1 TABLET BY MOUTH EVERY DAY    metoprolol tartrate (LOPRESSOR) 100 MG  tablet TAKE 1 TABLET BY MOUTH TWICE A DAY        PRN meds if BP> 180/110 mm HG  Yes    Uncontrolled type 2 diabetes mellitus with hyperglycemia [E11.65]  Patient's FSGs are not controlled on current hypoglycemics.   Last A1c reviewed-   Lab Results   Component Value Date    HGBA1C 9.0 (H) 10/27/2021    HGBA1C 7.5 (H) 07/29/2021    HGBA1C 7.7 (H) 06/16/2021     Recent Labs   Lab 11/22/21  0745 11/22/21  1257 11/22/21  1658 11/22/21 2034   POCTGLUCOSE 228* 188* 206* 154*     currently on  insulin aspart U-100, 6 Units, Subcutaneous, TIDWM and moderate dose SSI .Started  Levemir 15 units q HS   11/23  Glucose in 200s. Levemir 24 units nightly and Insulin aspart 12 units TIDWM / low dose correction scale    Hypokalemia - Patient with potassium   Recent Labs   Lab 11/22/21  0300 11/22/21  1608 11/23/21  0300   K 3.1* 3.8 3.3*   . replaced. monitor      Yes    Hypothyroidism [E03.9] TSH 1.292  WNL continue levothyroxine  Yes      Resolved Hospital Problems    Diagnosis Date Resolved POA    Morbid obesity with BMI of 60.0-69.9, adult [E66.01, Z68.44] 11/15/2021 Not Applicable       Disposition-  rehab    Discharge Planning   SARAVANAN: 11/26/2021     Code Status: Full Code   Is the patient medically ready for discharge?: No    Reason for patient still in hospital (select all that apply): Treatment  Discharge Plan A: Rehab         DVT prophylaxis addressed with:  subcutaneous heparin.        Subsequent Hospital Care   Level 2 02693 Total visit time was 25 minutes or greater with greater than 50% of time spent in counseling and coordination of care.       We discussed in detail the plan of care, the patient's response to treatment, the discharge plan. Total time includes time spent reviewing the medical record, examining the patient, writing notes and communicating with other professionals.    Chiki Arteaga MD  Attending Staff Physician  [facility]  pager- [phone]  [employer] - [phone]

## 2021-11-23 NOTE — PLAN OF CARE
Problem: Adjustment to Illness (Stroke, Hemorrhagic)  Goal: Optimal Coping  Outcome: Ongoing, Progressing     Problem: Adult Inpatient Plan of Care  Goal: Absence of Hospital-Acquired Illness or Injury  Outcome: Ongoing, Progressing   Patient alert, vss, follows command sometimes.BG stable Given all schedule med , no issue at this time will cont to reinforce.

## 2021-11-23 NOTE — ASSESSMENT & PLAN NOTE
-resolved  -last sodium 140, improved after fluid administration  -Unclear if patient has Diabetes Insipidus. Will avoid desmopressin for now, no clear indication for use currently   -Both serum and urine osmolality readings reviewed   -Output has decreased in last 12 hours, Net - 2740 in last 24 hours, slight polyuria previously seen likely secondary to IV fluid administration

## 2021-11-23 NOTE — PROGRESS NOTES
[facility]  Endocrinology  Progress Note    Admit Date: 11/12/2021     60 year old female with pertinent medical history of Type 2 Diabetes Mellitus, hypothyroidism, Intraductal papilloma of the right breast s/p excision, depression and YUSEF was admitted as transfer from [facility] for concern of elevated ICP due to vasogenic edema from numerous brain lesions (likely etiology septic emboli, in the setting of parvimonas and fusobacterium bacteremia.)   Nancy was initially admitted at [facility] 10/27 for acute encephalopathy, found to have DKA ( ( beta hydroxybutyrate 5.3) and bacteremia. 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 She improved with antibiotics and insulin until 3 days prior to transfer when she developed worsening confusion and lethargy. She had a MRI done on 11/13 revealing multiple lesions throughout the bilateral cerebral hemispheres with associated vasogenic edema and leftward midline shift after which she was transferred to our hospital.      Endocrinology was consulted for management of type 2 diabetes mellitus, hypothyroidism and hypernatremia        Regarding Diabetes Mellitus    Type: Type 2 Diabetes Mellitus  Diabetes diagnosis year: first diagnosed in 2010.    Home Diabetes Medications:  Metformin 1000 mg twice daily, Trulicity 1.5 mg weekly and glipizide just 10 mg daily    Previous Medications tried:  Januvia  Farxiga   Has never been on insulin.     How often checking glucose at home? Very infrequently  Hypoglycemia on the regimen?  No      Diabetes Complications include:     Diabetic Ketoacidosis  Neuropathy    Complicating diabetes co morbidities:   YUSEF  Infection        Regarding Hypothyroidism  -Has hypothyroidism for almost 10 years  -On 100 mcg levothyroxine      Her Current symptoms are:         No   Yes    []    [x]   Weight gain    []    [x]   Fatigue    []    [x]   Constipation    [x]    []   Hair loss    [x]    []    "Brittle nails    []    [x]   Mental fog    [x]    []   Cold intolerance    []    [x]   Memory impair    [x]    []   Muscle weakness    [x]    []   Neck swelling, pain, pressure    [x]    []   Hoarseness    [x]    []   Periorbital edema    [x]    []   Lithium or Amiodarone use    []    [x]   Recent severe illness        [x]    []   Recent pregnancy      Personal or Family History:      No   Yes    []    [x]   Thyroid disorder    [x]    []   Thyroid cancer        Last BMD: Normal forearm BMD in 2013 while on fosamax       Previous thyroid studies: none      Plan for pregnancy at this time: none        Lab Results   Component Value Date    TSH 1.392 2021    TSH 0.214 (L) 10/27/2021    TSH 2.236 2021    FREET4 0.90 10/27/2021    FREET4 1.07 2021    FREET4 1.17 2021       Regarding Hypernateremia  -Unclear if it is actual Diabetes Insipidus.   -The Urine Output had normalized after initially being high for a day post IV fluids adminitstration  -No history of Diabetes Insipidus in the patient.   -Never taken Desmopressin in the past, was ordered but discontinued while in the hospital on this visit  -Patient is awake, alert, altered, disoriented and has spells of agitation at night  -Patient had complaints of polydipsia and polyuria initially, currently denies polyuria        Ref. Range 2021 17:51 2021 20:09 2021 23:19 2021 00:00 2021 00:00   Osmolality 275 - 295 mOsm/kg 312 (H)   318 (H) 318 (H)   Osmolality, Urine 50 - 1200 mOsm/kg  263 253                 Interval HPI:   Overnight events: sodium improved to 140  Eatin%    BP (!) 153/72   Pulse 89   Temp 96 °F (35.6 °C)   Resp 17   Ht 5' 4" (1.626 m)   Wt 123.8 kg (273 lb)   LMP  (LMP Unknown)   SpO2 (!) 94%   BMI 46.86 kg/m²     Labs Reviewed and Include    Recent Labs   Lab 21  0300   *   CALCIUM 8.5*   ALBUMIN 2.4*   PROT 6.9      K 3.3*   CO2 26      BUN 8   CREATININE 0.9 "   ALKPHOS 65   ALT <5*   AST 10   BILITOT 0.2     Lab Results   Component Value Date    WBC 6.10 11/23/2021    HGB 9.3 (L) 11/23/2021    HCT 30.5 (L) 11/23/2021    MCV 89 11/23/2021     11/23/2021     No results for input(s): TSH, FREET4 in the last 168 hours.  Lab Results   Component Value Date    HGBA1C 9.0 (H) 10/27/2021       Nutritional status:   Body mass index is 46.86 kg/m².  Lab Results   Component Value Date    ALBUMIN 2.4 (L) 11/23/2021    ALBUMIN 2.5 (L) 11/22/2021    ALBUMIN 2.4 (L) 11/22/2021     No results found for: PREALBUMIN    Estimated Creatinine Clearance: 88.5 mL/min (based on SCr of 0.9 mg/dL).    Accu-Checks  Recent Labs     11/20/21  2109 11/21/21  0734 11/21/21  1209 11/21/21  1659 11/21/21  2112 11/22/21  0745 11/22/21  1257 11/22/21  1658 11/22/21  2034 11/23/21  0852   POCTGLUCOSE 261* 284* 216* 233* 189* 228* 188* 206* 154* 192*       Current Medications and/or Treatments Impacting Glycemic Control  Immunotherapy:    Immunosuppressants     None        Steroids:   Hormones (From admission, onward)            Start     Stop Route Frequency Ordered    11/19/21 1859  melatonin tablet 6 mg         -- Oral Nightly PRN 11/19/21 1759        Pressors:    Autonomic Drugs (From admission, onward)            None        Hyperglycemia/Diabetes Medications:   Antihyperglycemics (From admission, onward)            Start     Stop Route Frequency Ordered    11/22/21 2100  insulin detemir U-100 pen 24 Units         -- SubQ Nightly 11/22/21 1508    11/22/21 1645  insulin aspart U-100 pen 12 Units         -- SubQ 3 times daily with meals 11/22/21 1508          ASSESSMENT and PLAN    Hypernatremia  -resolved  -last sodium 140, improved after fluid administration  -Unclear if patient has Diabetes Insipidus. Will avoid desmopressin for now, no clear indication for use currently   -Both serum and urine osmolality readings reviewed   -Output has decreased in last 12 hours, Net - 2740 in last 24 hours,  slight polyuria previously seen likely secondary to IV fluid administration    Uncontrolled type 2 diabetes mellitus with hyperglycemia  -Better controlled with Insulin Levemir 24 units nightly and Insulin aspart 12 units before meals along with low dose correction scale  -Fingersticks before meals, at bedtime and 2 am advised  -Hypoglycemia precautions ordered      Hypothyroidism  -Last TSH 1.392  -Would continue levothyroxine 100 mcg  -No signs of acute decompensation secondary to hypothyroidism noted on evaluation of patient        Lauri Merchant MD  Endocrinology  [facility]

## 2021-11-23 NOTE — PLAN OF CARE
Problem: Adult Inpatient Plan of Care  Goal: Plan of Care Review  Outcome: Ongoing, Progressing  Flowsheets (Taken 11/23/2021 0334)  Plan of Care Reviewed With: patient  Goal: Patient-Specific Goal (Individualization)  Outcome: Ongoing, Progressing  Flowsheets (Taken 11/23/2021 0334)  Individualized Care Needs: safety  Anxieties, Fears or Concerns: n/a  Patient-Specific Goals (Include Timeframe): Free of fall and injury during the shift  Goal: Absence of Hospital-Acquired Illness or Injury  Outcome: Ongoing, Progressing  Goal: Optimal Comfort and Wellbeing  Outcome: Ongoing, Progressing  Goal: Readiness for Transition of Care  Outcome: Ongoing, Progressing  Goal: Rounds/Family Conference  Outcome: Ongoing, Progressing     Problem: Diabetes Comorbidity  Goal: Blood Glucose Level Within Desired Range  Outcome: Ongoing, Progressing     Problem: Adjustment to Illness (Sepsis/Septic Shock)  Goal: Optimal Coping  Outcome: Ongoing, Progressing     Problem: Bleeding (Sepsis/Septic Shock)  Goal: Absence of Bleeding  Outcome: Ongoing, Progressing     Problem: Glycemic Control Impaired (Sepsis/Septic Shock)  Goal: Blood Glucose Level Within Desired Range  Outcome: Ongoing, Progressing     Problem: Hemodynamic Instability (Sepsis/Septic Shock)  Goal: Effective Tissue Perfusion  Outcome: Ongoing, Progressing     Problem: Infection (Sepsis/Septic Shock)  Goal: Absence of Infection Signs/Symptoms  Outcome: Ongoing, Progressing     Problem: Nutrition Impaired (Sepsis/Septic Shock)  Goal: Optimal Nutrition Intake  Outcome: Ongoing, Progressing     Problem: Electrolyte Imbalance (Acute Kidney Injury/Impairment)  Goal: Serum Electrolyte Balance  Outcome: Ongoing, Progressing     Problem: Fluid Imbalance (Acute Kidney Injury/Impairment)  Goal: Optimal Fluid Balance  Outcome: Ongoing, Progressing     Problem: Hematologic Alteration (Acute Kidney Injury/Impairment)  Goal: Hemoglobin, Hematocrit and Platelets Within Normal  Range  Outcome: Ongoing, Progressing     Problem: Oral Intake Inadequate (Acute Kidney Injury/Impairment)  Goal: Optimal Nutrition Intake  Outcome: Ongoing, Progressing     Problem: Renal Function Impairment (Acute Kidney Injury/Impairment)  Goal: Effective Renal Function  Outcome: Ongoing, Progressing     Problem: Bariatric Environmental Safety  Goal: Safety Maintained with Care  Outcome: Ongoing, Progressing     Problem: Infection  Goal: Infection Symptom Resolution  Outcome: Ongoing, Progressing     Problem: Wound  Goal: Optimal Wound Healing  Outcome: Ongoing, Progressing     Problem: Adjustment to Illness (Stroke, Hemorrhagic)  Goal: Optimal Coping  Outcome: Ongoing, Progressing     Problem: Bowel Elimination Impaired (Stroke, Hemorrhagic)  Goal: Effective Bowel Elimination  Outcome: Ongoing, Progressing     Problem: Cerebral Tissue Perfusion Risk (Stroke, Hemorrhagic)  Goal: Optimal Cerebral Tissue Perfusion  Outcome: Ongoing, Progressing     Problem: Communication Impairment (Stroke, Hemorrhagic)  Goal: Improved Communication Skills and Cognitive Function  Outcome: Ongoing, Progressing     Problem: Eating/Swallowing Impairment (Stroke, Hemorrhagic)  Goal: Oral Intake without Aspiration  Outcome: Ongoing, Progressing     Problem: Functional Ability Impaired (Stroke, Hemorrhagic)  Goal: Optimal Functional Ability  Outcome: Ongoing, Progressing     Problem: Hemodynamic Instability (Stroke, Hemorrhagic)  Goal: Vital Signs Remain in Desired Range  Outcome: Ongoing, Progressing     Problem: Pain (Stroke, Hemorrhagic)  Goal: Acceptable Pain Control  Outcome: Ongoing, Progressing     Problem: Sensorimotor Impairment (Stroke, Hemorrhagic)  Goal: Improved Sensorimotor Function  Outcome: Ongoing, Progressing     Problem: Urinary Elimination Impaired (Stroke, Hemorrhagic)  Goal: Effective Urinary Elimination  Outcome: Ongoing, Progressing     Problem: Skin Injury Risk Increased  Goal: Skin Health and Integrity  Outcome:  Ongoing, Progressing

## 2021-11-23 NOTE — PROGRESS NOTES
[facility]  Psychiatry  Progress Note    Patient Name: Violeta Champion  MRN: [id number]   Code Status: Full Code  Admission Date: 11/12/2021  Hospital Length of Stay: 11 days  Expected Discharge Date: 11/26/2021  Attending Physician: Chiki Arteaga MD  Primary Care Provider: Madie Petersen DO    Current Legal Status: Uncontested    Patient information was obtained from patient, relative(s) and ER records.       HPI:   Consultation-Liaison Psychiatry Consult Note    11/21/2021 4:12 PM  Violeta Champion  MRN: [id number]    Chief Complaint / Reason for Consult: agitation     SUBJECTIVE     History of Present Illness:   Violeta Champion is a 60 y.o. female with a past psychiatric history of depression currently presenting with Brain lesion.  Psychiatry was originally consulted to address the patient's symptoms of agitation.    Per Primary MD:  HPI/Interval history (See H&P for complete P,F,SHx) :   Pmhx DM2, hypothyroidism, YUSEF, hx breast ca, fibromyalgia, htn, hld, depression, class 3 obesity admitted as transfer from [facility] for concern of elevated ICP 2/2 vasogenic edema of numerous brain abscesses vs masses. Patient recently admitted at [facility] 10/27 for acute encephalopathy, found to have DKA vs HHS and bacteremia. Clinically improved with abx and appropriate blood sugar management until 3 days prior to transfer when she developed worsening confusion and lethargy. MRI 11/13 revealing multiple lesions throughout the bilateral cerebral hemispheres with associated vasogenic edema and leftward midline shift.      HPI from [facility] below:  Miss Champion is a 60 year old female with a PMH of DMII on oral anti-hyperglycemics, hypothyroid on synthroid, YUSEF, Right breast papilloma, fibromyalgia, HTN, HLD and depression presents to [facility] via ambulance after being found down and unresponsive at home. Patient is obtunded with a GCS of 8 and is not able to participate in any history giving. This note is  "per report from neighbor, EMS, ED providers and nurse. Per report Miss Champion' neighbor hadnt seen her for 3 days so they went over to her house to check on her and found her down and unresponsive. EMS was called and brought her into the ED. Upon drawing labwork patient Wbc was 25.8, , anion gap 8, Cr 2.9, blood glucose 805, UDS pos for barbiturates, pH 6.928. Patient is protecting airway although her respirations are labored, Sp02 mid to low 90's and she has YUSEF. A dose of narcan had no response, patient was further treated with bicarb, insulin, IV fluids, IV vanc and zosyn and admitted to the ICU for a higher level of care. Patients sister Darcy was called by staff and myself and asked to be kept updated, phone number in EMR.    Per C-L Psych MD:  Pt awake in bed.  She is noticeably confused and has difficulty finding with word finding.  Pt endorses history of depression.  She has taken medication in the past, but is not currently taking anything recently because "I've been doing well".  Oriented to self and being in the hospital.  She does not recall transfer from [facility] and states that she is in the hospital because of a virus.  Pt preservative on asking for Sprite and sugar free drinks, stating that she can have them. 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       Psychiatric Review Of Systems - Is patient experiencing or having changes in:  Unable to assess due to AMS    Psychiatric History:  Diagnose(s): depression   Previous Medication Trials: Yes   Previous Psychiatric Hospitalizations: No  Previous Suicide Attempts: No  History of Violence: No  Outpatient Psychiatrist: No.  Pt does have an outpatient therapist at [facility] per chart review    Social History:  Marital Status: single  Children: 0   Employment Status: currently employed as nurse at [employer]   Education: college graduate  Special Ed: no   History: no  Housing Status: Lives " alone  Developmental History: No  History of Abuse: unable to assess   Access to Gun:  unable to assess     Substance Abuse History:  Per family at bedside and chart check, no history of drug or alcohol use.  Unable to assess with pt at this time due to AMS    Legal History:  Past Charges/Incarcerations: No  Pending Charges: No    Family Psychiatric History:   No    Psychosocial Factors:  Unable to assess    Collateral:   Darcy Mancuso, Sister, at bedside  Over the past two days, she is less agitated than previously.  She continues to be confused.  Confusion and agitation are worse during the night.  Pt works as a nurse at night, so she is normally awake at night.  Pt at times will try to climb out of the bed, or start pulling at her diaper.  At one point she was smearing feces on things.  Sister agreeable to trying medication as needed for agitation.  She has one other sibling and they are alternating who stays with the pt.  Family is trying to remain at bedside for redirection as much as they are able.      Medical Review Of Systems:  Unable to assess       Hospital Course: 11/22: Pt's sister is at bedside and provides much of the history.  Last night she did experience some agitation and poor sleep at first despite Depacon.  Around 4am she fell asleep and had restful sleep.  Overall she thought they had a better night last night.  This morning when she woke up, she was far less confused.  Patient is laughing and smiling appropriately this morning.  She is participating with physical therapy and appears more alert.     11/23: Pt's sister is at bedside.  Last night she had a rough night.  Pt at times starts pulling at sheets, lays side ways/twisted in the bed, and puts hands in feces.  Sister had to go to another room briefly last night because she was getting agitated with her as well.  Nursing had a difficult time taking care of her last night because she was not following instructions.  Sister went back to the  "room with nursing and was able to help re-direct her.  Around 6pm she requested Depacon.   Sister says that it was delayed coming from the pharmacy and pt did not receive it until 1am.  No updated EKG on file.    Pt reports that she had an okay night.  She mentions getting a DORCAS.  Patient is quite confused today.  She becomes irritable when asking orientation questions.  She identifies that she is at [facility].  When asked what city, she says [place]" and repeats that the city is [facility] [place].  She fluctuated between the month being October or November.  When asked if there is a holiday coming up, after much thought and counting months out loud she said multiple times that the holiday coming up is "October".                  Family History     Problem Relation (Age of Onset)    Amblyopia Father    Breast cancer Paternal Aunt, Paternal Grandmother    Cancer Maternal Grandmother    Diabetes Mother, Maternal Aunt, Maternal Uncle    Glaucoma Mother    Hypertension Mother, Brother        Tobacco Use    Smoking status: Never Smoker    Smokeless tobacco: Never Used   Substance and Sexual Activity    Alcohol use: Yes     Alcohol/week: 0.0 standard drinks     Comment: very rarely    Drug use: No    Sexual activity: Not on file     Psychotherapeutics (From admission, onward)            None           Review of Systems  Objective:     Vital Signs (Most Recent):  Temp: 96 °F (35.6 °C) (11/23/21 0800)  Pulse: 89 (11/23/21 0800)  Resp: 17 (11/23/21 0800)  BP: (!) 153/72 (11/23/21 0800)  SpO2: (!) 94 % (11/23/21 0800) Vital Signs (24h Range):  Temp:  [96 °F (35.6 °C)-99.1 °F (37.3 °C)] 96 °F (35.6 °C)  Pulse:  [86-99] 89  Resp:  [17-18] 17  SpO2:  [92 %-98 %] 94 %  BP: (126-167)/(66-89) 153/72     Height: 5' 4" (162.6 cm)  Weight: 123.8 kg (273 lb)  Body mass index is 46.86 kg/m².      Intake/Output Summary (Last 24 hours) at 11/23/2021 0965  Last data filed at 11/23/2021 0600  Gross per 24 hour   Intake --   Output " "2450 ml   Net -2450 ml       Mental Status Exam:  Appearance: poor grooming, laying in bed at an angle, obese   Behavior/Cooperation: normal, cooperative  Speech: somewhat mumbled, normal rate, normal tone  Language: fluent english  Mood: "good"  Affect: mildly constricted   Thought Process: somewhat disorganized, with redirection more goal-directed, perservates on last word spoken  Thought Content: normal, no suicidality, no homicidality, delusions, or paranoia   Orientation: person, place, situation  Memory: Grossly intact  Attention Span/Concentration: Impaired  Fund of Knowledge: Estimated to be average level   Insight: fair  Judgment: fair      Significant Labs: All pertinent labs within the past 24 hours have been reviewed.    Significant Imaging: I have reviewed all pertinent imaging results/findings within the past 24 hours.       Scheduled Medications:   amLODIPine  10 mg Oral Daily    aspirin  81 mg Oral Daily    cefTRIAXone (ROCEPHIN) IVPB  2 g Intravenous Q12H    heparin (porcine)  5,000 Units Subcutaneous Q8H    insulin aspart U-100  12 Units Subcutaneous TIDWM    insulin detemir U-100  24 Units Subcutaneous QHS    levothyroxine  100 mcg Oral Before breakfast    metronidazole  500 mg Intravenous Q8H    miconazole nitrate 2%   Topical (Top) BID    vibegron  75 mg Oral Daily       PRN Medications:  acetaminophen, dextrose 50%, glucagon (human recombinant), guaiFENesin 100 mg/5 ml, labetalol, magnesium oxide, magnesium oxide, melatonin, potassium bicarbonate, potassium bicarbonate, potassium bicarbonate, potassium, sodium phosphates, potassium, sodium phosphates, potassium, sodium phosphates, sodium chloride 0.9%, tiZANidine, valproate sodium (DEPACON) IVPB    Review of patient's allergies indicates:   Allergen Reactions    Iodinated contrast media Swelling     Other reaction(s): Swelling    Naproxen sodium Swelling    Naprosyn  [naproxen]      Other reaction(s): Swelling "       Assessment/Plan:     Encephalopathy, metabolic  ASSESSMENT     iVoleta Champion is a 60 y.o. female with a past psychiatric history of depression currently presenting with Brain lesion.  Psychiatry was originally consulted to address the patient's symptoms of agitation.    IMPRESSION  Hyperactive delirium     RECOMMENDATION(S)      1. Scheduled Medication(s):  Schedule 250mg depacon for 5pm     2. PRN Medication(s):  Depacon 250mg IV [id number] PRN for non-redirectable agitation.    3.  Monitor:  Please obtain daily EKG to monitor QTc.  If pt receives depacon, monitor ammonia and transaminases.    4. Legal Status/Precaution(s):  Patient does not meet criteria for PEC or inpatient psychiatric admission at this time. Recommend to rescind PEC if one was placed. Patient is not currently an imminent danger to self or others and is not gravely disabled due to a psychiatric illness.    5. Capacity:  Pt continues to have waxing and waning of symptoms and continues to refuse treatment at times. Therefore pt currently does NOT have medical decision making capacity due to Delirium. Please contact next of kin for making medical decisions currently.    6. Other:  CAM ICU - Positive  DELIRIUM BEHAVIOR MANAGEMENT   PLEASE utilize CHEMICAL restraints with PRN meds first for agitation. Minimize use of PHYSICAL restraints   Keep window shades open and room lit during day and room dim at night in order to promote normal sleep-wake cycles   Encourage family at bedside. [place] patient often to situation, location, date   Continue to Limit or Discontinue use of Narcotics, Benzos and Anti-cholinergic medications as they may worsen delirium   Continue medical workup for causative etiology of Delirium         Total time:  25 with greater than 50% of this time spent in counseling and/or coordination of care.     Lala Raya MD, PhD  [facility]  -2  11/23/2021

## 2021-11-23 NOTE — SUBJECTIVE & OBJECTIVE
"Interval HPI:   Overnight events: sodium improved to 140  Eatin%    BP (!) 153/72   Pulse 89   Temp 96 °F (35.6 °C)   Resp 17   Ht 5' 4" (1.626 m)   Wt 123.8 kg (273 lb)   LMP  (LMP Unknown)   SpO2 (!) 94%   BMI 46.86 kg/m²     Labs Reviewed and Include    Recent Labs   Lab 21  0300   *   CALCIUM 8.5*   ALBUMIN 2.4*   PROT 6.9      K 3.3*   CO2 26      BUN 8   CREATININE 0.9   ALKPHOS 65   ALT <5*   AST 10   BILITOT 0.2     Lab Results   Component Value Date    WBC 6.10 2021    HGB 9.3 (L) 2021    HCT 30.5 (L) 2021    MCV 89 2021     2021     No results for input(s): TSH, FREET4 in the last 168 hours.  Lab Results   Component Value Date    HGBA1C 9.0 (H) 10/27/2021       Nutritional status:   Body mass index is 46.86 kg/m².  Lab Results   Component Value Date    ALBUMIN 2.4 (L) 2021    ALBUMIN 2.5 (L) 2021    ALBUMIN 2.4 (L) 2021     No results found for: PREALBUMIN    Estimated Creatinine Clearance: 88.5 mL/min (based on SCr of 0.9 mg/dL).    Accu-Checks  Recent Labs     21  2109 21  0734 21  1209 21  1659 21  2112 21  0745 21  1257 21  1658 21  2034 21  0852   POCTGLUCOSE 261* 284* 216* 233* 189* 228* 188* 206* 154* 192*       Current Medications and/or Treatments Impacting Glycemic Control  Immunotherapy:    Immunosuppressants     None        Steroids:   Hormones (From admission, onward)            Start     Stop Route Frequency Ordered    21 1859  melatonin tablet 6 mg         -- Oral Nightly PRN 21 1759        Pressors:    Autonomic Drugs (From admission, onward)            None        Hyperglycemia/Diabetes Medications:   Antihyperglycemics (From admission, onward)            Start     Stop Route Frequency Ordered    21 2100  insulin detemir U-100 pen 24 Units         -- SubQ Nightly 21 1508    21 1645  insulin aspart U-100 pen " 12 Units         -- SubQ 3 times daily with meals 11/22/21 5880

## 2021-11-23 NOTE — ASSESSMENT & PLAN NOTE
ASSESSMENT     Violeta Champion is a 60 y.o. female with a past psychiatric history of depression currently presenting with Brain lesion.  Psychiatry was originally consulted to address the patient's symptoms of agitation.    IMPRESSION  Hyperactive delirium     RECOMMENDATION(S)      1. Scheduled Medication(s):  Schedule 250mg depacon for 5pm     2. PRN Medication(s):  Depacon 250mg IV [id number] PRN for non-redirectable agitation.    3.  Monitor:  Please obtain daily EKG to monitor QTc.  If pt receives depacon, monitor ammonia and transaminases.    4. Legal Status/Precaution(s):  Patient does not meet criteria for PEC or inpatient psychiatric admission at this time. Recommend to rescind PEC if one was placed. Patient is not currently an imminent danger to self or others and is not gravely disabled due to a psychiatric illness.    5. Capacity:  Pt continues to have waxing and waning of symptoms and continues to refuse treatment at times. Therefore pt currently does NOT have medical decision making capacity due to Delirium. Please contact next of kin for making medical decisions currently.    6. Other:  CAM ICU - Positive  DELIRIUM BEHAVIOR MANAGEMENT   PLEASE utilize CHEMICAL restraints with PRN meds first for agitation. Minimize use of PHYSICAL restraints   Keep window shades open and room lit during day and room dim at night in order to promote normal sleep-wake cycles   Encourage family at bedside. [place] patient often to situation, location, date   Continue to Limit or Discontinue use of Narcotics, Benzos and Anti-cholinergic medications as they may worsen delirium   Continue medical workup for causative etiology of Delirium

## 2021-11-23 NOTE — ASSESSMENT & PLAN NOTE
-Better controlled with Insulin Levemir 24 units nightly and Insulin aspart 12 units before meals along with low dose correction scale  -Fingersticks before meals, at bedtime and 2 am advised  -Hypoglycemia precautions ordered

## 2021-11-24 ENCOUNTER — ANESTHESIA (OUTPATIENT)
Dept: MEDSURG UNIT | Facility: HOSPITAL | Age: 61
DRG: 871 | End: 2021-11-24
Payer: COMMERCIAL

## 2021-11-24 LAB
ALBUMIN SERPL BCP-MCNC: 2.6 G/DL (ref 3.5–5.2)
ALP SERPL-CCNC: 69 U/L (ref 55–135)
ALT SERPL W/O P-5'-P-CCNC: <5 U/L (ref 10–44)
ANION GAP SERPL CALC-SCNC: 13 MMOL/L (ref 8–16)
AST SERPL-CCNC: 10 U/L (ref 10–40)
BASOPHILS # BLD AUTO: 0.03 K/UL (ref 0–0.2)
BASOPHILS NFR BLD: 0.6 % (ref 0–1.9)
BILIRUB SERPL-MCNC: 0.2 MG/DL (ref 0.1–1)
BSA FOR ECHO PROCEDURE: 2.36 M2
BUN SERPL-MCNC: 6 MG/DL (ref 6–20)
CALCIUM SERPL-MCNC: 8.6 MG/DL (ref 8.7–10.5)
CHLORIDE SERPL-SCNC: 107 MMOL/L (ref 95–110)
CO2 SERPL-SCNC: 25 MMOL/L (ref 23–29)
CREAT SERPL-MCNC: 0.9 MG/DL (ref 0.5–1.4)
DIFFERENTIAL METHOD: ABNORMAL
EJECTION FRACTION: 60 %
EOSINOPHIL # BLD AUTO: 0.3 K/UL (ref 0–0.5)
EOSINOPHIL NFR BLD: 5 % (ref 0–8)
ERYTHROCYTE [DISTWIDTH] IN BLOOD BY AUTOMATED COUNT: 21.2 % (ref 11.5–14.5)
EST. GFR  (AFRICAN AMERICAN): >60 ML/MIN/1.73 M^2
EST. GFR  (NON AFRICAN AMERICAN): >60 ML/MIN/1.73 M^2
GLUCOSE SERPL-MCNC: 180 MG/DL (ref 70–110)
HCT VFR BLD AUTO: 32.6 % (ref 37–48.5)
HGB BLD-MCNC: 9.9 G/DL (ref 12–16)
IMM GRANULOCYTES # BLD AUTO: 0.01 K/UL (ref 0–0.04)
IMM GRANULOCYTES NFR BLD AUTO: 0.2 % (ref 0–0.5)
LYMPHOCYTES # BLD AUTO: 2.4 K/UL (ref 1–4.8)
LYMPHOCYTES NFR BLD: 43.3 % (ref 18–48)
MAGNESIUM SERPL-MCNC: 1.9 MG/DL (ref 1.6–2.6)
MCH RBC QN AUTO: 27.3 PG (ref 27–31)
MCHC RBC AUTO-ENTMCNC: 30.4 G/DL (ref 32–36)
MCV RBC AUTO: 90 FL (ref 82–98)
MONOCYTES # BLD AUTO: 0.6 K/UL (ref 0.3–1)
MONOCYTES NFR BLD: 11.6 % (ref 4–15)
NEUTROPHILS # BLD AUTO: 2.1 K/UL (ref 1.8–7.7)
NEUTROPHILS NFR BLD: 39.3 % (ref 38–73)
NRBC BLD-RTO: 0 /100 WBC
PHOSPHATE SERPL-MCNC: 4 MG/DL (ref 2.7–4.5)
PLATELET # BLD AUTO: 224 K/UL (ref 150–450)
PMV BLD AUTO: 11.7 FL (ref 9.2–12.9)
POCT GLUCOSE: 132 MG/DL (ref 70–110)
POCT GLUCOSE: 148 MG/DL (ref 70–110)
POCT GLUCOSE: 218 MG/DL (ref 70–110)
POTASSIUM SERPL-SCNC: 3.7 MMOL/L (ref 3.5–5.1)
PROT SERPL-MCNC: 6.9 G/DL (ref 6–8.4)
RBC # BLD AUTO: 3.62 M/UL (ref 4–5.4)
SODIUM SERPL-SCNC: 145 MMOL/L (ref 136–145)
WBC # BLD AUTO: 5.43 K/UL (ref 3.9–12.7)

## 2021-11-24 PROCEDURE — 11000001 HC ACUTE MED/SURG PRIVATE ROOM

## 2021-11-24 PROCEDURE — S0030 INJECTION, METRONIDAZOLE: HCPCS | Performed by: PHYSICIAN ASSISTANT

## 2021-11-24 PROCEDURE — 97530 THERAPEUTIC ACTIVITIES: CPT

## 2021-11-24 PROCEDURE — 25000003 PHARM REV CODE 250: Performed by: HOSPITALIST

## 2021-11-24 PROCEDURE — 63600175 PHARM REV CODE 636 W HCPCS: Performed by: NURSE ANESTHETIST, CERTIFIED REGISTERED

## 2021-11-24 PROCEDURE — 97535 SELF CARE MNGMENT TRAINING: CPT

## 2021-11-24 PROCEDURE — 99232 PR SUBSEQUENT HOSPITAL CARE,LEVL II: ICD-10-PCS | Mod: ,,, | Performed by: INTERNAL MEDICINE

## 2021-11-24 PROCEDURE — 92507 TX SP LANG VOICE COMM INDIV: CPT

## 2021-11-24 PROCEDURE — 25000003 PHARM REV CODE 250: Performed by: PHYSICIAN ASSISTANT

## 2021-11-24 PROCEDURE — 63600175 PHARM REV CODE 636 W HCPCS: Performed by: NURSE PRACTITIONER

## 2021-11-24 PROCEDURE — 63600175 PHARM REV CODE 636 W HCPCS: Performed by: PHYSICIAN ASSISTANT

## 2021-11-24 PROCEDURE — 99232 PR SUBSEQUENT HOSPITAL CARE,LEVL II: ICD-10-PCS | Mod: ,,, | Performed by: HOSPITALIST

## 2021-11-24 PROCEDURE — 93010 ELECTROCARDIOGRAM REPORT: CPT | Mod: ,,, | Performed by: INTERNAL MEDICINE

## 2021-11-24 PROCEDURE — 99232 SBSQ HOSP IP/OBS MODERATE 35: CPT | Mod: ,,, | Performed by: INTERNAL MEDICINE

## 2021-11-24 PROCEDURE — 93005 ELECTROCARDIOGRAM TRACING: CPT

## 2021-11-24 PROCEDURE — D9220A PRA ANESTHESIA: ICD-10-PCS | Mod: CRNA,,, | Performed by: NURSE ANESTHETIST, CERTIFIED REGISTERED

## 2021-11-24 PROCEDURE — 97112 NEUROMUSCULAR REEDUCATION: CPT

## 2021-11-24 PROCEDURE — 94761 N-INVAS EAR/PLS OXIMETRY MLT: CPT

## 2021-11-24 PROCEDURE — 85025 COMPLETE CBC W/AUTO DIFF WBC: CPT | Performed by: STUDENT IN AN ORGANIZED HEALTH CARE EDUCATION/TRAINING PROGRAM

## 2021-11-24 PROCEDURE — D9220A PRA ANESTHESIA: ICD-10-PCS | Mod: ANES,,, | Performed by: ANESTHESIOLOGY

## 2021-11-24 PROCEDURE — A4216 STERILE WATER/SALINE, 10 ML: HCPCS | Performed by: PHYSICIAN ASSISTANT

## 2021-11-24 PROCEDURE — 83735 ASSAY OF MAGNESIUM: CPT | Performed by: STUDENT IN AN ORGANIZED HEALTH CARE EDUCATION/TRAINING PROGRAM

## 2021-11-24 PROCEDURE — 99232 SBSQ HOSP IP/OBS MODERATE 35: CPT | Mod: ,,, | Performed by: PSYCHIATRY & NEUROLOGY

## 2021-11-24 PROCEDURE — 25000003 PHARM REV CODE 250: Performed by: NURSE PRACTITIONER

## 2021-11-24 PROCEDURE — 37000009 HC ANESTHESIA EA ADD 15 MINS

## 2021-11-24 PROCEDURE — 37000008 HC ANESTHESIA 1ST 15 MINUTES

## 2021-11-24 PROCEDURE — 25000003 PHARM REV CODE 250: Performed by: NURSE ANESTHETIST, CERTIFIED REGISTERED

## 2021-11-24 PROCEDURE — 99232 SBSQ HOSP IP/OBS MODERATE 35: CPT | Mod: ,,, | Performed by: HOSPITALIST

## 2021-11-24 PROCEDURE — 80053 COMPREHEN METABOLIC PANEL: CPT | Performed by: STUDENT IN AN ORGANIZED HEALTH CARE EDUCATION/TRAINING PROGRAM

## 2021-11-24 PROCEDURE — D9220A PRA ANESTHESIA: Mod: CRNA,,, | Performed by: NURSE ANESTHETIST, CERTIFIED REGISTERED

## 2021-11-24 PROCEDURE — 97110 THERAPEUTIC EXERCISES: CPT

## 2021-11-24 PROCEDURE — D9220A PRA ANESTHESIA: Mod: ANES,,, | Performed by: ANESTHESIOLOGY

## 2021-11-24 PROCEDURE — 25000003 PHARM REV CODE 250: Performed by: STUDENT IN AN ORGANIZED HEALTH CARE EDUCATION/TRAINING PROGRAM

## 2021-11-24 PROCEDURE — 93010 EKG 12-LEAD: ICD-10-PCS | Mod: ,,, | Performed by: INTERNAL MEDICINE

## 2021-11-24 PROCEDURE — 99232 PR SUBSEQUENT HOSPITAL CARE,LEVL II: ICD-10-PCS | Mod: ,,, | Performed by: PSYCHIATRY & NEUROLOGY

## 2021-11-24 PROCEDURE — 84100 ASSAY OF PHOSPHORUS: CPT | Performed by: STUDENT IN AN ORGANIZED HEALTH CARE EDUCATION/TRAINING PROGRAM

## 2021-11-24 RX ORDER — PHENYLEPHRINE HYDROCHLORIDE 10 MG/ML
INJECTION INTRAVENOUS
Status: DISCONTINUED | OUTPATIENT
Start: 2021-11-24 | End: 2021-11-24

## 2021-11-24 RX ORDER — LIDOCAINE HYDROCHLORIDE 20 MG/ML
INJECTION INTRAVENOUS
Status: DISCONTINUED | OUTPATIENT
Start: 2021-11-24 | End: 2021-11-24

## 2021-11-24 RX ORDER — QUETIAPINE FUMARATE 100 MG/1
100 TABLET, FILM COATED ORAL NIGHTLY
Status: DISCONTINUED | OUTPATIENT
Start: 2021-11-24 | End: 2021-11-26

## 2021-11-24 RX ORDER — HYDROMORPHONE HYDROCHLORIDE 1 MG/ML
0.2 INJECTION, SOLUTION INTRAMUSCULAR; INTRAVENOUS; SUBCUTANEOUS EVERY 5 MIN PRN
Status: DISCONTINUED | OUTPATIENT
Start: 2021-11-24 | End: 2021-11-24 | Stop reason: HOSPADM

## 2021-11-24 RX ORDER — DEXMEDETOMIDINE HYDROCHLORIDE 100 UG/ML
INJECTION, SOLUTION INTRAVENOUS
Status: DISCONTINUED | OUTPATIENT
Start: 2021-11-24 | End: 2021-11-24

## 2021-11-24 RX ORDER — SODIUM CHLORIDE 450 MG/100ML
INJECTION, SOLUTION INTRAVENOUS CONTINUOUS
Status: ACTIVE | OUTPATIENT
Start: 2021-11-24 | End: 2021-11-24

## 2021-11-24 RX ORDER — PROPOFOL 10 MG/ML
VIAL (ML) INTRAVENOUS CONTINUOUS PRN
Status: DISCONTINUED | OUTPATIENT
Start: 2021-11-24 | End: 2021-11-24

## 2021-11-24 RX ORDER — SODIUM CHLORIDE 0.9 % (FLUSH) 0.9 %
10 SYRINGE (ML) INJECTION
Status: DISCONTINUED | OUTPATIENT
Start: 2021-11-24 | End: 2021-11-24 | Stop reason: HOSPADM

## 2021-11-24 RX ORDER — PROPOFOL 10 MG/ML
VIAL (ML) INTRAVENOUS
Status: DISCONTINUED | OUTPATIENT
Start: 2021-11-24 | End: 2021-11-24

## 2021-11-24 RX ADMIN — CEFTRIAXONE SODIUM 2 G: 2 INJECTION, SOLUTION INTRAVENOUS at 03:11

## 2021-11-24 RX ADMIN — INSULIN ASPART 12 UNITS: 100 INJECTION, SOLUTION INTRAVENOUS; SUBCUTANEOUS at 06:11

## 2021-11-24 RX ADMIN — MUPIROCIN: 20 OINTMENT TOPICAL at 08:11

## 2021-11-24 RX ADMIN — QUETIAPINE FUMARATE 100 MG: 100 TABLET ORAL at 08:11

## 2021-11-24 RX ADMIN — METRONIDAZOLE 500 MG: 500 INJECTION, SOLUTION INTRAVENOUS at 01:11

## 2021-11-24 RX ADMIN — LIDOCAINE HYDROCHLORIDE 100 MG: 20 INJECTION, SOLUTION INTRAVENOUS at 10:11

## 2021-11-24 RX ADMIN — VALPROATE SODIUM 250 MG: 100 INJECTION, SOLUTION INTRAVENOUS at 08:11

## 2021-11-24 RX ADMIN — METRONIDAZOLE 500 MG: 500 INJECTION, SOLUTION INTRAVENOUS at 10:11

## 2021-11-24 RX ADMIN — Medication 6 MG: at 10:11

## 2021-11-24 RX ADMIN — ACETAMINOPHEN 650 MG: 325 TABLET ORAL at 08:11

## 2021-11-24 RX ADMIN — VALPROATE SODIUM 250 MG: 100 INJECTION, SOLUTION INTRAVENOUS at 06:11

## 2021-11-24 RX ADMIN — TIZANIDINE 2 MG: 2 TABLET ORAL at 03:11

## 2021-11-24 RX ADMIN — ASPIRIN 81 MG: 81 TABLET, COATED ORAL at 08:11

## 2021-11-24 RX ADMIN — TIZANIDINE 2 MG: 2 TABLET ORAL at 08:11

## 2021-11-24 RX ADMIN — MICONAZOLE NITRATE: 20 OINTMENT TOPICAL at 09:11

## 2021-11-24 RX ADMIN — INSULIN ASPART 12 UNITS: 100 INJECTION, SOLUTION INTRAVENOUS; SUBCUTANEOUS at 01:11

## 2021-11-24 RX ADMIN — DEXMEDETOMIDINE HYDROCHLORIDE 8 MCG: 100 INJECTION, SOLUTION, CONCENTRATE INTRAVENOUS at 10:11

## 2021-11-24 RX ADMIN — SODIUM CHLORIDE: 0.45 INJECTION, SOLUTION INTRAVENOUS at 07:11

## 2021-11-24 RX ADMIN — VALPROATE SODIUM 250 MG: 100 INJECTION, SOLUTION INTRAVENOUS at 04:11

## 2021-11-24 RX ADMIN — ACETAMINOPHEN 650 MG: 325 TABLET ORAL at 03:11

## 2021-11-24 RX ADMIN — PHENYLEPHRINE HYDROCHLORIDE 200 MCG: 10 INJECTION, SOLUTION INTRAMUSCULAR; INTRAVENOUS; SUBCUTANEOUS at 10:11

## 2021-11-24 RX ADMIN — MUPIROCIN: 20 OINTMENT TOPICAL at 09:11

## 2021-11-24 RX ADMIN — HEPARIN SODIUM 5000 UNITS: 5000 INJECTION INTRAVENOUS; SUBCUTANEOUS at 08:11

## 2021-11-24 RX ADMIN — LEVOTHYROXINE SODIUM 100 MCG: 100 TABLET ORAL at 05:11

## 2021-11-24 RX ADMIN — Medication 150 MCG/KG/MIN: at 10:11

## 2021-11-24 RX ADMIN — GUAIFENESIN 200 MG: 100 SOLUTION ORAL at 08:11

## 2021-11-24 RX ADMIN — MICONAZOLE NITRATE: 20 OINTMENT TOPICAL at 08:11

## 2021-11-24 RX ADMIN — Medication 10 ML: at 10:11

## 2021-11-24 RX ADMIN — PHENYLEPHRINE HYDROCHLORIDE 200 MCG: 10 INJECTION, SOLUTION INTRAMUSCULAR; INTRAVENOUS; SUBCUTANEOUS at 11:11

## 2021-11-24 RX ADMIN — GLYCOPYRROLATE 0.2 MG: 0.2 INJECTION, SOLUTION INTRAMUSCULAR; INTRAVITREAL at 10:11

## 2021-11-24 RX ADMIN — HEPARIN SODIUM 5000 UNITS: 5000 INJECTION INTRAVENOUS; SUBCUTANEOUS at 01:11

## 2021-11-24 RX ADMIN — VALPROATE SODIUM 250 MG: 100 INJECTION, SOLUTION INTRAVENOUS at 01:11

## 2021-11-24 RX ADMIN — AMLODIPINE BESYLATE 10 MG: 10 TABLET ORAL at 08:11

## 2021-11-24 RX ADMIN — SODIUM CHLORIDE: 0.9 INJECTION, SOLUTION INTRAVENOUS at 10:11

## 2021-11-24 RX ADMIN — PROPOFOL 50 MG: 10 INJECTION, EMULSION INTRAVENOUS at 10:11

## 2021-11-24 NOTE — PT/OT/SLP PROGRESS
Occupational Therapy   Treatment    Name: Violeta Champion  MRN: [id number]  Admitting Diagnosis:  Brain lesion  Day of Surgery    Recommendations:     Discharge Recommendations: rehabilitation facility  Discharge Equipment Recommendations:  none  Barriers to discharge:  None    Assessment:     Violeta Champion is a 60 y.o. female with a medical diagnosis of Brain lesion.  She presents with impairments listed below. Pt did well to tolerate and participate in the session. Pt is functioning well below baseline at this time and is requiring high overall levels of assist. Pt noted w/ impaired cognition and decreased safety awareness, making the pt an overall safety concerns and high fall risk. Pt displayed global deconditioning requiring increased assist for ADLs and mobility at this time. Pt would benefit from skilled OT services to improve independence and overall occupational functioning.     Performance deficits affecting function are weakness,impaired endurance,impaired self care skills,impaired functional mobilty,gait instability,impaired balance,impaired cognition,decreased lower extremity function,impaired cardiopulmonary response to activity,decreased safety awareness.     Rehab Prognosis:  Good; patient would benefit from acute skilled OT services to address these deficits and reach maximum level of function.       Plan:     Patient to be seen 4 x/week to address the above listed problems via self-care/home management,therapeutic activities,therapeutic exercises,neuromuscular re-education  · Plan of Care Expires: 12/13/21  · Plan of Care Reviewed with: patient    Subjective     Pain/Comfort:  · Pain Rating 1: 0/10  · Pain Rating Post-Intervention 1: 0/10    Objective:     Communicated with: RN prior to session.  Patient found HOB elevated with telemetry upon OT entry to room.    General Precautions: Standard, aspiration,fall   Orthopedic Precautions:N/A   Braces: N/A  Respiratory Status:  · O2 Device (Oxygen  Therapy): room air     Occupational Performance:     Bed Mobility:    · Patient completed Scooting/Bridging with maximal assistance and 2 persons  · Patient completed Supine to Sit with maximal assistance and 2 persons  · Patient completed Sit to Supine with maximal assistance and 2 persons     Functional Mobility/Transfers:  · Not appropriate at this time 2/2 poor sitting balance and poor command following.    [facility] 6 Click ADL: 11    Treatment & Education:  Pt sat EOB ~15 min at max a w/ L lateral lean. Air mattress contributed to L lateral.  Pt worked on sit and reach activity seated EOB. Pt w/ good BUE AROM and was able to hit targets w/ 100% accuracy, but required max a for trunk control.   Pt's dtr was educated extensively on D/C recs, pt performance, POC, and overall coordination of care.     Patient left HOB elevated with all lines intact, call button in reach and sister presentEducation:      GOALS:   Multidisciplinary Problems     Occupational Therapy Goals        Problem: Occupational Therapy Goal    Goal Priority Disciplines Outcome Interventions   Occupational Therapy Goal     OT, PT/OT Ongoing, Progressing    Description: Goals to be met by: 2 weeks (11/27/21)     Patient will increase functional independence with ADLs by performing:    UE Dressing with Minimal Assistance.  Grooming while seated with Minimal Assistance.  Sitting at edge of bed x10 minutes with Minimal Assistance.  Supine to sit with Minimal Assistance.  Stand pivot transfers with Moderate Assistance.  Pt will follow 3/3 one-step commands to participate in ADL task.                     Time Tracking:     OT Date of Treatment: 11/24/21  OT Start Time: 0918  OT Stop Time: 0941  OT Total Time (min): 23 min    Billable Minutes:Therapeutic Activity 23 minutes    OT/MARY ALICE: OT          11/24/2021

## 2021-11-24 NOTE — SUBJECTIVE & OBJECTIVE
"Interval HPI:   Overnight events: sodium 145 today, patient going for DORCAS was NPO overnight  Hypoglycemia and intervention: No  Fever: No    /71 (BP Location: Left arm, Patient Position: Lying)   Pulse 89   Temp 97.8 °F (36.6 °C) (Oral)   Resp 16   Ht 5' 4" (1.626 m)   Wt 123.8 kg (273 lb)   LMP  (LMP Unknown)   SpO2 97%   BMI 46.86 kg/m²     Labs Reviewed and Include    Recent Labs   Lab 11/24/21  0558   *   CALCIUM 8.6*   ALBUMIN 2.6*   PROT 6.9      K 3.7   CO2 25      BUN 6   CREATININE 0.9   ALKPHOS 69   ALT <5*   AST 10   BILITOT 0.2     Lab Results   Component Value Date    WBC 5.43 11/24/2021    HGB 9.9 (L) 11/24/2021    HCT 32.6 (L) 11/24/2021    MCV 90 11/24/2021     11/24/2021     No results for input(s): TSH, FREET4 in the last 168 hours.  Lab Results   Component Value Date    HGBA1C 9.0 (H) 10/27/2021       Nutritional status:   Body mass index is 46.86 kg/m².  Lab Results   Component Value Date    ALBUMIN 2.6 (L) 11/24/2021    ALBUMIN 2.4 (L) 11/23/2021    ALBUMIN 2.5 (L) 11/22/2021     No results found for: PREALBUMIN    Estimated Creatinine Clearance: 88.5 mL/min (based on SCr of 0.9 mg/dL).    Accu-Checks  Recent Labs     11/21/21 2112 11/22/21  0745 11/22/21  1257 11/22/21  1658 11/22/21  2034 11/23/21  0852 11/23/21  1136 11/23/21  1628 11/23/21  2111 11/24/21  0815   POCTGLUCOSE 189* 228* 188* 206* 154* 192* 197* 188* 188* 218*       Current Medications and/or Treatments Impacting Glycemic Control  Immunotherapy:    Immunosuppressants     None        Steroids:   Hormones (From admission, onward)            Start     Stop Route Frequency Ordered    11/19/21 1859  melatonin tablet 6 mg         -- Oral Nightly PRN 11/19/21 1759        Pressors:    Autonomic Drugs (From admission, onward)            None        Hyperglycemia/Diabetes Medications:   Antihyperglycemics (From admission, onward)            Start     Stop Route Frequency Ordered    11/22/21 2100  " insulin detemir U-100 pen 24 Units         -- SubQ Nightly 11/22/21 1508    11/22/21 1645  insulin aspart U-100 pen 12 Units         -- SubQ 3 times daily with meals 11/22/21 1506

## 2021-11-24 NOTE — ASSESSMENT & PLAN NOTE
-Will increase Insulin Levemir to 26 units nightly and continue Insulin aspart 12 units before meals along with low dose correction scale  -Fingersticks before meals, at bedtime and 2 am advised  -Hypoglycemia precautions ordered

## 2021-11-24 NOTE — PT/OT/SLP PROGRESS
Physical Therapy Co-Treatment with OT    Patient Name:  Violeta Champion   MRN:  [id number]    *Co-treatment with OT due to patient complexity and need for two skilled therapists to ensure safe mobilization.    Recommendations:     Discharge Recommendations:  rehabilitation facility   Discharge Equipment Recommendations:  (tbd @ next level of care)   Barriers to discharge: Inaccessible home, Decreased caregiver support and increased (A)    Highest Level of Mobility: Sitting EOB  Assistance Required: Mod(A) with brief moments of SBA    Assessment:     Violeta Champion is a 60 y.o. female admitted with a medical diagnosis of Brain lesion.  She presents with the following impairments/functional limitations:  weakness,impaired balance,decreased safety awareness,impaired endurance,gait instability,impaired functional mobilty,decreased lower extremity function,decreased upper extremity function,impaired cognition. Violeta Champion would benefit from acute PT intervention to address listed functional deficits, provide patient/caregiver education, reduce fall risk, and maximize (I) and safety with functional mobility.    Pt met with HOB elevated, sister present and agreeable to PT treatment. Pt with improved active participation at this date and follows most 1-step commands appropriately. Today's PT focus was on therapeutic exercise and neuromuscular re-education to bring attention to midline. Pt continues to demo L-sided inattention, however demos increased  L-sided use as compared to previous sessions. It was unsafe to attempt transfers today, but pt is progressing appropriately.    Pt is progressing towards acute PT goals appropriately and continues to benefit from acute PT sessions. After hospital discharge, pt would benefit from inpatient rehab to maximize rehab potential.    Rehab Prognosis: Good; patient would benefit from acute skilled PT services to address these deficits and reach maximum level of function.      Plan:      During this hospitalization, patient to be seen 4 x/week to address the identified rehab impairments via gait training,therapeutic activities,therapeutic exercises,neuromuscular re-education and progress toward the following goals:    · Plan of Care Expires:  12/13/21    This plan of care has been discussed with the patient/caregiver, who was included in its development and is in agreement with the identified goals and treatment plan.     Subjective     Communicated with RN prior to session.  Patient agreeable to participate.     Pain/Comfort:  · Pain Rating 1: 0/10  · Pain Rating Post-Intervention 1: 0/10    Chief Complaint: Brain lesion  Patient/Family Comments/goals: Return to PLOF      Objective:     Patient found HOB elevated with telemetry,enamorado catheter,peripheral IV  upon PT entry to room.    General Precautions: Standard, aspiration,fall   Orthopedic Precautions:N/A   Braces: N/A         Exams:     Cognition:  o Pt is alert and oriented x 2 (person, place)  o Command following: intact most simple 1-step commands    Functional Mobility:    Bed Mobility:  · Supine to Sit: Maximum Assistance and 2 persons  on L side of bed  · Sit to Supine: Maximum Assistance and 2 persons  · Rolling L: Maximum Assistance  · Rolling R: Maximum Assistance  · Scooting anteriorly to EOB to plant feet on floor: Maximum Assistance and 2 persons  · Scooting/Bridging in supine to HOB: Maximum Assistance and 2 persons via drawsheet    Transfers:   · Sit to Stand Transfer: Activity did not occur  due to poor sitting balance EOB             Gait:  · Patient unable at this time    Balance:  · Static Sit:   · Mod Assist at EOB x ~15 minutes  · Poor: Patient requires handhold support and moderate to maximal assistance to maintain position.   · Pt with L-sided lean, requires max verbal and tactile cues for attention to midline  · Dynamic sit:  · Poor: Patient unable to accept challenge or move without loss of balance.      Therapeutic  Activities/Exercises      Patient assisted with functional mobility as noted above   PT performed pec stretching while pt sitting EOB, 3 reps x30 second hold   Pt performed manually resisted LAQs with LLE while EOB x10 reps   Pt completed cross-body reaching activity with L UE. PT positioned behind pt and constraining R UE in order for pt to use her L UE for task. OT positioned in front providing pt with target.   Patient educated on the importance of early mobility to prevent functional decline during hospital stay   Patient educated on PT POC and role of PT in acute care   White board updated regarding patient's safest level of mobility with staff assistance, RN also updated.     AM-PAC 6 CLICK MOBILITY  Total Score:8     Patient left HOB elevated with all lines intact, call button in reach, RN notified and sister, transport present.        History/Goals:     PAST MEDICAL HISTORY:  Past Medical History:   Diagnosis Date    Breast cancer 12/10/2020    Diabetes mellitus, type 2     GERD (gastroesophageal reflux disease)     High cholesterol     Hypertension     Hypothyroid     Injury of knee, leg, ankle and foot     Insulin-resistant diabetes mellitus and acanthosis nigricans     Menopause present     Osteoarthritis     Osteoarthritis, knee     Osteopenia     Osteopenia     Sleep apnea     Status post breast lump removal 12/01/2020       Past Surgical History:   Procedure Laterality Date    bilateral duct removal breast      BREAST CYST ASPIRATION Right 2020    EXCISIONAL BIOPSY Right 12/10/2020    Procedure: EXCISIONAL BIOPSY, breast; u/s guided;  Surgeon: Bernadette Lopez MD;  Location: [facility];  Service: General;  Laterality: Right;    HYSTERECTOMY      OOPHORECTOMY      OTHER SURGICAL HISTORY      bilateral central duct removal with bilateral papilomona       GOALS:   Multidisciplinary Problems     Physical Therapy Goals        Problem: Physical Therapy Goal    Goal Priority Disciplines  Outcome Goal Variances Interventions   Physical Therapy Goal     PT, PT/OT Ongoing, Progressing     Description: Goals to be met by: 21     Patient will increase functional independence with mobility by performin. Supine to sit with Moderate Assistance  2. Sit to supine with Moderate Assistance  3. Rolling to Left and Right with Moderate Assistance.  4. Sit to stand transfer with Maximum Assistance  5. Bed to chair transfer with Maximum Assistance using LRAD  6. Gait  x 5 feet with Maximum Assistance using LRAD.   7. Lower extremity exercise program x15 reps per handout, with assistance as needed                     Time Tracking:     PT Received On: 21  PT Start Time: 916     PT Stop Time: 940  PT Total Time (min): 24 min     Billable Minutes: Therapeutic Exercise 12 and Neuromuscular Re-education 12      Sarahi Norris, PT  2021   Pager# [phone]

## 2021-11-24 NOTE — ASSESSMENT & PLAN NOTE
-last sodium 145, patient restarted on 1/2 NS fluid administration overnight  -unlikely that patient has Diabetes Insipidus. Will avoid desmopressin for now  -input and output are not accurate in chart, need accurate input and output documentation for management  -Both serum and urine osmolality readings reviewed

## 2021-11-24 NOTE — PROGRESS NOTES
[facility]  Endocrinology  Progress Note    Admit Date: 11/12/2021     60 year old female with pertinent medical history of Type 2 Diabetes Mellitus, hypothyroidism, Intraductal papilloma of the right breast s/p excision, depression and YUSEF was admitted as transfer from [place] for concern of elevated ICP due to vasogenic edema from numerous brain lesions (likely etiology septic emboli, in the setting of parvimonas and fusobacterium bacteremia.)   Nancy was initially admitted at [place] 10/27 for acute encephalopathy, found to have DKA ( ( beta hydroxybutyrate 5.3) and bacteremia. 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 She improved with antibiotics and insulin until 3 days prior to transfer when she developed worsening confusion and lethargy. She had a MRI done on 11/13 revealing multiple lesions throughout the bilateral cerebral hemispheres with associated vasogenic edema and leftward midline shift after which she was transferred to our hospital.      Endocrinology was consulted for management of type 2 diabetes mellitus, hypothyroidism and hypernatremia        Regarding Diabetes Mellitus    Type: Type 2 Diabetes Mellitus  Diabetes diagnosis year: first diagnosed in 2010.    Home Diabetes Medications:  Metformin 1000 mg twice daily, Trulicity 1.5 mg weekly and glipizide just 10 mg daily    Previous Medications tried:  Januvia  Farxiga   Has never been on insulin.     How often checking glucose at home? Very infrequently  Hypoglycemia on the regimen?  No      Diabetes Complications include:     Diabetic Ketoacidosis  Neuropathy    Complicating diabetes co morbidities:   YUSEF  Infection        Regarding Hypothyroidism  -Has hypothyroidism for almost 10 years  -On 100 mcg levothyroxine      Her Current symptoms are:         No   Yes    []    [x]   Weight gain    []    [x]   Fatigue    []    [x]   Constipation    [x]    []   Hair loss    [x]    []    "Brittle nails    []    [x]   Mental fog    [x]    []   Cold intolerance    []    [x]   Memory impair    [x]    []   Muscle weakness    [x]    []   Neck swelling, pain, pressure    [x]    []   Hoarseness    [x]    []   Periorbital edema    [x]    []   Lithium or Amiodarone use    []    [x]   Recent severe illness        [x]    []   Recent pregnancy      Personal or Family History:      No   Yes    []    [x]   Thyroid disorder    [x]    []   Thyroid cancer        Last BMD: Normal forearm BMD in 2013 while on fosamax       Previous thyroid studies: none      Plan for pregnancy at this time: none        Lab Results   Component Value Date    TSH 1.392 11/13/2021    TSH 0.214 (L) 10/27/2021    TSH 2.236 07/29/2021    FREET4 0.90 10/27/2021    FREET4 1.07 07/29/2021    FREET4 1.17 03/16/2021       Regarding Hypernateremia  -Unclear if it is actual Diabetes Insipidus.   -The Urine Output had normalized after initially being high for a day post IV fluids adminitstration  -No history of Diabetes Insipidus in the patient.   -Never taken Desmopressin in the past, was ordered but discontinued while in the hospital on this visit  -Patient is awake, alert, altered, disoriented and has spells of agitation at night  -Patient had complaints of polydipsia and polyuria initially, currently denies polyuria        Ref. Range 11/20/2021 17:51 11/20/2021 20:09 11/20/2021 23:19 11/21/2021 00:00 11/21/2021 00:00   Osmolality 275 - 295 mOsm/kg 312 (H)   318 (H) 318 (H)   Osmolality, Urine 50 - 1200 mOsm/kg  263 253                 Interval HPI:   Overnight events: sodium 145 today, patient going for DORCAS was NPO overnight  Hypoglycemia and intervention: No  Fever: No    /71 (BP Location: Left arm, Patient Position: Lying)   Pulse 89   Temp 97.8 °F (36.6 °C) (Oral)   Resp 16   Ht 5' 4" (1.626 m)   Wt 123.8 kg (273 lb)   LMP  (LMP Unknown)   SpO2 97%   BMI 46.86 kg/m²     Labs Reviewed and Include    Recent Labs   Lab " 11/24/21  0558   *   CALCIUM 8.6*   ALBUMIN 2.6*   PROT 6.9      K 3.7   CO2 25      BUN 6   CREATININE 0.9   ALKPHOS 69   ALT <5*   AST 10   BILITOT 0.2     Lab Results   Component Value Date    WBC 5.43 11/24/2021    HGB 9.9 (L) 11/24/2021    HCT 32.6 (L) 11/24/2021    MCV 90 11/24/2021     11/24/2021     No results for input(s): TSH, FREET4 in the last 168 hours.  Lab Results   Component Value Date    HGBA1C 9.0 (H) 10/27/2021       Nutritional status:   Body mass index is 46.86 kg/m².  Lab Results   Component Value Date    ALBUMIN 2.6 (L) 11/24/2021    ALBUMIN 2.4 (L) 11/23/2021    ALBUMIN 2.5 (L) 11/22/2021     No results found for: PREALBUMIN    Estimated Creatinine Clearance: 88.5 mL/min (based on SCr of 0.9 mg/dL).    Accu-Checks  Recent Labs     11/21/21 2112 11/22/21  0745 11/22/21  1257 11/22/21  1658 11/22/21  2034 11/23/21  0852 11/23/21  1136 11/23/21  1628 11/23/21  2111 11/24/21  0815   POCTGLUCOSE 189* 228* 188* 206* 154* 192* 197* 188* 188* 218*       Current Medications and/or Treatments Impacting Glycemic Control  Immunotherapy:    Immunosuppressants     None        Steroids:   Hormones (From admission, onward)            Start     Stop Route Frequency Ordered    11/19/21 1859  melatonin tablet 6 mg         -- Oral Nightly PRN 11/19/21 1759        Pressors:    Autonomic Drugs (From admission, onward)            None        Hyperglycemia/Diabetes Medications:   Antihyperglycemics (From admission, onward)            Start     Stop Route Frequency Ordered    11/22/21 2100  insulin detemir U-100 pen 24 Units         -- SubQ Nightly 11/22/21 1508    11/22/21 1645  insulin aspart U-100 pen 12 Units         -- SubQ 3 times daily with meals 11/22/21 1508          ASSESSMENT and PLAN    Hypernatremia  -last sodium 145, patient restarted on 1/2 NS fluid administration overnight  -unlikely that patient has Diabetes Insipidus. Will avoid desmopressin for now  -input and output are  not accurate in chart, need accurate input and output documentation for management  -Both serum and urine osmolality readings reviewed     Uncontrolled type 2 diabetes mellitus with hyperglycemia  -Will increase Insulin Levemir to 26 units nightly and continue Insulin aspart 12 units before meals along with low dose correction scale  -Fingersticks before meals, at bedtime and 2 am advised  -Hypoglycemia precautions ordered      Hypothyroidism  -Last TSH 1.392  -Would continue levothyroxine 100 mcg  -No signs of acute decompensation secondary to hypothyroidism noted on evaluation of patient        Lauri Merchant MD  Endocrinology  [facility]

## 2021-11-24 NOTE — PROGRESS NOTES
[facility]  Psychiatry  Progress Note    Patient Name: Violeta Champion  MRN: [id number]   Code Status: Full Code  Admission Date: 11/12/2021  Hospital Length of Stay: 12 days  Expected Discharge Date: 11/26/2021  Attending Physician: Chiki Arteaga MD  Primary Care Provider: Madie Petersen DO    Current Legal Status: Uncontested    Patient information was obtained from patient, relative(s) and ER records.       HPI:   Consultation-Liaison Psychiatry Consult Note    11/21/2021 4:12 PM  Violeta Champion  MRN: [id number]    Chief Complaint / Reason for Consult: agitation     SUBJECTIVE     History of Present Illness:   Violeta Champion is a 60 y.o. female with a past psychiatric history of depression currently presenting with Brain lesion.  Psychiatry was originally consulted to address the patient's symptoms of agitation.    Per Primary MD:  HPI/Interval history (See H&P for complete P,F,SHx) :   Pmhx DM2, hypothyroidism, YUSEF, hx breast ca, fibromyalgia, htn, hld, depression, class 3 obesity admitted as transfer from [facility] for concern of elevated ICP 2/2 vasogenic edema of numerous brain abscesses vs masses. Patient recently admitted at [facility] 10/27 for acute encephalopathy, found to have DKA vs HHS and bacteremia. Clinically improved with abx and appropriate blood sugar management until 3 days prior to transfer when she developed worsening confusion and lethargy. MRI 11/13 revealing multiple lesions throughout the bilateral cerebral hemispheres with associated vasogenic edema and leftward midline shift.      HPI from [facility] below:  Miss Champion is a 60 year old female with a PMH of DMII on oral anti-hyperglycemics, hypothyroid on synthroid, YUSEF, Right breast papilloma, fibromyalgia, HTN, HLD and depression presents to [facility] via ambulance after being found down and unresponsive at home. Patient is obtunded with a GCS of 8 and is not able to participate in any history giving. This note is per  "report from neighbor, EMS, ED providers and nurse. Per report Miss Champion' neighbor hadnt seen her for 3 days so they went over to her house to check on her and found her down and unresponsive. EMS was called and brought her into the ED. Upon drawing labwork patient Wbc was 25.8, , anion gap 8, Cr 2.9, blood glucose 805, UDS pos for barbiturates, pH 6.928. Patient is protecting airway although her respirations are labored, Sp02 mid to low 90's and she has YUSEF. A dose of narcan had no response, patient was further treated with bicarb, insulin, IV fluids, IV vanc and zosyn and admitted to the ICU for a higher level of care. Patients sister Darcy was called by staff and myself and asked to be kept updated, phone number in EMR.    Per C-L Psych MD:  Pt awake in bed.  She is noticeably confused and has difficulty finding with word finding.  Pt endorses history of depression.  She has taken medication in the past, but is not currently taking anything recently because "I've been doing well".  Oriented to self and being in the hospital.  She does not recall transfer from [facility] and states that she is in the hospital because of a virus.  Pt preservative on asking for Sprite and sugar free drinks, stating that she can have them. 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       Psychiatric Review Of Systems - Is patient experiencing or having changes in:  Unable to assess due to AMS    Psychiatric History:  Diagnose(s): depression   Previous Medication Trials: Yes   Previous Psychiatric Hospitalizations: No  Previous Suicide Attempts: No  History of Violence: No  Outpatient Psychiatrist: No.  Pt does have an outpatient therapist at [facility] per chart review    Social History:  Marital Status: single  Children: 0   Employment Status: currently employed as nurse at [employer]   Education: college graduate  Special Ed: no   History: no  Housing Status: Lives " alone  Developmental History: No  History of Abuse: unable to assess   Access to Gun:  unable to assess     Substance Abuse History:  Per family at bedside and chart check, no history of drug or alcohol use.  Unable to assess with pt at this time due to AMS    Legal History:  Past Charges/Incarcerations: No  Pending Charges: No    Family Psychiatric History:   No    Psychosocial Factors:  Unable to assess    Collateral:   Darcy Mancuso, Sister, at bedside  Over the past two days, she is less agitated than previously.  She continues to be confused.  Confusion and agitation are worse during the night.  Pt works as a nurse at night, so she is normally awake at night.  Pt at times will try to climb out of the bed, or start pulling at her diaper.  At one point she was smearing feces on things.  Sister agreeable to trying medication as needed for agitation.  She has one other sibling and they are alternating who stays with the pt.  Family is trying to remain at bedside for redirection as much as they are able.      Medical Review Of Systems:  Unable to assess       Hospital Course: 11/22: Pt's sister is at bedside and provides much of the history.  Last night she did experience some agitation and poor sleep at first despite Depacon.  Around 4am she fell asleep and had restful sleep.  Overall she thought they had a better night last night.  This morning when she woke up, she was far less confused.  Patient is laughing and smiling appropriately this morning.  She is participating with physical therapy and appears more alert.     11/23: Pt's sister is at bedside.  Last night she had a rough night.  Pt at times starts pulling at sheets, lays side ways/twisted in the bed, and puts hands in feces.  Sister had to go to another room briefly last night because she was getting agitated with her as well.  Nursing had a difficult time taking care of her last night because she was not following instructions.  Sister went back to the  "room with nursing and was able to help re-direct her.  Around 6pm she requested Depacon.   Sister says that it was delayed coming from the pharmacy and pt did not receive it until 1am.  No updated EKG on file.    Pt reports that she had an okay night.  She mentions getting a DORCAS.  Patient is quite confused today.  She becomes irritable when asking orientation questions.  She identifies that she is at [facility].  When asked what city, she says "[facility]" and repeats that the city is [facility].  She fluctuated between the month being October or November.  When asked if there is a holiday coming up, after much thought and counting months out loud she said multiple times that the holiday coming up is "October".        11/24: Patient is awake in bed and enthusiastically greets MD.  She says that she slept well last night and feels good.  She is able to tell me that she is having a study today to look at her heart (DORCAS).  Per sister at bedside, the patient had less restless behavior and agitation after receiving Depacon yesterday around dinner time.  She was somewhat agitated and restless throughout the night, so sister requested it in the evening.  Depacon was not given until 0422. After she received it, she fell asleep around 5am and slept for several hours.  Last night the sister things she talked more logically than previous nights, although she still remains quite confused.              Family History     Problem Relation (Age of Onset)    Amblyopia Father    Breast cancer Paternal Aunt, Paternal Grandmother    Cancer Maternal Grandmother    Diabetes Mother, Maternal Aunt, Maternal Uncle    Glaucoma Mother    Hypertension Mother, Brother        Tobacco Use    Smoking status: Never Smoker    Smokeless tobacco: Never Used   Substance and Sexual Activity    Alcohol use: Yes     Alcohol/week: 0.0 standard drinks     Comment: very rarely    Drug use: No    Sexual activity: Not on file " "    Psychotherapeutics (From admission, onward)            None           Review of Systems  Objective:     Vital Signs (Most Recent):  Temp: 97.8 °F (36.6 °C) (11/24/21 0848)  Pulse: 89 (11/24/21 0848)  Resp: 16 (11/24/21 0848)  BP: 114/71 (11/24/21 0848)  SpO2: 97 % (11/24/21 0848) Vital Signs (24h Range):  Temp:  [96.1 °F (35.6 °C)-98.5 °F (36.9 °C)] 97.8 °F (36.6 °C)  Pulse:  [87-95] 89  Resp:  [16-17] 16  SpO2:  [95 %-97 %] 97 %  BP: (114-154)/(52-89) 114/71     Height: 5' 4" (162.6 cm)  Weight: 123.8 kg (273 lb)  Body mass index is 46.86 kg/m².      Intake/Output Summary (Last 24 hours) at 11/24/2021 0857  Last data filed at 11/23/2021 1800  Gross per 24 hour   Intake --   Output 500 ml   Net -500 ml       Mental Status Exam:  Appearance: Obese, laying in bed  Behavior/Cooperation: cooperative, restless and fidgety , eye contact normal, inconsistently follows 1 step commands, unable to follow two step commands  Speech: normal tone, normal rate, normal pitch, normal volume  Language: english  Mood: "good"  Affect: normal, reactive   Thought Process: largely tangential, perservation at times  Thought Content: normal, no suicidality, no homicidality, delusions, or paranoia   Orientation: Oriented to person, place, situation, Disoriented to day, date, month, year  Memory: Impaired to some degree   Attention Span/Concentration: Impaired   Fund of Knowledge: Estimated to be average level   Insight: limited  Judgment: fair    CAM-ICU Positive     Significant Labs: All pertinent labs within the past 24 hours have been reviewed.    Significant Imaging: I have reviewed all pertinent imaging results/findings within the past 24 hours.       Scheduled Medications:   amLODIPine  10 mg Oral Daily    aspirin  81 mg Oral Daily    cefTRIAXone (ROCEPHIN) IVPB  2 g Intravenous Q12H    heparin (porcine)  5,000 Units Subcutaneous Q8H    insulin aspart U-100  12 Units Subcutaneous TIDWM    insulin detemir U-100  26 Units " Subcutaneous QHS    levothyroxine  100 mcg Oral Before breakfast    metronidazole  500 mg Intravenous Q8H    miconazole nitrate 2%   Topical (Top) BID    mupirocin   Topical (Top) BID    valproate sodium (DEPACON) IVPB  250 mg Intravenous with dinner    vibegron  75 mg Oral Daily       PRN Medications:  acetaminophen, dextrose 50%, glucagon (human recombinant), guaiFENesin 100 mg/5 ml, HYDROmorphone, labetalol, magnesium oxide, magnesium oxide, melatonin, potassium bicarbonate, potassium bicarbonate, potassium bicarbonate, potassium, sodium phosphates, potassium, sodium phosphates, potassium, sodium phosphates, sodium chloride 0.9%, sodium chloride 0.9%, tiZANidine, valproate sodium (DEPACON) IVPB    Review of patient's allergies indicates:   Allergen Reactions    Iodinated contrast media Swelling     Other reaction(s): Swelling    Naproxen sodium Swelling    Naprosyn  [naproxen]      Other reaction(s): Swelling       Assessment/Plan:     Encephalopathy, metabolic  ASSESSMENT     Violeta Champion is a 60 y.o. female with a past psychiatric history of depression currently presenting with Brain lesion.  Psychiatry was originally consulted to address the patient's symptoms of agitation.    IMPRESSION  Hyperactive delirium     RECOMMENDATION(S)      1. Scheduled Medication(s):  Increase Depacon to 250mg bid  Start Seroquel 100mg nightly    2. PRN Medication(s):  Depacon 250mg IV x0ffwxd PRN for non-redirectable agitation.    3.  Monitor:  Please obtain daily EKG to monitor QTc.  If pt receives depacon, monitor ammonia and transaminases.    4. Legal Status/Precaution(s):  Patient does not meet criteria for PEC or inpatient psychiatric admission at this time. Recommend to rescind PEC if one was placed. Patient is not currently an imminent danger to self or others and is not gravely disabled due to a psychiatric illness.    5. Capacity:  Pt continues to have waxing and waning of symptoms and continues to refuse  treatment at times. Therefore pt currently does NOT have medical decision making capacity due to Delirium. Please contact next of kin for making medical decisions currently.    6. Other:  CAM ICU - Positive  DELIRIUM BEHAVIOR MANAGEMENT   PLEASE utilize CHEMICAL restraints with PRN meds first for agitation. Minimize use of PHYSICAL restraints   Keep window shades open and room lit during day and room dim at night in order to promote normal sleep-wake cycles   Encourage family at bedside. [place] patient often to situation, location, date   Continue to Limit or Discontinue use of Narcotics, Benzos and Anti-cholinergic medications as they may worsen delirium   Continue medical workup for causative etiology of Delirium       Need for Continued Hospitalization:  No need for inpatient psychiatric hospitalization. Continue medical care as per the primary team.    Anticipated Disposition:  Per primary team     Total time:  25 with greater than 50% of this time spent in counseling and/or coordination of care.     Lala Raya MD, PhD  [facility]  -2  11/24/2021

## 2021-11-24 NOTE — PLAN OF CARE
Problem: Adult Inpatient Plan of Care  Goal: Plan of Care Review  Outcome: Ongoing, Progressing     Problem: Diabetes Comorbidity  Goal: Blood Glucose Level Within Desired Range  Outcome: Ongoing, Progressing     Problem: Adjustment to Illness (Sepsis/Septic Shock)  Goal: Optimal Coping  Outcome: Ongoing, Progressing

## 2021-11-24 NOTE — PT/OT/SLP PROGRESS
"Speech Language Pathology Treatment    Patient Name:  Violeta Champion   MRN:  [id number]  Admitting Diagnosis: Brain lesion    Recommendations:                 General Recommendations:  Dysphagia therapy and Cognitive-linguistic therapy  Diet recommendations:  Dental Soft, Liquid Diet Level: Thin   Aspiration Precautions: 1 bite/sip at a time, Assistance with meals, Eliminate distractions, Feed only when awake/alert, Frequent oral care, HOB to 90 degrees, Meds whole buried in puree, Meds whole 1 at a time, Monitor for s/s of aspiration, Remain upright 30 minutes post meal, Small bites/sips and Strict aspiration precautions   General Precautions: Standard, aspiration,fall  Communication strategies:  go to room if call light pushed    Subjective     SLP reviewed Pt with RN prior to session, RN confirmed Pt NPO for pending procedure, cleared for therapy  Pt presents calm  She explains, "I feel like I just woke up"   Sibling explains, "She finally got some sleep"     Pain/Comfort:  · Pain Rating 1: 0/10    Respiratory Status:  · O2 Device (Oxygen Therapy): room air    Objective:     Has the patient been evaluated by SLP for swallowing?   Yes  Keep patient NPO? No   Current Respiratory Status:  room air     Pt unavailable upon initial attempt (PT entering room to work with Pt.) upon later attempt, Pt found awake and alert in bed with call light in reach. Family in room. Pt alert and oriented x3. She demonstrated increased sustained attention and organization of thoughts t/o conversational speech tasks this service day. She completed convergent naming tasks for a FO3 verbally presented WNL. She named 7 items/minute in concrete category provided min verbal cues to expand. She completed verbal reasoning tasks (1+ similarity for FO2 items) with 80% accuracy I'ly and up to 100% accuracy with min cues to expand. PO trials deferred 2/2 NPO for pending DORCAS. SLP educated Pt and Sibling in room on ongoing SLP POC, safety precautions, " aspiration precautions and attention strategies. No questions noted. Pt and Sibling verbalized understanding. Transport in room for Pt and session discontinued.     Assessment:     Violeta Champion is a 60 y.o. female with an SLP diagnosis of Dysphagia and Cognitive-Linguistic Impairment.  She presents with improved sustained attention and thought organization this service day. ST to continue to follow. .    Goals:   Multidisciplinary Problems     SLP Goals        Problem: SLP Goal    Goal Priority Disciplines Outcome   SLP Goal     SLP Ongoing, Progressing   Description: Speech Therapy Short Term Goals  Goal expected to be met by 11/25  1. Pt will participate in an ongoing assessment to determine the least restrictive and safest diet with possible updated goals to follow pending results.  2. Pt will participate in a speech, language, and cognitive evaluation with possible updated goals to follow pending results. -ongoing  3. Pt will attend to therapy tasks for 1+ minutes given 1 redirection.   4. Pt will answer orientation questions x4 given min cues.   5. Pt will answer general information questions with 75% acc given cues.   6. Pt will complete word finding tasks with 75% acc min cues.   7. Pt will follow simple 1 step directions with 75% acc given 1 repetition.                      Plan:     · Patient to be seen:  4 x/week   · Plan of Care expires:     · Plan of Care reviewed with:  patient,sibling   · SLP Follow-Up:  Yes       Discharge recommendations:  rehabilitation facility     Time Tracking:     SLP Treatment Date:   11/24/21  Speech Start Time:  0941  Speech Stop Time:  0957     Speech Total Time (min):  16 min    Billable Minutes: Speech Therapy Individual 8 and Self Care/Home Management Training 8    11/24/2021

## 2021-11-24 NOTE — SUBJECTIVE & OBJECTIVE
"    Family History     Problem Relation (Age of Onset)    Amblyopia Father    Breast cancer Paternal Aunt, Paternal Grandmother    Cancer Maternal Grandmother    Diabetes Mother, Maternal Aunt, Maternal Uncle    Glaucoma Mother    Hypertension Mother, Brother        Tobacco Use    Smoking status: Never Smoker    Smokeless tobacco: Never Used   Substance and Sexual Activity    Alcohol use: Yes     Alcohol/week: 0.0 standard drinks     Comment: very rarely    Drug use: No    Sexual activity: Not on file     Psychotherapeutics (From admission, onward)            None           Review of Systems  Objective:     Vital Signs (Most Recent):  Temp: 97.8 °F (36.6 °C) (11/24/21 0848)  Pulse: 89 (11/24/21 0848)  Resp: 16 (11/24/21 0848)  BP: 114/71 (11/24/21 0848)  SpO2: 97 % (11/24/21 0848) Vital Signs (24h Range):  Temp:  [96.1 °F (35.6 °C)-98.5 °F (36.9 °C)] 97.8 °F (36.6 °C)  Pulse:  [87-95] 89  Resp:  [16-17] 16  SpO2:  [95 %-97 %] 97 %  BP: (114-154)/(52-89) 114/71     Height: 5' 4" (162.6 cm)  Weight: 123.8 kg (273 lb)  Body mass index is 46.86 kg/m².      Intake/Output Summary (Last 24 hours) at 11/24/2021 0857  Last data filed at 11/23/2021 1800  Gross per 24 hour   Intake --   Output 500 ml   Net -500 ml       Mental Status Exam:  Appearance: Obese, laying in bed  Behavior/Cooperation: cooperative, restless and fidgety , eye contact normal, inconsistently follows 1 step commands, unable to follow two step commands  Speech: normal tone, normal rate, normal pitch, normal volume  Language: english  Mood: "good"  Affect: normal, reactive   Thought Process: largely tangential, perservation at times  Thought Content: normal, no suicidality, no homicidality, delusions, or paranoia   Orientation: Oriented to person, place, situation, Disoriented to day, date, month, year  Memory: Impaired to some degree   Attention Span/Concentration: Impaired   Fund of Knowledge: Estimated to be average level   Insight: " limited  Judgment: fair    CAM-ICU Positive     Significant Labs: All pertinent labs within the past 24 hours have been reviewed.    Significant Imaging: I have reviewed all pertinent imaging results/findings within the past 24 hours.

## 2021-11-24 NOTE — PROGRESS NOTES
Progress Note  Hospital Medicine    Patient: Violeta Champion [id number]  Date of Service: 11/24/2021  Team: [facility] MED N Chiki Arteaga MD  Length of Stay:  LOS: 12 days   Admission Date: 11/12/2021    SUBJECTIVE:     Follow-up For:  Brain lesion    HPI/Interval history (See H&P for complete P,F,SHx) :    year old female with Pmhx DM2, hypothyroidism, YUSEF, hx breast ca, fibromyalgia, htn, hld, depression, class 3 obesity admitted as transfer from [place] for concern of elevated ICP 2/2 vasogenic edema of numerous brain abscesses vs masses. Patient recently admitted at [place] 10/27 for acute encephalopathy, found to have DKA vs HHS and bacteremia. Clinically improved with abx and appropriate blood sugar management until 3 days prior to transfer when she developed worsening confusion and lethargy. MRI 11/13 revealing multiple lesions throughout the bilateral cerebral hemispheres with associated vasogenic edema and leftward midline shift.      HPI from [place] below:  Miss Champion is a 60 year old female with a PMH of DMII on oral anti-hyperglycemics, hypothyroid on synthroid, YUSEF, Right breast papilloma, fibromyalgia, HTN, HLD and depression presents to [facility] via ambulance after being found down and unresponsive at home. Patient is obtunded with a GCS of 8 and is not able to participate in any history giving. This note is per report from neighbor, EMS, ED providers and nurse. Per report Miss Champion' neighbor hadnt seen her for 3 days so they went over to her house to check on her and found her down and unresponsive. EMS was called and brought her into the ED. Upon drawing labwork patient Wbc was 25.8, , anion gap 8, Cr 2.9, blood glucose 805, UDS pos for barbiturates, pH 6.928. Patient is protecting airway although her respirations are labored, Sp02 mid to low 90's and she has YUSEF. A dose of narcan had no response, patient was further treated with bicarb, insulin, IV fluids, IV vanc and zosyn and admitted to  the ICU for a higher level of care. Patients sister Darcy was called by staff and myself and asked to be kept updated, phone number in EMR.        Hospital Course: 11/18/2021 Hypernatremic. Monitoring.   11/19: Drink to thirst , NAEON  11/20: Drink to thirst, cont follow Na, follow ID reccs, NAEON.  Transfer to hospital medicine  Nancy was initially admitted at [facility] 10/27 for acute encephalopathy, found to have DKA ( ( beta hydroxybutyrate 5.3 )and bacteremia. 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 improved with antibiotics and blood sugar management until 3 days prior to transfer when she developed worsening confusion and lethargy. MRI 11/13 revealing multiple lesions throughout the bilateral cerebral hemispheres with associated vasogenic edema and leftward midline shift.  UOP 6 L /24h. discussed with sister at the bedside. she wanst the patient to be discharged to [place] are for rehab  11/21 confusion/agitation at night- pulling at gown and telemetry monitor requiring frequent redirection. psychiatry consulted . sodium at 146 . UOP trendind down  2L /24h.  nephrology consulted. cardiology consulted for DORCAS . discussed with nephrology. no plan for desmopressin now. started on 1/2NS at 75ml/h . Glucose in 200s.  Levemir to 20 units nightly and will increase insulin aspart to 10 units TID .c/o bladder spams. s/p psychiatry evaluation. Depacon 250mg IV q6dglno PRN for non-redirectable agitation. QTc prolongation 517 on 10/27  -  daily EKG to monitor QTc  11/22 K replaced . sodium improved to 143 . received depacon prn overnight refused EKG .improved sleep after depacon. Plan for DORCAS on 11/24 AM. Unclear if patient has Diabetes Insipidus.  11/23 agitatation overnight received depacon x 1 after midnght . started on scheduled 250mg depacon for 5pm , sodium at 140. K replaced. foleys discontinued.  EKG daily to monitor QTc . EKG today Normal sinus rhythm 90bpm QTc 440ms  11/24  DORCAS today. sodium at 145. started on 1/2NS @ 75ml/h. started on scheduled  Depacon 250mg bid and Seroquel 100mg qhs. DORCAS -No valvular vegetations or other findings to suggest cardiac source of bacteremia or abscesses.The left ventricle is normal in size with normal systolic function. The estimated ejection fraction is 60%.  Normal right ventricular size with normal right ventricular systolic function.Aneurysmal interatrial septum without evidence of interatrial shunting.                Review of Systems:   Pain scale:   Constitutional:  fever,  chills, headache, vision loss, hearing loss, weight loss, Generalized weakness, falls, loss of smell, loss of taste, poor appetite,  sore throat  Respiratory: cough, shortness of breath.   Cardiovascular: chest pain, dizziness, palpitations, orthopnea, swelling of feet, syncope  Gastrointestinal: nausea, vomiting, abdominal pain, diarrhea, black stool,  beeding per rectum,  change in bowel habits  Genitourinary: hematuria, dysuria, urgency, frequency  Integument/Breast: rash,  pruritis  Hematologic/Lymphatic: easy bruising, lymphadenopathy  Musculoskeletal: arthralgias , myalgias, back pain, neck pain, knee pain + leg pain   Neurological: confusion, seizures, tremors, slurred speech  Behavioral/Psych:  depression, anxiety, auditory or visual hallucinations     OBJECTIVE:     Physical Exam:  Body mass index is 46.86 kg/m².    Constitutional: Appears well-developed and well-nourished. morbid obesity  Head: Normocephalic and atraumatic.   Neck: Normal range of motion. Neck supple.   Cardiovascular: Normal heart rate.  Regular heart rhythm.  Pulmonary/Chest: Effort normal.   Abdominal: No distension.  No tenderness. foleys catheter  Musculoskeletal: Normal range of motion. No edema.   Neurological: Alert and oriented to person,, and time. follows commmands   able to move bilateral upper and lower extremities without limitation  Skin: Skin is warm and dry.   Psychiatric: Normal  mood and affect. Behavior is normal.                  Vital Signs  Temp: 96.5 °F (35.8 °C) (11/24/21 0504)  Pulse: 87 (11/24/21 0504)  Resp: 16 (11/24/21 0504)  BP: 134/72 (11/24/21 0504)  SpO2: 95 % (11/24/21 0504)     24 Hour VS Range    Temp:  [96 °F (35.6 °C)-98.5 °F (36.9 °C)]   Pulse:  [87-95]   Resp:  [16-17]   BP: (134-154)/(52-89)   SpO2:  [94 %-96 %]     Intake/Output Summary (Last 24 hours) at 11/24/2021 0739  Last data filed at 11/23/2021 1800  Gross per 24 hour   Intake no documentation   Output 500 ml   Net -500 ml         I/O This Shift:  No intake/output data recorded.    Wt Readings from Last 3 Encounters:   11/13/21 123.8 kg (273 lb)   11/11/21 129.5 kg (285 lb 7.9 oz)   07/08/21 135.6 kg (299 lb)       I have personally reviewed the vitals and recorded Intake/Output     Laboratory/Diagnostic Data:    CBC/Anemia Labs: Coags:    Recent Labs   Lab 11/22/21  0300 11/23/21  0300 11/24/21  0558   WBC 6.08 6.10 5.43   HGB 9.8* 9.3* 9.9*   HCT 31.8* 30.5* 32.6*    191 224   MCV 89 89 90   RDW 20.9* 20.7* 21.2*    No results for input(s): PT, INR, APTT in the last 168 hours.     Chemistries: ABG:   Recent Labs   Lab 11/22/21  0300 11/22/21  0300 11/22/21  1608 11/23/21  0300 11/24/21  0558      < > 144 140 145   K 3.1*   < > 3.8 3.3* 3.7      < > 106 104 107   CO2 25   < > 27 26 25   BUN 8   < > 8 8 6   CREATININE 0.9   < > 0.9 0.9 0.9   CALCIUM 8.4*   < > 8.5* 8.5* 8.6*   PROT 7.0  --   --  6.9 6.9   BILITOT 0.2  --   --  0.2 0.2   ALKPHOS 68  --   --  65 69   ALT <5*  --   --  <5* <5*   AST 11  --   --  10 10   MG 1.6  --   --  1.9 1.9   PHOS 3.7   < > 3.6 3.2 4.0    < > = values in this interval not displayed.    No results for input(s): PH, PCO2, PO2, HCO3, POCSATURATED, BE in the last 168 hours.     POCT Glucose: HbA1c:    Recent Labs   Lab 11/22/21  1658 11/22/21  2034 11/23/21  0852 11/23/21  1136 11/23/21  1628 11/23/21  2111   POCTGLUCOSE 206* 154* 192* 197* 188* 188*     Hemoglobin A1C   Date Value Ref Range Status   10/27/2021 9.0 (H) 4.0 - 5.6 % Final     Comment:     ADA Screening Guidelines:  5.7-6.4%  Consistent with prediabetes  >or=6.5%  Consistent with diabetes    High levels of fetal hemoglobin interfere with the HbA1C  assay. Heterozygous hemoglobin variants (HbS, HgC, etc)do  not significantly interfere with this assay.   However, presence of multiple variants may affect accuracy.     07/29/2021 7.5 (H) 4.0 - 5.6 % Final     Comment:     ADA Screening Guidelines:  5.7-6.4%  Consistent with prediabetes  >or=6.5%  Consistent with diabetes    High levels of fetal hemoglobin interfere with the HbA1C  assay. Heterozygous hemoglobin variants (HbS, HgC, etc)do  not significantly interfere with this assay.   However, presence of multiple variants may affect accuracy.     06/16/2021 7.7 (H) 4.0 - 5.6 % Final     Comment:     ADA Screening Guidelines:  5.7-6.4%  Consistent with prediabetes  >or=6.5%  Consistent with diabetes    High levels of fetal hemoglobin interfere with the HbA1C  assay. Heterozygous hemoglobin variants (HbS, HgC, etc)do  not significantly interfere with this assay.   However, presence of multiple variants may affect accuracy.          Cardiac Enzymes: Ejection Fractions:    No results for input(s): CPK, CPKMB, MB, TROPONINI in the last 72 hours. EF   Date Value Ref Range Status   11/13/2021 65 % Final   10/29/2021 60 % Final          No results for input(s): COLORU, APPEARANCEUA, PHUR, SPECGRAV, PROTEINUA, GLUCUA, KETONESU, BILIRUBINUA, OCCULTUA, NITRITE, UROBILINOGEN, LEUKOCYTESUR, RBCUA, WBCUA, BACTERIA, SQUAMEPITHEL, HYALINECASTS in the last 48 hours.    Invalid input(s): WRIGHTSUR    Lactate (Lactic Acid) (mmol/L)   Date Value   10/29/2021 1.1   10/27/2021 5.1 (HH)   10/27/2021 3.1 (H)   10/27/2021 1.6     BNP (pg/mL)   Date Value   10/27/2021 52   03/20/2018 13   12/16/2016 25   06/29/2010 10     CRP (mg/L)   Date Value   10/29/2019 6.7   06/27/2019 11.6  (H)   07/13/2016 14.9 (H)   06/20/2014 11.5 (H)     Sed Rate (mm/Hr)   Date Value   10/29/2019 65 (H)   06/27/2019 90 (H)   07/13/2016 47 (H)   06/20/2014 39 (H)     No results found for: DDIMER  No results found for: FERRITIN  No results found for: LDH  Troponin I (ng/mL)   Date Value   11/13/2021 <0.006   10/27/2021 0.015     CPK (U/L)   Date Value   10/29/2021 1306 (H)   10/27/2021 1465 (H)   06/27/2019 52   06/20/2014 106     Results for orders placed or performed in visit on 06/19/20   Vitamin D   Result Value Ref Range    Vit D, 25-Hydroxy 44 30 - 96 ng/mL   Results for orders placed or performed in visit on 06/27/19   Vitamin D   Result Value Ref Range    Vit D, 25-Hydroxy 54 30 - 96 ng/mL   Results for orders placed or performed in visit on 07/11/16   Vitamin D   Result Value Ref Range    Vit D, 25-Hydroxy 27 (L) 30 - 96 ng/mL     SARS-CoV2 (COVID-19) Qualitative PCR (no units)   Date Value   12/07/2020 Not Detected     POC Rapid COVID (no units)   Date Value   10/27/2021 Negative       Microbiology labs for the last week  Microbiology Results (last 7 days)     Procedure Component Value Units Date/Time    Blood culture [[id number]] Collected: 11/13/21 0227    Order Status: Completed Specimen: Blood Updated: 11/18/21 0612     Blood Culture, Routine No growth after 5 days.    Narrative:      Blood cultures from 2 different sites. 4 bottles total.  Please draw before starting antibiotics.    Blood culture [[id number]] Collected: 11/13/21 0227    Order Status: Completed Specimen: Blood Updated: 11/18/21 0612     Blood Culture, Routine No growth after 5 days.    Narrative:      Blood cultures x 2 different sites. 4 bottles total. Please  draw cultures before administering antibiotics.          Reviewed and noted in plan where applicable- Please see chart for full lab data.    Lines/Drains:  PICC Double Lumen 11/01/21 1950 right basilic (Active)   Verification by X-ray Yes 11/20/21 0700   Site Assessment No  "drainage;No redness;No swelling;No warmth 11/20/21 0700   Extremity Assessment Distal to IV No abnormal discoloration;No redness;No swelling;No warmth 11/20/21 1100   Line Securement Device Secured with sutureless device 11/20/21 0700   Dressing Type Biopatch in place;Central line dressing 11/20/21 1100   Dressing Status Clean;Dry;Intact 11/20/21 1100   Dressing Intervention Integrity maintained 11/20/21 1100   Date on Dressing 11/15/21 11/19/21 1901   Dressing Due to be Changed 11/22/21 11/19/21 1901   Left Lumen Patency/Care Normal saline locked 11/20/21 0301   Right Lumen Patency/Care Normal saline locked 11/20/21 0301   Line Necessity Review Longterm central access required;Poor venous access 11/20/21 0700   Number of days: 18            Urethral Catheter 11/17/21 1000 Latex 16 Fr. (Active)   Site Assessment Clean;Intact 11/20/21 1100   Collection Container Urimeter 11/20/21 1100   Securement Method secured to top of thigh w/ adhesive device 11/20/21 1100   Catheter Care Performed yes 11/20/21 1100   Reason for Continuing Urinary Catheterization Critically ill in ICU and requiring hourly monitoring of intake/output 11/20/21 1100   CAUTI Prevention Bundle StatLock in place w 1" slack;Intact seal between catheter & drainage tubing;Drainage bag/urimeter off the floor;Green sheeting clip in use;No dependent loops or kinks;Drainage bag/urimeter not overfilled (<2/3 full);Drainage bag/urimeter below bladder 11/20/21 0700   Output (mL) 125 mL 11/20/21 1200   Number of days: 3       Imaging      Results for orders placed during the hospital encounter of 11/12/21    Echo Saline Bubble? Yes    Interpretation Summary  · The left ventricle is normal in size with normal systolic function.  · The estimated ejection fraction is 65%.  · Normal left ventricular diastolic function.  · Normal right ventricular size with normal right ventricular systolic function.  · There is no evidence of intracardiac shunting.  · Indeterminate " central venous pressure. Estimated PA systolic pressure is at least 25 mmHg.      US Upper Extremity Veins Bilateral  Narrative: EXAMINATION:  US UPPER EXTREMITY VEINS BILATERAL    CLINICAL HISTORY:  concern for Lemierre's syndrome, infectious thrombophlebitis of the internal jugular vein;    TECHNIQUE:  Duplex and color flow Doppler evaluation and dynamic compression was performed of the right and left internal jugular veins.    COMPARISON:  No relevant prior images.    FINDINGS:  Right internal jugular vein: Patent and compressible.    Left internal jugular vein: Patent and compressible.  Impression: No thrombus in bilateral internal jugular veins.    Electronically signed by resident: Brenden Mane  Date:    11/15/2021  Time:    16:23    Electronically signed by: Rakan Vee MD  Date:    11/15/2021  Time:    17:11      Labs, Imaging, EKG and Diagnostic results from 11/24/2021 were reviewed.    Medications:  Medication list was reviewed and changes noted under Assessment/Plan.  No current facility-administered medications on file prior to encounter.     Current Outpatient Medications on File Prior to Encounter   Medication Sig Dispense Refill    albuterol (VENTOLIN HFA) 90 mcg/actuation inhaler INHALE ONE TO TWO PUFFS INTO LUNGS EVERY 6 HOURS AS NEEDED FOR WHEEZING 54 g 3    amLODIPine (NORVASC) 5 MG tablet TAKE 1 TABLET BY MOUTH EVERY DAY 90 tablet 3    aspirin 81 mg Tab Take by mouth. 1 Tablet Oral Every day      blood sugar diagnostic (FREESTYLE INSULINX TEST STRIPS) Strp Check glucose three times daily 100 each 3    butalbital-acetaminophen-caffeine -40 mg (FIORICET, ESGIC) -40 mg per tablet Take 1 tablet by mouth as needed.      calcium carbonate (TUMS) 300 mg (750 mg) Chew Take by mouth 2 (two) times daily.      diclofenac sodium (VOLTAREN) 1 % Gel APPLY TWO GRAMS TOPICALLY ONCE DAILY 100 g 0    ezetimibe (ZETIA) 10 mg tablet TAKE 1 TABLET BY MOUTH EVERY DAY IN THE EVENING 90 tablet 3     FARXIGA 10 mg tablet TAKE 1 TABLET BY MOUTH EVERY DAY 90 tablet 1    fish oil-dha-epa 1,200-144-216 mg Cap 1 Capsule Oral Every day 90 capsule 3    fluticasone-salmeterol diskus inhaler 250-50 mcg Inhale 1 puff into the lungs 2 (two) times daily. Controller 180 each 3    glipiZIDE (GLUCOTROL) 10 MG TR24 TAKE 1 TABLET (10 MG TOTAL) BY MOUTH DAILY WITH BREAKFAST. 90 tablet 2    GUAIATUSSIN AC  mg/5 mL syrup Take 5 mLs by mouth every 4 (four) hours as needed.      HYDROcodone-acetaminophen (NORCO)  mg per tablet Take 1 tablet by mouth every 8 (eight) hours as needed for Pain. 90 tablet 0    hyoscyamine (LEVSIN/SL) 0.125 mg Subl DISSOLVE 1 TABLET UNDER THE TONGUE EVERY 4 TO 6 HOURS 30 tablet 5    ibuprofen (ADVIL,MOTRIN) 800 MG tablet Take 800 mg by mouth every 8 (eight) hours as needed.  0    ipratropium (ATROVENT) 21 mcg (0.03 %) nasal spray SMARTSI Both Nares Every Night      JARDIANCE 10 mg tablet Take 10 mg by mouth once daily.      lactulose (CHRONULAC) 10 gram/15 mL solution TAKE 15 ML DAILY AS NEEDED FOR CONSTIPATION 473 mL 4    levothyroxine (SYNTHROID) 100 MCG tablet TAKE 1 TABLET DAILY 90 tablet 3    LORazepam (ATIVAN) 0.5 MG tablet Take 1 tablet (0.5 mg total) by mouth 2 (two) times daily. 60 tablet 0    meclizine (ANTIVERT) 25 mg tablet Take 1 tablet (25 mg total) by mouth 3 (three) times daily as needed. 30 tablet 0    metFORMIN (GLUCOPHAGE) 1000 MG tablet TAKE 1 TABLET BY MOUTH TWICE A DAY WITH MEALS 180 tablet 3    metoprolol tartrate (LOPRESSOR) 100 MG tablet TAKE 1 TABLET BY MOUTH TWICE A  tablet 1    mv-min-FA-Wn-Hs-lrukhgk-lutein 0.4-162-18 mg Tab Take by mouth. 1 Tablet Oral Every day      NARCAN 4 mg/actuation Spry 1 spray once.      potassium chloride SA (K-DUR,KLOR-CON) 20 MEQ tablet TAKE 1 TABLET BY MOUTH TWICE A  tablet 3    sucralfate (CARAFATE) 1 gram tablet Take 1 tablet (1 g total) by mouth 3 (three) times daily. 270 tablet 3    temazepam  (RESTORIL) 30 mg capsule Take 1 capsule (30 mg total) by mouth nightly as needed. 1 Capsule Oral Every day 30 capsule 0    tiZANidine (ZANAFLEX) 4 MG tablet TAKE 1 TABLET (4 MG TOTAL) BY MOUTH EVERY 8 (EIGHT) HOURS. 90 tablet 1    TRULICITY 1.5 mg/0.5 mL pen injector INJECT 1 PEN EVERY WEEK 2 pen 4    vibegron 75 mg Tab Take 75 mg by mouth once daily. 30 tablet 11    [DISCONTINUED] DULoxetine (CYMBALTA) 30 MG capsule Take 1 capsule (30 mg total) by mouth once daily. 90 capsule 3    [DISCONTINUED] FARXIGA 10 mg tablet TAKE 1 TABLET BY MOUTH EVERY DAY 90 tablet 1    [DISCONTINUED] sucralfate (CARAFATE) 1 gram tablet TAKE 1 TABLET BY MOUTH THREE TIMES A  tablet 0     Scheduled Medications:  amLODIPine, 10 mg, Oral, Daily  aspirin, 81 mg, Oral, Daily  cefTRIAXone (ROCEPHIN) IVPB, 2 g, Intravenous, Q12H  heparin (porcine), 5,000 Units, Subcutaneous, Q8H  insulin aspart U-100, 12 Units, Subcutaneous, TIDWM  insulin detemir U-100, 24 Units, Subcutaneous, QHS  levothyroxine, 100 mcg, Oral, Before breakfast  metronidazole, 500 mg, Intravenous, Q8H  miconazole nitrate 2%, , Topical (Top), BID  mupirocin, , Topical (Top), BID  valproate sodium (DEPACON) IVPB, 250 mg, Intravenous, with dinner  vibegron, 75 mg, Oral, Daily      PRN: acetaminophen, dextrose 50%, glucagon (human recombinant), guaiFENesin 100 mg/5 ml, labetalol, magnesium oxide, magnesium oxide, melatonin, potassium bicarbonate, potassium bicarbonate, potassium bicarbonate, potassium, sodium phosphates, potassium, sodium phosphates, potassium, sodium phosphates, sodium chloride 0.9%, tiZANidine, valproate sodium (DEPACON) IVPB  Infusions:     Estimated Creatinine Clearance: 88.5 mL/min (based on SCr of 0.9 mg/dL).    ASSESSMENT/PLAN:   Overview:  Violeta Champion is a 60 y.o. female with medical history significant for     Active Hospital Problems    Diagnosis  POA    *Brain lesion [G93.9]MRI which showed multiple lesions throughout the bilateral  cerebral hemispheres with vasogenic edema ,mild leftward midline shift and partial mass effect of the right lateral ventricle. Patient transferred to [facility] for neuro-ICU level of care    involving frontal, parietal, temporal, and occipital lobes with surrounding vasogenic edema. Suspect 2/2 septic emboli given recent bacteremia. Speciation of BCx 10/27 revealing organisms from oral mary. Mannitol and 3% saline started prior to transfer. CTH notable for multiple areas of mild diminished attenuation involving the cerebral hemispheres bilaterally corresponding with multiple T2/FLAIR hyperintense lesions noted on the recent MRI examination.      - q1h neuro checks    - BCx NGTD  -DORCAS deferred  - Infectious Disease consult; appreciate recommendations. Signed off          - continue long term antibiotics x 6 weeks  - q6h Na; Na goal EuNa, drinking to thirst, consider DDAVP if Na continues to climb despite PO intake  s/p nuerosurgery eval ---no acute neurosurgical intervention  - stable for transfer to floor   11/20 s/p ID eval -Cardiology would like to defer DORCAS for now until more clinically stable. LP is  high risk for further evaluation. Patient continues to improve. IJ US is negative for septic thrombophlebitis. Iikely etiology of brain lesions is septic emboli from an undetermined source, in the setting of parvimonas and fusobacterium bacteremia. Will plan to treat with 6 of ceftriaxone and metronidazole for brain abscess and possible IE.  consider LP, recommend cell count, protein, culture and toxo PCR    11/22  . Plan for DORCAS on 11/24 AM.   11/24 DORCAS -No valvular vegetations or other findings to suggest cardiac source of bacteremia or abscesses.The left ventricle is normal in size with normal systolic function. The estimated ejection fraction is 60%.  Normal right ventricular size with normal right ventricular systolic function.Aneurysmal interatrial septum without evidence of interatrial  shunting.                Sepsis-   r/o oral bacteria associated infective endocarditis .  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  TTE was negative.  In light of brain lesions, recommend DORCAS.  cardiology defered DORCAS due to high risk. Recommend treating broadly for now with vanc, ceftriaxone and metronidazole  x 6weeks. HIV negative  11/21  . cardiology consulted for DORCAS       Encephalopathy, metabolic   secondary to brain lesions. improving. AOX2    waxing and waning mentaion.   11/21 confusion/agitation at night- pulling at gown and telemetry monitor requiring frequent redirection. psychiatry consulted . Depacon 250mg IV [id number] PRN for non-redirectable agitation.   .improved sleep after depacon.  11/23 agitatation overnight received depacon x 1 after midnght . started on scheduled 250mg depacon for 5pm   11/24  started on scheduled  Depacon 250mg bid and Seroquel 100mg qhs.         QTc prolongation 517 on 10/27  -  daily EKG to monitor QTc  11/23    EKG today Normal sinus rhythm 90bpm QTc 440ms          Hypernatremia  resolved      11/20 follow Na, UOP; drinking to thirst  with stable Na but if Na continues to increase consider ddavp. UOP 6 L /24h.   11/21sodium at 146 . UOP trendind down  2L /24h. discussed with nephrology. no plan for desmopressin now. started on 1/2NS at 75ml/h   11/22  Unclear if patient has Diabetes Insipidus.  11/24 sodium at 145. started on 1/2NS @ 75ml/h            Yes    BMI 45.0-49.9, adult [Z68.42]   Body mass index is 46.86 kg/m². Morbid obesity complicates all aspects of disease management from diagnostic modalities to treatment. Weight loss encouraged    Anemia   Patient's with Normocytic anemia.. Hemoglobin stable. Etiology likely due to chronic disease .  Current CBC reviewed-    Recent Labs   Lab 11/22/21  0300 11/23/21  0300 11/24/21  0558   HGB 9.8* 9.3* 9.9*         Component Value Date/Time    MCV 90 11/24/2021  0558    RDW 21.2 (H) 11/24/2021 0558    IRON 92 11/10/2004 1215     Monitor CBC and transfuse if H/H drops below 7/21.   Not Applicable    Discharge planning issues [Z02.9] needs long term antibiotics  Not Applicable    Vasogenic brain edema [G93.6]secondary to above .concern of elevated ICP 2/2 vasogenic edema of numerous brain abscesses vs masses.   Yes    Brain compression [G93.5]  Yes    Diabetes insipidus [E23.2] trend sodium  labs Q6hrs  - encourage fluid intake  - on half normal; continue with Hypernatremia as above  11/22   Unclear if patient has Diabetes Insipidus.  Yes      Yes    Essential Hypertension  Chronic, controlled.  Latest blood pressure and vitals reviewed-   Temp:  [96 °F (35.6 °C)-98.5 °F (36.9 °C)]   Pulse:  [87-95]   Resp:  [16-17]   BP: (134-154)/(52-89)   SpO2:  [94 %-96 %] .   Home meds for hypertension were reviewed and amlodipine continued   Hypertension Medications             amLODIPine (NORVASC) 5 MG tablet TAKE 1 TABLET BY MOUTH EVERY DAY    metoprolol tartrate (LOPRESSOR) 100 MG tablet TAKE 1 TABLET BY MOUTH TWICE A DAY        PRN meds if BP> 180/110 mm HG  Yes    Uncontrolled type 2 diabetes mellitus with hyperglycemia [E11.65]  Patient's FSGs are not controlled on current hypoglycemics.   Last A1c reviewed-   Lab Results   Component Value Date    HGBA1C 9.0 (H) 10/27/2021    HGBA1C 7.5 (H) 07/29/2021    HGBA1C 7.7 (H) 06/16/2021     Recent Labs   Lab 11/23/21  0852 11/23/21  1136 11/23/21  1628 11/23/21  2111   POCTGLUCOSE 192* 197* 188* 188*     currently on  insulin aspart U-100, 6 Units, Subcutaneous, TIDWM and moderate dose SSI .Started  Levemir 15 units q HS   11/23  Glucose in 200s. Levemir 24 units nightly and Insulin aspart 12 units TIDWM / low dose correction scale    Hypokalemia - Patient with potassium   Recent Labs   Lab 11/22/21  1608 11/23/21  0300 11/24/21  0558   K 3.8 3.3* 3.7   . replaced. monitor      Yes    Hypothyroidism [E03.9] TSH 1.292  WNL  continue levothyroxine  Yes      Resolved Hospital Problems    Diagnosis Date Resolved POA    Morbid obesity with BMI of 60.0-69.9, adult [E66.01, Z68.44] 11/15/2021 Not Applicable       Disposition-  rehab    Discharge Planning   SARAVANAN: 11/26/2021     Code Status: Full Code   Is the patient medically ready for discharge?: No    Reason for patient still in hospital (select all that apply): Treatment  Discharge Plan A: Rehab         DVT prophylaxis addressed with:  subcutaneous heparin.        Subsequent [facility] Care   Level 2 04892 Total visit time was 25 minutes or greater with greater than 50% of time spent in counseling and coordination of care.       We discussed in detail the plan of care, the patient's response to treatment, the discharge plan. Total time includes time spent reviewing the medical record, examining the patient, writing notes and communicating with other professionals.    Chiki Arteaga MD  Attending Staff Physician  [facility] Medicine  pager- [phone]  [facility] - [phone]

## 2021-11-24 NOTE — ASSESSMENT & PLAN NOTE
ASSESSMENT     Violeta Champion is a 60 y.o. female with a past psychiatric history of depression currently presenting with Brain lesion.  Psychiatry was originally consulted to address the patient's symptoms of agitation.    IMPRESSION  Hyperactive delirium     RECOMMENDATION(S)      1. Scheduled Medication(s):  Increase Depacon to 250mg bid  Start Seroquel 100mg nightly    2. PRN Medication(s):  Depacon 250mg IV [id number] PRN for non-redirectable agitation.    3.  Monitor:  Please obtain daily EKG to monitor QTc.  If pt receives depacon, monitor ammonia and transaminases.    4. Legal Status/Precaution(s):  Patient does not meet criteria for PEC or inpatient psychiatric admission at this time. Recommend to rescind PEC if one was placed. Patient is not currently an imminent danger to self or others and is not gravely disabled due to a psychiatric illness.    5. Capacity:  Pt continues to have waxing and waning of symptoms and continues to refuse treatment at times. Therefore pt currently does NOT have medical decision making capacity due to Delirium. Please contact next of kin for making medical decisions currently.    6. Other:  [facility] ICU - Positive  DELIRIUM BEHAVIOR MANAGEMENT   PLEASE utilize CHEMICAL restraints with PRN meds first for agitation. Minimize use of PHYSICAL restraints   Keep window shades open and room lit during day and room dim at night in order to promote normal sleep-wake cycles   Encourage family at bedside. [place] patient often to situation, location, date   Continue to Limit or Discontinue use of Narcotics, Benzos and Anti-cholinergic medications as they may worsen delirium   Continue medical workup for causative etiology of Delirium

## 2021-11-24 NOTE — PLAN OF CARE
[facility]  Discharge Reassessment    Primary Care Provider: Madie Petersen DO    Expected Discharge Date: 11/26/2021     Pt is not yet medically ready to d/c today.     EMMANUEL met with pt at bedside to discuss plans for d/c. Pt confirmed that she would like to d/c to an inpatient rehab in the [place] area.    SW sent updates to inpatient rehabs in the [place] area via [employer], as pt would like to be closer to her sister there. [facility], who reported that she would review the referral with her medical director and call SW back.    EMMANUEL left message on voicemail for CM dept at [facility] inpatient rehab to follow up on referral.    Reassessment (most recent)     Discharge Reassessment - 11/24/21 7881        Discharge Reassessment    Assessment Type Discharge Planning Reassessment     Discharge Plan discussed with: Patient     Communicated SARAVANAN with patient/caregiver Date not available/Unable to determine     Discharge Plan A Rehab     Discharge Plan B Skilled Nursing Facility     Why the patient remains in the hospital Requires continued medical care        Post-Acute Status    Post-Acute Authorization Placement     Post-Acute Placement Status Pending medical clearance/testing     Discharge Delays None known at this time               EMMANUEL will continue to coordinate with patient, family, team and insurance to complete patient's discharge plan.    Gracia Nix, AGUSTIN  [phone]

## 2021-11-25 LAB
ALBUMIN SERPL BCP-MCNC: 2.4 G/DL (ref 3.5–5.2)
ALP SERPL-CCNC: 60 U/L (ref 55–135)
ALT SERPL W/O P-5'-P-CCNC: <5 U/L (ref 10–44)
ANION GAP SERPL CALC-SCNC: 12 MMOL/L (ref 8–16)
AST SERPL-CCNC: 9 U/L (ref 10–40)
BASOPHILS # BLD AUTO: 0.03 K/UL (ref 0–0.2)
BASOPHILS NFR BLD: 0.6 % (ref 0–1.9)
BILIRUB SERPL-MCNC: 0.2 MG/DL (ref 0.1–1)
BUN SERPL-MCNC: 6 MG/DL (ref 6–20)
CALCIUM SERPL-MCNC: 8.3 MG/DL (ref 8.7–10.5)
CHLORIDE SERPL-SCNC: 108 MMOL/L (ref 95–110)
CO2 SERPL-SCNC: 24 MMOL/L (ref 23–29)
CREAT SERPL-MCNC: 0.8 MG/DL (ref 0.5–1.4)
DIFFERENTIAL METHOD: ABNORMAL
EOSINOPHIL # BLD AUTO: 0.2 K/UL (ref 0–0.5)
EOSINOPHIL NFR BLD: 4.1 % (ref 0–8)
ERYTHROCYTE [DISTWIDTH] IN BLOOD BY AUTOMATED COUNT: 21 % (ref 11.5–14.5)
EST. GFR  (AFRICAN AMERICAN): >60 ML/MIN/1.73 M^2
EST. GFR  (NON AFRICAN AMERICAN): >60 ML/MIN/1.73 M^2
GLUCOSE SERPL-MCNC: 170 MG/DL (ref 70–110)
HCT VFR BLD AUTO: 31.9 % (ref 37–48.5)
HGB BLD-MCNC: 9.7 G/DL (ref 12–16)
IMM GRANULOCYTES # BLD AUTO: 0.01 K/UL (ref 0–0.04)
IMM GRANULOCYTES NFR BLD AUTO: 0.2 % (ref 0–0.5)
LYMPHOCYTES # BLD AUTO: 2.4 K/UL (ref 1–4.8)
LYMPHOCYTES NFR BLD: 46.7 % (ref 18–48)
MAGNESIUM SERPL-MCNC: 1.7 MG/DL (ref 1.6–2.6)
MCH RBC QN AUTO: 27.2 PG (ref 27–31)
MCHC RBC AUTO-ENTMCNC: 30.4 G/DL (ref 32–36)
MCV RBC AUTO: 89 FL (ref 82–98)
MONOCYTES # BLD AUTO: 0.6 K/UL (ref 0.3–1)
MONOCYTES NFR BLD: 12 % (ref 4–15)
NEUTROPHILS # BLD AUTO: 1.9 K/UL (ref 1.8–7.7)
NEUTROPHILS NFR BLD: 36.4 % (ref 38–73)
NRBC BLD-RTO: 0 /100 WBC
PHOSPHATE SERPL-MCNC: 4.1 MG/DL (ref 2.7–4.5)
PLATELET # BLD AUTO: 226 K/UL (ref 150–450)
PMV BLD AUTO: 12 FL (ref 9.2–12.9)
POCT GLUCOSE: 152 MG/DL (ref 70–110)
POCT GLUCOSE: 183 MG/DL (ref 70–110)
POCT GLUCOSE: 216 MG/DL (ref 70–110)
POCT GLUCOSE: 228 MG/DL (ref 70–110)
POTASSIUM SERPL-SCNC: 3.3 MMOL/L (ref 3.5–5.1)
PROT SERPL-MCNC: 6.4 G/DL (ref 6–8.4)
RBC # BLD AUTO: 3.57 M/UL (ref 4–5.4)
SODIUM SERPL-SCNC: 144 MMOL/L (ref 136–145)
WBC # BLD AUTO: 5.18 K/UL (ref 3.9–12.7)

## 2021-11-25 PROCEDURE — 93010 ELECTROCARDIOGRAM REPORT: CPT | Mod: ,,, | Performed by: INTERNAL MEDICINE

## 2021-11-25 PROCEDURE — 63600175 PHARM REV CODE 636 W HCPCS: Performed by: PHYSICIAN ASSISTANT

## 2021-11-25 PROCEDURE — 25000003 PHARM REV CODE 250: Performed by: HOSPITALIST

## 2021-11-25 PROCEDURE — 36415 COLL VENOUS BLD VENIPUNCTURE: CPT | Performed by: PHYSICIAN ASSISTANT

## 2021-11-25 PROCEDURE — 99233 SBSQ HOSP IP/OBS HIGH 50: CPT | Mod: ,,, | Performed by: INTERNAL MEDICINE

## 2021-11-25 PROCEDURE — S0030 INJECTION, METRONIDAZOLE: HCPCS | Performed by: PHYSICIAN ASSISTANT

## 2021-11-25 PROCEDURE — 97129 THER IVNTJ 1ST 15 MIN: CPT

## 2021-11-25 PROCEDURE — 80053 COMPREHEN METABOLIC PANEL: CPT | Performed by: PHYSICIAN ASSISTANT

## 2021-11-25 PROCEDURE — 99232 PR SUBSEQUENT HOSPITAL CARE,LEVL II: ICD-10-PCS | Mod: ,,, | Performed by: PSYCHIATRY & NEUROLOGY

## 2021-11-25 PROCEDURE — 93010 EKG 12-LEAD: ICD-10-PCS | Mod: ,,, | Performed by: INTERNAL MEDICINE

## 2021-11-25 PROCEDURE — 11000001 HC ACUTE MED/SURG PRIVATE ROOM

## 2021-11-25 PROCEDURE — 25000003 PHARM REV CODE 250: Performed by: PHYSICIAN ASSISTANT

## 2021-11-25 PROCEDURE — 99232 SBSQ HOSP IP/OBS MODERATE 35: CPT | Mod: ,,, | Performed by: INTERNAL MEDICINE

## 2021-11-25 PROCEDURE — 83735 ASSAY OF MAGNESIUM: CPT | Performed by: PHYSICIAN ASSISTANT

## 2021-11-25 PROCEDURE — 99232 PR SUBSEQUENT HOSPITAL CARE,LEVL II: ICD-10-PCS | Mod: ,,, | Performed by: INTERNAL MEDICINE

## 2021-11-25 PROCEDURE — 85025 COMPLETE CBC W/AUTO DIFF WBC: CPT | Performed by: PHYSICIAN ASSISTANT

## 2021-11-25 PROCEDURE — 94761 N-INVAS EAR/PLS OXIMETRY MLT: CPT

## 2021-11-25 PROCEDURE — 93005 ELECTROCARDIOGRAM TRACING: CPT

## 2021-11-25 PROCEDURE — 25000003 PHARM REV CODE 250: Performed by: INTERNAL MEDICINE

## 2021-11-25 PROCEDURE — 99232 SBSQ HOSP IP/OBS MODERATE 35: CPT | Mod: ,,, | Performed by: PSYCHIATRY & NEUROLOGY

## 2021-11-25 PROCEDURE — 84100 ASSAY OF PHOSPHORUS: CPT | Performed by: PHYSICIAN ASSISTANT

## 2021-11-25 PROCEDURE — 99900035 HC TECH TIME PER 15 MIN (STAT)

## 2021-11-25 PROCEDURE — 99233 PR SUBSEQUENT HOSPITAL CARE,LEVL III: ICD-10-PCS | Mod: ,,, | Performed by: INTERNAL MEDICINE

## 2021-11-25 RX ORDER — POTASSIUM CHLORIDE 750 MG/1
30 CAPSULE, EXTENDED RELEASE ORAL ONCE
Status: COMPLETED | OUTPATIENT
Start: 2021-11-25 | End: 2021-11-25

## 2021-11-25 RX ADMIN — POTASSIUM CHLORIDE 30 MEQ: 10 CAPSULE, COATED, EXTENDED RELEASE ORAL at 04:11

## 2021-11-25 RX ADMIN — MICONAZOLE NITRATE: 20 OINTMENT TOPICAL at 08:11

## 2021-11-25 RX ADMIN — AMLODIPINE BESYLATE 10 MG: 10 TABLET ORAL at 08:11

## 2021-11-25 RX ADMIN — LEVOTHYROXINE SODIUM 100 MCG: 100 TABLET ORAL at 05:11

## 2021-11-25 RX ADMIN — CEFTRIAXONE SODIUM 2 G: 2 INJECTION, SOLUTION INTRAVENOUS at 04:11

## 2021-11-25 RX ADMIN — ACETAMINOPHEN 650 MG: 325 TABLET ORAL at 10:11

## 2021-11-25 RX ADMIN — HEPARIN SODIUM 5000 UNITS: 5000 INJECTION INTRAVENOUS; SUBCUTANEOUS at 09:11

## 2021-11-25 RX ADMIN — TIZANIDINE 2 MG: 2 TABLET ORAL at 08:11

## 2021-11-25 RX ADMIN — ACETAMINOPHEN 650 MG: 325 TABLET ORAL at 08:11

## 2021-11-25 RX ADMIN — MUPIROCIN: 20 OINTMENT TOPICAL at 08:11

## 2021-11-25 RX ADMIN — VALPROATE SODIUM 250 MG: 100 INJECTION, SOLUTION INTRAVENOUS at 10:11

## 2021-11-25 RX ADMIN — HEPARIN SODIUM 5000 UNITS: 5000 INJECTION INTRAVENOUS; SUBCUTANEOUS at 01:11

## 2021-11-25 RX ADMIN — METRONIDAZOLE 500 MG: 500 INJECTION, SOLUTION INTRAVENOUS at 01:11

## 2021-11-25 RX ADMIN — METRONIDAZOLE 500 MG: 500 INJECTION, SOLUTION INTRAVENOUS at 08:11

## 2021-11-25 RX ADMIN — INSULIN ASPART 12 UNITS: 100 INJECTION, SOLUTION INTRAVENOUS; SUBCUTANEOUS at 01:11

## 2021-11-25 RX ADMIN — ACETAMINOPHEN 650 MG: 325 TABLET ORAL at 12:11

## 2021-11-25 RX ADMIN — CEFTRIAXONE SODIUM 2 G: 2 INJECTION, SOLUTION INTRAVENOUS at 03:11

## 2021-11-25 RX ADMIN — ASPIRIN 81 MG: 81 TABLET, COATED ORAL at 08:11

## 2021-11-25 RX ADMIN — METRONIDAZOLE 500 MG: 500 INJECTION, SOLUTION INTRAVENOUS at 10:11

## 2021-11-25 RX ADMIN — MICONAZOLE NITRATE: 20 OINTMENT TOPICAL at 09:11

## 2021-11-25 RX ADMIN — MUPIROCIN: 20 OINTMENT TOPICAL at 09:11

## 2021-11-25 RX ADMIN — VALPROATE SODIUM 250 MG: 100 INJECTION, SOLUTION INTRAVENOUS at 06:11

## 2021-11-25 RX ADMIN — HEPARIN SODIUM 5000 UNITS: 5000 INJECTION INTRAVENOUS; SUBCUTANEOUS at 05:11

## 2021-11-25 RX ADMIN — TIZANIDINE 2 MG: 2 TABLET ORAL at 01:11

## 2021-11-25 RX ADMIN — INSULIN ASPART 12 UNITS: 100 INJECTION, SOLUTION INTRAVENOUS; SUBCUTANEOUS at 03:11

## 2021-11-25 RX ADMIN — Medication 6 MG: at 09:11

## 2021-11-25 RX ADMIN — TIZANIDINE 2 MG: 2 TABLET ORAL at 10:11

## 2021-11-25 RX ADMIN — INSULIN ASPART 12 UNITS: 100 INJECTION, SOLUTION INTRAVENOUS; SUBCUTANEOUS at 08:11

## 2021-11-25 RX ADMIN — QUETIAPINE FUMARATE 100 MG: 100 TABLET ORAL at 09:11

## 2021-11-25 RX ADMIN — TIZANIDINE 2 MG: 2 TABLET ORAL at 12:11

## 2021-11-25 RX ADMIN — ACETAMINOPHEN 650 MG: 325 TABLET ORAL at 01:11

## 2021-11-25 NOTE — PROGRESS NOTES
Hospital Medicine  Progress note    Team: [facility] MED D Kiley Yusuf MD  Admit Date: 11/12/2021  SARAVANAN 11/26/2021  Length of Stay:  LOS: 13 days   Code status: Full Code    Principal Problem:  Brain lesion    Interval hx:  No new events. Patient laying lower body out of bed    ROS   Respiratory: neg for cough neg for shortness of breath  Cardiovascular: neg for chest pain neg for palpitations  Gastrointestinal: neg for nausea neg for vomiting, neg for abdominal pain neg for diarrhea neg for constipation   Behavioral/Psych: neg for depression neg for anxiety    PEx  Temp:  [96 °F (35.6 °C)-99.3 °F (37.4 °C)]   Pulse:  []   Resp:  [16-17]   BP: (136-169)/(73-89)   SpO2:  [95 %-100 %]     Intake/Output Summary (Last 24 hours) at 11/25/2021 1351  Last data filed at 11/25/2021 0634  Gross per 24 hour   Intake 700 ml   Output --   Net 700 ml       General Appearance: no acute distress, obese  Heart: regular rate and rhythm  Respiratory: Normal respiratory effort, no crackles   Abdomen: Soft, non-tender; bowel sounds active  Skin: intact.  Neurologic:  No focal numbness or weakness  Mental status: Alert, oriented to person and location, but not to time and situation    Recent Labs   Lab 11/23/21 0300 11/24/21 0558 11/25/21 0222   WBC 6.10 5.43 5.18   HGB 9.3* 9.9* 9.7*   HCT 30.5* 32.6* 31.9*    224 226     Recent Labs   Lab 11/23/21 0300 11/24/21 0558 11/25/21 0222    145 144   K 3.3* 3.7 3.3*    107 108   CO2 26 25 24   BUN 8 6 6   CREATININE 0.9 0.9 0.8   * 180* 170*   CALCIUM 8.5* 8.6* 8.3*   MG 1.9 1.9 1.7   PHOS 3.2 4.0 4.1     Recent Labs   Lab 11/23/21 0300 11/24/21 0558 11/25/21 0222   ALKPHOS 65 69 60   ALT <5* <5* <5*   AST 10 10 9*   ALBUMIN 2.4* 2.6* 2.4*   PROT 6.9 6.9 6.4   BILITOT 0.2 0.2 0.2        Recent Labs   Lab 11/23/21  2111 11/24/21  0815 11/24/21  1644 11/24/21  2051 11/25/21  0845 11/25/21  1136   POCTGLUCOSE 188* 218* 148* 132* 183* 152*       Scheduled  Meds:   amLODIPine  10 mg Oral Daily    aspirin  81 mg Oral Daily    cefTRIAXone (ROCEPHIN) IVPB  2 g Intravenous Q12H    heparin (porcine)  5,000 Units Subcutaneous Q8H    insulin aspart U-100  12 Units Subcutaneous TIDWM    insulin detemir U-100  26 Units Subcutaneous QHS    levothyroxine  100 mcg Oral Before breakfast    metronidazole  500 mg Intravenous Q8H    miconazole nitrate 2%   Topical (Top) BID    mupirocin   Topical (Top) BID    QUEtiapine  100 mg Oral QHS    valproate sodium (DEPACON) IVPB  250 mg Intravenous BID    vibegron  75 mg Oral Daily     Continuous Infusions:  As Needed:  acetaminophen, dextrose 50%, glucagon (human recombinant), guaiFENesin 100 mg/5 ml, labetalol, melatonin, sodium chloride 0.9%, tiZANidine, valproate sodium (DEPACON) IVPB      Active Hospital Problems    Diagnosis  POA    *Brain lesion [G93.9]  Yes    Hypernatremia [E87.0]  Unknown    BMI 45.0-49.9, adult [Z68.42]  Not Applicable    Discharge planning issues [Z02.9]  Not Applicable    Vasogenic brain edema [G93.6]  Yes    Brain compression [G93.5]  Yes    Diabetes insipidus [E23.2]  Yes    Encephalopathy, metabolic [G93.41]  Yes    Hypertension associated with diabetes [E11.59, I15.2]  Yes    Uncontrolled type 2 diabetes mellitus with hyperglycemia [E11.65]  Yes    Hypothyroidism [E03.9]  Yes      Resolved Hospital Problems    Diagnosis Date Resolved POA    Morbid obesity with BMI of 60.0-69.9, adult [E66.01, Z68.44] 11/15/2021 Not Applicable       Assessment and Plan  / Problems managed today    Septic emboli in the brain  CVA  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  ID consulted and recommended 6 weeks IV antibiotics   - Ceftriaxone 2g IV Q12H + Metronidazole 500mg PO Q8H   - 6 week course, last day 12/26/21  DORCAS negative for vegetations    Metabolic encephalopathy  Acute agitation  Waxing and waning  Psychiatry assisting in medication management  depakote 250 mg BID and seroquel  100 mg qhs    Hypernatremia - resolving with lfuids    Anemia of acute infection  Normal iron levels  Treat infection  Stop daily lab draws    Hypothyroidism  Continue levothyoxine 100 mcg daily    Essential HTN - continue amlodipine 10 mg daily    Debility  Cognitive impariement  ST/PT/OT    DM II  Endocrine consulted     Diet:  consistent carbs  GI PPx: not needed  DVT PPx:  heparin  Airways: room air  Wounds: none    Goals of Care:  Return to prior functional status     Discharge plan: rehab and IV abx    Time (minutes) spent in care of the patient (Greater than 1/2 spent in direct face-to-face contact)  35 min    Kiley Yusuf MD

## 2021-11-25 NOTE — ASSESSMENT & PLAN NOTE
-better controlled on Insulin Levemir to 26 units nightly and Insulin aspart 12 units before meals along with low dose correction scale  -Fingersticks before meals, at bedtime and 2 am advised  -Hypoglycemia precautions ordered

## 2021-11-25 NOTE — PLAN OF CARE
Problem: Adjustment to Illness (Stroke, Hemorrhagic)  Goal: Optimal Coping  Outcome: Ongoing, Progressing     Problem: Adult Inpatient Plan of Care  Goal: Optimal Comfort and Wellbeing  Outcome: Ongoing, Progressing   Patient alert, confuse, vss, follows command sometimes, restless all the time, moves in the bed and take off the diaper, tele box, gown, and blanket. Patient received all PRN around the clock and  scheduled med. Patient received education to keep clothing on, will cont to reinforce.

## 2021-11-25 NOTE — PROGRESS NOTES
"[facility]  Psychiatry  Progress Note    Patient Name: Violeta Champion  MRN: [id number]   Code Status: Full Code  Admission Date: 11/12/2021  Hospital Length of Stay: 13 days  Expected Discharge Date: 11/26/2021  Attending Physician: Kiley Yusuf MD  Primary Care Provider: Madie Petersen DO    Current Legal Status: Uncontested    Patient information was obtained from patient, relative(s) and ER records.       SUBJECTIVE     11/25: Patient's sister at bedside reported patient had a slightly better night last night but was still awake speaking to herself for some of the night and demanding water through the night. Patient is alert to name only today. Affect labile. Perseverates on the word "October." Sister was inquisitive about recommendations for lessening delirium. Discussed sunlight/staying awake during day and darkness/no TV/no sound at night.     Objective:     Vital Signs (Most Recent):  Temp: 96 °F (35.6 °C) (11/25/21 1145)  Pulse: 89 (11/25/21 1145)  Resp: 16 (11/25/21 1145)  BP: (!) 145/87 (11/25/21 1145)  SpO2: 97 % (11/25/21 1145) Vital Signs (24h Range):  Temp:  [96 °F (35.6 °C)-99.3 °F (37.4 °C)] 96 °F (35.6 °C)  Pulse:  [] 89  Resp:  [16-17] 16  SpO2:  [95 %-100 %] 97 %  BP: (136-169)/(73-89) 145/87     Height: 5' 4" (162.6 cm)  Weight: 123.8 kg (273 lb)  Body mass index is 46.86 kg/m².      Intake/Output Summary (Last 24 hours) at 11/25/2021 1424  Last data filed at 11/25/2021 0634  Gross per 24 hour   Intake 700 ml   Output --   Net 700 ml     Mental Status Exam:   Appearance: Obese, laying in bed  Behavior/Cooperation: cooperative, restless and fidgety , eye contact normal, inconsistently follows 1 step commands, unable to follow two step commands  Speech: normal tone, normal rate, normal pitch, normal volume  Language: english  Mood: "good"  Affect: normal, reactive   Thought Process: largely tangential, perservation at times  Thought Content: normal, no suicidality, no " homicidality, delusions, or paranoia   Orientation: Oriented to person. Disoriented to day, date, month, year  Memory: Impaired to some degree   Attention Span/Concentration: Impaired   Fund of Knowledge: Estimated to be average level   Insight: limited  Judgment: fair     Significant Labs: All pertinent labs within the past 24 hours have been reviewed.    Significant Imaging: I have reviewed all pertinent imaging results/findings within the past 24 hours.       Scheduled Medications:   amLODIPine  10 mg Oral Daily    aspirin  81 mg Oral Daily    cefTRIAXone (ROCEPHIN) IVPB  2 g Intravenous Q12H    heparin (porcine)  5,000 Units Subcutaneous Q8H    insulin aspart U-100  12 Units Subcutaneous TIDWM    insulin detemir U-100  26 Units Subcutaneous QHS    levothyroxine  100 mcg Oral Before breakfast    metronidazole  500 mg Intravenous Q8H    miconazole nitrate 2%   Topical (Top) BID    mupirocin   Topical (Top) BID    potassium chloride  30 mEq Oral Once    QUEtiapine  100 mg Oral QHS    valproate sodium (DEPACON) IVPB  250 mg Intravenous BID    vibegron  75 mg Oral Daily       PRN Medications:  acetaminophen, dextrose 50%, glucagon (human recombinant), guaiFENesin 100 mg/5 ml, labetalol, melatonin, sodium chloride 0.9%, tiZANidine, valproate sodium (DEPACON) IVPB    Review of patient's allergies indicates:   Allergen Reactions    Iodinated contrast media Swelling     Other reaction(s): Swelling    Naproxen sodium Swelling    Naprosyn  [naproxen]      Other reaction(s): Swelling       Assessment/Plan:     Encephalopathy, metabolic  ASSESSMENT     Violeta Champion is a 60 y.o. female with a past psychiatric history of depression currently presenting with Brain lesion.  Psychiatry was originally consulted to address the patient's symptoms of agitation.    IMPRESSION  Hyperactive delirium     RECOMMENDATION(S)      1. Scheduled Medication(s):  - Continue Depacon to 250mg bid  - Given increased QTC to 554,  would recommend decreasing Seroquel to 50 mg QHS  - Decreasing levothyroxine is sometimes helpful in decreasing agitation - can consider decreasing dose to 50 mcg if endocrinology is agreeable    2. PRN Medication(s):  Depacon 250mg IV [id number] PRN for non-redirectable agitation.    3.  Monitor:  Please obtain daily EKG to monitor QTc.  If pt receives depacon, monitor ammonia and transaminases.    4. Legal Status/Precaution(s):  Patient does not meet criteria for PEC or inpatient psychiatric admission at this time. Recommend to rescind PEC if one was placed. Patient is not currently an imminent danger to self or others and is not gravely disabled due to a psychiatric illness.    5. Capacity:  Pt continues to have waxing and waning of symptoms and continues to refuse treatment at times. Therefore pt currently does NOT have medical decision making capacity due to Delirium. Please contact next of kin for making medical decisions currently.    6. Other:  [facility] ICU - Positive  DELIRIUM BEHAVIOR MANAGEMENT   PLEASE utilize CHEMICAL restraints with PRN meds first for agitation. Minimize use of PHYSICAL restraints   Keep window shades open and room lit during day and room dim at night in order to promote normal sleep-wake cycles   Encourage family at bedside. [place] patient often to situation, location, date   Continue to Limit or Discontinue use of Narcotics, Benzos and Anti-cholinergic medications as they may worsen delirium   Continue medical workup for causative etiology of Delirium           Need for Continued Hospitalization:  No need for inpatient psychiatric hospitalization. Continue medical care as per the primary team.    Anticipated Disposition:  Still a Patient    Total time:  35 with greater than 50% of this time spent in counseling and/or coordination of care.       Светлана Huber MD   Psychiatry  [facility]

## 2021-11-25 NOTE — RESPIRATORY THERAPY
RAPID RESPONSE RESPIRATORY CHART CHECK       Chart check completed,  instructed to call [phone] for further concerns or assistance.

## 2021-11-25 NOTE — SUBJECTIVE & OBJECTIVE
"Interval HPI:   Overnight events: corrected sodium 145  Eating:   <25%  Nausea: No  Hypoglycemia and intervention: No  Fever: No      BP (!) 153/83 (BP Location: Left arm, Patient Position: Lying)   Pulse 96   Temp 99.3 °F (37.4 °C) (Oral)   Resp 16   Ht 5' 4" (1.626 m)   Wt 123.8 kg (273 lb)   LMP  (LMP Unknown)   SpO2 100%   BMI 46.86 kg/m²     Labs Reviewed and Include    Recent Labs   Lab 11/25/21 0222   *   CALCIUM 8.3*   ALBUMIN 2.4*   PROT 6.4      K 3.3*   CO2 24      BUN 6   CREATININE 0.8   ALKPHOS 60   ALT <5*   AST 9*   BILITOT 0.2     Lab Results   Component Value Date    WBC 5.18 11/25/2021    HGB 9.7 (L) 11/25/2021    HCT 31.9 (L) 11/25/2021    MCV 89 11/25/2021     11/25/2021     No results for input(s): TSH, FREET4 in the last 168 hours.  Lab Results   Component Value Date    HGBA1C 9.0 (H) 10/27/2021       Nutritional status:   Body mass index is 46.86 kg/m².  Lab Results   Component Value Date    ALBUMIN 2.4 (L) 11/25/2021    ALBUMIN 2.6 (L) 11/24/2021    ALBUMIN 2.4 (L) 11/23/2021     No results found for: PREALBUMIN    Estimated Creatinine Clearance: 99.6 mL/min (based on SCr of 0.8 mg/dL).    Accu-Checks  Recent Labs     11/22/21  1257 11/22/21  1658 11/22/21  2034 11/23/21  0852 11/23/21  1136 11/23/21  1628 11/23/21  2111 11/24/21  0815 11/24/21  1644 11/24/21  2051   POCTGLUCOSE 188* 206* 154* 192* 197* 188* 188* 218* 148* 132*       Current Medications and/or Treatments Impacting Glycemic Control  Immunotherapy:    Immunosuppressants     None        Steroids:   Hormones (From admission, onward)            Start     Stop Route Frequency Ordered    11/19/21 1859  melatonin tablet 6 mg         -- Oral Nightly PRN 11/19/21 1759        Pressors:    Autonomic Drugs (From admission, onward)            None        Hyperglycemia/Diabetes Medications:   Antihyperglycemics (From admission, onward)            Start     Stop Route Frequency Ordered    11/24/21 2100  " insulin detemir U-100 pen 26 Units         -- SubQ Nightly 11/24/21 1016    11/22/21 1645  insulin aspart U-100 pen 12 Units         -- SubQ 3 times daily with meals 11/22/21 1502

## 2021-11-25 NOTE — PROGRESS NOTES
[facility]  Endocrinology  Progress Note    Admit Date: 11/12/2021     60 year old female with pertinent medical history of Type 2 Diabetes Mellitus, hypothyroidism, Intraductal papilloma of the right breast s/p excision, depression and YUSEF was admitted as transfer from [place] for concern of elevated ICP due to vasogenic edema from numerous brain lesions (likely etiology septic emboli, in the setting of parvimonas and fusobacterium bacteremia.)   Nancy was initially admitted at [place] 10/27 for acute encephalopathy, found to have DKA ( ( beta hydroxybutyrate 5.3) and bacteremia. 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 She improved with antibiotics and insulin until 3 days prior to transfer when she developed worsening confusion and lethargy. She had a MRI done on 11/13 revealing multiple lesions throughout the bilateral cerebral hemispheres with associated vasogenic edema and leftward midline shift after which she was transferred to our hospital.      Endocrinology was consulted for management of type 2 diabetes mellitus, hypothyroidism and hypernatremia        Regarding Diabetes Mellitus    Type: Type 2 Diabetes Mellitus  Diabetes diagnosis year: first diagnosed in 2010.    Home Diabetes Medications:  Metformin 1000 mg twice daily, Trulicity 1.5 mg weekly and glipizide just 10 mg daily    Previous Medications tried:  Januvia  Farxiga   Has never been on insulin.     How often checking glucose at home? Very infrequently  Hypoglycemia on the regimen?  No      Diabetes Complications include:     Diabetic Ketoacidosis  Neuropathy    Complicating diabetes co morbidities:   YUSEF  Infection        Regarding Hypothyroidism  -Has hypothyroidism for almost 10 years  -On 100 mcg levothyroxine      Her Current symptoms are:         No   Yes    []    [x]   Weight gain    []    [x]   Fatigue    []    [x]   Constipation    [x]    []   Hair loss    [x]    []    "Brittle nails    []    [x]   Mental fog    [x]    []   Cold intolerance    []    [x]   Memory impair    [x]    []   Muscle weakness    [x]    []   Neck swelling, pain, pressure    [x]    []   Hoarseness    [x]    []   Periorbital edema    [x]    []   Lithium or Amiodarone use    []    [x]   Recent severe illness        [x]    []   Recent pregnancy      Personal or Family History:      No   Yes    []    [x]   Thyroid disorder    [x]    []   Thyroid cancer        Last BMD: Normal forearm BMD in 2013 while on fosamax       Previous thyroid studies: none      Plan for pregnancy at this time: none        Lab Results   Component Value Date    TSH 1.392 11/13/2021    TSH 0.214 (L) 10/27/2021    TSH 2.236 07/29/2021    FREET4 0.90 10/27/2021    FREET4 1.07 07/29/2021    FREET4 1.17 03/16/2021       Regarding Hypernateremia  -Unclear if it is actual Diabetes Insipidus.   -The Urine Output had normalized after initially being high for a day post IV fluids adminitstration  -No history of Diabetes Insipidus in the patient.   -Never taken Desmopressin in the past, was ordered but discontinued while in the hospital on this visit  -Patient is awake, alert, altered, disoriented and has spells of agitation at night  -Patient had complaints of polydipsia and polyuria initially, currently denies polyuria        Ref. Range 11/20/2021 17:51 11/20/2021 20:09 11/20/2021 23:19 11/21/2021 00:00 11/21/2021 00:00   Osmolality 275 - 295 mOsm/kg 312 (H)   318 (H) 318 (H)   Osmolality, Urine 50 - 1200 mOsm/kg  263 253                 Interval HPI:   Overnight events: corrected sodium 145  Eating:   <25%  Nausea: No  Hypoglycemia and intervention: No  Fever: No      BP (!) 153/83 (BP Location: Left arm, Patient Position: Lying)   Pulse 96   Temp 99.3 °F (37.4 °C) (Oral)   Resp 16   Ht 5' 4" (1.626 m)   Wt 123.8 kg (273 lb)   LMP  (LMP Unknown)   SpO2 100%   BMI 46.86 kg/m²     Labs Reviewed and Include    Recent Labs   Lab 11/25/21 0222 "   *   CALCIUM 8.3*   ALBUMIN 2.4*   PROT 6.4      K 3.3*   CO2 24      BUN 6   CREATININE 0.8   ALKPHOS 60   ALT <5*   AST 9*   BILITOT 0.2     Lab Results   Component Value Date    WBC 5.18 11/25/2021    HGB 9.7 (L) 11/25/2021    HCT 31.9 (L) 11/25/2021    MCV 89 11/25/2021     11/25/2021     No results for input(s): TSH, FREET4 in the last 168 hours.  Lab Results   Component Value Date    HGBA1C 9.0 (H) 10/27/2021       Nutritional status:   Body mass index is 46.86 kg/m².  Lab Results   Component Value Date    ALBUMIN 2.4 (L) 11/25/2021    ALBUMIN 2.6 (L) 11/24/2021    ALBUMIN 2.4 (L) 11/23/2021     No results found for: PREALBUMIN    Estimated Creatinine Clearance: 99.6 mL/min (based on SCr of 0.8 mg/dL).    Accu-Checks  Recent Labs     11/22/21  1257 11/22/21  1658 11/22/21  2034 11/23/21  0852 11/23/21  1136 11/23/21  1628 11/23/21  2111 11/24/21  0815 11/24/21  1644 11/24/21  2051   POCTGLUCOSE 188* 206* 154* 192* 197* 188* 188* 218* 148* 132*       Current Medications and/or Treatments Impacting Glycemic Control  Immunotherapy:    Immunosuppressants     None        Steroids:   Hormones (From admission, onward)            Start     Stop Route Frequency Ordered    11/19/21 1859  melatonin tablet 6 mg         -- Oral Nightly PRN 11/19/21 1759        Pressors:    Autonomic Drugs (From admission, onward)            None        Hyperglycemia/Diabetes Medications:   Antihyperglycemics (From admission, onward)            Start     Stop Route Frequency Ordered    11/24/21 2100  insulin detemir U-100 pen 26 Units         -- SubQ Nightly 11/24/21 1016    11/22/21 1645  insulin aspart U-100 pen 12 Units         -- SubQ 3 times daily with meals 11/22/21 1508          ASSESSMENT and PLAN    Hypernatremia  -corrected sodium 145  -unlikely that patient has Diabetes Insipidus. Will avoid desmopressin for now  -since the enamorado catheter is removed, there is a difficulty with accurate input and output  as patient does wets the bed and is not agreeable to wearing adult diapers too. Would need accurate input and output documentation.  -Both previous serum and urine osmolality readings reviewed     Uncontrolled type 2 diabetes mellitus with hyperglycemia  -better controlled on Insulin Levemir to 26 units nightly and Insulin aspart 12 units before meals along with low dose correction scale  -Fingersticks before meals, at bedtime and 2 am advised  -Hypoglycemia precautions ordered      Hypothyroidism  -Last TSH 1.392  -Would continue levothyroxine 100 mcg  -No signs of acute decompensation secondary to hypothyroidism noted on evaluation of patient        Lauri Merchant MD  Endocrinology  [facility]

## 2021-11-25 NOTE — ASSESSMENT & PLAN NOTE
ASSESSMENT     Violeta Champion is a 60 y.o. female with a past psychiatric history of depression currently presenting with Brain lesion.  Psychiatry was originally consulted to address the patient's symptoms of agitation.    IMPRESSION  Hyperactive delirium     RECOMMENDATION(S)      1. Scheduled Medication(s):  - Continue Depacon to 250mg bid  - Given increased QTC to 554, would recommend decreasing Seroquel to 50 mg QHS  - Decreasing levothyroxine is sometimes helpful in decreasing agitation - can consider decreasing dose to 50 mcg if endocrinology is agreeable    2. PRN Medication(s):  Depacon 250mg IV [id number] PRN for non-redirectable agitation.    3.  Monitor:  Please obtain daily EKG to monitor QTc.  If pt receives depacon, monitor ammonia and transaminases.    4. Legal Status/Precaution(s):  Patient does not meet criteria for PEC or inpatient psychiatric admission at this time. Recommend to rescind PEC if one was placed. Patient is not currently an imminent danger to self or others and is not gravely disabled due to a psychiatric illness.    5. Capacity:  Pt continues to have waxing and waning of symptoms and continues to refuse treatment at times. Therefore pt currently does NOT have medical decision making capacity due to Delirium. Please contact next of kin for making medical decisions currently.    6. Other:  CAM ICU - Positive  DELIRIUM BEHAVIOR MANAGEMENT   PLEASE utilize CHEMICAL restraints with PRN meds first for agitation. Minimize use of PHYSICAL restraints   Keep window shades open and room lit during day and room dim at night in order to promote normal sleep-wake cycles   Encourage family at bedside. [place] patient often to situation, location, date   Continue to Limit or Discontinue use of Narcotics, Benzos and Anti-cholinergic medications as they may worsen delirium   Continue medical workup for causative etiology of Delirium

## 2021-11-25 NOTE — PLAN OF CARE
Problem: Adjustment to Illness (Sepsis/Septic Shock)  Goal: Optimal Coping  Outcome: Ongoing, Progressing     Problem: Bariatric Environmental Safety  Goal: Safety Maintained with Care  Outcome: Ongoing, Progressing   Patient alert, forgetful, vss, bg stable, not sleeping enough, given all PRN family at bedside will cont to reinforce.

## 2021-11-25 NOTE — SUBJECTIVE & OBJECTIVE
"    Family History     Problem Relation (Age of Onset)    Amblyopia Father    Breast cancer Paternal Aunt, Paternal Grandmother    Cancer Maternal Grandmother    Diabetes Mother, Maternal Aunt, Maternal Uncle    Glaucoma Mother    Hypertension Mother, Brother        Tobacco Use    Smoking status: Never Smoker    Smokeless tobacco: Never Used   Substance and Sexual Activity    Alcohol use: Yes     Alcohol/week: 0.0 standard drinks     Comment: very rarely    Drug use: No    Sexual activity: Not on file     Psychotherapeutics (From admission, onward)            Start     Stop Route Frequency Ordered    11/24/21 2100  QUEtiapine tablet 100 mg         -- Oral Nightly 11/24/21 1053             Objective:     Vital Signs (Most Recent):  Temp: 96 °F (35.6 °C) (11/25/21 1145)  Pulse: 89 (11/25/21 1145)  Resp: 16 (11/25/21 1145)  BP: (!) 145/87 (11/25/21 1145)  SpO2: 97 % (11/25/21 1145) Vital Signs (24h Range):  Temp:  [96 °F (35.6 °C)-99.3 °F (37.4 °C)] 96 °F (35.6 °C)  Pulse:  [] 89  Resp:  [16-17] 16  SpO2:  [95 %-100 %] 97 %  BP: (136-169)/(73-89) 145/87     Height: 5' 4" (162.6 cm)  Weight: 123.8 kg (273 lb)  Body mass index is 46.86 kg/m².      Intake/Output Summary (Last 24 hours) at 11/25/2021 1424  Last data filed at 11/25/2021 0634  Gross per 24 hour   Intake 700 ml   Output --   Net 700 ml     Mental status exam:   Appearance: Obese, laying in bed  Behavior/Cooperation: cooperative, restless and fidgety , eye contact normal, inconsistently follows 1 step commands, unable to follow two step commands  Speech: normal tone, normal rate, normal pitch, normal volume  Language: english  Mood: "good"  Affect: normal, reactive   Thought Process: largely tangential, perservation at times  Thought Content: normal, no suicidality, no homicidality, delusions, or paranoia   Orientation: Oriented to person. Disoriented to day, date, month, year  Memory: Impaired to some degree   Attention Span/Concentration: Impaired "   Fund of Knowledge: Estimated to be average level   Insight: limited  Judgment: fair     Significant Labs: All pertinent labs within the past 24 hours have been reviewed.    Significant Imaging: I have reviewed all pertinent imaging results/findings within the past 24 hours.

## 2021-11-25 NOTE — ASSESSMENT & PLAN NOTE
-corrected sodium 145  -unlikely that patient has Diabetes Insipidus. Will avoid desmopressin for now  -since the enamorado catheter is removed, there is a difficulty with accurate input and output as patient does wets the bed and is not agreeable to wearing adult diapers too. Would need accurate input and output documentation.  -Both previous serum and urine osmolality readings reviewed

## 2021-11-25 NOTE — PT/OT/SLP PROGRESS
"Speech Language Pathology Treatment    Patient Name:  Violeta Champion   MRN:  [id number]  Admitting Diagnosis: Brain lesion    Recommendations:                 General Recommendations:  Cognitive-linguistic therapy and monitor diet tolerance  Diet recommendations:  Dental Soft, Liquid Diet Level: Thin   Aspiration Precautions: 1 bite/sip at a time, Alternating bites/sips, Assistance with meals, Avoid talking while eating, Eliminate distractions, Feed only when awake/alert, HOB to 90 degrees, Monitor for s/s of aspiration, Small bites/sips and Standard aspiration precautions   General Precautions: Standard, aspiration,fall  Communication strategies:  go to room if call light pushed    Subjective     "This is Alfonso." pt perseverated on this response during orientation q's.     Pain/Comfort:  · Pain Rating 1: 0/10    Respiratory Status:  · O2 Device (Oxygen Therapy): room air    Objective:     Has the patient been evaluated by SLP for swallowing?   Yes  Keep patient NPO? No   Current Respiratory Status:        Pt was oriented to place/location/street IND, but then perseverated on "[place]" when attempting to orient to name of hospital.  Given external aid, pt recalled "[facility]."  Pt required max cues to oriented to month and binary choices to orient to year. SLP educated pt on use of external/visual aids to assist with improving orientation.  Pt recall general information with 75% accuracy.  Session ended to allow nursing to clean and change pt after soiling diaper. Cont POC.     Assessment:     Violeta Champion is a 60 y.o. female with an SLP diagnosis of Cognitive-Linguistic Impairment.      Goals:   Multidisciplinary Problems     SLP Goals        Problem: SLP Goal    Goal Priority Disciplines Outcome   SLP Goal     SLP Ongoing, Progressing   Description: Speech Therapy Short Term Goals  Goal expected to be met by 11/25  1. Pt will participate in an ongoing assessment to determine the least restrictive and safest " diet with possible updated goals to follow pending results.  2. Pt will participate in a speech, language, and cognitive evaluation with possible updated goals to follow pending results. -ongoing  3. Pt will attend to therapy tasks for 1+ minutes given 1 redirection.   4. Pt will answer orientation questions x4 given min cues.   5. Pt will answer general information questions with 75% acc given cues.   6. Pt will complete word finding tasks with 75% acc min cues.   7. Pt will follow simple 1 step directions with 75% acc given 1 repetition.                      Plan:     · Patient to be seen:  4 x/week   · Plan of Care expires:     · Plan of Care reviewed with:  patient,family   · SLP Follow-Up:  Yes       Discharge recommendations:  rehabilitation facility     Time Tracking:     SLP Treatment Date:   11/25/21  Speech Start Time:  1011  Speech Stop Time:  1021     Speech Total Time (min):  10 min    Billable Minutes: Speech Therapy Individual (cognitive therpay) 10    11/25/2021

## 2021-11-25 NOTE — PLAN OF CARE
Problem: Adult Inpatient Plan of Care  Goal: Plan of Care Review  Outcome: Ongoing, Progressing     Problem: Diabetes Comorbidity  Goal: Blood Glucose Level Within Desired Range  Outcome: Ongoing, Progressing     Problem: Hemodynamic Instability (Sepsis/Septic Shock)  Goal: Effective Tissue Perfusion  Outcome: Ongoing, Progressing     Patient is AAO x2. POC reviewed with patient and sister. Sister verbalized understanding. Patient's breathing is unlabored with equal chest expansion. Patient has wounds to sacral area and perineum rash. Patient complained of pain;PRN pain meds given per order. Patient voids per diaper. Patient remained free from falls. Patient rested intermittently well through shift. Bed in lowest position,bed alarm on, side rails up x3, no complaints or signs of distress. WCTM.  See flowsheets for full assessment and VS info.

## 2021-11-26 LAB
ALBUMIN SERPL BCP-MCNC: 2.5 G/DL (ref 3.5–5.2)
ANION GAP SERPL CALC-SCNC: 8 MMOL/L (ref 8–16)
BACTERIA #/AREA URNS AUTO: ABNORMAL /HPF
BILIRUB UR QL STRIP: NEGATIVE
BUN SERPL-MCNC: 5 MG/DL (ref 6–20)
CALCIUM SERPL-MCNC: 8.4 MG/DL (ref 8.7–10.5)
CHLORIDE SERPL-SCNC: 108 MMOL/L (ref 95–110)
CLARITY UR REFRACT.AUTO: ABNORMAL
CO2 SERPL-SCNC: 26 MMOL/L (ref 23–29)
COLOR UR AUTO: YELLOW
CREAT SERPL-MCNC: 0.8 MG/DL (ref 0.5–1.4)
EST. GFR  (AFRICAN AMERICAN): >60 ML/MIN/1.73 M^2
EST. GFR  (NON AFRICAN AMERICAN): >60 ML/MIN/1.73 M^2
GLUCOSE SERPL-MCNC: 149 MG/DL (ref 70–110)
GLUCOSE UR QL STRIP: ABNORMAL
HGB UR QL STRIP: ABNORMAL
KETONES UR QL STRIP: NEGATIVE
LEUKOCYTE ESTERASE UR QL STRIP: ABNORMAL
MICROSCOPIC COMMENT: ABNORMAL
NITRITE UR QL STRIP: NEGATIVE
PH UR STRIP: 6 [PH] (ref 5–8)
PHOSPHATE SERPL-MCNC: 3.7 MG/DL (ref 2.7–4.5)
POCT GLUCOSE: 146 MG/DL (ref 70–110)
POCT GLUCOSE: 173 MG/DL (ref 70–110)
POCT GLUCOSE: 213 MG/DL (ref 70–110)
POTASSIUM SERPL-SCNC: 3.3 MMOL/L (ref 3.5–5.1)
PROT UR QL STRIP: NEGATIVE
RBC #/AREA URNS AUTO: 9 /HPF (ref 0–4)
SODIUM SERPL-SCNC: 142 MMOL/L (ref 136–145)
SP GR UR STRIP: 1 (ref 1–1.03)
SQUAMOUS #/AREA URNS AUTO: 1 /HPF
URN SPEC COLLECT METH UR: ABNORMAL
WBC #/AREA URNS AUTO: >100 /HPF (ref 0–5)
WBC CLUMPS UR QL AUTO: ABNORMAL
YEAST UR QL AUTO: ABNORMAL

## 2021-11-26 PROCEDURE — 80069 RENAL FUNCTION PANEL: CPT | Performed by: STUDENT IN AN ORGANIZED HEALTH CARE EDUCATION/TRAINING PROGRAM

## 2021-11-26 PROCEDURE — 25000003 PHARM REV CODE 250: Performed by: INTERNAL MEDICINE

## 2021-11-26 PROCEDURE — 99232 PR SUBSEQUENT HOSPITAL CARE,LEVL II: ICD-10-PCS | Mod: ,,, | Performed by: INTERNAL MEDICINE

## 2021-11-26 PROCEDURE — 87086 URINE CULTURE/COLONY COUNT: CPT | Performed by: STUDENT IN AN ORGANIZED HEALTH CARE EDUCATION/TRAINING PROGRAM

## 2021-11-26 PROCEDURE — S0030 INJECTION, METRONIDAZOLE: HCPCS | Performed by: PHYSICIAN ASSISTANT

## 2021-11-26 PROCEDURE — 11000001 HC ACUTE MED/SURG PRIVATE ROOM

## 2021-11-26 PROCEDURE — 25000003 PHARM REV CODE 250: Performed by: PHYSICIAN ASSISTANT

## 2021-11-26 PROCEDURE — 63600175 PHARM REV CODE 636 W HCPCS: Performed by: STUDENT IN AN ORGANIZED HEALTH CARE EDUCATION/TRAINING PROGRAM

## 2021-11-26 PROCEDURE — 99232 SBSQ HOSP IP/OBS MODERATE 35: CPT | Mod: ,,, | Performed by: INTERNAL MEDICINE

## 2021-11-26 PROCEDURE — 63600175 PHARM REV CODE 636 W HCPCS: Performed by: PHYSICIAN ASSISTANT

## 2021-11-26 PROCEDURE — 81001 URINALYSIS AUTO W/SCOPE: CPT | Performed by: STUDENT IN AN ORGANIZED HEALTH CARE EDUCATION/TRAINING PROGRAM

## 2021-11-26 PROCEDURE — C9399 UNCLASSIFIED DRUGS OR BIOLOG: HCPCS | Performed by: STUDENT IN AN ORGANIZED HEALTH CARE EDUCATION/TRAINING PROGRAM

## 2021-11-26 PROCEDURE — 97530 THERAPEUTIC ACTIVITIES: CPT

## 2021-11-26 PROCEDURE — 94761 N-INVAS EAR/PLS OXIMETRY MLT: CPT

## 2021-11-26 PROCEDURE — 25000003 PHARM REV CODE 250: Performed by: STUDENT IN AN ORGANIZED HEALTH CARE EDUCATION/TRAINING PROGRAM

## 2021-11-26 PROCEDURE — 36415 COLL VENOUS BLD VENIPUNCTURE: CPT | Performed by: STUDENT IN AN ORGANIZED HEALTH CARE EDUCATION/TRAINING PROGRAM

## 2021-11-26 PROCEDURE — 25000003 PHARM REV CODE 250: Performed by: HOSPITALIST

## 2021-11-26 PROCEDURE — 99233 PR SUBSEQUENT HOSPITAL CARE,LEVL III: ICD-10-PCS | Mod: ,,, | Performed by: INTERNAL MEDICINE

## 2021-11-26 PROCEDURE — 97112 NEUROMUSCULAR REEDUCATION: CPT

## 2021-11-26 PROCEDURE — 93010 ELECTROCARDIOGRAM REPORT: CPT | Mod: ,,, | Performed by: INTERNAL MEDICINE

## 2021-11-26 PROCEDURE — 99233 SBSQ HOSP IP/OBS HIGH 50: CPT | Mod: ,,, | Performed by: INTERNAL MEDICINE

## 2021-11-26 PROCEDURE — 93010 EKG 12-LEAD: ICD-10-PCS | Mod: ,,, | Performed by: INTERNAL MEDICINE

## 2021-11-26 PROCEDURE — 97535 SELF CARE MNGMENT TRAINING: CPT

## 2021-11-26 PROCEDURE — 92526 ORAL FUNCTION THERAPY: CPT

## 2021-11-26 PROCEDURE — 93005 ELECTROCARDIOGRAM TRACING: CPT

## 2021-11-26 RX ORDER — POTASSIUM CHLORIDE 750 MG/1
30 CAPSULE, EXTENDED RELEASE ORAL
Status: COMPLETED | OUTPATIENT
Start: 2021-11-26 | End: 2021-11-28

## 2021-11-26 RX ORDER — INSULIN ASPART 100 [IU]/ML
12 INJECTION, SOLUTION INTRAVENOUS; SUBCUTANEOUS
Status: DISCONTINUED | OUTPATIENT
Start: 2021-11-26 | End: 2021-12-01

## 2021-11-26 RX ORDER — IBUPROFEN 200 MG
24 TABLET ORAL
Status: DISCONTINUED | OUTPATIENT
Start: 2021-11-26 | End: 2021-12-01 | Stop reason: HOSPADM

## 2021-11-26 RX ORDER — OLANZAPINE 2.5 MG/1
5 TABLET ORAL 2 TIMES DAILY PRN
Status: DISCONTINUED | OUTPATIENT
Start: 2021-11-26 | End: 2021-12-01 | Stop reason: HOSPADM

## 2021-11-26 RX ORDER — INSULIN ASPART 100 [IU]/ML
0-5 INJECTION, SOLUTION INTRAVENOUS; SUBCUTANEOUS
Status: DISCONTINUED | OUTPATIENT
Start: 2021-11-26 | End: 2021-12-01

## 2021-11-26 RX ORDER — METRONIDAZOLE 500 MG/1
500 TABLET ORAL EVERY 8 HOURS
Status: DISCONTINUED | OUTPATIENT
Start: 2021-11-26 | End: 2021-12-01 | Stop reason: HOSPADM

## 2021-11-26 RX ORDER — DIVALPROEX SODIUM 250 MG/1
250 TABLET, DELAYED RELEASE ORAL
Status: DISCONTINUED | OUTPATIENT
Start: 2021-11-27 | End: 2021-11-29

## 2021-11-26 RX ORDER — DIVALPROEX SODIUM 250 MG/1
500 TABLET, DELAYED RELEASE ORAL
Status: DISCONTINUED | OUTPATIENT
Start: 2021-11-26 | End: 2021-11-29

## 2021-11-26 RX ORDER — IBUPROFEN 200 MG
16 TABLET ORAL
Status: DISCONTINUED | OUTPATIENT
Start: 2021-11-26 | End: 2021-12-01 | Stop reason: HOSPADM

## 2021-11-26 RX ADMIN — DIVALPROEX SODIUM 500 MG: 250 TABLET, DELAYED RELEASE ORAL at 04:11

## 2021-11-26 RX ADMIN — HEPARIN SODIUM 5000 UNITS: 5000 INJECTION INTRAVENOUS; SUBCUTANEOUS at 03:11

## 2021-11-26 RX ADMIN — MUPIROCIN: 20 OINTMENT TOPICAL at 09:11

## 2021-11-26 RX ADMIN — MICONAZOLE NITRATE: 20 OINTMENT TOPICAL at 09:11

## 2021-11-26 RX ADMIN — HEPARIN SODIUM 5000 UNITS: 5000 INJECTION INTRAVENOUS; SUBCUTANEOUS at 05:11

## 2021-11-26 RX ADMIN — OLANZAPINE 5 MG: 2.5 TABLET, FILM COATED ORAL at 09:11

## 2021-11-26 RX ADMIN — VALPROATE SODIUM 250 MG: 100 INJECTION, SOLUTION INTRAVENOUS at 07:11

## 2021-11-26 RX ADMIN — ACETAMINOPHEN 650 MG: 325 TABLET ORAL at 07:11

## 2021-11-26 RX ADMIN — CEFTRIAXONE SODIUM 2 G: 2 INJECTION, SOLUTION INTRAVENOUS at 02:11

## 2021-11-26 RX ADMIN — POTASSIUM CHLORIDE 30 MEQ: 10 CAPSULE, COATED, EXTENDED RELEASE ORAL at 12:11

## 2021-11-26 RX ADMIN — MUPIROCIN: 20 OINTMENT TOPICAL at 07:11

## 2021-11-26 RX ADMIN — HEPARIN SODIUM 5000 UNITS: 5000 INJECTION INTRAVENOUS; SUBCUTANEOUS at 09:11

## 2021-11-26 RX ADMIN — TIZANIDINE 2 MG: 2 TABLET ORAL at 07:11

## 2021-11-26 RX ADMIN — INSULIN ASPART 12 UNITS: 100 INJECTION, SOLUTION INTRAVENOUS; SUBCUTANEOUS at 04:11

## 2021-11-26 RX ADMIN — MICONAZOLE NITRATE: 20 OINTMENT TOPICAL at 07:11

## 2021-11-26 RX ADMIN — INSULIN DETEMIR 28 UNITS: 100 INJECTION, SOLUTION SUBCUTANEOUS at 09:11

## 2021-11-26 RX ADMIN — INSULIN ASPART 12 UNITS: 100 INJECTION, SOLUTION INTRAVENOUS; SUBCUTANEOUS at 07:11

## 2021-11-26 RX ADMIN — AMLODIPINE BESYLATE 10 MG: 10 TABLET ORAL at 07:11

## 2021-11-26 RX ADMIN — Medication 6 MG: at 09:11

## 2021-11-26 RX ADMIN — ASPIRIN 81 MG: 81 TABLET, COATED ORAL at 07:11

## 2021-11-26 RX ADMIN — METRONIDAZOLE 500 MG: 500 TABLET ORAL at 12:11

## 2021-11-26 RX ADMIN — METRONIDAZOLE 500 MG: 500 INJECTION, SOLUTION INTRAVENOUS at 08:11

## 2021-11-26 RX ADMIN — CEFTRIAXONE SODIUM 2 G: 2 INJECTION, SOLUTION INTRAVENOUS at 04:11

## 2021-11-26 RX ADMIN — METRONIDAZOLE 500 MG: 500 TABLET ORAL at 09:11

## 2021-11-26 RX ADMIN — LEVOTHYROXINE SODIUM 100 MCG: 100 TABLET ORAL at 05:11

## 2021-11-26 RX ADMIN — VALPROATE SODIUM 250 MG: 100 INJECTION, SOLUTION INTRAVENOUS at 12:11

## 2021-11-26 RX ADMIN — INSULIN ASPART 12 UNITS: 100 INJECTION, SOLUTION INTRAVENOUS; SUBCUTANEOUS at 01:11

## 2021-11-26 NOTE — SUBJECTIVE & OBJECTIVE
"    Family History     Problem Relation (Age of Onset)    Amblyopia Father    Breast cancer Paternal Aunt, Paternal Grandmother    Cancer Maternal Grandmother    Diabetes Mother, Maternal Aunt, Maternal Uncle    Glaucoma Mother    Hypertension Mother, Brother        Tobacco Use    Smoking status: Never Smoker    Smokeless tobacco: Never Used   Substance and Sexual Activity    Alcohol use: Yes     Alcohol/week: 0.0 standard drinks     Comment: very rarely    Drug use: No    Sexual activity: Not on file     Psychotherapeutics (From admission, onward)            Start     Stop Route Frequency Ordered    11/24/21 2100  QUEtiapine tablet 100 mg         -- Oral Nightly 11/24/21 1053           Review of Systems  Objective:     Vital Signs (Most Recent):  Temp: 96 °F (35.6 °C) (11/26/21 0736)  Pulse: 81 (11/26/21 0736)  Resp: 16 (11/26/21 0736)  BP: 133/85 (11/26/21 0736)  SpO2: 96 % (11/26/21 0736) Vital Signs (24h Range):  Temp:  [96 °F (35.6 °C)-97.9 °F (36.6 °C)] 96 °F (35.6 °C)  Pulse:  [81-98] 81  Resp:  [16-20] 16  SpO2:  [92 %-98 %] 96 %  BP: ()/(55-87) 133/85     Height: 5' 4" (162.6 cm)  Weight: 123.8 kg (273 lb)  Body mass index is 46.86 kg/m².      Intake/Output Summary (Last 24 hours) at 11/26/2021 1121  Last data filed at 11/26/2021 0359  Gross per 24 hour   Intake 8033.45 ml   Output --   Net 8033.45 ml     Mental Status Exam:   Appearance: Obese, laying in bed  Behavior/Cooperation: cooperative, drowsy, falls asleep during interview  Speech: normal tone, normal rate, normal pitch, normal volume  Language: english  Mood: "good"  Affect: normal, reactive   Thought Process: largely tangential, perservation at times  Thought Content: normal, no suicidality, no homicidality, delusions, or paranoia   Orientation: unable to assess due to patient frequently falling asleep  Memory: Impaired to some degree   Attention Span/Concentration: Impaired   Fund of Knowledge: Estimated to be average level "   Insight: limited  Judgment: fair    CAM-ICU Positive     Significant Labs: All pertinent labs within the past 24 hours have been reviewed.    Significant Imaging: I have reviewed all pertinent imaging results/findings within the past 24 hours.

## 2021-11-26 NOTE — PROGRESS NOTES
[facility]  Endocrinology  Progress Note    Admit Date: 11/12/2021     60 year old female with pertinent medical history of Type 2 Diabetes Mellitus, hypothyroidism, Intraductal papilloma of the right breast s/p excision, depression and YUSEF was admitted as transfer from [place] for concern of elevated ICP due to vasogenic edema from numerous brain lesions (likely etiology septic emboli, in the setting of parvimonas and fusobacterium bacteremia.)   Nancy was initially admitted at [place] 10/27 for acute encephalopathy, found to have DKA ( ( beta hydroxybutyrate 5.3) and bacteremia. 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 She improved with antibiotics and insulin until 3 days prior to transfer when she developed worsening confusion and lethargy. She had a MRI done on 11/13 revealing multiple lesions throughout the bilateral cerebral hemispheres with associated vasogenic edema and leftward midline shift after which she was transferred to our hospital.      Endocrinology was consulted for management of type 2 diabetes mellitus, hypothyroidism and hypernatremia        Regarding Diabetes Mellitus    Type: Type 2 Diabetes Mellitus  Diabetes diagnosis year: first diagnosed in 2010.    Home Diabetes Medications:  Metformin 1000 mg twice daily, Trulicity 1.5 mg weekly and glipizide just 10 mg daily    Previous Medications tried:  Januvia  Farxiga   Has never been on insulin.     How often checking glucose at home? Very infrequently  Hypoglycemia on the regimen?  No      Diabetes Complications include:     Diabetic Ketoacidosis  Neuropathy    Complicating diabetes co morbidities:   YUSEF  Infection        Regarding Hypothyroidism  -Has hypothyroidism for almost 10 years  -On 100 mcg levothyroxine      Her Current symptoms are:         No   Yes    []    [x]   Weight gain    []    [x]   Fatigue    []    [x]   Constipation    [x]    []   Hair loss    [x]    []    "Brittle nails    []    [x]   Mental fog    [x]    []   Cold intolerance    []    [x]   Memory impair    [x]    []   Muscle weakness    [x]    []   Neck swelling, pain, pressure    [x]    []   Hoarseness    [x]    []   Periorbital edema    [x]    []   Lithium or Amiodarone use    []    [x]   Recent severe illness        [x]    []   Recent pregnancy      Personal or Family History:      No   Yes    []    [x]   Thyroid disorder    [x]    []   Thyroid cancer        Last BMD: Normal forearm BMD in 2013 while on fosamax       Previous thyroid studies: none      Plan for pregnancy at this time: none        Lab Results   Component Value Date    TSH 1.392 11/13/2021    TSH 0.214 (L) 10/27/2021    TSH 2.236 07/29/2021    FREET4 0.90 10/27/2021    FREET4 1.07 07/29/2021    FREET4 1.17 03/16/2021       Regarding Hypernateremia  -Unclear if it is actual Diabetes Insipidus.   -The Urine Output had normalized after initially being high for a day post IV fluids adminitstration  -No history of Diabetes Insipidus in the patient.   -Never taken Desmopressin in the past, was ordered but discontinued while in the hospital on this visit  -Patient is awake, alert, altered, disoriented and has spells of agitation at night  -Patient had complaints of polydipsia and polyuria initially, currently denies polyuria        Ref. Range 11/20/2021 17:51 11/20/2021 20:09 11/20/2021 23:19 11/21/2021 00:00 11/21/2021 00:00   Osmolality 275 - 295 mOsm/kg 312 (H)   318 (H) 318 (H)   Osmolality, Urine 50 - 1200 mOsm/kg  263 253                 Interval HPI:   Overnight events: patient agitated overnight  Eating:   <25%  Nausea: No  Hypoglycemia and intervention: No  Fever: No    /85 (BP Location: Left arm, Patient Position: Lying)   Pulse 81   Temp 96 °F (35.6 °C) (Axillary)   Resp 16   Ht 5' 4" (1.626 m)   Wt 123.8 kg (273 lb)   LMP  (LMP Unknown)   SpO2 96%   BMI 46.86 kg/m²     Labs Reviewed and Include    No results for input(s): GLU, " CALCIUM, ALBUMIN, PROT, NA, K, CO2, CL, BUN, CREATININE, ALKPHOS, ALT, AST, BILITOT in the last 24 hours.  Lab Results   Component Value Date    WBC 5.18 11/25/2021    HGB 9.7 (L) 11/25/2021    HCT 31.9 (L) 11/25/2021    MCV 89 11/25/2021     11/25/2021     No results for input(s): TSH, FREET4 in the last 168 hours.  Lab Results   Component Value Date    HGBA1C 9.0 (H) 10/27/2021       Nutritional status:   Body mass index is 46.86 kg/m².  Lab Results   Component Value Date    ALBUMIN 2.4 (L) 11/25/2021    ALBUMIN 2.6 (L) 11/24/2021    ALBUMIN 2.4 (L) 11/23/2021     No results found for: PREALBUMIN    Estimated Creatinine Clearance: 99.6 mL/min (based on SCr of 0.8 mg/dL).    Accu-Checks  Recent Labs     11/23/21  1628 11/23/21  2111 11/24/21  0815 11/24/21  1644 11/24/21  2051 11/25/21  0845 11/25/21  1136 11/25/21  1557 11/25/21  2123 11/26/21  0739   POCTGLUCOSE 188* 188* 218* 148* 132* 183* 152* 216* 228* 173*       Current Medications and/or Treatments Impacting Glycemic Control  Immunotherapy:    Immunosuppressants     None        Steroids:   Hormones (From admission, onward)            Start     Stop Route Frequency Ordered    11/19/21 1859  melatonin tablet 6 mg         -- Oral Nightly PRN 11/19/21 1759        Pressors:    Autonomic Drugs (From admission, onward)            None        Hyperglycemia/Diabetes Medications:   Antihyperglycemics (From admission, onward)            Start     Stop Route Frequency Ordered    11/24/21 2100  insulin detemir U-100 pen 26 Units         -- SubQ Nightly 11/24/21 1016    11/22/21 1645  insulin aspart U-100 pen 12 Units         -- SubQ 3 times daily with meals 11/22/21 1508          ASSESSMENT and PLAN    Hypernatremia  -corrected sodium 145 yesterday, will check today's value when it results  -unlikely that patient has Diabetes Insipidus. Will avoid desmopressin for now  -since the enamorado catheter is removed, there is a difficulty with accurate input and output as  patient bed wets. Would need accurate input and output documentation for appropriate management.  -Both previous serum and urine osmolality readings reviewed     Uncontrolled type 2 diabetes mellitus with hyperglycemia  -slight hyperglycemia seen on current regime of Insulin Levemir to 26 units nightly and Insulin aspart 12 units before meals along with low dose correction scale. Will increase levemir to 28 units nightly today  -Fingersticks before meals, at bedtime and 2 am advised  -Hypoglycemia precautions ordered      Hypothyroidism  -Last TSH 1.392  -Would continue levothyroxine 100 mcg  -No signs of acute decompensation secondary to hypothyroidism noted on evaluation of patient        Lauri Mecrhant MD  Endocrinology  [facility]

## 2021-11-26 NOTE — ASSESSMENT & PLAN NOTE
-slight hyperglycemia seen on current regime of Insulin Levemir to 26 units nightly and Insulin aspart 12 units before meals along with low dose correction scale. Will increase levemir to 28 units nightly today  -Fingersticks before meals, at bedtime and 2 am advised  -Hypoglycemia precautions ordered

## 2021-11-26 NOTE — SUBJECTIVE & OBJECTIVE
"Interval HPI:   Overnight events: patient agitated overnight  Eating:   <25%  Nausea: No  Hypoglycemia and intervention: No  Fever: No    /85 (BP Location: Left arm, Patient Position: Lying)   Pulse 81   Temp 96 °F (35.6 °C) (Axillary)   Resp 16   Ht 5' 4" (1.626 m)   Wt 123.8 kg (273 lb)   LMP  (LMP Unknown)   SpO2 96%   BMI 46.86 kg/m²     Labs Reviewed and Include    No results for input(s): GLU, CALCIUM, ALBUMIN, PROT, NA, K, CO2, CL, BUN, CREATININE, ALKPHOS, ALT, AST, BILITOT in the last 24 hours.  Lab Results   Component Value Date    WBC 5.18 11/25/2021    HGB 9.7 (L) 11/25/2021    HCT 31.9 (L) 11/25/2021    MCV 89 11/25/2021     11/25/2021     No results for input(s): TSH, FREET4 in the last 168 hours.  Lab Results   Component Value Date    HGBA1C 9.0 (H) 10/27/2021       Nutritional status:   Body mass index is 46.86 kg/m².  Lab Results   Component Value Date    ALBUMIN 2.4 (L) 11/25/2021    ALBUMIN 2.6 (L) 11/24/2021    ALBUMIN 2.4 (L) 11/23/2021     No results found for: PREALBUMIN    Estimated Creatinine Clearance: 99.6 mL/min (based on SCr of 0.8 mg/dL).    Accu-Checks  Recent Labs     11/23/21  1628 11/23/21  2111 11/24/21  0815 11/24/21  1644 11/24/21  2051 11/25/21  0845 11/25/21  1136 11/25/21  1557 11/25/21  2123 11/26/21  0739   POCTGLUCOSE 188* 188* 218* 148* 132* 183* 152* 216* 228* 173*       Current Medications and/or Treatments Impacting Glycemic Control  Immunotherapy:    Immunosuppressants     None        Steroids:   Hormones (From admission, onward)            Start     Stop Route Frequency Ordered    11/19/21 1859  melatonin tablet 6 mg         -- Oral Nightly PRN 11/19/21 1759        Pressors:    Autonomic Drugs (From admission, onward)            None        Hyperglycemia/Diabetes Medications:   Antihyperglycemics (From admission, onward)            Start     Stop Route Frequency Ordered    11/24/21 2100  insulin detemir U-100 pen 26 Units         -- SubQ Nightly " 11/24/21 1016    11/22/21 1645  insulin aspart U-100 pen 12 Units         -- SubQ 3 times daily with meals 11/22/21 1501

## 2021-11-26 NOTE — PT/OT/SLP PROGRESS
Physical Therapy Treatment    Patient Name:  Violeta Champion   MRN:  [id number]  Admitting Diagnosis: Brain lesion  Recent Surgery: Procedure(s) (LRB):  ECHOCARDIOGRAM, TRANSESOPHAGEAL (N/A) 2 Days Post-Op    Recommendations:     Discharge Recommendations:  rehabilitation facility (pending progress with cognition)   Discharge Equipment Recommendations:  (TBD)   Barriers to discharge: None    Highest Level of Mobility: bed mobility  Assistance Needed: maximum assistance x2    Assessment:     Violeta Champion is a 60 y.o. female admitted with a medical diagnosis of Brain lesion. Patient tolerated PT treatment well today. She  is most limited today by weakness and decreased safety awareness.  She was able to practice bed mobility and sitting tolerance today. Focus of treatment was improving overall strength and balance. Pt is progressing well. See detailed treatment note below:    Problem List: weakness,impaired endurance,impaired self care skills,impaired functional mobilty,impaired balance,impaired cognition,decreased safety awareness,impaired coordination,impaired fine motor. weakness,impaired endurance,impaired self care skills,impaired functional mobilty,impaired balance,impaired cognition,decreased safety awareness,impaired coordination,impaired fine motor  Rehab Prognosis: Good     GOALS:   Multidisciplinary Problems     Physical Therapy Goals        Problem: Physical Therapy Goal    Goal Priority Disciplines Outcome Goal Variances Interventions   Physical Therapy Goal     PT, PT/OT Ongoing, Progressing     Description: Goals to be met by: 21     Patient will increase functional independence with mobility by performin. Supine to sit with Moderate Assistance  2. Sit to supine with Moderate Assistance  3. Rolling to Left and Right with Moderate Assistance.  4. Sit to stand transfer with Maximum Assistance  5. Bed to chair transfer with Maximum Assistance using LRAD  6. Gait  x 5 feet with Maximum  "Assistance using LRAD.   7. Lower extremity exercise program x15 reps per handout, with assistance as needed                   Plan:     During this hospitalization, patient to be seen 4 x/week to address the listed problems via gait training,therapeutic activities,therapeutic exercises,neuromuscular re-education  · Plan of Care Expires:  12/13/21   Plan of Care Reviewed with: patient,sibling    Subjective     Communicated with RN prior to session.  Patient found resting in bed upon PT entry to room.   "I know this is weird but I do sleep like this"    Pain/Comfort:  · Pain Rating 1: 0/10  · Pain Rating Post-Intervention 1: 0/10    Objective:     Patient found with: telemetry,peripheral IV   Mental Status: Patient is Alert and Cooperative during session.    General Precautions: Standard, fall   Orthopedic Precautions:N/A   Braces: N/A   Respiratory Status: room air  Vital Signs (Most Recent):    Temp: 96 °F (35.6 °C) (11/26/21 1214)  Pulse: 87 (11/26/21 1214)  Resp: 16 (11/26/21 1214)  BP: 129/80 (11/26/21 1214)  SpO2: 100 % (11/26/21 1214)    Functional Mobility:  Bed Mobility:   · Rolling/Turning to Right: maximal assistance  · Supine to Sit: maximal assistance and 2 persons  · Scooting anteriorly to EOB to have both feet planted on floor: maximal assistance  · Sit to Supine: maximal assistance and 2 persons    Sitting Balance at Edge of Bed:  · Assistance Level Required: minimal assist  · Postural deviations noted: posterior pelvic tilt, instability  · Patient required constant tactile and verbal cueing to remain in an upright and midline sitting position  · Very poor safety awareness while performing ADL tasks with OT      Education:  Patient was educated on the following:   Progress of PT goals and plan of care   In room safety and use of call button   Importance of continued upright mobility and exercise    Patient left right sidelying with all lines intact, call button in reach, and RN notified.    AM-PAC " 6 CLICK MOBILITY  Turning over in bed (including adjusting bedclothes, sheets and blankets)?: 2  Sitting down on and standing up from a chair with arms (e.g., wheelchair, bedside commode, etc.): 1  Moving from lying on back to sitting on the side of the bed?: 2  Moving to and from a bed to a chair (including a wheelchair)?: 1  Need to walk in hospital room?: 1  Climbing 3-5 steps with a railing?: 1  Basic Mobility Total Score: 8       Time Tracking:     PT Received On: 11/26/21  PT Start Time: 1355     PT Stop Time: 1424  PT Total Time (min): 29 min     Additional staff present: OT    Billable Minutes:   · Therapeutic Activity 15 and Neuromuscular Re-education 14    Treatment Type: Treatment  PT/PTA: LEI Christiansen PT, DPT  11/26/2021

## 2021-11-26 NOTE — PT/OT/SLP PROGRESS
"Speech Language Pathology Treatment    Patient Name:  Violeta Champion   MRN:  [id number]   960/960 A    Admitting Diagnosis: Brain lesion    Recommendations:                 General Recommendations:  Cognitive-linguistic therapy and monitor diet tolerance  Diet recommendations:  Regular, Liquid Diet Level: Thin   Aspiration Precautions:   · 1 small bite/sip at a time,   · Alternating bites/sips,   · Assistance with meals,   · Eliminate distractions and feed only when awake/alert,   · HOB to 90 degrees,   · Check for pocketing  General Precautions: Standard, fall,aspiration  Communication strategies:  go to room if call light pushed    Subjective     Patient awake with sister present in room. Per sister, patient did not sleep last night.   "I've developed a bit of a pica problem. I want [address] ice cream."    Pain/Comfort:  Pain Rating Post-Intervention 1: 0/10    Respiratory Status:  O2 Device (Oxygen Therapy): room air    Objective:     Has the patient been evaluated by SLP for swallowing?   Yes  Keep patient NPO? No   Current Respiratory Status:        Patient awake with some confusion evident. She answered general information appropriately, however, noted tangential speech in conversation. Impaired STM of functional information despite cues. She followed simple directions without repetitions. Per sister, patient pocketing food. Pocketing appears to sister to be only food itmes the patient does not want to eat. Patient reports she does not like the food and is requesting multiple dessert items. Patient noted to be chewing/eating ice upon SLP entry without difficulty and timely swallow. Patient declining bites of breakfast tray with SLP. She jokes, "If I eat anymore of those hash browns, I'm migel turn into a brownie." SLP discussed option to upgrade to regular solids to increase options and for family to select food items patient can tolerate. Patient and sister in agreement with trial of regular solids to " increase po acceptance. SLP provided education on SLP role and POC. Patient and sister verbalized understanding. MD entered room and session ended.     Assessment:     Violeta Champion is a 60 y.o. female with an SLP diagnosis of oral Dysphagia and Cognitive-Linguistic Impairment. ST will continue to follow.      Goals:   Multidisciplinary Problems     SLP Goals        Problem: SLP Goal    Goal Priority Disciplines Outcome   SLP Goal     SLP Ongoing, Progressing   Description: Speech Therapy Short Term Goals  Goal expected to be met by 11/25, updated to continue until 12/10  1. Pt will participate in an ongoing assessment to determine the least restrictive and safest diet with possible updated goals to follow pending results.  2. Pt will participate in a speech, language, and cognitive evaluation with possible updated goals to follow pending results. -ongoing  3. Pt will attend to therapy tasks for 1+ minutes given 1 redirection.   4. Pt will answer orientation questions x4 given min cues.   5. Pt will answer general information questions with 75% acc given cues. -met  6. Pt will complete word finding tasks with 75% acc min cues.   7. Pt will follow 2 step directions with 75% acc given 1 repetition.   8. Pt will answer problem solving/reasoning questions with 75% acc no cues.                      Plan:     · Patient to be seen:  4 x/week   · Plan of Care expires:     · Plan of Care reviewed with:  patient,sibling   · SLP Follow-Up:  Yes       Discharge recommendations:  rehabilitation facility     Time Tracking:     SLP Treatment Date:   11/26/21  Speech Start Time:  0833  Speech Stop Time:  0844     Speech Total Time (min):  11 min    Billable Minutes: Swallow Therapy 11    11/26/2021

## 2021-11-26 NOTE — HOSPITAL COURSE
Miss Champion is a 60 year old F admitted on 10/27 after being found down and unresponsive by her neighbor. Her neighbor stated that he usually hears her walking around her apartment and because he didn't hear anything for 3 days, decided to go check on her when he found her unresponsive. EMS was notified and upon presentation to [facility] patient was with labored and agonal respiration with saturations low 90's, GCS of 8. Wbc was 25.8, , anion gap 8, Cr 2.9, blood glucose 805, UDS pos for barbiturates, pH 6.928. Patient was intubated in the ED due to worsening respiratory status and remained intubated on vent with pulm/CC mgmt until 10/30 when she was successfully extubated. She was treated for DKA with aggresssive fluids, electrolyte repletion and insulin and transtioned successfully to IM insulin. She was treated with IV antibiotics for severe sepsis with cultures returning positive for Parvimonas and Fusobacterium. Broad spectrum coverage was provided and after consultation to ID these agents were changed to ceftriaxone and flagyl. Ct on admission 10/27 with remote lacunar infarcts and patient initially improved on treatment with resolving encephalopathy although I do not believe she ever reached baseline. After several weeks inpatient ion aggressive IV abx treatment patient began declining mentally which prompted an MRI brain evaluation. This study showed multiple lesions throughout the bilateral cerebral hemispheres.  Associated vasogenic edema with mild leftward midline shift and partial mass effect of the right lateral ventricle with additional concern for lesion at the left paramidline lola with adjacent edema of the left midbrain and cerebral peduncle. Abx were increased to Vanc, cefepime, and IV metro and neurology was consulted who recommended transfer to neuro ICU at [facility] for higher care. On 11/12/21 patient was transferred to [facility] for continued evaluation of  her brain lesions.

## 2021-11-26 NOTE — PLAN OF CARE
Problem: Occupational Therapy Goal  Goal: Occupational Therapy Goal  Description: Goals to be met by: 2 weeks (11/27/21)     Patient will increase functional independence with ADLs by performing:    UE Dressing with Minimal Assistance.  Grooming while seated with Minimal Assistance.  Sitting at edge of bed x10 minutes with Minimal Assistance. - Met 11/26  Supine to sit with Minimal Assistance.  Stand pivot transfers with Moderate Assistance.  Pt will follow 3/3 one-step commands to participate in ADL task.    Outcome: Ongoing, Progressing     Pt is progressing and met one of her OT goals.  Continue OT POC.

## 2021-11-26 NOTE — PROGRESS NOTES
[facility]  Psychiatry  Progress Note    Patient Name: Violeta Champion  MRN: [id number]   Code Status: Full Code  Admission Date: 11/12/2021  Hospital Length of Stay: 14 days  Expected Discharge Date: 11/26/2021  Attending Physician: Kiley Yusuf MD  Primary Care Provider: Madie Petersen DO    Current Legal Status: Uncontested    Patient information was obtained from patient, relative(s) and ER records.       HPI:   Consultation-Liaison Psychiatry Consult Note    11/21/2021 4:12 PM  Violeta Champion  MRN: [id number]    Chief Complaint / Reason for Consult: agitation     SUBJECTIVE     History of Present Illness:   Violeta Champion is a 60 y.o. female with a past psychiatric history of depression currently presenting with Brain lesion.  Psychiatry was originally consulted to address the patient's symptoms of agitation.    Per Primary MD:  HPI/Interval history (See H&P for complete P,F,SHx) :   Pmhx DM2, hypothyroidism, YUSEF, hx breast ca, fibromyalgia, htn, hld, depression, class 3 obesity admitted as transfer from [place] for concern of elevated ICP 2/2 vasogenic edema of numerous brain abscesses vs masses. Patient recently admitted at [place] 10/27 for acute encephalopathy, found to have DKA vs HHS and bacteremia. Clinically improved with abx and appropriate blood sugar management until 3 days prior to transfer when she developed worsening confusion and lethargy. MRI 11/13 revealing multiple lesions throughout the bilateral cerebral hemispheres with associated vasogenic edema and leftward midline shift.      HPI from [place] below:  Miss Champion is a 60 year old female with a PMH of DMII on oral anti-hyperglycemics, hypothyroid on synthroid, YUSEF, Right breast papilloma, fibromyalgia, HTN, HLD and depression presents to [facility] via ambulance after being found down and unresponsive at home. Patient is obtunded with a GCS of 8 and is not able to participate in any history giving. This note is per  "report from neighbor, EMS, ED providers and nurse. Per report Miss Champion' neighbor hadnt seen her for 3 days so they went over to her house to check on her and found her down and unresponsive. EMS was called and brought her into the ED. Upon drawing labwork patient Wbc was 25.8, , anion gap 8, Cr 2.9, blood glucose 805, UDS pos for barbiturates, pH 6.928. Patient is protecting airway although her respirations are labored, Sp02 mid to low 90's and she has YUSEF. A dose of narcan had no response, patient was further treated with bicarb, insulin, IV fluids, IV vanc and zosyn and admitted to the ICU for a higher level of care. Patients sister Darcy was called by staff and myself and asked to be kept updated, phone number in EMR.    Per C-L Psych MD:  Pt awake in bed.  She is noticeably confused and has difficulty finding with word finding.  Pt endorses history of depression.  She has taken medication in the past, but is not currently taking anything recently because "I've been doing well".  Oriented to self and being in the hospital.  She does not recall transfer from [facility] and states that she is in the hospital because of a virus.  Pt preservative on asking for Sprite and sugar free drinks, stating that she can have them. 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       Psychiatric Review Of Systems - Is patient experiencing or having changes in:  Unable to assess due to AMS    Psychiatric History:  Diagnose(s): depression   Previous Medication Trials: Yes   Previous Psychiatric Hospitalizations: No  Previous Suicide Attempts: No  History of Violence: No  Outpatient Psychiatrist: No.  Pt does have an outpatient therapist at [facility] per chart review    Social History:  Marital Status: single  Children: 0   Employment Status: currently employed as nurse at [employer]   Education: college graduate  Special Ed: no   History: no  Housing Status: Lives " alone  Developmental History: No  History of Abuse: unable to assess   Access to Gun:  unable to assess     Substance Abuse History:  Per family at bedside and chart check, no history of drug or alcohol use.  Unable to assess with pt at this time due to AMS    Legal History:  Past Charges/Incarcerations: No  Pending Charges: No    Family Psychiatric History:   No    Psychosocial Factors:  Unable to assess    Collateral:   Darcy Mancuso, Sister, at bedside  Over the past two days, she is less agitated than previously.  She continues to be confused.  Confusion and agitation are worse during the night.  Pt works as a nurse at night, so she is normally awake at night.  Pt at times will try to climb out of the bed, or start pulling at her diaper.  At one point she was smearing feces on things.  Sister agreeable to trying medication as needed for agitation.  She has one other sibling and they are alternating who stays with the pt.  Family is trying to remain at bedside for redirection as much as they are able.      Medical Review Of Systems:  Unable to assess       Hospital Course: 11/22: Pt's sister is at bedside and provides much of the history.  Last night she did experience some agitation and poor sleep at first despite Depacon.  Around 4am she fell asleep and had restful sleep.  Overall she thought they had a better night last night.  This morning when she woke up, she was far less confused.  Patient is laughing and smiling appropriately this morning.  She is participating with physical therapy and appears more alert.     11/23: Pt's sister is at bedside.  Last night she had a rough night.  Pt at times starts pulling at sheets, lays side ways/twisted in the bed, and puts hands in feces.  Sister had to go to another room briefly last night because she was getting agitated with her as well.  Nursing had a difficult time taking care of her last night because she was not following instructions.  Sister went back to the  "room with nursing and was able to help re-direct her.  Around 6pm she requested Depacon.   Sister says that it was delayed coming from the pharmacy and pt did not receive it until 1am.  No updated EKG on file.    Pt reports that she had an okay night.  She mentions getting a DORCAS.  Patient is quite confused today.  She becomes irritable when asking orientation questions.  She identifies that she is at [facility].  When asked what city, she says "[facility]" and repeats that the [place] is [facility].  She fluctuated between the month being October or November.  When asked if there is a holiday coming up, after much thought and counting months out loud she said multiple times that the holiday coming up is "October".        11/24: Patient is awake in bed and enthusiastically greets MD.  She says that she slept well last night and feels good.  She is able to tell me that she is having a study today to look at her heart (DORCAS).  Per sister at bedside, the patient had less restless behavior and agitation after receiving Depacon yesterday around dinner time.  She was somewhat agitated and restless throughout the night, so sister requested it in the evening.  Depacon was not given until 0422. After she received it, she fell asleep around 5am and slept for several hours.  Last night the sister things she talked more logically than previous nights, although she still remains quite confused.        11/25: Patient's sister at bedside reported patient had a slightly better night last night but was still awake speaking to herself for some of the night and demanding water through the night. Patient is alert to name only today. Affect labile. Perseverates on the word "October." Sister was inquisitive about recommendations for lessening delirium. Discussed sunlight/staying awake during day and darkness/no TV/no sound at night.     11/26: Patient continues to have agitation during the night.  Last night she had a very bad night " "and was yelling during the night.  This morning she has been sleeping.  Sister says that she usually falls asleep in the early morning hours.  Pt says that she never used to sleep much and reports sleeping 4-5 hours nightly for years.  She usually sleeps in the morning since she has a reversed sleep/wake cycle due to working nights.            Family History     Problem Relation (Age of Onset)    Amblyopia Father    Breast cancer Paternal Aunt, Paternal Grandmother    Cancer Maternal Grandmother    Diabetes Mother, Maternal Aunt, Maternal Uncle    Glaucoma Mother    Hypertension Mother, Brother        Tobacco Use    Smoking status: Never Smoker    Smokeless tobacco: Never Used   Substance and Sexual Activity    Alcohol use: Yes     Alcohol/week: 0.0 standard drinks     Comment: very rarely    Drug use: No    Sexual activity: Not on file     Psychotherapeutics (From admission, onward)            Start     Stop Route Frequency Ordered    11/24/21 2100  QUEtiapine tablet 100 mg         -- Oral Nightly 11/24/21 1053           Review of Systems  Objective:     Vital Signs (Most Recent):  Temp: 96 °F (35.6 °C) (11/26/21 0736)  Pulse: 81 (11/26/21 0736)  Resp: 16 (11/26/21 0736)  BP: 133/85 (11/26/21 0736)  SpO2: 96 % (11/26/21 0736) Vital Signs (24h Range):  Temp:  [96 °F (35.6 °C)-97.9 °F (36.6 °C)] 96 °F (35.6 °C)  Pulse:  [81-98] 81  Resp:  [16-20] 16  SpO2:  [92 %-98 %] 96 %  BP: ()/(55-87) 133/85     Height: 5' 4" (162.6 cm)  Weight: 123.8 kg (273 lb)  Body mass index is 46.86 kg/m².      Intake/Output Summary (Last 24 hours) at 11/26/2021 1121  Last data filed at 11/26/2021 0359  Gross per 24 hour   Intake 8033.45 ml   Output --   Net 8033.45 ml     Mental Status Exam:   Appearance: Obese, laying in bed  Behavior/Cooperation: cooperative, drowsy, falls asleep during interview  Speech: normal tone, normal rate, normal pitch, normal volume  Language: english  Mood: "good"  Affect: normal, reactive   Thought " Process: largely tangential, perservation at times  Thought Content: normal, no suicidality, no homicidality, delusions, or paranoia   Orientation: unable to assess due to patient frequently falling asleep  Memory: Impaired to some degree   Attention Span/Concentration: Impaired   Fund of Knowledge: Estimated to be average level   Insight: limited  Judgment: fair    CAM-ICU Positive     Significant Labs: All pertinent labs within the past 24 hours have been reviewed.    Significant Imaging: I have reviewed all pertinent imaging results/findings within the past 24 hours.       Scheduled Medications:   amLODIPine  10 mg Oral Daily    aspirin  81 mg Oral Daily    cefTRIAXone (ROCEPHIN) IVPB  2 g Intravenous Q12H    heparin (porcine)  5,000 Units Subcutaneous Q8H    insulin aspart U-100  12 Units Subcutaneous TIDWM    insulin detemir U-100  28 Units Subcutaneous QHS    levothyroxine  100 mcg Oral Before breakfast    metroNIDAZOLE  500 mg Oral Q8H    miconazole nitrate 2%   Topical (Top) BID    mupirocin   Topical (Top) BID    QUEtiapine  100 mg Oral QHS    valproate sodium (DEPACON) IVPB  250 mg Intravenous BID    vibegron  75 mg Oral Daily       PRN Medications:  acetaminophen, dextrose 50%, glucagon (human recombinant), guaiFENesin 100 mg/5 ml, labetalol, melatonin, sodium chloride 0.9%, tiZANidine, valproate sodium (DEPACON) IVPB    Review of patient's allergies indicates:   Allergen Reactions    Iodinated contrast media Swelling     Other reaction(s): Swelling    Naproxen sodium Swelling    Naprosyn  [naproxen]      Other reaction(s): Swelling       Assessment/Plan:     Encephalopathy, metabolic  ASSESSMENT     Violeta Champion is a 60 y.o. female with a past psychiatric history of depression currently presenting with Brain lesion.  Psychiatry was originally consulted to address the patient's symptoms of agitation.    IMPRESSION  Hyperactive delirium     RECOMMENDATION(S)      1. Scheduled  Medication(s):  - Increase Depacon to 250mg am and 500mg qhs  -Discontinue Seroquel to due increased QTc (554) and lack of efficacy   - Decreasing levothyroxine is sometimes helpful in decreasing agitation - can consider decreasing dose to 50 mcg if endocrinology is agreeable    2. PRN Medication(s):  Depacon 250mg IV [id number] PRN for non-redirectable agitation.    3.  Monitor:  Please obtain daily EKG to monitor QTc.  If pt receives depacon, monitor ammonia and transaminases.    4. Legal Status/Precaution(s):  Patient does not meet criteria for PEC or inpatient psychiatric admission at this time. Recommend to rescind PEC if one was placed. Patient is not currently an imminent danger to self or others and is not gravely disabled due to a psychiatric illness.    5. Capacity:  Pt continues to have waxing and waning of symptoms and continues to refuse treatment at times. Therefore pt currently does NOT have medical decision making capacity due to Delirium. Please contact next of kin for making medical decisions currently.    6. Other:  CAM ICU - Positive  DELIRIUM BEHAVIOR MANAGEMENT   PLEASE utilize CHEMICAL restraints with PRN meds first for agitation. Minimize use of PHYSICAL restraints   Keep window shades open and room lit during day and room dim at night in order to promote normal sleep-wake cycles   Encourage family at bedside. Downs patient often to situation, location, date   Continue to Limit or Discontinue use of Narcotics, Benzos and Anti-cholinergic medications as they may worsen delirium   Continue medical workup for causative etiology of Delirium       Need for Continued Hospitalization:  No need for inpatient psychiatric hospitalization. Continue medical care as per the primary team.    Anticipated Disposition:  Per primary team     Total time:  25 with greater than 50% of this time spent in counseling and/or coordination of care.       Lala Raya MD, PhD  [facility]  Psychiatry  -2  11/26/2021

## 2021-11-26 NOTE — DISCHARGE SUMMARY
This note has been moved to another encounter. If you have any questions, please contact HIM Chart Correction at [phone].

## 2021-11-26 NOTE — PLAN OF CARE
Problem: Adult Inpatient Plan of Care  Goal: Absence of Hospital-Acquired Illness or Injury  Outcome: Ongoing, Progressing     Problem: Adjustment to Illness (Sepsis/Septic Shock)  Goal: Optimal Coping  Outcome: Ongoing, Progressing   Patient alert, confused, restless, vss, educated to stay in the middle of the bed, family verbalized understanding , tele sitter at bedside will cont to reinforce.

## 2021-11-26 NOTE — ASSESSMENT & PLAN NOTE
-corrected sodium 145 yesterday, will check today's value when it results  -unlikely that patient has Diabetes Insipidus. Will avoid desmopressin for now  -since the enamorado catheter is removed, there is a difficulty with accurate input and output as patient bed wets. Would need accurate input and output documentation for appropriate management.  -Both previous serum and urine osmolality readings reviewed

## 2021-11-26 NOTE — PLAN OF CARE
Problem: Adult Inpatient Plan of Care  Goal: Plan of Care Review  Outcome: Ongoing, Progressing     Problem: Hemodynamic Instability (Sepsis/Septic Shock)  Goal: Effective Tissue Perfusion  Outcome: Ongoing, Progressing     Patient is AAO x2. POC reviewed with patient and sister. Sister verbalized understanding. Patient's breathing is unlabored with equal chest expansion. Patient has wounds to sacral area and perineum rash. Patient complained of pain;PRN pain meds given per order. Patient remained agitated and non-redirectable despite PRN meds being given. Patient was leaning out of bed, touching floor at one point during shift. Patient does not take direction well at all. Patient was grabbing IV pole and opening chamber. Patient voids per diaper. Patient remained free from falls. Patient rested for about an hour during shift. Bed in lowest position,bed alarm on, side rails up x3, no complaints or signs of distress. WCTM.  See flowsheets for full assessment and VS info.

## 2021-11-26 NOTE — ASSESSMENT & PLAN NOTE
ASSESSMENT     Violeta Champion is a 60 y.o. female with a past psychiatric history of depression currently presenting with Brain lesion.  Psychiatry was originally consulted to address the patient's symptoms of agitation.    IMPRESSION  Hyperactive delirium     RECOMMENDATION(S)      1. Scheduled Medication(s):  - Increase Depacon to 250mg am and 500mg qhs  -Discontinue to due increased QTc (554) and lack of efficacy   - Decreasing levothyroxine is sometimes helpful in decreasing agitation - can consider decreasing dose to 50 mcg if endocrinology is agreeable    2. PRN Medication(s):  Depacon 250mg IV [id number] PRN for non-redirectable agitation.    3.  Monitor:  Please obtain daily EKG to monitor QTc.  If pt receives depacon, monitor ammonia and transaminases.    4. Legal Status/Precaution(s):  Patient does not meet criteria for PEC or inpatient psychiatric admission at this time. Recommend to rescind PEC if one was placed. Patient is not currently an imminent danger to self or others and is not gravely disabled due to a psychiatric illness.    5. Capacity:  Pt continues to have waxing and waning of symptoms and continues to refuse treatment at times. Therefore pt currently does NOT have medical decision making capacity due to Delirium. Please contact next of kin for making medical decisions currently.    6. Other:  CAM ICU - Positive  DELIRIUM BEHAVIOR MANAGEMENT   PLEASE utilize CHEMICAL restraints with PRN meds first for agitation. Minimize use of PHYSICAL restraints   Keep window shades open and room lit during day and room dim at night in order to promote normal sleep-wake cycles   Encourage family at bedside. [place] patient often to situation, location, date   Continue to Limit or Discontinue use of Narcotics, Benzos and Anti-cholinergic medications as they may worsen delirium   Continue medical workup for causative etiology of Delirium

## 2021-11-26 NOTE — PROGRESS NOTES
Pharmacist Intervention IV to PO Note    Violeta Champion is a 60 y.o. female being treated with IV medication metronidazole    Patient Data:    Vital Signs (Most Recent):  Temp: 96 °F (35.6 °C) (11/26/21 0736)  Pulse: 81 (11/26/21 0736)  Resp: 16 (11/26/21 0736)  BP: 133/85 (11/26/21 0736)  SpO2: 96 % (11/26/21 0736) Vital Signs (72h Range):  Temp:  [96 °F (35.6 °C)-99.3 °F (37.4 °C)]   Pulse:  []   Resp:  [15-29]   BP: ()/(51-89)   SpO2:  [92 %-100 %]      CBC:  Recent Labs   Lab 11/23/21 0300 11/24/21 0558 11/25/21  0222   WBC 6.10 5.43 5.18   RBC 3.44* 3.62* 3.57*   HGB 9.3* 9.9* 9.7*   HCT 30.5* 32.6* 31.9*    224 226   MCV 89 90 89   MCH 27.0 27.3 27.2   MCHC 30.5* 30.4* 30.4*     CMP:     Recent Labs   Lab 11/23/21 0300 11/23/21 0300 11/24/21 0558 11/24/21  0558 11/25/21  0222   *  --  180*  --  170*   CALCIUM 8.5*   < > 8.6*   < > 8.3*   ALBUMIN 2.4*  --  2.6*  --  2.4*   PROT 6.9  --  6.9  --  6.4     --  145  --  144   K 3.3*  --  3.7  --  3.3*   CO2 26  --  25  --  24     --  107  --  108   BUN 8  --  6  --  6   CREATININE 0.9  --  0.9  --  0.8   ALKPHOS 65  --  69  --  60   ALT <5*  --  <5*  --  <5*   AST 10  --  10  --  9*   BILITOT 0.2  --  0.2  --  0.2    < > = values in this interval not displayed.       Dietary Orders:  Diet Orders  Report           Diet Adult Regular (IDDSI Level 7) [facility]; Consistent Carbohydrate; Thin: Regular starting at 11/26 0855            Based on the following criteria, this patient qualifies for intravenous to oral conversion:  [x] The patients gastrointestinal tract is functioning (tolerating medications via oral or enteral route for 24 hours and tolerating food or enteral feeds for 24 hours).  [x] The patient is hemodynamically stable for 24 hours (heart rate <100 beats per minute, systolic blood pressure >99 mm Hg, and respiratory rate <20 breaths per minute).  [x] The patient shows clinical improvement (afebrile for  at least 24 hours and white blood cell count downtrending or normalized). Additionally, the patient must be non-neutropenic (absolute neutrophil count >500 cells/mm3).  [x] For antimicrobials, the patient has received IV therapy for at least 24 hours.    IV medication metronidazole will be changed to oral medication metronidazole.    Pharmacist's Name: David Butler  Pharmacist's Extension: [phone]

## 2021-11-26 NOTE — PT/OT/SLP PROGRESS
"Occupational Therapy   Treatment    Name: Violeta Champion  MRN: [id number]  Admitting Diagnosis:  Brain lesion  2 Days Post-Op     Co-treat with PT to ensure pt safety     Recommendations:     Discharge Recommendations: rehabilitation facility (pending progress with cognition)  Discharge Equipment Recommendations:  other (see comments) (TBD)  Barriers to discharge:  Other (Comment) (increased level of assistance required)    Assessment:     Violeta Champion is a 60 y.o. female with a medical diagnosis of Brain lesion.  Pt had good participation this date, performing grooming tasks while sitting EOB.  However, her impaired cognition and decreased safety awareness make her a high fall risk and are currently preventing her from performing ADLs and functional mobility independently.  She presents with the following. Performance deficits affecting function are weakness,impaired endurance,impaired self care skills,impaired functional mobilty,impaired balance,impaired cognition,decreased safety awareness,impaired fine motor,impaired coordination.     Rehab Prognosis:  Good; patient would benefit from acute skilled OT services to address these deficits and reach maximum level of function.       Plan:     Patient to be seen 4 x/week to address the above listed problems via self-care/home management,therapeutic exercises,therapeutic activities,neuromuscular re-education,cognitive retraining  · Plan of Care Expires: 12/13/21  · Plan of Care Reviewed with: patient,sibling    Subjective   "I saw the cup the whole time."   Pain/Comfort:  · Pain Rating 1: 0/10  · Pain Rating Post-Intervention 1: 0/10    Objective:     Communicated with: nursing and PT prior to session.  Patient found left sidelying diagonally across the bed (pt said is her usual posture) with telemetry,peripheral IV,Other (comments) ([employer] camera) with her sister present upon OT entry to room.    General Precautions: Standard, fall,aspiration,seizure   Orthopedic " Precautions:N/A   Braces: N/A  Respiratory Status:  · O2 Device (Oxygen Therapy): room air     Occupational Performance:     Bed Mobility:    · Patient completed Rolling/Turning to Right with maximal assistance  · Patient completed Scooting/Bridging anteriorly to EOB to place her feet on the ground with maximal assistance   · Patient completed Supine to Sit with maximal assistance and 2 persons  · Patient completed Sit to Supine with maximal assistance and 2 persons     Functional Mobility/Transfers:  · Deferred due to pt's poor safety awareness while sitting EOB.  She required Min A and cueing to maintain midline posture.  Pt was easily distracted.      Activities of Daily Living:  · Grooming: minimum assistance to brush her teeth while sitting EOB with (A) to apply tooth paste on the tooth brush.  She required maximal cueing for initiating task, sequencing of task, attending to task, and for terminating task.  Pt then washed her face with a wipe with Min A for sitting balance only (from PT) while sitting EOB, with cueing for task initation.       [facility] 6 Click ADL: 11    Treatment & Education:  Pt and her sister edu on role of OT, POC, safety when performing self care tasks, benefit of performing EOB/OOB activity, and safety when performing functional transfers and bed mobility.    - Self care tasks completed-- as noted above       Patient left left sidelying diagonally across the bed with all lines intact, call button in reach and her sister presentEducation:      GOALS:   Multidisciplinary Problems     Occupational Therapy Goals        Problem: Occupational Therapy Goal    Goal Priority Disciplines Outcome Interventions   Occupational Therapy Goal     OT, PT/OT Ongoing, Progressing    Description: Goals to be met by: 2 weeks (11/27/21)     Patient will increase functional independence with ADLs by performing:    UE Dressing with Minimal Assistance.  Grooming while seated with Minimal Assistance.  Sitting at edge  of bed x10 minutes with Minimal Assistance. - Met 11/26  Supine to sit with Minimal Assistance.  Stand pivot transfers with Moderate Assistance.  Pt will follow 3/3 one-step commands to participate in ADL task.                     Time Tracking:     OT Date of Treatment: 11/26/21  OT Start Time: 1354  OT Stop Time: 1419  OT Total Time (min): 25 min    Billable Minutes:Self Care/Home Management 25 min    OT/MARY ALICE: OT          11/26/2021

## 2021-11-27 LAB
BACTERIA UR CULT: NO GROWTH
POCT GLUCOSE: 176 MG/DL (ref 70–110)
POCT GLUCOSE: 215 MG/DL (ref 70–110)
POCT GLUCOSE: 225 MG/DL (ref 70–110)
POCT GLUCOSE: 279 MG/DL (ref 70–110)

## 2021-11-27 PROCEDURE — 99232 PR SUBSEQUENT HOSPITAL CARE,LEVL II: ICD-10-PCS | Mod: ,,, | Performed by: INTERNAL MEDICINE

## 2021-11-27 PROCEDURE — 99233 PR SUBSEQUENT HOSPITAL CARE,LEVL III: ICD-10-PCS | Mod: ,,, | Performed by: INTERNAL MEDICINE

## 2021-11-27 PROCEDURE — 63600175 PHARM REV CODE 636 W HCPCS: Performed by: PHYSICIAN ASSISTANT

## 2021-11-27 PROCEDURE — 25000003 PHARM REV CODE 250: Performed by: PHYSICIAN ASSISTANT

## 2021-11-27 PROCEDURE — 99232 SBSQ HOSP IP/OBS MODERATE 35: CPT | Mod: ,,, | Performed by: INTERNAL MEDICINE

## 2021-11-27 PROCEDURE — 63600175 PHARM REV CODE 636 W HCPCS: Performed by: STUDENT IN AN ORGANIZED HEALTH CARE EDUCATION/TRAINING PROGRAM

## 2021-11-27 PROCEDURE — 25000003 PHARM REV CODE 250: Performed by: INTERNAL MEDICINE

## 2021-11-27 PROCEDURE — 99233 SBSQ HOSP IP/OBS HIGH 50: CPT | Mod: ,,, | Performed by: INTERNAL MEDICINE

## 2021-11-27 PROCEDURE — 11000001 HC ACUTE MED/SURG PRIVATE ROOM

## 2021-11-27 RX ORDER — OLANZAPINE 2.5 MG/1
5 TABLET ORAL
Status: DISCONTINUED | OUTPATIENT
Start: 2021-11-27 | End: 2021-11-30

## 2021-11-27 RX ORDER — DIVALPROEX SODIUM 250 MG/1
250 TABLET, DELAYED RELEASE ORAL
Status: DISCONTINUED | OUTPATIENT
Start: 2021-11-28 | End: 2021-11-29

## 2021-11-27 RX ADMIN — INSULIN ASPART 12 UNITS: 100 INJECTION, SOLUTION INTRAVENOUS; SUBCUTANEOUS at 04:11

## 2021-11-27 RX ADMIN — DIVALPROEX SODIUM 250 MG: 250 TABLET, DELAYED RELEASE ORAL at 05:11

## 2021-11-27 RX ADMIN — POTASSIUM CHLORIDE 30 MEQ: 10 CAPSULE, COATED, EXTENDED RELEASE ORAL at 09:11

## 2021-11-27 RX ADMIN — HEPARIN SODIUM 5000 UNITS: 5000 INJECTION INTRAVENOUS; SUBCUTANEOUS at 10:11

## 2021-11-27 RX ADMIN — INSULIN ASPART 12 UNITS: 100 INJECTION, SOLUTION INTRAVENOUS; SUBCUTANEOUS at 11:11

## 2021-11-27 RX ADMIN — MICONAZOLE NITRATE: 20 OINTMENT TOPICAL at 09:11

## 2021-11-27 RX ADMIN — HEPARIN SODIUM 5000 UNITS: 5000 INJECTION INTRAVENOUS; SUBCUTANEOUS at 05:11

## 2021-11-27 RX ADMIN — ACETAMINOPHEN 650 MG: 325 TABLET ORAL at 01:11

## 2021-11-27 RX ADMIN — AMLODIPINE BESYLATE 10 MG: 10 TABLET ORAL at 09:11

## 2021-11-27 RX ADMIN — MICONAZOLE NITRATE: 20 OINTMENT TOPICAL at 10:11

## 2021-11-27 RX ADMIN — CEFTRIAXONE SODIUM 2 G: 2 INJECTION, SOLUTION INTRAVENOUS at 03:11

## 2021-11-27 RX ADMIN — DIVALPROEX SODIUM 500 MG: 250 TABLET, DELAYED RELEASE ORAL at 04:11

## 2021-11-27 RX ADMIN — LEVOTHYROXINE SODIUM 100 MCG: 100 TABLET ORAL at 05:11

## 2021-11-27 RX ADMIN — METRONIDAZOLE 500 MG: 500 TABLET ORAL at 05:11

## 2021-11-27 RX ADMIN — ASPIRIN 81 MG: 81 TABLET, COATED ORAL at 09:11

## 2021-11-27 RX ADMIN — OLANZAPINE 5 MG: 2.5 TABLET, FILM COATED ORAL at 04:11

## 2021-11-27 RX ADMIN — MUPIROCIN: 20 OINTMENT TOPICAL at 10:11

## 2021-11-27 RX ADMIN — HEPARIN SODIUM 5000 UNITS: 5000 INJECTION INTRAVENOUS; SUBCUTANEOUS at 03:11

## 2021-11-27 RX ADMIN — INSULIN ASPART 2 UNITS: 100 INJECTION, SOLUTION INTRAVENOUS; SUBCUTANEOUS at 11:11

## 2021-11-27 RX ADMIN — METRONIDAZOLE 500 MG: 500 TABLET ORAL at 03:11

## 2021-11-27 RX ADMIN — MUPIROCIN: 20 OINTMENT TOPICAL at 09:11

## 2021-11-27 RX ADMIN — INSULIN ASPART 2 UNITS: 100 INJECTION, SOLUTION INTRAVENOUS; SUBCUTANEOUS at 04:11

## 2021-11-27 RX ADMIN — METRONIDAZOLE 500 MG: 500 TABLET ORAL at 10:11

## 2021-11-27 RX ADMIN — INSULIN ASPART 3 UNITS: 100 INJECTION, SOLUTION INTRAVENOUS; SUBCUTANEOUS at 09:11

## 2021-11-27 NOTE — PROGRESS NOTES
[facility]  Endocrinology  Progress Note    Admit Date: 11/12/2021     60 year old female with pertinent medical history of Type 2 Diabetes Mellitus, hypothyroidism, Intraductal papilloma of the right breast s/p excision, depression and YUSEF was admitted as transfer from [facility] for concern of elevated ICP due to vasogenic edema from numerous brain lesions (likely etiology septic emboli, in the setting of parvimonas and fusobacterium bacteremia.)   Nancy was initially admitted at [facility] 10/27 for acute encephalopathy, found to have DKA ( ( beta hydroxybutyrate 5.3) and bacteremia. 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 She improved with antibiotics and insulin until 3 days prior to transfer when she developed worsening confusion and lethargy. She had a MRI done on 11/13 revealing multiple lesions throughout the bilateral cerebral hemispheres with associated vasogenic edema and leftward midline shift after which she was transferred to our hospital.      Endocrinology was consulted for management of type 2 diabetes mellitus, hypothyroidism and hypernatremia        Regarding Diabetes Mellitus    Type: Type 2 Diabetes Mellitus  Diabetes diagnosis year: first diagnosed in 2010.    Home Diabetes Medications:  Metformin 1000 mg twice daily, Trulicity 1.5 mg weekly and glipizide just 10 mg daily    Previous Medications tried:  Januvia  Farxiga   Has never been on insulin.     How often checking glucose at home? Very infrequently  Hypoglycemia on the regimen?  No      Diabetes Complications include:     Diabetic Ketoacidosis  Neuropathy    Complicating diabetes co morbidities:   YUSEF  Infection        Regarding Hypothyroidism  -Has hypothyroidism for almost 10 years  -On 100 mcg levothyroxine      Her Current symptoms are:         No   Yes    []    [x]   Weight gain    []    [x]   Fatigue    []    [x]   Constipation    [x]    []   Hair loss    [x]    []    "Brittle nails    []    [x]   Mental fog    [x]    []   Cold intolerance    []    [x]   Memory impair    [x]    []   Muscle weakness    [x]    []   Neck swelling, pain, pressure    [x]    []   Hoarseness    [x]    []   Periorbital edema    [x]    []   Lithium or Amiodarone use    []    [x]   Recent severe illness        [x]    []   Recent pregnancy      Personal or Family History:      No   Yes    []    [x]   Thyroid disorder    [x]    []   Thyroid cancer        Last BMD: Normal forearm BMD in 2013 while on fosamax       Previous thyroid studies: none      Plan for pregnancy at this time: none        Lab Results   Component Value Date    TSH 1.392 2021    TSH 0.214 (L) 10/27/2021    TSH 2.236 2021    FREET4 0.90 10/27/2021    FREET4 1.07 2021    FREET4 1.17 2021       Regarding Hypernateremia  -Unclear if it is actual Diabetes Insipidus.   -The Urine Output had normalized after initially being high for a day post IV fluids adminitstration  -No history of Diabetes Insipidus in the patient.   -Never taken Desmopressin in the past, was ordered but discontinued while in the hospital on this visit  -Patient is awake, alert, altered, disoriented and has spells of agitation at night  -Patient had complaints of polydipsia and polyuria initially, currently denies polyuria        Ref. Range 2021 17:51 2021 20:09 2021 23:19 2021 00:00 2021 00:00   Osmolality 275 - 295 mOsm/kg 312 (H)   318 (H) 318 (H)   Osmolality, Urine 50 - 1200 mOsm/kg  263 253                 Interval HPI:   Overnight events: -  Eatin%  Nausea: No  Hypoglycemia and intervention: No  Fever: No    BP (!) 146/72   Pulse 96   Temp 98 °F (36.7 °C) (Oral)   Resp 16   Ht 5' 4" (1.626 m)   Wt 123.8 kg (273 lb)   LMP  (LMP Unknown)   SpO2 95%   BMI 46.86 kg/m²     Labs Reviewed and Include    Recent Labs   Lab 21  1105   *   CALCIUM 8.4*   ALBUMIN 2.5*      K 3.3*   CO2 26   CL " 108   BUN 5*   CREATININE 0.8     Lab Results   Component Value Date    WBC 5.18 11/25/2021    HGB 9.7 (L) 11/25/2021    HCT 31.9 (L) 11/25/2021    MCV 89 11/25/2021     11/25/2021     No results for input(s): TSH, FREET4 in the last 168 hours.  Lab Results   Component Value Date    HGBA1C 9.0 (H) 10/27/2021       Nutritional status:   Body mass index is 46.86 kg/m².  Lab Results   Component Value Date    ALBUMIN 2.5 (L) 11/26/2021    ALBUMIN 2.4 (L) 11/25/2021    ALBUMIN 2.6 (L) 11/24/2021     No results found for: PREALBUMIN    Estimated Creatinine Clearance: 99.6 mL/min (based on SCr of 0.8 mg/dL).    Accu-Checks  Recent Labs     11/24/21  1644 11/24/21  2051 11/25/21  0845 11/25/21  1136 11/25/21  1557 11/25/21  2123 11/26/21  0739 11/26/21  1218 11/26/21  2146 11/27/21  0739   POCTGLUCOSE 148* 132* 183* 152* 216* 228* 173* 146* 213* 279*       Current Medications and/or Treatments Impacting Glycemic Control  Immunotherapy:    Immunosuppressants     None        Steroids:   Hormones (From admission, onward)            Start     Stop Route Frequency Ordered    11/19/21 1859  melatonin tablet 6 mg         -- Oral Nightly PRN 11/19/21 1759        Pressors:    Autonomic Drugs (From admission, onward)            None        Hyperglycemia/Diabetes Medications:   Antihyperglycemics (From admission, onward)            Start     Stop Route Frequency Ordered    11/26/21 2100  insulin detemir U-100 pen 28 Units         -- SubQ Nightly 11/26/21 1341    11/26/21 1645  insulin aspart U-100 pen 12 Units         -- SubQ 3 times daily with meals 11/26/21 1341    11/26/21 1440  insulin aspart U-100 pen 0-5 Units         -- SubQ Before meals & nightly PRN 11/26/21 1341          ASSESSMENT and PLAN    Hypernatremia  -corrected sodium 142 yesterday, will follow up the results for today  -unlikely that patient has Diabetes Insipidus. Will avoid desmopressin for now  -since the enamorado catheter is removed, there is a difficulty  with accurate input and output as patient bed wets. Would need accurate input and output documentation for appropriate management.  -Both previous serum and urine osmolality readings reviewed     Uncontrolled type 2 diabetes mellitus with hyperglycemia  -still slight hyperglycemia seen on current regime of Insulin Levemir to 28 units nightly and Insulin aspart 12 units before meals along with low dose correction scale. Will increase levemir to 30 units nightly today  -Fingersticks before meals, at bedtime and 2 am advised  -Hypoglycemia precautions ordered          Lauri Merchant MD  Endocrinology  [facility]

## 2021-11-27 NOTE — SUBJECTIVE & OBJECTIVE
"Interval HPI:   Overnight events: -  Eatin%  Nausea: No  Hypoglycemia and intervention: No  Fever: No    BP (!) 146/72   Pulse 96   Temp 98 °F (36.7 °C) (Oral)   Resp 16   Ht 5' 4" (1.626 m)   Wt 123.8 kg (273 lb)   LMP  (LMP Unknown)   SpO2 95%   BMI 46.86 kg/m²     Labs Reviewed and Include    Recent Labs   Lab 21  1105   *   CALCIUM 8.4*   ALBUMIN 2.5*      K 3.3*   CO2 26      BUN 5*   CREATININE 0.8     Lab Results   Component Value Date    WBC 5.18 2021    HGB 9.7 (L) 2021    HCT 31.9 (L) 2021    MCV 89 2021     2021     No results for input(s): TSH, FREET4 in the last 168 hours.  Lab Results   Component Value Date    HGBA1C 9.0 (H) 10/27/2021       Nutritional status:   Body mass index is 46.86 kg/m².  Lab Results   Component Value Date    ALBUMIN 2.5 (L) 2021    ALBUMIN 2.4 (L) 2021    ALBUMIN 2.6 (L) 2021     No results found for: PREALBUMIN    Estimated Creatinine Clearance: 99.6 mL/min (based on SCr of 0.8 mg/dL).    Accu-Checks  Recent Labs     21  1644 21  2051 21  0845 21  1136 21  1557 21  2123 21  0739 21  1218 21  2146 21  0739   POCTGLUCOSE 148* 132* 183* 152* 216* 228* 173* 146* 213* 279*       Current Medications and/or Treatments Impacting Glycemic Control  Immunotherapy:    Immunosuppressants     None        Steroids:   Hormones (From admission, onward)            Start     Stop Route Frequency Ordered    21 1859  melatonin tablet 6 mg         -- Oral Nightly PRN 21 1759        Pressors:    Autonomic Drugs (From admission, onward)            None        Hyperglycemia/Diabetes Medications:   Antihyperglycemics (From admission, onward)            Start     Stop Route Frequency Ordered    21 2100  insulin detemir U-100 pen 28 Units         -- SubQ Nightly 21 1341    21 1645  insulin aspart U-100 pen 12 Units         " -- SubQ 3 times daily with meals 11/26/21 1341    11/26/21 1440  insulin aspart U-100 pen 0-5 Units         -- SubQ Before meals & nightly PRN 11/26/21 1341

## 2021-11-27 NOTE — PLAN OF CARE
Problem: Adult Inpatient Plan of Care  Goal: Plan of Care Review  Outcome: Ongoing, Progressing  Goal: Patient-Specific Goal (Individualization)  Outcome: Ongoing, Progressing  Goal: Absence of Hospital-Acquired Illness or Injury  Outcome: Ongoing, Progressing  Goal: Optimal Comfort and Wellbeing  Outcome: Ongoing, Progressing  Goal: Readiness for Transition of Care  Outcome: Ongoing, Progressing  Goal: Rounds/Family Conference  Outcome: Ongoing, Progressing     Problem: Diabetes Comorbidity  Goal: Blood Glucose Level Within Desired Range  Outcome: Ongoing, Progressing     Problem: Adjustment to Illness (Sepsis/Septic Shock)  Goal: Optimal Coping  Outcome: Ongoing, Progressing     Problem: Bleeding (Sepsis/Septic Shock)  Goal: Absence of Bleeding  Outcome: Ongoing, Progressing     Problem: Glycemic Control Impaired (Sepsis/Septic Shock)  Goal: Blood Glucose Level Within Desired Range  Outcome: Ongoing, Progressing     Problem: Hemodynamic Instability (Sepsis/Septic Shock)  Goal: Effective Tissue Perfusion  Outcome: Ongoing, Progressing     Problem: Infection (Sepsis/Septic Shock)  Goal: Absence of Infection Signs/Symptoms  Outcome: Ongoing, Progressing     Problem: Nutrition Impaired (Sepsis/Septic Shock)  Goal: Optimal Nutrition Intake  Outcome: Ongoing, Progressing     Problem: Electrolyte Imbalance (Acute Kidney Injury/Impairment)  Goal: Serum Electrolyte Balance  Outcome: Ongoing, Progressing     Problem: Fluid Imbalance (Acute Kidney Injury/Impairment)  Goal: Optimal Fluid Balance  Outcome: Ongoing, Progressing     Problem: Hematologic Alteration (Acute Kidney Injury/Impairment)  Goal: Hemoglobin, Hematocrit and Platelets Within Normal Range  Outcome: Ongoing, Progressing     Problem: Oral Intake Inadequate (Acute Kidney Injury/Impairment)  Goal: Optimal Nutrition Intake  Outcome: Ongoing, Progressing     Problem: Renal Function Impairment (Acute Kidney Injury/Impairment)  Goal: Effective Renal  Function  Outcome: Ongoing, Progressing     Problem: Bariatric Environmental Safety  Goal: Safety Maintained with Care  Outcome: Ongoing, Progressing     Problem: Infection  Goal: Infection Symptom Resolution  Outcome: Ongoing, Progressing     Problem: Wound  Goal: Optimal Wound Healing  Outcome: Ongoing, Progressing     Problem: Adjustment to Illness (Stroke, Hemorrhagic)  Goal: Optimal Coping  Outcome: Ongoing, Progressing     Problem: Bowel Elimination Impaired (Stroke, Hemorrhagic)  Goal: Effective Bowel Elimination  Outcome: Ongoing, Progressing     Problem: Cerebral Tissue Perfusion Risk (Stroke, Hemorrhagic)  Goal: Optimal Cerebral Tissue Perfusion  Outcome: Ongoing, Progressing     Problem: Communication Impairment (Stroke, Hemorrhagic)  Goal: Improved Communication Skills and Cognitive Function  Outcome: Ongoing, Progressing     Problem: Eating/Swallowing Impairment (Stroke, Hemorrhagic)  Goal: Oral Intake without Aspiration  Outcome: Ongoing, Progressing     Problem: Functional Ability Impaired (Stroke, Hemorrhagic)  Goal: Optimal Functional Ability  Outcome: Ongoing, Progressing     Problem: Hemodynamic Instability (Stroke, Hemorrhagic)  Goal: Vital Signs Remain in Desired Range  Outcome: Ongoing, Progressing     Problem: Pain (Stroke, Hemorrhagic)  Goal: Acceptable Pain Control  Outcome: Ongoing, Progressing     Problem: Sensorimotor Impairment (Stroke, Hemorrhagic)  Goal: Improved Sensorimotor Function  Outcome: Ongoing, Progressing     Problem: Urinary Elimination Impaired (Stroke, Hemorrhagic)  Goal: Effective Urinary Elimination  Outcome: Ongoing, Progressing     Problem: Skin Injury Risk Increased  Goal: Skin Health and Integrity  Outcome: Ongoing, Progressing

## 2021-11-27 NOTE — PROGRESS NOTES
"[facility]  [facility]iatry  Progress Note    Patient Name: Violeta Champion  MRN: [id number]   Code Status: Full Code  Admission Date: 11/12/2021  Hospital Length of Stay: 15 days  Expected Discharge Date: 12/1/2021  Attending Physician: Kiley Yusuf MD  Primary Care Provider: Madie Petersen DO    Current Legal Status: Uncontested    Patient information was obtained from patient and ER records.       Subjective:     11/26: Patient continues to have agitation during the night.  Last night she had a very bad night and was yelling during the night.  This morning she has been sleeping.  Sister says that she usually falls asleep in the early morning hours.  Pt says that she never used to sleep much and reports sleeping 4-5 hours nightly for years.  She usually sleeps in the morning since she has a reversed sleep/wake cycle due to working nights.      11/27: Agitated last night. Confused this morning. Received PRN zyprexa 5 mg and PRN depacon 250 for agitation last night.      Psychotherapeutics (From admission, onward)            Start     Stop Route Frequency Ordered    11/26/21 1947  OLANZapine tablet 5 mg         -- Oral 2 times daily PRN 11/26/21 1849          Objective:     Vital Signs (Most Recent):  Temp: 98.2 °F (36.8 °C) (11/27/21 1236)  Pulse: 97 (11/27/21 1236)  Resp: 16 (11/27/21 1236)  BP: (!) 160/78 (11/27/21 1237)  SpO2: 98 % (11/27/21 1236) Vital Signs (24h Range):  Temp:  [96 °F (35.6 °C)-98.4 °F (36.9 °C)] 98.2 °F (36.8 °C)  Pulse:  [] 97  Resp:  [16] 16  SpO2:  [95 %-100 %] 98 %  BP: (129-170)/(64-91) 160/78     Height: 5' 4" (162.6 cm)  Weight: 123.8 kg (273 lb)  Body mass index is 46.86 kg/m².      Intake/Output Summary (Last 24 hours) at 11/27/2021 1542  Last data filed at 11/27/2021 0529  Gross per 24 hour   Intake 800 ml   Output --   Net 800 ml     Appearance: Obese, laying in bed  Behavior/Cooperation: cooperative, drowsy, falls asleep during interview  Speech: normal " "tone, normal rate, normal pitch, normal volume  Language: english  Mood: "good"  Affect: normal, reactive   Thought Process: largely tangential, perservation at times  Thought Content: normal, no suicidality, no homicidality, delusions, or paranoia   Orientation: unable to assess due to patient frequently falling asleep  Memory: Impaired to some degree   Attention Span/Concentration: Impaired   Fund of Knowledge: Estimated to be average level   Insight: limited  Judgment: fair     CAM-ICU Positive      Significant Labs: All pertinent labs within the past 24 hours have been reviewed.    Significant Imaging: I have reviewed all pertinent imaging results/findings within the past 24 hours.       Scheduled Medications:   amLODIPine  10 mg Oral Daily    aspirin  81 mg Oral Daily    cefTRIAXone (ROCEPHIN) IVPB  2 g Intravenous Q12H    divalproex  250 mg Oral Before breakfast    divalproex  500 mg Oral Before dinner    heparin (porcine)  5,000 Units Subcutaneous Q8H    insulin aspart U-100  12 Units Subcutaneous TIDWM    insulin detemir U-100  30 Units Subcutaneous QHS    levothyroxine  100 mcg Oral Before breakfast    metroNIDAZOLE  500 mg Oral Q8H    miconazole nitrate 2%   Topical (Top) BID    mupirocin   Topical (Top) BID    potassium chloride  30 mEq Oral with breakfast    vibegron  75 mg Oral Daily       PRN Medications:  acetaminophen, dextrose 50%, glucagon (human recombinant), glucose, glucose, guaiFENesin 100 mg/5 ml, insulin aspart U-100, melatonin, OLANZapine, sodium chloride 0.9%, valproate sodium (DEPACON) IVPB    Review of patient's allergies indicates:   Allergen Reactions    Iodinated contrast media Swelling     Other reaction(s): Swelling    Naproxen sodium Swelling    Naprosyn  [naproxen]      Other reaction(s): Swelling       Assessment/Plan:     Encephalopathy, metabolic  ASSESSMENT     Violeta Champion is a 60 y.o. female with a past psychiatric history of depression currently " presenting with Brain lesion.  Psychiatry was originally consulted to address the patient's symptoms of agitation.    IMPRESSION  Hyperactive delirium     RECOMMENDATION(S)      1. Scheduled Medication(s):  - Continue Depacon 250mg qam and 500mg qhs - obtain VPA level with Monday morning labs    2. PRN Medication(s):  PRN Depacon 250mg IV [id number] or PRN Zyprexa 5 mg bid for non-redirectable agitation    3.  Monitor:  Please obtain daily EKG to monitor QTc.  If pt receives depacon, monitor ammonia and transaminases.    4. Legal Status/Precaution(s):  Patient does not meet criteria for PEC or inpatient psychiatric admission at this time. Recommend to rescind PEC if one was placed. Patient is not currently an imminent danger to self or others and is not gravely disabled due to a psychiatric illness.    5. Capacity:  Pt continues to have waxing and waning of symptoms and continues to refuse treatment at times. Therefore pt currently does NOT have medical decision making capacity due to Delirium. Please contact next of kin for making medical decisions currently.    6. Other:  CAM ICU - Positive  DELIRIUM BEHAVIOR MANAGEMENT   PLEASE utilize CHEMICAL restraints with PRN meds first for agitation. Minimize use of PHYSICAL restraints   Keep window shades open and room lit during day and room dim at night in order to promote normal sleep-wake cycles   Encourage family at bedside. [facility] patient often to situation, location, date   Continue to Limit or Discontinue use of Narcotics, Benzos and Anti-cholinergic medications as they may worsen delirium   Continue medical workup for causative etiology of Delirium           Need for Continued Hospitalization:  No need for inpatient psychiatric hospitalization. Continue medical care as per the primary team.    Anticipated Disposition:  Still a Patient    Total time:  35 with greater than 50% of this time spent in counseling and/or coordination of care.       Светлана Bermudez  MD [facility]   Psychiatry  [facility]

## 2021-11-27 NOTE — SUBJECTIVE & OBJECTIVE
"  Psychotherapeutics (From admission, onward)            Start     Stop Route Frequency Ordered    11/26/21 1947  OLANZapine tablet 5 mg         -- Oral 2 times daily PRN 11/26/21 1849          Objective:     Vital Signs (Most Recent):  Temp: 98.2 °F (36.8 °C) (11/27/21 1236)  Pulse: 97 (11/27/21 1236)  Resp: 16 (11/27/21 1236)  BP: (!) 160/78 (11/27/21 1237)  SpO2: 98 % (11/27/21 1236) Vital Signs (24h Range):  Temp:  [96 °F (35.6 °C)-98.4 °F (36.9 °C)] 98.2 °F (36.8 °C)  Pulse:  [] 97  Resp:  [16] 16  SpO2:  [95 %-100 %] 98 %  BP: (129-170)/(64-91) 160/78     Height: 5' 4" (162.6 cm)  Weight: 123.8 kg (273 lb)  Body mass index is 46.86 kg/m².      Intake/Output Summary (Last 24 hours) at 11/27/2021 1542  Last data filed at 11/27/2021 0529  Gross per 24 hour   Intake 800 ml   Output --   Net 800 ml     Appearance: Obese, laying in bed  Behavior/Cooperation: cooperative, drowsy, falls asleep during interview  Speech: normal tone, normal rate, normal pitch, normal volume  Language: english  Mood: "good"  Affect: normal, reactive   Thought Process: largely tangential, perservation at times  Thought Content: normal, no suicidality, no homicidality, delusions, or paranoia   Orientation: unable to assess due to patient frequently falling asleep  Memory: Impaired to some degree   Attention Span/Concentration: Impaired   Fund of Knowledge: Estimated to be average level   Insight: limited  Judgment: fair     CAM-ICU Positive      Significant Labs: All pertinent labs within the past 24 hours have been reviewed.    Significant Imaging: I have reviewed all pertinent imaging results/findings within the past 24 hours.  "

## 2021-11-27 NOTE — ASSESSMENT & PLAN NOTE
-still slight hyperglycemia seen on current regime of Insulin Levemir to 28 units nightly and Insulin aspart 12 units before meals along with low dose correction scale. Will increase levemir to 30 units nightly today  -Fingersticks before meals, at bedtime and 2 am advised  -Hypoglycemia precautions ordered

## 2021-11-27 NOTE — ASSESSMENT & PLAN NOTE
-corrected sodium 142 yesterday, will follow up the results for today  -unlikely that patient has Diabetes Insipidus. Will avoid desmopressin for now  -since the enamorado catheter is removed, there is a difficulty with accurate input and output as patient bed wets. Would need accurate input and output documentation for appropriate management.  -Both previous serum and urine osmolality readings reviewed

## 2021-11-27 NOTE — PROGRESS NOTES
Hospital Medicine  Progress note    Team: [facility] Kiley Yusuf MD  Admit Date: 11/12/2021  SARAVANAN 12/1/2021  Length of Stay:  LOS: 14 days   Code status: Full Code    Principal Problem:  Brain lesion    Interval hx:  Patient had poor night last night with agitation    ROS   Respiratory: neg for cough neg for shortness of breath  Cardiovascular: neg for chest pain neg for palpitations  Gastrointestinal: neg for nausea neg for vomiting, neg for abdominal pain neg for diarrhea neg for constipation   Behavioral/Psych: neg for depression neg for anxiety    PEx  Temp:  [96 °F (35.6 °C)-97.9 °F (36.6 °C)]   Pulse:  [81-98]   Resp:  [16-20]   BP: ()/(55-86)   SpO2:  [92 %-100 %]     Intake/Output Summary (Last 24 hours) at 11/26/2021 1838  Last data filed at 11/26/2021 0359  Gross per 24 hour   Intake 8033.45 ml   Output --   Net 8033.45 ml       General Appearance: no acute distress, obese  Heart: regular rate and rhythm  Respiratory: Normal respiratory effort, no crackles   Abdomen: Soft, non-tender; bowel sounds active  Skin: intact.  Neurologic:  No focal numbness or weakness  Mental status: Alert, oriented to person and location, but not to time and situation    Recent Labs   Lab 11/23/21  0300 11/24/21  0558 11/25/21 0222   WBC 6.10 5.43 5.18   HGB 9.3* 9.9* 9.7*   HCT 30.5* 32.6* 31.9*    224 226     Recent Labs   Lab 11/23/21  0300 11/23/21  0300 11/24/21  0558 11/25/21 0222 11/26/21  1105      < > 145 144 142   K 3.3*   < > 3.7 3.3* 3.3*      < > 107 108 108   CO2 26   < > 25 24 26   BUN 8   < > 6 6 5*   CREATININE 0.9   < > 0.9 0.8 0.8   *   < > 180* 170* 149*   CALCIUM 8.5*   < > 8.6* 8.3* 8.4*   MG 1.9  --  1.9 1.7  --    PHOS 3.2   < > 4.0 4.1 3.7    < > = values in this interval not displayed.     Recent Labs   Lab 11/23/21  0300 11/23/21  0300 11/24/21  0558 11/25/21 0222 11/26/21  1105   ALKPHOS 65  --  69 60  --    ALT <5*  --  <5* <5*  --    AST 10  --  10 9*  --     ALBUMIN 2.4*   < > 2.6* 2.4* 2.5*   PROT 6.9  --  6.9 6.4  --    BILITOT 0.2  --  0.2 0.2  --     < > = values in this interval not displayed.        Recent Labs   Lab 11/25/21  0845 11/25/21  1136 11/25/21  1557 11/25/21  2123 11/26/21  0739 11/26/21  1218   POCTGLUCOSE 183* 152* 216* 228* 173* 146*       Scheduled Meds:   amLODIPine  10 mg Oral Daily    aspirin  81 mg Oral Daily    cefTRIAXone (ROCEPHIN) IVPB  2 g Intravenous Q12H    [START ON 11/27/2021] divalproex  250 mg Oral Before breakfast    divalproex  500 mg Oral Before dinner    heparin (porcine)  5,000 Units Subcutaneous Q8H    insulin aspart U-100  12 Units Subcutaneous TIDWM    insulin detemir U-100  28 Units Subcutaneous QHS    levothyroxine  100 mcg Oral Before breakfast    metroNIDAZOLE  500 mg Oral Q8H    miconazole nitrate 2%   Topical (Top) BID    mupirocin   Topical (Top) BID    potassium chloride  30 mEq Oral with breakfast    vibegron  75 mg Oral Daily     Continuous Infusions:  As Needed:  acetaminophen, dextrose 50%, glucagon (human recombinant), glucose, glucose, guaiFENesin 100 mg/5 ml, insulin aspart U-100, labetalol, melatonin, sodium chloride 0.9%, tiZANidine, valproate sodium (DEPACON) IVPB      Active Hospital Problems    Diagnosis  POA    *Brain lesion [G93.9]  Yes    Hypernatremia [E87.0]  Unknown    BMI 45.0-49.9, adult [Z68.42]  Not Applicable    Discharge planning issues [Z02.9]  Not Applicable    Vasogenic brain edema [G93.6]  Yes    Brain compression [G93.5]  Yes    Diabetes insipidus [E23.2]  Yes    Encephalopathy, metabolic [G93.41]  Yes    Hypertension associated with diabetes [E11.59, I15.2]  Yes    Uncontrolled type 2 diabetes mellitus with hyperglycemia [E11.65]  Yes    Hypothyroidism [E03.9]  Yes      Resolved Hospital Problems    Diagnosis Date Resolved POA    Morbid obesity with BMI of 60.0-69.9, adult [E66.01, Z68.44] 11/15/2021 Not Applicable       Assessment and Plan  / Problems managed  today    Septic emboli in the brain  CVA  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  ID consulted and recommended 6 weeks IV antibiotics   - Ceftriaxone 2g IV Q12H + Metronidazole 500mg PO Q8H   - 6 week course, last day 12/26/21  DORCAS negative for vegetations    Metabolic encephalopathy  Acute agitation  Waxing and waning  Psychiatry assisting in medication management  depakote 250 mg daily and 500 mg after night  seroquel discontinued due to one EKG Qtc>500, but NOT consistent  zyprexa 5 mg BID prn  Delirium precautions    Hypernatremia - resolving with lfuids    Anemia of acute infection  Normal iron levels  Treat infection  Stop daily lab draws    Hypothyroidism  Continue levothyoxine 100 mcg daily    Essential HTN - continue amlodipine 10 mg daily    Debility  Cognitive impariement  ST/PT/OT    DM II  Endocrine consulted     Diet:  consistent carbs  GI PPx: not needed  DVT PPx:  heparin  Airways: room air  Wounds: none    Goals of Care:  Return to prior functional status     Discharge plan: rehab and IV abx    Time (minutes) spent in care of the patient (Greater than 1/2 spent in direct face-to-face contact)  35 min    Kiley Yusuf MD

## 2021-11-27 NOTE — ASSESSMENT & PLAN NOTE
ASSESSMENT     Violeta Champion is a 60 y.o. female with a past psychiatric history of depression currently presenting with Brain lesion.  Psychiatry was originally consulted to address the patient's symptoms of agitation.    IMPRESSION  Hyperactive delirium     RECOMMENDATION(S)      1. Scheduled Medication(s):  - Continue Depacon 250mg qam and 500mg qhs    2. PRN Medication(s):  PRN Depacon 250mg IV [id number] or PRN Zyprexa 5 mg bid for non-redirectable agitation    3.  Monitor:  Please obtain daily EKG to monitor QTc.  If pt receives depacon, monitor ammonia and transaminases.    4. Legal Status/Precaution(s):  Patient does not meet criteria for PEC or inpatient psychiatric admission at this time. Recommend to rescind PEC if one was placed. Patient is not currently an imminent danger to self or others and is not gravely disabled due to a psychiatric illness.    5. Capacity:  Pt continues to have waxing and waning of symptoms and continues to refuse treatment at times. Therefore pt currently does NOT have medical decision making capacity due to Delirium. Please contact next of kin for making medical decisions currently.    6. Other:  CAM ICU - Positive  DELIRIUM BEHAVIOR MANAGEMENT   PLEASE utilize CHEMICAL restraints with PRN meds first for agitation. Minimize use of PHYSICAL restraints   Keep window shades open and room lit during day and room dim at night in order to promote normal sleep-wake cycles   Encourage family at bedside. [place] patient often to situation, location, date   Continue to Limit or Discontinue use of Narcotics, Benzos and Anti-cholinergic medications as they may worsen delirium   Continue medical workup for causative etiology of Delirium

## 2021-11-28 PROBLEM — G93.9 BRAIN LESION: Status: RESOLVED | Noted: 2021-11-12 | Resolved: 2021-11-28

## 2021-11-28 PROBLEM — E23.2 DIABETES INSIPIDUS: Status: RESOLVED | Noted: 2021-11-08 | Resolved: 2021-11-28

## 2021-11-28 PROBLEM — I66.9 CEREBRAL SEPTIC EMBOLI: Status: ACTIVE | Noted: 2021-11-28

## 2021-11-28 PROBLEM — I76 CEREBRAL SEPTIC EMBOLI: Status: ACTIVE | Noted: 2021-11-28

## 2021-11-28 LAB
ALBUMIN SERPL BCP-MCNC: 2.5 G/DL (ref 3.5–5.2)
ANION GAP SERPL CALC-SCNC: 11 MMOL/L (ref 8–16)
BUN SERPL-MCNC: 6 MG/DL (ref 6–20)
CALCIUM SERPL-MCNC: 8.6 MG/DL (ref 8.7–10.5)
CHLORIDE SERPL-SCNC: 109 MMOL/L (ref 95–110)
CO2 SERPL-SCNC: 24 MMOL/L (ref 23–29)
CREAT SERPL-MCNC: 0.9 MG/DL (ref 0.5–1.4)
EST. GFR  (AFRICAN AMERICAN): >60 ML/MIN/1.73 M^2
EST. GFR  (NON AFRICAN AMERICAN): >60 ML/MIN/1.73 M^2
GLUCOSE SERPL-MCNC: 196 MG/DL (ref 70–110)
PHOSPHATE SERPL-MCNC: 3.2 MG/DL (ref 2.7–4.5)
POCT GLUCOSE: 188 MG/DL (ref 70–110)
POCT GLUCOSE: 264 MG/DL (ref 70–110)
POTASSIUM SERPL-SCNC: 3.4 MMOL/L (ref 3.5–5.1)
SODIUM SERPL-SCNC: 144 MMOL/L (ref 136–145)

## 2021-11-28 PROCEDURE — 25000003 PHARM REV CODE 250: Performed by: PHYSICIAN ASSISTANT

## 2021-11-28 PROCEDURE — 99232 SBSQ HOSP IP/OBS MODERATE 35: CPT | Mod: ,,, | Performed by: PSYCHIATRY & NEUROLOGY

## 2021-11-28 PROCEDURE — 99232 PR SUBSEQUENT HOSPITAL CARE,LEVL II: ICD-10-PCS | Mod: ,,, | Performed by: INTERNAL MEDICINE

## 2021-11-28 PROCEDURE — 25000003 PHARM REV CODE 250: Performed by: STUDENT IN AN ORGANIZED HEALTH CARE EDUCATION/TRAINING PROGRAM

## 2021-11-28 PROCEDURE — 36415 COLL VENOUS BLD VENIPUNCTURE: CPT | Performed by: INTERNAL MEDICINE

## 2021-11-28 PROCEDURE — C9399 UNCLASSIFIED DRUGS OR BIOLOG: HCPCS | Performed by: STUDENT IN AN ORGANIZED HEALTH CARE EDUCATION/TRAINING PROGRAM

## 2021-11-28 PROCEDURE — 63600175 PHARM REV CODE 636 W HCPCS: Performed by: PHYSICIAN ASSISTANT

## 2021-11-28 PROCEDURE — 99233 SBSQ HOSP IP/OBS HIGH 50: CPT | Mod: ,,, | Performed by: INTERNAL MEDICINE

## 2021-11-28 PROCEDURE — 80069 RENAL FUNCTION PANEL: CPT | Performed by: INTERNAL MEDICINE

## 2021-11-28 PROCEDURE — 99233 PR SUBSEQUENT HOSPITAL CARE,LEVL III: ICD-10-PCS | Mod: ,,, | Performed by: INTERNAL MEDICINE

## 2021-11-28 PROCEDURE — 99232 SBSQ HOSP IP/OBS MODERATE 35: CPT | Mod: ,,, | Performed by: INTERNAL MEDICINE

## 2021-11-28 PROCEDURE — 99232 PR SUBSEQUENT HOSPITAL CARE,LEVL II: ICD-10-PCS | Mod: ,,, | Performed by: PSYCHIATRY & NEUROLOGY

## 2021-11-28 PROCEDURE — 11000001 HC ACUTE MED/SURG PRIVATE ROOM

## 2021-11-28 PROCEDURE — 63600175 PHARM REV CODE 636 W HCPCS: Mod: JG | Performed by: INTERNAL MEDICINE

## 2021-11-28 PROCEDURE — 25000003 PHARM REV CODE 250: Performed by: INTERNAL MEDICINE

## 2021-11-28 RX ADMIN — ASPIRIN 81 MG: 81 TABLET, COATED ORAL at 08:11

## 2021-11-28 RX ADMIN — OLANZAPINE 5 MG: 2.5 TABLET, FILM COATED ORAL at 03:11

## 2021-11-28 RX ADMIN — AMLODIPINE BESYLATE 10 MG: 10 TABLET ORAL at 08:11

## 2021-11-28 RX ADMIN — ACETAMINOPHEN 650 MG: 325 TABLET ORAL at 09:11

## 2021-11-28 RX ADMIN — INSULIN ASPART 12 UNITS: 100 INJECTION, SOLUTION INTRAVENOUS; SUBCUTANEOUS at 08:11

## 2021-11-28 RX ADMIN — METRONIDAZOLE 500 MG: 500 TABLET ORAL at 03:11

## 2021-11-28 RX ADMIN — MUPIROCIN: 20 OINTMENT TOPICAL at 08:11

## 2021-11-28 RX ADMIN — MICONAZOLE NITRATE: 20 OINTMENT TOPICAL at 09:11

## 2021-11-28 RX ADMIN — CEFTRIAXONE SODIUM 2 G: 2 INJECTION, SOLUTION INTRAVENOUS at 03:11

## 2021-11-28 RX ADMIN — MUPIROCIN: 20 OINTMENT TOPICAL at 09:11

## 2021-11-28 RX ADMIN — METRONIDAZOLE 500 MG: 500 TABLET ORAL at 09:11

## 2021-11-28 RX ADMIN — LEVOTHYROXINE SODIUM 100 MCG: 100 TABLET ORAL at 06:11

## 2021-11-28 RX ADMIN — INSULIN DETEMIR 30 UNITS: 100 INJECTION, SOLUTION SUBCUTANEOUS at 01:11

## 2021-11-28 RX ADMIN — HEPARIN SODIUM 5000 UNITS: 5000 INJECTION INTRAVENOUS; SUBCUTANEOUS at 03:11

## 2021-11-28 RX ADMIN — ALTEPLASE 2 MG: 2.2 INJECTION, POWDER, LYOPHILIZED, FOR SOLUTION INTRAVENOUS at 10:11

## 2021-11-28 RX ADMIN — POTASSIUM CHLORIDE 30 MEQ: 10 CAPSULE, COATED, EXTENDED RELEASE ORAL at 06:11

## 2021-11-28 RX ADMIN — HEPARIN SODIUM 5000 UNITS: 5000 INJECTION INTRAVENOUS; SUBCUTANEOUS at 09:11

## 2021-11-28 RX ADMIN — DIVALPROEX SODIUM 250 MG: 250 TABLET, DELAYED RELEASE ORAL at 06:11

## 2021-11-28 RX ADMIN — INSULIN ASPART 12 UNITS: 100 INJECTION, SOLUTION INTRAVENOUS; SUBCUTANEOUS at 05:11

## 2021-11-28 RX ADMIN — DIVALPROEX SODIUM 500 MG: 250 TABLET, DELAYED RELEASE ORAL at 03:11

## 2021-11-28 RX ADMIN — METRONIDAZOLE 500 MG: 500 TABLET ORAL at 06:11

## 2021-11-28 RX ADMIN — HEPARIN SODIUM 5000 UNITS: 5000 INJECTION INTRAVENOUS; SUBCUTANEOUS at 06:11

## 2021-11-28 RX ADMIN — INSULIN DETEMIR 34 UNITS: 100 INJECTION, SOLUTION SUBCUTANEOUS at 09:11

## 2021-11-28 RX ADMIN — DIVALPROEX SODIUM 250 MG: 250 TABLET, DELAYED RELEASE ORAL at 11:11

## 2021-11-28 RX ADMIN — INSULIN ASPART 12 UNITS: 100 INJECTION, SOLUTION INTRAVENOUS; SUBCUTANEOUS at 11:11

## 2021-11-28 NOTE — PROGRESS NOTES
[facility]  Psychiatry  Progress Note    Patient Name: Violeta Champion  MRN: [id number]   Code Status: Full Code  Admission Date: 11/12/2021  Hospital Length of Stay: 16 days  Expected Discharge Date: 12/1/2021  Attending Physician: Kiley Yusuf MD  Primary Care Provider: Madie Petersen DO    Current Legal Status: Uncontested    Patient information was obtained from patient and relative(s).       Subjective:     Patient is a 60 y.o., female, presents with:    Principal Problem:Brain lesion    Chief Complaint: Delirium    HPI:   Consultation-Liaison Psychiatry Consult Note    11/21/2021 4:12 PM  Violeta Champion  MRN: [id number]    Chief Complaint / Reason for Consult: agitation     SUBJECTIVE     History of Present Illness:   Violeta Champion is a 60 y.o. female with a past psychiatric history of depression currently presenting with Brain lesion.  Psychiatry was originally consulted to address the patient's symptoms of agitation.    Per Primary MD:  HPI/Interval history (See H&P for complete P,F,SHx) :   Pmhx DM2, hypothyroidism, YUSEF, hx breast ca, fibromyalgia, htn, hld, depression, class 3 obesity admitted as transfer from [place] for concern of elevated ICP 2/2 vasogenic edema of numerous brain abscesses vs masses. Patient recently admitted at [place] 10/27 for acute encephalopathy, found to have DKA vs HHS and bacteremia. Clinically improved with abx and appropriate blood sugar management until 3 days prior to transfer when she developed worsening confusion and lethargy. MRI 11/13 revealing multiple lesions throughout the bilateral cerebral hemispheres with associated vasogenic edema and leftward midline shift.      HPI from [place] below:  Miss Champion is a 60 year old female with a PMH of DMII on oral anti-hyperglycemics, hypothyroid on synthroid, YUSEF, Right breast papilloma, fibromyalgia, HTN, HLD and depression presents to [facility] via ambulance after being found down and unresponsive at  "home. Patient is obtunded with a GCS of 8 and is not able to participate in any history giving. This note is per report from neighbor, EMS, ED providers and nurse. Per report Miss Champion' neighbor hadnt seen her for 3 days so they went over to her house to check on her and found her down and unresponsive. EMS was called and brought her into the ED. Upon drawing labwork patient Wbc was 25.8, , anion gap 8, Cr 2.9, blood glucose 805, UDS pos for barbiturates, pH 6.928. Patient is protecting airway although her respirations are labored, Sp02 mid to low 90's and she has YUSEF. A dose of narcan had no response, patient was further treated with bicarb, insulin, IV fluids, IV vanc and zosyn and admitted to the ICU for a higher level of care. Patients sister Darcy was called by staff and myself and asked to be kept updated, phone number in EMR.    Per C-L Psych MD:  Pt awake in bed.  She is noticeably confused and has difficulty finding with word finding.  Pt endorses history of depression.  She has taken medication in the past, but is not currently taking anything recently because "I've been doing well".  Oriented to self and being in the hospital.  She does not recall transfer from [facility] and states that she is in the hospital because of a virus.  Pt preservative on asking for Sprite and sugar free drinks, stating that she can have them. 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       Psychiatric Review Of Systems - Is patient experiencing or having changes in:  Unable to assess due to AMS    Psychiatric History:  Diagnose(s): depression   Previous Medication Trials: Yes   Previous Psychiatric Hospitalizations: No  Previous Suicide Attempts: No  History of Violence: No  Outpatient Psychiatrist: No.  Pt does have an outpatient therapist at [facility] per chart review    Social History:  Marital Status: single  Children: 0   Employment Status: currently employed as nurse at " [employer]   Education: college graduate  Special Ed: no   History: no  Housing Status: Lives alone  Developmental History: No  History of Abuse: unable to assess   Access to Gun:  unable to assess     Substance Abuse History:  Per family at bedside and chart check, no history of drug or alcohol use.  Unable to assess with pt at this time due to AMS    Legal History:  Past Charges/Incarcerations: No  Pending Charges: No    Family Psychiatric History:   No    Psychosocial Factors:  Unable to assess    Collateral:   Darcy Mancuso, Sister, at bedside  Over the past two days, she is less agitated than previously.  She continues to be confused.  Confusion and agitation are worse during the night.  Pt works as a nurse at night, so she is normally awake at night.  Pt at times will try to climb out of the bed, or start pulling at her diaper.  At one point she was smearing feces on things.  Sister agreeable to trying medication as needed for agitation.  She has one other sibling and they are alternating who stays with the pt.  Family is trying to remain at bedside for redirection as much as they are able.      Medical Review Of Systems:  Unable to assess       Hospital Course: 11/22: Pt's sister is at bedside and provides much of the history.  Last night she did experience some agitation and poor sleep at first despite Depacon.  Around 4am she fell asleep and had restful sleep.  Overall she thought they had a better night last night.  This morning when she woke up, she was far less confused.  Patient is laughing and smiling appropriately this morning.  She is participating with physical therapy and appears more alert.     11/23: Pt's sister is at bedside.  Last night she had a rough night.  Pt at times starts pulling at sheets, lays side ways/twisted in the bed, and puts hands in feces.  Sister had to go to another room briefly last night because she was getting agitated with her as well.  Nursing had a difficult  "time taking care of her last night because she was not following instructions.  Sister went back to the room with nursing and was able to help re-direct her.  Around 6pm she requested Depacon.   Sister says that it was delayed coming from the pharmacy and pt did not receive it until 1am.  No updated EKG on file.    Pt reports that she had an okay night.  She mentions getting a DORCAS.  Patient is quite confused today.  She becomes irritable when asking orientation questions.  She identifies that she is at [facility].  When asked what city, she says "[facility]" and repeats that the city is [facility].  She fluctuated between the month being October or November.  When asked if there is a holiday coming up, after much thought and counting months out loud she said multiple times that the holiday coming up is "October".        11/24: Patient is awake in bed and enthusiastically greets MD.  She says that she slept well last night and feels good.  She is able to tell me that she is having a study today to look at her heart (DORCAS).  Per sister at bedside, the patient had less restless behavior and agitation after receiving Depacon yesterday around dinner time.  She was somewhat agitated and restless throughout the night, so sister requested it in the evening.  Depacon was not given until 0422. After she received it, she fell asleep around 5am and slept for several hours.  Last night the sister things she talked more logically than previous nights, although she still remains quite confused.        11/25: Patient's sister at bedside reported patient had a slightly better night last night but was still awake speaking to herself for some of the night and demanding water through the night. Patient is alert to name only today. Affect labile. Perseverates on the word "October." Sister was inquisitive about recommendations for lessening delirium. Discussed sunlight/staying awake during day and darkness/no TV/no sound at night. "     11/26: Patient continues to have agitation during the night.  Last night she had a very bad night and was yelling during the night.  This morning she has been sleeping.  Sister says that she usually falls asleep in the early morning hours.  Pt says that she never used to sleep much and reports sleeping 4-5 hours nightly for years.  She usually sleeps in the morning since she has a reversed sleep/wake cycle due to working nights.      11/27: Agitated last night. Confused this morning. Received PRN zyprexa 5 mg and PRN depacon 250 for agitation last night.    11/28: Patient was well-behaved overnight. She did not require any PRNs and per daughter, this was the best night she has had in a while. Patient remains disoriented but is pleasant. She states she slept well through the night. No restraints in place and she is cooperative with exam, though she is unable to fully comply due to delirium.      Interval History: see hospital course    Family History     Problem Relation (Age of Onset)    Amblyopia Father    Breast cancer Paternal Aunt, Paternal Grandmother    Cancer Maternal Grandmother    Diabetes Mother, Maternal Aunt, Maternal Uncle    Glaucoma Mother    Hypertension Mother, Brother        Tobacco Use    Smoking status: Never Smoker    Smokeless tobacco: Never Used   Substance and Sexual Activity    Alcohol use: Yes     Alcohol/week: 0.0 standard drinks     Comment: very rarely    Drug use: No    Sexual activity: Not on file     Psychotherapeutics (From admission, onward)            Start     Stop Route Frequency Ordered    11/27/21 1615  OLANZapine tablet 5 mg         -- Oral with dinner 11/27/21 1603    11/26/21 1947  OLANZapine tablet 5 mg         -- Oral 2 times daily PRN 11/26/21 1849           Review of Systems  Objective:     Vital Signs (Most Recent):  Temp: 98 °F (36.7 °C) (11/28/21 0918)  Pulse: 98 (11/28/21 0918)  Resp: 18 (11/28/21 0918)  BP: 126/72 (11/28/21 0918)  SpO2: 96 % (11/28/21 0918)  "Vital Signs (24h Range):  Temp:  [98 °F (36.7 °C)-99.6 °F (37.6 °C)] 98 °F (36.7 °C)  Pulse:  [] 98  Resp:  [16-18] 18  SpO2:  [94 %-96 %] 96 %  BP: (126-144)/(72-82) 126/72     Height: 5' 4" (162.6 cm)  Weight: 123.8 kg (273 lb)  Body mass index is 46.86 kg/m².      Intake/Output Summary (Last 24 hours) at 11/28/2021 1427  Last data filed at 11/28/2021 0400  Gross per 24 hour   Intake 1030 ml   Output --   Net 1030 ml     Appearance: Obese, laying in bed  Behavior/Cooperation: cooperative, pleasant  Speech: normal tone, normal rate, normal pitch, normal volume  Language: english  Mood: "good", happy  Affect: full, reactive   Thought Process: largely tangential, perseverative at times  Thought Content: normal, no suicidality, no homicidality, delusions, or paranoia   Orientation: grossly disoriented to place and time, oriented to person only  Memory: Impaired to some degree   Attention Span/Concentration: Impaired, unable to spell 'world' backwards or follow directions for [facility]  Fund of Knowledge: Estimated to be average level   Insight: limited  Judgment: fair     CAM-ICU Positive        Significant Labs: All pertinent labs within the past 24 hours have been reviewed.    Significant Imaging: I have reviewed all pertinent imaging results/findings within the past 24 hours.       Scheduled Medications:   amLODIPine  10 mg Oral Daily    aspirin  81 mg Oral Daily    cefTRIAXone (ROCEPHIN) IVPB  2 g Intravenous Q12H    divalproex  250 mg Oral Before breakfast    divalproex  250 mg Oral with lunch    divalproex  500 mg Oral Before dinner    heparin (porcine)  5,000 Units Subcutaneous Q8H    insulin aspart U-100  12 Units Subcutaneous TIDWM    insulin detemir U-100  34 Units Subcutaneous QHS    levothyroxine  100 mcg Oral Before breakfast    metroNIDAZOLE  500 mg Oral Q8H    miconazole nitrate 2%   Topical (Top) BID    mupirocin   Topical (Top) BID    OLANZapine  5 mg Oral with dinner    " vibegron  75 mg Oral Daily       PRN Medications:  acetaminophen, dextrose 50%, glucagon (human recombinant), glucose, glucose, guaiFENesin 100 mg/5 ml, insulin aspart U-100, melatonin, OLANZapine, sodium chloride 0.9%, valproate sodium (DEPACON) IVPB    Review of patient's allergies indicates:   Allergen Reactions    Iodinated contrast media Swelling     Other reaction(s): Swelling    Naproxen sodium Swelling    Naprosyn  [naproxen]      Other reaction(s): Swelling       Assessment/Plan:     Encephalopathy, metabolic  ASSESSMENT     Violeta Champion is a 60 y.o. female with a past psychiatric history of depression currently presenting with Brain lesion.  Psychiatry was originally consulted to address the patient's symptoms of agitation.    IMPRESSION  Hyperactive delirium     RECOMMENDATION(S)      1. Scheduled Medication(s):  - Continue Depacon 250mg qam and 500mg qhs  - Continue Zyprexa 5 mg nightly    2. PRN Medication(s):  PRN Depacon 250mg IV [id number] or PRN Zyprexa 5 mg bid for non-redirectable agitation    3.  Monitor:  Please obtain daily EKG to monitor QTc.  If pt receives depacon, monitor ammonia and transaminases.    4. Legal Status/Precaution(s):  Patient does not meet criteria for PEC or inpatient psychiatric admission at this time. Recommend to rescind PEC if one was placed. Patient is not currently an imminent danger to self or others and is not gravely disabled due to a psychiatric illness.    5. Capacity:  Pt continues to have waxing and waning of symptoms and continues to refuse treatment at times. Therefore pt currently does NOT have medical decision making capacity due to Delirium. Please contact next of kin for making medical decisions currently.    6. Other:  CAM ICU - Positive  DELIRIUM BEHAVIOR MANAGEMENT   PLEASE utilize CHEMICAL restraints with PRN meds first for agitation. Minimize use of PHYSICAL restraints   Keep window shades open and room lit during day and room dim at night in  order to promote normal sleep-wake cycles   Encourage family at bedside. [place] patient often to situation, location, date   Continue to Limit or Discontinue use of Narcotics, Benzos and Anti-cholinergic medications as they may worsen delirium   Continue medical workup for causative etiology of Delirium           Need for Continued Hospitalization:  No need for inpatient psychiatric hospitalization. Continue medical care as per the primary team.    Anticipated Disposition:  per primary    Total time:  25 with greater than 50% of this time spent in counseling and/or coordination of care.       Song Maldonado MD   Psychiatry  [facility]

## 2021-11-28 NOTE — SUBJECTIVE & OBJECTIVE
"Interval History: see hospital course    Family History     Problem Relation (Age of Onset)    Amblyopia Father    Breast cancer Paternal Aunt, Paternal Grandmother    Cancer Maternal Grandmother    Diabetes Mother, Maternal Aunt, Maternal Uncle    Glaucoma Mother    Hypertension Mother, Brother        Tobacco Use    Smoking status: Never Smoker    Smokeless tobacco: Never Used   Substance and Sexual Activity    Alcohol use: Yes     Alcohol/week: 0.0 standard drinks     Comment: very rarely    Drug use: No    Sexual activity: Not on file     Psychotherapeutics (From admission, onward)            Start     Stop Route Frequency Ordered    11/27/21 1615  OLANZapine tablet 5 mg         -- Oral with dinner 11/27/21 1603    11/26/21 1947  OLANZapine tablet 5 mg         -- Oral 2 times daily PRN 11/26/21 1849           Review of Systems  Objective:     Vital Signs (Most Recent):  Temp: 98 °F (36.7 °C) (11/28/21 0918)  Pulse: 98 (11/28/21 0918)  Resp: 18 (11/28/21 0918)  BP: 126/72 (11/28/21 0918)  SpO2: 96 % (11/28/21 0918) Vital Signs (24h Range):  Temp:  [98 °F (36.7 °C)-99.6 °F (37.6 °C)] 98 °F (36.7 °C)  Pulse:  [] 98  Resp:  [16-18] 18  SpO2:  [94 %-96 %] 96 %  BP: (126-144)/(72-82) 126/72     Height: 5' 4" (162.6 cm)  Weight: 123.8 kg (273 lb)  Body mass index is 46.86 kg/m².      Intake/Output Summary (Last 24 hours) at 11/28/2021 1427  Last data filed at 11/28/2021 0400  Gross per 24 hour   Intake 1030 ml   Output --   Net 1030 ml     Appearance: Obese, laying in bed  Behavior/Cooperation: cooperative, pleasant  Speech: normal tone, normal rate, normal pitch, normal volume  Language: english  Mood: "good", happy  Affect: full, reactive   Thought Process: largely tangential, perseverative at times  Thought Content: normal, no suicidality, no homicidality, delusions, or paranoia   Orientation: grossly disoriented to place and time, oriented to person only  Memory: Impaired to some degree   Attention " Span/Concentration: Impaired, unable to spell 'world' backwards or follow directions for [facility]  Fund of Knowledge: Estimated to be average level   Insight: limited  Judgment: fair     CAM-ICU Positive        Significant Labs: All pertinent labs within the past 24 hours have been reviewed.    Significant Imaging: I have reviewed all pertinent imaging results/findings within the past 24 hours.

## 2021-11-28 NOTE — SUBJECTIVE & OBJECTIVE
"Interval HPI:   Overnight events: -  Eating: intermittently around 50%  Nausea: No  Hypoglycemia and intervention: No  Fever: No    /72   Pulse 98   Temp 98 °F (36.7 °C)   Resp 18   Ht 5' 4" (1.626 m)   Wt 123.8 kg (273 lb)   LMP  (LMP Unknown)   SpO2 96%   BMI 46.86 kg/m²     Labs Reviewed and Include    No results for input(s): GLU, CALCIUM, ALBUMIN, PROT, NA, K, CO2, CL, BUN, CREATININE, ALKPHOS, ALT, AST, BILITOT in the last 24 hours.  Lab Results   Component Value Date    WBC 5.18 11/25/2021    HGB 9.7 (L) 11/25/2021    HCT 31.9 (L) 11/25/2021    MCV 89 11/25/2021     11/25/2021     No results for input(s): TSH, FREET4 in the last 168 hours.  Lab Results   Component Value Date    HGBA1C 9.0 (H) 10/27/2021       Nutritional status:   Body mass index is 46.86 kg/m².  Lab Results   Component Value Date    ALBUMIN 2.5 (L) 11/26/2021    ALBUMIN 2.4 (L) 11/25/2021    ALBUMIN 2.6 (L) 11/24/2021     No results found for: PREALBUMIN    Estimated Creatinine Clearance: 99.6 mL/min (based on SCr of 0.8 mg/dL).    Accu-Checks  Recent Labs     11/25/21  1557 11/25/21  2123 11/26/21  0739 11/26/21  1218 11/26/21  2146 11/27/21  0739 11/27/21  1107 11/27/21  1555 11/27/21  2135 11/28/21  0857   POCTGLUCOSE 216* 228* 173* 146* 213* 279* 225* 215* 176* 264*       Current Medications and/or Treatments Impacting Glycemic Control  Immunotherapy:    Immunosuppressants     None        Steroids:   Hormones (From admission, onward)            Start     Stop Route Frequency Ordered    11/19/21 1859  melatonin tablet 6 mg         -- Oral Nightly PRN 11/19/21 1759        Pressors:    Autonomic Drugs (From admission, onward)            None        Hyperglycemia/Diabetes Medications:   Antihyperglycemics (From admission, onward)            Start     Stop Route Frequency Ordered    11/27/21 2100  insulin detemir U-100 pen 30 Units         -- SubQ Nightly 11/27/21 0938    11/26/21 1645  insulin aspart U-100 pen 12 Units   "       -- SubQ 3 times daily with meals 11/26/21 1341    11/26/21 1440  insulin aspart U-100 pen 0-5 Units         -- SubQ Before meals & nightly PRN 11/26/21 1341

## 2021-11-28 NOTE — PROGRESS NOTES
Hospital Medicine  Progress note    Team: [facility] MED D Kiley Yusuf MD  Admit Date: 11/12/2021  SARAVANAN 12/1/2021  Length of Stay:  LOS: 16 days   Code status: Full Code    Principal Problem:  Brain lesion    Interval hx:  Patient had better night. Less agitated, but still confused. Still trying to get out of bed.    ROS   Respiratory: neg for cough neg for shortness of breath  Cardiovascular: neg for chest pain neg for palpitations  Gastrointestinal: neg for nausea neg for vomiting, neg for abdominal pain neg for diarrhea neg for constipation   Behavioral/Psych: neg for depression neg for anxiety    PEx  Temp:  [98 °F (36.7 °C)-99.6 °F (37.6 °C)]   Pulse:  []   Resp:  [16-18]   BP: (126-144)/(72-82)   SpO2:  [94 %-96 %]     Intake/Output Summary (Last 24 hours) at 11/28/2021 1558  Last data filed at 11/28/2021 0400  Gross per 24 hour   Intake 530 ml   Output --   Net 530 ml     General Appearance: no acute distress, obese  Heart: regular rate and rhythm  Respiratory: Normal respiratory effort, no crackles   Abdomen: Soft, non-tender; bowel sounds active  Skin: intact.  Neurologic:  No focal numbness or weakness  Mental status: Alert, oriented to person and location, but not to time and situation    Recent Labs   Lab 11/23/21  0300 11/24/21  0558 11/25/21  0222   WBC 6.10 5.43 5.18   HGB 9.3* 9.9* 9.7*   HCT 30.5* 32.6* 31.9*    224 226     Recent Labs   Lab 11/23/21  0300 11/23/21  0300 11/24/21  0558 11/24/21  0558 11/25/21  0222 11/26/21  1105 11/28/21  1113      < > 145   < > 144 142 144   K 3.3*   < > 3.7   < > 3.3* 3.3* 3.4*      < > 107   < > 108 108 109   CO2 26   < > 25   < > 24 26 24   BUN 8   < > 6   < > 6 5* 6   CREATININE 0.9   < > 0.9   < > 0.8 0.8 0.9   *   < > 180*   < > 170* 149* 196*   CALCIUM 8.5*   < > 8.6*   < > 8.3* 8.4* 8.6*   MG 1.9  --  1.9  --  1.7  --   --    PHOS 3.2   < > 4.0   < > 4.1 3.7 3.2    < > = values in this interval not displayed.     Recent Labs    Lab 11/23/21  0300 11/23/21  0300 11/24/21  0558 11/24/21  0558 11/25/21  0222 11/26/21  1105 11/28/21  1113   ALKPHOS 65  --  69  --  60  --   --    ALT <5*  --  <5*  --  <5*  --   --    AST 10  --  10  --  9*  --   --    ALBUMIN 2.4*   < > 2.6*   < > 2.4* 2.5* 2.5*   PROT 6.9  --  6.9  --  6.4  --   --    BILITOT 0.2  --  0.2  --  0.2  --   --     < > = values in this interval not displayed.        Recent Labs   Lab 11/27/21  0739 11/27/21  1107 11/27/21  1555 11/27/21  2135 11/28/21  0857 11/28/21  1147   POCTGLUCOSE 279* 225* 215* 176* 264* 188*       Scheduled Meds:   amLODIPine  10 mg Oral Daily    aspirin  81 mg Oral Daily    cefTRIAXone (ROCEPHIN) IVPB  2 g Intravenous Q12H    divalproex  250 mg Oral Before breakfast    divalproex  250 mg Oral with lunch    divalproex  500 mg Oral Before dinner    heparin (porcine)  5,000 Units Subcutaneous Q8H    insulin aspart U-100  12 Units Subcutaneous TIDWM    insulin detemir U-100  34 Units Subcutaneous QHS    levothyroxine  100 mcg Oral Before breakfast    metroNIDAZOLE  500 mg Oral Q8H    miconazole nitrate 2%   Topical (Top) BID    mupirocin   Topical (Top) BID    OLANZapine  5 mg Oral with dinner    vibegron  75 mg Oral Daily     Continuous Infusions:  As Needed:  acetaminophen, dextrose 50%, glucagon (human recombinant), glucose, glucose, guaiFENesin 100 mg/5 ml, insulin aspart U-100, melatonin, OLANZapine, sodium chloride 0.9%, valproate sodium (DEPACON) IVPB    Active Hospital Problems    Diagnosis  POA    *Brain lesion [G93.9]  Yes    Hypernatremia [E87.0]  Unknown    BMI 45.0-49.9, adult [Z68.42]  Not Applicable    Discharge planning issues [Z02.9]  Not Applicable    Vasogenic brain edema [G93.6]  Yes    Brain compression [G93.5]  Yes    Diabetes insipidus [E23.2]  Yes    Encephalopathy, metabolic [G93.41]  Yes    Hypertension associated with diabetes [E11.59, I15.2]  Yes    Uncontrolled type 2 diabetes mellitus with hyperglycemia  [E11.65]  Yes    Hypothyroidism [E03.9]  Yes      Resolved Hospital Problems    Diagnosis Date Resolved POA    Morbid obesity with BMI of 60.0-69.9, adult [E66.01, Z68.44] 11/15/2021 Not Applicable     Assessment and Plan  / Problems managed today    STROKE due to Septic emboli in the brain  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  Vasogenic edema  ID consulted and recommended 6 weeks IV antibiotics   - Ceftriaxone 2g IV Q12H + Metronidazole 500mg PO Q8H   - 6 week course, last day 12/26/21  DORCAS negative for vegetations    Metabolic encephalopathy  Acute agitation  Waxing and waning  Psychiatry assisting in medication management  Increase depakote 250 mg to with morning and lunch and 500 mg after night  Zyprexa 5 mg with dinner  seroquel discontinued due to one EKG Qtc>500, but NOT consistent  zyprexa 5 mg BID prn  Delirium precautions  Lab draws later in morning  SLP  Currently retraints    Hypernatremia - resolving    Anemia of acute infection  Normal iron levels  Treat infection  Stop daily lab draws    Hypothyroidism  Continue levothyoxine 100 mcg daily    Essential HTN - continue amlodipine 10 mg daily    Debility  Cognitive impariement  ST/PT/OT    DM II  Endocrine consulted     Diet:  consistent carbs  GI PPx: not needed  DVT PPx:  heparin  Airways: room air  Wounds: none    Goals of Care:  Return to prior functional status     Discharge plan: rehab and IV abx    Time (minutes) spent in care of the patient (Greater than 1/2 spent in direct face-to-face contact)  35 min    Kiley Yusuf MD

## 2021-11-28 NOTE — PROGRESS NOTES
[facility]  Endocrinology  Progress Note    Admit Date: 11/12/2021     60 year old female with pertinent medical history of Type 2 Diabetes Mellitus, hypothyroidism, Intraductal papilloma of the right breast s/p excision, depression and YUSEF was admitted as transfer from [place] for concern of elevated ICP due to vasogenic edema from numerous brain lesions (likely etiology septic emboli, in the setting of parvimonas and fusobacterium bacteremia.)   Nancy was initially admitted at [place] 10/27 for acute encephalopathy, found to have DKA ( ( beta hydroxybutyrate 5.3) and bacteremia. 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 She improved with antibiotics and insulin until 3 days prior to transfer when she developed worsening confusion and lethargy. She had a MRI done on 11/13 revealing multiple lesions throughout the bilateral cerebral hemispheres with associated vasogenic edema and leftward midline shift after which she was transferred to our hospital.      Endocrinology was consulted for management of type 2 diabetes mellitus, hypothyroidism and hypernatremia        Regarding Diabetes Mellitus    Type: Type 2 Diabetes Mellitus  Diabetes diagnosis year: first diagnosed in 2010.    Home Diabetes Medications:  Metformin 1000 mg twice daily, Trulicity 1.5 mg weekly and glipizide just 10 mg daily    Previous Medications tried:  Januvia  Farxiga   Has never been on insulin.     How often checking glucose at home? Very infrequently  Hypoglycemia on the regimen?  No      Diabetes Complications include:     Diabetic Ketoacidosis  Neuropathy    Complicating diabetes co morbidities:   YUSEF  Infection        Regarding Hypothyroidism  -Has hypothyroidism for almost 10 years  -On 100 mcg levothyroxine      Her Current symptoms are:         No   Yes    []    [x]   Weight gain    []    [x]   Fatigue    []    [x]   Constipation    [x]    []   Hair loss    [x]    []    "Brittle nails    []    [x]   Mental fog    [x]    []   Cold intolerance    []    [x]   Memory impair    [x]    []   Muscle weakness    [x]    []   Neck swelling, pain, pressure    [x]    []   Hoarseness    [x]    []   Periorbital edema    [x]    []   Lithium or Amiodarone use    []    [x]   Recent severe illness        [x]    []   Recent pregnancy      Personal or Family History:      No   Yes    []    [x]   Thyroid disorder    [x]    []   Thyroid cancer        Last BMD: Normal forearm BMD in 2013 while on fosamax       Previous thyroid studies: none      Plan for pregnancy at this time: none        Lab Results   Component Value Date    TSH 1.392 11/13/2021    TSH 0.214 (L) 10/27/2021    TSH 2.236 07/29/2021    FREET4 0.90 10/27/2021    FREET4 1.07 07/29/2021    FREET4 1.17 03/16/2021       Regarding Hypernateremia  -Unclear if it is actual Diabetes Insipidus.   -The Urine Output had normalized after initially being high for a day post IV fluids adminitstration  -No history of Diabetes Insipidus in the patient.   -Never taken Desmopressin in the past, was ordered but discontinued while in the hospital on this visit  -Patient is awake, alert, altered, disoriented and has spells of agitation at night  -Patient had complaints of polydipsia and polyuria initially, currently denies polyuria        Ref. Range 11/20/2021 17:51 11/20/2021 20:09 11/20/2021 23:19 11/21/2021 00:00 11/21/2021 00:00   Osmolality 275 - 295 mOsm/kg 312 (H)   318 (H) 318 (H)   Osmolality, Urine 50 - 1200 mOsm/kg  263 253                 Interval HPI:   Overnight events: -  Eating: intermittently around 50%  Nausea: No  Hypoglycemia and intervention: No  Fever: No    /72   Pulse 98   Temp 98 °F (36.7 °C)   Resp 18   Ht 5' 4" (1.626 m)   Wt 123.8 kg (273 lb)   LMP  (LMP Unknown)   SpO2 96%   BMI 46.86 kg/m²     Labs Reviewed and Include    No results for input(s): GLU, CALCIUM, ALBUMIN, PROT, NA, K, CO2, CL, BUN, CREATININE, ALKPHOS, " ALT, AST, BILITOT in the last 24 hours.  Lab Results   Component Value Date    WBC 5.18 11/25/2021    HGB 9.7 (L) 11/25/2021    HCT 31.9 (L) 11/25/2021    MCV 89 11/25/2021     11/25/2021     No results for input(s): TSH, FREET4 in the last 168 hours.  Lab Results   Component Value Date    HGBA1C 9.0 (H) 10/27/2021       Nutritional status:   Body mass index is 46.86 kg/m².  Lab Results   Component Value Date    ALBUMIN 2.5 (L) 11/26/2021    ALBUMIN 2.4 (L) 11/25/2021    ALBUMIN 2.6 (L) 11/24/2021     No results found for: PREALBUMIN    Estimated Creatinine Clearance: 99.6 mL/min (based on SCr of 0.8 mg/dL).    Accu-Checks  Recent Labs     11/25/21  1557 11/25/21  2123 11/26/21  0739 11/26/21  1218 11/26/21  2146 11/27/21  0739 11/27/21  1107 11/27/21  1555 11/27/21  2135 11/28/21  0857   POCTGLUCOSE 216* 228* 173* 146* 213* 279* 225* 215* 176* 264*       Current Medications and/or Treatments Impacting Glycemic Control  Immunotherapy:    Immunosuppressants     None        Steroids:   Hormones (From admission, onward)            Start     Stop Route Frequency Ordered    11/19/21 1859  melatonin tablet 6 mg         -- Oral Nightly PRN 11/19/21 1759        Pressors:    Autonomic Drugs (From admission, onward)            None        Hyperglycemia/Diabetes Medications:   Antihyperglycemics (From admission, onward)            Start     Stop Route Frequency Ordered    11/27/21 2100  insulin detemir U-100 pen 30 Units         -- SubQ Nightly 11/27/21 0938    11/26/21 1645  insulin aspart U-100 pen 12 Units         -- SubQ 3 times daily with meals 11/26/21 1341    11/26/21 1440  insulin aspart U-100 pen 0-5 Units         -- SubQ Before meals & nightly PRN 11/26/21 1341          ASSESSMENT and PLAN    * Brain lesion  -management per primary team      Hypernatremia  -sodium 142 on 11.26.21, will follow up the results for today  -unlikely that patient has Diabetes Insipidus. Will avoid desmopressin for now  -since the  enamorado catheter is removed, there is a difficulty with accurate input and output as patient bed wets. Would need accurate input and output documentation for appropriate management.  -Both previous serum and urine osmolality readings reviewed     Uncontrolled type 2 diabetes mellitus with hyperglycemia  -on Insulin Levemir to 30 units nightly and Insulin aspart 12 units before meals along with low dose correction scale. will increase the Levemir to 34 units today and observe the trend  -Fingersticks before meals, at bedtime and 2 am advised  -Hypoglycemia precautions ordered      Hypothyroidism  -Last TSH 1.392  -Would continue levothyroxine 100 mcg  -No signs of acute decompensation secondary to hypothyroidism noted on evaluation of patient        Lauri Merchant MD  Endocrinology  [facility]

## 2021-11-28 NOTE — PROGRESS NOTES
Hospital Medicine  Progress note    Team: [facility] MED D Kiley Yusuf MD  Admit Date: 11/12/2021  SARAVANAN 12/1/2021  Length of Stay:  LOS: 15 days   Code status: Full Code    Principal Problem:  Brain lesion    Interval hx:  Patient had poor night last night with agitation. Today was found by sister out of bed with head hanging toward floor. She was restrained afterward. Thirsty. She denies attempting to get out of bed.    ROS   Respiratory: neg for cough neg for shortness of breath  Cardiovascular: neg for chest pain neg for palpitations  Gastrointestinal: neg for nausea neg for vomiting, neg for abdominal pain neg for diarrhea neg for constipation   Behavioral/Psych: neg for depression neg for anxiety    PEx  Temp:  [97.2 °F (36.2 °C)-98.4 °F (36.9 °C)]   Pulse:  []   Resp:  [16]   BP: (129-170)/(64-91)   SpO2:  [95 %-98 %]     Intake/Output Summary (Last 24 hours) at 11/27/2021 1834  Last data filed at 11/27/2021 1500  Gross per 24 hour   Intake 1900 ml   Output --   Net 1900 ml     General Appearance: no acute distress, obese  Heart: regular rate and rhythm  Respiratory: Normal respiratory effort, no crackles   Abdomen: Soft, non-tender; bowel sounds active  Skin: intact.  Neurologic:  No focal numbness or weakness  Mental status: Alert, oriented to person and location, but not to time and situation    Recent Labs   Lab 11/23/21  0300 11/24/21  0558 11/25/21 0222   WBC 6.10 5.43 5.18   HGB 9.3* 9.9* 9.7*   HCT 30.5* 32.6* 31.9*    224 226     Recent Labs   Lab 11/23/21  0300 11/23/21  0300 11/24/21  0558 11/25/21  0222 11/26/21  1105      < > 145 144 142   K 3.3*   < > 3.7 3.3* 3.3*      < > 107 108 108   CO2 26   < > 25 24 26   BUN 8   < > 6 6 5*   CREATININE 0.9   < > 0.9 0.8 0.8   *   < > 180* 170* 149*   CALCIUM 8.5*   < > 8.6* 8.3* 8.4*   MG 1.9  --  1.9 1.7  --    PHOS 3.2   < > 4.0 4.1 3.7    < > = values in this interval not displayed.     Recent Labs   Lab 11/23/21  2135  11/23/21  0300 11/24/21  0558 11/25/21  0222 11/26/21  1105   ALKPHOS 65  --  69 60  --    ALT <5*  --  <5* <5*  --    AST 10  --  10 9*  --    ALBUMIN 2.4*   < > 2.6* 2.4* 2.5*   PROT 6.9  --  6.9 6.4  --    BILITOT 0.2  --  0.2 0.2  --     < > = values in this interval not displayed.        Recent Labs   Lab 11/26/21  0739 11/26/21  1218 11/26/21  2146 11/27/21  0739 11/27/21  1107 11/27/21  1555   POCTGLUCOSE 173* 146* 213* 279* 225* 215*       Scheduled Meds:   amLODIPine  10 mg Oral Daily    aspirin  81 mg Oral Daily    cefTRIAXone (ROCEPHIN) IVPB  2 g Intravenous Q12H    divalproex  250 mg Oral Before breakfast    [START ON 11/28/2021] divalproex  250 mg Oral with lunch    divalproex  500 mg Oral Before dinner    heparin (porcine)  5,000 Units Subcutaneous Q8H    insulin aspart U-100  12 Units Subcutaneous TIDWM    insulin detemir U-100  30 Units Subcutaneous QHS    levothyroxine  100 mcg Oral Before breakfast    metroNIDAZOLE  500 mg Oral Q8H    miconazole nitrate 2%   Topical (Top) BID    mupirocin   Topical (Top) BID    OLANZapine  5 mg Oral with dinner    potassium chloride  30 mEq Oral with breakfast    vibegron  75 mg Oral Daily     Continuous Infusions:  As Needed:  acetaminophen, dextrose 50%, glucagon (human recombinant), glucose, glucose, guaiFENesin 100 mg/5 ml, insulin aspart U-100, melatonin, OLANZapine, sodium chloride 0.9%, valproate sodium (DEPACON) IVPB    Active Hospital Problems    Diagnosis  POA    *Brain lesion [G93.9]  Yes    Hypernatremia [E87.0]  Unknown    BMI 45.0-49.9, adult [Z68.42]  Not Applicable    Discharge planning issues [Z02.9]  Not Applicable    Vasogenic brain edema [G93.6]  Yes    Brain compression [G93.5]  Yes    Diabetes insipidus [E23.2]  Yes    Encephalopathy, metabolic [G93.41]  Yes    Hypertension associated with diabetes [E11.59, I15.2]  Yes    Uncontrolled type 2 diabetes mellitus with hyperglycemia [E11.65]  Yes    Hypothyroidism [E03.9]   Yes      Resolved Hospital Problems    Diagnosis Date Resolved POA    Morbid obesity with BMI of 60.0-69.9, adult [E66.01, Z68.44] 11/15/2021 Not Applicable     Assessment and Plan  / Problems managed today    Septic emboli in the brain  CVA  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  ID consulted and recommended 6 weeks IV antibiotics   - Ceftriaxone 2g IV Q12H + Metronidazole 500mg PO Q8H   - 6 week course, last day 12/26/21  DORCAS negative for vegetations    Metabolic encephalopathy  Acute agitation  Waxing and waning  Psychiatry assisting in medication management  Increase depakote 250 mg to with morning and lunch and 500 mg after night  Zyprexa 5 mg with dinner  seroquel discontinued due to one EKG Qtc>500, but NOT consistent  zyprexa 5 mg BID prn  Delirium precautions  Lab draws later in morning  SLP  Currently retraints    Hypernatremia - resolving    Anemia of acute infection  Normal iron levels  Treat infection  Stop daily lab draws    Hypothyroidism  Continue levothyoxine 100 mcg daily    Essential HTN - continue amlodipine 10 mg daily    Debility  Cognitive impariement  ST/PT/OT    DM II  Endocrine consulted     Diet:  consistent carbs  GI PPx: not needed  DVT PPx:  heparin  Airways: room air  Wounds: none    Goals of Care:  Return to prior functional status     Discharge plan: rehab and IV abx    Time (minutes) spent in care of the patient (Greater than 1/2 spent in direct face-to-face contact)  35 min    Kiley Yusuf MD

## 2021-11-28 NOTE — ASSESSMENT & PLAN NOTE
-sodium 142 on 11.26.21, will follow up the results for today  -unlikely that patient has Diabetes Insipidus. Will avoid desmopressin for now  -since the enamorado catheter is removed, there is a difficulty with accurate input and output as patient bed wets. Would need accurate input and output documentation for appropriate management.  -Both previous serum and urine osmolality readings reviewed

## 2021-11-28 NOTE — ASSESSMENT & PLAN NOTE
ASSESSMENT     Violeta Champion is a 60 y.o. female with a past psychiatric history of depression currently presenting with Brain lesion.  Psychiatry was originally consulted to address the patient's symptoms of agitation.    IMPRESSION  Hyperactive delirium     RECOMMENDATION(S)      1. Scheduled Medication(s):  - Continue Depacon 250mg qam and 500mg qhs  - Continue Zyprexa 5 mg nightly    2. PRN Medication(s):  PRN Depacon 250mg IV [id number] or PRN Zyprexa 5 mg bid for non-redirectable agitation    3.  Monitor:  Please obtain daily EKG to monitor QTc.  If pt receives depacon, monitor ammonia and transaminases.    4. Legal Status/Precaution(s):  Patient does not meet criteria for PEC or inpatient psychiatric admission at this time. Recommend to rescind PEC if one was placed. Patient is not currently an imminent danger to self or others and is not gravely disabled due to a psychiatric illness.    5. Capacity:  Pt continues to have waxing and waning of symptoms and continues to refuse treatment at times. Therefore pt currently does NOT have medical decision making capacity due to Delirium. Please contact next of kin for making medical decisions currently.    6. Other:  [facility] ICU - Positive  DELIRIUM BEHAVIOR MANAGEMENT   PLEASE utilize CHEMICAL restraints with PRN meds first for agitation. Minimize use of PHYSICAL restraints   Keep window shades open and room lit during day and room dim at night in order to promote normal sleep-wake cycles   Encourage family at bedside. [place] patient often to situation, location, date   Continue to Limit or Discontinue use of Narcotics, Benzos and Anti-cholinergic medications as they may worsen delirium   Continue medical workup for causative etiology of Delirium

## 2021-11-28 NOTE — PLAN OF CARE
Problem: Adult Inpatient Plan of Care  Goal: Readiness for Transition of Care  Outcome: Ongoing, Not Progressing  Pt is confused and not cooperative, climbing out of bed.      Problem: Restraint, Nonbehavioral (Nonviolent)  Goal: Discontinuation Criteria Achieved  Outcome: Ongoing, Not Progressing  Pt very confused, climbing out of bed and not redirectable. Javon wrist restraints continued.      Problem: Adult Inpatient Plan of Care  Goal: Plan of Care Review  Outcome: Ongoing, Progressing  Goal: Patient-Specific Goal (Individualization)  Outcome: Ongoing, Progressing  Goal: Absence of Hospital-Acquired Illness or Injury  Outcome: Ongoing, Progressing  Goal: Optimal Comfort and Wellbeing  Outcome: Ongoing, Progressing  Goal: Rounds/Family Conference  Outcome: Ongoing, Progressing     Problem: Diabetes Comorbidity  Goal: Blood Glucose Level Within Desired Range  Outcome: Ongoing, Progressing     Problem: Adjustment to Illness (Sepsis/Septic Shock)  Goal: Optimal Coping  Outcome: Ongoing, Progressing     Problem: Bleeding (Sepsis/Septic Shock)  Goal: Absence of Bleeding  Outcome: Ongoing, Progressing     Problem: Glycemic Control Impaired (Sepsis/Septic Shock)  Goal: Blood Glucose Level Within Desired Range  Outcome: Ongoing, Progressing     Problem: Hemodynamic Instability (Sepsis/Septic Shock)  Goal: Effective Tissue Perfusion  Outcome: Ongoing, Progressing     Problem: Infection (Sepsis/Septic Shock)  Goal: Absence of Infection Signs/Symptoms  Outcome: Ongoing, Progressing     Problem: Nutrition Impaired (Sepsis/Septic Shock)  Goal: Optimal Nutrition Intake  Outcome: Ongoing, Progressing     Problem: Electrolyte Imbalance (Acute Kidney Injury/Impairment)  Goal: Serum Electrolyte Balance  Outcome: Ongoing, Progressing     Problem: Fluid Imbalance (Acute Kidney Injury/Impairment)  Goal: Optimal Fluid Balance  Outcome: Ongoing, Progressing     Problem: Hematologic Alteration (Acute Kidney Injury/Impairment)  Goal:  Hemoglobin, Hematocrit and Platelets Within Normal Range  Outcome: Ongoing, Progressing     Problem: Oral Intake Inadequate (Acute Kidney Injury/Impairment)  Goal: Optimal Nutrition Intake  Outcome: Ongoing, Progressing     Problem: Renal Function Impairment (Acute Kidney Injury/Impairment)  Goal: Effective Renal Function  Outcome: Ongoing, Progressing     Problem: Bariatric Environmental Safety  Goal: Safety Maintained with Care  Outcome: Ongoing, Progressing     Problem: Infection  Goal: Infection Symptom Resolution  Outcome: Ongoing, Progressing     Problem: Wound  Goal: Optimal Wound Healing  Outcome: Ongoing, Progressing     Problem: Adjustment to Illness (Stroke, Hemorrhagic)  Goal: Optimal Coping  Outcome: Ongoing, Progressing     Problem: Bowel Elimination Impaired (Stroke, Hemorrhagic)  Goal: Effective Bowel Elimination  Outcome: Ongoing, Progressing     Problem: Cerebral Tissue Perfusion Risk (Stroke, Hemorrhagic)  Goal: Optimal Cerebral Tissue Perfusion  Outcome: Ongoing, Progressing     Problem: Communication Impairment (Stroke, Hemorrhagic)  Goal: Improved Communication Skills and Cognitive Function  Outcome: Ongoing, Progressing     Problem: Eating/Swallowing Impairment (Stroke, Hemorrhagic)  Goal: Oral Intake without Aspiration  Outcome: Ongoing, Progressing     Problem: Functional Ability Impaired (Stroke, Hemorrhagic)  Goal: Optimal Functional Ability  Outcome: Ongoing, Progressing     Problem: Hemodynamic Instability (Stroke, Hemorrhagic)  Goal: Vital Signs Remain in Desired Range  Outcome: Ongoing, Progressing     Problem: Pain (Stroke, Hemorrhagic)  Goal: Acceptable Pain Control  Outcome: Ongoing, Progressing     Problem: Sensorimotor Impairment (Stroke, Hemorrhagic)  Goal: Improved Sensorimotor Function  Outcome: Ongoing, Progressing     Problem: Urinary Elimination Impaired (Stroke, Hemorrhagic)  Goal: Effective Urinary Elimination  Outcome: Ongoing, Progressing     Problem: Skin Injury Risk  Increased  Goal: Skin Health and Integrity  Outcome: Ongoing, Progressing     Problem: Fall Injury Risk  Goal: Absence of Fall and Fall-Related Injury  Outcome: Ongoing, Progressing     Problem: Restraint, Nonbehavioral (Nonviolent)  Goal: Personal Dignity and Safety Maintained  Outcome: Ongoing, Progressing

## 2021-11-28 NOTE — ASSESSMENT & PLAN NOTE
-on Insulin Levemir to 30 units nightly and Insulin aspart 12 units before meals along with low dose correction scale. will increase the Levemir to 34 units today and observe the trend  -Fingersticks before meals, at bedtime and 2 am advised  -Hypoglycemia precautions ordered

## 2021-11-28 NOTE — PLAN OF CARE
Problem: Adult Inpatient Plan of Care  Goal: Plan of Care Review  Outcome: Ongoing, Progressing  Goal: Absence of Hospital-Acquired Illness or Injury  Outcome: Ongoing, Progressing  Goal: Optimal Comfort and Wellbeing  Outcome: Ongoing, Progressing     Problem: Diabetes Comorbidity  Goal: Blood Glucose Level Within Desired Range  Outcome: Ongoing, Progressing     POC reviewed with patient. All questions and concerns reviewed. Vital signs stable throughout shift. For full assessment please refer to flowsheet. Fall/ safety precautions implemented & maintained. Bed locked in lowest position, bed alarm activated & audible, & call light in reach. Will continue to monitor.    Restraints removed successfully at 2200, patient remained redirectable throughout the night.

## 2021-11-29 PROBLEM — I63.9 CEREBROVASCULAR ACCIDENT (CVA) DUE TO EMBOLISM: Status: ACTIVE | Noted: 2021-11-29

## 2021-11-29 LAB
25(OH)D3+25(OH)D2 SERPL-MCNC: 31 NG/ML (ref 30–96)
ALBUMIN SERPL BCP-MCNC: 2.1 G/DL (ref 3.5–5.2)
ALP SERPL-CCNC: 46 U/L (ref 55–135)
ALT SERPL W/O P-5'-P-CCNC: <5 U/L (ref 10–44)
ANION GAP SERPL CALC-SCNC: 9 MMOL/L (ref 8–16)
AST SERPL-CCNC: 9 U/L (ref 10–40)
BASOPHILS # BLD AUTO: 0.02 K/UL (ref 0–0.2)
BASOPHILS NFR BLD: 0.4 % (ref 0–1.9)
BILIRUB SERPL-MCNC: 0.2 MG/DL (ref 0.1–1)
BUN SERPL-MCNC: 4 MG/DL (ref 6–20)
CALCIUM SERPL-MCNC: 6.9 MG/DL (ref 8.7–10.5)
CHLORIDE SERPL-SCNC: 114 MMOL/L (ref 95–110)
CO2 SERPL-SCNC: 21 MMOL/L (ref 23–29)
CREAT SERPL-MCNC: 0.6 MG/DL (ref 0.5–1.4)
DIFFERENTIAL METHOD: ABNORMAL
EOSINOPHIL # BLD AUTO: 0.1 K/UL (ref 0–0.5)
EOSINOPHIL NFR BLD: 2.5 % (ref 0–8)
ERYTHROCYTE [DISTWIDTH] IN BLOOD BY AUTOMATED COUNT: 19.7 % (ref 11.5–14.5)
EST. GFR  (AFRICAN AMERICAN): >60 ML/MIN/1.73 M^2
EST. GFR  (NON AFRICAN AMERICAN): >60 ML/MIN/1.73 M^2
GLUCOSE SERPL-MCNC: 133 MG/DL (ref 70–110)
HCT VFR BLD AUTO: 27.5 % (ref 37–48.5)
HGB BLD-MCNC: 8.5 G/DL (ref 12–16)
IMM GRANULOCYTES # BLD AUTO: 0.03 K/UL (ref 0–0.04)
IMM GRANULOCYTES NFR BLD AUTO: 0.5 % (ref 0–0.5)
LYMPHOCYTES # BLD AUTO: 2.8 K/UL (ref 1–4.8)
LYMPHOCYTES NFR BLD: 48.8 % (ref 18–48)
MAGNESIUM SERPL-MCNC: 1.4 MG/DL (ref 1.6–2.6)
MCH RBC QN AUTO: 27.2 PG (ref 27–31)
MCHC RBC AUTO-ENTMCNC: 30.9 G/DL (ref 32–36)
MCV RBC AUTO: 88 FL (ref 82–98)
MONOCYTES # BLD AUTO: 0.6 K/UL (ref 0.3–1)
MONOCYTES NFR BLD: 10.1 % (ref 4–15)
NEUTROPHILS # BLD AUTO: 2.1 K/UL (ref 1.8–7.7)
NEUTROPHILS NFR BLD: 37.7 % (ref 38–73)
NRBC BLD-RTO: 0 /100 WBC
PHOSPHATE SERPL-MCNC: 3 MG/DL (ref 2.7–4.5)
PLATELET # BLD AUTO: 134 K/UL (ref 150–450)
PMV BLD AUTO: 12.9 FL (ref 9.2–12.9)
POCT GLUCOSE: 146 MG/DL (ref 70–110)
POCT GLUCOSE: 150 MG/DL (ref 70–110)
POCT GLUCOSE: 188 MG/DL (ref 70–110)
POCT GLUCOSE: 191 MG/DL (ref 70–110)
POCT GLUCOSE: 232 MG/DL (ref 70–110)
POTASSIUM SERPL-SCNC: 2.7 MMOL/L (ref 3.5–5.1)
PROT SERPL-MCNC: 5.1 G/DL (ref 6–8.4)
RBC # BLD AUTO: 3.12 M/UL (ref 4–5.4)
SODIUM SERPL-SCNC: 144 MMOL/L (ref 136–145)
VALPROATE SERPL-MCNC: 26.9 UG/ML (ref 50–100)
VIT B12 SERPL-MCNC: 744 PG/ML (ref 210–950)
WBC # BLD AUTO: 5.64 K/UL (ref 3.9–12.7)

## 2021-11-29 PROCEDURE — 80164 ASSAY DIPROPYLACETIC ACD TOT: CPT | Performed by: STUDENT IN AN ORGANIZED HEALTH CARE EDUCATION/TRAINING PROGRAM

## 2021-11-29 PROCEDURE — 93010 EKG 12-LEAD: ICD-10-PCS | Mod: ,,, | Performed by: INTERNAL MEDICINE

## 2021-11-29 PROCEDURE — 93010 ELECTROCARDIOGRAM REPORT: CPT | Mod: ,,, | Performed by: INTERNAL MEDICINE

## 2021-11-29 PROCEDURE — 82306 VITAMIN D 25 HYDROXY: CPT | Performed by: INTERNAL MEDICINE

## 2021-11-29 PROCEDURE — 92507 TX SP LANG VOICE COMM INDIV: CPT

## 2021-11-29 PROCEDURE — 84425 ASSAY OF VITAMIN B-1: CPT | Performed by: INTERNAL MEDICINE

## 2021-11-29 PROCEDURE — 80053 COMPREHEN METABOLIC PANEL: CPT | Performed by: INTERNAL MEDICINE

## 2021-11-29 PROCEDURE — 25000003 PHARM REV CODE 250: Performed by: PHYSICIAN ASSISTANT

## 2021-11-29 PROCEDURE — 99232 SBSQ HOSP IP/OBS MODERATE 35: CPT | Mod: ,,, | Performed by: INTERNAL MEDICINE

## 2021-11-29 PROCEDURE — 99233 SBSQ HOSP IP/OBS HIGH 50: CPT | Mod: ,,, | Performed by: INTERNAL MEDICINE

## 2021-11-29 PROCEDURE — 93005 ELECTROCARDIOGRAM TRACING: CPT

## 2021-11-29 PROCEDURE — 84100 ASSAY OF PHOSPHORUS: CPT | Performed by: INTERNAL MEDICINE

## 2021-11-29 PROCEDURE — 83735 ASSAY OF MAGNESIUM: CPT | Performed by: INTERNAL MEDICINE

## 2021-11-29 PROCEDURE — 85025 COMPLETE CBC W/AUTO DIFF WBC: CPT | Performed by: INTERNAL MEDICINE

## 2021-11-29 PROCEDURE — 97535 SELF CARE MNGMENT TRAINING: CPT

## 2021-11-29 PROCEDURE — 11000001 HC ACUTE MED/SURG PRIVATE ROOM

## 2021-11-29 PROCEDURE — 97110 THERAPEUTIC EXERCISES: CPT

## 2021-11-29 PROCEDURE — 97530 THERAPEUTIC ACTIVITIES: CPT

## 2021-11-29 PROCEDURE — 82607 VITAMIN B-12: CPT | Performed by: INTERNAL MEDICINE

## 2021-11-29 PROCEDURE — 84207 ASSAY OF VITAMIN B-6: CPT | Performed by: INTERNAL MEDICINE

## 2021-11-29 PROCEDURE — 99232 PR SUBSEQUENT HOSPITAL CARE,LEVL II: ICD-10-PCS | Mod: ,,, | Performed by: INTERNAL MEDICINE

## 2021-11-29 PROCEDURE — 25000003 PHARM REV CODE 250: Performed by: INTERNAL MEDICINE

## 2021-11-29 PROCEDURE — 63600175 PHARM REV CODE 636 W HCPCS: Performed by: INTERNAL MEDICINE

## 2021-11-29 PROCEDURE — 99233 PR SUBSEQUENT HOSPITAL CARE,LEVL III: ICD-10-PCS | Mod: ,,, | Performed by: INTERNAL MEDICINE

## 2021-11-29 PROCEDURE — 63600175 PHARM REV CODE 636 W HCPCS: Performed by: PHYSICIAN ASSISTANT

## 2021-11-29 RX ORDER — POTASSIUM CHLORIDE 750 MG/1
30 CAPSULE, EXTENDED RELEASE ORAL
Status: COMPLETED | OUTPATIENT
Start: 2021-11-29 | End: 2021-11-30

## 2021-11-29 RX ORDER — POTASSIUM CHLORIDE 750 MG/1
10 CAPSULE, EXTENDED RELEASE ORAL 2 TIMES DAILY WITH MEALS
Status: DISCONTINUED | OUTPATIENT
Start: 2021-11-30 | End: 2021-12-01 | Stop reason: HOSPADM

## 2021-11-29 RX ORDER — POTASSIUM CHLORIDE 750 MG/1
30 CAPSULE, EXTENDED RELEASE ORAL ONCE
Status: DISCONTINUED | OUTPATIENT
Start: 2021-11-29 | End: 2021-11-29

## 2021-11-29 RX ORDER — SODIUM,POTASSIUM PHOSPHATES 280-250MG
2 POWDER IN PACKET (EA) ORAL
Status: DISCONTINUED | OUTPATIENT
Start: 2021-11-29 | End: 2021-12-01 | Stop reason: HOSPADM

## 2021-11-29 RX ORDER — MAGNESIUM SULFATE HEPTAHYDRATE 40 MG/ML
2 INJECTION, SOLUTION INTRAVENOUS
Status: COMPLETED | OUTPATIENT
Start: 2021-11-29 | End: 2021-11-29

## 2021-11-29 RX ORDER — POTASSIUM CHLORIDE 750 MG/1
10 CAPSULE, EXTENDED RELEASE ORAL DAILY
Status: DISCONTINUED | OUTPATIENT
Start: 2021-11-30 | End: 2021-11-29

## 2021-11-29 RX ORDER — DIVALPROEX SODIUM 250 MG/1
500 TABLET, DELAYED RELEASE ORAL 3 TIMES DAILY
Status: DISCONTINUED | OUTPATIENT
Start: 2021-11-30 | End: 2021-12-01 | Stop reason: HOSPADM

## 2021-11-29 RX ORDER — LANOLIN ALCOHOL/MO/W.PET/CERES
400 CREAM (GRAM) TOPICAL 2 TIMES DAILY
Status: DISCONTINUED | OUTPATIENT
Start: 2021-11-29 | End: 2021-12-01 | Stop reason: HOSPADM

## 2021-11-29 RX ADMIN — POTASSIUM CHLORIDE 30 MEQ: 10 CAPSULE, COATED, EXTENDED RELEASE ORAL at 03:11

## 2021-11-29 RX ADMIN — METRONIDAZOLE 500 MG: 500 TABLET ORAL at 02:11

## 2021-11-29 RX ADMIN — MAGNESIUM SULFATE 2 G: 2 INJECTION INTRAVENOUS at 01:11

## 2021-11-29 RX ADMIN — OLANZAPINE 5 MG: 2.5 TABLET, FILM COATED ORAL at 01:11

## 2021-11-29 RX ADMIN — MAGNESIUM SULFATE 2 G: 2 INJECTION INTRAVENOUS at 10:11

## 2021-11-29 RX ADMIN — DIVALPROEX SODIUM 250 MG: 250 TABLET, DELAYED RELEASE ORAL at 06:11

## 2021-11-29 RX ADMIN — INSULIN ASPART 12 UNITS: 100 INJECTION, SOLUTION INTRAVENOUS; SUBCUTANEOUS at 01:11

## 2021-11-29 RX ADMIN — METRONIDAZOLE 500 MG: 500 TABLET ORAL at 06:11

## 2021-11-29 RX ADMIN — AMLODIPINE BESYLATE 10 MG: 10 TABLET ORAL at 10:11

## 2021-11-29 RX ADMIN — MAGNESIUM SULFATE 2 G: 2 INJECTION INTRAVENOUS at 03:11

## 2021-11-29 RX ADMIN — ASPIRIN 81 MG: 81 TABLET, COATED ORAL at 08:11

## 2021-11-29 RX ADMIN — Medication 400 MG: at 09:11

## 2021-11-29 RX ADMIN — CEFTRIAXONE SODIUM 2 G: 2 INJECTION, SOLUTION INTRAVENOUS at 02:11

## 2021-11-29 RX ADMIN — INSULIN ASPART 1 UNITS: 100 INJECTION, SOLUTION INTRAVENOUS; SUBCUTANEOUS at 09:11

## 2021-11-29 RX ADMIN — HEPARIN SODIUM 5000 UNITS: 5000 INJECTION INTRAVENOUS; SUBCUTANEOUS at 02:11

## 2021-11-29 RX ADMIN — MICONAZOLE NITRATE: 20 OINTMENT TOPICAL at 09:11

## 2021-11-29 RX ADMIN — Medication 400 MG: at 08:11

## 2021-11-29 RX ADMIN — DIVALPROEX SODIUM 500 MG: 250 TABLET, DELAYED RELEASE ORAL at 03:11

## 2021-11-29 RX ADMIN — INSULIN DETEMIR 34 UNITS: 100 INJECTION, SOLUTION SUBCUTANEOUS at 09:11

## 2021-11-29 RX ADMIN — INSULIN ASPART 12 UNITS: 100 INJECTION, SOLUTION INTRAVENOUS; SUBCUTANEOUS at 05:11

## 2021-11-29 RX ADMIN — POTASSIUM CHLORIDE 30 MEQ: 10 CAPSULE, COATED, EXTENDED RELEASE ORAL at 08:11

## 2021-11-29 RX ADMIN — LEVOTHYROXINE SODIUM 100 MCG: 100 TABLET ORAL at 06:11

## 2021-11-29 RX ADMIN — HEPARIN SODIUM 5000 UNITS: 5000 INJECTION INTRAVENOUS; SUBCUTANEOUS at 09:11

## 2021-11-29 RX ADMIN — MUPIROCIN: 20 OINTMENT TOPICAL at 09:11

## 2021-11-29 RX ADMIN — HEPARIN SODIUM 5000 UNITS: 5000 INJECTION INTRAVENOUS; SUBCUTANEOUS at 06:11

## 2021-11-29 RX ADMIN — METRONIDAZOLE 500 MG: 500 TABLET ORAL at 10:11

## 2021-11-29 RX ADMIN — DIVALPROEX SODIUM 250 MG: 250 TABLET, DELAYED RELEASE ORAL at 01:11

## 2021-11-29 RX ADMIN — CEFTRIAXONE SODIUM 2 G: 2 INJECTION, SOLUTION INTRAVENOUS at 03:11

## 2021-11-29 RX ADMIN — OLANZAPINE 5 MG: 2.5 TABLET, FILM COATED ORAL at 04:11

## 2021-11-29 RX ADMIN — MUPIROCIN: 20 OINTMENT TOPICAL at 08:11

## 2021-11-29 RX ADMIN — POTASSIUM & SODIUM PHOSPHATES POWDER PACK 280-160-250 MG 2 PACKET: 280-160-250 PACK at 08:11

## 2021-11-29 NOTE — PLAN OF CARE
POC is reviewed and understood by patient. Elevated SBP at times. Agitation meds given. NO c/o pain, nausea. Avasys camera on. Pt almost fell out the bed earlier as patient attempts to get out. Pt continues to be reoriented. Call bell in reach. Bed in lowest position. WCTM.

## 2021-11-29 NOTE — PT/OT/SLP PROGRESS
"Speech Language Pathology Treatment    Patient Name:  Violeta Champion   MRN:  [id number]  Admitting Diagnosis: Cerebrovascular accident (CVA) due to embolism    Recommendations:                 General Recommendations:  Speech/language therapy and Cognitive-linguistic therapy  Diet recommendations:  Regular, Liquid Diet Level: Thin   Aspiration Precautions: Eliminate distractions, Feed only when awake/alert, Frequent oral care, HOB to 90 degrees, Monitor for s/s of aspiration, Remain upright 30 minutes post meal, Small bites/sips and Standard aspiration precautions   General Precautions: Standard, aspiration,fall  Communication strategies:  none    Subjective     "I was sick with some kind of virus"    Spoke with RN prior to entering pt's room . Pt seen bedside for session. Alert and agreeable to ST.       Pain/Comfort:  · Pain Rating 1: 0/10  · Pain Rating Post-Intervention 1: 0/10    Respiratory Status:  · O2 Device (Oxygen Therapy): room air    Objective:     Has the patient been evaluated by SLP for swallowing?   Yes  Keep patient NPO? No   Current Respiratory Status:        Pt seen for ongoing treatment of cognitive-linguistic deficits. Required max cues to orient x4. Pt believed she was in [place] due to a virus and stated it was "the turkey month". Pt tangential and required cues to redirect to structured therapy tasks. Required min cues to recall details of prior PT session. Pt IND utilized descriptive strategy in instance of anomia. Pt stated external memory strategies x3 given min cues. Provided education re: role of ST, POC, memory strategies, diet recs, and swallow precautions.      Assessment:     Violeta Champion is a 60 y.o. female with an SLP diagnosis of Dysphagia and Cognitive-Linguistic Impairment.      Goals:   Multidisciplinary Problems     SLP Goals        Problem: SLP Goal    Goal Priority Disciplines Outcome   SLP Goal     SLP Ongoing, Progressing   Description: Speech Therapy Short Term " Goals  Goal expected to be met by 11/25, updated to continue until 12/10  1. Pt will participate in an ongoing assessment to determine the least restrictive and safest diet with possible updated goals to follow pending results.  2. Pt will participate in a speech, language, and cognitive evaluation with possible updated goals to follow pending results. -ongoing  3. Pt will attend to therapy tasks for 1+ minutes given 1 redirection.   4. Pt will answer orientation questions x4 given min cues.   5. Pt will answer general information questions with 75% acc given cues. -met  6. Pt will complete word finding tasks with 75% acc min cues.   7. Pt will follow 2 step directions with 75% acc given 1 repetition.   8. Pt will answer problem solving/reasoning questions with 75% acc no cues.                      Plan:     · Patient to be seen:  4 x/week   · Plan of Care expires:     · Plan of Care reviewed with:  patient   · SLP Follow-Up:  Yes       Discharge recommendations:  rehabilitation facility   Barriers to Discharge:  Level of Skilled Assistance Needed .    Time Tracking:     SLP Treatment Date:   11/29/21  Speech Start Time:  1114  Speech Stop Time:  1131     Speech Total Time (min):  17 min    Billable Minutes: Speech Therapy Individual 9 and Self Care/Home Management Training 8    11/29/2021

## 2021-11-29 NOTE — ASSESSMENT & PLAN NOTE
ASSESSMENT     Violeta Champion is a 60 y.o. female with a past psychiatric history of depression currently presenting with Brain lesion.  Psychiatry was originally consulted to address the patient's symptoms of agitation.    IMPRESSION  Hyperactive delirium     RECOMMENDATION(S)      1. Scheduled Medication(s):  - Increase Depacon to 500mg am, 500mg midday, 500mg nightly  -Stop all Zyprexa.  Pt far more confused and agitated.  This can occur, often due to anticholinergic effects.    2. PRN Medication(s):  PRN Depacon 250mg IV [id number]  Do not use Zyprexa prn     3.  Monitor:  Please obtain daily EKG to monitor QTc.  If pt receives depacon, monitor ammonia and transaminases.    4. Legal Status/Precaution(s):  Patient does not meet criteria for PEC or inpatient psychiatric admission at this time. Recommend to rescind PEC if one was placed. Patient is not currently an imminent danger to self or others and is not gravely disabled due to a psychiatric illness.    5. Capacity:  Pt continues to have waxing and waning of symptoms and continues to refuse treatment at times. Therefore pt currently does NOT have medical decision making capacity due to Delirium. Please contact next of kin for making medical decisions currently.    6. Other:  CAM ICU - Positive  DELIRIUM BEHAVIOR MANAGEMENT   PLEASE utilize CHEMICAL restraints with PRN meds first for agitation. Minimize use of PHYSICAL restraints   Keep window shades open and room lit during day and room dim at night in order to promote normal sleep-wake cycles   Encourage family at bedside. [place] patient often to situation, location, date   Continue to Limit or Discontinue use of Narcotics, Benzos and Anti-cholinergic medications as they may worsen delirium   Continue medical workup for causative etiology of Delirium

## 2021-11-29 NOTE — PLAN OF CARE
Continue OT plan of care.    Problem: Occupational Therapy Goal  Goal: Occupational Therapy Goal  Description: Goals to be met by: 2 weeks (12/13/21)     Patient will increase functional independence with ADLs by performing:    UE Dressing with Minimal Assistance.  Grooming while seated with Contact Guard Assistance.  Sitting at edge of bed x10 minutes with Stand by Assistance.  Supine to sit with Minimal Assistance.  Stand pivot transfers with Moderate Assistance.  Pt will follow 3/3 one-step commands to participate in ADL task.    Goals to be met by: 2 weeks (11/27/21)     Patient will increase functional independence with ADLs by performing:    UE Dressing with Minimal Assistance.  Grooming while seated with Minimal Assistance.  Sitting at edge of bed x10 minutes with Minimal Assistance. - Met 11/26  Supine to sit with Minimal Assistance.  Stand pivot transfers with Moderate Assistance.  Pt will follow 3/3 one-step commands to participate in ADL task.    Outcome: Ongoing, Progressing

## 2021-11-29 NOTE — PLAN OF CARE
[facility]  Discharge Reassessment    Primary Care Provider: Madie Petersen DO    Expected Discharge Date: 12/1/2021     Pt is medically ready to d/c today per Dr. Yusuf.  EMMANUEL sent updates via [facility] to [facility] and [facility] in rehab programs in the [place] area, as pt/family would like pt to be closer to family there. EMMANUEL called both rehabs to alert them that updates were sent and requested that they give a firm denial if they are unable to accept pt so that SW may seek other placement options.    4:16 PM  EMMANUEL faxed 11/29 therapy notes to Giovana at [facility] at [phone] per her request for recent notes within the last 48 hours. EMMANUEL called Giovana at [phone] to confirm receipt. Giovana reported that she would have the medical director review and update SW.  [facility].    Reassessment (most recent)     Discharge Reassessment - 11/29/21 0916        Discharge Reassessment    Assessment Type Discharge Planning Reassessment     Did the patient's condition or plan change since previous assessment? Yes     Communicated SARAVANAN with patient/caregiver Date not available/Unable to determine     Discharge Plan A Rehab     Discharge Plan B Rehab     Why the patient remains in the hospital Placement issues        Post-Acute Status    Post-Acute Authorization Placement     Post-Acute Placement Status Referrals Sent     Discharge Delays None known at this time               EMMANUEL will continue to coordinate with patient, family, team and insurance to complete patient's discharge plan.    Gracia Nix, EMMANUEL  [phone]

## 2021-11-29 NOTE — SUBJECTIVE & OBJECTIVE
"Interval HPI:   Overnight events:  No acute events reported overnight  Eatin%  Nausea: No  Hypoglycemia and intervention: No  Fever: No  TPN and/or TF: No  If yes, type of TF/TPN and rate: NA    BP (!) 161/82 (BP Location: Right leg, Patient Position: Lying)   Pulse 95   Temp 98 °F (36.7 °C) (Axillary)   Resp 16   Ht 5' 4" (1.626 m)   Wt 123.8 kg (273 lb)   LMP  (LMP Unknown)   SpO2 96%   BMI 46.86 kg/m²     Labs Reviewed and Include    Recent Labs   Lab 21  1113   *   CALCIUM 8.6*   ALBUMIN 2.5*      K 3.4*   CO2 24      BUN 6   CREATININE 0.9     Lab Results   Component Value Date    WBC 5.64 2021    HGB 8.5 (L) 2021    HCT 27.5 (L) 2021    MCV 88 2021     (L) 2021     No results for input(s): TSH, FREET4 in the last 168 hours.  Lab Results   Component Value Date    HGBA1C 9.0 (H) 10/27/2021       Nutritional status:   Body mass index is 46.86 kg/m².  Lab Results   Component Value Date    ALBUMIN 2.5 (L) 2021    ALBUMIN 2.5 (L) 2021    ALBUMIN 2.4 (L) 2021     No results found for: PREALBUMIN    Estimated Creatinine Clearance: 88.5 mL/min (based on SCr of 0.9 mg/dL).    Accu-Checks  Recent Labs     21  1218 21  2146 21  0739 21  1107 21  1555 21  2135 21  0857 21  1147 21   POCTGLUCOSE 146* 213* 279* 225* 215* 176* 264* 188* 188*       Current Medications and/or Treatments Impacting Glycemic Control  Immunotherapy:    Immunosuppressants     None        Steroids:   Hormones (From admission, onward)            Start     Stop Route Frequency Ordered    21 1859  melatonin tablet 6 mg         -- Oral Nightly PRN 21 1759        Pressors:    Autonomic Drugs (From admission, onward)            None        Hyperglycemia/Diabetes Medications:   Antihyperglycemics (From admission, onward)            Start     Stop Route Frequency Ordered    21 2100  " insulin detemir U-100 pen 34 Units         -- SubQ Nightly 11/28/21 1138    11/26/21 1645  insulin aspart U-100 pen 12 Units         -- SubQ 3 times daily with meals 11/26/21 1341    11/26/21 1440  insulin aspart U-100 pen 0-5 Units         -- SubQ Before meals & nightly PRN 11/26/21 1341

## 2021-11-29 NOTE — PT/OT/SLP PROGRESS
"Occupational Therapy   Co-Treatment    Name: Violeta Champion  MRN: [id number]  Admitting Diagnosis:  Cerebrovascular accident (CVA) due to embolism  5 Days Post-Op    Recommendations:     Discharge Recommendations: rehabilitation facility  Discharge Equipment Recommendations: TBD  Barriers to discharge: increased assistance needed    Assessment:     Violeta Champion is a 60 y.o. female with a medical diagnosis of Cerebrovascular accident (CVA) due to embolism. She presents with performance deficits including weakness,impaired endurance,impaired self care skills,impaired functional mobilty,impaired balance,decreased safety awareness,decreased lower extremity function,decreased upper extremity function,decreased coordination,impaired cognition,decreased ROM. Pt with increased active participation and command following this date, demo improved sitting balance and functional use of L UE for ADLs. Pt progressing toward goals and would continue to benefit from OT to increase functional independence and safety. Recommend rehab upon D/C to return to PLOF.    Rehab Prognosis: Good; patient would benefit from acute skilled OT services to address these deficits and reach maximum level of function.       Plan:     Patient to be seen 4 x/week to address the above listed problems via self-care/home management,therapeutic activities,therapeutic exercises,neuromuscular re-education,cognitive retraining  · Plan of Care Expires: 12/13/21  · Plan of Care Reviewed with: patient    Subjective     Pain/Comfort:  · Pain Rating 1: 0/10  · Pain Rating Post-Intervention 1: 0/10    Objective:     Communicated with: RN prior to session. Patient found left sidelying with telemetry,peripheral IV ([employer] camera) upon OT entry to room, no family present.  "I want a Coke or a Sprite. I've been calling and calling."  Co-treatment performed with PT due to patient's multiple deficits requiring two skilled therapists to appropriately and safely assess " patient's strength and endurance while facilitating functional tasks in addition to accommodating for patient's activity tolerance.     General Precautions: Standard, fall,aspiration   Orthopedic Precautions:N/A   Braces: N/A  Respiratory Status:  · O2 Device (Oxygen Therapy): room air     Occupational Performance:     Bed Mobility:    · Patient completed Rolling/Turning to Left with maximal assistance  · Patient completed Rolling/Turning to Right with maximal assistance  · Patient completed Scooting/Bridging with maximal assistance and 2 persons  · Patient completed Supine to Sit with maximal assistance  · Patient completed Sit to Supine with maximal assistance and 2 persons     Functional Mobility/Transfers:  · Not attempted due to impaired safety; elevated bed height and feet unable to reach floor    Activities of Daily Living:  · Grooming: Min A sitting EOB to complete oral hygiene; pt demo difficulty initiating task, sequencing and appropriately completing task  · Upper Body Dressing: Mod A to don gown while sitting EOB  · Lower Body Dressing: Total A to don socks  · Toileting: Total A in supine/sidelying for hygiene and to change brief      AMPAC 6 Click ADL: 11    Treatment & Education:  Pt sat EOB ~20 minutes with CGA-Min A, brief periods of SBA; completed seated grooming task and UB dressing; completed LE therex with PT while OT provided cues and assist for postural control; returned to supine and assisted with rolling for tony hygiene and to change pads/linens; discussed POC and D/C recs and will require ongoing education    Patient left HOB elevated with all lines intact, call button in reach and RN notifiedEducation:      GOALS:   Multidisciplinary Problems     Occupational Therapy Goals        Problem: Occupational Therapy Goal    Goal Priority Disciplines Outcome Interventions   Occupational Therapy Goal     OT, PT/OT Ongoing, Progressing    Description: Goals to be met by: 2 weeks (12/13/21)      Patient will increase functional independence with ADLs by performing:    UE Dressing with Minimal Assistance.  Grooming while seated with Contact Guard Assistance.  Sitting at edge of bed x10 minutes with Stand by Assistance.  Supine to sit with Minimal Assistance.  Stand pivot transfers with Moderate Assistance.  Pt will follow 3/3 one-step commands to participate in ADL task.    Goals to be met by: 2 weeks (11/27/21)     Patient will increase functional independence with ADLs by performing:    UE Dressing with Minimal Assistance.  Grooming while seated with Minimal Assistance.  Sitting at edge of bed x10 minutes with Minimal Assistance. - Met 11/26  Supine to sit with Minimal Assistance.  Stand pivot transfers with Moderate Assistance.  Pt will follow 3/3 one-step commands to participate in ADL task.                     Time Tracking:     OT Date of Treatment: 11/29/21  OT Start Time: 1041  OT Stop Time: 1111  OT Total Time (min): 30 min    Billable Minutes:Self Care/Home Management 15  Therapeutic Activity 15    OT/MARY ALICE: OT          11/29/2021

## 2021-11-29 NOTE — PROGRESS NOTES
[facility]  Endocrinology  Progress Note    Admit Date: 11/12/2021     60 year old female with Type 2 Diabetes Mellitus, hypothyroidism, depression, and YUSEF was admitted as transfer from [facility] for concern of elevated intracranial pressure due to vasogenic edema from numerous brain lesions thought to be septic emboli in the setting of parvimonas and fusobacterium bacteremia.  Nancy was initially admitted at [facility] on 10/27/2021 for acute encephalopathy and found to have DKA and bacteremia. 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 She improved with antibiotics and insulin until 3 days prior to transfer when she developed worsening confusion and lethargy. She had a MRI done on 11/13/2021 revealing multiple lesions throughout the bilateral cerebral hemispheres with associated vasogenic edema and leftward midline shift after which she was transferred to [facility].    Endocrinology consulted for management of type 2 diabetes mellitus, hypothyroidism, and hypernatremia.    Regarding Diabetes Mellitus    Type: Type 2 Diabetes Mellitus  Diabetes diagnosis year: 2010    Home Diabetes Medications:  Metformin 1000 mg twice daily, Trulicity 1.5 mg weekly, and glipizide 10 mg daily    Previous Medications tried:  Januvia and Farxiga    How often checking glucose at home? Very infrequently  Hypoglycemia on the regimen?  No    Diabetes Complications include: DKA and Neuropathy    Complicating diabetes co morbidities:  YUSEF and infection      Regarding Hypothyroidism  - Diagnosed with hypothyroidism approximately 10 years prior to admission  - Treated with 100 mcg levothyroxine daily  - TSH of 1.4 on 11/13/2021    Current symptoms: weight gain, fatigue, constipation, mental fog, memory impairment, recent severe illness  Denies:  Hair loss, brittle nails, cold intolerance, muscle weakness, neck swelling/pressure, hoarseness, periorbital edema, lithium/amiodarone  "use  No family history of thyroid cancer  Last BMD: Normal forearm BMD in 2013 while on fosamax   Previous thyroid studies: none    Regarding Hypernateremia  - Unclear if Diabetes Insipidus given central involvement with brain lesions  - Urine output normalized after initial polyuria  - Never taken Desmopressin      Interval HPI:   Overnight events:  No acute events reported overnight  Eatin%  Nausea: No  Hypoglycemia and intervention: No  Fever: No  TPN and/or TF: No  If yes, type of TF/TPN and rate: NA    BP (!) 161/82 (BP Location: Right leg, Patient Position: Lying)   Pulse 95   Temp 98 °F (36.7 °C) (Axillary)   Resp 16   Ht 5' 4" (1.626 m)   Wt 123.8 kg (273 lb)   LMP  (LMP Unknown)   SpO2 96%   BMI 46.86 kg/m²     Labs Reviewed and Include    Recent Labs   Lab 21  1113   *   CALCIUM 8.6*   ALBUMIN 2.5*      K 3.4*   CO2 24      BUN 6   CREATININE 0.9     Lab Results   Component Value Date    WBC 5.64 2021    HGB 8.5 (L) 2021    HCT 27.5 (L) 2021    MCV 88 2021     (L) 2021     No results for input(s): TSH, FREET4 in the last 168 hours.  Lab Results   Component Value Date    HGBA1C 9.0 (H) 10/27/2021       Nutritional status:   Body mass index is 46.86 kg/m².  Lab Results   Component Value Date    ALBUMIN 2.5 (L) 2021    ALBUMIN 2.5 (L) 2021    ALBUMIN 2.4 (L) 2021     No results found for: PREALBUMIN    Estimated Creatinine Clearance: 88.5 mL/min (based on SCr of 0.9 mg/dL).    Accu-Checks  Recent Labs     21  1218 21  2146 21  0739 21  1107 21  1555 21  2135 21  0857 21  1147 218   POCTGLUCOSE 146* 213* 279* 225* 215* 176* 264* 188* 188*       Current Medications and/or Treatments Impacting Glycemic Control  Immunotherapy:    Immunosuppressants     None        Steroids:   Hormones (From admission, onward)            Start     Stop Route Frequency Ordered    " 21 1859  melatonin tablet 6 mg         -- Oral Nightly PRN 21 1759        Pressors:    Autonomic Drugs (From admission, onward)            None        Hyperglycemia/Diabetes Medications:   Antihyperglycemics (From admission, onward)            Start     Stop Route Frequency Ordered    21 2100  insulin detemir U-100 pen 34 Units         -- SubQ Nightly 21 1138    21 1645  insulin aspart U-100 pen 12 Units         -- SubQ 3 times daily with meals 21 1341    21 1440  insulin aspart U-100 pen 0-5 Units         -- SubQ Before meals & nightly PRN 21 1341          ASSESSMENT and PLAN    Uncontrolled type 2 diabetes mellitus with hyperglycemia  - Longstanding, uncontrolled; diagnosed with type 2 diabetes mellitus in   - Home regimen:  Metformin 1000 mg twice daily, Trulicity 1.5 mg weekly, and glipizide 10 mg daily  - Last A1c 9.0 on 10/27/2021  - Weight based dosin kg x 0.5 = 62 TDD = 31 basal / 31 prandial  - 1700/TDD = 27 (estimated insulin sensitivity factor)  - 450/TDD = 7 (estimated starting carb ratio for prandial dosing)  - Currently requiring a little more than her weight based dose suggesting significant insulin resistance  - DKA present initially on admission to [facility] resolved    - Continue Levemir 34 units nightly   - Continue aspart 12 units before meals with low dose correction scale  - At discharge, recommend continuing insulin therapy (do not continue glipizide) and resuming metformin 1000 mg twice daily and Trulicity 1.5 mg weekly with close endocrine follow-up outpatient  - Recommend patient check fasting sugar daily with at least 1 pre or post meal (two hour) check daily and bring log to outpatient visit    Hypothyroidism  - Last TSH 1.4 on 2021    - Continue levothyroxine 100 mcg  - Recheck TFTs in 6 weeks    Hypernatremia  - Resolved, unclear etiology  - Unlikely Diabetes Insipidus    - If hypernatremia recurs, will need strict  intake/output and repeat urine studies to adequately assess      Cerebral septic emboli  - Management per primary and ID        Cory Jackman,   Endocrinology  [facility]

## 2021-11-29 NOTE — ASSESSMENT & PLAN NOTE
- Longstanding, uncontrolled; diagnosed with type 2 diabetes mellitus in   - Home regimen:  Metformin 1000 mg twice daily, Trulicity 1.5 mg weekly, and glipizide 10 mg daily  - Last A1c 9.0 on 10/27/2021  - Weight based dosin kg x 0.5 = 62 TDD = 31 basal / 31 prandial  - 1700/TDD = 27 (estimated insulin sensitivity factor)  - 450/TDD = 7 (estimated starting carb ratio for prandial dosing)  - Currently requiring a little more than her weight based dose suggesting significant insulin resistance  - DKA present initially on admission to [facility] resolved    - Continue Levemir 34 units nightly   - Continue aspart 12 units before meals with low dose correction scale  - At discharge, recommend continuing insulin therapy (do not continue glipizide) and resuming metformin 1000 mg twice daily and Trulicity 1.5 mg weekly with close endocrine follow-up outpatient  - Recommend patient check fasting sugar daily with at least 1 pre or post meal (two hour) check daily and bring log to outpatient visit

## 2021-11-29 NOTE — CONSULTS
Inpatient consult to Physical Medicine Rehab  Consult performed by: Rosette Andrew NP  Consult ordered by: [facility] Yusuf MD  Reason for consult: Debility       Rehab team already following. Please see previous note for further details. Recommend inpatient rehab.     FELA Lauren, FNP-C  Physical Medicine & Rehabilitation   11/29/21

## 2021-11-29 NOTE — SUBJECTIVE & OBJECTIVE
"    Family History     Problem Relation (Age of Onset)    Amblyopia Father    Breast cancer Paternal Aunt, Paternal Grandmother    Cancer Maternal Grandmother    Diabetes Mother, Maternal Aunt, Maternal Uncle    Glaucoma Mother    Hypertension Mother, Brother        Tobacco Use    Smoking status: Never Smoker    Smokeless tobacco: Never Used   Substance and Sexual Activity    Alcohol use: Yes     Alcohol/week: 0.0 standard drinks     Comment: very rarely    Drug use: No    Sexual activity: Not on file     Psychotherapeutics (From admission, onward)            Start     Stop Route Frequency Ordered    11/27/21 1615  OLANZapine tablet 5 mg         -- Oral with dinner 11/27/21 1603    11/26/21 1947  OLANZapine tablet 5 mg         -- Oral 2 times daily PRN 11/26/21 1849           Review of Systems  Objective:     Vital Signs (Most Recent):  Temp: 96.1 °F (35.6 °C) (11/29/21 0800)  Pulse: 95 (11/29/21 0800)  Resp: 17 (11/29/21 0800)  BP: (!) 165/89 (11/29/21 0800)  SpO2: 97 % (11/29/21 0800) Vital Signs (24h Range):  Temp:  [96.1 °F (35.6 °C)-98 °F (36.7 °C)] 96.1 °F (35.6 °C)  Pulse:  [] 95  Resp:  [16-17] 17  SpO2:  [96 %-97 %] 97 %  BP: (152-184)/(73-93) 165/89     Height: 5' 4" (162.6 cm)  Weight: 123.8 kg (273 lb)  Body mass index is 46.86 kg/m².      Intake/Output Summary (Last 24 hours) at 11/29/2021 1044  Last data filed at 11/29/2021 0426  Gross per 24 hour   Intake 1710 ml   Output --   Net 1710 ml       Mental Status Exam:  Appearance: Laying in bed naked except for adult diaper with sheet wrapped loosely around her head like a larios, obese  Behavior/Cooperation: normal, cooperative  Speech: slightly slurred, normal rate, normal tone  Language: english  Mood: "good"  Affect: constricted   Thought Process:disorganized, perservative   Thought Content: normal, no suicidality, no homicidality, delusions, or paranoia   Orientation: Disoriented to place, situation, year, day, date, month  Memory: Impaired "   Attention Span/Concentration: Impaired   Fund of Knowledge: Estimated to be average level   Insight: limited  Judgment: fair      Significant Labs: All pertinent labs within the past 24 hours have been reviewed.    Significant Imaging: I have reviewed all pertinent imaging results/findings within the past 24 hours.

## 2021-11-29 NOTE — PROGRESS NOTES
[facility]  Psychiatry  Progress Note    Patient Name: Violeta Champion  MRN: [id number]   Code Status: Full Code  Admission Date: 11/12/2021  Hospital Length of Stay: 17 days  Expected Discharge Date: 12/1/2021  Attending Physician: Kiley Yusuf MD  Primary Care Provider: Madie Petersen DO    Current Legal Status: Uncontested    Patient information was obtained from patient, relative(s) and ER records.       HPI:   Consultation-Liaison Psychiatry Consult Note    11/21/2021 4:12 PM  Violeta Champion  MRN: [id number]    Chief Complaint / Reason for Consult: agitation     SUBJECTIVE     History of Present Illness:   Violeta Champion is a 60 y.o. female with a past psychiatric history of depression currently presenting with Brain lesion.  Psychiatry was originally consulted to address the patient's symptoms of agitation.    Per Primary MD:  HPI/Interval history (See H&P for complete P,F,SHx) :   Pmhx DM2, hypothyroidism, YUSEF, hx breast ca, fibromyalgia, htn, hld, depression, class 3 obesity admitted as transfer from [facility] for concern of elevated ICP 2/2 vasogenic edema of numerous brain abscesses vs masses. Patient recently admitted at [facility] 10/27 for acute encephalopathy, found to have DKA vs HHS and bacteremia. Clinically improved with abx and appropriate blood sugar management until 3 days prior to transfer when she developed worsening confusion and lethargy. MRI 11/13 revealing multiple lesions throughout the bilateral cerebral hemispheres with associated vasogenic edema and leftward midline shift.      HPI from [facility] below:  Miss Champion is a 60 year old female with a PMH of DMII on oral anti-hyperglycemics, hypothyroid on synthroid, YUSEF, Right breast papilloma, fibromyalgia, HTN, HLD and depression presents to [facility] via ambulance after being found down and unresponsive at home. Patient is obtunded with a GCS of 8 and is not able to participate in any history giving. This note is per  "report from neighbor, EMS, ED providers and nurse. Per report Miss Champion' neighbor hadnt seen her for 3 days so they went over to her house to check on her and found her down and unresponsive. EMS was called and brought her into the ED. Upon drawing labwork patient Wbc was 25.8, , anion gap 8, Cr 2.9, blood glucose 805, UDS pos for barbiturates, pH 6.928. Patient is protecting airway although her respirations are labored, Sp02 mid to low 90's and she has YUSEF. A dose of narcan had no response, patient was further treated with bicarb, insulin, IV fluids, IV vanc and zosyn and admitted to the ICU for a higher level of care. Patients sister Darcy was called by staff and myself and asked to be kept updated, phone number in EMR.    Per C-L Psych MD:  Pt awake in bed.  She is noticeably confused and has difficulty finding with word finding.  Pt endorses history of depression.  She has taken medication in the past, but is not currently taking anything recently because "I've been doing well".  Oriented to self and being in the hospital.  She does not recall transfer from [facility] and states that she is in the hospital because of a virus.  Pt preservative on asking for Sprite and sugar free drinks, stating that she can have them. 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       Psychiatric Review Of Systems - Is patient experiencing or having changes in:  Unable to assess due to AMS    Psychiatric History:  Diagnose(s): depression   Previous Medication Trials: Yes   Previous Psychiatric Hospitalizations: No  Previous Suicide Attempts: No  History of Violence: No  Outpatient Psychiatrist: No.  Pt does have an outpatient therapist at [facility] per chart review    Social History:  Marital Status: single  Children: 0   Employment Status: currently employed as nurse at [employer]   Education: college graduate  Special Ed: no   History: no  Housing Status: Lives " alone  Developmental History: No  History of Abuse: unable to assess   Access to Gun:  unable to assess     Substance Abuse History:  Per family at bedside and chart check, no history of drug or alcohol use.  Unable to assess with pt at this time due to AMS    Legal History:  Past Charges/Incarcerations: No  Pending Charges: No    Family Psychiatric History:   No    Psychosocial Factors:  Unable to assess    Collateral:   Darcy Mancuso, Sister, at bedside  Over the past two days, she is less agitated than previously.  She continues to be confused.  Confusion and agitation are worse during the night.  Pt works as a nurse at night, so she is normally awake at night.  Pt at times will try to climb out of the bed, or start pulling at her diaper.  At one point she was smearing feces on things.  Sister agreeable to trying medication as needed for agitation.  She has one other sibling and they are alternating who stays with the pt.  Family is trying to remain at bedside for redirection as much as they are able.      Medical Review Of Systems:  Unable to assess       Hospital Course: 11/22: Pt's sister is at bedside and provides much of the history.  Last night she did experience some agitation and poor sleep at first despite Depacon.  Around 4am she fell asleep and had restful sleep.  Overall she thought they had a better night last night.  This morning when she woke up, she was far less confused.  Patient is laughing and smiling appropriately this morning.  She is participating with physical therapy and appears more alert.     11/23: Pt's sister is at bedside.  Last night she had a rough night.  Pt at times starts pulling at sheets, lays side ways/twisted in the bed, and puts hands in feces.  Sister had to go to another room briefly last night because she was getting agitated with her as well.  Nursing had a difficult time taking care of her last night because she was not following instructions.  Sister went back to the  "room with nursing and was able to help re-direct her.  Around 6pm she requested Depacon.   Sister says that it was delayed coming from the pharmacy and pt did not receive it until 1am.  No updated EKG on file.    Pt reports that she had an okay night.  She mentions getting a DORCAS.  Patient is quite confused today.  She becomes irritable when asking orientation questions.  She identifies that she is at [facility].  When asked what city, she says "[facility]" and repeats that the city is [facility].  She fluctuated between the month being October or November.  When asked if there is a holiday coming up, after much thought and counting months out loud she said multiple times that the holiday coming up is "October".        11/24: Patient is awake in bed and enthusiastically greets MD.  She says that she slept well last night and feels good.  She is able to tell me that she is having a study today to look at her heart (DORCAS).  Per sister at bedside, the patient had less restless behavior and agitation after receiving Depacon yesterday around dinner time.  She was somewhat agitated and restless throughout the night, so sister requested it in the evening.  Depacon was not given until 0422. After she received it, she fell asleep around 5am and slept for several hours.  Last night the sister things she talked more logically than previous nights, although she still remains quite confused.        11/25: Patient's sister at bedside reported patient had a slightly better night last night but was still awake speaking to herself for some of the night and demanding water through the night. Patient is alert to name only today. Affect labile. Perseverates on the word "October." Sister was inquisitive about recommendations for lessening delirium. Discussed sunlight/staying awake during day and darkness/no TV/no sound at night.     11/26: Patient continues to have agitation during the night.  Last night she had a very bad night " "and was yelling during the night.  This morning she has been sleeping.  Sister says that she usually falls asleep in the early morning hours.  Pt says that she never used to sleep much and reports sleeping 4-5 hours nightly for years.  She usually sleeps in the morning since she has a reversed sleep/wake cycle due to working nights.      11/27: Agitated last night. Confused this morning. Received PRN zyprexa 5 mg and PRN depacon 250 for agitation last night.    11/28: Patient was well-behaved overnight. She did not require any PRNs and per daughter, this was the best night she has had in a while. Patient remains disoriented but is pleasant. She states she slept well through the night. No restraints in place and she is cooperative with exam, though she is unable to fully comply due to delirium.    11/29: Pt laying in bed naked with the sheet loosely wrapped over her head like a larios.  Today she misuses words consistently, stating words that sound alike to what she likely means.  She knows that she is on a medication (Zyprexa), but refers to it by a different name starting with a Z.  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  She said the medicine is "for kids that don't behave".  She currently says that she is in a "pediatric obstetric clinic".  She endorses having a headache.  Pt identifies the year as "1981", recent holiday as "1981", and month as "1981".          Family History     Problem Relation (Age of Onset)    Amblyopia Father    Breast cancer Paternal Aunt, Paternal Grandmother    Cancer Maternal Grandmother    Diabetes Mother, Maternal Aunt, Maternal Uncle    Glaucoma Mother    Hypertension Mother, Brother        Tobacco Use    Smoking status: Never Smoker    Smokeless tobacco: Never Used   Substance and Sexual Activity    Alcohol use: Yes     Alcohol/week: 0.0 standard drinks     Comment: very rarely    Drug use: No    Sexual " "activity: Not on file     Psychotherapeutics (From admission, onward)            Start     Stop Route Frequency Ordered    11/27/21 1615  OLANZapine tablet 5 mg         -- Oral with dinner 11/27/21 1603    11/26/21 1947  OLANZapine tablet 5 mg         -- Oral 2 times daily PRN 11/26/21 1849           Review of Systems  Objective:     Vital Signs (Most Recent):  Temp: 96.1 °F (35.6 °C) (11/29/21 0800)  Pulse: 95 (11/29/21 0800)  Resp: 17 (11/29/21 0800)  BP: (!) 165/89 (11/29/21 0800)  SpO2: 97 % (11/29/21 0800) Vital Signs (24h Range):  Temp:  [96.1 °F (35.6 °C)-98 °F (36.7 °C)] 96.1 °F (35.6 °C)  Pulse:  [] 95  Resp:  [16-17] 17  SpO2:  [96 %-97 %] 97 %  BP: (152-184)/(73-93) 165/89     Height: 5' 4" (162.6 cm)  Weight: 123.8 kg (273 lb)  Body mass index is 46.86 kg/m².      Intake/Output Summary (Last 24 hours) at 11/29/2021 1044  Last data filed at 11/29/2021 0426  Gross per 24 hour   Intake 1710 ml   Output --   Net 1710 ml       Mental Status Exam:  Appearance: Laying in bed naked except for adult diaper with sheet wrapped loosely around her head like a larios, obese  Behavior/Cooperation: normal, cooperative  Speech: slightly slurred, normal rate, normal tone  Language: english  Mood: "good"  Affect: constricted   Thought Process:disorganized, perservative   Thought Content: normal, no suicidality, no homicidality, delusions, or paranoia   Orientation: Disoriented to place, situation, year, day, date, month  Memory: Impaired   Attention Span/Concentration: Impaired   Fund of Knowledge: Estimated to be average level   Insight: limited  Judgment: fair      Significant Labs: All pertinent labs within the past 24 hours have been reviewed.    Significant Imaging: I have reviewed all pertinent imaging results/findings within the past 24 hours.       Scheduled Medications:   amLODIPine  10 mg Oral Daily    aspirin  81 mg Oral Daily    cefTRIAXone (ROCEPHIN) IVPB  2 g Intravenous Q12H    divalproex  250 mg " Oral Before breakfast    divalproex  250 mg Oral with lunch    divalproex  500 mg Oral Before dinner    heparin (porcine)  5,000 Units Subcutaneous Q8H    insulin aspart U-100  12 Units Subcutaneous TIDWM    insulin detemir U-100  34 Units Subcutaneous QHS    levothyroxine  100 mcg Oral Before breakfast    magnesium oxide  400 mg Oral BID    magnesium sulfate IVPB  2 g Intravenous Q2H    metroNIDAZOLE  500 mg Oral Q8H    miconazole nitrate 2%   Topical (Top) BID    mupirocin   Topical (Top) BID    OLANZapine  5 mg Oral with dinner    [START ON 11/30/2021] potassium chloride  10 mEq Oral BID WM    potassium chloride  30 mEq Oral TID WM    potassium, sodium phosphates  2 packet Oral Daily with breakfast    vibegron  75 mg Oral Daily       PRN Medications:  acetaminophen, dextrose 50%, glucagon (human recombinant), glucose, glucose, guaiFENesin 100 mg/5 ml, insulin aspart U-100, melatonin, OLANZapine, sodium chloride 0.9%, valproate sodium (DEPACON) IVPB    Review of patient's allergies indicates:   Allergen Reactions    Iodinated contrast media Swelling     Other reaction(s): Swelling    Naproxen sodium Swelling    Naprosyn  [naproxen]      Other reaction(s): Swelling       Assessment/Plan:     Encephalopathy, metabolic  ASSESSMENT     Violeta Champion is a 60 y.o. female with a past psychiatric history of depression currently presenting with Brain lesion.  Psychiatry was originally consulted to address the patient's symptoms of agitation.    IMPRESSION  Hyperactive delirium     RECOMMENDATION(S)      1. Scheduled Medication(s):  - Increase Depacon to 500mg am, 500mg midday, 500mg nightly  -Stop all Zyprexa.  Pt far more confused and agitated.  This can occur, often due to anticholinergic and antihistaminergic effects.    2. PRN Medication(s):  PRN Depacon 250mg IV [id number]  Would not use Zyprexa prn     3.  Monitor:  Please obtain daily EKG to monitor QTc.  If pt receives depacon, monitor ammonia and  transaminases.    4. Legal Status/Precaution(s):  Patient does not meet criteria for PEC or inpatient psychiatric admission at this time. Recommend to rescind PEC if one was placed. Patient is not currently an imminent danger to self or others and is not gravely disabled due to a psychiatric illness.    5. Capacity:  Pt continues to have waxing and waning of symptoms and continues to refuse treatment at times. Therefore pt currently does NOT have medical decision making capacity due to Delirium. Please contact next of kin for making medical decisions currently.    6. Other:  [facility] ICU - Positive  DELIRIUM BEHAVIOR MANAGEMENT   PLEASE utilize CHEMICAL restraints with PRN meds first for agitation. Minimize use of PHYSICAL restraints   Keep window shades open and room lit during day and room dim at night in order to promote normal sleep-wake cycles   Encourage family at bedside. Smiley patient often to situation, location, date   Continue to Limit or Discontinue use of Narcotics, Benzos and Anti-cholinergic medications as they may worsen delirium   Continue medical workup for causative etiology of Delirium         Need for Continued Hospitalization:  No need for inpatient psychiatric hospitalization. Continue medical care as per the primary team.    Anticipated Disposition:  Per primary team     Total time:  35 with greater than 50% of this time spent in counseling and/or coordination of care.     Lala Raya MD, PhD  [facility]  -2  11/29/2021

## 2021-11-29 NOTE — PT/OT/SLP PROGRESS
Physical Therapy Co-Treatment with OT    Patient Name:  Violeta Champion   MRN:  [id number]    *Co-treatment with OT due to patient complexity and need for two skilled therapists to ensure safe mobilization.    Recommendations:     Discharge Recommendations:  rehabilitation facility   Discharge Equipment Recommendations:  (tbd)   Barriers to discharge: Inaccessible home, Decreased caregiver support and increased (A)    Highest Level of Mobility: Sitting EOB  Assistance Required: Min(A)-CGA    Assessment:     Violeta Champion is a 60 y.o. female admitted with a medical diagnosis of Cerebrovascular accident (CVA) due to embolism.  She presents with the following impairments/functional limitations:  weakness,impaired balance,decreased safety awareness,impaired endurance,impaired cognition,impaired self care skills,impaired functional mobilty,decreased upper extremity function,decreased lower extremity function,gait instability. Violeta Champion would benefit from acute PT intervention to address listed functional deficits, provide patient/caregiver education, reduce fall risk, and maximize (I) and safety with functional mobility.    Pt met with HOB elevated and agreeable to PT treatment. Pt is excited to participate in PT session today and follows most 1-step commands appropriately. She demos increased active participation during today's session and requires less assist with bed mobility and sitting balance.    Pt is progressing towards acute PT goals appropriately and continues to benefit from acute PT sessions. After hospital discharge, pt would benefit from inpatient rehab to maximize rehab potential and reduce fall risk. Pt can tolerate 3 hours of intensive therapy services and is motivated to participate.     Rehab Prognosis: Good; patient would benefit from acute skilled PT services to address these deficits and reach maximum level of function.      Plan:     During this hospitalization, patient to be seen 4 x/week to  "address the identified rehab impairments via gait training,therapeutic activities,neuromuscular re-education,therapeutic exercises and progress toward the following goals:    · Plan of Care Expires:  12/13/21    This plan of care has been discussed with the patient/caregiver, who was included in its development and is in agreement with the identified goals and treatment plan.     Subjective     Communicated with RN prior to session.  Patient agreeable to participate.     Pain/Comfort:  · Pain Rating 1: 0/10  · Pain Rating Post-Intervention 1: 0/10    Chief Complaint: CVA due to embolism   Patient/Family Comments/goals: "I really want that coca-cola"       Objective:     Patient found HOB elevated with telemetry,peripheral IV (avasys camera)  upon PT entry to room.    General Precautions: Standard, fall,aspiration   Orthopedic Precautions:N/A   Braces: N/A         Exams:     Cognition:  o Pt is alert and cooperative  o Command following: intact most 1-step commands    Functional Mobility:    Bed Mobility:  · Supine to Sit: Maximum Assistance on L side of bed  · Pt with good initiation, requires max assist to raise trunk   · Sit to Supine: Maximum Assistance  · Rolling L: Maximum Assistance  · Rolling R: Maximum Assistance  · Scooting anteriorly to EOB to plant feet on floor: Maximum Assistance  · Scooting/Bridging in supine to HOB: Maximum Assistance and 2 persons via drawsheet    Transfers:   · Sit to Stand Transfer: Activity did not occur  due to poor sitting balance EOB, inability of bed to lower in order for pt's feet to touch the ground              Gait:  · Patient unable at this time    Balance:  · Static Sit:   · Min(A)-CGA at EOB ~20 mins  · Good: Patient able to maintain balance without handhold support, limited postural sway.   · L lateral lean  · PT provided tactile cues for awareness to midline and improved upright posture  · Dynamic sit:  · Fair: Patient accepts minimal challenge; able to maintain " balance while turning head/trunk.    Therapeutic Activities/Exercises      Patient assisted with functional mobility as noted above   Patient performed the following exercises sitting EOB:  o Shoulder shrugs x10 B  o [id number] B  - Pt with increased attention to L side    Patient was soiled with urine, PT and OT assisted with complete linen change and tony-care   Patient repositioned with wedge on L to reduce the risk of skin breakdown   Patient educated on the importance of early mobility to prevent functional decline during hospital stay   Patient educated on PT POC and role of PT in acute care   White board updated regarding patient's safest level of mobility with staff assistance, RN also updated.     AM-PAC 6 CLICK MOBILITY  Total Score:8     Patient left HOB elevated with all lines intact, call button in reach, bed alarm on and RN notified.        History/Goals:     PAST MEDICAL HISTORY:  Past Medical History:   Diagnosis Date    Breast cancer 12/10/2020    Diabetes mellitus, type 2     GERD (gastroesophageal reflux disease)     High cholesterol     Hypertension     Hypothyroid     Injury of knee, leg, ankle and foot     Insulin-resistant diabetes mellitus and acanthosis nigricans     Menopause present     Osteoarthritis     Osteoarthritis, knee     Osteopenia     Osteopenia     Sleep apnea     Status post breast lump removal 12/01/2020       Past Surgical History:   Procedure Laterality Date    bilateral duct removal breast      BREAST CYST ASPIRATION Right 2020    EXCISIONAL BIOPSY Right 12/10/2020    Procedure: EXCISIONAL BIOPSY, breast; u/s guided;  Surgeon: Bernadette Lopez MD;  Location: [facility];  Service: General;  Laterality: Right;    HYSTERECTOMY      OOPHORECTOMY      OTHER SURGICAL HISTORY      bilateral central duct removal with bilateral papilomona    TRANSESOPHAGEAL ECHOCARDIOGRAPHY N/A 11/24/2021    Procedure: ECHOCARDIOGRAM, TRANSESOPHAGEAL;  Surgeon: Edda Diagnostic  Provider;  Location: [facility];  Service: Anesthesiology;  Laterality: N/A;       GOALS:   Multidisciplinary Problems     Physical Therapy Goals        Problem: Physical Therapy Goal    Goal Priority Disciplines Outcome Goal Variances Interventions   Physical Therapy Goal     PT, PT/OT Ongoing, Progressing     Description: Goals to be met by: 21     Patient will increase functional independence with mobility by performin. Supine to sit with Moderate Assistance  2. Sit to supine with Moderate Assistance  3. Rolling to Left and Right with Moderate Assistance.  4. Sit to stand transfer with Maximum Assistance  5. Bed to chair transfer with Maximum Assistance using LRAD  6. Gait  x 5 feet with Maximum Assistance using LRAD.   7. Lower extremity exercise program x15 reps per handout, with assistance as needed                     Time Tracking:     PT Received On: 21  PT Start Time: 1042     PT Stop Time: 1111  PT Total Time (min): 29 min     Billable Minutes: Therapeutic Activity 15 and Therapeutic Exercise 14      Sarahi Norris, PT  2021  Pager# [phone]

## 2021-11-29 NOTE — ASSESSMENT & PLAN NOTE
- Resolved, unclear etiology  - Unlikely Diabetes Insipidus    - If hypernatremia recurs, will need strict intake/output and repeat urine studies to adequately assess

## 2021-11-29 NOTE — PROGRESS NOTES
"[facility]  Adult Nutrition  Progress Note    SUMMARY       Recommendations     1. Continue Consistent Carbohydrate diet     2. Encourage po intake.      3. RD following.     Goals: Pt to receive nutrition by RD follow up  Nutrition Goal Status: goal met  Communication of RD Recs:  (POC)    Assessment and Plan       Nutrition Problem  1. Obesity   2. Altered nutrition related lab values      Related to (etiology):   1. Suspected excessive energy intake   2. Physiological reasons/suspected noncompliance with diet      Signs and Symptoms (as evidenced by):   1. BMI 46.94 kg/m2   2. Glucose 241      Interventions (treatment strategy):  Collaboration of nutrition care with other providers   CHO modified diet     Nutrition Diagnosis Status:   1. Continues  2. Resolved     Reason for Assessment    Reason For Assessment: RD follow-up  Diagnosis:  (brain lesion)  Relevant Medical History: T2DM, YUSEF, breast cancer, fibromyalgia, HTN, HLD, obesity  Interdisciplinary Rounds: did not attend    General Information Comments: Pt's appetite remains the same. PO intake ~50%. Adequate fluid intake. Per chart review, pt with poor po intake. Denies N/V/D/C. Wt stable since admit. Pt appears nourished. Pt at risk for acute malnutrition if po intake continues to be poor and if wt loss occurs. Will continue to monitor.    Nutrition Discharge Planning: Diabetic diet, texture per SLP.    Nutrition Risk Screen    Nutrition Risk Screen: no indicators present    Nutrition/Diet History    Patient Reported Diet/Restrictions/Preferences: diabetic diet  Spiritual, Cultural Beliefs, Adventism Practices, Values that Affect Care: no  Food Allergies: NKFA  Factors Affecting Nutritional Intake: decreased appetite    Anthropometrics    Temp: 96.4 °F (35.8 °C)  Height: 5' 4" (162.6 cm)  Height (inches): 64 in  Weight Method: Bed Scale  Weight: 123.8 kg (273 lb)  Weight (lb): 273 lb  Ideal Body Weight (IBW), Female: 120 lb  % Ideal " Body Weight, Female (lb): 227.5 %  BMI (Calculated): 46.8  BMI Grade: greater than 40 - morbid obesity       Lab/Procedures/Meds    Pertinent Labs Reviewed: reviewed  Pertinent Labs Comments: K 2.7  Pertinent Medications Reviewed: reviewed  Pertinent Medications Comments: amlodipine, aspirin, heparin, insulin, levothyroxine, mg, KCl    Estimated/Assessed Needs    Weight Used For Calorie Calculations: 124.1 kg (273 lb 9.5 oz)  Energy Calorie Requirements (kcal): 1806 kcal  Energy Need Method: [place]-St Jeor (no AF 2/2 obesity)  Protein Requirements: 65-82g (1.2-1.5g/kg IBW)  Weight Used For Protein Calculations: 54.5 kg (120 lb 2.4 oz) (IBW)  Fluid Requirements (mL): 1ml/kcal or per MD     RDA Method (mL): 1806  CHO Requirement: 225g      Nutrition Prescription Ordered    Current Diet Order: Consistent Carbohydate  Nutrition Order Comments: Regular texture with thin liquids    Evaluation of Received Nutrient/Fluid Intake    I/O: -44.3mL since admit  Energy Calories Required: not meeting needs  Protein Required: not meeting needs  Fluid Required: meeting needs  Comments: LBM: 11/27  Tolerance: tolerating  % Intake of Estimated Energy Needs: 50 - 75 %  % Meal Intake: 50 - 75 %    Nutrition Risk    Level of Risk/Frequency of Follow-up: low     Monitor and Evaluation    Food and Nutrient Intake: energy intake,food and beverage intake  Food and Nutrient Adminstration: diet order  Knowledge/Beliefs/Attitudes: food and nutrition knowledge/skill,beliefs and attitudes  Physical Activity and Function: nutrition-related ADLs and IADLs  Anthropometric Measurements: weight,weight change,body mass index  Biochemical Data, Medical Tests and Procedures: electrolyte and renal panel,gastrointestinal profile,glucose/endocrine profile,inflammatory profile,lipid profile  Nutrition-Focused Physical Findings: overall appearance,extremities, muscles and bones     Nutrition Follow-Up    RD Follow-up?: Yes

## 2021-11-30 LAB
ALBUMIN SERPL BCP-MCNC: 2.6 G/DL (ref 3.5–5.2)
ANION GAP SERPL CALC-SCNC: 7 MMOL/L (ref 8–16)
BUN SERPL-MCNC: 4 MG/DL (ref 6–20)
CALCIUM SERPL-MCNC: 8.4 MG/DL (ref 8.7–10.5)
CHLORIDE SERPL-SCNC: 104 MMOL/L (ref 95–110)
CO2 SERPL-SCNC: 24 MMOL/L (ref 23–29)
CREAT SERPL-MCNC: 0.8 MG/DL (ref 0.5–1.4)
EST. GFR  (AFRICAN AMERICAN): >60 ML/MIN/1.73 M^2
EST. GFR  (NON AFRICAN AMERICAN): >60 ML/MIN/1.73 M^2
GLUCOSE SERPL-MCNC: 238 MG/DL (ref 70–110)
MAGNESIUM SERPL-MCNC: 2.2 MG/DL (ref 1.6–2.6)
PHOSPHATE SERPL-MCNC: 3.9 MG/DL (ref 2.7–4.5)
POCT GLUCOSE: 225 MG/DL (ref 70–110)
POCT GLUCOSE: 285 MG/DL (ref 70–110)
POCT GLUCOSE: 295 MG/DL (ref 70–110)
POCT GLUCOSE: 311 MG/DL (ref 70–110)
POTASSIUM SERPL-SCNC: 3.4 MMOL/L (ref 3.5–5.1)
SARS-COV-2 RNA RESP QL NAA+PROBE: NOT DETECTED
SODIUM SERPL-SCNC: 135 MMOL/L (ref 136–145)

## 2021-11-30 PROCEDURE — 11000001 HC ACUTE MED/SURG PRIVATE ROOM

## 2021-11-30 PROCEDURE — 99232 SBSQ HOSP IP/OBS MODERATE 35: CPT | Mod: ,,, | Performed by: INTERNAL MEDICINE

## 2021-11-30 PROCEDURE — 63600175 PHARM REV CODE 636 W HCPCS: Performed by: PHYSICIAN ASSISTANT

## 2021-11-30 PROCEDURE — 25000003 PHARM REV CODE 250: Performed by: INTERNAL MEDICINE

## 2021-11-30 PROCEDURE — 92507 TX SP LANG VOICE COMM INDIV: CPT

## 2021-11-30 PROCEDURE — 25000003 PHARM REV CODE 250: Performed by: PHYSICIAN ASSISTANT

## 2021-11-30 PROCEDURE — 97535 SELF CARE MNGMENT TRAINING: CPT

## 2021-11-30 PROCEDURE — U0003 INFECTIOUS AGENT DETECTION BY NUCLEIC ACID (DNA OR RNA); SEVERE ACUTE RESPIRATORY SYNDROME CORONAVIRUS 2 (SARS-COV-2) (CORONAVIRUS DISEASE [COVID-19]), AMPLIFIED PROBE TECHNIQUE, MAKING USE OF HIGH THROUGHPUT TECHNOLOGIES AS DESCRIBED BY CMS-2020-01-R: HCPCS | Performed by: INTERNAL MEDICINE

## 2021-11-30 PROCEDURE — 99233 PR SUBSEQUENT HOSPITAL CARE,LEVL III: ICD-10-PCS | Mod: ,,, | Performed by: INTERNAL MEDICINE

## 2021-11-30 PROCEDURE — 80069 RENAL FUNCTION PANEL: CPT | Performed by: INTERNAL MEDICINE

## 2021-11-30 PROCEDURE — 99232 PR SUBSEQUENT HOSPITAL CARE,LEVL II: ICD-10-PCS | Mod: ,,, | Performed by: INTERNAL MEDICINE

## 2021-11-30 PROCEDURE — 99233 SBSQ HOSP IP/OBS HIGH 50: CPT | Mod: ,,, | Performed by: INTERNAL MEDICINE

## 2021-11-30 PROCEDURE — 97110 THERAPEUTIC EXERCISES: CPT

## 2021-11-30 PROCEDURE — 97530 THERAPEUTIC ACTIVITIES: CPT

## 2021-11-30 PROCEDURE — U0005 INFEC AGEN DETEC AMPLI PROBE: HCPCS | Performed by: INTERNAL MEDICINE

## 2021-11-30 PROCEDURE — 36415 COLL VENOUS BLD VENIPUNCTURE: CPT | Performed by: INTERNAL MEDICINE

## 2021-11-30 PROCEDURE — 83735 ASSAY OF MAGNESIUM: CPT | Performed by: INTERNAL MEDICINE

## 2021-11-30 RX ORDER — LANOLIN ALCOHOL/MO/W.PET/CERES
400 CREAM (GRAM) TOPICAL 2 TIMES DAILY
Refills: 0
Start: 2021-11-30

## 2021-11-30 RX ORDER — AMLODIPINE BESYLATE 10 MG/1
10 TABLET ORAL DAILY
Qty: 30 TABLET | Refills: 11 | Status: SHIPPED | OUTPATIENT
Start: 2021-12-01 | End: 2022-12-01

## 2021-11-30 RX ORDER — DIVALPROEX SODIUM 500 MG/1
500 TABLET, DELAYED RELEASE ORAL 3 TIMES DAILY
Qty: 90 TABLET | Refills: 11 | Status: SHIPPED | OUTPATIENT
Start: 2021-11-30 | End: 2022-11-30

## 2021-11-30 RX ORDER — BUTALBITAL, ACETAMINOPHEN AND CAFFEINE 50; 325; 40 MG/1; MG/1; MG/1
1 TABLET ORAL EVERY 6 HOURS PRN
Start: 2021-11-30

## 2021-11-30 RX ORDER — METRONIDAZOLE 500 MG/1
500 TABLET ORAL EVERY 8 HOURS
Qty: 78 TABLET | Refills: 0 | Status: SHIPPED | OUTPATIENT
Start: 2021-11-30 | End: 2021-12-26

## 2021-11-30 RX ORDER — INSULIN ASPART 100 [IU]/ML
12 INJECTION, SOLUTION INTRAVENOUS; SUBCUTANEOUS 3 TIMES DAILY
Refills: 0
Start: 2021-11-30 | End: 2022-11-30

## 2021-11-30 RX ORDER — OLANZAPINE 5 MG/1
10 TABLET, ORALLY DISINTEGRATING ORAL 2 TIMES DAILY PRN
Qty: 60 TABLET | Refills: 11 | Status: SHIPPED | OUTPATIENT
Start: 2021-11-30 | End: 2022-11-30

## 2021-11-30 RX ORDER — SODIUM,POTASSIUM PHOSPHATES 280-250MG
2 POWDER IN PACKET (EA) ORAL
Refills: 0
Start: 2021-12-01

## 2021-11-30 RX ADMIN — MICONAZOLE NITRATE: 20 OINTMENT TOPICAL at 09:11

## 2021-11-30 RX ADMIN — Medication 400 MG: at 08:11

## 2021-11-30 RX ADMIN — INSULIN ASPART 3 UNITS: 100 INJECTION, SOLUTION INTRAVENOUS; SUBCUTANEOUS at 11:11

## 2021-11-30 RX ADMIN — METRONIDAZOLE 500 MG: 500 TABLET ORAL at 03:11

## 2021-11-30 RX ADMIN — INSULIN ASPART 12 UNITS: 100 INJECTION, SOLUTION INTRAVENOUS; SUBCUTANEOUS at 05:11

## 2021-11-30 RX ADMIN — HEPARIN SODIUM 5000 UNITS: 5000 INJECTION INTRAVENOUS; SUBCUTANEOUS at 05:11

## 2021-11-30 RX ADMIN — POTASSIUM CHLORIDE 30 MEQ: 10 CAPSULE, COATED, EXTENDED RELEASE ORAL at 08:11

## 2021-11-30 RX ADMIN — ACETAMINOPHEN 650 MG: 325 TABLET ORAL at 12:11

## 2021-11-30 RX ADMIN — METRONIDAZOLE 500 MG: 500 TABLET ORAL at 08:11

## 2021-11-30 RX ADMIN — INSULIN DETEMIR 34 UNITS: 100 INJECTION, SOLUTION SUBCUTANEOUS at 08:11

## 2021-11-30 RX ADMIN — INSULIN ASPART 12 UNITS: 100 INJECTION, SOLUTION INTRAVENOUS; SUBCUTANEOUS at 08:11

## 2021-11-30 RX ADMIN — CEFTRIAXONE SODIUM 2 G: 2 INJECTION, SOLUTION INTRAVENOUS at 03:11

## 2021-11-30 RX ADMIN — HEPARIN SODIUM 5000 UNITS: 5000 INJECTION INTRAVENOUS; SUBCUTANEOUS at 03:11

## 2021-11-30 RX ADMIN — POTASSIUM & SODIUM PHOSPHATES POWDER PACK 280-160-250 MG 2 PACKET: 280-160-250 PACK at 08:11

## 2021-11-30 RX ADMIN — DIVALPROEX SODIUM 500 MG: 250 TABLET, DELAYED RELEASE ORAL at 08:11

## 2021-11-30 RX ADMIN — ASPIRIN 81 MG: 81 TABLET, COATED ORAL at 08:11

## 2021-11-30 RX ADMIN — AMLODIPINE BESYLATE 10 MG: 10 TABLET ORAL at 08:11

## 2021-11-30 RX ADMIN — HEPARIN SODIUM 5000 UNITS: 5000 INJECTION INTRAVENOUS; SUBCUTANEOUS at 08:11

## 2021-11-30 RX ADMIN — INSULIN ASPART 1 UNITS: 100 INJECTION, SOLUTION INTRAVENOUS; SUBCUTANEOUS at 08:11

## 2021-11-30 RX ADMIN — MUPIROCIN: 20 OINTMENT TOPICAL at 08:11

## 2021-11-30 RX ADMIN — METRONIDAZOLE 500 MG: 500 TABLET ORAL at 05:11

## 2021-11-30 RX ADMIN — LEVOTHYROXINE SODIUM 100 MCG: 100 TABLET ORAL at 05:11

## 2021-11-30 RX ADMIN — DIVALPROEX SODIUM 500 MG: 250 TABLET, DELAYED RELEASE ORAL at 03:11

## 2021-11-30 RX ADMIN — POTASSIUM CHLORIDE 10 MEQ: 10 CAPSULE, COATED, EXTENDED RELEASE ORAL at 08:11

## 2021-11-30 RX ADMIN — POTASSIUM CHLORIDE 10 MEQ: 10 CAPSULE, COATED, EXTENDED RELEASE ORAL at 05:11

## 2021-11-30 RX ADMIN — INSULIN ASPART 12 UNITS: 100 INJECTION, SOLUTION INTRAVENOUS; SUBCUTANEOUS at 11:11

## 2021-11-30 NOTE — NURSING
POC reviewed with pt and pt verbalized understanding. Questions/Concerns addressed. A&Ox3. Confused @ times requires frequent redirection. NADN. Respirations equal and unlabored. Took night medications swallows without difficulty. Incontinent of B/B has on perwick external female catheter intact per facility protocol to suction no complications skin intact. Bedbound turn Q 2 hours to prevent pressure injuries. Bed in low position, side rails up x3. Call bell in reach. Will continue to monitor closely throughout this [id number] shift Safety maintained. Bed alarm activated for safety. Safety/Fall precautions in place per facility protocol for safety. Instructed pt to press call bell for assistance if needed pt verbalized understanding. Telesitter in progress for safety/fall precautions.

## 2021-11-30 NOTE — PROGRESS NOTES
Hospital Medicine  Progress note    Team: [facility] Kiley Yusuf MD  Admit Date: 11/12/2021  SARAVANAN 12/1/2021  Length of Stay:  LOS: 17 days   Code status: Full Code    Principal Problem:  Cerebrovascular accident (CVA) due to embolism    Interval hx:  Patient had better night. Less agitated, but still confused. Still trying to get out of bed.    ROS   Respiratory: neg for cough neg for shortness of breath  Cardiovascular: neg for chest pain neg for palpitations  Gastrointestinal: neg for nausea neg for vomiting, neg for abdominal pain neg for diarrhea neg for constipation   Behavioral/Psych: neg for depression neg for anxiety    PEx  Temp:  [96.1 °F (35.6 °C)-98 °F (36.7 °C)]   Pulse:  []   Resp:  [16-18]   BP: (151-184)/(57-93)   SpO2:  [96 %-98 %]     Intake/Output Summary (Last 24 hours) at 11/29/2021 1829  Last data filed at 11/29/2021 0426  Gross per 24 hour   Intake 1710 ml   Output --   Net 1710 ml     General Appearance: no acute distress, obese  Heart: regular rate and rhythm  Respiratory: Normal respiratory effort, no crackles   Abdomen: Soft, non-tender; bowel sounds active  Skin: intact.  Neurologic:  No focal numbness or weakness  Mental status: Alert, oriented to person and location, but not to time and situation    Recent Labs   Lab 11/24/21  0558 11/25/21  0222 11/29/21  0614   WBC 5.43 5.18 5.64   HGB 9.9* 9.7* 8.5*   HCT 32.6* 31.9* 27.5*    226 134*     Recent Labs   Lab 11/24/21  0558 11/24/21  0558 11/25/21  0222 11/25/21  0222 11/26/21  1105 11/28/21  1113 11/29/21  0614      < > 144   < > 142 144 144   K 3.7   < > 3.3*   < > 3.3* 3.4* 2.7*      < > 108   < > 108 109 114*   CO2 25   < > 24   < > 26 24 21*   BUN 6   < > 6   < > 5* 6 4*   CREATININE 0.9   < > 0.8   < > 0.8 0.9 0.6   *   < > 170*   < > 149* 196* 133*   CALCIUM 8.6*   < > 8.3*   < > 8.4* 8.6* 6.9*   MG 1.9  --  1.7  --   --   --  1.4*   PHOS 4.0   < > 4.1   < > 3.7 3.2 3.0    < > = values in this  interval not displayed.     Recent Labs   Lab 11/24/21  0558 11/24/21  0558 11/25/21  0222 11/25/21  0222 11/26/21  1105 11/28/21  1113 11/29/21  0614   ALKPHOS 69  --  60  --   --   --  46*   ALT <5*  --  <5*  --   --   --  <5*   AST 10  --  9*  --   --   --  9*   ALBUMIN 2.6*   < > 2.4*   < > 2.5* 2.5* 2.1*   PROT 6.9  --  6.4  --   --   --  5.1*   BILITOT 0.2  --  0.2  --   --   --  0.2    < > = values in this interval not displayed.        Recent Labs   Lab 11/28/21  0857 11/28/21  1147 11/28/21  1703 11/28/21  2138 11/29/21  1122 11/29/21  1556   POCTGLUCOSE 264* 188* 150* 188* 191* 146*       Scheduled Meds:   amLODIPine  10 mg Oral Daily    aspirin  81 mg Oral Daily    cefTRIAXone (ROCEPHIN) IVPB  2 g Intravenous Q12H    [START ON 11/30/2021] divalproex  500 mg Oral TID    heparin (porcine)  5,000 Units Subcutaneous Q8H    insulin aspart U-100  12 Units Subcutaneous TIDWM    insulin detemir U-100  34 Units Subcutaneous QHS    levothyroxine  100 mcg Oral Before breakfast    magnesium oxide  400 mg Oral BID    metroNIDAZOLE  500 mg Oral Q8H    miconazole nitrate 2%   Topical (Top) BID    mupirocin   Topical (Top) BID    OLANZapine  5 mg Oral with dinner    [START ON 11/30/2021] potassium chloride  10 mEq Oral BID WM    potassium chloride  30 mEq Oral TID WM    potassium, sodium phosphates  2 packet Oral Daily with breakfast    vibegron  75 mg Oral Daily     Continuous Infusions:  As Needed:  acetaminophen, dextrose 50%, glucagon (human recombinant), glucose, glucose, guaiFENesin 100 mg/5 ml, insulin aspart U-100, melatonin, OLANZapine, sodium chloride 0.9%, valproate sodium (DEPACON) IVPB    Active Hospital Problems    Diagnosis  POA    *Cerebrovascular accident (CVA) due to embolism [I63.9]  Yes    Cerebral septic emboli [I76, I66.9]  Yes    Hypernatremia [E87.0]  Yes    BMI 45.0-49.9, adult [Z68.42]  Not Applicable    Discharge planning issues [Z02.9]  Not Applicable    Vasogenic brain  edema [G93.6]  Yes    Brain compression [G93.5]  Yes    Encephalopathy, metabolic [G93.41]  Yes    Hypertension associated with diabetes [E11.59, I15.2]  Yes    Uncontrolled type 2 diabetes mellitus with hyperglycemia [E11.65]  Yes    Hypothyroidism [E03.9]  Yes      Resolved Hospital Problems    Diagnosis Date Resolved POA    Brain lesion [G93.9] 11/28/2021 Yes    Diabetes insipidus [E23.2] 11/28/2021 Yes    Morbid obesity with BMI of 60.0-69.9, adult [E66.01, Z68.44] 11/15/2021 Not Applicable     Assessment and Plan  / Problems managed today    STROKE due to Septic emboli in the brain  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  Vasogenic edema  ID consulted and recommended 6 weeks IV antibiotics   - Ceftriaxone 2g IV Q12H + Metronidazole 500mg PO Q8H   - 6 week course, last day 12/26/21  DORCAS negative for vegetations    Metabolic encephalopathy  Acute agitation  Waxing and waning  Psychiatry assisting in medication management  Increase depakote 500 mg TID  Zyprexa 5 mg with dinner  seroquel discontinued due to one EKG Qtc>500, but NOT consistent  zyprexa 5 mg BID prn  Delirium precautions  Lab draws later in morning  SLP  telesitter  Minimize restraints    Hypernatremia - resolving    Anemia of acute infection  Normal iron levels  Treat infection  Stop daily lab draws    Hypothyroidism  Continue levothyoxine 100 mcg daily    Essential HTN - continue amlodipine 10 mg daily    Debility  Cognitive impariement  ST/PT/OT    DM II  Endocrine consulted     Diet:  consistent carbs  GI PPx: not needed  DVT PPx:  heparin  Airways: room air  Wounds: none    Goals of Care:  Return to prior functional status     Discharge plan: rehab and IV abx    Time (minutes) spent in care of the patient (Greater than 1/2 spent in direct face-to-face contact)  35 min    Kiley Yusuf MD

## 2021-11-30 NOTE — PLAN OF CARE
[facility]     Department of Hospital Medicine     [address] [place] [address]     [phone] after hours  [phone] fax                                        FACILITY TRANSFER ORDERS     12/01/2021    Admit to: in patient rehab    Diagnoses:  Active Hospital Problems    Diagnosis  POA    *Cerebrovascular accident (CVA) due to embolism [I63.9]  Yes    Cerebral septic emboli [I76, I66.9]  Yes    Hypernatremia [E87.0]  Yes    BMI 45.0-49.9, adult [Z68.42]  Not Applicable    Discharge planning issues [Z02.9]  Not Applicable    Vasogenic brain edema [G93.6]  Yes    Brain compression [G93.5]  Yes    Encephalopathy, metabolic [G93.41]  Yes    Hypertension associated with diabetes [E11.59, I15.2]  Yes    Uncontrolled type 2 diabetes mellitus with hyperglycemia [E11.65]  Yes    Hypothyroidism [E03.9]  Yes      Resolved Hospital Problems    Diagnosis Date Resolved POA    Brain lesion [G93.9] 11/28/2021 Yes    Diabetes insipidus [E23.2] 11/28/2021 Yes    Morbid obesity with BMI of 60.0-69.9, adult [E66.01, Z68.44] 11/15/2021 Not Applicable     Vital Signs: Routine.    Allergies:  Review of patient's allergies indicates:   Allergen Reactions    Iodinated contrast media Swelling     Other reaction(s): Swelling    Naproxen sodium Swelling    Naprosyn  [naproxen]      Other reaction(s): Swelling     Diet:diabetic 2000 kcal    Acitivities: ad alexi    Nursing: per unit routine  Routine line care     Labs: CMP, Mg. Phos, CBC every Monday  BMP, Mg, Phos every thursday  accucheck before meals TID and qhs    Consults: PT/OT/ST    Medications:   Current Discharge Medication List      START taking these medications    Details   alteplase (CATHFLO ACTIVASE) 2 mg injection 2 mg by Intra-Catheter route as needed (line care).      cefTRIAXone (ROCEPHIN) 2 g/50 mL PgBk IVPB Inject 50 mLs (2 g total) into the vein every 12 (twelve) hours. Stop 12/26/2021   Qty: 2600  mL, Refills: 0   Septic emboli   divalproex (DEPAKOTE) 500 MG TbEC Take 1 tablet (500 mg total) by mouth 3 (three) times daily.  Qty: 90 tablet, Refills: 11   agitation   insulin aspart U-100 (NOVOLOG) 100 unit/mL (3 mL) InPn pen Inject 15 Units into the skin 3 (three) times daily.  Refills: 0   DM II   insulin detemir U-100 (LEVEMIR FLEXTOUCH) 100 unit/mL (3 mL) SubQ InPn pen Inject 36 Units into the skin every evening.  Refills: 0   DM II   magnesium oxide (MAG-OX) 400 mg (241.3 mg magnesium) tablet Take 1 tablet (400 mg total) by mouth 2 (two) times daily.  Refills: 0   hypomagnesium   metroNIDAZOLE (FLAGYL) 500 MG tablet Take 1 tablet (500 mg total) by mouth every 8 (eight) hours. Stop 12/26/2021  Qty: 78 tablet, Refills: 0      miconazole nitrate 2% (MICOTIN) 2 % Oint Apply topically 2 (two) times daily. Apply to groin, perineum, buttocks  Refills: 0      olanzapine zydis (ZYPREXA ZYDIS) 5 MG TbDL Take 1 tablets (5 mg total) by mouth 2 (two) times daily as needed (non-directable adjustation).  Qty: 60 tablet, Refills: 11      potassium, sodium phosphates (PHOS-NAK) 280-160-250 mg PwPk Take 2 packets by mouth daily with breakfast.  Refills: 0   hypophosphorus      CONTINUE these medications which have CHANGED    Details   amLODIPine (NORVASC) 10 MG tablet Take 1 tablet (10 mg total) by mouth once daily.  Qty: 30 tablet, Refills: 11      butalbital-acetaminophen-caffeine -40 mg (FIORICET, ESGIC) -40 mg per tablet Take 1 tablet by mouth every 6 (six) hours as needed for Headaches.         CONTINUE these medications which have NOT CHANGED    Details   albuterol (VENTOLIN HFA) 90 mcg/actuation inhaler INHALE ONE TO TWO PUFFS INTO LUNGS EVERY 6 HOURS AS NEEDED FOR WHEEZING  Qty: 54 g, Refills: 3      aspirin 81 mg Tab Take by mouth. 1 Tablet Oral Every day      ezetimibe (ZETIA) 10 mg tablet TAKE 1 TABLET BY MOUTH EVERY DAY IN THE EVENING  Qty: 90 tablet, Refills: 3      fish oil-dha-epa 1,200-144-216 mg  Cap 1 Capsule Oral Every day  Qty: 90 capsule, Refills: 3      fluticasone-salmeterol diskus inhaler 250-50 mcg Inhale 1 puff into the lungs 2 (two) times daily. Controller  Qty: 180 each, Refills: 3      levothyroxine (SYNTHROID) 100 MCG tablet TAKE 1 TABLET DAILY  Qty: 90 tablet, Refills: 3      metFORMIN (GLUCOPHAGE) 1000 MG tablet TAKE 1 TABLET BY MOUTH TWICE A DAY WITH MEALS  Qty: 180 tablet, Refills: 3      metoprolol tartrate (LOPRESSOR) 100 MG tablet TAKE 1 TABLET BY MOUTH TWICE A DAY  Qty: 180 tablet, Refills: 1      mv-min-FA-Zl-St-ynkmwuq-lutein 0.4-162-18 mg Tab Take by mouth. 1 Tablet Oral Every day      potassium chloride SA (K-DUR,KLOR-CON) 20 MEQ tablet TAKE 1 TABLET BY MOUTH TWICE A DAY  Qty: 180 tablet, Refills: 3      TRULICITY 1.5 mg/0.5 mL pen injector INJECT 1 PEN EVERY WEEK  Qty: 2 pen, Refills: 4         STOP taking these medications       calcium carbonate (TUMS) 300 mg (750 mg) Chew Comments:   Reason for Stopping:         diclofenac sodium (VOLTAREN) 1 % Gel Comments:   Reason for Stopping:         FARXIGA 10 mg tablet Comments:   Reason for Stopping:         glipiZIDE (GLUCOTROL) 10 MG TR24 Comments:   Reason for Stopping:         GUAIATUSSIN AC  mg/5 mL syrup Comments:   Reason for Stopping:         HYDROcodone-acetaminophen (NORCO)  mg per tablet Comments:   Reason for Stopping:         hyoscyamine (LEVSIN/SL) 0.125 mg Subl Comments:   Reason for Stopping:         ibuprofen (ADVIL,MOTRIN) 800 MG tablet Comments:   Reason for Stopping:         ipratropium (ATROVENT) 21 mcg (0.03 %) nasal spray Comments:   Reason for Stopping:         JARDIANCE 10 mg tablet Comments:   Reason for Stopping:         lactulose (CHRONULAC) 10 gram/15 mL solution Comments:   Reason for Stopping:         LORazepam (ATIVAN) 0.5 MG tablet Comments:   Reason for Stopping:         meclizine (ANTIVERT) 25 mg tablet Comments:   Reason for Stopping:         NARCAN 4 mg/actuation Spry Comments:   Reason for  Stopping:         sucralfate (CARAFATE) 1 gram tablet Comments:   Reason for Stopping:         temazepam (RESTORIL) 30 mg capsule Comments:   Reason for Stopping:         tiZANidine (ZANAFLEX) 4 MG tablet Comments:   Reason for Stopping:         vibegron 75 mg Tab Comments:   Reason for Stopping:               _________________________________  Kiley Yusuf MD  12/01/2021

## 2021-11-30 NOTE — PROGRESS NOTES
Hospital Medicine  Progress note    Team: [facility] D Kiley Yusuf MD  Admit Date: 11/12/2021  SARAVANAN 12/1/2021  Length of Stay:  LOS: 18 days   Code status: Full Code    Principal Problem:  Cerebrovascular accident (CVA) due to embolism    Interval hx:  She had good night. 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    ROS   Respiratory: neg for cough neg for shortness of breath  Cardiovascular: neg for chest pain neg for palpitations  Gastrointestinal: neg for nausea neg for vomiting, neg for abdominal pain neg for diarrhea neg for constipation   Behavioral/Psych: neg for depression neg for anxiety    PEx  Temp:  [96.1 °F (35.6 °C)-98.7 °F (37.1 °C)]   Pulse:  [89-99]   Resp:  [17-18]   BP: (128-151)/(74-83)   SpO2:  [95 %-99 %]     Intake/Output Summary (Last 24 hours) at 11/30/2021 1342  Last data filed at 11/30/2021 0352  Gross per 24 hour   Intake 960 ml   Output --   Net 960 ml     General Appearance: no acute distress, obese  Heart: regular rate and rhythm  Respiratory: Normal respiratory effort, no crackles   Abdomen: Soft, non-tender; bowel sounds active  Skin: intact.  Neurologic:  No focal numbness or weakness  Mental status: Alert, oriented to person and location, but not to time and situation    Recent Labs   Lab 11/24/21  0558 11/25/21  0222 11/29/21  0614   WBC 5.43 5.18 5.64   HGB 9.9* 9.7* 8.5*   HCT 32.6* 31.9* 27.5*    226 134*     Recent Labs   Lab 11/24/21  0558 11/24/21  0558 11/25/21  0222 11/25/21  0222 11/26/21  1105 11/28/21  1113 11/29/21  0614      < > 144   < > 142 144 144   K 3.7   < > 3.3*   < > 3.3* 3.4* 2.7*      < > 108   < > 108 109 114*   CO2 25   < > 24   < > 26 24 21*   BUN 6   < > 6   < > 5* 6 4*   CREATININE 0.9   < > 0.8   < > 0.8 0.9 0.6   *   < > 170*   < > 149* 196* 133*   CALCIUM 8.6*   < > 8.3*   < > 8.4* 8.6* 6.9*   MG 1.9  --  1.7  --   --   --  1.4*   PHOS 4.0   < > 4.1   < > 3.7 3.2 3.0     < > = values in this interval not displayed.     Recent Labs   Lab 11/24/21  0558 11/24/21  0558 11/25/21  0222 11/25/21  0222 11/26/21  1105 11/28/21  1113 11/29/21  0614   ALKPHOS 69  --  60  --   --   --  46*   ALT <5*  --  <5*  --   --   --  <5*   AST 10  --  9*  --   --   --  9*   ALBUMIN 2.6*   < > 2.4*   < > 2.5* 2.5* 2.1*   PROT 6.9  --  6.4  --   --   --  5.1*   BILITOT 0.2  --  0.2  --   --   --  0.2    < > = values in this interval not displayed.        Recent Labs   Lab 11/28/21  2138 11/29/21  1122 11/29/21  1556 11/29/21  2059 11/30/21  0748 11/30/21  1142   POCTGLUCOSE 188* 191* 146* 232* 225* 285*       Scheduled Meds:   amLODIPine  10 mg Oral Daily    aspirin  81 mg Oral Daily    cefTRIAXone (ROCEPHIN) IVPB  2 g Intravenous Q12H    divalproex  500 mg Oral TID    heparin (porcine)  5,000 Units Subcutaneous Q8H    insulin aspart U-100  12 Units Subcutaneous TIDWM    insulin detemir U-100  34 Units Subcutaneous QHS    levothyroxine  100 mcg Oral Before breakfast    magnesium oxide  400 mg Oral BID    metroNIDAZOLE  500 mg Oral Q8H    miconazole nitrate 2%   Topical (Top) BID    mupirocin   Topical (Top) BID    potassium chloride  10 mEq Oral BID WM    potassium, sodium phosphates  2 packet Oral Daily with breakfast    vibegron  75 mg Oral Daily     Continuous Infusions:  As Needed:  acetaminophen, alteplase, dextrose 50%, glucagon (human recombinant), glucose, glucose, guaiFENesin 100 mg/5 ml, insulin aspart U-100, melatonin, OLANZapine, sodium chloride 0.9%, valproate sodium (DEPACON) IVPB    Active Hospital Problems    Diagnosis  POA    *Cerebrovascular accident (CVA) due to embolism [I63.9]  Yes    Cerebral septic emboli [I76, I66.9]  Yes    Hypernatremia [E87.0]  Yes    BMI 45.0-49.9, adult [Z68.42]  Not Applicable    Discharge planning issues [Z02.9]  Not Applicable    Vasogenic brain edema [G93.6]  Yes    Brain compression [G93.5]  Yes    Encephalopathy, metabolic [G93.41]   Yes    Hypertension associated with diabetes [E11.59, I15.2]  Yes    Uncontrolled type 2 diabetes mellitus with hyperglycemia [E11.65]  Yes    Hypothyroidism [E03.9]  Yes      Resolved Hospital Problems    Diagnosis Date Resolved POA    Brain lesion [G93.9] 11/28/2021 Yes    Diabetes insipidus [E23.2] 11/28/2021 Yes    Morbid obesity with BMI of 60.0-69.9, adult [E66.01, Z68.44] 11/15/2021 Not Applicable     Assessment and Plan  / Problems managed today    STROKE due to Septic emboli in the brain  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  Vasogenic edema  ID consulted and recommended 6 weeks IV antibiotics   - Ceftriaxone 2g IV Q12H + Metronidazole 500mg PO Q8H   - 6 week course, last day 12/26/21  DORCAS negative for vegetations    Metabolic encephalopathy  Acute agitation  Waxing and waning  Psychiatry assisting in medication management  Increase depakote 500 mg TID  Zyprexa 5 mg with dinner  seroquel discontinued due to one EKG Qtc>500, but NOT consistent  zyprexa 5 mg BID prn  Delirium precautions  Lab draws later in morning  SLP  telesitter  Minimize restraints    Hypernatremia - resolving    Anemia of acute infection  Normal iron levels  Treat infection  Stop daily lab draws    Hypothyroidism  Continue levothyoxine 100 mcg daily    Essential HTN - continue amlodipine 10 mg daily    hypomagnesium  Hypokalemia  Hypophosphorus  replace    Debility  Cognitive impariement  ST/PT/OT    DM II  Endocrine consulted     Diet:  consistent carbs  GI PPx: not needed  DVT PPx:  heparin  Airways: room air  Wounds: none    Goals of Care:  Return to prior functional status     Discharge plan: rehab and IV abx    Time (minutes) spent in care of the patient (Greater than 1/2 spent in direct face-to-face contact)  35 min    Kiley Yusuf MD

## 2021-11-30 NOTE — NURSING
Pts PICC not drawing blood. Several attempts made with moving pts arm around unsuccessful. Pt now a lab collect. Safety maintained. Consult placed to PICC team to assess.

## 2021-11-30 NOTE — PLAN OF CARE
EMMANUEL received a call from Giovana ([phone]) at [facility] in [place], reporting that pt was approved for admission and insurance auth was received. Giovana requested that pt transport be scheduled for 8 am on 12/1, and that facility will need COVID test results, discharge summary, last BM and recent pt vitals from the morning of 12/1.  EMMANUEL ordered a STAT COVID test for pt.    EMMANUEL called pt's sister Darcy to let her know that pt was approved, and requested that sister call Giovana at facility.      EMMANUEL scheduled ambulance transport for pt at 8 am on 12/1 (approved by  supervisor Michi) and notified pt's sister Darcy.    EMMANUEL will continue to coordinate with patient, family, team and insurance to complete patient's discharge plan.    Gracia Nix, AGUSTIN  [id number]

## 2021-11-30 NOTE — PT/OT/SLP PROGRESS
Physical Therapy Co-Treatment with OT    Patient Name:  Violeta Champion   MRN:  [id number]    *Co-treatment with OT due to patient complexity and need for two skilled therapists to ensure safe mobilization.    Recommendations:     Discharge Recommendations:  rehabilitation facility   Discharge Equipment Recommendations:  (tbd)   Barriers to discharge: Inaccessible home, Decreased caregiver support and increased (A)    Highest Level of Mobility: STS  Assistance Required: Max(A)x2 persons    Assessment:     Violeta Champion is a 60 y.o. female admitted with a medical diagnosis of Cerebrovascular accident (CVA) due to embolism.  She presents with the following impairments/functional limitations:  weakness,impaired balance,decreased safety awareness,impaired endurance,impaired cognition,impaired self care skills,decreased coordination,impaired functional mobilty,decreased upper extremity function,decreased lower extremity function,gait instability. Violeta Champion would benefit from acute PT intervention to address listed functional deficits, provide patient/caregiver education, reduce fall risk, and maximize (I) and safety with functional mobility.    Pt met with HOB elevated and agreeable to PT treatment. Pt is in good spirits and follows most 1-step commands appropriately. She was found soiled with urine and a loose BM and required a complete bedding change and total (A) for tony-care.  Pt able to stand 2x today with max(A)x2 persons.    Pt is progressing towards acute PT goals appropriately and continues to benefit from acute PT sessions. After hospital discharge, pt would benefit from inpatient rehab to maximize rehab potential.    Rehab Prognosis: Good; patient would benefit from acute skilled PT services to address these deficits and reach maximum level of function.      Plan:     During this hospitalization, patient to be seen 4 x/week to address the identified rehab impairments via gait training,therapeutic  "activities,therapeutic exercises,neuromuscular re-education and progress toward the following goals:    · Plan of Care Expires:  12/13/21    This plan of care has been discussed with the patient/caregiver, who was included in its development and is in agreement with the identified goals and treatment plan.     Subjective     Communicated with RN prior to session.  Patient agreeable to participate.     Pain/Comfort:  · Pain Rating 1: 0/10  · Pain Rating Post-Intervention 1: 0/10    Chief Complaint: CVA due to embolism  Patient/Family Comments/goals: "Drop the geovanna, she can move"       Objective:     Patient found HOB elevated with telemetry,peripheral IV  upon PT entry to room.    General Precautions: Standard, aspiration,fall   Orthopedic Precautions:N/A   Braces: N/A         Exams:     Cognition:  o Pt is alert and cooperative   o Command following: intact most 1-step commands    Functional Mobility:    Bed Mobility:  · Supine to Sit: Maximum Assistance on L side of bed  · Sit to Supine: Maximum Assistance and 2 persons  · Rolling L: Maximum Assistance  · Scooting anteriorly to EOB to plant feet on floor: Maximum Assistance  · Scooting/Bridging in supine to HOB: Maximum Assistance and 2 persons via drawsheet    Transfers:   · Sit to Stand Transfer: Maximum Assistance and 2 persons   from EOB with HHAx2  · X2 trials from EOB  · Pt with good use of forward momentum to rise  · Bed to Chair: Activity did not occur , not appropriate at this time              Gait:  · Patient unable at this time    Balance:  · Static Sit:   · Contact-Guard Assist at EOB x ~12 minutes  · Good: Patient able to maintain balance without handhold support, limited postural sway.  · Static Stand:   · Max Assist with Hand-held assist x 2  · Poor: Patient requires handhold support and moderate to maximal assistance to maintain position      Therapeutic Activities/Exercises      Patient assisted with functional mobility as noted above   STSx2 " from EOB with max(A)x2 persons   PT and OT assisted with tony-care, complete bedding change, and gown change as pt was soiled with loose BM and urine on PT entry   Patient educated on the importance of early mobility to prevent functional decline during hospital stay   Patient educated on PT POC and role of PT in acute care   White board updated regarding patient's safest level of mobility with staff assistance, RN also updated.     AM-PAC 6 CLICK MOBILITY  Total Score:9     Patient left HOB elevated with all lines intact, call button in reach, bed alarm on and RN notified.        History/Goals:     PAST MEDICAL HISTORY:  Past Medical History:   Diagnosis Date    Breast cancer 12/10/2020    Diabetes mellitus, type 2     GERD (gastroesophageal reflux disease)     High cholesterol     Hypertension     Hypothyroid     Injury of knee, leg, ankle and foot     Insulin-resistant diabetes mellitus and acanthosis nigricans     Menopause present     Osteoarthritis     Osteoarthritis, knee     Osteopenia     Osteopenia     Sleep apnea     Status post breast lump removal 12/01/2020       Past Surgical History:   Procedure Laterality Date    bilateral duct removal breast      BREAST CYST ASPIRATION Right 2020    EXCISIONAL BIOPSY Right 12/10/2020    Procedure: EXCISIONAL BIOPSY, breast; u/s guided;  Surgeon: Bernadette Lopez MD;  Location: [facility] OR;  Service: General;  Laterality: Right;    HYSTERECTOMY      OOPHORECTOMY      OTHER SURGICAL HISTORY      bilateral central duct removal with bilateral papilomona    TRANSESOPHAGEAL ECHOCARDIOGRAPHY N/A 11/24/2021    Procedure: ECHOCARDIOGRAM, TRANSESOPHAGEAL;  Surgeon: Edda Diagnostic Provider;  Location: [facility] EP LAB;  Service: Anesthesiology;  Laterality: N/A;       GOALS:   Multidisciplinary Problems     Physical Therapy Goals        Problem: Physical Therapy Goal    Goal Priority Disciplines Outcome Goal Variances Interventions   Physical Therapy Goal      PT, PT/OT Ongoing, Progressing     Description: Goals to be met by: 21     Patient will increase functional independence with mobility by performin. Supine to sit with Moderate Assistance  2. Sit to supine with Moderate Assistance  3. Rolling to Left and Right with Moderate Assistance.  4. Sit to stand transfer with Maximum Assistance  5. Bed to chair transfer with Maximum Assistance using LRAD  6. Gait  x 5 feet with Maximum Assistance using LRAD.   7. Lower extremity exercise program x15 reps per handout, with assistance as needed                     Time Tracking:     PT Received On: 21  PT Start Time: 1026     PT Stop Time: 1058  PT Total Time (min): 32 min     Billable Minutes: Therapeutic Activity 16 and Therapeutic Exercise 16      Sarahi Norris, PT  2021   Pager# [phone]

## 2021-11-30 NOTE — PT/OT/SLP PROGRESS
"Occupational Therapy   Co-Treatment    Name: Violeta Champion  MRN: [id number]  Admitting Diagnosis:  Cerebrovascular accident (CVA) due to embolism  6 Days Post-Op  Procedure(s):  ECHOCARDIOGRAM, TRANSESOPHAGEAL     Recommendations:     Discharge Recommendations: rehabilitation facility  Discharge Equipment Recommendations: TBD next level of care  Barriers to discharge: increased assistance needed    Assessment:     Violeta Champion is a 60 y.o. female with a medical diagnosis of Cerebrovascular accident (CVA) due to embolism. She presents with performance deficits including weakness,impaired endurance,impaired self care skills,impaired functional mobilty,impaired balance,decreased safety awareness,decreased lower extremity function,decreased upper extremity function,decreased coordination,impaired cognition,gait instability. Pt alert and cooperative throughout session, demo improvement in sitting balance and able to complete standing trials this date. Pt progressing toward goals and would continue to benefit from OT to increase functional independence and safety. Recommend rehab upon D/C to increase independence.    Rehab Prognosis:  Good; patient would benefit from acute skilled OT services to address these deficits and reach maximum level of function.       Plan:     Patient to be seen 4 x/week to address the above listed problems via self-care/home management,therapeutic activities,therapeutic exercises,neuromuscular re-education,cognitive retraining  · Plan of Care Expires: 12/13/21  · Plan of Care Reviewed with: patient    Subjective     Pain/Comfort:  · Pain Rating 1: 0/10  · Pain Rating Post-Intervention 1: 0/10    Objective:     Communicated with: RN prior to session. Patient found left sidelying with telemetry,peripheral IV upon OT entry to room, no family present.  "Drop the geovanna, she can move!"  Co-treatment performed with PT due to patient's multiple deficits requiring two skilled therapists to appropriately " and safely assess patient's strength and endurance while facilitating functional tasks in addition to accommodating for patient's activity tolerance.     General Precautions: Standard, fall   Orthopedic Precautions:N/A   Braces: N/A  Respiratory Status:  · O2 Device (Oxygen Therapy): room air     Occupational Performance:     Bed Mobility:    · Patient completed Rolling/Turning to Left with maximal assistance  · Patient completed Rolling/Turning to Right with maximal assistance  · Patient completed Scooting/Bridging with maximal assistance and 2 persons in supine to HOB  · Patient completed Supine to Sit with maximal assistance  · Patient completed Sit to Supine with maximal assistance and 2 persons     Functional Mobility/Transfers:  · Patient completed Sit <> Stand Transfer with maximal assistance of 2 persons from EOB 2 trials; maintains static standing briefly  · Functional Mobility: Unable at this time    Activities of Daily Living:  · Grooming: contact guard assistance while sitting EOB to wash face  · Upper Body Dressing: moderate assistance to change gown  · Lower Body Dressing: total assistance to don socks  · Toileting: total assistance in sidelying for tony hygiene and to change pads/linens      AMPA 6 Click ADL: 12    Treatment & Education:  Pt assisted with rolling to complete tony hygiene and change linens prior to OOB activity; pt sat EOB ~15 min with close SBA-CGA; completed anterior trunk leans x 5 reps with CGA-Min A to return to midline and maintain; completed seated grooming task and 2 standing trials; discussed POC and D/C recs and will require ongoing education; RN updated on pt's assistance levels    Patient left HOB elevated with all lines intact, call button in reach and RN notifiedEducation:      GOALS:   Multidisciplinary Problems     Occupational Therapy Goals        Problem: Occupational Therapy Goal    Goal Priority Disciplines Outcome Interventions   Occupational Therapy Goal     OT,  PT/OT Ongoing, Progressing    Description: Goals to be met by: 2 weeks (12/13/21)     Patient will increase functional independence with ADLs by performing:    UE Dressing with Minimal Assistance.  Grooming while seated with Contact Guard Assistance.  Sitting at edge of bed x10 minutes with Stand by Assistance.  Supine to sit with Minimal Assistance.  Stand pivot transfers with Moderate Assistance.  Pt will follow 3/3 one-step commands to participate in ADL task.    Goals to be met by: 2 weeks (11/27/21)     Patient will increase functional independence with ADLs by performing:    UE Dressing with Minimal Assistance.  Grooming while seated with Minimal Assistance.  Sitting at edge of bed x10 minutes with Minimal Assistance. - Met 11/26  Supine to sit with Minimal Assistance.  Stand pivot transfers with Moderate Assistance.  Pt will follow 3/3 one-step commands to participate in ADL task.                     Time Tracking:     OT Date of Treatment: 11/30/21  OT Start Time: 1027  OT Stop Time: 1059  OT Total Time (min): 32 min    Billable Minutes:Self Care/Home Management 16  Therapeutic Activity 16    OT/MARY ALICE: OT          11/30/2021

## 2021-11-30 NOTE — NURSING
Pt required frequent redirection this shift due to intermittent confusion. Observed pt playing in toothpaste/soap/lotion. Pt also noncompliant with care refusing to let staff assist her with changing her due to incontinence episode of bowel/bladder. Pt is not easily redirected. Will report off to oncoming nursing staff. Safety maintained. Telesitter remains in progress for safety/fall prevention.

## 2021-11-30 NOTE — SUBJECTIVE & OBJECTIVE
"  Family History     Problem Relation (Age of Onset)    Amblyopia Father    Breast cancer Paternal Aunt, Paternal Grandmother    Cancer Maternal Grandmother    Diabetes Mother, Maternal Aunt, Maternal Uncle    Glaucoma Mother    Hypertension Mother, Brother        Tobacco Use    Smoking status: Never Smoker    Smokeless tobacco: Never Used   Substance and Sexual Activity    Alcohol use: Yes     Alcohol/week: 0.0 standard drinks     Comment: very rarely    Drug use: No    Sexual activity: Not on file     Psychotherapeutics (From admission, onward)            Start     Stop Route Frequency Ordered    11/27/21 1615  OLANZapine tablet 5 mg         -- Oral with dinner 11/27/21 1603    11/26/21 1947  OLANZapine tablet 5 mg         -- Oral 2 times daily PRN 11/26/21 1849           Review of Systems  Objective:     Vital Signs (Most Recent):  Temp: 97.9 °F (36.6 °C) (11/30/21 0524)  Pulse: 89 (11/30/21 0524)  Resp: 18 (11/30/21 0524)  BP: (!) 141/83 (11/30/21 0524)  SpO2: 95 % (11/30/21 0524) Vital Signs (24h Range):  Temp:  [96.1 °F (35.6 °C)-97.9 °F (36.6 °C)] 97.9 °F (36.6 °C)  Pulse:  [89-99] 89  Resp:  [17-18] 18  SpO2:  [95 %-98 %] 95 %  BP: (129-165)/(57-83) 141/83     Height: 5' 4" (162.6 cm)  Weight: 123.8 kg (273 lb)  Body mass index is 46.86 kg/m².      Intake/Output Summary (Last 24 hours) at 11/30/2021 1006  Last data filed at 11/30/2021 0352  Gross per 24 hour   Intake 960 ml   Output --   Net 960 ml     Mental Status Exam:  Appearance: Obese, laying in bed sideways  Behavior/Cooperation: normal, cooperative, restless and fidgety   Speech: normal tone, normal rate, normal pitch, normal volume  Language: fluent english  Mood: fine  Affect: blunted  Thought Process: disorganized, tangential  Thought Content: normal, no suicidality, no homicidality, delusions, or paranoia   Orientation: person, place, year, disoriented to day, date, month  Memory: Impaired to some degree  Attention Span/Concentration: " Impaired to some degree  Fund of Knowledge: Estimated to be average level   Insight: limited  Judgment: fair      Significant Labs: All pertinent labs within the past 24 hours have been reviewed.    Significant Imaging: I have reviewed all pertinent imaging results/findings within the past 24 hours.

## 2021-11-30 NOTE — PLAN OF CARE
Problem: Adult Inpatient Plan of Care  Goal: Plan of Care Review  Outcome: Ongoing, Progressing  Goal: Patient-Specific Goal (Individualization)  Outcome: Ongoing, Progressing  Goal: Absence of Hospital-Acquired Illness or Injury  Outcome: Ongoing, Progressing  Intervention: Identify and Manage Fall Risk  Flowsheets (Taken 11/30/2021 0406)  Safety Promotion/Fall Prevention:   assistive device/personal item within reach   bed alarm set   Fall Risk reviewed with patient/family   nonskid shoes/socks when out of bed   side rails raised x 3   instructed to call staff for mobility     Problem: Diabetes Comorbidity  Goal: Blood Glucose Level Within Desired Range  Outcome: Ongoing, Progressing  Intervention: Maintain Glycemic Control  Flowsheets (Taken 11/30/2021 0406)  Glycemic Management: blood glucose monitoring     Problem: Electrolyte Imbalance (Acute Kidney Injury/Impairment)  Goal: Serum Electrolyte Balance  Outcome: Ongoing, Progressing  Intervention: Monitor and Manage Electrolyte Imbalance  Flowsheets (Taken 11/30/2021 0406)  Fluid/Electrolyte Management:   electrolyte supplement initiated   fluids provided     Problem: Wound  Goal: Optimal Wound Healing  Outcome: Ongoing, Progressing  Intervention: Promote Effective Wound Healing  Flowsheets (Taken 11/30/2021 0406)  Oral Nutrition Promotion:   calorie dense foods provided   calorie dense liquids provided  Pain Management Interventions: awakened for pain meds per patient request

## 2021-11-30 NOTE — ASSESSMENT & PLAN NOTE
- Longstanding, uncontrolled; diagnosed with type 2 diabetes mellitus in   - Home regimen:  Metformin 1000 mg twice daily, Trulicity 1.5 mg weekly, and glipizide 10 mg daily  - Last A1c 9.0 on 10/27/2021  - Weight based dosin kg x 0.5 = 62 TDD = 31 basal / 31 prandial  - 1700/TDD = 27 (estimated insulin sensitivity factor)  - 450/TDD = 7 (estimated starting carb ratio for prandial dosing)  - Currently requiring a little more than her weight based dose suggesting significant insulin resistance  - DKA present initially on admission to [facility] resolved    - Continue Levemir 34 units nightly   - Continue aspart 12 units before meals with low dose correction scale  - At discharge, recommend continuing insulin therapy (do not continue glipizide) and resuming metformin 1000 mg twice daily and Trulicity 1.5 mg weekly with close endocrine follow-up outpatient  - Recommend patient check fasting sugar daily with at least 1 pre or post meal (two hour) check daily and bring log to outpatient visit

## 2021-11-30 NOTE — ASSESSMENT & PLAN NOTE
- Resolved, unclear etiology  - Unlikely Diabetes Insipidus  - Awaiting metabolic panel this morning    - If hypernatremia recurs, will need strict intake/output and repeat urine studies to adequately assess

## 2021-11-30 NOTE — ASSESSMENT & PLAN NOTE
ASSESSMENT     Violeta Champion is a 60 y.o. female with a past psychiatric history of depression currently presenting with Brain lesion.  Psychiatry was originally consulted to address the patient's symptoms of agitation.    IMPRESSION  Hyperactive delirium     RECOMMENDATION(S)      1. Scheduled Medication(s):  -Continue Depacon 500mg tid  -Stop all Zyprexa.  Pt far more confused and agitated this week compared to last week.  While zyprexa can be used for delirium, it is also well-known to potentially cause or worsen delirium, largely due to anticholinergic and antihistaminic properties.       2. PRN Medication(s):  PRN Depacon 250mg IV [id number]  Do not use Zyprexa prn     3.  Monitor:  Please obtain daily EKG to monitor QTc.  If pt receives depacon, monitor ammonia and transaminases.    4. Legal Status/Precaution(s):  Patient does not meet criteria for PEC or inpatient psychiatric admission at this time. Recommend to rescind PEC if one was placed. Patient is not currently an imminent danger to self or others and is not gravely disabled due to a psychiatric illness.    5. Capacity:  Pt continues to have waxing and waning of symptoms and continues to refuse treatment at times. Therefore pt currently does NOT have medical decision making capacity due to Delirium. Please contact next of kin for making medical decisions currently.    6. Other:  [facility] ICU - Positive  DELIRIUM BEHAVIOR MANAGEMENT   PLEASE utilize CHEMICAL restraints with PRN meds first for agitation. Minimize use of PHYSICAL restraints   Keep window shades open and room lit during day and room dim at night in order to promote normal sleep-wake cycles   Encourage family at bedside. [place] patient often to situation, location, date   Continue to Limit or Discontinue use of Narcotics, Benzos and Anti-cholinergic medications as they may worsen delirium   Continue medical workup for causative etiology of Delirium

## 2021-11-30 NOTE — PROGRESS NOTES
[facility]  Psychiatry  Progress Note    Patient Name: Violeta Champion  MRN: [id number]   Code Status: Full Code  Admission Date: 11/12/2021  Hospital Length of Stay: 18 days  Expected Discharge Date: 12/1/2021  Attending Physician: Kiley Yusuf MD  Primary Care Provider: Madie Petersen DO    Current Legal Status: Uncontested    Patient information was obtained from patient, relative(s) and ER records.       HPI:   Consultation-Liaison Psychiatry Consult Note    11/21/2021 4:12 PM  Violeta Champion  MRN: [id number]    Chief Complaint / Reason for Consult: agitation     SUBJECTIVE     History of Present Illness:   Violeta Champion is a 60 y.o. female with a past psychiatric history of depression currently presenting with Brain lesion.  Psychiatry was originally consulted to address the patient's symptoms of agitation.    Per Primary MD:  HPI/Interval history (See H&P for complete P,F,SHx) :   Pmhx DM2, hypothyroidism, YUSEF, hx breast ca, fibromyalgia, htn, hld, depression, class 3 obesity admitted as transfer from [place] for concern of elevated ICP 2/2 vasogenic edema of numerous brain abscesses vs masses. Patient recently admitted at [facility] 10/27 for acute encephalopathy, found to have DKA vs HHS and bacteremia. Clinically improved with abx and appropriate blood sugar management until 3 days prior to transfer when she developed worsening confusion and lethargy. MRI 11/13 revealing multiple lesions throughout the bilateral cerebral hemispheres with associated vasogenic edema and leftward midline shift.      HPI from [facility] below:  Miss Champion is a 60 year old female with a PMH of DMII on oral anti-hyperglycemics, hypothyroid on synthroid, YUSEF, Right breast papilloma, fibromyalgia, HTN, HLD and depression presents to [facility] via ambulance after being found down and unresponsive at home. Patient is obtunded with a GCS of 8 and is not able to participate in any history giving. This note is per  "report from neighbor, EMS, ED providers and nurse. Per report Miss Champion' neighbor hadnt seen her for 3 days so they went over to her house to check on her and found her down and unresponsive. EMS was called and brought her into the ED. Upon drawing labwork patient Wbc was 25.8, , anion gap 8, Cr 2.9, blood glucose 805, UDS pos for barbiturates, pH 6.928. Patient is protecting airway although her respirations are labored, Sp02 mid to low 90's and she has YUSEF. A dose of narcan had no response, patient was further treated with bicarb, insulin, IV fluids, IV vanc and zosyn and admitted to the ICU for a higher level of care. Patients sister Darcy was called by staff and myself and asked to be kept updated, phone number in EMR.    Per C-L Psych MD:  Pt awake in bed.  She is noticeably confused and has difficulty finding with word finding.  Pt endorses history of depression.  She has taken medication in the past, but is not currently taking anything recently because "I've been doing well".  Oriented to self and being in the hospital.  She does not recall transfer from [facility] and states that she is in the hospital because of a virus.  Pt preservative on asking for Sprite and sugar free drinks, stating that she can have them. 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       Psychiatric Review Of Systems - Is patient experiencing or having changes in:  Unable to assess due to AMS    Psychiatric History:  Diagnose(s): depression   Previous Medication Trials: Yes   Previous Psychiatric Hospitalizations: No  Previous Suicide Attempts: No  History of Violence: No  Outpatient Psychiatrist: No.  Pt does have an outpatient therapist at [facility] per chart review    Social History:  Marital Status: single  Children: 0   Employment Status: currently employed as nurse at [employer]   Education: college graduate  Special Ed: no   History: no  Housing Status: Lives " alone  Developmental History: No  History of Abuse: unable to assess   Access to Gun:  unable to assess     Substance Abuse History:  Per family at bedside and chart check, no history of drug or alcohol use.  Unable to assess with pt at this time due to AMS    Legal History:  Past Charges/Incarcerations: No  Pending Charges: No    Family Psychiatric History:   No    Psychosocial Factors:  Unable to assess    Collateral:   Darcy Mancuso, Sister, at bedside  Over the past two days, she is less agitated than previously.  She continues to be confused.  Confusion and agitation are worse during the night.  Pt works as a nurse at night, so she is normally awake at night.  Pt at times will try to climb out of the bed, or start pulling at her diaper.  At one point she was smearing feces on things.  Sister agreeable to trying medication as needed for agitation.  She has one other sibling and they are alternating who stays with the pt.  Family is trying to remain at bedside for redirection as much as they are able.      Medical Review Of Systems:  Unable to assess       Hospital Course: 11/22: Pt's sister is at bedside and provides much of the history.  Last night she did experience some agitation and poor sleep at first despite Depacon.  Around 4am she fell asleep and had restful sleep.  Overall she thought they had a better night last night.  This morning when she woke up, she was far less confused.  Patient is laughing and smiling appropriately this morning.  She is participating with physical therapy and appears more alert.     11/23: Pt's sister is at bedside.  Last night she had a rough night.  Pt at times starts pulling at sheets, lays side ways/twisted in the bed, and puts hands in feces.  Sister had to go to another room briefly last night because she was getting agitated with her as well.  Nursing had a difficult time taking care of her last night because she was not following instructions.  Sister went back to the  "room with nursing and was able to help re-direct her.  Around 6pm she requested Depacon.   Sister says that it was delayed coming from the pharmacy and pt did not receive it until 1am.  No updated EKG on file.    Pt reports that she had an okay night.  She mentions getting a DORCAS.  Patient is quite confused today.  She becomes irritable when asking orientation questions.  She identifies that she is at [facility].  When asked what city, she says "[facility]" and repeats that the city is [facility].  She fluctuated between the month being October or November.  When asked if there is a holiday coming up, after much thought and counting months out loud she said multiple times that the holiday coming up is "October".        11/24: Patient is awake in bed and enthusiastically greets MD.  She says that she slept well last night and feels good.  She is able to tell me that she is having a study today to look at her heart (DORCAS).  Per sister at bedside, the patient had less restless behavior and agitation after receiving Depacon yesterday around dinner time.  She was somewhat agitated and restless throughout the night, so sister requested it in the evening.  Depacon was not given until 0422. After she received it, she fell asleep around 5am and slept for several hours.  Last night the sister things she talked more logically than previous nights, although she still remains quite confused.        11/25: Patient's sister at bedside reported patient had a slightly better night last night but was still awake speaking to herself for some of the night and demanding water through the night. Patient is alert to name only today. Affect labile. Perseverates on the word "October." Sister was inquisitive about recommendations for lessening delirium. Discussed sunlight/staying awake during day and darkness/no TV/no sound at night.     11/26: Patient continues to have agitation during the night.  Last night she had a very bad night " "and was yelling during the night.  This morning she has been sleeping.  Sister says that she usually falls asleep in the early morning hours.  Pt says that she never used to sleep much and reports sleeping 4-5 hours nightly for years.  She usually sleeps in the morning since she has a reversed sleep/wake cycle due to working nights.      11/27: Agitated last night. Confused this morning. Received PRN zyprexa 5 mg and PRN depacon 250 for agitation last night.    11/28: Patient was well-behaved overnight. She did not require any PRNs and per daughter, this was the best night she has had in a while. Patient remains disoriented but is pleasant. She states she slept well through the night. No restraints in place and she is cooperative with exam, though she is unable to fully comply due to delirium.    11/29: Pt laying in bed naked with the sheet loosely wrapped over her head like a larios.  Today she misuses words consistently, stating words that sound alike to what she likely means.  She knows that she is on a medication (Zyprexa), but refers to it by a different name starting with a Z.  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  She said the medicine is "for kids that don't behave".  She currently says that she is in a "pediatric obstetric clinic".  She endorses having a headache.  Pt identifies the year as "1981", recent holiday as "1981", and month as "1981".    11/30: No PRN medications overnight.  Pt has been transferred to a room closer to the nurses unit.  However, pt denies any recent room change.  She endorses having a headache.  Pt is unsure how she slept last night.  She reports that she will go to rehab after hospitalization is complete.  Pt mentions "glistening" and "going on to a shower", but it's unclear what she is referring to.          Family History     Problem Relation (Age of Onset)    Amblyopia Father    Breast cancer Paternal " "Aunt, Paternal Grandmother    Cancer Maternal Grandmother    Diabetes Mother, Maternal Aunt, Maternal Uncle    Glaucoma Mother    Hypertension Mother, Brother        Tobacco Use    Smoking status: Never Smoker    Smokeless tobacco: Never Used   Substance and Sexual Activity    Alcohol use: Yes     Alcohol/week: 0.0 standard drinks     Comment: very rarely    Drug use: No    Sexual activity: Not on file     Psychotherapeutics (From admission, onward)            Start     Stop Route Frequency Ordered    11/27/21 1615  OLANZapine tablet 5 mg         -- Oral with dinner 11/27/21 1603    11/26/21 1947  OLANZapine tablet 5 mg         -- Oral 2 times daily PRN 11/26/21 1849           Review of Systems  Objective:     Vital Signs (Most Recent):  Temp: 97.9 °F (36.6 °C) (11/30/21 0524)  Pulse: 89 (11/30/21 0524)  Resp: 18 (11/30/21 0524)  BP: (!) 141/83 (11/30/21 0524)  SpO2: 95 % (11/30/21 0524) Vital Signs (24h Range):  Temp:  [96.1 °F (35.6 °C)-97.9 °F (36.6 °C)] 97.9 °F (36.6 °C)  Pulse:  [89-99] 89  Resp:  [17-18] 18  SpO2:  [95 %-98 %] 95 %  BP: (129-165)/(57-83) 141/83     Height: 5' 4" (162.6 cm)  Weight: 123.8 kg (273 lb)  Body mass index is 46.86 kg/m².      Intake/Output Summary (Last 24 hours) at 11/30/2021 1006  Last data filed at 11/30/2021 0352  Gross per 24 hour   Intake 960 ml   Output --   Net 960 ml     Mental Status Exam:  Appearance: Obese, laying in bed sideways  Behavior/Cooperation: normal, cooperative, restless and fidgety   Speech: normal tone, normal rate, normal pitch, normal volume  Language: fluent english  Mood: fine  Affect: blunted  Thought Process: disorganized, tangential  Thought Content: normal, no suicidality, no homicidality, delusions, or paranoia   Orientation: person, place, year, disoriented to day, date, month  Memory: Impaired to some degree  Attention Span/Concentration: Impaired to some degree  Fund of Knowledge: Estimated to be average level   Insight: limited  Judgment: " fair      Significant Labs: All pertinent labs within the past 24 hours have been reviewed.    Significant Imaging: I have reviewed all pertinent imaging results/findings within the past 24 hours.       Scheduled Medications:   amLODIPine  10 mg Oral Daily    aspirin  81 mg Oral Daily    cefTRIAXone (ROCEPHIN) IVPB  2 g Intravenous Q12H    divalproex  500 mg Oral TID    heparin (porcine)  5,000 Units Subcutaneous Q8H    insulin aspart U-100  12 Units Subcutaneous TIDWM    insulin detemir U-100  34 Units Subcutaneous QHS    levothyroxine  100 mcg Oral Before breakfast    magnesium oxide  400 mg Oral BID    metroNIDAZOLE  500 mg Oral Q8H    miconazole nitrate 2%   Topical (Top) BID    mupirocin   Topical (Top) BID    OLANZapine  5 mg Oral with dinner    potassium chloride  10 mEq Oral BID WM    potassium, sodium phosphates  2 packet Oral Daily with breakfast    vibegron  75 mg Oral Daily       PRN Medications:  acetaminophen, dextrose 50%, glucagon (human recombinant), glucose, glucose, guaiFENesin 100 mg/5 ml, insulin aspart U-100, melatonin, OLANZapine, sodium chloride 0.9%, valproate sodium (DEPACON) IVPB    Review of patient's allergies indicates:   Allergen Reactions    Iodinated contrast media Swelling     Other reaction(s): Swelling    Naproxen sodium Swelling    Naprosyn  [naproxen]      Other reaction(s): Swelling       Assessment/Plan:     Encephalopathy, metabolic  ASSESSMENT     Violeta Champion is a 60 y.o. female with a past psychiatric history of depression currently presenting with Brain lesion.  Psychiatry was originally consulted to address the patient's symptoms of agitation.    IMPRESSION  Hyperactive delirium     RECOMMENDATION(S)      1. Scheduled Medication(s):  -Continue Depacon 500mg tid  -Stop all Zyprexa.  Pt far more confused and agitated this week compared to last week.  While zyprexa can be used for delirium, it is also well-known to potentially cause or worsen delirium,  largely due to anticholinergic and antihistaminic properties.       2. PRN Medication(s):  PRN Depacon 250mg IV [id number]  Do not use Zyprexa prn     3.  Monitor:  Please obtain daily EKG to monitor QTc.  If pt receives depacon, monitor ammonia and transaminases.    4. Legal Status/Precaution(s):  Patient does not meet criteria for PEC or inpatient psychiatric admission at this time. Recommend to rescind PEC if one was placed. Patient is not currently an imminent danger to self or others and is not gravely disabled due to a psychiatric illness.    5. Capacity:  Pt continues to have waxing and waning of symptoms and continues to refuse treatment at times. Therefore pt currently does NOT have medical decision making capacity due to Delirium. Please contact next of kin for making medical decisions currently.    6. Other:  CAM ICU - Positive  DELIRIUM BEHAVIOR MANAGEMENT   PLEASE utilize CHEMICAL restraints with PRN meds first for agitation. Minimize use of PHYSICAL restraints   Keep window shades open and room lit during day and room dim at night in order to promote normal sleep-wake cycles   Encourage family at bedside. [place] patient often to situation, location, date   Continue to Limit or Discontinue use of Narcotics, Benzos and Anti-cholinergic medications as they may worsen delirium   Continue medical workup for causative etiology of Delirium         Need for Continued Hospitalization:  No need for inpatient psychiatric hospitalization. Continue medical care as per the primary team.    Anticipated Disposition:  Per primary team     Total time:  25 with greater than 50% of this time spent in counseling and/or coordination of care.      Psychiatry will sign off.  Please follow recommendations as above and if still requiring assistance, call to discuss further.     Lala Raya MD, PhD  [facility]  -2  11/30/2021

## 2021-11-30 NOTE — PROGRESS NOTES
[facility]  Endocrinology  Progress Note    Admit Date: 11/12/2021     60 year old female with Type 2 Diabetes Mellitus, hypothyroidism, depression, and YUSEF was admitted as transfer from [place] for concern of elevated intracranial pressure due to vasogenic edema from numerous brain lesions thought to be septic emboli in the setting of parvimonas and fusobacterium bacteremia.  Nancy was initially admitted at [place] on 10/27/2021 for acute encephalopathy and found to have DKA and bacteremia. 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 She improved with antibiotics and insulin until 3 days prior to transfer when she developed worsening confusion and lethargy. She had a MRI done on 11/13/2021 revealing multiple lesions throughout the bilateral cerebral hemispheres with associated vasogenic edema and leftward midline shift after which she was transferred to [facility].    Endocrinology consulted for management of type 2 diabetes mellitus, hypothyroidism, and hypernatremia.    Regarding Diabetes Mellitus    Type: Type 2 Diabetes Mellitus  Diabetes diagnosis year: 2010  Home Diabetes Medications:  Metformin 1000 mg twice daily, Trulicity 1.5 mg weekly, and glipizide 10 mg daily  Previous Medications tried:  Januvia and Farxiga  How often checking glucose at home? Very infrequently  Hypoglycemia on the regimen?  No  Diabetes Complications include: DKA and Neuropathy  Complicating diabetes co morbidities:  YUSEF and infection    Regarding Hypothyroidism  - Diagnosed with hypothyroidism approximately 10 years prior to admission  - Treated with 100 mcg levothyroxine daily  - TSH of 1.4 on 11/13/2021    Current symptoms: weight gain, fatigue, constipation, mental fog, memory impairment, recent severe illness  Denies:  Hair loss, brittle nails, cold intolerance, muscle weakness, neck swelling/pressure, hoarseness, periorbital edema, lithium/amiodarone use  No  "family history of thyroid cancer  Last BMD: Normal forearm BMD in 2013 while on fosamax   Previous thyroid studies: none    Regarding Hypernateremia  - Unclear if Diabetes Insipidus given central involvement with brain lesions, resolved  - Urine output normalized after initial polyuria  - Never taken Desmopressin      Interval HPI:   Overnight events:  Patient having some confusion but no acute events overnight  Eatin%  Nausea: No  Hypoglycemia and intervention: No  Fever: No  TPN and/or TF: No  If yes, type of TF/TPN and rate: NA    BP (!) 141/83 (BP Location: Left arm, Patient Position: Lying)   Pulse 89   Temp 97.9 °F (36.6 °C) (Oral)   Resp 18   Ht 5' 4" (1.626 m)   Wt 123.8 kg (273 lb)   LMP  (LMP Unknown)   SpO2 95%   BMI 46.86 kg/m²     Labs Reviewed and Include    No results for input(s): GLU, CALCIUM, ALBUMIN, PROT, NA, K, CO2, CL, BUN, CREATININE, ALKPHOS, ALT, AST, BILITOT in the last 24 hours.  Lab Results   Component Value Date    WBC 5.64 2021    HGB 8.5 (L) 2021    HCT 27.5 (L) 2021    MCV 88 2021     (L) 2021     No results for input(s): TSH, FREET4 in the last 168 hours.  Lab Results   Component Value Date    HGBA1C 9.0 (H) 10/27/2021       Nutritional status:   Body mass index is 46.86 kg/m².  Lab Results   Component Value Date    ALBUMIN 2.1 (L) 2021    ALBUMIN 2.5 (L) 2021    ALBUMIN 2.5 (L) 2021     No results found for: PREALBUMIN    Estimated Creatinine Clearance: 132.8 mL/min (based on SCr of 0.6 mg/dL).    Accu-Checks  Recent Labs     21  1555 21  2135 21  0857 21  1147 21  1703 21  2138 21  1122 21  1556 21  2059 21  0748   POCTGLUCOSE 215* 176* 264* 188* 150* 188* 191* 146* 232* 225*       Current Medications and/or Treatments Impacting Glycemic Control  Immunotherapy:    Immunosuppressants     None        Steroids:   Hormones (From admission, onward)            " Start     Stop Route Frequency Ordered    21 1859  melatonin tablet 6 mg         -- Oral Nightly PRN 21 1759        Pressors:    Autonomic Drugs (From admission, onward)            None        Hyperglycemia/Diabetes Medications:   Antihyperglycemics (From admission, onward)            Start     Stop Route Frequency Ordered    21 2100  insulin detemir U-100 pen 34 Units         -- SubQ Nightly 21 1138    21 1645  insulin aspart U-100 pen 12 Units         -- SubQ 3 times daily with meals 21 1341    21 1440  insulin aspart U-100 pen 0-5 Units         -- SubQ Before meals & nightly PRN 21 1341          ASSESSMENT and PLAN    Uncontrolled type 2 diabetes mellitus with hyperglycemia  - Longstanding, uncontrolled; diagnosed with type 2 diabetes mellitus in   - Home regimen:  Metformin 1000 mg twice daily, Trulicity 1.5 mg weekly, and glipizide 10 mg daily  - Last A1c 9.0 on 10/27/2021  - Weight based dosin kg x 0.5 = 62 TDD = 31 basal / 31 prandial  - 1700/TDD = 27 (estimated insulin sensitivity factor)  - 450/TDD = 7 (estimated starting carb ratio for prandial dosing)  - Currently requiring a little more than her weight based dose suggesting significant insulin resistance  - DKA present initially on admission to [facility] resolved    - Continue Levemir 34 units nightly   - Continue aspart 12 units before meals with low dose correction scale  - At discharge, recommend continuing insulin therapy (do not continue glipizide) and resuming metformin 1000 mg twice daily and Trulicity 1.5 mg weekly with close endocrine follow-up outpatient  - Recommend patient check fasting sugar daily with at least 1 pre or post meal (two hour) check daily and bring log to outpatient visit    Hypothyroidism  - Last TSH 1.4 on 2021    - Continue levothyroxine 100 mcg  - Recheck TFTs in 6 weeks    Hypernatremia  - Resolved, unclear etiology  - Unlikely Diabetes Insipidus  -  Awaiting metabolic panel this morning    - If hypernatremia recurs, will need strict intake/output and repeat urine studies to adequately assess      Cerebral septic emboli  - Management per primary and ID        Cory Jackman,   Endocrinology  [facility]

## 2021-11-30 NOTE — PT/OT/SLP PROGRESS
"Speech Language Pathology Treatment    Patient Name:  Violeta Champion   MRN:  [id number]  Admitting Diagnosis: Cerebrovascular accident (CVA) due to embolism    Recommendations:                 General Recommendations:  Speech/language therapy and Cognitive-linguistic therapy  Diet recommendations:  Regular, Liquid Diet Level: Thin   Aspiration Precautions: Eliminate distractions, Feed only when awake/alert, Frequent oral care, HOB to 90 degrees, Monitor for s/s of aspiration, Remain upright 30 minutes post meal, Small bites/sips and Standard aspiration precautions    General Precautions: Standard, aspiration,fall  Communication strategies:  provide increased time to answer and go to room if call light pushed    Subjective     "Yeah, that is a problem."     Pain/Comfort:  · Pain Rating 1: 0/10  · Pain Rating Post-Intervention 1: 0/10    Respiratory Status:  · O2 Device (Oxygen Therapy): room air    Objective:     Has the patient been evaluated by SLP for swallowing?   Yes  Keep patient NPO? No     Pt seen bedside, mod-max cues to maintain alertness and attention.  She was oriented to self and place IND, month given mod A.  She was not oriented to year or situation despite max cues.  Generalized, disorganized responses elicited in response to simple conversation.  Poor recall of recent events noted. 2 step directions followed with 50% accy given increased time and multiple repetitions.  Confrontation naming completed with 25% accy IND, 50% given max cues.  Perseveration and off topic responses evident.  Pt unable to recall any strategies reviewed by SLP yesterday.  SLP reviewed role of SLP, rationale for therapy, word finding strategies, memory strategies, orientation information, and ongoing SLP POC.  Reinforcement will be required.       Assessment:     Violeta Champion is a 60 y.o. female with an SLP diagnosis of Dysphagia and Cognitive-Linguistic Impairment.      Goals:   Multidisciplinary Problems     SLP Goals     "    Problem: SLP Goal    Goal Priority Disciplines Outcome   SLP Goal     SLP Ongoing, Progressing   Description: Speech Therapy Short Term Goals  Goal expected to be met by 11/25, updated to continue until 12/10  1. Pt will participate in an ongoing assessment to determine the least restrictive and safest diet with possible updated goals to follow pending results.  2. Pt will participate in a speech, language, and cognitive evaluation with possible updated goals to follow pending results. -ongoing  3. Pt will attend to therapy tasks for 1+ minutes given 1 redirection.   4. Pt will answer orientation questions x4 given min cues.   5. Pt will answer general information questions with 75% acc given cues. -met  6. Pt will complete word finding tasks with 75% acc min cues.   7. Pt will follow 2 step directions with 75% acc given 1 repetition.   8. Pt will answer problem solving/reasoning questions with 75% acc no cues.                      Plan:     · Patient to be seen:  4 x/week   · Plan of Care expires:  12/11/21  · Plan of Care reviewed with:  patient   · SLP Follow-Up:  Yes       Discharge recommendations:  rehabilitation facility   Barriers to Discharge:  Level of Skilled Assistance Needed      Time Tracking:     SLP Treatment Date:   11/30/21  Speech Start Time:  0720  Speech Stop Time:  0737     Speech Total Time (min):  17 min    Billable Minutes: Speech Therapy Individual 9 and Self Care/Home Management Training 8    11/30/2021

## 2021-11-30 NOTE — SUBJECTIVE & OBJECTIVE
"Interval HPI:   Overnight events:  Patient having some confusion but no acute events overnight  Eatin%  Nausea: No  Hypoglycemia and intervention: No  Fever: No  TPN and/or TF: No  If yes, type of TF/TPN and rate: NA    BP (!) 141/83 (BP Location: Left arm, Patient Position: Lying)   Pulse 89   Temp 97.9 °F (36.6 °C) (Oral)   Resp 18   Ht 5' 4" (1.626 m)   Wt 123.8 kg (273 lb)   LMP  (LMP Unknown)   SpO2 95%   BMI 46.86 kg/m²     Labs Reviewed and Include    No results for input(s): GLU, CALCIUM, ALBUMIN, PROT, NA, K, CO2, CL, BUN, CREATININE, ALKPHOS, ALT, AST, BILITOT in the last 24 hours.  Lab Results   Component Value Date    WBC 5.64 2021    HGB 8.5 (L) 2021    HCT 27.5 (L) 2021    MCV 88 2021     (L) 2021     No results for input(s): TSH, FREET4 in the last 168 hours.  Lab Results   Component Value Date    HGBA1C 9.0 (H) 10/27/2021       Nutritional status:   Body mass index is 46.86 kg/m².  Lab Results   Component Value Date    ALBUMIN 2.1 (L) 2021    ALBUMIN 2.5 (L) 2021    ALBUMIN 2.5 (L) 2021     No results found for: PREALBUMIN    Estimated Creatinine Clearance: 132.8 mL/min (based on SCr of 0.6 mg/dL).    Accu-Checks  Recent Labs     21  1555 21  2135 21  0857 21  1147 21  1703 21  2138 21  1122 21  1556 21  2059 21  0748   POCTGLUCOSE 215* 176* 264* 188* 150* 188* 191* 146* 232* 225*       Current Medications and/or Treatments Impacting Glycemic Control  Immunotherapy:    Immunosuppressants     None        Steroids:   Hormones (From admission, onward)            Start     Stop Route Frequency Ordered    21 1859  melatonin tablet 6 mg         -- Oral Nightly PRN 21 3250        Pressors:    Autonomic Drugs (From admission, onward)            None        Hyperglycemia/Diabetes Medications:   Antihyperglycemics (From admission, onward)            Start     Stop Route " Frequency Ordered    11/28/21 2100  insulin detemir U-100 pen 34 Units         -- SubQ Nightly 11/28/21 1138    11/26/21 1645  insulin aspart U-100 pen 12 Units         -- SubQ 3 times daily with meals 11/26/21 1341    11/26/21 1440  insulin aspart U-100 pen 0-5 Units         -- SubQ Before meals & nightly PRN 11/26/21 1341

## 2021-12-01 VITALS
WEIGHT: 273 LBS | SYSTOLIC BLOOD PRESSURE: 161 MMHG | HEIGHT: 64 IN | TEMPERATURE: 99 F | OXYGEN SATURATION: 97 % | DIASTOLIC BLOOD PRESSURE: 88 MMHG | RESPIRATION RATE: 18 BRPM | BODY MASS INDEX: 46.61 KG/M2 | HEART RATE: 103 BPM

## 2021-12-01 LAB — POCT GLUCOSE: 333 MG/DL (ref 70–110)

## 2021-12-01 PROCEDURE — 25000003 PHARM REV CODE 250: Performed by: PHYSICIAN ASSISTANT

## 2021-12-01 PROCEDURE — 25000003 PHARM REV CODE 250: Performed by: INTERNAL MEDICINE

## 2021-12-01 PROCEDURE — 63600175 PHARM REV CODE 636 W HCPCS: Performed by: PHYSICIAN ASSISTANT

## 2021-12-01 PROCEDURE — 99239 PR HOSPITAL DISCHARGE DAY,>30 MIN: ICD-10-PCS | Mod: ,,, | Performed by: INTERNAL MEDICINE

## 2021-12-01 PROCEDURE — 99239 HOSP IP/OBS DSCHRG MGMT >30: CPT | Mod: ,,, | Performed by: INTERNAL MEDICINE

## 2021-12-01 RX ORDER — INSULIN ASPART 100 [IU]/ML
1-10 INJECTION, SOLUTION INTRAVENOUS; SUBCUTANEOUS
Status: DISCONTINUED | OUTPATIENT
Start: 2021-12-01 | End: 2021-12-01 | Stop reason: HOSPADM

## 2021-12-01 RX ORDER — INSULIN ASPART 100 [IU]/ML
14 INJECTION, SOLUTION INTRAVENOUS; SUBCUTANEOUS
Status: DISCONTINUED | OUTPATIENT
Start: 2021-12-01 | End: 2021-12-01 | Stop reason: HOSPADM

## 2021-12-01 RX ADMIN — HEPARIN SODIUM 5000 UNITS: 5000 INJECTION INTRAVENOUS; SUBCUTANEOUS at 04:12

## 2021-12-01 RX ADMIN — OLANZAPINE 5 MG: 2.5 TABLET, FILM COATED ORAL at 03:12

## 2021-12-01 RX ADMIN — CEFTRIAXONE SODIUM 2 G: 2 INJECTION, SOLUTION INTRAVENOUS at 03:12

## 2021-12-01 RX ADMIN — LEVOTHYROXINE SODIUM 100 MCG: 100 TABLET ORAL at 04:12

## 2021-12-01 RX ADMIN — INSULIN ASPART 12 UNITS: 100 INJECTION, SOLUTION INTRAVENOUS; SUBCUTANEOUS at 07:12

## 2021-12-01 RX ADMIN — ACETAMINOPHEN 650 MG: 325 TABLET ORAL at 04:12

## 2021-12-01 RX ADMIN — METRONIDAZOLE 500 MG: 500 TABLET ORAL at 04:12

## 2021-12-01 RX ADMIN — INSULIN ASPART 4 UNITS: 100 INJECTION, SOLUTION INTRAVENOUS; SUBCUTANEOUS at 07:12

## 2021-12-01 NOTE — ASSESSMENT & PLAN NOTE
- Longstanding, uncontrolled; diagnosed with type 2 diabetes mellitus in   - Home regimen:  Metformin 1000 mg twice daily, Trulicity 1.5 mg weekly, and glipizide 10 mg daily  - Last A1c 9.0 on 10/27/2021  - Weight based dosin kg x 0.5 = 62 TDD = 31 basal / 31 prandial  - 1700/TDD = 27 (estimated insulin sensitivity factor)  - 450/TDD = 7 (estimated starting carb ratio for prandial dosing)  - Currently requiring a little more than her weight based dose suggesting significant insulin resistance  - DKA present initially on admission to [facility]"" resolved    - Increase Levemir 34 to 36 units nightly   - Increase aspart 12 to 15 units before meals; increase low to moderate dose correction scale  - At discharge, recommend continuing insulin therapy (do not continue glipizide) and resuming metformin 1000 mg twice daily and Trulicity 1.5 mg weekly with close endocrine follow-up outpatient  - Recommend patient check fasting sugar daily with at least 1 pre or post meal (two hour) check daily and bring log to outpatient visit

## 2021-12-01 NOTE — NURSING
Pt discharged via EMS at 0815. Pt was cleaned up and dressed before leaving. All vitals stable, pt left with right double lumen PICC in place for IV abx. All belongings were packed and placed with patient. Discharge papers were printed and discussed with patient, patient did not have any questions at this time. Report was called to Mireille from rehab facility, all questions were answered.

## 2021-12-01 NOTE — PLAN OF CARE
[facility]  Discharge Final Note    Primary Care Provider: Madie Petersen DO  Expected Discharge Date: 12/1/2021  Patient medically ready for discharge to [facility] re[facility] today.  Nurse can call report to [phone].  Transportation via ambulance scheduled for 8 am today per [facility] (approved by  supervisor Michi).   Is family/patient aware of discharge yes , pt/family were notified.   sent COVID test, recent vitals, and orders to facility via [facility].    Final Discharge Note (most recent)     Final Note - 12/01/21 0755        Final Note    Assessment Type Final Discharge Note     Anticipated Discharge Disposition Rehab Facility     What phone number can be called within the next 1-3 days to see how you are doing after discharge? [phone]     Hospital Resources/Appts/Education Provided Provided patient/caregiver with written discharge plan information;Provided education on problems/symptoms using teachback        Post-Acute Status    Post-Acute Authorization Placement     Post-Acute Placement Status Set-up Complete/Auth obtained     Discharge Delays None known at this time               Important Message from Medicare         Referral Info (most recent)     Referral Info    No documentation.                 Future Appointments   Date Time Provider Department Center   12/7/2021 11:00 AM Earnest Kinsey MD [facility]   12/27/2021  1:00 PM Usha Ward MD [facility]  Case Management  Ext: [phone]  12/01/2021

## 2021-12-01 NOTE — DISCHARGE SUMMARY
Discharge Summary  Hospital Medicine    Attending Provider on Discharge: Kiley Yusuf MD  [facility] Medicine Team: [facility] MED D  Date of Admission:  11/12/2021     Date of Discharge:  12/1/2021  Length of Stay:  LOS: 19 days   Code status: Full Code    Active Hospital Problems    Diagnosis  POA    *Cerebrovascular accident (CVA) due to embolism [I63.9]  Yes    Cerebral septic emboli [I76, I66.9]  Yes    Hypernatremia [E87.0]  Yes    BMI 45.0-49.9, adult [Z68.42]  Not Applicable    Discharge planning issues [Z02.9]  Not Applicable    Vasogenic brain edema [G93.6]  Yes    Brain compression [G93.5]  Yes    Encephalopathy, metabolic [G93.41]  Yes    Hypertension associated with diabetes [E11.59, I15.2]  Yes    Uncontrolled type 2 diabetes mellitus with hyperglycemia [E11.65]  Yes    Hypothyroidism [E03.9]  Yes      Resolved Hospital Problems    Diagnosis Date Resolved POA    Brain lesion [G93.9] 11/28/2021 Yes    Diabetes insipidus [E23.2] 11/28/2021 Yes    Morbid obesity with BMI of 60.0-69.9, adult [E66.01, Z68.44] 11/15/2021 Not Applicable     HPI  History of Present Illness: 60 year old female with Pmhx DM2, hypothyroidism, YUSEF, hx breast ca, fibromyalgia, htn, hld, depression, class 3 obesity admitted as transfer from [facility] for concern of elevated ICP 2/2 vasogenic edema of numerous brain abscesses vs masses. Patient recently admitted at [facility] 10/27 for acute encephalopathy, found to have DKA vs HHS and bacteremia. Clinically improved with abx and appropriate blood sugar management until 3 days prior to transfer when she developed worsening confusion and lethargy. MRI 11/13 revealing multiple lesions throughout the bilateral cerebral hemispheres with associated vasogenic edema and leftward midline shift.      HPI from [facility] below:  Miss Champion is a 60 year old female with a PMH of DMII on oral anti-hyperglycemics, hypothyroid on synthroid, YUSEF, Right breast papilloma, fibromyalgia, HTN, HLD and  depression presents to [facility] via ambulance after being found down and unresponsive at home. Patient is obtunded with a GCS of 8 and is not able to participate in any history giving. This note is per report from neighbor, EMS, ED providers and nurse. Per report Miss Champion' neighbor hadnt seen her for 3 days so they went over to her house to check on her and found her down and unresponsive. EMS was called and brought her into the ED. Upon drawing labwork patient Wbc was 25.8, , anion gap 8, Cr 2.9, blood glucose 805, UDS pos for barbiturates, pH 6.928. Patient is protecting airway although her respirations are labored, Sp02 mid to low 90's and she has YUSEF. A dose of narcan had no response, patient was further treated with bicarb, insulin, IV fluids, IV vanc and zosyn and admitted to the ICU for a higher level of care. Patients sister Darcy was called by staff and myself and asked to be kept updated, phone number in EMR.    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 Neurosurgery saw patient and recommended non-operative care. She was intubated for airway protection and extubated 10/30/2021. ID consulted and recommended 6 weeks IV antibiotics including Ceftriaxone 2g IV Q12H + Metronidazole 500mg PO Q8H. With last day 12/26/21. DORCAS negative for vegetations.      Metabolic encephalopathy  Acute agitation  Waxing and waning. Briefly required restraints at one point. Psychiatry assisting in medication management. No issues with agitation, but frequently tries to get out of bed. Psychiatry consulted for medication management. Given seroquel but discontinued because EKG Qtc>500, but subsequent QTc normal despite still on seroquel. Managed with depakote 500 mg TID and Zyprexa 5 mg qhs. Stopped scheduled zyprexa. Progressively improving during stay. Zyprexa 5 mg  BID prn. Lab draws later in morning. Delirium precautions. SLP saw patient during stay.      Hypernatremia - Unsure of cause, likely due to free water losses in setting of decreased intake and recent hyperosmolar hyperglycemia syndrome. Improved with hypotonic IV infusions and ad alexi liquid oral intake.     Anemia of acute infection  Normal iron levels  Treat infection  Stop daily lab draws     Hypothyroidism  Continue levothyoxine 100 mcg daily     Essential HTN - continue amlodipine 10 mg daily     hypomagnesium  Hypokalemia  Hypophosphorus  Replace    pseudohypolacemia  Resolved with replacement of mangesium      Debility  Cognitive impariement  ST/PT/OT    RANCHO, likely pre-renal  Baseline Scr 0.9. Elen to 1.3 and resolved with IVFs     DM II  A1c 9%  Endocrine consulted and assisted in management of insulins.   Recommended at discharge: Levemir 36 units nightly, aspart 15 units subq before meals, moderate dose correction scale, trulicity 1.5 mg weekly and metforrmin 1g BID     Diet:  consistent carbs  GI PPx: not needed  DVT PPx:  heparin  Airways: room air  Wounds: none     Goals of Care:  Return to prior functional status        Consultants: neurocritical care, neurosurgery, psychiatry, and endocrine    Discharge Medication List as of 12/1/2021  8:12 AM      START taking these medications    Details   alteplase (CATHFLO ACTIVASE) 2 mg injection 2 mg by Intra-Catheter route as needed (line care)., Starting Tue 11/30/2021, Until Tue 1/11/2022 at 2359, No Print      cefTRIAXone (ROCEPHIN) 2 g/50 mL PgBk IVPB Inject 50 mLs (2 g total) into the vein every 12 (twelve) hours. Stop 12/26/2021 for 26 days, Starting Tue 11/30/2021, Until Sun 12/26/2021, No Print      divalproex (DEPAKOTE) 500 MG TbEC Take 1 tablet (500 mg total) by mouth 3 (three) times daily., Starting Tue 11/30/2021, Until Wed 11/30/2022, Normal      insulin aspart U-100 (NOVOLOG) 100 unit/mL (3 mL) InPn pen Inject 12 Units into the skin 3 (three)  times daily., Starting Tue 11/30/2021, Until Wed 11/30/2022, No Print      insulin detemir U-100 (LEVEMIR FLEXTOUCH) 100 unit/mL (3 mL) SubQ InPn pen Inject 34 Units into the skin every evening., Starting Tue 11/30/2021, Until Wed 11/30/2022, No Print      magnesium oxide (MAG-OX) 400 mg (241.3 mg magnesium) tablet Take 1 tablet (400 mg total) by mouth 2 (two) times daily., Starting Tue 11/30/2021, No Print      metroNIDAZOLE (FLAGYL) 500 MG tablet Take 1 tablet (500 mg total) by mouth every 8 (eight) hours. Stop 12/26/2021 for 26 days, Starting Tue 11/30/2021, Until Sun 12/26/2021, Normal      miconazole nitrate 2% (MICOTIN) 2 % Oint Apply topically 2 (two) times daily. Apply to groin, perineum, buttocks, Starting Tue 11/30/2021, No Print      olanzapine zydis (ZYPREXA ZYDIS) 5 MG TbDL Take 2 tablets (10 mg total) by mouth 2 (two) times daily as needed (non-directable adjustation)., Starting Tue 11/30/2021, Until Wed 11/30/2022 at 2359, Normal      potassium, sodium phosphates (PHOS-NAK) 280-160-250 mg PwPk Take 2 packets by mouth daily with breakfast., Starting Wed 12/1/2021, No Print         CONTINUE these medications which have CHANGED    Details   amLODIPine (NORVASC) 10 MG tablet Take 1 tablet (10 mg total) by mouth once daily., Starting Wed 12/1/2021, Until u 12/1/2022, Normal      butalbital-acetaminophen-caffeine -40 mg (FIORICET, ESGIC) -40 mg per tablet Take 1 tablet by mouth every 6 (six) hours as needed for Headaches., Starting Tue 11/30/2021, No Print         CONTINUE these medications which have NOT CHANGED    Details   albuterol (VENTOLIN HFA) 90 mcg/actuation inhaler INHALE ONE TO TWO PUFFS INTO LUNGS EVERY 6 HOURS AS NEEDED FOR WHEEZING, Normal      aspirin 81 mg Tab Take by mouth. 1 Tablet Oral Every day, Until Discontinued, Historical Med      blood sugar diagnostic (FREESTYLE INSULINX TEST STRIPS) Strp Check glucose three times daily, Normal      ezetimibe (ZETIA) 10 mg tablet TAKE  1 TABLET BY MOUTH EVERY DAY IN THE EVENING, Normal      fish oil-dha-epa 1,200-144-216 mg Cap 1 Capsule Oral Every day, Normal      fluticasone-salmeterol diskus inhaler 250-50 mcg Inhale 1 puff into the lungs 2 (two) times daily. Controller, Starting Mon 3/23/2020, Normal      levothyroxine (SYNTHROID) 100 MCG tablet TAKE 1 TABLET DAILY, Normal      metFORMIN (GLUCOPHAGE) 1000 MG tablet TAKE 1 TABLET BY MOUTH TWICE A DAY WITH MEALS, Normal      metoprolol tartrate (LOPRESSOR) 100 MG tablet TAKE 1 TABLET BY MOUTH TWICE A DAY, Normal      mv-min-FA-Oe-Jg-tvwqtlr-lutein 0.4-162-18 mg Tab Take by mouth. 1 Tablet Oral Every day, Historical Med      potassium chloride SA (K-DUR,KLOR-CON) 20 MEQ tablet TAKE 1 TABLET BY MOUTH TWICE A DAY, Normal      TRULICITY 1.5 mg/0.5 mL pen injector INJECT 1 PEN EVERY WEEK, Normal         STOP taking these medications       calcium carbonate (TUMS) 300 mg (750 mg) Chew Comments:   Reason for Stopping:         diclofenac sodium (VOLTAREN) 1 % Gel Comments:   Reason for Stopping:         FARXIGA 10 mg tablet Comments:   Reason for Stopping:         glipiZIDE (GLUCOTROL) 10 MG TR24 Comments:   Reason for Stopping:         GUAIATUSSIN AC  mg/5 mL syrup Comments:   Reason for Stopping:         HYDROcodone-acetaminophen (NORCO)  mg per tablet Comments:   Reason for Stopping:         hyoscyamine (LEVSIN/SL) 0.125 mg Subl Comments:   Reason for Stopping:         ibuprofen (ADVIL,MOTRIN) 800 MG tablet Comments:   Reason for Stopping:         ipratropium (ATROVENT) 21 mcg (0.03 %) nasal spray Comments:   Reason for Stopping:         JARDIANCE 10 mg tablet Comments:   Reason for Stopping:         lactulose (CHRONULAC) 10 gram/15 mL solution Comments:   Reason for Stopping:         LORazepam (ATIVAN) 0.5 MG tablet Comments:   Reason for Stopping:         meclizine (ANTIVERT) 25 mg tablet Comments:   Reason for Stopping:         NARCAN 4 mg/actuation Spry Comments:   Reason for  Stopping:         sucralfate (CARAFATE) 1 gram tablet Comments:   Reason for Stopping:         temazepam (RESTORIL) 30 mg capsule Comments:   Reason for Stopping:         tiZANidine (ZANAFLEX) 4 MG tablet Comments:   Reason for Stopping:         vibegron 75 mg Tab Comments:   Reason for Stopping:               Discharge Diet:diabetic diet: 2000 calorie with Normal Fluid intake of 1500 - 2000 mL per day    Activity: activity as tolerated    Discharge Condition: Good    Disposition: Home or Self Care    Tests pending at the time of discharge: none      Time spent  on the discharge of the patient including review of hospital course with the patient. reviewing discharge medications and arranging follow-up care 35 min    Discharge examination Patient was seen and examined on the date of discharge and determined to be suitable for discharge.    Discharge plan     Future Appointments   Date Time Provider Department Center   12/7/2021 11:00 AM Earnest Kinsey MD [facility]   12/27/2021  1:00 PM Usha Ward MD [facility]

## 2021-12-01 NOTE — PROGRESS NOTES
[facility]  Endocrinology  Progress Note    Admit Date: 11/12/2021     60 year old female with Type 2 Diabetes Mellitus, hypothyroidism, depression, and YUSEF was admitted as transfer from [place] for concern of elevated intracranial pressure due to vasogenic edema from numerous brain lesions thought to be septic emboli in the setting of parvimonas and fusobacterium bacteremia.  Nancy was initially admitted at [place] on 10/27/2021 for acute encephalopathy and found to have DKA and bacteremia. 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 She improved with antibiotics and insulin until 3 days prior to transfer when she developed worsening confusion and lethargy. She had a MRI done on 11/13/2021 revealing multiple lesions throughout the bilateral cerebral hemispheres with associated vasogenic edema and leftward midline shift after which she was transferred to [facility].    Endocrinology consulted for management of type 2 diabetes mellitus, hypothyroidism, and hypernatremia.    Regarding Diabetes Mellitus    Type: Type 2 Diabetes Mellitus  Diabetes diagnosis year: 2010  Home Diabetes Medications:  Metformin 1000 mg twice daily, Trulicity 1.5 mg weekly, and glipizide 10 mg daily  Previous Medications tried:  Januvia and Farxiga  How often checking glucose at home? Very infrequently  Hypoglycemia on the regimen?  No  Diabetes Complications include: DKA and Neuropathy  Complicating diabetes co morbidities:  YUSEF and infection    Regarding Hypothyroidism  - Diagnosed with hypothyroidism approximately 10 years prior to admission  - Treated with 100 mcg levothyroxine daily  - TSH of 1.4 on 11/13/2021    Current symptoms: weight gain, fatigue, constipation, mental fog, memory impairment, recent severe illness  Denies:  Hair loss, brittle nails, cold intolerance, muscle weakness, neck swelling/pressure, hoarseness, periorbital edema, lithium/amiodarone use  No  "family history of thyroid cancer  Last BMD: Normal forearm BMD in 2013 while on fosamax   Previous thyroid studies: none    Regarding Hypernateremia  - Unclear if Diabetes Insipidus given central involvement with brain lesions, resolved  - Urine output normalized after initial polyuria  - Never taken Desmopressin      Interval HPI:   Overnight events: Hyperglycemia, otherwise NAEON  Eatin%  Nausea: No  Hypoglycemia and intervention: No  Fever: No  TPN and/or TF: No  If yes, type of TF/TPN and rate: NA    BP (!) 161/88 (BP Location: Right leg, Patient Position: Lying)   Pulse 103   Temp 99 °F (37.2 °C) (Oral)   Resp 18   Ht 5' 4" (1.626 m)   Wt 123.8 kg (273 lb)   LMP  (LMP Unknown)   SpO2 97%   BMI 46.86 kg/m²     Labs Reviewed and Include    Recent Labs   Lab 21  1325   *   CALCIUM 8.4*   ALBUMIN 2.6*   *   K 3.4*   CO2 24      BUN 4*   CREATININE 0.8     Lab Results   Component Value Date    WBC 5.64 2021    HGB 8.5 (L) 2021    HCT 27.5 (L) 2021    MCV 88 2021     (L) 2021     No results for input(s): TSH, FREET4 in the last 168 hours.  Lab Results   Component Value Date    HGBA1C 9.0 (H) 10/27/2021       Nutritional status:   Body mass index is 46.86 kg/m².  Lab Results   Component Value Date    ALBUMIN 2.6 (L) 2021    ALBUMIN 2.1 (L) 2021    ALBUMIN 2.5 (L) 2021     No results found for: PREALBUMIN    Estimated Creatinine Clearance: 99.6 mL/min (based on SCr of 0.8 mg/dL).    Accu-Checks  Recent Labs     21  1703 21  2138 21  1122 21  1556 21  2059 21  0748 21  1142 21  1727 21  2020 21  0733   POCTGLUCOSE 150* 188* 191* 146* 232* 225* 285* 311* 295* 333*       Current Medications and/or Treatments Impacting Glycemic Control  Immunotherapy:    Immunosuppressants     None        Steroids:   Hormones (From admission, onward)            Start     Stop Route " Frequency Ordered    21 1859  melatonin tablet 6 mg         -- Oral Nightly PRN 21 1759        Pressors:    Autonomic Drugs (From admission, onward)            None        Hyperglycemia/Diabetes Medications:   Antihyperglycemics (From admission, onward)            Start     Stop Route Frequency Ordered    21 2100  insulin detemir U-100 pen 36 Units         -- SubQ Nightly 21 0750    21 1130  insulin aspart U-100 pen 14 Units         -- SubQ 3 times daily with meals 21 0750    21 0848  insulin aspart U-100 pen 1-10 Units         -- SubQ Before meals & nightly PRN 21 0749          ASSESSMENT and PLAN    Uncontrolled type 2 diabetes mellitus with hyperglycemia  - Longstanding, uncontrolled; diagnosed with type 2 diabetes mellitus in   - Home regimen:  Metformin 1000 mg twice daily, Trulicity 1.5 mg weekly, and glipizide 10 mg daily  - Last A1c 9.0 on 10/27/2021  - Weight based dosin kg x 0.5 = 62 TDD = 31 basal / 31 prandial  - 1700/TDD = 27 (estimated insulin sensitivity factor)  - 450/TDD = 7 (estimated starting carb ratio for prandial dosing)  - Currently requiring a little more than her weight based dose suggesting significant insulin resistance  - DKA present initially on admission to hospitals resolved    - Increase Levemir 34 to 36 units nightly   - Increase aspart 12 to 15 units before meals; increase low to moderate dose correction scale  - At discharge, recommend continuing insulin therapy (do not continue glipizide) and resuming metformin 1000 mg twice daily and Trulicity 1.5 mg weekly with close endocrine follow-up outpatient  - Recommend patient check fasting sugar daily with at least 1 pre or post meal (two hour) check daily and bring log to outpatient visit    Hypothyroidism  - Last TSH 1.4 on 2021    - Continue levothyroxine 100 mcg  - Recheck TFTs in 6 weeks    Hypernatremia  - Resolved, unclear etiology  - Unlikely Diabetes  Insipidus  - Awaiting metabolic panel this morning    - If hypernatremia recurs, will need strict intake/output and repeat urine studies to adequately assess      Cerebral septic emboli  - Management per primary and ID        Cory Jackman,   Endocrinology  [facility]

## 2021-12-01 NOTE — NURSING
POC reviewed with pt and pt verbalized understanding. Questions/Concerns addressed. A&Ox3. Confused @ times requires frequent redirection. 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 NADN. Respirations equal and unlabored. Took night medications swallows without difficulty. Incontinent of B/B has on perwick external female catheter intact per facility protocol to suction no complications skin intact. Bedbound turn Q 2 hours to prevent pressure injuries. Bed in low position, side rails up x3. Call bell in reach. Will continue to monitor closely throughout this [id number] shift Safety maintained. Bed alarm activated for safety. Safety/Fall precautions in place per facility protocol for safety. Instructed pt to press call bell for assistance if needed pt verbalized understanding. Telesitter in progress for safety/fall precautions.

## 2021-12-01 NOTE — SUBJECTIVE & OBJECTIVE
"Interval HPI:   Overnight events: Hyperglycemia, otherwise NAEON  Eatin%  Nausea: No  Hypoglycemia and intervention: No  Fever: No  TPN and/or TF: No  If yes, type of TF/TPN and rate: NA    BP (!) 161/88 (BP Location: Right leg, Patient Position: Lying)   Pulse 103   Temp 99 °F (37.2 °C) (Oral)   Resp 18   Ht 5' 4" (1.626 m)   Wt 123.8 kg (273 lb)   LMP  (LMP Unknown)   SpO2 97%   BMI 46.86 kg/m²     Labs Reviewed and Include    Recent Labs   Lab 21  1325   *   CALCIUM 8.4*   ALBUMIN 2.6*   *   K 3.4*   CO2 24      BUN 4*   CREATININE 0.8     Lab Results   Component Value Date    WBC 5.64 2021    HGB 8.5 (L) 2021    HCT 27.5 (L) 2021    MCV 88 2021     (L) 2021     No results for input(s): TSH, FREET4 in the last 168 hours.  Lab Results   Component Value Date    HGBA1C 9.0 (H) 10/27/2021       Nutritional status:   Body mass index is 46.86 kg/m².  Lab Results   Component Value Date    ALBUMIN 2.6 (L) 2021    ALBUMIN 2.1 (L) 2021    ALBUMIN 2.5 (L) 2021     No results found for: PREALBUMIN    Estimated Creatinine Clearance: 99.6 mL/min (based on SCr of 0.8 mg/dL).    Accu-Checks  Recent Labs     21  1703 21  2138 21  1122 21  1556 21  2059 21  0748 21  1142 21  1727 21  2020 21  0733   POCTGLUCOSE 150* 188* 191* 146* 232* 225* 285* 311* 295* 333*       Current Medications and/or Treatments Impacting Glycemic Control  Immunotherapy:    Immunosuppressants     None        Steroids:   Hormones (From admission, onward)            Start     Stop Route Frequency Ordered    21 1859  melatonin tablet 6 mg         -- Oral Nightly PRN 21 0635        Pressors:    Autonomic Drugs (From admission, onward)            None        Hyperglycemia/Diabetes Medications:   Antihyperglycemics (From admission, onward)            Start     Stop Route Frequency Ordered    " 12/01/21 2100  insulin detemir U-100 pen 36 Units         -- SubQ Nightly 12/01/21 0750    12/01/21 1130  insulin aspart U-100 pen 14 Units         -- SubQ 3 times daily with meals 12/01/21 0750    12/01/21 0848  insulin aspart U-100 pen 1-10 Units         -- SubQ Before meals & nightly PRN 12/01/21 0720

## 2021-12-01 NOTE — NURSING
Pt repetitively moving feet back and fourth in bed prompting telesitter to contact this writer. Pt +agitated. Not  Easily redirected. Pt verbalized that she is frustrated because the current bed she is in is not comfortable for her size. Administered PRN Zyprexa see MAR for agitation. NADN. Safety maintained. Bed alarm activated. Pt is pending discharge later this morning to rehab.

## 2021-12-01 NOTE — PLAN OF CARE
Problem: Adult Inpatient Plan of Care  Goal: Plan of Care Review  Outcome: Adequate for Care Transition  Goal: Patient-Specific Goal (Individualization)  Outcome: Adequate for Care Transition  Goal: Absence of Hospital-Acquired Illness or Injury  Outcome: Adequate for Care Transition  Goal: Optimal Comfort and Wellbeing  Outcome: Adequate for Care Transition  Goal: Readiness for Transition of Care  Outcome: Adequate for Care Transition  Goal: Rounds/Family Conference  Outcome: Adequate for Care Transition     Problem: Diabetes Comorbidity  Goal: Blood Glucose Level Within Desired Range  Outcome: Adequate for Care Transition     Problem: Adjustment to Illness (Sepsis/Septic Shock)  Goal: Optimal Coping  Outcome: Adequate for Care Transition     Problem: Bleeding (Sepsis/Septic Shock)  Goal: Absence of Bleeding  Outcome: Adequate for Care Transition     Problem: Glycemic Control Impaired (Sepsis/Septic Shock)  Goal: Blood Glucose Level Within Desired Range  Outcome: Adequate for Care Transition     Problem: Hemodynamic Instability (Sepsis/Septic Shock)  Goal: Effective Tissue Perfusion  Outcome: Adequate for Care Transition     Problem: Infection (Sepsis/Septic Shock)  Goal: Absence of Infection Signs/Symptoms  Outcome: Adequate for Care Transition     Problem: Nutrition Impaired (Sepsis/Septic Shock)  Goal: Optimal Nutrition Intake  Outcome: Adequate for Care Transition     Problem: Electrolyte Imbalance (Acute Kidney Injury/Impairment)  Goal: Serum Electrolyte Balance  Outcome: Adequate for Care Transition     Problem: Fluid Imbalance (Acute Kidney Injury/Impairment)  Goal: Optimal Fluid Balance  Outcome: Adequate for Care Transition     Problem: Hematologic Alteration (Acute Kidney Injury/Impairment)  Goal: Hemoglobin, Hematocrit and Platelets Within Normal Range  Outcome: Adequate for Care Transition     Problem: Oral Intake Inadequate (Acute Kidney Injury/Impairment)  Goal: Optimal Nutrition Intake  Outcome:  Adequate for Care Transition     Problem: Renal Function Impairment (Acute Kidney Injury/Impairment)  Goal: Effective Renal Function  Outcome: Adequate for Care Transition     Problem: Bariatric Environmental Safety  Goal: Safety Maintained with Care  Outcome: Adequate for Care Transition     Problem: Infection  Goal: Infection Symptom Resolution  Outcome: Adequate for Care Transition     Problem: Wound  Goal: Optimal Wound Healing  Outcome: Adequate for Care Transition     Problem: Adjustment to Illness (Stroke, Hemorrhagic)  Goal: Optimal Coping  Outcome: Adequate for Care Transition     Problem: Bowel Elimination Impaired (Stroke, Hemorrhagic)  Goal: Effective Bowel Elimination  Outcome: Adequate for Care Transition     Problem: Cerebral Tissue Perfusion Risk (Stroke, Hemorrhagic)  Goal: Optimal Cerebral Tissue Perfusion  Outcome: Adequate for Care Transition     Problem: Communication Impairment (Stroke, Hemorrhagic)  Goal: Improved Communication Skills and Cognitive Function  Outcome: Adequate for Care Transition     Problem: Eating/Swallowing Impairment (Stroke, Hemorrhagic)  Goal: Oral Intake without Aspiration  Outcome: Adequate for Care Transition     Problem: Functional Ability Impaired (Stroke, Hemorrhagic)  Goal: Optimal Functional Ability  Outcome: Adequate for Care Transition     Problem: Hemodynamic Instability (Stroke, Hemorrhagic)  Goal: Vital Signs Remain in Desired Range  Outcome: Adequate for Care Transition     Problem: Pain (Stroke, Hemorrhagic)  Goal: Acceptable Pain Control  Outcome: Adequate for Care Transition     Problem: Sensorimotor Impairment (Stroke, Hemorrhagic)  Goal: Improved Sensorimotor Function  Outcome: Adequate for Care Transition     Problem: Urinary Elimination Impaired (Stroke, Hemorrhagic)  Goal: Effective Urinary Elimination  Outcome: Adequate for Care Transition     Problem: Skin Injury Risk Increased  Goal: Skin Health and Integrity  Outcome: Adequate for Care  Transition     Problem: Fall Injury Risk  Goal: Absence of Fall and Fall-Related Injury  Outcome: Adequate for Care Transition     Problem: Restraint, Nonbehavioral (Nonviolent)  Goal: Discontinuation Criteria Achieved  Outcome: Adequate for Care Transition  Goal: Personal Dignity and Safety Maintained  Outcome: Adequate for Care Transition

## 2021-12-01 NOTE — PLAN OF CARE
Problem: Adult Inpatient Plan of Care  Goal: Plan of Care Review  Outcome: Ongoing, Progressing  Goal: Absence of Hospital-Acquired Illness or Injury  Outcome: Ongoing, Progressing  Intervention: Prevent VTE (venous thromboembolism)  Flowsheets (Taken 12/1/2021 0500)  VTE Prevention/Management:   remove, assess skin and reapply sequential compression device   bleeding risk assessed   bleeding precautions maintained   ROM (active) performed     Problem: Diabetes Comorbidity  Goal: Blood Glucose Level Within Desired Range  Outcome: Ongoing, Progressing  Intervention: Maintain Glycemic Control  Flowsheets (Taken 12/1/2021 0500)  Glycemic Management: blood glucose monitoring

## 2021-12-02 ENCOUNTER — RESEARCH ENCOUNTER (OUTPATIENT)
Dept: RESEARCH | Facility: HOSPITAL | Age: 61
End: 2021-12-02
Payer: COMMERCIAL

## 2021-12-02 LAB — VIT B1 BLD-MCNC: 27 UG/L (ref 38–122)

## 2021-12-02 NOTE — PT/OT/SLP DISCHARGE
Occupational Therapy Discharge Summary    Violeta Champion  MRN: [id number]   Principal Problem: Cerebrovascular accident (CVA) due to embolism      Patient Discharged from acute Occupational Therapy on 12/1/21.  Please refer to prior OT note dated 11/30/21 for functional status.    Assessment:      Patient appropriate for care in another setting.    Objective:     GOALS:   Multidisciplinary Problems     Occupational Therapy Goals        Problem: Occupational Therapy Goal    Goal Priority Disciplines Outcome Interventions   Occupational Therapy Goal     OT, PT/OT Ongoing, Progressing    Description: Goals to be met by: 2 weeks (12/13/21)     Patient will increase functional independence with ADLs by performing:    UE Dressing with Minimal Assistance.  Grooming while seated with Contact Guard Assistance.  Sitting at edge of bed x10 minutes with Stand by Assistance.  Supine to sit with Minimal Assistance.  Stand pivot transfers with Moderate Assistance.  Pt will follow 3/3 one-step commands to participate in ADL task.    Goals to be met by: 2 weeks (11/27/21)     Patient will increase functional independence with ADLs by performing:    UE Dressing with Minimal Assistance.  Grooming while seated with Minimal Assistance.  Sitting at edge of bed x10 minutes with Minimal Assistance. - Met 11/26  Supine to sit with Minimal Assistance.  Stand pivot transfers with Moderate Assistance.  Pt will follow 3/3 one-step commands to participate in ADL task.                     Reasons for Discontinuation of Therapy Services  Transfer to alternate level of care.      Plan:     Patient Discharged to: Inpatient Rehab    12/2/2021

## 2021-12-03 LAB — PYRIDOXAL SERPL-MCNC: <2 UG/L (ref 5–50)

## 2021-12-03 NOTE — PHYSICIAN QUERY
PT Name: Violeta Champion  MR #: [id number]     DOCUMENTATION CLARIFICATION      CDS/: Blanca Martínez RN             Contact information:  [email or web address]  This form is a permanent document in the medical record.    Query Date: December 3, 2021    By submitting this query, we are merely seeking further clarification of documentation to reflect the severity of illness of your patient. Please utilize your independent clinical judgment when addressing the question(s) below.     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  · The left ventricle is normal in size with normal systolic function. The estimated ejection fraction is 60%.  · Normal right ventricular size with normal right ventricular systolic function.  · Aneurysmal interatrial septum without evidence of interatrial shunting.  · Normal appearing left atrial appendage. No thrombus is present in the appendage     DORCAS 11/24      Provider, please specify the acuity/chronicity of __endocarditis__________________:    [  x ] Acute   [   ] Chronic   [   ] Subacute   [   ] Acute on chronic   [   ] Past history only, not a current diagnosis   [   ] Clinically Undetermined           Please document in your progress notes daily for the duration of treatment until resolved, and include in your discharge summary.  Form No. [id number]

## 2021-12-06 ENCOUNTER — TELEPHONE (OUTPATIENT)
Dept: INFECTIOUS DISEASES | Facility: CLINIC | Age: 61
End: 2021-12-06
Payer: COMMERCIAL

## 2021-12-29 ENCOUNTER — PATIENT OUTREACH (OUTPATIENT)
Dept: ADMINISTRATIVE | Facility: OTHER | Age: 61
End: 2021-12-29
Payer: COMMERCIAL

## 2022-01-26 NOTE — CONSULTS
Attempted to contact patient at [phone] x [phone] with no answer. Unable to leave a voicemail message. Voicemail box is full.     Patient is scheduled today, 01/26/2022 at 10:40 am with Dr. Walker in Primary Care.     Notified this morning by staff that appointment slot is not for a new mediciad patient and the first available appointment is not until the end of this year.     Susan Christian, RN  RN Nurse Navigator  [facility]- [facility]         See full consult note from Eugene Camarillo MD on 11/13/2021.

## 2022-02-18 DIAGNOSIS — E11.9 TYPE 2 DIABETES MELLITUS WITHOUT COMPLICATION: ICD-10-CM

## 2022-05-18 DIAGNOSIS — E11.9 TYPE 2 DIABETES MELLITUS WITHOUT COMPLICATION: ICD-10-CM

## 2022-09-14 DIAGNOSIS — E11.9 TYPE 2 DIABETES MELLITUS WITHOUT COMPLICATION: ICD-10-CM

## 2022-10-18 ENCOUNTER — PATIENT OUTREACH (OUTPATIENT)
Dept: ADMINISTRATIVE | Facility: HOSPITAL | Age: 62
End: 2022-10-18
Payer: COMMERCIAL

## 2022-11-28 ENCOUNTER — PATIENT OUTREACH (OUTPATIENT)
Dept: ADMINISTRATIVE | Facility: HOSPITAL | Age: 62
End: 2022-11-28
Payer: COMMERCIAL

## 2023-02-28 ENCOUNTER — PATIENT OUTREACH (OUTPATIENT)
Dept: ADMINISTRATIVE | Facility: HOSPITAL | Age: 63
End: 2023-02-28
Payer: COMMERCIAL

## 2023-03-01 NOTE — PROGRESS NOTES
Chart reviewed  Immunizations reconciled   Left message regarding follow up appointments and htn

## 2023-07-13 NOTE — PROGRESS NOTES
BRIEF HISTORY:    Patient presents for therapeutic injections in the bilateral Knee(s) for OA. No complaints expressed at this time. The patient was counseled about risks and benefits of knee Visco-supplementation and other injections in general, including but not limited to pain, infection even that requiring surgery to clean out, failure to provide relief, transient flare, tissue damage. Patient expressed understanding, was given the opportunity to ask questions and any questions answered and consented verbally. Time out performed for localization, medication and patient identification using multiple identifiers.    Procedure Note:    Following a standard, sterile 2-antiseptic prep and negative arthrocentesis, Euflexxa was instilled in the bilateral knee(s) with no immediate discomfort. The procedure was tolerated well with no immediate adverse effects.    Follow up for next in series as scheduled.      Libtayo Counseling- I discussed with the patient the risks of Libtayo including but not limited to nausea, vomiting, diarrhea, and bone or muscle pain.  The patient verbalized understanding of the proper use and possible adverse effects of Libtayo.  All of the patient's questions and concerns were addressed.

## 2023-08-14 ENCOUNTER — PATIENT OUTREACH (OUTPATIENT)
Dept: ADMINISTRATIVE | Facility: HOSPITAL | Age: 63
End: 2023-08-14
Payer: COMMERCIAL

## 2023-11-13 NOTE — PROGRESS NOTES
HPI     Pt is coming in for contact dispense     Last edited by Mellisa Harris on 1/16/2020  2:31 PM. (History)            Assessment /Plan     For exam results, see Encounter Report.    Myopia with astigmatism and presbyopia, bilateral      Good VA and movement. CLRX updated. RTC 10 mo Routine and CL exam.                   head/neck/back

## (undated) DEVICE — ADHESIVE DERMABOND ADVANCED

## (undated) DEVICE — NDL 18GA X1 1/2 REG BEVEL

## (undated) DEVICE — ELECTRODE BLADE INSULATED 1 IN

## (undated) DEVICE — PACK UNIVERSAL SPLIT II

## (undated) DEVICE — SOL 0.9% NACL IRRI.IN STERIL

## (undated) DEVICE — ELECTRODE REM PLYHSV RETURN 9

## (undated) DEVICE — SYS LABEL CORRECT MED

## (undated) DEVICE — SEE MEDLINE ITEM 146417

## (undated) DEVICE — SUT CTD VICRYL 4-0 BR PS-2

## (undated) DEVICE — SPONGE LAP 18X18 PREWASHED

## (undated) DEVICE — TRAY MINOR GEN SURG

## (undated) DEVICE — GAUZE FLUFF XXLG 36X36 2 PLY

## (undated) DEVICE — COVER PROBE NL STRL 3.6X96IN

## (undated) DEVICE — SUT SILK 3-0 SH 18IN BLACK

## (undated) DEVICE — BRA POST SURGICAL WHT 52-55IN

## (undated) DEVICE — DRAPE STERI INSTRUMENT 1018

## (undated) DEVICE — SUT VICRYL 3-0 27 SH

## (undated) DEVICE — CONTAINER SPECIMEN STRL 4OZ